# Patient Record
Sex: MALE | Race: WHITE | NOT HISPANIC OR LATINO | URBAN - METROPOLITAN AREA
[De-identification: names, ages, dates, MRNs, and addresses within clinical notes are randomized per-mention and may not be internally consistent; named-entity substitution may affect disease eponyms.]

---

## 2021-01-17 ENCOUNTER — EMERGENCY (EMERGENCY)
Facility: HOSPITAL | Age: 26
LOS: 1 days | Discharge: HOME | End: 2021-01-17
Attending: EMERGENCY MEDICINE | Admitting: EMERGENCY MEDICINE
Payer: COMMERCIAL

## 2021-01-17 VITALS
TEMPERATURE: 98 F | WEIGHT: 169.98 LBS | HEIGHT: 71 IN | OXYGEN SATURATION: 98 % | DIASTOLIC BLOOD PRESSURE: 82 MMHG | HEART RATE: 97 BPM | SYSTOLIC BLOOD PRESSURE: 127 MMHG | RESPIRATION RATE: 16 BRPM

## 2021-01-17 DIAGNOSIS — F11.99 OPIOID USE, UNSPECIFIED WITH UNSPECIFIED OPIOID-INDUCED DISORDER: ICD-10-CM

## 2021-01-17 LAB
ALBUMIN SERPL ELPH-MCNC: 3.5 G/DL — SIGNIFICANT CHANGE UP (ref 3.3–5)
ALP SERPL-CCNC: 86 U/L — SIGNIFICANT CHANGE UP (ref 30–120)
ALT FLD-CCNC: 22 U/L DA — SIGNIFICANT CHANGE UP (ref 10–60)
ANION GAP SERPL CALC-SCNC: 7 MMOL/L — SIGNIFICANT CHANGE UP (ref 5–17)
APAP SERPL-MCNC: <1 UG/ML — LOW (ref 10–30)
AST SERPL-CCNC: 21 U/L — SIGNIFICANT CHANGE UP (ref 10–40)
BASOPHILS # BLD AUTO: 0.04 K/UL — SIGNIFICANT CHANGE UP (ref 0–0.2)
BASOPHILS NFR BLD AUTO: 0.3 % — SIGNIFICANT CHANGE UP (ref 0–2)
BILIRUB SERPL-MCNC: 0.8 MG/DL — SIGNIFICANT CHANGE UP (ref 0.2–1.2)
BUN SERPL-MCNC: 20 MG/DL — SIGNIFICANT CHANGE UP (ref 7–23)
CALCIUM SERPL-MCNC: 9 MG/DL — SIGNIFICANT CHANGE UP (ref 8.4–10.5)
CHLORIDE SERPL-SCNC: 95 MMOL/L — LOW (ref 96–108)
CO2 SERPL-SCNC: 30 MMOL/L — SIGNIFICANT CHANGE UP (ref 22–31)
CREAT SERPL-MCNC: 0.97 MG/DL — SIGNIFICANT CHANGE UP (ref 0.5–1.3)
EOSINOPHIL # BLD AUTO: 0.31 K/UL — SIGNIFICANT CHANGE UP (ref 0–0.5)
EOSINOPHIL NFR BLD AUTO: 2 % — SIGNIFICANT CHANGE UP (ref 0–6)
ETHANOL SERPL-MCNC: <3 MG/DL — SIGNIFICANT CHANGE UP (ref 0–3)
GLUCOSE SERPL-MCNC: 109 MG/DL — HIGH (ref 70–99)
HCT VFR BLD CALC: 36.4 % — LOW (ref 39–50)
HGB BLD-MCNC: 11.7 G/DL — LOW (ref 13–17)
IMM GRANULOCYTES NFR BLD AUTO: 0.5 % — SIGNIFICANT CHANGE UP (ref 0–1.5)
LYMPHOCYTES # BLD AUTO: 13.8 % — SIGNIFICANT CHANGE UP (ref 13–44)
LYMPHOCYTES # BLD AUTO: 2.11 K/UL — SIGNIFICANT CHANGE UP (ref 1–3.3)
MCHC RBC-ENTMCNC: 26.8 PG — LOW (ref 27–34)
MCHC RBC-ENTMCNC: 32.1 GM/DL — SIGNIFICANT CHANGE UP (ref 32–36)
MCV RBC AUTO: 83.5 FL — SIGNIFICANT CHANGE UP (ref 80–100)
MONOCYTES # BLD AUTO: 0.9 K/UL — SIGNIFICANT CHANGE UP (ref 0–0.9)
MONOCYTES NFR BLD AUTO: 5.9 % — SIGNIFICANT CHANGE UP (ref 2–14)
NEUTROPHILS # BLD AUTO: 11.89 K/UL — HIGH (ref 1.8–7.4)
NEUTROPHILS NFR BLD AUTO: 77.5 % — HIGH (ref 43–77)
NRBC # BLD: 0 /100 WBCS — SIGNIFICANT CHANGE UP (ref 0–0)
PCP SPEC-MCNC: SIGNIFICANT CHANGE UP
PLATELET # BLD AUTO: 266 K/UL — SIGNIFICANT CHANGE UP (ref 150–400)
POTASSIUM SERPL-MCNC: 3.8 MMOL/L — SIGNIFICANT CHANGE UP (ref 3.5–5.3)
POTASSIUM SERPL-SCNC: 3.8 MMOL/L — SIGNIFICANT CHANGE UP (ref 3.5–5.3)
PROT SERPL-MCNC: 6.9 G/DL — SIGNIFICANT CHANGE UP (ref 6–8.3)
RBC # BLD: 4.36 M/UL — SIGNIFICANT CHANGE UP (ref 4.2–5.8)
RBC # FLD: 12.8 % — SIGNIFICANT CHANGE UP (ref 10.3–14.5)
SALICYLATES SERPL-MCNC: <0.2 MG/DL — LOW (ref 2.8–20)
SARS-COV-2 IGG SERPL QL IA: NEGATIVE — SIGNIFICANT CHANGE UP
SARS-COV-2 IGM SERPL IA-ACNC: 0.07 INDEX — SIGNIFICANT CHANGE UP
SARS-COV-2 RNA SPEC QL NAA+PROBE: SIGNIFICANT CHANGE UP
SODIUM SERPL-SCNC: 132 MMOL/L — LOW (ref 135–145)
WBC # BLD: 15.32 K/UL — HIGH (ref 3.8–10.5)
WBC # FLD AUTO: 15.32 K/UL — HIGH (ref 3.8–10.5)

## 2021-01-17 PROCEDURE — 93010 ELECTROCARDIOGRAM REPORT: CPT

## 2021-01-17 PROCEDURE — 90792 PSYCH DIAG EVAL W/MED SRVCS: CPT | Mod: 95

## 2021-01-17 PROCEDURE — 85025 COMPLETE CBC W/AUTO DIFF WBC: CPT

## 2021-01-17 PROCEDURE — 93005 ELECTROCARDIOGRAM TRACING: CPT

## 2021-01-17 PROCEDURE — 86769 SARS-COV-2 COVID-19 ANTIBODY: CPT

## 2021-01-17 PROCEDURE — U0003: CPT

## 2021-01-17 PROCEDURE — 99285 EMERGENCY DEPT VISIT HI MDM: CPT

## 2021-01-17 PROCEDURE — 36415 COLL VENOUS BLD VENIPUNCTURE: CPT

## 2021-01-17 PROCEDURE — 80053 COMPREHEN METABOLIC PANEL: CPT

## 2021-01-17 PROCEDURE — 99285 EMERGENCY DEPT VISIT HI MDM: CPT | Mod: 25

## 2021-01-17 PROCEDURE — U0005: CPT

## 2021-01-17 PROCEDURE — 84443 ASSAY THYROID STIM HORMONE: CPT

## 2021-01-17 PROCEDURE — 80307 DRUG TEST PRSMV CHEM ANLYZR: CPT

## 2021-01-17 NOTE — ED PROVIDER NOTE - PATIENT PORTAL LINK FT
You can access the FollowMyHealth Patient Portal offered by [facility] by registering at the following website: [email or web address]. By joining [facility]’s FollowMyHealth portal, you will also be able to view your health information using other applications (apps) compatible with our system.

## 2021-01-17 NOTE — ED PROVIDER NOTE - PROGRESS NOTE DETAILS
Jeannette ADAMS for ED attending, Dr. Sears: Spoke with pt's mother, who states pt with no real psych hx, but has extensive drug use hx. Reports pt has been depressed since brother  1 month ago. She noted him to be banging his head against wall yesterday and hallucinating, talking to someone who isn't there. Mother feels he is a danger to himself. Labs done. Pt ate tray of food. Still wants to leave. Awaiting Telepsych eval. Seen by Telepsych. Dr Hugo. Cleared for Discharge home. he will speak to pts mom about safety plan.

## 2021-01-17 NOTE — ED ADULT NURSE NOTE - OBJECTIVE STATEMENT
24 yo BIBA [facility] for agitation, as per [facility] pt mother called as pt was acting bizarre and agitated and aggressive . pt denies being agitated and or aggressive and states he was having a adverse reaction to new medication for withdrawals, pt reports hx of heroin abuse , states the last heroin use was around 01:00am. Pt denies being suicidal and or homicidal at this time, will place pt on 1:1 for risk of elopement , risk for hurting others and or himself.

## 2021-01-17 NOTE — ED ADULT TRIAGE NOTE - CHIEF COMPLAINT QUOTE
" I was nauseated earlier " as per pt  As per EMS, mother called EMS, pt has been agitated, acting bizarre, last use of Heroin at 1 am "

## 2021-01-17 NOTE — ED BEHAVIORAL HEALTH ASSESSMENT NOTE - SUMMARY
25 year-old man with opioid use disorder, history of detox/rehab/MAT, denying psychiatric diagnoses or hospitalizations, denying suicide attempts, who was BIB police for an evaluation. Psychiatry was consulted because of a report that the patient was banging his head and was possibly suicidal in the early morning hours while he was intoxicated on Kratom and possibly other substances. Patient meets criteria for opioid use disorder. He's been struggling with abstinence and has come across challenges in his recovery with his brother passing away last month and insurance problems preventing continuation of Vivitrol. It is reassuring that the patient is in the planning stage of his recovery at this point, stating his plan is to present to [facility] tomorrow for rehab. The patient is tyree for safety and is denying SI or HI. I do not have safety concerns for this patient from a psychiatric perspective that would warrant inpatient psychiatric treatment against his will; he is not interested in voluntary inpatient psychiatric treatment. He is at risk for overdosing from heroin and/or other substances. I attempted to mitigate this risk by informing the patient and his mother on harm reduction techniques, informing them of MAT options, and we are also faxing referrals to the ED for substance treatment in case the patient decides against representing to [facility] (or in the event [facility] is not accepting new patients tomorrow). The mother confirms that she has Narcan at home.

## 2021-01-17 NOTE — ED BEHAVIORAL HEALTH NOTE - BEHAVIORAL HEALTH NOTE
PRE-HOSPITAL COURSE  ===================  SOURCE:  Second-hand information via EMR documentation and primary RNAme.   DETAILS: Patient was BIB EMS    ===================  ED COURSE:   SOURCE:  Second-hand information via EMR documentation and primary RNAme.   ARRIVAL:  Patient was BIB EMS   BELONGINGS:  Clothing  BEHAVIOR: Complied with triage protocols –provided blood/urine, changed into a hospital gown, and allowed staff to wand/collect belongings without incident. Patient denies SI plan or ideation, denies HI, denies AH, and denies VH. Patient noted to be agitated stating that he wants to be discharged. Patient appears well-kempt, maintains eye contact, has a linear thought process, and is A&Ox3. No aggression or behavioral issues reported.   TREATMENT: No prn medications, restraints, security interventions or manual holds required.   VISITORS: Patient is unaccompanied by family or social supports.   ========================  FOR EACH COLLATERAL  ========================  NAME: Luli Bledsoe  NUMBER: [phone]  RELATIONSHIP: Mother   RELIABILITY: Reliable   COMMENTS:     =======================  PATIENT DEMOGRAPHICS: 25 year-old, male, single, domiciled with mother, unemployed, and a non-caregiver.   ========================  HPI  BASELINE FUNCTIONING: Collateral stated that patient has an extensive substance abuse history. She reported that patient has multiple detox/rehab admissions and usually discharges AMA and starts using immediately. Patient is not in treatment with a therapist or psychiatrist. Patient is not currently prescribed psychotropic medications.     DATE HPI STARTED: Yesterday   DECOMPENSATION: Collateral stated that patient was intoxicated yesterday and began acting bizarrely. She reported that patient had hallucinations and was observed having conversations with people who were not present. Patient began hitting/punching himself and attempting to scratch his skin off. Collateral brought patient to [facility] seeking a detox admission but they released him. Today, pt was observed to be intoxicated, talking to him, and had twitching body convulsions. No other mood/anxiety/psychotic symptoms reported.     SUICIDALITY: None   VIOLENCE: Pt was agitated at home   SUBSTANCE:  Heroin, opioids ?    ========================  PAST PSYCHIATRIC HISTORY  ========================  DATE PAST PSYCHIATRIC HISTORY STARTED: 7-8 years   MAIN PSYCHIATRIC DIAGNOSIS: Substance-abuse disorder   PSYCHIATRIC HOSPITALIZATIONS:  No hx of IPP. Multiple past rehab and detox admissions  SUICIDALITY:  Collateral reported that in December after patient brother  pt made a SI statement but recanted after he was taken to the ER  VIOLENCE:  Hx of agitation   SUBSTANCE USE:  hx of opioids, heroin, k2, marijuana, and other unknown substances ?    ==============  OTHER HISTORY  ==============  CURRENT MEDICATION:  None   MEDICAL HISTORY:  None   ALLERGIES: None   LEGAL ISSUES: Pt was in [employer] and on [employer] for larceny   FIREARM ACCESS: No reported access to firearms or weapons   FAMILY HISTORY: Pt brother  of an accidental overdose in 2020   DEVELOPMENTAL HISTORY: Normal developmental pattern     COVID Exposure Screen- collateral (i.e. third-party, chart review, belongings, etc; include EMS and ED staff)     1.        *Has the patient had a COVID-19 test in the last 21 days?  (  ) Yes   ( X ) No   (  ) Unknown- Reason: ______  IF YES PROCEED TO QUESTION #2. IF NO OR UNKNOWN THEN PLEASE SKIP TO QUESTION #3.  2.        Date of test: ________  3.        Do you know the result? (  ) Negative   (  ) Positive   (  ) No result available  4.        *In the past 14 days, has the patient been around anyone with a positive COVID-19 test?*  (  ) Yes   ( X ) No   (  ) Unknown- Reason (e.g. collateral uncertain, refusing to answer, etc.):  ______  IF YES PROCEED TO QUESTION #5. IF NO or UNKNOWN, PLEASE SKIP TO QUESTION #10  5.        Was the patient within 6 feet of them for at least 15 minutes? (  ) Yes   (  ) No   (  ) Unknown- Reason: ______   6.        Did the patient provide care for them? (  ) Yes   (  ) No   (  ) Unknown- Reason: ______   7.        Did the patient have direct physical contact with them (touched, hugged, or kissed them)? (  ) Yes   (  ) No    (  ) Unknown- Reason: ______   8.        Did the patient share eating or drinking utensils with them? (  ) Yes   (  ) No    (  ) Unknown- Reason: ______   9.        Have they sneezed, coughed, or somehow got respiratory droplets on the patient? (  ) Yes   (  ) No    (  ) Unknown- Reason: ______   10.     *Have you been out of [place] within the past 14 days?*  (  ) Yes   (X ) No   (  ) Unknown- Reason (e.g. patient uncertain, sedated, refusing to answer, etc.): _______  IF YES PLEASE ANSWER THE FOLLOWING QUESTIONS:  11.     Which state/country have you been

## 2021-01-17 NOTE — ED BEHAVIORAL HEALTH ASSESSMENT NOTE - DESCRIPTION
ICU Vital Signs Last 24 Hrs  T(C): 36.4 (17 Jan 2021 16:15), Max: 36.4 (17 Jan 2021 16:15)  T(F): 97.6 (17 Jan 2021 16:15), Max: 97.6 (17 Jan 2021 16:15)  HR: 97 (17 Jan 2021 16:15) (97 - 97)  BP: 127/82 (17 Jan 2021 16:15) (127/82 - 127/82)  BP(mean): --  ABP: --  ABP(mean): --  RR: 16 (17 Jan 2021 16:15) (16 - 16)  SpO2: 98% (17 Jan 2021 16:15) (98% - 98%) denies Living with mother at this time

## 2021-01-17 NOTE — ED PROVIDER NOTE - CLINICAL SUMMARY MEDICAL DECISION MAKING FREE TEXT BOX
Drug abuser, recently out of rehab, used Kratom today. Mother called EMS 2/2 agitation, reporting pt had been abusing drugs, agitated, and assaultive. Pt denies this, feels he had a bad reaction to Kratom. Denies SI or HI.   Plan: medical clearance and may need tele psych evaluation.

## 2021-01-17 NOTE — ED ADULT NURSE REASSESSMENT NOTE - NS ED NURSE REASSESS COMMENT FT1
Pt agreed to get blood work and ecg done after some encouraging and negotiating. Pt tolerated Iv process well.

## 2021-01-17 NOTE — ED PROVIDER NOTE - NSFOLLOWUPINSTRUCTIONS_ED_ALL_ED_FT
Polysubstance Abuse    WHAT YOU NEED TO KNOW:    Polysubstance abuse is the abuse of 2 or more drugs that cause impairment or distress. Examples include alcohol, nicotine, marijuana, cocaine, heroin, methamphetamine, hallucinogens such as mushrooms, or inhalants such as paint thinner. Prescribed medicines, such as opioids for pain or benzodiazepines for anxiety, are also commonly abused.    DISCHARGE INSTRUCTIONS:    Call your local emergency number (911 in the [place]) if:   •You feel you might harm yourself or others.          Return to the emergency department if:   •You have a seizure.      •You have chest pain and your heart is beating faster than usual.      •You have new shortness of breath.      •You are dizzy and lightheaded.      Call your doctor or therapist if:   •You are using drugs and think you are pregnant.      •You have withdrawal symptoms and want to start using drugs again.      •You have questions or concerns about your condition or care.      Risks of polysubstance abuse:   •Drug dependence is when you continue to use drugs, even when you know the risks. Polysubstance abuse can damage your heart, brain, lungs, liver, and gastrointestinal tract. You continue even when it causes problems with work, school, or relationships. You may have difficulty finding or keeping a job because of your drug dependence.      •Drug tolerance is when you need to use more drugs, or use them more often, to get the effects you want. You may not be able to stop using the drugs. When you try to stop, you may have withdrawal symptoms and strong cravings for the drugs.      •Drug overdose can occur when you take more drugs than your body can handle. This may be a small amount or a large amount. You can lose consciousness or have a seizure or stroke. Your heart can stop beating, or you can stop breathing. You may die from a drug overdose.      Medicines:   •Withdrawal medicines may be given according to the types of drugs you are abusing. Withdrawal from drugs can cause serious, life-threatening side effects. Certain medicines can help decrease your withdrawal symptoms and your desire for the drug. Ask for more information about the withdrawal medicines you may need.      •Mood stabilizers may be given to help prevent or treat depression or anxiety caused by drug abuse and withdrawal.      •Take your medicine as directed. Contact your healthcare provider if you think your medicine is not helping or if you have side effects. Tell him or her if you are allergic to any medicine. Keep a list of the medicines, vitamins, and herbs you take. Include the amounts, and when and why you take them. Bring the list or the pill bottles to follow-up visits. Carry your medicine list with you in case of an emergency.      Follow up with your doctor or therapist as directed: You may be referred to a specialist to treat health conditions caused by your drug use. This includes mental health, heart, or lung specialists. Write down your questions so you remember to ask them during your visits.    Therapy: You may need therapy and support to stop using drugs:   •Cognitive and behavioral therapy helps you change your thinking and behavior. It can help you develop plans to avoid the situations that make you want to use drugs. It also helps you cope with the feelings of wanting to use drugs. You may have individual or group therapy.      •Contingency management helps you set drug-free goals with a therapist. You will decide ways to celebrate your success when you reach a goal.      •Family therapy and support groups allow you and your family members to talk to and be encouraged by other people affected by drug abuse. You and your family members may attend together or separately. Ask your healthcare provider for information about programs in your area.      How polysubstance abuse affects unborn or  babies:   •If you are pregnant or get pregnant while using drugs, you may have a miscarriage or give birth early. Your baby may be born addicted to the drugs.      •Do not breastfeed your baby if you use drugs. Drugs pass from your bloodstream into your breast milk and affect your baby's health. Talk with your healthcare provider if you are using drugs and breastfeeding.      For support and more information:   •Alcoholics Anonymous  Web Address: http://www.aa.org      •Substance Abuse and Mental Health Services Administration  [address]  [place] [address]  Web Address: http://www.samhsa.gov

## 2021-01-17 NOTE — ED BEHAVIORAL HEALTH ASSESSMENT NOTE - DETAILS
I reviewed safety planning with the patient but did not fax a safety plan to the patient because his current acute and chronic risks are both low I spoke with the mother Denying SI

## 2021-01-17 NOTE — ED ADULT NURSE REASSESSMENT NOTE - NS ED NURSE REASSESS COMMENT FT1
Pt cleared by telepscyh, and is being discharge, offered pt to call his family and or girlfriend to come pick him up , pt refused, Pt also refused discharge VS.

## 2021-01-17 NOTE — ED BEHAVIORAL HEALTH ASSESSMENT NOTE - HPI (INCLUDE ILLNESS QUALITY, SEVERITY, DURATION, TIMING, CONTEXT, MODIFYING FACTORS, ASSOCIATED SIGNS AND SYMPTOMS)
25 year-old man with opioid use disorder, history of detox/rehab/MAT, denying psychiatric diagnoses or hospitalizations, denying suicide attempts, who was BIB police for an evaluation. Psychiatry was consulted because of a report that the patient was banging his head and was possibly suicidal in the early morning hours while he was intoxicated on Kratom and possibly other substances. Patient reports taking Kratom for the first time and having an adverse reaction to it. Says his mother became concerned about him, and had police take him to the ED. States he feels "anxious and claustrophobic" being held against his will in the ED. He states he wants to go home. He denies SI or HI, depressed mood or anhedonia. Denies symptoms of psychosis or zehra presently. Patient says he wants to re-present to rehab tomorrow morning and is planning to go to [facility]. Patient says his goals for the future include finishing out his parole and moving to [place] to live and work. I educated the patient about Suboxone, and he states he's familiar with medication options and used the abbreviation "MAT" himself. Patient reporting familiarity with harm reduction techniques and Narcan. Denies recent travel out of [place] or close contact with COVID+ people.     See SW note for collateral from mother.      I spoke to the mother myself to reinforce harm reduction and MAT options. She says she's familiar with how Narcan works. She says she's working on getting the patient on different insurance to make Vivitrol possible again. States he was previously on it but discontinued after insurance problems. Mother reports that the patient was psychotic in the early morning hours while intoxicated on Kratom. I explained that it's possible that the Kratom was cut with methamphetamine, a common practice called speedballing when uppers and combined with downers. Kratom itself has some stimulant properties. Results of the patient's UTOX are not presently available. In any event, she is denying safety concerns at this time and is agreeable to help facilitate his presentation to rehab tomorrow.      Records Checked: [facility], [facility] Inpatient, [facility], [facility] ED, [facility] Inpatient, [facility] CL, [facility] Outpatient Medical, HIE Outpatient , HIE ED, CVM Inpatient, CVM Outpatient, Tier Inpatient, Tier E&A, Meditech Inpatient, Meditech ED, Quick Docs, Healthix, Psyckes, One Content Inpatient, One Content CL, [facility], Social Media (For example - Facebook, Karma Snap, Huaat), Web search, Forensic Databases (For example - https://SmarTots.First Opinion.[place].gov or [email or web address].courts.[email or web address]/webcrim_attorney/DefendantSearch)      Records Checked-No Data: [facility], [facility] In[facility], [facility], [facility] ED, [facility] Inpatient, Alpha CL, [facility] Outpatient Medical, HIE Outpatient BH, HIE ED, CVM Inpatient, CVM Outpatient, Tier Inpatient, Tier E&A, Meditech Inpatient, Meditech ED, Quick Docs, One Content Inpatient, One Content CL, [facility], Social Media (For example - Facebook, Karma Snap, Huaat), Web search, Forensic Databases (For example - https://SmarTots.First Opinion.[place].gov or [email or web address])

## 2021-01-17 NOTE — ED BEHAVIORAL HEALTH ASSESSMENT NOTE - RISK ASSESSMENT
Acute and chronic risks for suicide/homicide are both low. He's denying SI/HI and is future-oriented and reports motivation for abstinence. Risk factors for violence include a history of being charged for larceny. The patient is at risk for an accidental overdose, a risk I attempted to mitigated as described above. Low Acute Suicide Risk

## 2021-01-17 NOTE — ED PROVIDER NOTE - OBJECTIVE STATEMENT
26 y/o M with PMHx of heroin abuse with prior detox, last used 1 week ago presents to the ED BIB [facility] for eval. Pt reports his mother called 911 because she was concerned about him because he was "having a reaction" to taking Kratom for the first time. Says he took Kratom because he is in "post acute withdrawal." Began having +diaphoresis, +N/V, and +abd cramping. States he feels fine in the ED and wants to go home. As per [facility], mother called 911 because pt was agitated, aggressive, and yelling at home. Pt denies this and no SI or HI. Current cigarette smoker. Occ marijuana use. 24 y/o M with PMHx of heroin abuse recently d/c from [facility] in [place] last week presents to the ED BIB [facility] for eval. Pt reports his mother called 911 because she was concerned about him because he was "having a reaction" to taking Kratom for the first time. Says he took Kratom because he is in "post acute withdrawal" as he has not used heroin in 1 week. After taking Kratom he began having +diaphoresis, +N/V, and +abd cramping. States he feels fine in the ED and wants to go home. As per [facility], mother called 911 because pt was agitated, aggressive, and yelling at home. Pt denies this and denies SI or HI. Current cigarette smoker. Occ marijuana use.

## 2021-04-30 ENCOUNTER — EMERGENCY (EMERGENCY)
Facility: HOSPITAL | Age: 26
LOS: 1 days | End: 2021-04-30
Admitting: EMERGENCY MEDICINE
Payer: OTHER GOVERNMENT

## 2021-04-30 PROCEDURE — 99285 EMERGENCY DEPT VISIT HI MDM: CPT

## 2021-04-30 PROCEDURE — 93010 ELECTROCARDIOGRAM REPORT: CPT

## 2021-04-30 PROCEDURE — 71046 X-RAY EXAM CHEST 2 VIEWS: CPT | Mod: 26

## 2021-05-12 ENCOUNTER — INPATIENT (INPATIENT)
Facility: HOSPITAL | Age: 26
LOS: 0 days | Discharge: ANOTHER HOSPITAL | End: 2021-05-13
Attending: INTERNAL MEDICINE
Payer: MEDICAID

## 2021-05-12 ENCOUNTER — OUTPATIENT (OUTPATIENT)
Dept: OUTPATIENT SERVICES | Facility: HOSPITAL | Age: 26
LOS: 1 days | End: 2021-05-12
Payer: MEDICAID

## 2021-05-12 PROCEDURE — 99285 EMERGENCY DEPT VISIT HI MDM: CPT

## 2021-05-12 PROCEDURE — 74230 X-RAY XM SWLNG FUNCJ C+: CPT | Mod: 26

## 2021-05-13 ENCOUNTER — INPATIENT (INPATIENT)
Facility: HOSPITAL | Age: 26
LOS: 2 days | Discharge: LEFT BEFORE DISCHARGE | DRG: 392 | End: 2021-05-16
Attending: STUDENT IN AN ORGANIZED HEALTH CARE EDUCATION/TRAINING PROGRAM | Admitting: HOSPITALIST
Payer: MEDICAID

## 2021-05-13 ENCOUNTER — TRANSCRIPTION ENCOUNTER (OUTPATIENT)
Age: 26
End: 2021-05-13

## 2021-05-13 VITALS
OXYGEN SATURATION: 99 % | RESPIRATION RATE: 16 BRPM | HEART RATE: 64 BPM | DIASTOLIC BLOOD PRESSURE: 65 MMHG | SYSTOLIC BLOOD PRESSURE: 102 MMHG | TEMPERATURE: 99 F

## 2021-05-13 DIAGNOSIS — K22.2 ESOPHAGEAL OBSTRUCTION: ICD-10-CM

## 2021-05-13 PROBLEM — F11.10 OPIOID ABUSE, UNCOMPLICATED: Chronic | Status: ACTIVE | Noted: 2021-01-17

## 2021-05-13 LAB
ALBUMIN SERPL ELPH-MCNC: 4.1 G/DL — SIGNIFICANT CHANGE UP (ref 3.3–5.2)
ALP SERPL-CCNC: 73 U/L — SIGNIFICANT CHANGE UP (ref 40–120)
ALT FLD-CCNC: 33 U/L — SIGNIFICANT CHANGE UP
ANION GAP SERPL CALC-SCNC: 9 MMOL/L — SIGNIFICANT CHANGE UP (ref 5–17)
AST SERPL-CCNC: 21 U/L — SIGNIFICANT CHANGE UP
BILIRUB SERPL-MCNC: 1.6 MG/DL — SIGNIFICANT CHANGE UP (ref 0.4–2)
BUN SERPL-MCNC: 17 MG/DL — SIGNIFICANT CHANGE UP (ref 8–20)
CALCIUM SERPL-MCNC: 9.2 MG/DL — SIGNIFICANT CHANGE UP (ref 8.6–10.2)
CHLORIDE SERPL-SCNC: 104 MMOL/L — SIGNIFICANT CHANGE UP (ref 98–107)
CO2 SERPL-SCNC: 28 MMOL/L — SIGNIFICANT CHANGE UP (ref 22–29)
CREAT SERPL-MCNC: 0.79 MG/DL — SIGNIFICANT CHANGE UP (ref 0.5–1.3)
GLUCOSE SERPL-MCNC: 84 MG/DL — SIGNIFICANT CHANGE UP (ref 70–99)
HCT VFR BLD CALC: 36.3 % — LOW (ref 39–50)
HGB BLD-MCNC: 10.8 G/DL — LOW (ref 13–17)
INR BLD: 1.17 RATIO — HIGH (ref 0.88–1.16)
MCHC RBC-ENTMCNC: 23.2 PG — LOW (ref 27–34)
MCHC RBC-ENTMCNC: 29.8 GM/DL — LOW (ref 32–36)
MCV RBC AUTO: 77.9 FL — LOW (ref 80–100)
PLATELET # BLD AUTO: 146 K/UL — LOW (ref 150–400)
POTASSIUM SERPL-MCNC: 4.5 MMOL/L — SIGNIFICANT CHANGE UP (ref 3.5–5.3)
POTASSIUM SERPL-SCNC: 4.5 MMOL/L — SIGNIFICANT CHANGE UP (ref 3.5–5.3)
PROT SERPL-MCNC: 6.7 G/DL — SIGNIFICANT CHANGE UP (ref 6.6–8.7)
PROTHROM AB SERPL-ACNC: 13.5 SEC — SIGNIFICANT CHANGE UP (ref 10.6–13.6)
RBC # BLD: 4.66 M/UL — SIGNIFICANT CHANGE UP (ref 4.2–5.8)
RBC # FLD: 15.2 % — HIGH (ref 10.3–14.5)
SODIUM SERPL-SCNC: 141 MMOL/L — SIGNIFICANT CHANGE UP (ref 135–145)
WBC # BLD: 4.6 K/UL — SIGNIFICANT CHANGE UP (ref 3.8–10.5)
WBC # FLD AUTO: 4.6 K/UL — SIGNIFICANT CHANGE UP (ref 3.8–10.5)

## 2021-05-13 PROCEDURE — 99223 1ST HOSP IP/OBS HIGH 75: CPT

## 2021-05-13 RX ORDER — SODIUM CHLORIDE 9 MG/ML
1000 INJECTION, SOLUTION INTRAVENOUS
Refills: 0 | Status: DISCONTINUED | OUTPATIENT
Start: 2021-05-13 | End: 2021-05-15

## 2021-05-13 RX ORDER — PANTOPRAZOLE SODIUM 20 MG/1
40 TABLET, DELAYED RELEASE ORAL DAILY
Refills: 0 | Status: DISCONTINUED | OUTPATIENT
Start: 2021-05-13 | End: 2021-05-13

## 2021-05-13 RX ORDER — ACETAMINOPHEN 500 MG
1000 TABLET ORAL ONCE
Refills: 0 | Status: COMPLETED | OUTPATIENT
Start: 2021-05-13 | End: 2021-05-13

## 2021-05-13 RX ORDER — PANTOPRAZOLE SODIUM 20 MG/1
40 TABLET, DELAYED RELEASE ORAL
Refills: 0 | Status: DISCONTINUED | OUTPATIENT
Start: 2021-05-13 | End: 2021-05-15

## 2021-05-13 RX ORDER — ENOXAPARIN SODIUM 100 MG/ML
40 INJECTION SUBCUTANEOUS DAILY
Refills: 0 | Status: DISCONTINUED | OUTPATIENT
Start: 2021-05-13 | End: 2021-05-13

## 2021-05-13 RX ADMIN — SODIUM CHLORIDE 100 MILLILITER(S): 9 INJECTION, SOLUTION INTRAVENOUS at 17:07

## 2021-05-13 RX ADMIN — PANTOPRAZOLE SODIUM 40 MILLIGRAM(S): 20 TABLET, DELAYED RELEASE ORAL at 17:07

## 2021-05-13 RX ADMIN — Medication 400 MILLIGRAM(S): at 21:25

## 2021-05-13 NOTE — CONSULT NOTE ADULT - ATTENDING COMMENTS
The patient probable has a very tight distal esophageal peptic stricture with possible superimposed barretts. Will proceed with EGD and possible savory dilation under flour tomorrow. The patient probable has a very tight distal esophageal peptic stricture with possible superimposed barretts. Will proceed with EGD and possible savory dilation under flour tomorrow. The treatment of his hep C is elective and should be addressed when his current problem has been rectified.

## 2021-05-13 NOTE — H&P ADULT - ASSESSMENT
24 y/o M with PMH of hiatal hernia, GERD, esophageal spasm (2012), illicit drug use transferred from [facility] s/p EGD with esophageal stricture.     Dysphagia secondary to esophageal stricture  - s/p MBS, esophagram, EGD at [place]  - GI consulted  - Protonix 40mg IV     GERD  Esophageal spasm   Hiatal Hernia  - Protonix 40mg IV daily    Daily tobacco use  - smoking cessation counseling given  - declines Nicotine patch    H/o drug use  - cocaine, heroin and K2  - counseling given  - last use March 2021    DVT ppx  - Lovenox SQ 26 y/o M with PMH of hiatal hernia, GERD, esophageal spasm (2012), illicit drug use transferred from [facility] s/p EGD with esophageal stricture.     Dysphagia secondary to esophageal stricture  - s/p MBS, esophagram, EGD at [place]  - GI consulted  - Protonix 40mg IV   - Speech and swallow evaluation    GERD  Esophageal spasm   Hiatal Hernia  - Protonix 40mg IV daily    Daily tobacco use  - smoking cessation counseling given  - declines Nicotine patch    H/o drug use  - cocaine, heroin and K2  - counseling given  - last use March 2021    DVT ppx  - Lovenox SQ 24 y/o M with PMH of hiatal hernia, GERD, esophageal spasm (2012), illicit drug use transferred from [facility] s/p EGD with esophageal stricture.     Dysphagia secondary to esophageal stricture  - s/p MBS, esophagram, EGD at [place]  - GI consulted  - Protonix 40mg IV   - Speech and swallow evaluation    GERD  Esophageal spasm   Hiatal Hernia  - Protonix 40mg IV daily    Daily tobacco use  - smoking cessation counseling given  - declines Nicotine patch    H/o drug use  - cocaine, heroin and K2  - counseling given  - last use March 2021    DVT ppx  - Lovenox SQ

## 2021-05-13 NOTE — H&P ADULT - NSHPPHYSICALEXAM_GEN_ALL_CORE
Vital Signs Last 24 Hrs  T(F): --  HR: --  BP: --  RR: --  SpO2: --    Physical Exam:  Constitutional: alert and oriented, in no acute distress   Neck: Soft and supple, No LAD, No JVD  Respiratory: Clear to auscultation bilaterally, no wheezes or crackles  Cardiovascular: Regular rate and rhythm, no murmurs, gallops, rubs  Gastrointestinal: Soft, non-tender to palpation, +bs  Vascular: 2+ peripheral pulses  Neurological: A/O x 3, no focal neurological deficits  Musculoskeletal: 5/5 strength b/l upper and lower extremities, no lower extremity edema bilaterally  Skin: no rashes  Psych: answering questions appropriately

## 2021-05-13 NOTE — CHART NOTE - NSCHARTNOTEFT_GEN_A_CORE
Called by RN for patient with complain of abdominal pain   Patient seen and assessed, and chart reviewed. patient is a 25y old man with a past medical history significant for Hiatal hernia, GERD, , Hep C, IV Drug user (last use 03/2021), who presents as a transfer from [facility] [place] complaining of dysphagia., with esophageal stricture and scheduled for EGD in am. Pt NPO on IVFs for EGD in am.    Vital Signs Last 24 Hrs  T(C): --  T(F): --  HR: --  BP: --  BP(mean): --  RR: --  SpO2: -- Called by RN for patient with complain of abdominal pain   Patient seen and assessed, and chart reviewed. Patient is a 25y old man with a past medical history significant for Hiatal hernia, GERD, , Hep C, IV Drug user (last use 03/2021), who presents as a transfer from [facility] [place] complaining of dysphagia., with esophageal stricture and scheduled for EGD in am. Pt NPO on IVFs for EGD in am. Patient denied nausea, vomiting, constipation, diarrhea, sob, chest pain, palpitations. Reports most recent BM was yesterday. Voiding without urinary retention/burning.    Vital Signs Last 24 Hrs    T(F): 98.8  HR: 75  BP: 112/67  RR: 15  SpO2: 99%    Physical Exam  General: Alert and oriented x4, well developed, comfortable with unlabored breathing  HEENT: NC/AT, EOMI, moist mucosa  Neck: Supple. No JVD  Cardiovasc: S1S2, RRR. No murmur  Gastroint: Soft, non-distended, mildly tender RLQ abdomen. No rebound tenderness, no guarding. Active bowel sounds throughout all quadrants.  Extrem: No pedal edema    A/P 24 Yo with abdominal pain, esophogeal stricture, PMHx Hiatal hernia, GERD, scheduled for EGD. Unclear if abd pain associated with esophageal stricture, or GERD. Pt also NPO since yesterday. No constipation, diarrhea, vomiting, nausea. EGD may shed light on pt's source of abd pain.  -Ofirmev 1000mg IVPB  Cont to monitor and manage as indicated.  RN instructed to call provider if patients condition worsens.

## 2021-05-13 NOTE — H&P ADULT - HISTORY OF PRESENT ILLNESS
26 y/o M with PMH of hiatal hernia, GERD, esophageal spasm (2012), illicit drug use presents as transfer from [facility] after EGD. The patient states he has been having difficulty swallowing for the last 2 months to the point where he was only able to drink liquids (Boost and Ensure). Patient underwent MBS and esophagram yesterday showing esophageal stricture. He was then sent for EGD showing severe stricture and as per patient, stricture was severe enough that biopsy was unable to be taken. Patient states his dysphagia worsened one month ago where he was barely able to swallow liquids. He was told this might have been caused by caustic ingestion per GI. Reports feeling tightness in his chest when he attempts to eat or drink and has occasional nausea.  24 y/o M with PMH of hiatal hernia, GERD, esophageal spasm (2012), illicit drug use presents as [place] [facility] [place]. The patient states he has been having difficulty swallowing for the last 2 months to the point where he was only able to drink liquids (Boost and Ensure). Patient underwent MBS and esophagram yesterday showing esophageal stricture. He was then sent for EGD showing severe stricture and as per patient, stricture was severe enough that biopsy was unable to be taken. Patient states his dysphagia worsened one month ago where he was barely able to swallow liquids. He was told this might have been caused by caustic ingestion per GI. Reports feeling tightness in his chest when he attempts to eat or drink and has occasional nausea.

## 2021-05-13 NOTE — CONSULT NOTE ADULT - SUBJECTIVE AND OBJECTIVE BOX
HISTORY OF PRESENT ILLNESS: This is a 25y old man with a past medical history significant for Hiatal hernia, GERD, esophageal spasms, Hep C, IV Drug user (last use 03/2021), who presents as a transfer from [facility] [place] complaining of dysphagia. As per patient yesterday at GI and had a modify Barium Swallow eval, and after abnormal results was referred for an Esophagram. After the Esophagram patient was sent to [facility] ED for an EGD where a benign stricture was seen in the middle third of the esophagus. Then patient transfer to [facility] for repeat EGD with fluoroscopic dilation  Patient stated difficulty swallowing started as mild discomfort approximately 2 months ago, where at the same time try a new drug called "K2" patient's diet has progressively changing from boost and Ensure to minimally water. Symptoms worsen 2 weeks ago where it was taking patient a long time to swallow fluids. Patient had an EGD in 2012 because of similar symptoms and was diagnosed with esophageal spasms. Denies nausea, vomiting, abdominal pain, chest pain, shortness of breath, hematemesis, hematochezia, melena.  ROS: A 14-point review of systems was completed and was otherwise negative save what was reported in the HPI.    PAST MEDICAL/SURGICAL HISTORY:  Heroin abuse    Cocaine abuse  GERD, Esophageal stricture   No significant past surgical history      SOCIAL HISTORY:  - TOBACCO: Denies  - ALCOHOL: Denies  - ILLICIT DRUG USE: Denies    FAMILY HISTORY:  No known history of gastrointestinal or liver disease;  FH: thyroid disease (Grandparent)        HOME MEDICATIONS:  Protonix 40 mg oral delayed release tablet: 1 tab(s) orally once a day (13 May 2021 12:04)    INPATIENT MEDICATIONS:  MEDICATIONS  (STANDING):  enoxaparin Injectable 40 milliGRAM(s) SubCutaneous daily  pantoprazole  Injectable 40 milliGRAM(s) IV Push daily    MEDICATIONS  (PRN):    ALLERGIES:  No Known Allergies      PHYSICAL EXAM:  Constitutional: Well-developed, well-nourished, in no apparent distress  Eyes: Sclerae anicteric, conjunctivae normal  ENMT: Mucus membranes moist, no oropharyngeal thrush noted  Respiratory: Breathing nonlabored; clear to auscultation  Cardiovascular: Regular rate and rhythm  Gastrointestinal: Soft, nontender, nondistended, normoactive bowel sounds.  Extremities: No clubbing, cyanosis or edema  Neurological: Alert and oriented to person, place and time; no asterixis  Skin: No jaundice  Musculoskeletal: No significant peripheral atrophy  Psychiatric: Affect and mood appropriate      LABS:                     HISTORY OF PRESENT ILLNESS: This is a 25y old man with a past medical history significant for Hiatal hernia, GERD, esophageal spasms, Hep C, IV Drug user (last use 03/2021), who [place] [facility]. As per patient yesterday at GI and had a modify Barium Swallow eval, and after abnormal results was referred for an Esophagram. After the Esophagram patient was sent [place] [facility] [place]GD where a benign stricture was seen in the middle third of the esophagus. Then patient transfer to [facility] for repeat EGD with fluoroscopic dilation.  Patient stated difficulty swallowing started as mild discomfort approximately 2years ago and in the last 2 months,(  the same time he try a new drug called "K2") patient's diet has progressively changing from boost and Ensure to minimally water. Symptoms worsen 2 weeks ago where it was taking patient a long time to swallow fluids. Patient had an EGD in 2012 because of similar symptoms and was diagnosed with GERD/esophageal spasms. Denies nausea, vomiting, abdominal pain, chest pain, shortness of breath, hematemesis, hematochezia, melena.  ROS: A 14-point review of systems was completed and was otherwise negative save what was reported in the HPI.    PAST MEDICAL/SURGICAL HISTORY:  Heroin abuse    Cocaine abuse  GERD, Esophageal stricture   No significant past surgical history      SOCIAL HISTORY:  - TOBACCO: Denies  - ALCOHOL: Denies  - ILLICIT DRUG USE: Denies    FAMILY HISTORY:  No known history of gastrointestinal or liver disease;  FH: thyroid disease (Grandparent)        HOME MEDICATIONS:  Protonix 40 mg oral delayed release tablet: 1 tab(s) orally once a day (13 May 2021 12:04)    INPATIENT MEDICATIONS:  MEDICATIONS  (STANDING):  enoxaparin Injectable 40 milliGRAM(s) SubCutaneous daily  pantoprazole  Injectable 40 milliGRAM(s) IV Push daily    MEDICATIONS  (PRN):    ALLERGIES:  No Known Allergies      PHYSICAL EXAM:  Constitutional: Well-developed, well-nourished, in no apparent distress  Eyes: Sclerae anicteric, conjunctivae normal  ENMT: Mucus membranes moist, no oropharyngeal thrush noted  Respiratory: Breathing nonlabored; clear to auscultation  Cardiovascular: Regular rate and rhythm  Gastrointestinal: Soft, nontender, nondistended, normoactive bowel sounds.  Extremities: No clubbing, cyanosis or edema  Neurological: Alert and oriented to person, place and time; no asterixis  Skin: No jaundice  Musculoskeletal: No significant peripheral atrophy  Psychiatric: Affect and mood appropriate      LABS:                     HISTORY OF PRESENT ILLNESS: This is a 25y old man with a past medical history significant for Hiatal hernia, GERD, , Hep C, IV Drug user (last use 2021), who presents as a transfer from [facility] [place] complaining of dysphagia. For past year he has had intermittant dysphagia to solids but now much worse and progressive for past two months to the point where he cant even tolerate clear liquids.. Earlier this year his brother  of an overdise and from  to march he was using IV heroin, cocaine and K2. More recently he has been residing in [facility] for rehab in [place]. Then patient transfer to [facility] for repeat EGD with fluoroscopic dilation.  Patient stated difficulty swallowing started as mild discomfort approximately 2years ago and in the last 2 months,(  the same time he try a new drug called "K2") patient's diet has progressively changing from boost and Ensure to minimally water. Symptoms worsen 2 weeks ago where it was taking patient a long time to swallow fluids. Patient had an EGD in  because of similar symptoms and was diagnosed with GERD/esophageal spasms. Denies nausea, vomiting, abdominal pain, chest pain, shortness of breath, hematemesis, hematochezia, melena.  ROS: A 14-point review of systems was completed and was otherwise negative save what was reported in the HPI.    PAST MEDICAL/SURGICAL HISTORY:  Heroin abuse    Cocaine abuse  GERD, Esophageal stricture   No significant past surgical history      SOCIAL HISTORY:  - TOBACCO: Denies  - ALCOHOL: Denies  - ILLICIT DRUG USE: Denies    FAMILY HISTORY:  No known history of gastrointestinal or liver disease;  FH: thyroid disease (Grandparent)        HOME MEDICATIONS:  Protonix 40 mg oral delayed release tablet: 1 tab(s) orally once a day (13 May 2021 12:04)    INPATIENT MEDICATIONS:  MEDICATIONS  (STANDING):  enoxaparin Injectable 40 milliGRAM(s) SubCutaneous daily  pantoprazole  Injectable 40 milliGRAM(s) IV Push daily    MEDICATIONS  (PRN):    ALLERGIES:  No Known Allergies      PHYSICAL EXAM:  Constitutional: Well-developed, well-nourished, in no apparent distress  Eyes: Sclerae anicteric, conjunctivae normal  ENMT: Mucus membranes moist, no oropharyngeal thrush noted  Respiratory: Breathing nonlabored; clear to auscultation  Cardiovascular: Regular rate and rhythm  Gastrointestinal: Soft, nontender, nondistended, normoactive bowel sounds.  Extremities: No clubbing, cyanosis or edema  Neurological: Alert and oriented to person, place and time; no asterixis  Skin: No jaundice  Musculoskeletal: No significant peripheral atrophy  Psychiatric: Affect and mood appropriate      LABS:                     HISTORY OF PRESENT ILLNESS: This is a 25y old man with a past medical history significant for Hiatal hernia, GERD, , Hep C, IV Drug user (last use 2021), who presents as a [place] [facility] [place] of dysphagia. For past year he has had intermittant dysphagia to solids but now much worse and progressive for past two months to the point where he cant even tolerate clear liquids.. Earlier this year his brother  of an overdise and from  to March he was using IV heroin, cocaine and K2. More recently he has been residing in [facility] for rehab in [place]. Yesterday patient's  mother picked him up in [place] and brought him to [facility] for an [place]ophagram which showed a smooth but tight distal esophageal stricture. He was admitted and underwent EGD by Dr. Suh this AM that showed a tight stricture thru which he could not pass. He stated the stricture was in the mid esophagus. He was then transferred here for further treatment. He underwent an EGD  for reflux and was told he had a hiatal hernia and reflux with esophageal spasm. he was treated with pantoprazole for one year after which the med was stopped. he was having daily heartburn ever since for which he takes tums. The heartburn could awaken him from sleep. The heartburn stopped when the dysphagia worsened two months ago. ROS: A 14-point review of systems was completed and was otherwise negative save what was reported in the HPI.    PAST MEDICAL/SURGICAL HISTORY:  Heroin abuse  chronic hep C -never treated and just recently diagnosed  Cocaine abuse  GERD, Esophageal stricture         SOCIAL HISTORY:  - TOBACCO: Denies  - ALCOHOL: Denies  - ILLICIT DRUG USE: Denies    FAMILY HISTORY:  No known history of gastrointestinal or liver disease;  FH: thyroid disease (Grandparent)        HOME MEDICATIONS:  Protonix 40 mg oral delayed release tablet: 1 tab(s) orally once a day (13 May 2021 12:04)    INPATIENT MEDICATIONS:  MEDICATIONS  (STANDING):  enoxaparin Injectable 40 milliGRAM(s) SubCutaneous daily  pantoprazole  Injectable 40 milliGRAM(s) IV Push daily    MEDICATIONS  (PRN):    ALLERGIES:  No Known Allergies      PHYSICAL EXAM:  Constitutional: Well-developed, well-nourished, in no apparent distress  Eyes: Sclerae anicteric, conjunctivae normal  ENMT: Mucus membranes moist, no oropharyngeal thrush noted  Respiratory: Breathing nonlabored; clear to auscultation  Cardiovascular: Regular rate and rhythm  Gastrointestinal: Soft, nontender, nondistended, normoactive bowel sounds.  Extremities: No clubbing, cyanosis or edema  Neurological: Alert and oriented to person, place and time; no asterixis  Skin: No jaundice  Musculoskeletal: No significant peripheral atrophy  Psychiatric: Affect and mood appropriate      LABS:

## 2021-05-13 NOTE — H&P ADULT - NSICDXFAMILYHX_GEN_ALL_CORE_FT
FAMILY HISTORY:  Grandparent  Still living? Unknown  FH: thyroid disease, Age at diagnosis: Age Unknown

## 2021-05-13 NOTE — H&P ADULT - NSHPSOCIALHISTORY_GEN_ALL_CORE
Lives with family  Recently incarcerated from March until 2 weeks ago  Denies alcohol use  Smokes 1/2 pack of cigarettes per day  Former Heroin, Cocaine and K2 use (last used March 2021)

## 2021-05-13 NOTE — PATIENT PROFILE ADULT - PATIENT REPRESENTATIVE: ( YOU CAN CHOOSE ANY PERSON THAT CAN ASSIST YOU WITH YOUR HEALTH CARE PREFERENCES, DOES NOT HAVE TO BE A SPOUSE, IMMEDIATE FAMILY OR SIGNIFICANT OTHER/PARTNER)
PHYSICIAN NEXT STEPS:  Review Only    CHIEF COMPLAINT:  Chief Complaint/Protocol Used: Back Pain  Onset: Got worse today        ASSESSMENT:  ? Onset: Got worse today    ? Normal True  ? Normal, no trouble breathing True  ? Onset: Got worse today , took pain medication , not helping , had physucal therapy earlier  today    ? Location: Midback and  bilateral  hip areas   ? Severity: Severe , Pain = 8 in a scale of 1-10   ? Pattern: Constant   ? Cause: Chronic problems  to  back areas   ? Neurologic Symptoms: None    ? Other Symptoms: Afebrile ; no  abddominal  pain , no urinary symptoms   -------------------------------------------------------    DISPOSITION:  Disposition Recommendation: See Physician within 4 Hours (or PCP triage)  Questions that led to disposition:  ? [1] SEVERE back pain (e.g., excruciating, unable to do any normal activities) AND [2] not improved 2 hours after pain medicine  Patient Directed To: Unspecified  Patient Intended Action: Other      CALL NOTES:  11/11/2020 at 4:44 PM by Ra Hoyt  ? Patient states she has no  SOB ,no chest pain  , not feeling faint  ; Hx COPD , has oxygen at home  ; symptoms that  got worse today   :  severe middle   back pain  and   ran out of  her  Norco   pain med since   yesterday . 11/11/2020 at 4:43 PM by   Ra Hoyt  ? Upgraded to   ED Disposition due to symptoms    ; patient declined   and insist to   have PCP   to  send refill of  her  [place] to her  pharmacy   because she ran out of it already  since yesterday . Patient   states the last   time she   refilled   her   Norco was  9/22/2020  and has been calling  for  refill request since  last  week ;  Magy Matias MD on call  Dr. Beard   ; patient informed  ;Advised patient   to  go to the hospital ER or call  911  if her   symptoms get worse and patient   agreed with the  advice.    DISPOSITION OVERRIDE/PROVIDER CONSULT:  Disposition Override: Go to ED Now  Override Source: Nurse clinical  judgment  Consulted with PCP: No  Consulted with On-Call Physician: No    CALLER CONTACT INFO:  Name: Soheila Boykin (Self)  Phone 1: [phone] (Home Phone) - Preferred      ENCOUNTER STARTED:  11/11/20 04:31:08 PM  ENCOUNTER ASSIGNED TO/CLOSED BY:  Ra Hoyt @ 11/11/20 04:45:05 PM      -------------------------------------------------------    UNDERSTANDS CARE ADVICE: Yes    AGREES WITH CARE ADVICE: No    WILL FOLLOW CARE ADVICE: No    -------------------------------------------------------   same name as above

## 2021-05-13 NOTE — CONSULT NOTE ADULT - PROBLEM SELECTOR RECOMMENDATION 9
Please keep Patient NPO  IV-Fluids  Will schedule EGD with dilation tomorrow in AM Please keep Patient NPO  IV-Fluids  Will schedule EGD with dilation tomorrow in AM  Please discontinue Lovenox for EGD with dilation tomorrow in AM. Please keep Patient NPO  IV-Fluids  Will schedule EGD with dilation tomorrow in AM  Please discontinue Lovenox for EGD with dilation tomorrow in AM.  IV pantoprazole BID

## 2021-05-14 DIAGNOSIS — F11.10 OPIOID ABUSE, UNCOMPLICATED: ICD-10-CM

## 2021-05-14 LAB
ALBUMIN SERPL ELPH-MCNC: 3.5 G/DL — SIGNIFICANT CHANGE UP (ref 3.3–5.2)
ALP SERPL-CCNC: 66 U/L — SIGNIFICANT CHANGE UP (ref 40–120)
ALT FLD-CCNC: 26 U/L — SIGNIFICANT CHANGE UP
ANION GAP SERPL CALC-SCNC: 8 MMOL/L — SIGNIFICANT CHANGE UP (ref 5–17)
ANISOCYTOSIS BLD QL: SLIGHT — SIGNIFICANT CHANGE UP
AST SERPL-CCNC: 17 U/L — SIGNIFICANT CHANGE UP
BASOPHILS # BLD AUTO: 0.03 K/UL — SIGNIFICANT CHANGE UP (ref 0–0.2)
BASOPHILS NFR BLD AUTO: 0.9 % — SIGNIFICANT CHANGE UP (ref 0–2)
BILIRUB SERPL-MCNC: 1.5 MG/DL — SIGNIFICANT CHANGE UP (ref 0.4–2)
BUN SERPL-MCNC: 14 MG/DL — SIGNIFICANT CHANGE UP (ref 8–20)
BURR CELLS BLD QL SMEAR: PRESENT — SIGNIFICANT CHANGE UP
CALCIUM SERPL-MCNC: 8.8 MG/DL — SIGNIFICANT CHANGE UP (ref 8.6–10.2)
CHLORIDE SERPL-SCNC: 105 MMOL/L — SIGNIFICANT CHANGE UP (ref 98–107)
CO2 SERPL-SCNC: 25 MMOL/L — SIGNIFICANT CHANGE UP (ref 22–29)
COVID-19 SPIKE DOMAIN AB INTERP: NEGATIVE — SIGNIFICANT CHANGE UP
COVID-19 SPIKE DOMAIN ANTIBODY RESULT: 0.4 U/ML — SIGNIFICANT CHANGE UP
CREAT SERPL-MCNC: 0.74 MG/DL — SIGNIFICANT CHANGE UP (ref 0.5–1.3)
ELLIPTOCYTES BLD QL SMEAR: SIGNIFICANT CHANGE UP
EOSINOPHIL # BLD AUTO: 0.06 K/UL — SIGNIFICANT CHANGE UP (ref 0–0.5)
EOSINOPHIL NFR BLD AUTO: 1.7 % — SIGNIFICANT CHANGE UP (ref 0–6)
GIANT PLATELETS BLD QL SMEAR: PRESENT — SIGNIFICANT CHANGE UP
GLUCOSE SERPL-MCNC: 72 MG/DL — SIGNIFICANT CHANGE UP (ref 70–99)
HCT VFR BLD CALC: 33.8 % — LOW (ref 39–50)
HGB BLD-MCNC: 10.2 G/DL — LOW (ref 13–17)
LYMPHOCYTES # BLD AUTO: 1.73 K/UL — SIGNIFICANT CHANGE UP (ref 1–3.3)
LYMPHOCYTES # BLD AUTO: 49.6 % — HIGH (ref 13–44)
MANUAL SMEAR VERIFICATION: SIGNIFICANT CHANGE UP
MCHC RBC-ENTMCNC: 23 PG — LOW (ref 27–34)
MCHC RBC-ENTMCNC: 30.2 GM/DL — LOW (ref 32–36)
MCV RBC AUTO: 76.3 FL — LOW (ref 80–100)
MICROCYTES BLD QL: SLIGHT — SIGNIFICANT CHANGE UP
MONOCYTES # BLD AUTO: 0.39 K/UL — SIGNIFICANT CHANGE UP (ref 0–0.9)
MONOCYTES NFR BLD AUTO: 11.3 % — SIGNIFICANT CHANGE UP (ref 2–14)
NEUTROPHILS # BLD AUTO: 1.27 K/UL — LOW (ref 1.8–7.4)
NEUTROPHILS NFR BLD AUTO: 36.5 % — LOW (ref 43–77)
OVALOCYTES BLD QL SMEAR: SLIGHT — SIGNIFICANT CHANGE UP
PLAT MORPH BLD: NORMAL — SIGNIFICANT CHANGE UP
PLATELET # BLD AUTO: 140 K/UL — LOW (ref 150–400)
POIKILOCYTOSIS BLD QL AUTO: SIGNIFICANT CHANGE UP
POLYCHROMASIA BLD QL SMEAR: SLIGHT — SIGNIFICANT CHANGE UP
POTASSIUM SERPL-MCNC: 3.9 MMOL/L — SIGNIFICANT CHANGE UP (ref 3.5–5.3)
POTASSIUM SERPL-SCNC: 3.9 MMOL/L — SIGNIFICANT CHANGE UP (ref 3.5–5.3)
PROT SERPL-MCNC: 6 G/DL — LOW (ref 6.6–8.7)
RBC # BLD: 4.43 M/UL — SIGNIFICANT CHANGE UP (ref 4.2–5.8)
RBC # FLD: 14.9 % — HIGH (ref 10.3–14.5)
RBC BLD AUTO: ABNORMAL
SARS-COV-2 IGG+IGM SERPL QL IA: 0.4 U/ML — SIGNIFICANT CHANGE UP
SARS-COV-2 IGG+IGM SERPL QL IA: NEGATIVE — SIGNIFICANT CHANGE UP
SARS-COV-2 RNA SPEC QL NAA+PROBE: SIGNIFICANT CHANGE UP
SODIUM SERPL-SCNC: 138 MMOL/L — SIGNIFICANT CHANGE UP (ref 135–145)
WBC # BLD: 3.49 K/UL — LOW (ref 3.8–10.5)
WBC # FLD AUTO: 3.49 K/UL — LOW (ref 3.8–10.5)

## 2021-05-14 PROCEDURE — 43200 ESOPHAGOSCOPY FLEXIBLE BRUSH: CPT

## 2021-05-14 PROCEDURE — 99221 1ST HOSP IP/OBS SF/LOW 40: CPT

## 2021-05-14 PROCEDURE — 71250 CT THORAX DX C-: CPT | Mod: 26

## 2021-05-14 PROCEDURE — 99233 SBSQ HOSP IP/OBS HIGH 50: CPT

## 2021-05-14 PROCEDURE — 71045 X-RAY EXAM CHEST 1 VIEW: CPT | Mod: 26

## 2021-05-14 RX ADMIN — PANTOPRAZOLE SODIUM 40 MILLIGRAM(S): 20 TABLET, DELAYED RELEASE ORAL at 05:09

## 2021-05-14 RX ADMIN — PANTOPRAZOLE SODIUM 40 MILLIGRAM(S): 20 TABLET, DELAYED RELEASE ORAL at 18:07

## 2021-05-14 NOTE — BRIEF OPERATIVE NOTE - NSICDXBRIEFPROCEDURE_GEN_ALL_CORE_FT
PROCEDURES:  EGD with biopsy 13-May-2021 14:53:25  Deniz Craft  
PROCEDURES:  EGD 14-May-2021 13:55:59  Amador Cox

## 2021-05-14 NOTE — BRIEF OPERATIVE NOTE - NSICDXBRIEFPOSTOP_GEN_ALL_CORE_FT
POST-OP DIAGNOSIS:  Esophageal stricture 14-May-2021 13:56:41  Amador Cox  
POST-OP DIAGNOSIS:  Nausea and vomiting 13-May-2021 14:54:29  Deniz Craft

## 2021-05-14 NOTE — CONSULT NOTE ADULT - SUBJECTIVE AND OBJECTIVE BOX
HISTORY OF PRESENT ILLNESS: This is a 25y old man with a past medical history significant for Hiatal hernia, GERD,  Hep C, IV Drug user (last use 2021), who [place] [facility] [place]. For past year he has had intermittant dysphagia to solids but now much worse and progressive for past two months to the point where he cant even tolerate clear liquids. Earlier this year his brother  of an overdose and from  to March he was using IV heroin, cocaine and K2. More recently he has been residing in [facility] for rehab in [place]. Yesterday patient's  mother picked him up in [place] and [place] [facility] [place] which showed a smooth but tight distal esophageal stricture. He was admitted and underwent EGD by Dr. Suh this AM that showed a tight stricture thru which he could not pass. He stated the stricture was in the mid esophagus. He was then transferred here for further treatment. He underwent an EGD  for reflux and was told he had a hiatal hernia and reflux with esophageal spasm. he was treated with pantoprazole for one year after which the med was stopped. He was having daily heartburn ever since for which he takes tums. The heartburn could awaken him from sleep. The heartburn stopped when the dysphagia worsened two months ago. ROS: A 14-point review of systems was completed and was otherwise negative save what was reported in the HPI.    PAST MEDICAL/SURGICAL HISTORY:  Heroin abuse  chronic hep C -never treated and just recently diagnosed  Cocaine abuse  GERD, Esophageal stricture         SOCIAL HISTORY:  - TOBACCO: Denies  - ALCOHOL: Denies  - ILLICIT DRUG USE: Denies    FAMILY HISTORY:  No known history of gastrointestinal or liver disease;  FH: thyroid disease (Grandparent)        HOME MEDICATIONS:  Protonix 40 mg oral delayed release tablet: 1 tab(s) orally once a day (13 May 2021 12:04)    INPATIENT MEDICATIONS:  MEDICATIONS  (STANDING):  enoxaparin Injectable 40 milliGRAM(s) SubCutaneous daily  pantoprazole  Injectable 40 milliGRAM(s) IV Push daily        ALLERGIES:  No Known Allergies      PHYSICAL EXAM:  Constitutional: Well-developed, well-nourished, in no apparent distress  Eyes: Sclerae anicteric, conjunctivae normal  ENMT: Mucus membranes moist, no oropharyngeal thrush noted  Respiratory: Breathing nonlabored; clear to auscultation  Cardiovascular: Regular rate and rhythm  Gastrointestinal: Soft, nontender, nondistended, normoactive bowel sounds.  Extremities: No clubbing, cyanosis or edema  Neurological: Alert and oriented to person, place and time; no asterixis  Skin: No jaundice  Musculoskeletal: No significant peripheral atrophy  Psychiatric: Affect and mood appropriate      LABS:                        10.2   3.49  )-----------( 140      ( 14 May 2021 06:00 )             33.8       138  |  105  |  14.0  ----------------------------<  72  3.9   |  25.0  |  0.74    Ca    8.8      14 May 2021 06:00    TPro  6.0<L>  /  Alb  3.5  /  TBili  1.5  /  DBili  x   /  AST  17  /  ALT  26  /  AlkPhos  66      < from: CT Chest No Cont (21 @ 15:49) >  IMPRESSION:  Diffuse esophageal wall thickening with a linear tract of contrast to the left of the mid to distal esophageal lumen, possibly a small amount of contrast tracking in the esophageal wall from a tear in the mid to distal esophagus.    No pneumomediastinum or contrast outside the esophagus.    The findings were discussed with Dr. Cox on 2021 4:20 PM      < end of copied text >                     HISTORY OF PRESENT ILLNESS: This is a 25y old man with a past medical history significant for Hiatal hernia, GERD,  Hep C, IV Drug user (last use 2021), who [place] [facility] [place]. For past year he has had intermittant dysphagia to solids but now much worse and progressive for past two months to the point where he cant even tolerate clear liquids. Earlier this year his brother  of an overdose and from  to March he was using IV heroin, cocaine and K2. More recently he has been residing in [facility] for rehab in [place]. Yesterday patient's  mother picked him up in [place] and brought him [place] [facility] [place] which showed a smooth but tight distal esophageal stricture. He was admitted and underwent EGD by Dr. Suh this AM that showed a tight stricture thru which he could not pass. He stated the stricture was in the mid esophagus. He was then transferred here for further treatment. He underwent an EGD  for reflux and was told he had a hiatal hernia and reflux with esophageal spasm. he was treated with pantoprazole for one year after which the med was stopped. He was having daily heartburn ever since for which he takes tums. The heartburn could awaken him from sleep. The heartburn stopped when the dysphagia worsened two months ago.   Review of Systems  CONSTITUTIONAL:  Fevers[ ] chills[ ] sweats[ ] fatigue[ ] weight loss[ ] weight gain [ ]                                     NEGATIVE [x ]   NEURO:  parathesias[ ] seizures [ ]  syncope [ ]  confusion [ ]                                                                                NEGATIVE[x ]   EYES: glasses[ ]  blurry vision[ ]  discharge[ ] pain[ ] glaucoma [ ]                                                                          NEGATIVE[x ]   ENMT:  difficulty hearing [ ]  vertigo[ ]  dysphagia[ ] epistaxis[ ] recent dental work [ ]                                    NEGATIVE[x ]   CV:  chest pain[ ] palpitations[ ] ALVARADO [ ] diaphoresis [ ]                                                                                           NEGATIVE[x ]   RESPIRATORY:  wheezing[ ] SOB[ ] cough [ ] sputum[ ] hemoptysis[ ]                                                                  NEGATIVE[x ]   GI:  nausea[x ]  vommiting [ x]  diarrhea[ ] constipation [ ] melena [ ]    unable to keep any food or liquids down, today after EGD able to drink a small amount                   : hematuria[ ]  dysuria[ ] urgency[ ] incontinence[ ]                                                                                            NEGATIVE[x ]   MUSKULOSKELETAL:  arthritis[ ]  joint swelling [ ] muscle weakness [ ]                                                                NEGATIVE[x ]   SKIN/BREAST:  rash[ ] itching [x ]  hair loss[ ] masses[ ]                 at times states itch from abdomen to legs.  currently no itchiness                                                    NEGATIVE[ ]   PSYCH:  dementia [ ] depresion [ ] anxiety[ ]        Brother recentlly dies from OD.  PT had history of IV drug use  HEME/LYMPH:  bruises easily[ ] enlarged lymph nodes[ ] tender lymph nodes[ ]                                               NEGATIVE[x ]   ENDOCRINE:  cold intolerance[ ] heat intolerance[ ] polydipsia[ ]                                                                          NEGATIVE[x ]     PAST MEDICAL/SURGICAL HISTORY:  Heroin abuse  chronic hep C -never treated and just recently diagnosed  Cocaine abuse  GERD, Esophageal stricture         Social History (marital status, living situation, occupation, tobacco use, alcohol and drug use, and sexual history): Lives with family  Recently incarcerated from March until 2 weeks ago  Denies alcohol use  Smokes 1/2 pack of cigarettes per day  Former Heroin, Cocaine and K2 use (last used 2021)      FAMILY HISTORY:  No known history of gastrointestinal or liver disease;  FH: thyroid disease (Grandparent)        HOME MEDICATIONS:  Protonix 40 mg oral delayed release tablet: 1 tab(s) orally once a day (13 May 2021 12:04)    INPATIENT MEDICATIONS:  MEDICATIONS  (STANDING):  enoxaparin Injectable 40 milliGRAM(s) SubCutaneous daily  pantoprazole  Injectable 40 milliGRAM(s) IV Push daily        ALLERGIES:  No Known Allergies      PHYSICAL EXAM:  Constitutional: Well-developed, well-nourished, in no apparent distress  Eyes: Sclerae anicteric, conjunctivae normal  ENMT: Mucus membranes moist, no oropharyngeal thrush noted  Respiratory: Breathing nonlabored; clear to auscultation  Cardiovascular: Regular rate and rhythm  Gastrointestinal: Soft, nontender, nondistended, normoactive bowel sounds.  Extremities: No clubbing, cyanosis or edema  Neurological: Alert and oriented to person, place and time; no asterixis  Skin: No jaundice  Musculoskeletal: No significant peripheral atrophy  Psychiatric: Affect and mood appropriate      LABS:                        10.2   3.49  )-----------( 140      ( 14 May 2021 06:00 )             33.8   05-14    138  |  105  |  14.0  ----------------------------<  72  3.9   |  25.0  |  0.74    Ca    8.8      14 May 2021 06:00    TPro  6.0<L>  /  Alb  3.5  /  TBili  1.5  /  DBili  x   /  AST  17  /  ALT  26  /  AlkPhos  66      < from: CT Chest No Cont (21 @ 15:49) >  IMPRESSION:  Diffuse esophageal wall thickening with a linear tract of contrast to the left of the mid to distal esophageal lumen, possibly a small amount of contrast tracking in the esophageal wall from a tear in the mid to distal esophagus.    No pneumomediastinum or contrast outside the esophagus.    The findings were discussed with Dr. Cox on 2021 4:20 PM      < end of copied text >

## 2021-05-14 NOTE — CONSULT NOTE ADULT - PROBLEM SELECTOR RECOMMENDATION 9
Care as per GI   Possible esophageal stent   Thoracic surgery will continue to follow patient  Please call Thoracic service for any issues.  Plan discusses with Dr. Vizcarra. Care as per GI   Possible esophageal stent   Thoracic surgery will continue to follow patient no surgical intervention at this time  Please call Thoracic service for any issues.  Plan discusses with Dr. Vizcarra.

## 2021-05-14 NOTE — BRIEF OPERATIVE NOTE - NSICDXBRIEFPREOP_GEN_ALL_CORE_FT
PRE-OP DIAGNOSIS:  Nausea and vomiting 13-May-2021 14:54:15  Deniz Craft  
PRE-OP DIAGNOSIS:  Esophageal stricture 14-May-2021 13:56:14  Amador Cox

## 2021-05-14 NOTE — BRIEF OPERATIVE NOTE - COMMENTS
Get CT chest. If no evidence of extravasation, schedule EGD with stent on tuesday. CT surgery consult.
symptomatic treatment for recurrent chronic nausea and vomiting. F/U with Dr. Batista as outpatient.

## 2021-05-14 NOTE — BRIEF OPERATIVE NOTE - OPERATION/FINDINGS
EGD: Pin hole esophageal stricture noted. at 40 cm. Even pediatric scope could not be advanced. Then guidwire placed. Gastrograffin injected. ? extravasation noted. Eventually procedure terminated. FU chest xray revealed contrast in the stomach.
normal EGD. Antral and body biopsy obtained for H Pylori

## 2021-05-14 NOTE — PROGRESS NOTE ADULT - ASSESSMENT
24 y/o M with PMH of hiatal hernia, GERD, esophageal spasm (2012), illicit drug use [place] [facility] [place] with [place] strict[place].     Dysphagia secondary to esophageal stricture  - s/p MBS, esophagram, EGD at [facility]  - GI consulted, EGD with dilation today  - Protonix 40mg IV     GERD  Esophageal spasm   Hiatal Hernia  - Protonix 40mg IV daily    Leukopenia/anemia/thrombocytopenia  - will monitor  - will check HIV     Daily tobacco use  - smoking cessation counseling given  - declines Nicotine patch    H/o drug use  - cocaine, heroin and K2  - counseling given  - last use March 2021    DVT ppx  - Lovenox SQ held for procedure    Dispo: 1-2 days pending clinical course 24 y/o M with PMH of hiatal hernia, GERD, esophageal spasm (2012), illicit drug use transferred from [facility] [place]/p EGD with esophageal stricture.     Dysphagia secondary to esophageal stricture  - s/p MBS, esophagram, EGD at [facility]  - GI consulted, EGD with dilation today  - Protonix 40mg IV     GERD  Esophageal spasm   Hiatal Hernia  - Protonix 40mg IV daily    Leukopenia/anemia/thrombocytopenia  - will monitor  - will check HIV     Daily tobacco use  - smoking cessation counseling given  - declines Nicotine patch    H/o drug use  - cocaine, heroin and K2  - counseling given  - last use March 2021    DVT ppx  - Lovenox SQ held for procedure    Dispo: pending clinical course    Updated patient's mother regarding plan of care.

## 2021-05-14 NOTE — CONSULT NOTE ADULT - ASSESSMENT
25 year old male with history of GERD, esophageal spasm and illicit drug use now with Esophageal stricture.   25 year old male with history of GERD, esophageal spasm, Hep C and illicit drug use now with Esophageal stricture.

## 2021-05-15 VITALS
RESPIRATION RATE: 18 BRPM | TEMPERATURE: 99 F | OXYGEN SATURATION: 93 % | SYSTOLIC BLOOD PRESSURE: 125 MMHG | DIASTOLIC BLOOD PRESSURE: 61 MMHG | HEART RATE: 118 BPM

## 2021-05-15 LAB
ALBUMIN SERPL ELPH-MCNC: 3.8 G/DL — SIGNIFICANT CHANGE UP (ref 3.3–5.2)
ALP SERPL-CCNC: 63 U/L — SIGNIFICANT CHANGE UP (ref 40–120)
ALT FLD-CCNC: 21 U/L — SIGNIFICANT CHANGE UP
ANION GAP SERPL CALC-SCNC: 9 MMOL/L — SIGNIFICANT CHANGE UP (ref 5–17)
AST SERPL-CCNC: 13 U/L — SIGNIFICANT CHANGE UP
BASOPHILS # BLD AUTO: 0.01 K/UL — SIGNIFICANT CHANGE UP (ref 0–0.2)
BASOPHILS NFR BLD AUTO: 0.2 % — SIGNIFICANT CHANGE UP (ref 0–2)
BILIRUB SERPL-MCNC: 1 MG/DL — SIGNIFICANT CHANGE UP (ref 0.4–2)
BUN SERPL-MCNC: 11 MG/DL — SIGNIFICANT CHANGE UP (ref 8–20)
CALCIUM SERPL-MCNC: 8.7 MG/DL — SIGNIFICANT CHANGE UP (ref 8.6–10.2)
CHLORIDE SERPL-SCNC: 106 MMOL/L — SIGNIFICANT CHANGE UP (ref 98–107)
CO2 SERPL-SCNC: 26 MMOL/L — SIGNIFICANT CHANGE UP (ref 22–29)
CREAT SERPL-MCNC: 0.72 MG/DL — SIGNIFICANT CHANGE UP (ref 0.5–1.3)
EOSINOPHIL # BLD AUTO: 0.01 K/UL — SIGNIFICANT CHANGE UP (ref 0–0.5)
EOSINOPHIL NFR BLD AUTO: 0.2 % — SIGNIFICANT CHANGE UP (ref 0–6)
GLUCOSE BLDC GLUCOMTR-MCNC: 177 MG/DL — HIGH (ref 70–99)
GLUCOSE BLDC GLUCOMTR-MCNC: 87 MG/DL — SIGNIFICANT CHANGE UP (ref 70–99)
GLUCOSE SERPL-MCNC: 80 MG/DL — SIGNIFICANT CHANGE UP (ref 70–99)
HCT VFR BLD CALC: 33.4 % — LOW (ref 39–50)
HGB BLD-MCNC: 10.2 G/DL — LOW (ref 13–17)
HIV 1+2 AB+HIV1 P24 AG SERPL QL IA: SIGNIFICANT CHANGE UP
IMM GRANULOCYTES NFR BLD AUTO: 0.2 % — SIGNIFICANT CHANGE UP (ref 0–1.5)
LYMPHOCYTES # BLD AUTO: 1.6 K/UL — SIGNIFICANT CHANGE UP (ref 1–3.3)
LYMPHOCYTES # BLD AUTO: 25.2 % — SIGNIFICANT CHANGE UP (ref 13–44)
MCHC RBC-ENTMCNC: 23.1 PG — LOW (ref 27–34)
MCHC RBC-ENTMCNC: 30.5 GM/DL — LOW (ref 32–36)
MCV RBC AUTO: 75.6 FL — LOW (ref 80–100)
MONOCYTES # BLD AUTO: 0.55 K/UL — SIGNIFICANT CHANGE UP (ref 0–0.9)
MONOCYTES NFR BLD AUTO: 8.7 % — SIGNIFICANT CHANGE UP (ref 2–14)
NEUTROPHILS # BLD AUTO: 4.16 K/UL — SIGNIFICANT CHANGE UP (ref 1.8–7.4)
NEUTROPHILS NFR BLD AUTO: 65.5 % — SIGNIFICANT CHANGE UP (ref 43–77)
PLATELET # BLD AUTO: 145 K/UL — LOW (ref 150–400)
POTASSIUM SERPL-MCNC: 4 MMOL/L — SIGNIFICANT CHANGE UP (ref 3.5–5.3)
POTASSIUM SERPL-SCNC: 4 MMOL/L — SIGNIFICANT CHANGE UP (ref 3.5–5.3)
PROT SERPL-MCNC: 6.1 G/DL — LOW (ref 6.6–8.7)
RBC # BLD: 4.42 M/UL — SIGNIFICANT CHANGE UP (ref 4.2–5.8)
RBC # FLD: 14.6 % — HIGH (ref 10.3–14.5)
SODIUM SERPL-SCNC: 140 MMOL/L — SIGNIFICANT CHANGE UP (ref 135–145)
WBC # BLD: 6.34 K/UL — SIGNIFICANT CHANGE UP (ref 3.8–10.5)
WBC # FLD AUTO: 6.34 K/UL — SIGNIFICANT CHANGE UP (ref 3.8–10.5)

## 2021-05-15 PROCEDURE — 99233 SBSQ HOSP IP/OBS HIGH 50: CPT

## 2021-05-15 RX ORDER — ENOXAPARIN SODIUM 100 MG/ML
40 INJECTION SUBCUTANEOUS DAILY
Refills: 0 | Status: DISCONTINUED | OUTPATIENT
Start: 2021-05-15 | End: 2021-05-15

## 2021-05-15 RX ORDER — DEXTROSE 50 % IN WATER 50 %
15 SYRINGE (ML) INTRAVENOUS ONCE
Refills: 0 | Status: DISCONTINUED | OUTPATIENT
Start: 2021-05-15 | End: 2021-05-15

## 2021-05-15 RX ORDER — BENZOCAINE AND MENTHOL 5; 1 G/100ML; G/100ML
1 LIQUID ORAL EVERY 6 HOURS
Refills: 0 | Status: DISCONTINUED | OUTPATIENT
Start: 2021-05-15 | End: 2021-05-15

## 2021-05-15 RX ORDER — DEXTROSE 50 % IN WATER 50 %
25 SYRINGE (ML) INTRAVENOUS ONCE
Refills: 0 | Status: DISCONTINUED | OUTPATIENT
Start: 2021-05-15 | End: 2021-05-15

## 2021-05-15 RX ORDER — GLUCAGON INJECTION, SOLUTION 0.5 MG/.1ML
1 INJECTION, SOLUTION SUBCUTANEOUS ONCE
Refills: 0 | Status: DISCONTINUED | OUTPATIENT
Start: 2021-05-15 | End: 2021-05-15

## 2021-05-15 RX ORDER — HYDROCORTISONE 1 %
1 OINTMENT (GRAM) TOPICAL ONCE
Refills: 0 | Status: COMPLETED | OUTPATIENT
Start: 2021-05-15 | End: 2021-05-15

## 2021-05-15 RX ORDER — BENZOCAINE AND MENTHOL 5; 1 G/100ML; G/100ML
1 LIQUID ORAL ONCE
Refills: 0 | Status: COMPLETED | OUTPATIENT
Start: 2021-05-15 | End: 2021-05-15

## 2021-05-15 RX ORDER — DEXTROSE 50 % IN WATER 50 %
12.5 SYRINGE (ML) INTRAVENOUS ONCE
Refills: 0 | Status: DISCONTINUED | OUTPATIENT
Start: 2021-05-15 | End: 2021-05-15

## 2021-05-15 RX ORDER — SODIUM CHLORIDE 9 MG/ML
1000 INJECTION, SOLUTION INTRAVENOUS
Refills: 0 | Status: DISCONTINUED | OUTPATIENT
Start: 2021-05-15 | End: 2021-05-15

## 2021-05-15 RX ADMIN — BENZOCAINE AND MENTHOL 1 LOZENGE: 5; 1 LIQUID ORAL at 12:48

## 2021-05-15 RX ADMIN — PANTOPRAZOLE SODIUM 40 MILLIGRAM(S): 20 TABLET, DELAYED RELEASE ORAL at 05:43

## 2021-05-15 RX ADMIN — BENZOCAINE AND MENTHOL 1 LOZENGE: 5; 1 LIQUID ORAL at 17:05

## 2021-05-15 RX ADMIN — SODIUM CHLORIDE 75 MILLILITER(S): 9 INJECTION, SOLUTION INTRAVENOUS at 11:41

## 2021-05-15 RX ADMIN — BENZOCAINE AND MENTHOL 1 LOZENGE: 5; 1 LIQUID ORAL at 01:13

## 2021-05-15 RX ADMIN — Medication 1 APPLICATION(S): at 23:24

## 2021-05-15 RX ADMIN — BENZOCAINE AND MENTHOL 1 LOZENGE: 5; 1 LIQUID ORAL at 23:23

## 2021-05-15 RX ADMIN — BENZOCAINE AND MENTHOL 1 LOZENGE: 5; 1 LIQUID ORAL at 05:57

## 2021-05-15 RX ADMIN — PANTOPRAZOLE SODIUM 40 MILLIGRAM(S): 20 TABLET, DELAYED RELEASE ORAL at 17:05

## 2021-05-15 RX ADMIN — ENOXAPARIN SODIUM 40 MILLIGRAM(S): 100 INJECTION SUBCUTANEOUS at 11:42

## 2021-05-15 NOTE — PROGRESS NOTE ADULT - ASSESSMENT
Esophageal stricture/ Etiology prolonged reflux/benign stricture/? Caustic ingestion: Will reattempt EGD on tuesday with possible stent placement. However, extreme likelihood for complications. Thoracic surgery back up has been requested. Appreciate thoracic surgery evaluation. PPI bid. NPO except some ice chips.

## 2021-05-15 NOTE — CHART NOTE - NSCHARTNOTEFT_GEN_A_CORE
26 y/o M admitted with dysphagia secondary to esophageal stricture  Patient with complaint of itching of abdomen and bilateral LE  Patient seen at bedside  +MP rash on abdomen with scratch marks, no rash of LE seen   Denies SOB, throat swelling  VSS    Patient states he gets this rash at home and usually uses thick lotion with good effect   Patient states he gets allergic skin reactions to detergent frequently   No new medications seen on MR   Likely allergic dermatitis from hospital linens   Patient also requesting a shower, patient ambulates without assist and is independent  Shower order placed   Hydrocortisone 1% ointment to abdomen and LE   RN to notify with any acute changes

## 2021-05-15 NOTE — CHART NOTE - NSCHARTNOTEFT_GEN_A_CORE
Called by RN for patient complaining of sore throat. Patient assessed and his chart reviewed. No oropharyngeal erythema, or exudates noted on exam. Cepacol 1 lozenge PO x1 dose ordered.

## 2021-05-15 NOTE — PROVIDER CONTACT NOTE (OTHER) - SITUATION
Pt requesting a shower, no shower order. Pt. also c/o itchiness/rash on ABD/lower extremities. Redness noted to ABD and legs from scratching, slight rash noted to ABD.

## 2021-05-15 NOTE — PROGRESS NOTE ADULT - ASSESSMENT
24 y/o M with PMH of hiatal hernia, GERD, esophageal spasm (2012), illicit drug use transferred from [facility] s/p EGD with esophageal stricture.     Dysphagia secondary to esophageal stricture  - s/p MBS, esophagram, EGD at [facility]  - GI consult appreciated  - Protonix 40mg IV   - s/p EGD on 5/14: Pin hole esophageal stricture noted. at 40 cm. Even pediatric scope could not be advanced. Then guidwire placed. Gastrograffin injected. ? extravasation noted. Eventually procedure terminated.  - CT chest: Diffuse esophageal wall thickening with a linear tract of contrast to the left of the mid to distal esophageal lumen, possibly a small amount of contrast tracking in the esophageal wall from a tear in the mid to distal esophagus. No pneumomediastinum or contrast outside the esophagus.  - CT surgery consulted, no surgical intervention at this time  - Possible esophageal stent placement on Tuesday    GERD  Esophageal spasm   Hiatal Hernia  - Protonix 40mg IV daily    Leukopenia/anemia/thrombocytopenia  - will monitor  - HIV in process    Daily tobacco use  - smoking cessation counseling given  - declines Nicotine patch    H/o drug use  - cocaine, heroin and K2  - counseling given  - last use March 2021    DVT ppx  - Lovenox SQ     Dispo: pending clinical course, esophageal stent placement on Tuesday

## 2021-05-15 NOTE — PROGRESS NOTE ADULT - ASSESSMENT
HISTORY OF PRESENT ILLNESS: This is a 25y old man with a past medical history significant for Hiatal hernia, GERD,  Hep C, IV Drug user (last use 2021), who [place] [facility] [place] of [place]ph[place]ia. For past year he has had intermittant dysphagia to solids but now much worse and progressive for past two months to the point where he cant even tolerate clear liquids. Earlier this year his brother  of an overdose and from  to March he was using IV heroin, cocaine and K2. More recently he has been residing in [facility] for rehab in [place]. Yesterday patient's  mother picked him up in [place] and brought him [place] [facility] [place] which showed a smooth but tight distal esophageal stricture. He was admitted and underwent EGD by Dr. Suh this AM that showed a tight stricture thru which he could not pass. He stated the stricture was in the mid esophagus. He was then transferred here for further treatment. He underwent an EGD  for reflux and was told he had a hiatal hernia and reflux with esophageal spasm. he was treated with pantoprazole for one year after which the med was stopped. He was having daily heartburn ever since for which he takes tums. The heartburn could awaken him from sleep. The heartburn stopped when the dysphagia worsened two months ago.      25 year old male with history of GERD, esophageal spasm, Hep C and illicit drug use now with Esophageal stricture.      Plan for GI to place esophageal stent Monday     Cont medical mgmt as per primary team    [place] Thoracic team in am rounds

## 2021-05-15 NOTE — PROGRESS NOTE ADULT - SUBJECTIVE AND OBJECTIVE BOX
Chief complaint: Esophageal Stricture    Patient seen and examined at bedside. No acute overnight events reported. Patient underwent EGD yesterday however unsuccessful. Patient states he has no complaints, denies fever, chills, cough, chest pain, shortness of breath, nausea or vomiting.     Vital Signs Last 24 Hrs  T(F): 98.3 (15 May 2021 04:51), Max: 98.7 (14 May 2021 21:39)  HR: 56 (15 May 2021 04:51) (56 - 95)  BP: 102/55 (15 May 2021 04:51) (102/55 - 128/72)  RR: 18 (15 May 2021 04:51) (18 - 19)  SpO2: 95% (15 May 2021 04:51) (95% - 98%)    Physical Exam:  Constitutional: alert and oriented, in no acute distress   Neck: Soft and supple  Respiratory: Clear to auscultation bilaterally, no wheezes or crackles  Cardiovascular: Regular rate and rhythm, no murmurs, gallops, rubs  Gastrointestinal: Soft, non-tender to palpation, +bs  Vascular: 2+ peripheral pulses  Neurological: A/O x 3, no focal neurological deficits  Musculoskeletal: 5/5 strength b/l upper and lower extremities, no lower extremity edema bilaterally    Labs:                        10.2   6.34  )-----------( 145      ( 15 May 2021 06:12 )             33.4   05-15    140  |  106  |  11.0  ----------------------------<  80  4.0   |  26.0  |  0.72    Ca    8.7      15 May 2021 06:12    TPro  6.1<L>  /  Alb  3.8  /  TBili  1.0  /  DBili  x   /  AST  13  /  ALT  21  /  AlkPhos  63  05-15  
  Subjective:  Patient in bed , Resting     T(C): 36.9 (05-15-21 @ 10:57), Max: 37.1 (05-14-21 @ 21:39)  HR: 82 (05-15-21 @ 10:57) (56 - 95)  BP: 114/68 (05-15-21 @ 10:57) (102/55 - 128/72)  ABP: --  ABP(mean): --  RR: 18 (05-15-21 @ 10:57) (18 - 19)  SpO2: 98% (05-15-21 @ 10:57) (95% - 98%) RA   Wt(kg): --  CVP(mm Hg): --  CO: --  CI: --  PA: --                                              Tele: SR            05-15    140  |  106  |  11.0  ----------------------------<  80  4.0   |  26.0  |  0.72    Ca    8.7      15 May 2021 06:12    TPro  6.1<L>  /  Alb  3.8  /  TBili  1.0  /  DBili  x   /  AST  13  /  ALT  21  /  AlkPhos  63  05-15                               10.2   6.34  )-----------( 145      ( 15 May 2021 06:12 )             33.4        PT/INR - ( 13 May 2021 15:56 )   PT: 13.5 sec;   INR: 1.17 ratio                  CAPILLARY BLOOD GLUCOSE               CXR:  < from: Xray Chest 1 View-PORTABLE IMMEDIATE (Xray Chest 1 View-PORTABLE IMMEDIATE .) (05.14.21 @ 13:59) >  Two expiratory/kyphotic views are compared to 5/12/2021 and demonstrate an ET tube with the tip 6 cm above the rachel..    Contrast is evident in the esophagus and stomach with no extravasation.    Mild cardiomegaly with a left ventricular configuration.    Normal pulmonary vasculature with no consolidation, effusion, gross adenopathy, pneumothorax or acute osseous finding.    IMPRESSION:  ET tube in satisfactory position.    Contrast in the esophagus and stomach with no extravasation. Patient underwent EGD on 5/14/2021 which demonstrated a pinhole esophageal stricture at 40 cm.    Mild cardiomegaly with a left ventricular configuration, recommend correlation for possible hypertension    < end of copied text >          Assessment  Neuro: alert awake NAD  Pulm: decreased at bases b/l   CV: RRR S1 S2   Abd: soft nt nd + BS          Assessment:  25yMale    with PAST MEDICAL & SURGICAL HISTORY:  Heroin abuse    Cocaine abuse    No significant past surgical history          Plan:  
Chief complaint: Esophageal stricture    Patient seen and examined at bedside. Overnight patient complained of abdominal pain. Denies headache, fever, chills, cough, chest pain, shortness of breath, nausea or vomiting. EGD with dilation today.     Vital Signs Last 24 Hrs  T(F): 97.5 (14 May 2021 04:30), Max: 98.8 (13 May 2021 21:40)  HR: 90 (14 May 2021 04:30) (64 - 90)  BP: 101/55 (14 May 2021 04:30) (101/55 - 112/67)  RR: 20 (14 May 2021 04:30) (16 - 20)  SpO2: 98% (14 May 2021 04:30) (98% - 99%)    Physical Exam:  Constitutional: alert and oriented, in no acute distress   Neck: Soft and supple  Respiratory: Clear to auscultation bilaterally, no wheezes or crackles  Cardiovascular: Regular rate and rhyhtm, no murmurs, gallops, rubs  Gastrointestinal: Soft, mild tenderness to palpation, +bs  Vascular: 2+ peripheral pulses  Neurological: A/O x 3, no focal neurological deficits  Musculoskeletal: 5/5 strength b/l upper and lower extremities, no lower extremity edema bilaterally    Labs:                        10.2   3.49  )-----------( 140      ( 14 May 2021 06:00 )             33.8   05-14    138  |  105  |  14.0  ----------------------------<  72  3.9   |  25.0  |  0.74    Ca    8.8      14 May 2021 06:00    TPro  6.0<L>  /  Alb  3.5  /  TBili  1.5  /  DBili  x   /  AST  17  /  ALT  26  /  AlkPhos  66  05-14  
INTERVAL HPI/OVERNIGHT EVENTS: FU for esophageal stricture. He complains of sore throat. No other complaints. Understands the procedure findings.    MEDICATIONS  (STANDING):  benzocaine 15 mG/menthol 3.6 mG (Sugar-Free) Lozenge 1 Lozenge Oral every 6 hours  dextrose 40% Gel 15 Gram(s) Oral once  dextrose 5%. 1000 milliLiter(s) (75 mL/Hr) IV Continuous <Continuous>  dextrose 50% Injectable 25 Gram(s) IV Push once  dextrose 50% Injectable 12.5 Gram(s) IV Push once  dextrose 50% Injectable 25 Gram(s) IV Push once  enoxaparin Injectable 40 milliGRAM(s) SubCutaneous daily  glucagon  Injectable 1 milliGRAM(s) IntraMuscular once  pantoprazole  Injectable 40 milliGRAM(s) IV Push two times a day    MEDICATIONS  (PRN):      Allergies    No Known Allergies    Intolerances        Vital Signs Last 24 Hrs  T(C): 36.9 (15 May 2021 10:57), Max: 37.1 (14 May 2021 21:39)  T(F): 98.5 (15 May 2021 10:57), Max: 98.7 (14 May 2021 21:39)  HR: 82 (15 May 2021 10:57) (56 - 95)  BP: 114/68 (15 May 2021 10:57) (102/55 - 128/72)  BP(mean): --  RR: 18 (15 May 2021 10:57) (18 - 19)  SpO2: 98% (15 May 2021 10:57) (95% - 98%)    LABS:                        10.2   6.34  )-----------( 145      ( 15 May 2021 06:12 )             33.4     05-15    140  |  106  |  11.0  ----------------------------<  80  4.0   |  26.0  |  0.72    Ca    8.7      15 May 2021 06:12    TPro  6.1<L>  /  Alb  3.8  /  TBili  1.0  /  DBili  x   /  AST  13  /  ALT  21  /  AlkPhos  63  05-15    PT/INR - ( 13 May 2021 15:56 )   PT: 13.5 sec;   INR: 1.17 ratio               RADIOLOGY & ADDITIONAL TESTS:  < from: CT Chest No Cont (05.14.21 @ 15:49) >     EXAM:  CT CHEST                          PROCEDURE DATE:  05/14/2021          INTERPRETATION:  CLINICAL INFORMATION: Esophageal stricture. Concern for esophageal perforation.    COMPARISON: Esophagram 5/12/2021, chest x-ray 5/14/2021, and images from endoscopy 5/14/2021    CONTRAST/COMPLICATIONS:  IV Contrast: None.  Oral Contrast: None.    PROCEDURE:  CT of the Chest was performed.  Sagittal and coronal reformats were performed.    FINDINGS:    LUNGS AND AIRWAYS: Secretions in the trachea.  Linear atelectasis or scarring in the lower lobes, greater on the left; fissural thickening along the left major fissure. Punctate right middle lobe nodule (series 2 image 25), likely a granuloma.  PLEURA: No pleural effusion.  MEDIASTINUM AND JOHN: Nolymphadenopathy. Diffuse esophageal wall thickening. Small amount of contrast in esophagus with contrast in the stomach and imaged small bowel and colon; contrast in the esophagus and stomach is likely from the patient's recent endoscopy and contrastin the colon is either from the endoscopy or recent esophagram. There is a thin linear tract of contrast to the left of the esophageal lumen (series 3 images 124-116) in the mid to distal esophagus which may be a small amount of contrast tracking in the esophageal wall, possibly secondary to a mucosal tear in the mid to distal esophagus (series 3 image 124). Evaluation is limited secondary to the esophageal wall thickening and an underdistended esophagus. There is no pneumomediastinum or contrastoutside the esophagus.  VESSELS: Thoracic aorta normal in caliber.  HEART: Heart size is normal. No pericardial effusion.  CHEST WALL AND LOWER NECK: No enlarged axillary lymph nodes.  VISUALIZED UPPER ABDOMEN: See above, otherwise unremarkable.  BONES: No aggressive osseous lesion.    IMPRESSION:  Diffuse esophageal wall thickening with a linear tract of contrast to the left of the mid to distal esophageal lumen, possibly a small amount of contrast tracking in the esophageal wall from a tear in the mid to distal esophagus.    No pneumomediastinum or contrast outside the esophagus.    The findings were discussed with Dr. Cox on 5/14/2021 4:20 PM            RUIZ ISLAS MD; Attending Radiologist  This document has been electronically signed. May 14 2021  4:27PM    < end of copied text >

## 2021-05-16 ENCOUNTER — INPATIENT (INPATIENT)
Facility: HOSPITAL | Age: 26
LOS: 11 days | Discharge: HOME | DRG: 871 | End: 2021-05-28
Attending: HOSPITALIST | Admitting: HOSPITALIST
Payer: MEDICAID

## 2021-05-16 VITALS
OXYGEN SATURATION: 87 % | SYSTOLIC BLOOD PRESSURE: 111 MMHG | DIASTOLIC BLOOD PRESSURE: 66 MMHG | HEART RATE: 144 BPM | HEIGHT: 71 IN | RESPIRATION RATE: 18 BRPM | WEIGHT: 160.06 LBS | TEMPERATURE: 103 F

## 2021-05-16 DIAGNOSIS — J69.0 PNEUMONITIS DUE TO INHALATION OF FOOD AND VOMIT: ICD-10-CM

## 2021-05-16 PROBLEM — F14.10 COCAINE ABUSE, UNCOMPLICATED: Chronic | Status: ACTIVE | Noted: 2021-05-13

## 2021-05-16 LAB
ALBUMIN SERPL ELPH-MCNC: 4.5 G/DL — SIGNIFICANT CHANGE UP (ref 3.3–5.2)
ALP SERPL-CCNC: 79 U/L — SIGNIFICANT CHANGE UP (ref 40–120)
ALT FLD-CCNC: 66 U/L — HIGH
AMPHET UR-MCNC: NEGATIVE — SIGNIFICANT CHANGE UP
ANION GAP SERPL CALC-SCNC: 20 MMOL/L — HIGH (ref 5–17)
APPEARANCE UR: ABNORMAL
APTT BLD: 27.3 SEC — LOW (ref 27.5–35.5)
AST SERPL-CCNC: 216 U/L — HIGH
BARBITURATES UR SCN-MCNC: NEGATIVE — SIGNIFICANT CHANGE UP
BASE EXCESS BLDA CALC-SCNC: -0.5 MMOL/L — SIGNIFICANT CHANGE UP (ref -3–3)
BASOPHILS # BLD AUTO: 0.01 K/UL — SIGNIFICANT CHANGE UP (ref 0–0.2)
BASOPHILS NFR BLD AUTO: 0.1 % — SIGNIFICANT CHANGE UP (ref 0–2)
BENZODIAZ UR-MCNC: NEGATIVE — SIGNIFICANT CHANGE UP
BILIRUB SERPL-MCNC: 0.8 MG/DL — SIGNIFICANT CHANGE UP (ref 0.4–2)
BILIRUB UR-MCNC: NEGATIVE — SIGNIFICANT CHANGE UP
BLOOD GAS COMMENTS ARTERIAL: SIGNIFICANT CHANGE UP
BUN SERPL-MCNC: 15 MG/DL — SIGNIFICANT CHANGE UP (ref 8–20)
CALCIUM SERPL-MCNC: 9.2 MG/DL — SIGNIFICANT CHANGE UP (ref 8.6–10.2)
CHLORIDE SERPL-SCNC: 102 MMOL/L — SIGNIFICANT CHANGE UP (ref 98–107)
CO2 SERPL-SCNC: 22 MMOL/L — SIGNIFICANT CHANGE UP (ref 22–29)
COCAINE METAB.OTHER UR-MCNC: POSITIVE
COLOR SPEC: YELLOW — SIGNIFICANT CHANGE UP
CREAT SERPL-MCNC: 1.89 MG/DL — HIGH (ref 0.5–1.3)
DIFF PNL FLD: ABNORMAL
EOSINOPHIL # BLD AUTO: 0 K/UL — SIGNIFICANT CHANGE UP (ref 0–0.5)
EOSINOPHIL NFR BLD AUTO: 0 % — SIGNIFICANT CHANGE UP (ref 0–6)
GAS PNL BLDA: SIGNIFICANT CHANGE UP
GLUCOSE SERPL-MCNC: 87 MG/DL — SIGNIFICANT CHANGE UP (ref 70–99)
GLUCOSE UR QL: NEGATIVE MG/DL — SIGNIFICANT CHANGE UP
HCO3 BLDA-SCNC: 24 MMOL/L — SIGNIFICANT CHANGE UP (ref 20–26)
HCT VFR BLD CALC: 34.4 % — LOW (ref 39–50)
HGB BLD-MCNC: 10.4 G/DL — LOW (ref 13–17)
HOROWITZ INDEX BLDA+IHG-RTO: 40 — SIGNIFICANT CHANGE UP
IMM GRANULOCYTES NFR BLD AUTO: 0.4 % — SIGNIFICANT CHANGE UP (ref 0–1.5)
INR BLD: 1.19 RATIO — HIGH (ref 0.88–1.16)
KETONES UR-MCNC: ABNORMAL
LACTATE BLDV-MCNC: 1.6 MMOL/L — SIGNIFICANT CHANGE UP (ref 0.5–2)
LACTATE BLDV-MCNC: 7.7 MMOL/L — CRITICAL HIGH (ref 0.5–2)
LEUKOCYTE ESTERASE UR-ACNC: ABNORMAL
LYMPHOCYTES # BLD AUTO: 0.64 K/UL — LOW (ref 1–3.3)
LYMPHOCYTES # BLD AUTO: 5.1 % — LOW (ref 13–44)
MCHC RBC-ENTMCNC: 23.6 PG — LOW (ref 27–34)
MCHC RBC-ENTMCNC: 30.2 GM/DL — LOW (ref 32–36)
MCV RBC AUTO: 78 FL — LOW (ref 80–100)
METHADONE UR-MCNC: NEGATIVE — SIGNIFICANT CHANGE UP
MONOCYTES # BLD AUTO: 1.16 K/UL — HIGH (ref 0–0.9)
MONOCYTES NFR BLD AUTO: 9.2 % — SIGNIFICANT CHANGE UP (ref 2–14)
NEUTROPHILS # BLD AUTO: 10.81 K/UL — HIGH (ref 1.8–7.4)
NEUTROPHILS NFR BLD AUTO: 85.2 % — HIGH (ref 43–77)
NITRITE UR-MCNC: NEGATIVE — SIGNIFICANT CHANGE UP
OPIATES UR-MCNC: POSITIVE
PCO2 BLDA: 48 MMHG — HIGH (ref 35–45)
PCP SPEC-MCNC: SIGNIFICANT CHANGE UP
PCP UR-MCNC: NEGATIVE — SIGNIFICANT CHANGE UP
PH BLDA: 7.34 — LOW (ref 7.35–7.45)
PH UR: 5 — SIGNIFICANT CHANGE UP (ref 5–8)
PLATELET # BLD AUTO: 172 K/UL — SIGNIFICANT CHANGE UP (ref 150–400)
PO2 BLDA: 57 MMHG — LOW (ref 83–108)
POTASSIUM SERPL-MCNC: 4.5 MMOL/L — SIGNIFICANT CHANGE UP (ref 3.5–5.3)
POTASSIUM SERPL-SCNC: 4.5 MMOL/L — SIGNIFICANT CHANGE UP (ref 3.5–5.3)
PROT SERPL-MCNC: 7.3 G/DL — SIGNIFICANT CHANGE UP (ref 6.6–8.7)
PROT UR-MCNC: 100 MG/DL
PROTHROM AB SERPL-ACNC: 13.7 SEC — HIGH (ref 10.6–13.6)
RBC # BLD: 4.41 M/UL — SIGNIFICANT CHANGE UP (ref 4.2–5.8)
RBC # FLD: 15.1 % — HIGH (ref 10.3–14.5)
SAO2 % BLDA: 87 % — LOW (ref 95–99)
SARS-COV-2 RNA SPEC QL NAA+PROBE: SIGNIFICANT CHANGE UP
SODIUM SERPL-SCNC: 144 MMOL/L — SIGNIFICANT CHANGE UP (ref 135–145)
SP GR SPEC: 1.02 — SIGNIFICANT CHANGE UP (ref 1.01–1.02)
THC UR QL: NEGATIVE — SIGNIFICANT CHANGE UP
UROBILINOGEN FLD QL: NEGATIVE MG/DL — SIGNIFICANT CHANGE UP
WBC # BLD: 12.67 K/UL — HIGH (ref 3.8–10.5)
WBC # FLD AUTO: 12.67 K/UL — HIGH (ref 3.8–10.5)

## 2021-05-16 PROCEDURE — 70450 CT HEAD/BRAIN W/O DYE: CPT | Mod: 26,ME

## 2021-05-16 PROCEDURE — 74177 CT ABD & PELVIS W/CONTRAST: CPT | Mod: 26,MG

## 2021-05-16 PROCEDURE — 99291 CRITICAL CARE FIRST HOUR: CPT | Mod: 25

## 2021-05-16 PROCEDURE — 71260 CT THORAX DX C+: CPT | Mod: 26,MG

## 2021-05-16 PROCEDURE — G1004: CPT

## 2021-05-16 PROCEDURE — 99222 1ST HOSP IP/OBS MODERATE 55: CPT

## 2021-05-16 PROCEDURE — 93010 ELECTROCARDIOGRAM REPORT: CPT

## 2021-05-16 PROCEDURE — 31500 INSERT EMERGENCY AIRWAY: CPT

## 2021-05-16 PROCEDURE — 71045 X-RAY EXAM CHEST 1 VIEW: CPT | Mod: 26

## 2021-05-16 RX ORDER — SODIUM CHLORIDE 9 MG/ML
1000 INJECTION, SOLUTION INTRAVENOUS ONCE
Refills: 0 | Status: COMPLETED | OUTPATIENT
Start: 2021-05-16 | End: 2021-05-16

## 2021-05-16 RX ORDER — CHLORHEXIDINE GLUCONATE 213 G/1000ML
1 SOLUTION TOPICAL
Refills: 0 | Status: DISCONTINUED | OUTPATIENT
Start: 2021-05-16 | End: 2021-05-28

## 2021-05-16 RX ORDER — VANCOMYCIN HCL 1 G
1000 VIAL (EA) INTRAVENOUS ONCE
Refills: 0 | Status: COMPLETED | OUTPATIENT
Start: 2021-05-16 | End: 2021-05-16

## 2021-05-16 RX ORDER — PROPOFOL 10 MG/ML
20 INJECTION, EMULSION INTRAVENOUS
Qty: 1000 | Refills: 0 | Status: DISCONTINUED | OUTPATIENT
Start: 2021-05-16 | End: 2021-05-17

## 2021-05-16 RX ORDER — SODIUM CHLORIDE 9 MG/ML
2300 INJECTION INTRAMUSCULAR; INTRAVENOUS; SUBCUTANEOUS ONCE
Refills: 0 | Status: COMPLETED | OUTPATIENT
Start: 2021-05-16 | End: 2021-05-16

## 2021-05-16 RX ORDER — ROCURONIUM BROMIDE 10 MG/ML
100 VIAL (ML) INTRAVENOUS ONCE
Refills: 0 | Status: COMPLETED | OUTPATIENT
Start: 2021-05-16 | End: 2021-05-16

## 2021-05-16 RX ORDER — PANTOPRAZOLE SODIUM 20 MG/1
40 TABLET, DELAYED RELEASE ORAL DAILY
Refills: 0 | Status: DISCONTINUED | OUTPATIENT
Start: 2021-05-16 | End: 2021-05-17

## 2021-05-16 RX ORDER — CHLORHEXIDINE GLUCONATE 213 G/1000ML
15 SOLUTION TOPICAL EVERY 12 HOURS
Refills: 0 | Status: DISCONTINUED | OUTPATIENT
Start: 2021-05-16 | End: 2021-05-17

## 2021-05-16 RX ORDER — NOREPINEPHRINE BITARTRATE/D5W 8 MG/250ML
0.05 PLASTIC BAG, INJECTION (ML) INTRAVENOUS
Qty: 8 | Refills: 0 | Status: DISCONTINUED | OUTPATIENT
Start: 2021-05-16 | End: 2021-05-17

## 2021-05-16 RX ORDER — ETOMIDATE 2 MG/ML
20 INJECTION INTRAVENOUS ONCE
Refills: 0 | Status: COMPLETED | OUTPATIENT
Start: 2021-05-16 | End: 2021-05-16

## 2021-05-16 RX ORDER — PIPERACILLIN AND TAZOBACTAM 4; .5 G/20ML; G/20ML
3.38 INJECTION, POWDER, LYOPHILIZED, FOR SOLUTION INTRAVENOUS EVERY 8 HOURS
Refills: 0 | Status: COMPLETED | OUTPATIENT
Start: 2021-05-16 | End: 2021-05-23

## 2021-05-16 RX ORDER — ACETAMINOPHEN 500 MG
1000 TABLET ORAL ONCE
Refills: 0 | Status: COMPLETED | OUTPATIENT
Start: 2021-05-16 | End: 2021-05-16

## 2021-05-16 RX ORDER — CALCIUM GLUCONATE 100 MG/ML
2 VIAL (ML) INTRAVENOUS ONCE
Refills: 0 | Status: COMPLETED | OUTPATIENT
Start: 2021-05-16 | End: 2021-05-16

## 2021-05-16 RX ORDER — PIPERACILLIN AND TAZOBACTAM 4; .5 G/20ML; G/20ML
3.38 INJECTION, POWDER, LYOPHILIZED, FOR SOLUTION INTRAVENOUS ONCE
Refills: 0 | Status: COMPLETED | OUTPATIENT
Start: 2021-05-16 | End: 2021-05-16

## 2021-05-16 RX ORDER — HEPARIN SODIUM 5000 [USP'U]/ML
5000 INJECTION INTRAVENOUS; SUBCUTANEOUS EVERY 8 HOURS
Refills: 0 | Status: DISCONTINUED | OUTPATIENT
Start: 2021-05-16 | End: 2021-05-20

## 2021-05-16 RX ADMIN — SODIUM CHLORIDE 2300 MILLILITER(S): 9 INJECTION INTRAMUSCULAR; INTRAVENOUS; SUBCUTANEOUS at 14:00

## 2021-05-16 RX ADMIN — Medication 200 GRAM(S): at 22:41

## 2021-05-16 RX ADMIN — CHLORHEXIDINE GLUCONATE 15 MILLILITER(S): 213 SOLUTION TOPICAL at 18:45

## 2021-05-16 RX ADMIN — Medication 250 MILLIGRAM(S): at 15:11

## 2021-05-16 RX ADMIN — Medication 1000 MILLIGRAM(S): at 15:00

## 2021-05-16 RX ADMIN — SODIUM CHLORIDE 1000 MILLILITER(S): 9 INJECTION, SOLUTION INTRAVENOUS at 16:40

## 2021-05-16 RX ADMIN — PIPERACILLIN AND TAZOBACTAM 25 GRAM(S): 4; .5 INJECTION, POWDER, LYOPHILIZED, FOR SOLUTION INTRAVENOUS at 22:41

## 2021-05-16 RX ADMIN — PIPERACILLIN AND TAZOBACTAM 200 GRAM(S): 4; .5 INJECTION, POWDER, LYOPHILIZED, FOR SOLUTION INTRAVENOUS at 15:10

## 2021-05-16 RX ADMIN — Medication 6.81 MICROGRAM(S)/KG/MIN: at 16:40

## 2021-05-16 RX ADMIN — Medication 400 MILLIGRAM(S): at 14:30

## 2021-05-16 RX ADMIN — HEPARIN SODIUM 5000 UNIT(S): 5000 INJECTION INTRAVENOUS; SUBCUTANEOUS at 22:41

## 2021-05-16 RX ADMIN — PROPOFOL 8.71 MICROGRAM(S)/KG/MIN: 10 INJECTION, EMULSION INTRAVENOUS at 23:04

## 2021-05-16 RX ADMIN — Medication 100 MILLIGRAM(S): at 14:56

## 2021-05-16 RX ADMIN — ETOMIDATE 20 MILLIGRAM(S): 2 INJECTION INTRAVENOUS at 14:56

## 2021-05-16 RX ADMIN — SODIUM CHLORIDE 2300 MILLILITER(S): 9 INJECTION INTRAMUSCULAR; INTRAVENOUS; SUBCUTANEOUS at 16:38

## 2021-05-16 RX ADMIN — Medication 2 MILLIGRAM(S): at 14:30

## 2021-05-16 RX ADMIN — SODIUM CHLORIDE 1000 MILLILITER(S): 9 INJECTION, SOLUTION INTRAVENOUS at 16:00

## 2021-05-16 RX ADMIN — PROPOFOL 8.71 MICROGRAM(S)/KG/MIN: 10 INJECTION, EMULSION INTRAVENOUS at 15:00

## 2021-05-16 NOTE — ED PROCEDURE NOTE - ADDITIONAL POST INTUBATION REVIEWED:
Initially tube on CXR was at level of larynx.  ET tube was advanced twice and to 24 cm and now at the level of the clavicles

## 2021-05-16 NOTE — ED PROVIDER NOTE - PHYSICAL EXAMINATION
Constitutional - well-developed; well nourished. Head - NCAT. Airway patent. Eyes - PERRL. CV - tachy regular. no murmur. no edema. Pulm - tachypneic Bibasilar crackles. Abd - soft, nt. no rebound. no guarding. Neuro - A&Ox3. strength 5/5 x4. sensation intact x4. normal gait. Skin - No rash. MSK - normal ROM.

## 2021-05-16 NOTE — CHART NOTE - NSCHARTNOTEFT_GEN_A_CORE
Patient found asleep bedside, patient able be roused by RN and patient quickly hid an item into his underwear. When questioned by RN, patient states it was a pen and refused to show the RN item. Questionable drug use as old needle found in room. Security called to search patient's room, patient refused to have his belongings searched. Patient requesting to leave AMA, pt is AxOx3. Pt has demonstrated concrete thinking/reasoning, has maintained an orderly/reasonable conversation, appears to have intact insight/judgment/reason and therefore in our opinion has capacity to make decisions. The pt verbalized an understanding of our worries. We’ve told the patient that the hospital evaluation is incomplete & many troublesome conditions haven’t  been r/o. We have discussed the need for further inpatient w/u so we can get more information. We have discussed the range of possible dx, potential testing & tx options. We’ve made numerous efforts to prevent the pt from leaving AMA.  Our discussions included the potential outcomes of leaving AMA, including worsening of their condition, becoming permanently disabled/in pain/critically ill, or death.  Despite these efforts, we were unable to convince the pt to stay. The pt is refusing any further care and is leaving against medical advice. We have attempted to offer tx/rx/guidance for any dangerous conditions which are most likely and/or dangerous.  We have answered all questions and have implored the pt to return ASAP to complete the w/u. Patient verbalized understanding.  Situation discussed with Dr. Canales. Patient to receive ride via [employer]. Patient signed AMA form.

## 2021-05-16 NOTE — ED PROCEDURE NOTE - NS ED PROCEUDURE1 POST INTUBATION REVIEW
Appropriate capnography/Breath sounds bilateral/Positive end tidal Co2 noted/Chest X-Ray Breath sounds bilateral/Positive end tidal Co2 noted/Chest X-Ray

## 2021-05-16 NOTE — ED PROVIDER NOTE - OBJECTIVE STATEMENT
Pt is a 26 yo co weakness, vomiting.  Pt was admitted here for esophageal stricture and left AMA early this morning.  Pt states that he left here and used heroin and was drinking some water which he started vomiting. Pt unable to give much further history as he is intermittently confused.

## 2021-05-16 NOTE — H&P ADULT - ASSESSMENT
26 y/o M with a h/o hiatal hernia, GERD, esophageal spasm (2012), IVDU, with acute hypoxemic respiratory failure, aspiration pneumonitis/pneumonia, possible crack cocaine inhalation-related lung injury, septic shock, ELLA, severe esophageal stricture.    Admit to MICU for further management.    - emergently intubated, actively titrating ventilator settings to maintain SpO2 > 92%, will obtain baseline ABG now  - keep HOB elevated > 30 degrees, strictly NPO, no NGT/OGT  - sedate with propofol to promote vent synchrony  - hemodynamics not responsive to 4L IVF bolus, although lactate level cleared (7.7-->1.6), started on norepinephrine infusion to maintain a MAP > 65  - start empiric course of Zosyn, received vancomycin x 1 dose in setting of renal failure, blood cultures pending, add sputum culture  - ELLA likely ischemic ATN, optimize end-organ perfusion with IV vasopressor, trend BUN/Cr, monitor lytes and UOP, renally dose meds where necessary  - prior to patient leaving AMA this morning there was a plan for high risk repeat EGD with attempt at esophageal stent, unclear if this will still be on the table, thoracic service consulted for esophagectomy eval      Case discussed in detail with MICU physician, Dr. Lora.      CRITICAL CARE TIME SPENT: 69 mins  Time spent evaluating/treating patient with medical issues that pose a high risk for life threatening deterioration and/or end-organ damage, reviewing data/labs/imaging, discussing case with multidisciplinary team, discussing plan/goals of care with patient/family. Non-inclusive of procedure time.

## 2021-05-16 NOTE — ED ADULT NURSE NOTE - NURSING GU BLADDER
Patient has resistant organisms, persistent bacteremia. Ostomy in place with dark liquid stool, open abdominal wound with wound vac in place (will changed twice weekly)  - antibiotics as per ID, de-escalated today  -_______________________________________________  - no plans for further surgical intervention Patient has resistant organisms, persistent bacteremia. Ostomy in place with dark liquid stool, open abdominal wound with wound vac in place (will changed twice weekly)  - antibiotics as per ID, de-escalated today (On Erythromycin, Cefazolin, and minocycline)   - no plans for further surgical intervention non-distended

## 2021-05-16 NOTE — H&P ADULT - NSICDXPASTMEDICALHX_GEN_ALL_CORE_FT
PAST MEDICAL HISTORY:  Cocaine abuse     GERD (gastroesophageal reflux disease)     Heroin abuse     Hiatal hernia

## 2021-05-16 NOTE — ED ADULT NURSE REASSESSMENT NOTE - NS ED NURSE REASSESS COMMENT FT1
patient restless, unable to keep nasal cannula on, hypoxic down to 70% at times, MD to intubate, sat improving with non-rebreather however continues to remove mask and attempting to pull lines

## 2021-05-16 NOTE — ED PROCEDURE NOTE - PROCEDURE ADDITIONAL DETAILS
Glidescope used to place 7.5 et tube. Placement confirmed w capnography, XRay, bilateral breath sounds. Patient tolerated procedure well.

## 2021-05-16 NOTE — ED ADULT TRIAGE NOTE - CHIEF COMPLAINT QUOTE
Patient arrived to ED today with c/o trouble swallowing, patient with jerking movements, confused and unintelligible mixture of words.  Patient was here yesterday.  Patient unable to comply for full assessment and vitals signs.

## 2021-05-16 NOTE — H&P ADULT - HISTORY OF PRESENT ILLNESS
26 y/o M with a h/o hiatal hernia, GERD, esophageal spasm (2012), IVDU, presents to the ED with complaints of generalized weakness and n/v. He had just left [facility] against medical advice early this morning after spending a few days on the medicine service being worked up for esophageal stricture (confirmed via EGD). He told the ED staff that after he left the hospital he drank water and used heroin, mentioned something about a crack pipe, but was noted to be intermittently encephalopathic with fever of 103'F, sinus tachycardia (HR 140s), hypotension (SBP 80s), and hypoxemia (SpO2 80s). Lactate 7.7. CT chest revealed bilateral multifocal pneumonia. Urine toxicology positive for cocaine and opiates. His hypoxemia and AMS worsened and he was subsequently endotracheally intubated and placed on mechanical ventilation. His hemodynamics did not respond to sepsis protocol volume resuscitation and he was started on IV vasopressor therapy.

## 2021-05-16 NOTE — ED ADULT NURSE NOTE - INTERVENTIONS DEFINITIONS
Physically safe environment: no spills, clutter or unnecessary equipment/Monitor gait and stability/Monitor for mental status changes and reorient to person, place, and time/Reinforce activity limits and safety measures with patient and family

## 2021-05-16 NOTE — ED PROVIDER NOTE - CARE PLAN
Principal Discharge DX:	Aspiration pneumonia of both lower lobes, unspecified aspiration pneumonia type  Secondary Diagnosis:	Acute respiratory failure with hypoxia  Secondary Diagnosis:	Septic shock

## 2021-05-16 NOTE — ED ADULT NURSE NOTE - OBJECTIVE STATEMENT
AMA from hospital, returned today altered and restless with episodes of hypoxia, pale and diaphoretic, unable to sit still, pulling at lines and oxygen assistive devices

## 2021-05-17 LAB
AMMONIA BLD-MCNC: 40 UMOL/L — SIGNIFICANT CHANGE UP (ref 11–55)
ANION GAP SERPL CALC-SCNC: 4 MMOL/L — LOW (ref 5–17)
BUN SERPL-MCNC: 11 MG/DL — SIGNIFICANT CHANGE UP (ref 8–20)
CALCIUM SERPL-MCNC: 8.5 MG/DL — LOW (ref 8.6–10.2)
CHLORIDE SERPL-SCNC: 104 MMOL/L — SIGNIFICANT CHANGE UP (ref 98–107)
CO2 SERPL-SCNC: 27 MMOL/L — SIGNIFICANT CHANGE UP (ref 22–29)
COVID-19 SPIKE DOMAIN AB INTERP: NEGATIVE — SIGNIFICANT CHANGE UP
COVID-19 SPIKE DOMAIN ANTIBODY RESULT: 0.4 U/ML — SIGNIFICANT CHANGE UP
CREAT SERPL-MCNC: 0.79 MG/DL — SIGNIFICANT CHANGE UP (ref 0.5–1.3)
CULTURE RESULTS: NO GROWTH — SIGNIFICANT CHANGE UP
GLUCOSE SERPL-MCNC: 94 MG/DL — SIGNIFICANT CHANGE UP (ref 70–99)
HCT VFR BLD CALC: 30.6 % — LOW (ref 39–50)
HGB BLD-MCNC: 9.2 G/DL — LOW (ref 13–17)
MAGNESIUM SERPL-MCNC: 2.1 MG/DL — SIGNIFICANT CHANGE UP (ref 1.6–2.6)
MCHC RBC-ENTMCNC: 23.3 PG — LOW (ref 27–34)
MCHC RBC-ENTMCNC: 30.1 GM/DL — LOW (ref 32–36)
MCV RBC AUTO: 77.5 FL — LOW (ref 80–100)
PHOSPHATE SERPL-MCNC: 3.1 MG/DL — SIGNIFICANT CHANGE UP (ref 2.4–4.7)
PLATELET # BLD AUTO: 127 K/UL — LOW (ref 150–400)
POTASSIUM SERPL-MCNC: 4.3 MMOL/L — SIGNIFICANT CHANGE UP (ref 3.5–5.3)
POTASSIUM SERPL-SCNC: 4.3 MMOL/L — SIGNIFICANT CHANGE UP (ref 3.5–5.3)
RBC # BLD: 3.95 M/UL — LOW (ref 4.2–5.8)
RBC # FLD: 15.7 % — HIGH (ref 10.3–14.5)
SARS-COV-2 IGG+IGM SERPL QL IA: 0.4 U/ML — SIGNIFICANT CHANGE UP
SARS-COV-2 IGG+IGM SERPL QL IA: NEGATIVE — SIGNIFICANT CHANGE UP
SODIUM SERPL-SCNC: 135 MMOL/L — SIGNIFICANT CHANGE UP (ref 135–145)
SPECIMEN SOURCE: SIGNIFICANT CHANGE UP
WBC # BLD: 12.34 K/UL — HIGH (ref 3.8–10.5)
WBC # FLD AUTO: 12.34 K/UL — HIGH (ref 3.8–10.5)

## 2021-05-17 PROCEDURE — 99233 SBSQ HOSP IP/OBS HIGH 50: CPT

## 2021-05-17 RX ORDER — SODIUM CHLORIDE 9 MG/ML
1000 INJECTION, SOLUTION INTRAVENOUS
Refills: 0 | Status: DISCONTINUED | OUTPATIENT
Start: 2021-05-17 | End: 2021-05-18

## 2021-05-17 RX ORDER — PANTOPRAZOLE SODIUM 20 MG/1
40 TABLET, DELAYED RELEASE ORAL
Refills: 0 | Status: DISCONTINUED | OUTPATIENT
Start: 2021-05-17 | End: 2021-05-28

## 2021-05-17 RX ORDER — SODIUM CHLORIDE 9 MG/ML
1000 INJECTION, SOLUTION INTRAVENOUS
Refills: 0 | Status: DISCONTINUED | OUTPATIENT
Start: 2021-05-17 | End: 2021-05-17

## 2021-05-17 RX ORDER — DEXMEDETOMIDINE HYDROCHLORIDE IN 0.9% SODIUM CHLORIDE 4 UG/ML
0.2 INJECTION INTRAVENOUS
Qty: 200 | Refills: 0 | Status: DISCONTINUED | OUTPATIENT
Start: 2021-05-17 | End: 2021-05-17

## 2021-05-17 RX ORDER — FAMOTIDINE 10 MG/ML
20 INJECTION INTRAVENOUS EVERY 12 HOURS
Refills: 0 | Status: DISCONTINUED | OUTPATIENT
Start: 2021-05-17 | End: 2021-05-17

## 2021-05-17 RX ADMIN — Medication 1 MILLIGRAM(S): at 15:48

## 2021-05-17 RX ADMIN — SODIUM CHLORIDE 50 MILLILITER(S): 9 INJECTION, SOLUTION INTRAVENOUS at 10:26

## 2021-05-17 RX ADMIN — HEPARIN SODIUM 5000 UNIT(S): 5000 INJECTION INTRAVENOUS; SUBCUTANEOUS at 05:14

## 2021-05-17 RX ADMIN — CHLORHEXIDINE GLUCONATE 15 MILLILITER(S): 213 SOLUTION TOPICAL at 05:16

## 2021-05-17 RX ADMIN — Medication 6.81 MICROGRAM(S)/KG/MIN: at 07:56

## 2021-05-17 RX ADMIN — PIPERACILLIN AND TAZOBACTAM 25 GRAM(S): 4; .5 INJECTION, POWDER, LYOPHILIZED, FOR SOLUTION INTRAVENOUS at 21:14

## 2021-05-17 RX ADMIN — Medication 6.81 MICROGRAM(S)/KG/MIN: at 05:16

## 2021-05-17 RX ADMIN — SODIUM CHLORIDE 125 MILLILITER(S): 9 INJECTION, SOLUTION INTRAVENOUS at 21:15

## 2021-05-17 RX ADMIN — CHLORHEXIDINE GLUCONATE 1 APPLICATION(S): 213 SOLUTION TOPICAL at 05:15

## 2021-05-17 RX ADMIN — HEPARIN SODIUM 5000 UNIT(S): 5000 INJECTION INTRAVENOUS; SUBCUTANEOUS at 21:14

## 2021-05-17 RX ADMIN — PIPERACILLIN AND TAZOBACTAM 25 GRAM(S): 4; .5 INJECTION, POWDER, LYOPHILIZED, FOR SOLUTION INTRAVENOUS at 05:14

## 2021-05-17 RX ADMIN — PIPERACILLIN AND TAZOBACTAM 25 GRAM(S): 4; .5 INJECTION, POWDER, LYOPHILIZED, FOR SOLUTION INTRAVENOUS at 13:02

## 2021-05-17 RX ADMIN — HEPARIN SODIUM 5000 UNIT(S): 5000 INJECTION INTRAVENOUS; SUBCUTANEOUS at 14:06

## 2021-05-17 RX ADMIN — PANTOPRAZOLE SODIUM 40 MILLIGRAM(S): 20 TABLET, DELAYED RELEASE ORAL at 17:44

## 2021-05-17 NOTE — PROGRESS NOTE ADULT - ASSESSMENT
26 y/o male pmhx hiatal hernia, GERD, esophageal spasm, IVDU recently admitted to [facility] w/ esophageal stricture, ultimately signed out AMA. Presented to ED last night w/ AMS, fever, tachycardia and hypotension.     Impression:  1. Acute hypoxic respiratory failure  2. Multifocal pneumonia  3. Septic Shock, resolved   4.Esophageal stricture  5. Drug abuse.       # Acute respiratory failure, resolved. Tolerating room air.    # Multifocal pneumonia, c/w zosyn q8hr. Cultures are pending. Chest PT/ pulmonary toilet.    # Shock, resolved. HD stable. Giving 1L fluid bolus @ 50cc/hr.     #Esophageal stricture, keep NPO for now. Plan for speech and swallow eval once cleared by thoracic. Plan for possible esophageal stent, and eventual esophagectomy down the road.  No NGT. Thoracic surgery and GI following.     # Drug abuse, Utox positive for THC/cocaine.  Watch for signs of withdrawal.  Social work.     Discussed w/ Dr. Lora

## 2021-05-17 NOTE — PROGRESS NOTE ADULT - ASSESSMENT
Esophageal stricture most likely due to caustic ingestion, further complicated by PSA and aspiration pneumonia.  Strongly advise against placement of NG tube for feeding.  Once clinically better, will discuss with the patient a thoracic surgery intervention of possible esophagectomy and J-tube placement.  We will follow along.  No new recommendations at this time.

## 2021-05-17 NOTE — PROGRESS NOTE ADULT - ASSESSMENT
Patient is a 24 y/o male with pmh of esophageal spasm, IVDA recently admitted to [facility] w/ esophageal stricture and signed out aMA. Presented to ED after using heroin and presented with septic shock and found to have multi focal pna and subsquently intubated and brought to icu. Patient extubated today and is being downgraded to medicine    #septic shock secondary to complex multi focal pneumonia  - c.w iv zosyn for now  - npo     #severe esophageal stricture  - s/p MBS, esophagram, EGD at [facility]  - GI following   - Protonix 40mg IV bid  - s/p EGD on 5/14: Pin hole esophageal stricture noted. at 40 cm. Even pediatric scope could not be advanced. Then guidwire placed. Gastrograffin injected. ? extravasation noted. Eventually procedure terminated.  - CT chest: Diffuse esophageal wall thickening with a linear tract of contrast to the left of the mid to distal esophageal lumen, possibly a small amount of contrast tracking in the esophageal wall from a tear in the mid to distal esophagus.     #acute hypoxic respiratory failure - secondary to complex pneumonia  - improving  - npo    - iv abx    Leukopenia/anemia/thrombocytopenia  - will monitor  - HIV negative    Daily tobacco use  advised to quit    H/o drug use  - cocaine, heroin and K2  - iv ativan prn

## 2021-05-17 NOTE — CDI QUERY NOTE - NSCDIOTHERTXTBX_GEN_ALL_CORE_HH
5/14/21 Progress Note Adult-Hospitalist Attending:  Leukopenia/anemia/thrombocytopenia  - will monitor  - will check HIV       Can you please clarify if combination of leukopenia, anemia and thrombocytopenia support a clinically significant diagnosis?  A.	Pancytopenia  due to (please specify etiology if known)  B.	Other, please specify  C.	Not clinically significant

## 2021-05-17 NOTE — PROGRESS NOTE ADULT - SUBJECTIVE AND OBJECTIVE BOX
ABNER MORA    [id number]    25y      Male    INTERVAL HPI/OVERNIGHT EVENTS: patient being seen for acute hypoxic resp failure, aspiration pna and esophageal stricture. Patient seen at bedside and states feeling ok.     tmax 101.7    REVIEW OF SYSTEMS:    CONSTITUTIONAL: No fever, weight loss, or fatigue  RESPIRATORY: No cough, wheezing, hemoptysis; No shortness of breath  CARDIOVASCULAR: No chest pain, palpitations  GASTROINTESTINAL: No abdominal or epigastric pain. No nausea, vomiting  NEUROLOGICAL: No headaches, memory loss, loss of strength.  MISCELLANEOUS:      Vital Signs Last 24 Hrs  T(C): 36.8 (17 May 2021 11:36), Max: 38.7 (16 May 2021 15:45)  T(F): 98.2 (17 May 2021 11:36), Max: 101.7 (16 May 2021 15:45)  HR: 61 (17 May 2021 15:00) (42 - 102)  BP: 99/62 (17 May 2021 15:00) (76/38 - 183/111)  BP(mean): 72 (17 May 2021 15:00) (50 - 130)  RR: 14 (17 May 2021 15:00) (6 - 21)  SpO2: 100% (17 May 2021 15:00) (97% - 100%)    PHYSICAL EXAM:    GENERAL: NAD,   HEENT: PERRL, +EOMI  NECK: soft, Supple, No JVD,   CHEST/LUNG: scattered rhonchi   HEART: S1S2+, Regular rate and rhythm; No murmurs, rubs, or gallops  ABDOMEN: Soft, Nontender, Nondistended; Bowel sounds present  EXTREMITIES:  2+ Peripheral Pulses, No clubbing, cyanosis, or edema  SKIN: No rashes or lesions  NEURO: AAOX3, no focal deficits,     LABS:                        9.2    12.34 )-----------( 127      ( 17 May 2021 05:41 )             30.6     -    135  |  104  |  11.0  ----------------------------<  94  4.3   |  27.0  |  0.79    Ca    8.5<L>      17 May 2021 05:41  Phos  3.1     -  Mg     2.1     -    TPro  7.3  /  Alb  4.5  /  TBili  0.8  /  DBili  x   /  AST  216<H>  /  ALT  66<H>  /  AlkPhos  79  05-16    PT/INR - ( 16 May 2021 14:22 )   PT: 13.7 sec;   INR: 1.19 ratio         PTT - ( 16 May 2021 14:22 )  PTT:27.3 sec  Urinalysis Basic - ( 16 May 2021 17:34 )    Color: Yellow / Appearance: Slightly Turbid / S.020 / pH: x  Gluc: x / Ketone: Trace  / Bili: Negative / Urobili: Negative mg/dL   Blood: x / Protein: 100 mg/dL / Nitrite: Negative   Leuk Esterase: Trace / RBC: 11-25 /HPF / WBC 3-5   Sq Epi: x / Non Sq Epi: x / Bacteria: x          MEDICATIONS  (STANDING):  chlorhexidine 2% Cloths 1 Application(s) Topical <User Schedule>  famotidine Injectable 20 milliGRAM(s) IV Push every 12 hours  heparin   Injectable 5000 Unit(s) SubCutaneous every 8 hours  lactated ringers. 1000 milliLiter(s) (125 mL/Hr) IV Continuous <Continuous>  piperacillin/tazobactam IVPB.. 3.375 Gram(s) IV Intermittent every 8 hours    MEDICATIONS  (PRN):  LORazepam   Injectable 1 milliGRAM(s) IV Push every 6 hours PRN Agitation      RADIOLOGY & ADDITIONAL TESTS:

## 2021-05-17 NOTE — PROGRESS NOTE ADULT - SUBJECTIVE AND OBJECTIVE BOX
Patient is a 25y old  Male who presents with a chief complaint of Acute hypoxemic respiratory failure, septic shock (17 May 2021 09:09)      BRIEF HOSPITAL COURSE: ***    Events last 24 hours: ***    PAST MEDICAL & SURGICAL HISTORY:  Heroin abuse    Cocaine abuse    GERD (gastroesophageal reflux disease)    Hiatal hernia    No significant past surgical history        Review of Systems:  CONSTITUTIONAL: No fever, chills, or fatigue  EYES: No eye pain, visual disturbances, or discharge  ENMT:  No difficulty hearing, tinnitus, vertigo; No sinus or throat pain  NECK: No pain or stiffness  RESPIRATORY: No cough, wheezing, chills or hemoptysis; No shortness of breath  CARDIOVASCULAR: No chest pain, palpitations, dizziness, or leg swelling  GASTROINTESTINAL: No abdominal or epigastric pain. No nausea, vomiting, or hematemesis; No diarrhea or constipation. No melena or hematochezia.  GENITOURINARY: No dysuria, frequency, hematuria, or incontinence  NEUROLOGICAL: No headaches, memory loss, loss of strength, numbness, or tremors  SKIN: No itching, burning, rashes, or lesions   MUSCULOSKELETAL: No joint pain or swelling; No muscle, back, or extremity pain  PSYCHIATRIC: No depression, anxiety, mood swings, or difficulty sleeping      Medications:  piperacillin/tazobactam IVPB.. 3.375 Gram(s) IV Intermittent every 8 hours            heparin   Injectable 5000 Unit(s) SubCutaneous every 8 hours    famotidine Injectable 20 milliGRAM(s) IV Push every 12 hours        lactated ringers. 1000 milliLiter(s) IV Continuous <Continuous>      chlorhexidine 2% Cloths 1 Application(s) Topical <User Schedule>        Mode: CPAP with PS  FiO2: 50  PEEP: 5  PS: 5  MAP: 8      ICU Vital Signs Last 24 Hrs  T(C): 36.8 (17 May 2021 11:36), Max: 39.5 (16 May 2021 13:31)  T(F): 98.2 (17 May 2021 11:36), Max: 103.1 (16 May 2021 13:31)  HR: 61 (17 May 2021 12:00) (42 - 144)  BP: 95/53 (17 May 2021 12:00) (76/38 - 183/111)  BP(mean): 66 (17 May 2021 12:00) (50 - 130)  ABP: --  ABP(mean): --  RR: 18 (17 May 2021 12:00) (6 - 32)  SpO2: 100% (17 May 2021 12:00) (78% - 100%)      ABG - ( 16 May 2021 20:16 )  pH, Arterial: 7.34  pH, Blood: x     /  pCO2: 48    /  pO2: 57    / HCO3: 24    / Base Excess: -0.5  /  SaO2: 87                  I&O's Detail    16 May 2021 07:01  -  17 May 2021 07:00  --------------------------------------------------------  IN:    Dexmedetomidine: 5.4 mL    IV PiggyBack: 150 mL    IV PiggyBack: 100 mL    Norepinephrine: 134 mL    Propofol: 87 mL  Total IN: 476.4 mL    OUT:    Indwelling Catheter - Urethral (mL): 875 mL    Voided (mL): 400 mL  Total OUT: 1275 mL    Total NET: -798.6 mL      17 May 2021 07:01  -  17 May 2021 12:56  --------------------------------------------------------  IN:  Total IN: 0 mL    OUT:    Voided (mL): 700 mL  Total OUT: 700 mL    Total NET: -700 mL            LABS:                        9.2    12.34 )-----------( 127      ( 17 May 2021 05:41 )             30.6         135  |  104  |  11.0  ----------------------------<  94  4.3   |  27.0  |  0.79    Ca    8.5<L>      17 May 2021 05:41  Phos  3.1       Mg     2.1         TPro  7.3  /  Alb  4.5  /  TBili  0.8  /  DBili  x   /  AST  216<H>  /  ALT  66<H>  /  AlkPhos  79            CAPILLARY BLOOD GLUCOSE      POCT Blood Glucose.: 177 mg/dL (15 May 2021 23:26)    PT/INR - ( 16 May 2021 14:22 )   PT: 13.7 sec;   INR: 1.19 ratio         PTT - ( 16 May 2021 14:22 )  PTT:27.3 sec  Urinalysis Basic - ( 16 May 2021 17:34 )    Color: Yellow / Appearance: Slightly Turbid / S.020 / pH: x  Gluc: x / Ketone: Trace  / Bili: Negative / Urobili: Negative mg/dL   Blood: x / Protein: 100 mg/dL / Nitrite: Negative   Leuk Esterase: Trace / RBC: 11-25 /HPF / WBC 3-5   Sq Epi: x / Non Sq Epi: x / Bacteria: x      CULTURES:      Physical Examination:    General: No acute distress.  Alert, oriented, interactive, nonfocal    HEENT: Pupils equal, reactive to light.  Symmetric.    PULM: Clear to auscultation bilaterally, no significant sputum production    CVS: Regular rate and rhythm, no murmurs, rubs, or gallops    ABD: Soft, nondistended, nontender, normoactive bowel sounds, no masses    EXT: No edema, nontender    SKIN: Warm and well perfused, no rashes noted.    NEURO: A&Ox3, strength 5/5 all extremities, cranial nerves grossly intact, no focal deficits    RADIOLOGY: ***    CRITICAL CARE TIME SPENT: ***  Evaluating/treating patient, reviewing data/labs/imaging, discussing case with multidisciplinary team, discussing plan/goals of care with patient/family. Non-inclusive of procedure time.   Patient is a 25y old  Male who presents with a chief complaint of Acute hypoxemic respiratory failure, septic shock (17 May 2021 09:09)      BRIEF HOSPITAL COURSE:  24 y/o male pmhx hiatal hernia, GERD, esophageal spasm, IVDU recently admitted to [facility] w/ esophageal stricture, ultimately signed out AMA. Presented to ED last night w/ AMS, fever, tachycardia and hypotension. Received sepsis fluid resuitation, remained hypotensive and started on pressors. Patient required intubation in ED for worsening hypoxia. CT chest showed multifocal pneumonia.     Events last 24 hours: Extubated this morning, now tolerating room air. Able to come off pressors. HD stable. Remains NPO     PAST MEDICAL & SURGICAL HISTORY:  Heroin abuse    Cocaine abuse    GERD (gastroesophageal reflux disease)    Hiatal hernia    No significant past surgical history        Review of Systems:  CONSTITUTIONAL: No fever, chills, or fatigue  EYES: No eye pain, visual disturbances, or discharge  ENMT:  No difficulty hearing, tinnitus, vertigo; No sinus or throat pain  NECK: No pain or stiffness  RESPIRATORY: No cough, wheezing, chills or hemoptysis; No shortness of breath  CARDIOVASCULAR: No chest pain, palpitations, dizziness, or leg swelling  GASTROINTESTINAL: No abdominal or epigastric pain. No nausea, vomiting, or hematemesis; No diarrhea or constipation. No melena or hematochezia.  GENITOURINARY: No dysuria, frequency, hematuria, or incontinence  NEUROLOGICAL: No headaches, memory loss, loss of strength, numbness, or tremors  SKIN: No itching, burning, rashes, or lesions   MUSCULOSKELETAL: No joint pain or swelling; No muscle, back, or extremity pain  PSYCHIATRIC: No depression, anxiety, mood swings, or difficulty sleeping      Medications:  piperacillin/tazobactam IVPB.. 3.375 Gram(s) IV Intermittent every 8 hours            heparin   Injectable 5000 Unit(s) SubCutaneous every 8 hours    famotidine Injectable 20 milliGRAM(s) IV Push every 12 hours        lactated ringers. 1000 milliLiter(s) IV Continuous <Continuous>      chlorhexidine 2% Cloths 1 Application(s) Topical <User Schedule>        Mode: CPAP with PS  FiO2: 50  PEEP: 5  PS: 5  MAP: 8      ICU Vital Signs Last 24 Hrs  T(C): 36.8 (17 May 2021 11:36), Max: 39.5 (16 May 2021 13:31)  T(F): 98.2 (17 May 2021 11:36), Max: 103.1 (16 May 2021 13:31)  HR: 61 (17 May 2021 12:00) (42 - 144)  BP: 95/53 (17 May 2021 12:00) (76/38 - 183/111)  BP(mean): 66 (17 May 2021 12:00) (50 - 130)  ABP: --  ABP(mean): --  RR: 18 (17 May 2021 12:00) (6 - 32)  SpO2: 100% (17 May 2021 12:00) (78% - 100%)      ABG - ( 16 May 2021 20:16 )  pH, Arterial: 7.34  pH, Blood: x     /  pCO2: 48    /  pO2: 57    / HCO3: 24    / Base Excess: -0.5  /  SaO2: 87                  I&O's Detail    16 May 2021 07:01  -  17 May 2021 07:00  --------------------------------------------------------  IN:    Dexmedetomidine: 5.4 mL    IV PiggyBack: 150 mL    IV PiggyBack: 100 mL    Norepinephrine: 134 mL    Propofol: 87 mL  Total IN: 476.4 mL    OUT:    Indwelling Catheter - Urethral (mL): 875 mL    Voided (mL): 400 mL  Total OUT: 1275 mL    Total NET: -798.6 mL      17 May 2021 07:01  -  17 May 2021 12:56  --------------------------------------------------------  IN:  Total IN: 0 mL    OUT:    Voided (mL): 700 mL  Total OUT: 700 mL    Total NET: -700 mL            LABS:                        9.2    12.34 )-----------( 127      ( 17 May 2021 05:41 )             30.6     05-17    135  |  104  |  11.0  ----------------------------<  94  4.3   |  27.0  |  0.79    Ca    8.5<L>      17 May 2021 05:41  Phos  3.1       Mg     2.1         TPro  7.3  /  Alb  4.5  /  TBili  0.8  /  DBili  x   /  AST  216<H>  /  ALT  66<H>  /  AlkPhos  79            CAPILLARY BLOOD GLUCOSE      POCT Blood Glucose.: 177 mg/dL (15 May 2021 23:26)    PT/INR - ( 16 May 2021 14:22 )   PT: 13.7 sec;   INR: 1.19 ratio         PTT - ( 16 May 2021 14:22 )  PTT:27.3 sec  Urinalysis Basic - ( 16 May 2021 17:34 )    Color: Yellow / Appearance: Slightly Turbid / S.020 / pH: x  Gluc: x / Ketone: Trace  / Bili: Negative / Urobili: Negative mg/dL   Blood: x / Protein: 100 mg/dL / Nitrite: Negative   Leuk Esterase: Trace / RBC: 11-25 /HPF / WBC 3-5   Sq Epi: x / Non Sq Epi: x / Bacteria: x      CULTURES:      Physical Examination:    General: No acute distress.  Alert, oriented, interactive, nonfocal    HEENT: Pupils equal, reactive to light.  Symmetric.    PULM: b/l ronchi. No wheeze/rales. Non labored breathing     CVS: Regular rate and rhythm, no murmurs, rubs, or gallops    ABD: Soft, nondistended, nontender, normoactive bowel sounds, no masses    EXT: No edema, nontender    SKIN: Warm and well perfused, no rashes noted.    NEURO: A&Ox3, strength 5/5 all extremities, no focal deficits    RADIOLOGY: < from: CT Abdomen and Pelvis w/ IV Cont (21 @ 16:49) >   EXAM:  CT ABDOMEN AND PELVIS IC                         EXAM:  CT CHEST IC                          PROCEDURE DATE:  2021          INTERPRETATION:  CLINICAL INFORMATION: 25-year-old man with fever and history esophageal stricture and recent EGD. Hypoxia. Returned 6 to ED after leaving hospital AMA with altered mental status. Intubated in the ED. COMPARISON: CT scan 2021    CONTRAST/COMPLICATIONS:  IV Contrast: Omnipaque 300  87 cc administered   13 cc discarded  Oral Contrast: NONE  Complications: None reported at time of study completion    PROCEDURE:  CT of the Chest, abdomen, and pelvis was performed.  Sagittal and coronal reformats were performed.    FINDINGS:    LUNGS AND AIRWAYS: Patent central airways. Endotracheal tube above rachel. Bilateral patchy opacities new since 2021 most marked in the lower lobes consistent with multifocal pneumonia, possibly secondary to aspiration.  PLEURA: No pleural effusion.  MEDIASTINUM AND JOHN: No lymphadenopathy.  VESSELS: Within normal limits.  HEART: Heart size is normal. No pericardial effusion.  CHEST WALL AND LOWER NECK: Within normal limits.        LIVER: Within normal limits.  BILE DUCTS: Normal caliber.  GALLBLADDER: Within normal limits.  SPLEEN: Within normal limits.  PANCREAS: Within normal limits.  ADRENALS: Within normal limits.  KIDNEYS/URETERS: 2 mm nonobstructing calculus.    BLADDER: Within normal limits.  REPRODUCTIVE ORGANS: Normal size prostate    BOWEL: No bowel obstruction. Appendix normal.  PERITONEUM: No ascites.  VESSELS: Within normal limits.  RETROPERITONEUM/LYMPH NODES: No lymphadenopathy.  ABDOMINAL WALL: Within normal limits.  BONES: Within normal limits.    IMPRESSION:  Multifocal pneumonia, mainly lower lobes, possibly secondary to aspiration new since 2021    < end of copied text >       TIME SPENT: 30 min   Evaluating/treating patient, reviewing data/labs/imaging, discussing case with multidisciplinary team, discussing plan/goals of care with patient/family. Non-inclusive of procedure time.

## 2021-05-17 NOTE — PROGRESS NOTE ADULT - SUBJECTIVE AND OBJECTIVE BOX
Chief Complaint: This is a 25y old man patient being seen in follow-up consultation for esophageal stricture.    HPI / 24H events:  Patient planned for extubation this morning.  No acute events overnight.    ROS: Unable to obtain as patient was intubated at the time of my evaluation.    PAST MEDICAL/SURGICAL HISTORY:  Heroin abuse    Cocaine abuse    GERD (gastroesophageal reflux disease)    Hiatal hernia    No significant past surgical history      MEDICATIONS  (STANDING):  chlorhexidine 2% Cloths 1 Application(s) Topical <User Schedule>  heparin   Injectable 5000 Unit(s) SubCutaneous every 8 hours  norepinephrine Infusion 0.05 MICROgram(s)/kG/Min (6.81 mL/Hr) IV Continuous <Continuous>  piperacillin/tazobactam IVPB.. 3.375 Gram(s) IV Intermittent every 8 hours    MEDICATIONS  (PRN):    No Known Allergies    T(C): 37 (05-17-21 @ 07:31), Max: 39.5 (05-16-21 @ 13:31)  HR: 73 (05-17-21 @ 08:26) (42 - 144)  BP: 98/59 (05-17-21 @ 08:00) (76/38 - 183/111)  RR: 16 (05-17-21 @ 08:00) (6 - 32)  SpO2: 100% (05-17-21 @ 08:26) (78% - 100%)  Mode: CPAP with PS  FiO2: 50  PEEP: 5  PS: 5  MAP: 8    I&O's Summary    16 May 2021 07:01  -  17 May 2021 07:00  --------------------------------------------------------  IN: 476.4 mL / OUT: 1275 mL / NET: -798.6 mL      PHYSICAL EXAM:  Constitutional: Intubated awake, alert  Eyes: Sclerae anicteric, conjunctivae normal  ENMT: ETT in place  Respiratory: On vent  Cardiovascular: Regular rate and rhythm  Gastrointestinal: Soft, nontender, nondistended, normoactive bowel sounds; no hepatosplenomegaly appreciated; no rebound tenderness or involuntary guarding  Extremities: No clubbing, cyanosis or edema  Skin: No jaundice  Musculoskeletal: No significant peripheral atrophy      LABS:  ABG - ( 16 May 2021 20:16 )  pH, Arterial: 7.34  pH, Blood: x     /  pCO2: 48    /  pO2: 57    / HCO3: 24    / Base Excess: -0.5  /  SaO2: 87                           9.2    12.34 )-----------( 127      ( 05-17 @ 05:41 )             30.6                10.4   12.67 )-----------( 172      ( 05-16 @ 14:22 )             34.4                10.2   6.34  )-----------( 145      ( 05-15 @ 06:12 )             33.4       05-17    135  |  104  |  11.0  ----------------------------<  94  4.3   |  27.0  |  0.79    Ca    8.5<L>      17 May 2021 05:41  Phos  3.1     05-17  Mg     2.1     05-17    TPro  7.3  /  Alb  4.5  /  TBili  0.8  /  DBili  x   /  AST  216<H>  /  ALT  66<H>  /  AlkPhos  79  05-16    LIVER FUNCTIONS - ( 16 May 2021 14:22 )  Alb: 4.5 g/dL / Pro: 7.3 g/dL / ALK PHOS: 79 U/L / ALT: 66 U/L / AST: 216 U/L / GGT: x           PT/INR - ( 16 May 2021 14:22 )   PT: 13.7 sec;   INR: 1.19 ratio    PTT - ( 16 May 2021 14:22 )  PTT:27.3 sec

## 2021-05-18 LAB
ALBUMIN SERPL ELPH-MCNC: 3.1 G/DL — LOW (ref 3.3–5.2)
ALP SERPL-CCNC: 56 U/L — SIGNIFICANT CHANGE UP (ref 40–120)
ALT FLD-CCNC: 90 U/L — HIGH
ANION GAP SERPL CALC-SCNC: 11 MMOL/L — SIGNIFICANT CHANGE UP (ref 5–17)
AST SERPL-CCNC: 258 U/L — HIGH
BILIRUB DIRECT SERPL-MCNC: 0.4 MG/DL — HIGH (ref 0–0.3)
BILIRUB INDIRECT FLD-MCNC: 0.9 MG/DL — SIGNIFICANT CHANGE UP (ref 0.2–1)
BILIRUB SERPL-MCNC: 1.3 MG/DL — SIGNIFICANT CHANGE UP (ref 0.4–2)
BUN SERPL-MCNC: 12 MG/DL — SIGNIFICANT CHANGE UP (ref 8–20)
CALCIUM SERPL-MCNC: 8.3 MG/DL — LOW (ref 8.6–10.2)
CHLORIDE SERPL-SCNC: 104 MMOL/L — SIGNIFICANT CHANGE UP (ref 98–107)
CO2 SERPL-SCNC: 25 MMOL/L — SIGNIFICANT CHANGE UP (ref 22–29)
CREAT SERPL-MCNC: 0.87 MG/DL — SIGNIFICANT CHANGE UP (ref 0.5–1.3)
GLUCOSE BLDC GLUCOMTR-MCNC: 74 MG/DL — SIGNIFICANT CHANGE UP (ref 70–99)
GLUCOSE SERPL-MCNC: 65 MG/DL — LOW (ref 70–99)
HCT VFR BLD CALC: 30.5 % — LOW (ref 39–50)
HGB BLD-MCNC: 9 G/DL — LOW (ref 13–17)
MAGNESIUM SERPL-MCNC: 1.5 MG/DL — LOW (ref 1.8–2.6)
MCHC RBC-ENTMCNC: 22.8 PG — LOW (ref 27–34)
MCHC RBC-ENTMCNC: 29.5 GM/DL — LOW (ref 32–36)
MCV RBC AUTO: 77.4 FL — LOW (ref 80–100)
PHOSPHATE SERPL-MCNC: 3.1 MG/DL — SIGNIFICANT CHANGE UP (ref 2.4–4.7)
PLATELET # BLD AUTO: 107 K/UL — LOW (ref 150–400)
POTASSIUM SERPL-MCNC: 4 MMOL/L — SIGNIFICANT CHANGE UP (ref 3.5–5.3)
POTASSIUM SERPL-SCNC: 4 MMOL/L — SIGNIFICANT CHANGE UP (ref 3.5–5.3)
PROT SERPL-MCNC: 5.3 G/DL — LOW (ref 6.6–8.7)
RBC # BLD: 3.94 M/UL — LOW (ref 4.2–5.8)
RBC # FLD: 15.6 % — HIGH (ref 10.3–14.5)
SODIUM SERPL-SCNC: 140 MMOL/L — SIGNIFICANT CHANGE UP (ref 135–145)
WBC # BLD: 4.73 K/UL — SIGNIFICANT CHANGE UP (ref 3.8–10.5)
WBC # FLD AUTO: 4.73 K/UL — SIGNIFICANT CHANGE UP (ref 3.8–10.5)

## 2021-05-18 PROCEDURE — 99231 SBSQ HOSP IP/OBS SF/LOW 25: CPT

## 2021-05-18 PROCEDURE — 71250 CT THORAX DX C-: CPT | Mod: 26

## 2021-05-18 PROCEDURE — 99232 SBSQ HOSP IP/OBS MODERATE 35: CPT

## 2021-05-18 PROCEDURE — 93010 ELECTROCARDIOGRAM REPORT: CPT

## 2021-05-18 PROCEDURE — 99233 SBSQ HOSP IP/OBS HIGH 50: CPT

## 2021-05-18 PROCEDURE — 99222 1ST HOSP IP/OBS MODERATE 55: CPT

## 2021-05-18 RX ORDER — MAGNESIUM SULFATE 500 MG/ML
2 VIAL (ML) INJECTION ONCE
Refills: 0 | Status: COMPLETED | OUTPATIENT
Start: 2021-05-18 | End: 2021-05-18

## 2021-05-18 RX ORDER — SODIUM CHLORIDE 9 MG/ML
1000 INJECTION, SOLUTION INTRAVENOUS
Refills: 0 | Status: DISCONTINUED | OUTPATIENT
Start: 2021-05-18 | End: 2021-05-20

## 2021-05-18 RX ORDER — ACETAMINOPHEN 500 MG
1000 TABLET ORAL ONCE
Refills: 0 | Status: COMPLETED | OUTPATIENT
Start: 2021-05-18 | End: 2021-05-18

## 2021-05-18 RX ADMIN — PIPERACILLIN AND TAZOBACTAM 25 GRAM(S): 4; .5 INJECTION, POWDER, LYOPHILIZED, FOR SOLUTION INTRAVENOUS at 21:55

## 2021-05-18 RX ADMIN — Medication 1000 MILLIGRAM(S): at 23:45

## 2021-05-18 RX ADMIN — SODIUM CHLORIDE 125 MILLILITER(S): 9 INJECTION, SOLUTION INTRAVENOUS at 17:25

## 2021-05-18 RX ADMIN — Medication 1 MILLIGRAM(S): at 17:25

## 2021-05-18 RX ADMIN — PANTOPRAZOLE SODIUM 40 MILLIGRAM(S): 20 TABLET, DELAYED RELEASE ORAL at 06:08

## 2021-05-18 RX ADMIN — PIPERACILLIN AND TAZOBACTAM 25 GRAM(S): 4; .5 INJECTION, POWDER, LYOPHILIZED, FOR SOLUTION INTRAVENOUS at 06:08

## 2021-05-18 RX ADMIN — SODIUM CHLORIDE 125 MILLILITER(S): 9 INJECTION, SOLUTION INTRAVENOUS at 06:09

## 2021-05-18 RX ADMIN — Medication 50 GRAM(S): at 09:50

## 2021-05-18 RX ADMIN — HEPARIN SODIUM 5000 UNIT(S): 5000 INJECTION INTRAVENOUS; SUBCUTANEOUS at 14:00

## 2021-05-18 RX ADMIN — SODIUM CHLORIDE 125 MILLILITER(S): 9 INJECTION, SOLUTION INTRAVENOUS at 09:50

## 2021-05-18 RX ADMIN — Medication 1 MILLIGRAM(S): at 11:07

## 2021-05-18 RX ADMIN — HEPARIN SODIUM 5000 UNIT(S): 5000 INJECTION INTRAVENOUS; SUBCUTANEOUS at 06:08

## 2021-05-18 RX ADMIN — PANTOPRAZOLE SODIUM 40 MILLIGRAM(S): 20 TABLET, DELAYED RELEASE ORAL at 17:21

## 2021-05-18 RX ADMIN — HEPARIN SODIUM 5000 UNIT(S): 5000 INJECTION INTRAVENOUS; SUBCUTANEOUS at 21:55

## 2021-05-18 RX ADMIN — PIPERACILLIN AND TAZOBACTAM 25 GRAM(S): 4; .5 INJECTION, POWDER, LYOPHILIZED, FOR SOLUTION INTRAVENOUS at 14:00

## 2021-05-18 RX ADMIN — Medication 400 MILLIGRAM(S): at 22:48

## 2021-05-18 NOTE — BH CONSULTATION LIAISON ASSESSMENT NOTE - NSBHCHARTREVIEWVS_PSY_A_CORE FT
Vital Signs Last 24 Hrs  T(C): 36.7 (18 May 2021 12:00), Max: 37.3 (17 May 2021 17:42)  T(F): 98 (18 May 2021 12:00), Max: 99.1 (17 May 2021 17:42)  HR: 54 (18 May 2021 12:00) (54 - 71)  BP: 105/63 (18 May 2021 12:00) (99/62 - 132/78)  BP(mean): 72 (17 May 2021 15:00) (72 - 72)  RR: 18 (18 May 2021 12:00) (14 - 18)  SpO2: 97% (18 May 2021 12:00) (95% - 100%)

## 2021-05-18 NOTE — PROGRESS NOTE ADULT - ASSESSMENT
24 y/o male with pmh of esophageal spasm, IVDA recently admitted to [facility] w/ esophageal stricture and signed out aMA. Presented to ED after using heroin and presented with septic shock and found to have multi focal pna and subsquently intubated and brought to icu. 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 at 40 cm. Even pediatric scope could not be advanced. Then guidwire placed. Gastrograffin injected. ? extravasation noted. Eventually procedure terminated.  - CT chest: Diffuse esophageal wall thickening with a linear tract of contrast to the left of the mid to distal esophageal lumen, possibly a small amount of contrast tracking in the esophageal wall from a tear in the mid to distal esophagus.   - CT surgery consult    Acute hypoxic respiratory failure - secondary to complex pneumonia  - improving  - npo   - c/w Zosyn    Leukopenia/anemia/thrombocytopenia  - will monitor  - HIV negative    Daily tobacco use  - advised to quit    H/o drug use  - cocaine, heroin and K2  - iv ativan prn     DVT ppx  - Heparin

## 2021-05-18 NOTE — BH CONSULTATION LIAISON ASSESSMENT NOTE - SUMMARY
24 y/o M with no pphx and pmh of hiatal hernia, GERD, esophageal spasm (2012), IVDU, presents with esophageal stricture. Patient left [facility] against medical advice on 5/16/21 after being worked up for esophageal stricture (confirmed with EGD). He states that he injected heroin and smoked crack after he left on on 5/16/21. He then return back to the hospital with septic shock and bilateral multifocal pneumonia. Urine toxicology positive for cocaine and opiates. Upon arrival, patient is laying on his side with his eyes open. He is able to make eye contact and respond to questions. He looked very sad and had tears in his eyes. He states that he injected heroin and smoked crack on 5/16/21. Patient states he has been abusing heroin and crack since 2013. Patient states he is currently going to [place] [facility] to help with withdrawal.  Patient states he understands his situation with what happened to his esophagus. Patient stated he wasn't able to eat for the past 2 months and states he does not know how that happened. Patient states he is in pain and he is hungry. Patient states his mother will be coming soon. Patient denies SI/HI/AH/VH. Patient denies having prior psych history or prior psych in hospitalization.     Discussed patient's situation with patient. Provided patient with information to contact psych if patient experiences any SI/HI/AH/VH. Patient understands and agrees with plan. Discussed patient's situation with NP. Continue with current medical management. Continue to monitor for patient's withdrawal symptoms. Awaiting GI evaluation prior to making any psych changes.  26 y/o M with no pphx and pmh of hiatal hernia, GERD, esophageal spasm (2012), IVDU, presents with esophageal stricture. Patient left [facility] against medical advice on 5/16/21 after being worked up for esophageal stricture (confirmed with EGD). He states that he injected heroin and smoked crack after he left on on 5/16/21. He then return back to the hospital with septic shock and bilateral multifocal pneumonia. Urine toxicology positive for cocaine and opiates. Upon arrival, patient is laying on his side with his eyes open. He is able to make eye contact and respond to questions. He looked very sad and had tears in his eyes. He states that he injected heroin and smoked crack on 5/16/21. Patient states he has been abusing heroin and crack since 2013. Patient states he is currently going to [place] [facility] to help with withdrawal.  Patient states he understands his situation with what happened to his esophagus. Patient stated he wasn't able to eat for the past 2 months and states he does not know how that happened. Patient states he is in pain and he is hungry. Patient states his mother will be coming soon. Patient denies SI/HI/AH/VH. Patient denies having prior psych history or prior psych in hospitalization.     Discussed patient's situation with patient. Provided patient with information to contact psych if patient experiences any SI/HI/AH/VH. Patient understands and agrees with plan. Discussed patient's situation with NP. Continue with current medical management. Continue to monitor for patient's withdrawal symptoms. Awaiting Thoracic surgery evaluation prior to making any psych changes.

## 2021-05-18 NOTE — PROGRESS NOTE ADULT - SUBJECTIVE AND OBJECTIVE BOX
Subjective: "I don't know, I'm ok I think" Pt curled up on bed in NAD.     Vital Signs:  Vital Signs Last 24 Hrs  T(C): 36.7 (05-18-21 @ 12:00), Max: 37.3 (05-17-21 @ 17:42)  T(F): 98 (05-18-21 @ 12:00), Max: 99.1 (05-17-21 @ 17:42)  HR: 54 (05-18-21 @ 12:00) (54 - 71)  BP: 105/63 (05-18-21 @ 12:00) (105/63 - 132/78)  RR: 18 (05-18-21 @ 12:00) (18 - 18)  SpO2: 97% (05-18-21 @ 12:00) (95% - 97%) on RA    Relevant labs, radiology and Medications reviewed    Pertinent Physical Exam  Gen: WN/WD NAD  Neuro: A+Ox3, nonfocal  Pulm: cta  CV: RRR  Abd: soft + tenderness epigastric  LE: no edema    CXR: < from: CT Chest w/ IV Cont (05.16.21 @ 15:35) >  Multifocal pneumonia, mainly lower lobes, possibly secondary to aspiration new since 05/14/2021    < end of copied text >

## 2021-05-18 NOTE — PROGRESS NOTE ADULT - SUBJECTIVE AND OBJECTIVE BOX
Chief complaint: Septic shock    Patient seen and examined at bedside. No acute overnight events reported. Patient states he feels fine and only wants to eat. Denies headache, fever, chills, cough, chest pain, shortness of breath, nausea or vomiting.     Vital Signs Last 24 Hrs  T(F): 98.1 (18 May 2021 06:10), Max: 99.1 (17 May 2021 17:42)  HR: 60 (18 May 2021 06:10) (60 - 73)  BP: 118/65 (18 May 2021 06:10) (85/50 - 132/78)  RR: 18 (18 May 2021 06:10) (14 - 18)  SpO2: 96% (18 May 2021 06:10) (95% - 100%)    Physical Exam:  Constitutional: alert and oriented, in no acute distress   Neck: Soft and supple  Respiratory: Coarse breath sounds b/l  Cardiovascular: Regular rate and rhyhtm, no murmurs, gallops, rubs  Gastrointestinal: Soft, non-tender to palpation, +bs  Vascular: 2+ peripheral pulses  Neurological: A/O x 3, no focal neurological deficits  Musculoskeletal: 5/5 strength b/l upper and lower extremities, no lower extremity edema bilaterally    Labs:                        9.0    4.73  )-----------( 107      ( 18 May 2021 08:07 )             30.5   05-18    140  |  104  |  12.0  ----------------------------<  65<L>  4.0   |  25.0  |  0.87    Ca    8.3<L>      18 May 2021 08:07  Phos  3.1     05-18  Mg     1.5     05-18    TPro  5.3<L>  /  Alb  3.1<L>  /  TBili  1.3  /  DBili  0.4<H>  /  AST  258<H>  /  ALT  90<H>  /  AlkPhos  56  05-18

## 2021-05-18 NOTE — CHART NOTE - NSCHARTNOTEFT_GEN_A_CORE
Called by RN after pt c/o abdominal pain and requesting pain meds.   Pt with pmh of esophageal spasm, IVDA recently admitted to [facility] w/ esophageal stricture and signed out AMA. Presented to ED after using heroin and presented with septic shock and found to have multi focal pna and subsquently intubated and brought to icu.   Extubated and downgraded to medicine service yesterday.  Pt on zosyn for pneumonia; once cleared surgery to f/u with esophageal stent and J tube.    Upon my arrival, pt was seen using cellphone in no distress with his head resting on his left hand.  Pt states pain is similar to pain he has been having.  He is hungry, wants to eat.  +flatulence.  Been NPO for last 2 days, hasn't had BM.  On IVF, urinating well without difficulty, with clear urine in urinal.  Denies n, v, fever, chills.    ICU Vital Signs Last 24 Hrs  T(C): 36.3 (18 May 2021 20:47), Max: 37.2 (18 May 2021 16:39)  T(F): 97.4 (18 May 2021 20:47), Max: 99 (18 May 2021 16:39)  HR: 50 (18 May 2021 20:47) (50 - 60)  BP: 127/84 (18 May 2021 20:47) (105/63 - 131/80)  BP(mean): --  ABP: --  ABP(mean): --  RR: 18 (18 May 2021 20:47) (18 - 18)  SpO2: 98% (18 May 2021 20:47) (96% - 98%)    General   Pt is A&Ox3 in NAD  Lungs      fair air entry b/l with decreased BS in lower fields  Heart      s1/s2 bob  Abdomen +BS soft +guarding; no rebound tenderness  Ext           no edema    CT abd/pelvis with contrast (5/16) wnl except for nonobstructing 2mm nephrolithiasis    Abdominal pain   Esophageal stricture  Bradycardia   Hypomagnesemia     Ofirmev 1gm IVPB x1 due to NPO status  EKG stat   Mg supplemented in am, repeat Mg, phos, BMP  Continue to monitor and reassess prn. Called by RN after pt c/o abdominal pain and requesting pain meds.   Pt with pmh of esophageal spasm, IVDA recently admitted to [facility] w/ esophageal stricture and signed out AMA. Presented to ED after using heroin and presented with septic shock and found to have multi focal pna and subsquently intubated and brought to icu.   Extubated and downgraded to medicine service yesterday.  Pt on zosyn for pneumonia; once cleared surgery to f/u with esophageal stent and J tube.    Upon my arrival, pt was seen using cellphone in no distress with his head resting on his left hand.  Pt states pain is similar to pain he has been having.  He is hungry, wants to eat.  +flatulence.  Been NPO for last 2 days, hasn't had BM.  On IVF, urinating well without difficulty, with clear urine in urinal.  Denies n, v, diarrhea, fever, chills.    ICU Vital Signs Last 24 Hrs  T(C): 36.3 (18 May 2021 20:47), Max: 37.2 (18 May 2021 16:39)  T(F): 97.4 (18 May 2021 20:47), Max: 99 (18 May 2021 16:39)  HR: 50 (18 May 2021 20:47) (50 - 60)  BP: 127/84 (18 May 2021 20:47) (105/63 - 131/80)  BP(mean): --  ABP: --  ABP(mean): --  RR: 18 (18 May 2021 20:47) (18 - 18)  SpO2: 98% (18 May 2021 20:47) (96% - 98%)    General   Pt is A&Ox3 in NAD  Lungs      fair air entry b/l with decreased BS in lower fields  Heart      s1/s2 bob  Abdomen +BS soft +guarding; no rebound tenderness  Ext           no edema    CT abd/pelvis with contrast (5/16) wnl except for nonobstructing 2mm nephrolithiasis    Abdominal pain   Esophageal stricture  Bradycardia   Hypomagnesemia     Ofirmev 1gm IVPB x1 due to NPO status  EKG stat   Mg supplemented in am, repeat Mg, phos, BMP  Continue to monitor and reassess prn.    5/19/21  00:30     Pain is controlled with tylenol; resting comfortably  EKG reviewed - Sinus Bradycardia with VR 48  Cardiac monitor  Hypokalemic, 3.2 - KCL 10mEq IVPB x3 doses  Hypomagnesemic 1.5 - MgSO4 2gn IVPB x1  Repeat labs ordered for am  RN to monitor and escalate prn. Called by RN after pt c/o abdominal pain and requesting pain meds.   Pt with pmh of esophageal spasm, IVDA recently admitted to [facility] w/ esophageal stricture and signed out AMA. Presented to ED after using heroin and presented with septic shock and found to have multi focal pna and subsquently intubated and brought to icu.   Extubated and downgraded to medicine service yesterday.  Pt on zosyn for pneumonia; once cleared surgery to f/u with esophageal stent and J tube.    Upon my arrival, pt was seen using cellphone in no distress with his head resting on his left hand.  Pt states pain is similar to pain he has been having.  He is hungry, wants to eat.  +flatulence.  Been NPO for last 2 days, hasn't had BM.  On IVF, urinating well without difficulty, with clear urine in urinal.  Denies n, v, diarrhea, fever, chills.    ICU Vital Signs Last 24 Hrs  T(C): 36.3 (18 May 2021 20:47), Max: 37.2 (18 May 2021 16:39)  T(F): 97.4 (18 May 2021 20:47), Max: 99 (18 May 2021 16:39)  HR: 50 (18 May 2021 20:47) (50 - 60)  BP: 127/84 (18 May 2021 20:47) (105/63 - 131/80)  BP(mean): --  ABP: --  ABP(mean): --  RR: 18 (18 May 2021 20:47) (18 - 18)  SpO2: 98% (18 May 2021 20:47) (96% - 98%)    General   Pt is A&Ox3 in NAD  Lungs      fair air entry b/l with decreased BS in lower fields  Heart      s1/s2 bob  Abdomen +BS soft +guarding; no rebound tenderness  Ext           no edema    CT abd/pelvis with contrast (5/16) wnl except for nonobstructing 2mm nephrolithiasis    Abdominal pain   Esophageal stricture  Bradycardia   Hypomagnesemia     Ofirmev 1gm IVPB x1 due to NPO status  EKG stat   Mg supplemented in am, repeat Mg, phos, BMP  Continue to monitor and reassess prn.    5/19/21  00:30     Pain is controlled with tylenol; resting comfortably  EKG reviewed - Sinus Bradycardia with VR 48  Cardiac monitor  Hypokalemic, 3.2 - KCL 10mEq IVPB x3 doses  Hypomagnesemic 1.5 - MgSO4 2gm IVPB x1  Repeat labs ordered for am  RN to monitor and escalate prn.

## 2021-05-18 NOTE — BH CONSULTATION LIAISON ASSESSMENT NOTE - CURRENT MEDICATION
MEDICATIONS  (STANDING):  chlorhexidine 2% Cloths 1 Application(s) Topical <User Schedule>  dextrose 5% + sodium chloride 0.45%. 1000 milliLiter(s) (125 mL/Hr) IV Continuous <Continuous>  heparin   Injectable 5000 Unit(s) SubCutaneous every 8 hours  pantoprazole  Injectable 40 milliGRAM(s) IV Push two times a day  piperacillin/tazobactam IVPB.. 3.375 Gram(s) IV Intermittent every 8 hours    MEDICATIONS  (PRN):  LORazepam   Injectable 1 milliGRAM(s) IV Push every 6 hours PRN Agitation

## 2021-05-18 NOTE — BH CONSULTATION LIAISON ASSESSMENT NOTE - CASE SUMMARY
24 y/o M with no pphx and pmh of hiatal hernia, GERD, esophageal spasm (2012), IVDU, presents with esophageal stricture. Patient left [facility] against medical advice on 5/16/21 after being worked up for esophageal stricture (confirmed with EGD). He states that he injected heroin and smoked crack after he left on 5/16/21. He then return back to the hospital with septic shock and bilateral multifocal pneumonia. Urine toxicology positive for cocaine and opiates. Upon arrival, patient is laying on his side with his eyes open. He is able to make eye contact and respond to questions. He looked very sad and had tears in his eyes. He states that he injected heroin and smoked crack on 5/16/21. Patient states he has been abusing heroin and crack since 2013. Patient states he is currently going to [place] [facility] to help with withdrawal.  Patient states he understands his situation with what happened to his esophagus. Patient stated he wasn't able to eat for the past 2 months and states he does not know how that happened. Patient states he is in pain and he is hungry. Patient states his mother will be coming soon. Patient denies SI/HI/AH/VH. Patient denies having prior psych history or prior psych in hospitalization.     Discussed patient's situation with patient. Provided patient with information to contact psych if patient experiences any SI/HI/AH/VH. Patient understands and agrees with plan. Discussed patient's situation with NP. Continue with current medical management. Continue to monitor for patient's withdrawal symptoms. Awaiting GI evaluation prior to making any psych changes.  26 y/o M with no pphx and pmh of hiatal hernia, GERD, esophageal spasm (2012), IVDU, presents with esophageal stricture. Patient left [facility] against medical advice on 5/16/21 after being worked up for esophageal stricture (confirmed with EGD). He states that he injected heroin and smoked crack after he left on 5/16/21. He then return back to the hospital with septic shock and bilateral multifocal pneumonia. Urine toxicology positive for cocaine and opiates. Upon arrival, patient is laying on his side with his eyes open. He is able to make eye contact and respond to questions. He looked very sad and had tears in his eyes. He states that he injected heroin and smoked crack on 5/16/21. Patient states he has been abusing heroin and crack since 2013. Patient states he is currently going to [place] [facility] to help with withdrawal.  Patient states he understands his situation with what happened to his esophagus. Patient stated he wasn't able to eat for the past 2 months and states he does not know how that happened. Patient states he is in pain and he is hungry. Patient states his mother will be coming soon. Patient denies SI/HI/AH/VH. Patient denies having prior psych history or prior psych in hospitalization.     Discussed patient's situation with patient. Provided patient with information to contact psych if patient experiences any SI/HI/AH/VH. Patient understands and agrees with plan. Discussed patient's situation with NP. Continue with current medical management. Continue to monitor for patient's withdrawal symptoms. Awaiting Thoracic surgery evaluation prior to making any psych changes.

## 2021-05-18 NOTE — BH CONSULTATION LIAISON ASSESSMENT NOTE - RISK ASSESSMENT
Low risk for violence and suicide: Patient does not have any psych history. Patient denies SI/HI/AH/VH. Patient's current affect and mood is appropriate for patient's current situation. Patient has support from mother.

## 2021-05-18 NOTE — PROGRESS NOTE ADULT - ASSESSMENT
25M h/o esophageal stricture, GERD, hiatal hernia, hepatitis C    Dysphagia and abdominal pain > esoph stricture    Unsuccessful dilitation by EGD x 2    S/P thoracentesis    Plan:  Esophageal stent vs esophagectomy  NPO  Surgeons to decide recommendations  D/W Patient and Dr Vizcarra   25M h/o esophageal stricture, GERD, hiatal hernia, hepatitis C    Dysphagia and abdominal pain > esoph stricture    Unsuccessful dilitation by EGD x 2    Plan:  Needs time to clear PNA with ABX  Esophageal stent vs esophagectomy  NPO  Surgeons to decide recommendations  D/W Patient and Dr Vizcarra

## 2021-05-18 NOTE — BH CONSULTATION LIAISON ASSESSMENT NOTE - HPI (INCLUDE ILLNESS QUALITY, SEVERITY, DURATION, TIMING, CONTEXT, MODIFYING FACTORS, ASSOCIATED SIGNS AND SYMPTOMS)
24 y/o M with no pphx and pmh of hiatal hernia, GERD, esophageal spasm (2012), IVDU, presents with esophageal stricture. Patient left [facility] against medical advice on 5/16/21 after being worked up for esophageal stricture (confirmed with EGD). He states that he injected heroin and smoked crack after he left on on 5/16/21. He then return back to the hospital with intermittently encephalopathic with fever of 103'F, sinus tachycardia (HR 140s), hypotension (SBP 80s), and hypoxemia (SpO2 80s). Lactate 7.7. CT chest revealed bilateral multifocal pneumonia. Urine toxicology positive for cocaine and opiates. Upon arrival, patient is laying on his side. He looked very sad and had tears in his eyes. He states that he injected heroin and smoked crack on 5/16/21. Patient states he has been abusing heroin and crack since 2013. Patient states he is currently going to [place] [facility] to help with withdrawal. Patient states he understands his situation with what happened to his esophagus. Patient stated he wasn't able to eat for the past 2 months and states he does not know how that happened. Patient states he is in pain and he is hungry. Patient states his mother will be coming soon. Patient denies SI/HI/AH/VH. Patient denies having prior psych history or prior psych in hospitalization.     Collateral from NP stated patient has a high-grade distal esophageal stricture which is most likely due to a caustic ingestion. Patient may need J-tube placement and also possible esophageal stent. Patient may need an esophagectomy.

## 2021-05-18 NOTE — PROGRESS NOTE ADULT - SUBJECTIVE AND OBJECTIVE BOX
INTERVAL HPI/OVERNIGHT EVENTS:Follow-up is being performed for esophageal stricture.  The patient is going to have a CT chest with contrast.  At this time the patient wants to eat.  I had extensive discussion with him.    MEDICATIONS  (STANDING):  chlorhexidine 2% Cloths 1 Application(s) Topical <User Schedule>  dextrose 5% + sodium chloride 0.45%. 1000 milliLiter(s) (125 mL/Hr) IV Continuous <Continuous>  heparin   Injectable 5000 Unit(s) SubCutaneous every 8 hours  pantoprazole  Injectable 40 milliGRAM(s) IV Push two times a day  piperacillin/tazobactam IVPB.. 3.375 Gram(s) IV Intermittent every 8 hours    MEDICATIONS  (PRN):  LORazepam   Injectable 1 milliGRAM(s) IV Push every 6 hours PRN Agitation      Allergies    No Known Allergies    Intolerances        Vital Signs Last 24 Hrs  T(C): 36.7 (18 May 2021 06:10), Max: 37.3 (17 May 2021 17:42)  T(F): 98.1 (18 May 2021 06:10), Max: 99.1 (17 May 2021 17:42)  HR: 60 (18 May 2021 06:10) (60 - 71)  BP: 118/65 (18 May 2021 06:10) (85/50 - 132/78)  BP(mean): 72 (17 May 2021 15:00) (63 - 72)  RR: 18 (18 May 2021 06:10) (14 - 18)  SpO2: 96% (18 May 2021 06:10) (95% - 100%)    LABS:                        9.0    4.73  )-----------( 107      ( 18 May 2021 08:07 )             30.5     05-18    140  |  104  |  12.0  ----------------------------<  65<L>  4.0   |  25.0  |  0.87    Ca    8.3<L>      18 May 2021 08:07  Phos  3.1     05-18  Mg     1.5     0518    TPro  5.3<L>  /  Alb  3.1<L>  /  TBili  1.3  /  DBili  0.4<H>  /  AST  258<H>  /  ALT  90<H>  /  AlkPhos  56  05-18    PT/INR - ( 16 May 2021 14:22 )   PT: 13.7 sec;   INR: 1.19 ratio         PTT - ( 16 May 2021 14:22 )  PTT:27.3 sec  Urinalysis Basic - ( 16 May 2021 17:34 )    Color: Yellow / Appearance: Slightly Turbid / S.020 / pH: x  Gluc: x / Ketone: Trace  / Bili: Negative / Urobili: Negative mg/dL   Blood: x / Protein: 100 mg/dL / Nitrite: Negative   Leuk Esterase: Trace / RBC: 11-25 /HPF / WBC 3-5   Sq Epi: x / Non Sq Epi: x / Bacteria: x        RADIOLOGY & ADDITIONAL TESTS:

## 2021-05-18 NOTE — PROGRESS NOTE ADULT - ASSESSMENT
Dysphagia related to the high-grade distal esophageal stricture.  Most likely some caustic ingestion.  Will await the CT chest with oral and IV contrast.  May need J-tube placement and also possible esophageal stent.  But patient may need esophagectomy.  However significant psychiatric evaluation has to be performed as he has drug- abuse history.

## 2021-05-19 DIAGNOSIS — J69.0 PNEUMONITIS DUE TO INHALATION OF FOOD AND VOMIT: ICD-10-CM

## 2021-05-19 DIAGNOSIS — F32.9 MAJOR DEPRESSIVE DISORDER, SINGLE EPISODE, UNSPECIFIED: ICD-10-CM

## 2021-05-19 DIAGNOSIS — K22.2 ESOPHAGEAL OBSTRUCTION: ICD-10-CM

## 2021-05-19 LAB
ALBUMIN SERPL ELPH-MCNC: 3.3 G/DL — SIGNIFICANT CHANGE UP (ref 3.3–5.2)
ALP SERPL-CCNC: 60 U/L — SIGNIFICANT CHANGE UP (ref 40–120)
ALT FLD-CCNC: 90 U/L — HIGH
ANION GAP SERPL CALC-SCNC: 10 MMOL/L — SIGNIFICANT CHANGE UP (ref 5–17)
ANION GAP SERPL CALC-SCNC: 8 MMOL/L — SIGNIFICANT CHANGE UP (ref 5–17)
AST SERPL-CCNC: 193 U/L — HIGH
BASOPHILS # BLD AUTO: 0 K/UL — SIGNIFICANT CHANGE UP (ref 0–0.2)
BASOPHILS NFR BLD AUTO: 0 % — SIGNIFICANT CHANGE UP (ref 0–2)
BILIRUB SERPL-MCNC: 1.1 MG/DL — SIGNIFICANT CHANGE UP (ref 0.4–2)
BUN SERPL-MCNC: 6 MG/DL — LOW (ref 8–20)
BUN SERPL-MCNC: 6 MG/DL — LOW (ref 8–20)
CALCIUM SERPL-MCNC: 8.2 MG/DL — LOW (ref 8.6–10.2)
CALCIUM SERPL-MCNC: 8.4 MG/DL — LOW (ref 8.6–10.2)
CHLORIDE SERPL-SCNC: 102 MMOL/L — SIGNIFICANT CHANGE UP (ref 98–107)
CHLORIDE SERPL-SCNC: 102 MMOL/L — SIGNIFICANT CHANGE UP (ref 98–107)
CO2 SERPL-SCNC: 27 MMOL/L — SIGNIFICANT CHANGE UP (ref 22–29)
CO2 SERPL-SCNC: 29 MMOL/L — SIGNIFICANT CHANGE UP (ref 22–29)
CREAT SERPL-MCNC: 0.74 MG/DL — SIGNIFICANT CHANGE UP (ref 0.5–1.3)
CREAT SERPL-MCNC: 0.85 MG/DL — SIGNIFICANT CHANGE UP (ref 0.5–1.3)
EOSINOPHIL # BLD AUTO: 0.14 K/UL — SIGNIFICANT CHANGE UP (ref 0–0.5)
EOSINOPHIL NFR BLD AUTO: 3.6 % — SIGNIFICANT CHANGE UP (ref 0–6)
GLUCOSE SERPL-MCNC: 92 MG/DL — SIGNIFICANT CHANGE UP (ref 70–99)
GLUCOSE SERPL-MCNC: 98 MG/DL — SIGNIFICANT CHANGE UP (ref 70–99)
HCT VFR BLD CALC: 33.3 % — LOW (ref 39–50)
HGB BLD-MCNC: 10 G/DL — LOW (ref 13–17)
IMM GRANULOCYTES NFR BLD AUTO: 0.3 % — SIGNIFICANT CHANGE UP (ref 0–1.5)
LYMPHOCYTES # BLD AUTO: 1.26 K/UL — SIGNIFICANT CHANGE UP (ref 1–3.3)
LYMPHOCYTES # BLD AUTO: 32.6 % — SIGNIFICANT CHANGE UP (ref 13–44)
MAGNESIUM SERPL-MCNC: 1.5 MG/DL — LOW (ref 1.6–2.6)
MCHC RBC-ENTMCNC: 23.1 PG — LOW (ref 27–34)
MCHC RBC-ENTMCNC: 30 GM/DL — LOW (ref 32–36)
MCV RBC AUTO: 76.9 FL — LOW (ref 80–100)
MONOCYTES # BLD AUTO: 0.39 K/UL — SIGNIFICANT CHANGE UP (ref 0–0.9)
MONOCYTES NFR BLD AUTO: 10.1 % — SIGNIFICANT CHANGE UP (ref 2–14)
NEUTROPHILS # BLD AUTO: 2.06 K/UL — SIGNIFICANT CHANGE UP (ref 1.8–7.4)
NEUTROPHILS NFR BLD AUTO: 53.4 % — SIGNIFICANT CHANGE UP (ref 43–77)
PHOSPHATE SERPL-MCNC: 2.9 MG/DL — SIGNIFICANT CHANGE UP (ref 2.4–4.7)
PLATELET # BLD AUTO: 136 K/UL — LOW (ref 150–400)
POTASSIUM SERPL-MCNC: 3.2 MMOL/L — LOW (ref 3.5–5.3)
POTASSIUM SERPL-MCNC: 3.9 MMOL/L — SIGNIFICANT CHANGE UP (ref 3.5–5.3)
POTASSIUM SERPL-SCNC: 3.2 MMOL/L — LOW (ref 3.5–5.3)
POTASSIUM SERPL-SCNC: 3.9 MMOL/L — SIGNIFICANT CHANGE UP (ref 3.5–5.3)
PROT SERPL-MCNC: 6 G/DL — LOW (ref 6.6–8.7)
RBC # BLD: 4.33 M/UL — SIGNIFICANT CHANGE UP (ref 4.2–5.8)
RBC # FLD: 15.4 % — HIGH (ref 10.3–14.5)
SODIUM SERPL-SCNC: 139 MMOL/L — SIGNIFICANT CHANGE UP (ref 135–145)
SODIUM SERPL-SCNC: 139 MMOL/L — SIGNIFICANT CHANGE UP (ref 135–145)
TSH SERPL-MCNC: 0.66 UIU/ML — SIGNIFICANT CHANGE UP (ref 0.27–4.2)
WBC # BLD: 3.86 K/UL — SIGNIFICANT CHANGE UP (ref 3.8–10.5)
WBC # FLD AUTO: 3.86 K/UL — SIGNIFICANT CHANGE UP (ref 3.8–10.5)

## 2021-05-19 PROCEDURE — 99221 1ST HOSP IP/OBS SF/LOW 40: CPT

## 2021-05-19 PROCEDURE — 93306 TTE W/DOPPLER COMPLETE: CPT | Mod: 26

## 2021-05-19 PROCEDURE — 99232 SBSQ HOSP IP/OBS MODERATE 35: CPT | Mod: 24

## 2021-05-19 PROCEDURE — 93010 ELECTROCARDIOGRAM REPORT: CPT

## 2021-05-19 PROCEDURE — 99233 SBSQ HOSP IP/OBS HIGH 50: CPT

## 2021-05-19 PROCEDURE — 99232 SBSQ HOSP IP/OBS MODERATE 35: CPT

## 2021-05-19 RX ORDER — MAGNESIUM SULFATE 500 MG/ML
2 VIAL (ML) INJECTION ONCE
Refills: 0 | Status: COMPLETED | OUTPATIENT
Start: 2021-05-19 | End: 2021-05-19

## 2021-05-19 RX ORDER — MIRTAZAPINE 45 MG/1
15 TABLET, ORALLY DISINTEGRATING ORAL AT BEDTIME
Refills: 0 | Status: DISCONTINUED | OUTPATIENT
Start: 2021-05-19 | End: 2021-05-19

## 2021-05-19 RX ORDER — SACCHAROMYCES BOULARDII 250 MG
250 POWDER IN PACKET (EA) ORAL
Refills: 0 | Status: DISCONTINUED | OUTPATIENT
Start: 2021-05-19 | End: 2021-05-25

## 2021-05-19 RX ORDER — MIRTAZAPINE 45 MG/1
15 TABLET, ORALLY DISINTEGRATING ORAL AT BEDTIME
Refills: 0 | Status: DISCONTINUED | OUTPATIENT
Start: 2021-05-19 | End: 2021-05-28

## 2021-05-19 RX ORDER — POTASSIUM CHLORIDE 20 MEQ
10 PACKET (EA) ORAL
Refills: 0 | Status: COMPLETED | OUTPATIENT
Start: 2021-05-19 | End: 2021-05-19

## 2021-05-19 RX ADMIN — Medication 50 GRAM(S): at 04:35

## 2021-05-19 RX ADMIN — HEPARIN SODIUM 5000 UNIT(S): 5000 INJECTION INTRAVENOUS; SUBCUTANEOUS at 21:09

## 2021-05-19 RX ADMIN — Medication 100 MILLIEQUIVALENT(S): at 00:34

## 2021-05-19 RX ADMIN — Medication 100 MILLIEQUIVALENT(S): at 01:54

## 2021-05-19 RX ADMIN — PANTOPRAZOLE SODIUM 40 MILLIGRAM(S): 20 TABLET, DELAYED RELEASE ORAL at 05:36

## 2021-05-19 RX ADMIN — Medication 250 MILLIGRAM(S): at 17:34

## 2021-05-19 RX ADMIN — CHLORHEXIDINE GLUCONATE 1 APPLICATION(S): 213 SOLUTION TOPICAL at 05:37

## 2021-05-19 RX ADMIN — PIPERACILLIN AND TAZOBACTAM 25 GRAM(S): 4; .5 INJECTION, POWDER, LYOPHILIZED, FOR SOLUTION INTRAVENOUS at 05:37

## 2021-05-19 RX ADMIN — PANTOPRAZOLE SODIUM 40 MILLIGRAM(S): 20 TABLET, DELAYED RELEASE ORAL at 17:34

## 2021-05-19 RX ADMIN — SODIUM CHLORIDE 125 MILLILITER(S): 9 INJECTION, SOLUTION INTRAVENOUS at 14:33

## 2021-05-19 RX ADMIN — MIRTAZAPINE 15 MILLIGRAM(S): 45 TABLET, ORALLY DISINTEGRATING ORAL at 21:09

## 2021-05-19 RX ADMIN — HEPARIN SODIUM 5000 UNIT(S): 5000 INJECTION INTRAVENOUS; SUBCUTANEOUS at 14:33

## 2021-05-19 RX ADMIN — HEPARIN SODIUM 5000 UNIT(S): 5000 INJECTION INTRAVENOUS; SUBCUTANEOUS at 05:36

## 2021-05-19 RX ADMIN — PIPERACILLIN AND TAZOBACTAM 25 GRAM(S): 4; .5 INJECTION, POWDER, LYOPHILIZED, FOR SOLUTION INTRAVENOUS at 14:32

## 2021-05-19 RX ADMIN — SODIUM CHLORIDE 125 MILLILITER(S): 9 INJECTION, SOLUTION INTRAVENOUS at 05:36

## 2021-05-19 RX ADMIN — Medication 100 MILLIEQUIVALENT(S): at 03:25

## 2021-05-19 RX ADMIN — PIPERACILLIN AND TAZOBACTAM 25 GRAM(S): 4; .5 INJECTION, POWDER, LYOPHILIZED, FOR SOLUTION INTRAVENOUS at 21:09

## 2021-05-19 NOTE — CONSULT NOTE ADULT - SUBJECTIVE AND OBJECTIVE BOX
HPI:25-year-old man with history of drug abuse, question of caustic ingestion, distal esophageal stricture, who underwent EGD on Friday. He was planned to have another upper endoscopy on Tuesday with cardiothoracic surgery backup. However he signed out AMA yesterday. He used heroin and other drugs and now presented with altered mental status. He was noted to be febrile and very confused. He is electively intubated for his safety. He is noted to have aspiration pneumonia. He is admitted to medical ICU.      PAST MEDICAL & SURGICAL HISTORY:  Esophogeal stricture    Heroin abuse    Cocaine abuse    No significant past surgical history        ROS:  Could not be obtained    MEDICATIONS  (STANDING):  chlorhexidine 0.12% Liquid 15 milliLiter(s) Oral Mucosa every 12 hours  chlorhexidine 2% Cloths 1 Application(s) Topical <User Schedule>  heparin   Injectable 5000 Unit(s) SubCutaneous every 8 hours  norepinephrine Infusion 0.05 MICROgram(s)/kG/Min (6.81 mL/Hr) IV Continuous <Continuous>  pantoprazole  Injectable 40 milliGRAM(s) IV Push daily  piperacillin/tazobactam IVPB.. 3.375 Gram(s) IV Intermittent every 8 hours  propofol Infusion 20 MICROgram(s)/kG/Min (8.71 mL/Hr) IV Continuous <Continuous>    MEDICATIONS  (PRN):      Allergies    No Known Allergies    Intolerances        SOCIAL HISTORY:Drug abuse    ENDOSCOPIC/GI HISTORY:Recent EGD    FAMILY HISTORY:  FH: thyroid disease (Grandparent)        Vital Signs Last 24 Hrs  T(C): 37.7 (16 May 2021 17:20), Max: 39.5 (16 May 2021 13:31)  T(F): 99.9 (16 May 2021 17:20), Max: 103.1 (16 May 2021 13:31)  HR: 90 (16 May 2021 18:18) (86 - 144)  BP: 97/54 (16 May 2021 17:45) (81/40 - 125/61)  BP(mean): 73 (16 May 2021 17:45) (58 - 77)  RR: 16 (16 May 2021 17:45) (16 - 32)  SpO2: 100% (16 May 2021 18:18) (78% - 100%)    PHYSICAL EXAM:    GENERAL: Agitated  HEAD:  Atraumatic, Normocephalic  EYES: conjunctiva and sclera clear  ENMT:No lesions  NECK: Supple, No JVD, Normal thyroid  CHEST/LUNG:Coarse breath sounds  HEART: Tachycardiac No murmurs, rubs, or gallops  ABDOMEN: Soft, Nontender, Nondistended; Bowel sounds present  EXTREMITIES:  2+ Peripheral Pulses, No clubbing, cyanosis, or edema  LYMPH: No lymphadenopathy noted  SKIN: No rashes or lesions      LABS:                        10.4   12.67 )-----------( 172      ( 16 May 2021 14:22 )             34.4         144  |  102  |  15.0  ----------------------------<  87  4.5   |  22.0  |  1.89<H>    Ca    9.2      16 May 2021 14:22    TPro  7.3  /  Alb  4.5  /  TBili  0.8  /  DBili  x   /  AST  216<H>  /  ALT  66<H>  /  AlkPhos  79  16    PT/INR - ( 16 May 2021 14:22 )   PT: 13.7 sec;   INR: 1.19 ratio         PTT - ( 16 May 2021 14:22 )  PTT:27.3 sec   Urinalysis Basic - ( 16 May 2021 17:34 )    Color: Yellow / Appearance: Slightly Turbid / S.020 / pH: x  Gluc: x / Ketone: Trace  / Bili: Negative / Urobili: Negative mg/dL   Blood: x / Protein: 100 mg/dL / Nitrite: Negative   Leuk Esterase: Trace / RBC: 11-25 /HPF / WBC 3-5   Sq Epi: x / Non Sq Epi: x / Bacteria: x        LIVER FUNCTIONS - ( 16 May 2021 14:22 )  Alb: 4.5 g/dL / Pro: 7.3 g/dL / ALK PHOS: 79 U/L / ALT: 66 U/L / AST: 216 U/L / GGT: x           COVID-19 PCR (21 @ 14:22)    COVID-19 PCR: NotDetec: You can help in the fight against COVID-19. [employer] may contact  you to see if you are interested in voluntarily participating in one of  our clinical trials.  This test has been validated by "[employer] to be accurate;  though it has not been FDA cleared/approved by the usual pathway  As with all laboratory test, results should be correlated with clinical  findings.  https://www.fda.gov/media/195973/download  https://www.fda.gov/media/407595/download          RADIOLOGY & ADDITIONAL STUDIES:  < from: CT Abdomen and Pelvis w/ IV Cont (21 @ 16:49) >     EXAM:  CT ABDOMEN AND PELVIS IC                         EXAM:  CT CHEST IC                          PROCEDURE DATE:  2021          INTERPRETATION:  CLINICAL INFORMATION: 25-year-old man with fever and history esophageal stricture and recent EGD. Hypoxia. Returned 6 to ED after leaving hospital AMA with altered mental status. Intubated in the ED. COMPARISON: CT scan 2021    CONTRAST/COMPLICATIONS:  IV Contrast: Omnipaque 300  87 cc administered   13 cc discarded  Oral Contrast: NONE  Complications: None reported at time of study completion    PROCEDURE:  CT of the Chest, abdomen, and pelvis was performed.  Sagittal and coronal reformats were performed.    FINDINGS:    LUNGS AND AIRWAYS: Patent central airways. Endotracheal tube above rachel. Bilateral patchy opacities new since 2021 most marked in the lower lobes consistent with multifocal pneumonia, possibly secondary to aspiration.  PLEURA: No pleural effusion.  MEDIASTINUM AND JOHN: No lymphadenopathy.  VESSELS: Within normal limits.  HEART: Heart size is normal. No pericardial effusion.  CHEST WALL AND LOWER NECK: Within normal limits.        LIVER: Within normal limits.  BILE DUCTS: Normal caliber.  GALLBLADDER: Within normal limits.  SPLEEN: Within normal limits.  PANCREAS: Within normal limits.  ADRENALS: Within normal limits.  KIDNEYS/URETERS: 2 mm nonobstructing calculus.    BLADDER: Within normal limits.  REPRODUCTIVE ORGANS: Normal size prostate    BOWEL: No bowel obstruction. Appendix normal.  PERITONEUM: No ascites.  VESSELS: Within normal limits.  RETROPERITONEUM/LYMPH NODES: No lymphadenopathy.  ABDOMINAL WALL: Within normal limits.  BONES: Within normal limits.    IMPRESSION:  Multifocal pneumonia, mainly lower lobes, possibly secondary to aspiration new since 2021              ANALIA DYER MD; Attending Radiologist  This document has been electronically signed. May 16 2021  4:26PM    < end of copied text >  
                                                                    [facility]                                                               Office: [address] [address]                                                              Telephone: [phone]. Fax:[phone]                                                                        CARDIOLOGY CONSULTATION NOTE                                                                                            Chief complaint:  Abdominal pain    HPI:  25M h/o substance abuse (cocaine, heroin), severe esophageal stricture [place] [facility] [place], then left AMA and returned on 5/16/21 found with septic shock due to aspiration/multifocal pneumonia required intubation, extubated on 5/17/21, plan for eventual esophagectomy and J-tube placement, noted overnight with sinus bradycardia 40s and on telemetry this morning, cardiology consulted for evaluation.     Patient denies any dizziness or chest discomfort, been bothered by abdominal pain for few days, denies nausea, tolerating clear diet, denies prior cardiac issues, had prior loss of consciousness with drug use, last cocaine use few days ago. Denies family history of sudden cardiac death except his brother who passed away last year due to drug overdose.         REVIEW OF SYMPTOMS:     CONSTITUTIONAL: No fever, weight loss, or fatigue  ENMT:  No difficulty hearing, tinnitus, vertigo; No sinus or throat pain  NECK: No pain or stiffness  CARDIOVASCULAR: as per HPI  RESPIRATORY: No Dyspnea on exertion, Shortness of breath, cough, wheezing  : No dysuria, no hematuria   GI: No dark color stool, no melena, no diarrhea, no constipation, no abdominal pain   NEURO: No headache, no dizziness, no slurred speech   MUSCULOSKELETAL: No joint pain or swelling; No muscle, back, or extremity pain  PSYCH: No agitation, no anxiety.    ALL OTHER REVIEW OF SYSTEMS ARE NEGATIVE.      PREVIOUS DIAGNOSTIC TESTING    ECHO FINDINGS:  None        STRESS FINDINGS:  None    CATHETERIZATION FINDINGS:   None      ALLERGIES: Allergies    No Known Allergies        PAST MEDICAL HISTORY  see HPI      PAST SURGICAL HISTORY  None      FAMILY HISTORY:  Grandfather MI      SOCIAL HISTORY:    CIGARETTES:   +weed  ALCOHOL: +  DRUGS: cocaine, heroin      CURRENT MEDICATIONS:  MEDICATIONS  (STANDING):  chlorhexidine 2% Cloths 1 Application(s) Topical <User Schedule>  dextrose 5% + sodium chloride 0.45%. 1000 milliLiter(s) (125 mL/Hr) IV Continuous <Continuous>  heparin   Injectable 5000 Unit(s) SubCutaneous every 8 hours  mirtazapine Soltab 15 milliGRAM(s) Oral at bedtime  pantoprazole  Injectable 40 milliGRAM(s) IV Push two times a day  piperacillin/tazobactam IVPB.. 3.375 Gram(s) IV Intermittent every 8 hours    MEDICATIONS  (PRN):  LORazepam   Injectable 1 milliGRAM(s) IV Push every 6 hours PRN Agitation         HOME MEDICATIONS:  none      Vital Signs Last 24 Hrs  T(C): 37.1 (05-19-21 @ 09:00), Max: 37.2 (05-18-21 @ 16:39)  T(F): 98.8 (05-19-21 @ 09:00), Max: 99 (05-18-21 @ 16:39)  HR: 48 (05-19-21 @ 09:00) (39 - 54)  BP: 138/85 (05-19-21 @ 09:00) (105/63 - 145/90)  BP(mean): --  RR: 18 (05-19-21 @ 09:00) (18 - 18)  SpO2: 98% (05-19-21 @ 09:00) (97% - 99%)  I&O's Summary    18 May 2021 07:01  -  19 May 2021 07:00  --------------------------------------------------------  IN: 0 mL / OUT: 1200 mL / NET: -1200 mL        Appearance: Comfortable. No acute distress  HEENT:  Head and neck: Atraumatic. Normocephalic.  Normal oral mucosa, PERRL, Neck is supple. No JVD, No carotid bruit.   Neurologic: A&Ox 3, no focal deficits. EOMI, Cranial nerves are intact.  Lymphatic: No cervical lymphadenopathy  Cardiovascular: Normal S1 S2, RRR, No murmur, rubs/gallops. No JVD, No edema  Respiratory: Lungs clear to auscultation  Gastrointestinal:  Soft, Non-tender, + BS  Lower Extremities: No edema  Psychiatry: Patient is calm. No agitation. Mood & affect appropriate  Skin: No rashes/ecchymoses/cyanosis/ulcers visualized on the face, hands or feet.      Labs:                         10.0   3.86  )-----------( 136      ( 19 May 2021 07:03 )             33.3     05-19    139  |  102  |  6.0<L>  ----------------------------<  98  3.9   |  29.0  |  0.85    Ca    8.4<L>      19 May 2021 07:03  Phos  2.9     05-18  Mg     1.5     05-18    TPro  6.0<L>  /  Alb  3.3  /  TBili  1.1  /  DBili  x   /  AST  193<H>  /  ALT  90<H>  /  AlkPhos  60  05-19                TELEMETRY: sinus 40-50s    ECG:  Sinus 39, normal axis, QTc 421

## 2021-05-19 NOTE — BH CONSULTATION LIAISON PROGRESS NOTE - CASE SUMMARY
26 y/o M with no pphx and pmh of hiatal hernia, GERD, esophageal spasm (2012), IVDU, presents with esophageal stricture. Patient left [facility] against medical advice on 5/16/21 after being worked up for esophageal stricture (confirmed with EGD). He then return back to the hospital on the same day with septic shock and bilateral multifocal pneumonia. Urine toxicology positive for cocaine and opiates. Upon arrival, patient is laying on his side with his eyes open. He is able to make eye contact and respond to questions. He states he has been feeling very sad because of the whole situation. He also states his brother passed away last year and since then he realized he needed to seek help for his mental state. He states he was given zoloft but that gave him anxiety. Patient states he also was given  Remeron for sleep. He also states his mother came yesterday to visit. Patient shows he has capacity to understand his situation with what is happening to his esophagus. Patient denies SI/HI/AH/VH. Patient denies having prior psych history or prior psych in hospitalization. Patient does not show any signs of withdrawal and patient states he does not feel any withdrawal symptoms.     Discussed patient's situation with patient. Patient is told he will be given psych medication for depression. Psych medication will be determined based on patient's situation with his esophagitis stricture. Patient understands and agrees with plan. Continue to monitor for patient's withdrawal symptoms. Continue with current medical management.      24 y/o M with no pphx and pmh of hiatal hernia, GERD, esophageal spasm (2012), IVDU, presents with esophageal stricture. Patient left [facility] against medical advice on 5/16/21 after being worked up for esophageal stricture (confirmed with EGD). He then return back to the hospital on the same day with septic shock and bilateral multifocal pneumonia. Urine toxicology positive for cocaine and opiates. Upon arrival, patient is laying on his side with his eyes open. He is able to make eye contact and respond to questions. He states he has been feeling very sad because of the whole situation. He also states his brother passed away last year and since then he realized he needed to seek help for his mental state. He states he was given zoloft but that gave him anxiety. Patient states he also was given  Remeron for sleep. He also states his mother came yesterday to visit. Patient shows he has capacity to understand his situation with what is happening to his esophagus. Patient denies SI/HI/AH/VH. Patient denies having prior psych history or prior psych in hospitalization. Patient does not show any signs of withdrawal and patient states he does not feel any withdrawal symptoms.     Discussed patient's situation with patient. Patient will be started on Remeron 15 mg for depression and to help with sleep. Further psych medication will later be determined based on patient's situation regarding his esophagitis stricture. Patient understands and agrees with plan. Continue to monitor for patient's withdrawal symptoms. Continue with current medical management.    26 y/o M with no pphx and pmh of hiatal hernia, GERD, esophageal spasm (2012), IVDU, presents with esophageal stricture. Patient left [facility] against medical advice on 5/16/21 after being worked up for esophageal stricture (confirmed with EGD). He then return back to the hospital on the same day with septic shock and bilateral multifocal pneumonia. Urine toxicology positive for cocaine and opiates. Upon arrival, patient is laying on his side with his eyes open. He is able to make eye contact and respond to questions. He states he has been feeling very sad because of the whole situation. He also states his brother passed away last year and since then he realized he needed to seek help for his mental state. He states he was given zoloft but that gave him anxiety. Patient states he also was given  Remeron for sleep. He also states his mother came yesterday to visit. Patient shows he has capacity to understand his situation with what is happening to his esophagus. Patient denies SI/HI/AH/VH. Patient denies having prior psych history or prior psych in hospitalization. Patient does not show any signs of withdrawal and patient states he does not feel any withdrawal symptoms.     Discussed patient's situation with patient. Patient will be started on Remeron 15 mg oral bedtime for depression and to help with sleep. Further psych medication will later be determined based on patient's situation regarding his esophagitis stricture. Patient understands and agrees with plan. Continue to monitor for patient's withdrawal symptoms. Continue with current medical management.

## 2021-05-19 NOTE — PROGRESS NOTE ADULT - SUBJECTIVE AND OBJECTIVE BOX
Patient seen and examined; chart reviewed.  No change in clinical status.  Tolerated oral contrast with CTscan done yesterday.  No new medical issues.  Afebrile.  Passed normal BM.  Denies SOB or chest pain or Cough.  CT shows improvement in bilateral lung infiltrates.      PAST MEDICAL & SURGICAL HISTORY:  Heroin abuse    Cocaine abuse    GERD (gastroesophageal reflux disease)    Hiatal hernia    No significant past surgical history        ROS:  No Heartburn, regurgitation, dysphagia, odynophagia.  No dyspepsia  No abdominal pain.    No Nausea, vomiting.  No Bleeding.  No hematemesis.   No diarrhea.    No hematochesia.  No weight loss, anorexia.  No edema.      MEDICATIONS  (STANDING):  chlorhexidine 2% Cloths 1 Application(s) Topical <User Schedule>  dextrose 5% + sodium chloride 0.45%. 1000 milliLiter(s) (125 mL/Hr) IV Continuous <Continuous>  heparin   Injectable 5000 Unit(s) SubCutaneous every 8 hours  mirtazapine Soltab 15 milliGRAM(s) Oral at bedtime  pantoprazole  Injectable 40 milliGRAM(s) IV Push two times a day  piperacillin/tazobactam IVPB.. 3.375 Gram(s) IV Intermittent every 8 hours    MEDICATIONS  (PRN):  LORazepam   Injectable 1 milliGRAM(s) IV Push every 6 hours PRN Agitation      Allergies    No Known Allergies    Intolerances        Vital Signs Last 24 Hrs  T(C): 37.1 (19 May 2021 09:00), Max: 37.2 (18 May 2021 16:39)  T(F): 98.8 (19 May 2021 09:00), Max: 99 (18 May 2021 16:39)  HR: 48 (19 May 2021 09:00) (39 - 54)  BP: 138/85 (19 May 2021 09:00) (105/63 - 145/90)  BP(mean): --  RR: 18 (19 May 2021 09:00) (18 - 18)  SpO2: 98% (19 May 2021 09:00) (97% - 99%)    PHYSICAL EXAM:    GENERAL: NAD, well-groomed, well-developed; Robust in appearance.  NAD.  Cooperative.    HEAD:  Atraumatic, Normocephalic  EYES: EOMI, PERRLA, conjunctiva and sclera clear  ENMT: No tonsillar erythema, exudates, or enlargement; Moist mucous membranes, Good dentition, No lesions  NECK: Supple, No JVD, Normal thyroid  CHEST/LUNG: Clear to percussion bilaterally; No rales, rhonchi, wheezing, or rubs  HEART: Regular rate and rhythm; No murmurs, rubs, or gallops  ABDOMEN: Soft, Nontender, Nondistended; Bowel sounds present  EXTREMITIES:  2+ Peripheral Pulses, No clubbing, cyanosis, or edema  LYMPH: No lymphadenopathy noted  SKIN: No rashes or lesions      LABS:                        10.0   3.86  )-----------( 136      ( 19 May 2021 07:03 )             33.3     05-19    139  |  102  |  6.0<L>  ----------------------------<  98  3.9   |  29.0  |  0.85    Ca    8.4<L>      19 May 2021 07:03  Phos  2.9     05-18  Mg     1.5     05-18    TPro  6.0<L>  /  Alb  3.3  /  TBili  1.1  /  DBili  x   /  AST  193<H>  /  ALT  90<H>  /  AlkPhos  60  05-19             RADIOLOGY & ADDITIONAL STUDIES:  CT chest with [place] contrast:  Esophageal wall thickening and distal esophageal stricture.  Bilateral infiltrates.

## 2021-05-19 NOTE — BH CONSULTATION LIAISON PROGRESS NOTE - NSBHFUPINTERVALHXFT_PSY_A_CORE
24 y/o M with no pphx and pmh of hiatal hernia, GERD, esophageal spasm (2012), IVDU, presents with esophageal stricture. Patient left [facility] against medical advice on 5/16/21 after being worked up for esophageal stricture (confirmed with EGD). He states that he injected heroin and smoked crack after he left on 5/16/21. He then return back to the hospital with septic shock and bilateral multifocal pneumonia. Urine toxicology positive for cocaine and opiates. Upon arrival, patient is laying on his side with his eyes open. He is able to make eye contact and respond to questions. He continue to look very sad and states he had been feeling very sad because of the whole situation. He also states his brother passed away last year and since then he realized he needed to seek help for his mental state. He states he was given zoloft but that gave him anxiety. Patient states he also was given  Remeron for sleep. He also states his mother came yesterday to visit. Patient shows he has capacity to understand his situation with what is happening to his esophagus. Patient denies SI/HI/AH/VH. Patient denies having prior psych history or prior psych in hospitalization. Patient does not show any signs of withdrawal and patient states he does not feel any withdrawal symptoms.     Collateral from nurse: Stated that patient is still very depressed and patient is very hungry.

## 2021-05-19 NOTE — BH CONSULTATION LIAISON PROGRESS NOTE - NSBHASSESSMENTFT_PSY_ALL_CORE
24 y/o M with no pphx and pmh of hiatal hernia, GERD, esophageal spasm (2012), IVDU, presents with esophageal stricture. Patient left [facility] against medical advice on 5/16/21 after being worked up for esophageal stricture (confirmed with EGD). He then return back to the hospital on the same day with septic shock and bilateral multifocal pneumonia. Urine toxicology positive for cocaine and opiates. Upon arrival, patient is laying on his side with his eyes open. He is able to make eye contact and respond to questions. He continue to look very sad and states he had been feeling very sad because of the whole situation. He also states his brother passed away last year and since then he realized he needed to seek help for his mental state. He states he was given zoloft but that gave him anxiety. Patient states he also was given  Remeron for sleep. He also states his mother came yesterday to visit. Patient shows he has capacity to understand his situation with what is happening to his esophagus. Patient denies SI/HI/AH/VH. Patient denies having prior psych history or prior psych in hospitalization. Patient does not show any signs of withdrawal and patient states he does not feel any withdrawal symptoms.     Discussed patient's situation with patient. Patient is told he will be given psych medication for depression. Psych medication will be determined based on patient's situation with his esophagitis stricture. Patient understands and agrees with plan. Continue to monitor for patient's withdrawal symptoms. Continue with current medical management.    24 y/o M with no pphx and pmh of hiatal hernia, GERD, esophageal spasm (2012), IVDU, presents with esophageal stricture. Patient left [facility] against medical advice on 5/16/21 after being worked up for esophageal stricture (confirmed with EGD). He then return back to the hospital on the same day with septic shock and bilateral multifocal pneumonia. Urine toxicology positive for cocaine and opiates. Upon arrival, patient is laying on his side with his eyes open. He is able to make eye contact and respond to questions. He continue to look very sad and states he had been feeling very sad because of the whole situation. He also states his brother passed away last year and since then he realized he needed to seek help for his mental state. He states he was given zoloft but that gave him anxiety. Patient states he also was given  Remeron for sleep. He also states his mother came yesterday to visit. Patient shows he has capacity to understand his situation with what is happening to his esophagus. Patient denies SI/HI/AH/VH. Patient denies having prior psych history or prior psych in hospitalization. Patient does not show any signs of withdrawal and patient states he does not feel any withdrawal symptoms.     Discussed patient's situation with patient. Patient will be started on Remeron 15 mg for depression and to help with sleep. Further psych medication will later be determined based on patient's situation regarding his esophagitis stricture. Patient understands and agrees with plan. Continue to monitor for patient's withdrawal symptoms. Continue with current medical management.    24 y/o M with no pphx and pmh of hiatal hernia, GERD, esophageal spasm (2012), IVDU, presents with esophageal stricture. Patient left [facility] against medical advice on 5/16/21 after being worked up for esophageal stricture (confirmed with EGD). He then return back to the hospital on the same day with septic shock and bilateral multifocal pneumonia. Urine toxicology positive for cocaine and opiates. Upon arrival, patient is laying on his side with his eyes open. He is able to make eye contact and respond to questions. He continue to look very sad and states he had been feeling very sad because of the whole situation. He also states his brother passed away last year and since then he realized he needed to seek help for his mental state. He states he was given zoloft but that gave him anxiety. Patient states he also was given  Remeron for sleep. He also states his mother came yesterday to visit. Patient shows he has capacity to understand his situation with what is happening to his esophagus. Patient denies SI/HI/AH/VH. Patient denies having prior psych history or prior psych in hospitalization. Patient does not show any signs of withdrawal and patient states he does not feel any withdrawal symptoms.     Discussed patient's situation with patient. Patient will be started on Remeron 15 mg oral bedtime for depression and to help with sleep. Further psych medication will later be determined based on patient's situation regarding his esophagitis stricture. Patient understands and agrees with plan. Continue to monitor for patient's withdrawal symptoms. Continue with current medical management.

## 2021-05-19 NOTE — CONSULT NOTE ADULT - ASSESSMENT
25M h/o substance abuse (cocaine, heroin), severe esophageal stricture previously transferred from [facility] [place]GD on 5/13/21, then left AMA and returned on 5/16/21 found with septic shock due to aspiration/multifocal pneumonia required intubation, extubated on 5/17/21, plan for eventual esophagectomy and J-tube placement, noted overnight with sinus bradycardia 40s and on telemetry this morning, cardiology consulted for evaluation.     Asymptomatic sinus bradycardia, suspect due to inactivity/deconditioning vs. vagal mediated with abdominal discomfort, he was sinus tachycardic 3 days ago while in septic shock.     1. Sinus bradycardia  -continue telemetry monitoring, atropine only if symptomatic bradycardia  -check TSH and baseline TTE  -avoid AV reilly blocker  -OOB/ambulate and move around to increase sinus activity    2. Preoperative cardiac risk evaluation for eventual esophagectomy/J-tube placement  -if TTE without significant abnormality then no cardiac contraindication to proceed with planned surgery    3. h/o substance abuse  -psychiatry following, started on mirtazapine          Jean Paul Rod DO, [facility]  Faculty Non-Invasive Cardiologist  [phone] 
Esophageal stricture most likely due to caustic ingestion. Also now complicated by drug abuse and aspiration pneumonia. At this time please do not place any NG tube for feeding. Once he is clinically better, we will discuss with the patient regarding thoracic surgery intervention of possible esophagectomy and J-tube placement. We will follow along.

## 2021-05-19 NOTE — PROGRESS NOTE ADULT - ASSESSMENT
24 y/o male with pmh of esophageal spasm, IVDA recently admitted to [facility] w/ esophageal stricture and signed out aMA. Presented to ED after using heroin and presented with septic shock and found to have multi focal pna and subsquently intubated and brought to icu. Patient extubated today and is being downgraded to medicine. Patient being worked up for severe esophageal stricture.     Severe esophageal stricture possibly due to caustic ingestion  - s/p MBS, esophagram, EGD at [facility] [place]  - GI following   - Protonix 40mg IV bid  - s/p EGD on 5/14: Pin hole esophageal stricture noted. at 40 cm. Even pediatric scope could not be advanced. Then guidwire placed. Gastrograffin injected. ? extravasation noted. Eventually procedure terminated.  - CT chest: Diffuse esophageal wall thickening with a linear tract of contrast to the left of the mid to distal esophageal lumen, possibly a small amount of contrast tracking in the esophageal wall from a tear in the mid to distal esophagus.   - CT surgery consult appreciated  - CT chest with oral contrast: Significant improvement in bibasilar infiltrates from the prior study suggesting resolving aspiration pneumonia.  The esophagus is mildly distended with contrast remaining in the esophagus likely due to known stricture. There is distal esophageal wall thickening and narrowing of the distal esophageal lumen consistent with distal esophageal stricture of unknown origin. Some contrast does pass through to the stomach.  - patient started on clear liquid diet    Bradycardia  - sinus bradycardia  - TSH 0.66  - Cardiology consulted  - TTE      Depression  - Psych consulted  - Remeron 15mg nightly     Septic shock secondary to complex multi focal pneumonia  - c/w Zosyn    Acute hypoxic respiratory failure - secondary to complex pneumonia  - improving  - c/w Zosyn    Leukopenia/anemia/thrombocytopenia  - will monitor  - HIV negative    Daily tobacco use  - advised to quit    H/o drug use  - cocaine, heroin and K2  - iv ativan prn     DVT ppx  - Heparin

## 2021-05-19 NOTE — PROGRESS NOTE ADULT - ASSESSMENT
Tight distal esophageal stricture, eccentric. Recovering from bilateral infiltrates.  Antibiotics  persist.    Awaiting CT surgery opinion re intervention.    OK for start of CLD with ensure clear supplements.  Should still be tolerated.

## 2021-05-19 NOTE — PROGRESS NOTE ADULT - SUBJECTIVE AND OBJECTIVE BOX
Subjective: Pt. states he is tolerating clear liquid diet, denies any N/V and is passing gas.  He also states he is willing to proceed with J Tube & go home & wait for surgery.    VITAL SIGNS  Vital Signs Last 24 Hrs  T(C): 37.1 (05-19-21 @ 09:00), Max: 37.2 (05-18-21 @ 16:39)  T(F): 98.8 (05-19-21 @ 09:00), Max: 99 (05-18-21 @ 16:39)  HR: 48 (05-19-21 @ 09:00) (39 - 53)  BP: 138/85 (05-19-21 @ 09:00) (115/79 - 145/90)  RR: 18 (05-19-21 @ 09:00) (18 - 18)  SpO2: 98% (05-19-21 @ 09:00) (97% - 99%)  on (O2)              Telemetry:  Sinus rhythm      MEDICATIONS  chlorhexidine 2% Cloths 1 Application(s) Topical <User Schedule>  dextrose 5% + sodium chloride 0.45%. 1000 milliLiter(s) IV Continuous <Continuous>  heparin   Injectable 5000 Unit(s) SubCutaneous every 8 hours  LORazepam   Injectable 1 milliGRAM(s) IV Push every 6 hours PRN  mirtazapine Soltab 15 milliGRAM(s) Oral at bedtime  pantoprazole  Injectable 40 milliGRAM(s) IV Push two times a day  piperacillin/tazobactam IVPB.. 3.375 Gram(s) IV Intermittent every 8 hours  saccharomyces boulardii 250 milliGRAM(s) Oral two times a day      PHYSICAL EXAM  General: no acute distress  Neurology: alert and oriented x 3, nonfocal, no gross deficits  Respiratory: clear to auscultation bilaterally  CV: regular rate and rhythm, normal S1, S2  Abdomen: soft, +tenderness, nondistended, positive bowel sounds, last bowel movement 5/19/21 per pt  Extremities: warm, well perfused. no edema. + DP pulses      I&O's Detail    18 May 2021 07:01  -  19 May 2021 07:00  --------------------------------------------------------  IN:  Total IN: 0 mL    OUT:    Voided (mL): 1200 mL  Total OUT: 1200 mL    Total NET: -1200 mL          Weights:  Daily     Daily   Admit Wt: Drug Dosing Weight  Height (cm): 180.3 (16 May 2021 13:31)  Weight (kg): 72.6 (16 May 2021 13:31)  BMI (kg/m2): 22.3 (16 May 2021 13:31)  BSA (m2): 1.92 (16 May 2021 13:31)    LABS  05-19    139  |  102  |  6.0<L>  ----------------------------<  98  3.9   |  29.0  |  0.85    Ca    8.4<L>      19 May 2021 07:03  Phos  2.9     05-18  Mg     1.5     05-18    TPro  6.0<L>  /  Alb  3.3  /  TBili  1.1  /  DBili  x   /  AST  193<H>  /  ALT  90<H>  /  AlkPhos  60  05-19                                 10.0   3.86  )-----------( 136      ( 19 May 2021 07:03 )             33.3                Bilirubin Total, Serum: 1.1 mg/dL (05-19 @ 07:03)    CAPILLARY BLOOD GLUCOSE               Today's CXR:< from: CT Chest w/ Oral Cont (05.18.21 @ 19:01) >  EXAM:  CT CHEST OC                          PROCEDURE DATE:  05/18/2021          INTERPRETATION:  CLINICAL INFORMATION: Esophageal structure. Aspiration    COMPARISON: 9/16/2021    CONTRAST/COMPLICATIONS:  IV Contrast: NONE  Oral Contrast: Gastroview  Complications: None reported at time of study completion    PROCEDURE:  CT of the Chest was performed.  Sagittal and coronal reformats were performed.    FINDINGS:    LUNGS AND AIRWAYS: Patent central airways.  Lungs are clear remarkable for patchy densities at the posterior lung bases with some areas of groundglass opacity as well suspicious for infiltrates likely secondary to aspiration. These are significantly improved from the prior study. ET tube is no longer present  PLEURA: No pleural effusion.  MEDIASTINUM AND JOHN: No lymphadenopathy. The esophagus is mildly dilated and demonstrates presence of air with contrast layering posteriorly. There is distal esophageal wall thickening. There is narrowing of the distal esophageal lumen consistent with stricture  VESSELS: Within normal limits.  HEART: Heart size is normal. No pericardial effusion.  CHEST WALL AND LOWER NECK: Within normal limits.  VISUALIZED UPPER ABDOMEN: Within normal limits. Contrast is seen in the stomach  BONES: Within normal limits.    IMPRESSION: Significant improvement in bibasilar infiltrates from the prior study suggesting resolving aspiration pneumonia.  The esophagus is mildly distended with contrast remaining in the esophagus likely due to known stricture. There is distal esophageal wall thickening and narrowing of the distal esophageal lumen consistent with distal esophageal stricture of unknown origin. Some contrast does pass through to the stomach.            JU TOWNSEND MD; Attending Radiologist  This document has been electronically signed. May 18 2021  8:05PM    < end of copied text >          PAST MEDICAL & SURGICAL HISTORY:  Heroin abuse    Cocaine abuse    GERD (gastroesophageal reflux disease)    Hiatal hernia    No significant past surgical history

## 2021-05-19 NOTE — PROGRESS NOTE ADULT - SUBJECTIVE AND OBJECTIVE BOX
Chief complaint: Esophageal Stricture    Patient seen and examined at bedside. No acute overnight events reported. Patient is very sad about his situation. Denies headache, fever, chills, cough, chest pain, shortness of breath, nausea or vomiting.     Vital Signs Last 24 Hrs  T(F): 98.8 (19 May 2021 09:00), Max: 99 (18 May 2021 16:39)  HR: 48 (19 May 2021 09:00) (39 - 54)  BP: 138/85 (19 May 2021 09:00) (105/63 - 145/90)  RR: 18 (19 May 2021 09:00) (18 - 18)  SpO2: 98% (19 May 2021 09:00) (97% - 99%)    Physical Exam:  Constitutional: alert and oriented, in no acute distress   Neck: Soft and supple, No LAD, No JVD  Respiratory: Clear to auscultation bilaterally, no wheezes or crackles  Cardiovascular: Regular rate and rhyhtm, no murmurs, gallops, rubs  Gastrointestinal: Soft, non-tender to palpation, +bs  Vascular: 2+ peripheral pulses  Neurological: A/O x 3, no focal neurological deficits  Musculoskeletal: 5/5 strength b/l upper and lower extremities, no lower extremity edema bilaterally    Labs:                        10.0   3.86  )-----------( 136      ( 19 May 2021 07:03 )             33.3   05-19    139  |  102  |  6.0<L>  ----------------------------<  98  3.9   |  29.0  |  0.85    Ca    8.4<L>      19 May 2021 07:03  Phos  2.9     05-18  Mg     1.5     05-18    TPro  6.0<L>  /  Alb  3.3  /  TBili  1.1  /  DBili  x   /  AST  193<H>  /  ALT  90<H>  /  AlkPhos  60  05-19

## 2021-05-19 NOTE — PROGRESS NOTE ADULT - ASSESSMENT
25M h/o esophageal stricture, GERD, hiatal hernia, hepatitis C    Dysphagia and abdominal pain > esoph stricture    Unsuccessful dilitation by EGD x 2

## 2021-05-20 LAB
ALBUMIN SERPL ELPH-MCNC: 3.5 G/DL — SIGNIFICANT CHANGE UP (ref 3.3–5.2)
ALP SERPL-CCNC: 52 U/L — SIGNIFICANT CHANGE UP (ref 40–120)
ALT FLD-CCNC: 79 U/L — HIGH
ANION GAP SERPL CALC-SCNC: 8 MMOL/L — SIGNIFICANT CHANGE UP (ref 5–17)
AST SERPL-CCNC: 134 U/L — HIGH
BASOPHILS # BLD AUTO: 0.02 K/UL — SIGNIFICANT CHANGE UP (ref 0–0.2)
BASOPHILS NFR BLD AUTO: 0.5 % — SIGNIFICANT CHANGE UP (ref 0–2)
BILIRUB SERPL-MCNC: 0.6 MG/DL — SIGNIFICANT CHANGE UP (ref 0.4–2)
BUN SERPL-MCNC: 3 MG/DL — LOW (ref 8–20)
CALCIUM SERPL-MCNC: 8.9 MG/DL — SIGNIFICANT CHANGE UP (ref 8.6–10.2)
CHLORIDE SERPL-SCNC: 107 MMOL/L — SIGNIFICANT CHANGE UP (ref 98–107)
CO2 SERPL-SCNC: 27 MMOL/L — SIGNIFICANT CHANGE UP (ref 22–29)
CREAT SERPL-MCNC: 0.76 MG/DL — SIGNIFICANT CHANGE UP (ref 0.5–1.3)
EOSINOPHIL # BLD AUTO: 0.15 K/UL — SIGNIFICANT CHANGE UP (ref 0–0.5)
EOSINOPHIL NFR BLD AUTO: 3.6 % — SIGNIFICANT CHANGE UP (ref 0–6)
GLUCOSE SERPL-MCNC: 87 MG/DL — SIGNIFICANT CHANGE UP (ref 70–99)
HCT VFR BLD CALC: 34.6 % — LOW (ref 39–50)
HGB BLD-MCNC: 10.3 G/DL — LOW (ref 13–17)
IMM GRANULOCYTES NFR BLD AUTO: 0.2 % — SIGNIFICANT CHANGE UP (ref 0–1.5)
LYMPHOCYTES # BLD AUTO: 1.97 K/UL — SIGNIFICANT CHANGE UP (ref 1–3.3)
LYMPHOCYTES # BLD AUTO: 47.2 % — HIGH (ref 13–44)
MAGNESIUM SERPL-MCNC: 1.7 MG/DL — SIGNIFICANT CHANGE UP (ref 1.6–2.6)
MCHC RBC-ENTMCNC: 23.3 PG — LOW (ref 27–34)
MCHC RBC-ENTMCNC: 29.8 GM/DL — LOW (ref 32–36)
MCV RBC AUTO: 78.1 FL — LOW (ref 80–100)
MONOCYTES # BLD AUTO: 0.43 K/UL — SIGNIFICANT CHANGE UP (ref 0–0.9)
MONOCYTES NFR BLD AUTO: 10.3 % — SIGNIFICANT CHANGE UP (ref 2–14)
NEUTROPHILS # BLD AUTO: 1.59 K/UL — LOW (ref 1.8–7.4)
NEUTROPHILS NFR BLD AUTO: 38.2 % — LOW (ref 43–77)
PHOSPHATE SERPL-MCNC: 4 MG/DL — SIGNIFICANT CHANGE UP (ref 2.4–4.7)
PLATELET # BLD AUTO: 140 K/UL — LOW (ref 150–400)
POTASSIUM SERPL-MCNC: 3.5 MMOL/L — SIGNIFICANT CHANGE UP (ref 3.5–5.3)
POTASSIUM SERPL-SCNC: 3.5 MMOL/L — SIGNIFICANT CHANGE UP (ref 3.5–5.3)
PROT SERPL-MCNC: 6 G/DL — LOW (ref 6.6–8.7)
RBC # BLD: 4.43 M/UL — SIGNIFICANT CHANGE UP (ref 4.2–5.8)
RBC # FLD: 15.8 % — HIGH (ref 10.3–14.5)
SODIUM SERPL-SCNC: 142 MMOL/L — SIGNIFICANT CHANGE UP (ref 135–145)
WBC # BLD: 4.17 K/UL — SIGNIFICANT CHANGE UP (ref 3.8–10.5)
WBC # FLD AUTO: 4.17 K/UL — SIGNIFICANT CHANGE UP (ref 3.8–10.5)

## 2021-05-20 PROCEDURE — 99233 SBSQ HOSP IP/OBS HIGH 50: CPT

## 2021-05-20 RX ORDER — ENOXAPARIN SODIUM 100 MG/ML
40 INJECTION SUBCUTANEOUS DAILY
Refills: 0 | Status: DISCONTINUED | OUTPATIENT
Start: 2021-05-20 | End: 2021-05-24

## 2021-05-20 RX ORDER — MAGNESIUM SULFATE 500 MG/ML
1 VIAL (ML) INJECTION ONCE
Refills: 0 | Status: COMPLETED | OUTPATIENT
Start: 2021-05-20 | End: 2021-05-20

## 2021-05-20 RX ADMIN — MIRTAZAPINE 15 MILLIGRAM(S): 45 TABLET, ORALLY DISINTEGRATING ORAL at 21:39

## 2021-05-20 RX ADMIN — SODIUM CHLORIDE 125 MILLILITER(S): 9 INJECTION, SOLUTION INTRAVENOUS at 05:58

## 2021-05-20 RX ADMIN — PANTOPRAZOLE SODIUM 40 MILLIGRAM(S): 20 TABLET, DELAYED RELEASE ORAL at 05:52

## 2021-05-20 RX ADMIN — PANTOPRAZOLE SODIUM 40 MILLIGRAM(S): 20 TABLET, DELAYED RELEASE ORAL at 18:38

## 2021-05-20 RX ADMIN — Medication 250 MILLIGRAM(S): at 18:38

## 2021-05-20 RX ADMIN — Medication 100 GRAM(S): at 10:22

## 2021-05-20 RX ADMIN — PIPERACILLIN AND TAZOBACTAM 25 GRAM(S): 4; .5 INJECTION, POWDER, LYOPHILIZED, FOR SOLUTION INTRAVENOUS at 15:42

## 2021-05-20 RX ADMIN — PIPERACILLIN AND TAZOBACTAM 25 GRAM(S): 4; .5 INJECTION, POWDER, LYOPHILIZED, FOR SOLUTION INTRAVENOUS at 21:39

## 2021-05-20 RX ADMIN — HEPARIN SODIUM 5000 UNIT(S): 5000 INJECTION INTRAVENOUS; SUBCUTANEOUS at 05:54

## 2021-05-20 RX ADMIN — CHLORHEXIDINE GLUCONATE 1 APPLICATION(S): 213 SOLUTION TOPICAL at 05:52

## 2021-05-20 RX ADMIN — PIPERACILLIN AND TAZOBACTAM 25 GRAM(S): 4; .5 INJECTION, POWDER, LYOPHILIZED, FOR SOLUTION INTRAVENOUS at 05:53

## 2021-05-20 RX ADMIN — ENOXAPARIN SODIUM 40 MILLIGRAM(S): 100 INJECTION SUBCUTANEOUS at 15:42

## 2021-05-20 NOTE — PROGRESS NOTE ADULT - ASSESSMENT
24 y/o male with pmh of esophageal spasm, IVDA recently admitted to [facility] w/ esophageal stricture and signed out aMA. Presented to ED after using heroin and presented with septic shock and found to have multi focal pna and subsquently intubated and brought to icu. Patient extubated ,  downgraded to medicine. Patient being worked up for severe esophageal stricture.     Severe esophageal stricture possibly due to caustic ingestion  - s/p MBS, esophagram, EGD at [facility] [place]  - GI following   - Protonix 40mg IV bid  - s/p EGD on 5/14: Pin hole esophageal stricture noted. at 40 cm. Even pediatric scope could not be advanced. Then guidwire placed. Gastrograffin injected. ? extravasation noted. Eventually procedure terminated.  - CT chest: Diffuse esophageal wall thickening with a linear tract of contrast to the left of the mid to distal esophageal lumen, possibly a small amount of contrast tracking in the esophageal wall from a tear in the mid to distal esophagus.   - CT surgery consult appreciated  - CT chest with oral contrast: Significant improvement in bibasilar infiltrates from the prior study suggesting resolving aspiration pneumonia.  The esophagus is mildly distended with contrast remaining in the esophagus likely due to known stricture. There is distal esophageal wall thickening and narrowing of the distal esophageal lumen consistent with distal esophageal stricture of unknown origin. Some contrast does pass through to the stomach.  - Continue  clear liquid diet  - As per discussion with Dr. Vizcarra, best option would be placement of J tube then eventual esophageal stent. To be discussed with Dr. Cox. Options still Esophageal stent vs esophagectomy    Bradycardia  - sinus bradycardia  - TSH 0.66  - Cardiology input appreciated  - TTE  reviewed - normal EF  -suspect large hiatal hernia causing extrinsic compression of the sinus node-vagal mediated intermittent sinus bradycardia      Depression  - Psych input appreciated   - Remeron 15mg nightly     Septic shock secondary to complex multi focal pneumonia  - c/w Zosyn- Day 5    Acute hypoxic respiratory failure - secondary to complex pneumonia  - Does not require supplemental oxygen  - c/w Zosyn- Day 5    Leukopenia/anemia/thrombocytopenia  - will monitor  - HIV negative    Daily tobacco use  - advised to quit    H/o drug use  - cocaine, heroin and K2  - iv ativan prn  -no signs of withdrawal   -as per Psych defer starting Suboxone as patient will probably require opiates post operatively.     DVT ppx  - Lovenox    Dsipo: Pending decision of Esophageal stent vs esophagectomy.

## 2021-05-20 NOTE — PROGRESS NOTE ADULT - SUBJECTIVE AND OBJECTIVE BOX
CC: Acute hypoxemic respiratory failure/septic shock/Esophageal stricture    INTERVAL HPI/OVERNIGHT EVENTS: Patient seen and examined. Tolerating CLD with Ensure Enlive. Denies chest pain, SOB, dizziness, nausea, vomiting, fever, chills.     Vital Signs Last 24 Hrs  T(C): 36.8 (20 May 2021 09:32), Max: 37.1 (19 May 2021 16:21)  T(F): 98.2 (20 May 2021 09:32), Max: 98.8 (19 May 2021 16:21)  HR: 64 (20 May 2021 09:32) (64 - 76)  BP: 112/68 (20 May 2021 09:32) (112/68 - 137/81)  BP(mean): --  RR: 18 (20 May 2021 09:32) (18 - 18)  SpO2: 98% (20 May 2021 09:32) (98% - 99%)    Physical Exam:  Constitutional: alert and oriented, in no acute distress   Neck: Soft and supple, No LAD, No JVD  Respiratory: Clear to auscultation bilaterally, no wheezes or crackles  Cardiovascular: Regular rate and rhythm,  no murmurs, gallops, rubs  Gastrointestinal: Soft, non-tender to palpation, +bs  Vascular: 2+ peripheral pulses  Neurological: A/O x 3, no focal neurological deficits  Musculoskeletal: 5/5 strength b/l upper and lower extremities, no lower extremity edema bilaterally      I&O's Detail    20 May 2021 07:01  -  20 May 2021 13:07  --------------------------------------------------------  IN:  Total IN: 0 mL    OUT:    Voided (mL): 1000 mL  Total OUT: 1000 mL    Total NET: -1000 mL                                    10.3   4.17  )-----------( 140      ( 20 May 2021 07:03 )             34.6     20 May 2021 07:03    142    |  107    |  3.0    ----------------------------<  87     3.5     |  27.0   |  0.76     Ca    8.9        20 May 2021 07:03  Phos  4.0       20 May 2021 07:03  Mg     1.7       20 May 2021 07:03    TPro  6.0    /  Alb  3.5    /  TBili  0.6    /  DBili  x      /  AST  134    /  ALT  79     /  AlkPhos  52     20 May 2021 07:03      CAPILLARY BLOOD GLUCOSE        LIVER FUNCTIONS - ( 20 May 2021 07:03 )  Alb: 3.5 g/dL / Pro: 6.0 g/dL / ALK PHOS: 52 U/L / ALT: 79 U/L / AST: 134 U/L / GGT: x               MEDICATIONS  (STANDING):  chlorhexidine 2% Cloths 1 Application(s) Topical <User Schedule>  heparin   Injectable 5000 Unit(s) SubCutaneous every 8 hours  mirtazapine Soltab 15 milliGRAM(s) Oral at bedtime  pantoprazole  Injectable 40 milliGRAM(s) IV Push two times a day  piperacillin/tazobactam IVPB.. 3.375 Gram(s) IV Intermittent every 8 hours  saccharomyces boulardii 250 milliGRAM(s) Oral two times a day    MEDICATIONS  (PRN):  LORazepam   Injectable 1 milliGRAM(s) IV Push every 6 hours PRN Agitation      RADIOLOGY & ADDITIONAL TESTS:

## 2021-05-20 NOTE — PROGRESS NOTE ADULT - SUBJECTIVE AND OBJECTIVE BOX
Pt seen and examined. Barely tolerating CLD.     REVIEW OF SYSTEMS:  Constitutional: No fever, weight loss or fatigue  Cardiovascular: No chest pain, palpitations, dizziness or leg swelling  Gastrointestinal: As noted above. No abdominal or epigastric pain. No nausea, vomiting or hematemesis; No diarrhea or constipation. No melena or hematochezia.  Skin: No itching, burning, rashes or lesions       MEDICATIONS:  MEDICATIONS  (STANDING):  chlorhexidine 2% Cloths 1 Application(s) Topical <User Schedule>  dextrose 5% + sodium chloride 0.45%. 1000 milliLiter(s) (125 mL/Hr) IV Continuous <Continuous>  heparin   Injectable 5000 Unit(s) SubCutaneous every 8 hours  mirtazapine Soltab 15 milliGRAM(s) Oral at bedtime  pantoprazole  Injectable 40 milliGRAM(s) IV Push two times a day  piperacillin/tazobactam IVPB.. 3.375 Gram(s) IV Intermittent every 8 hours  saccharomyces boulardii 250 milliGRAM(s) Oral two times a day    MEDICATIONS  (PRN):  LORazepam   Injectable 1 milliGRAM(s) IV Push every 6 hours PRN Agitation      Allergies    No Known Allergies    Intolerances        Vital Signs Last 24 Hrs  T(C): 36.8 (20 May 2021 09:32), Max: 37.1 (19 May 2021 16:21)  T(F): 98.2 (20 May 2021 09:32), Max: 98.8 (19 May 2021 16:21)  HR: 64 (20 May 2021 09:32) (64 - 76)  BP: 112/68 (20 May 2021 09:32) (112/68 - 137/81)  BP(mean): --  RR: 18 (20 May 2021 09:32) (18 - 18)  SpO2: 98% (20 May 2021 09:32) (98% - 99%)    05-20 @ 07:01  -  05-20 @ 12:21  --------------------------------------------------------  IN: 0 mL / OUT: 1000 mL / NET: -1000 mL        PHYSICAL EXAM:    General: Well developed; well nourished; in no acute distress  HEENT: MMM, conjunctiva pink and sclera anicteric.  Lungs: clear to auscultation bilaterally.  Cor: RRR S1, S2 only.  Gastrointestinal: Abdomen: Soft non-tender non-distended; Normal bowel sounds; No hepatosplenomegaly.  Extremities: no cyanosis, clubbing or edema.  Skin: Warm and dry. No obvious rash  Neuro: Pt. a + o x 3    LABS:      CBC Full  -  ( 20 May 2021 07:03 )  WBC Count : 4.17 K/uL  RBC Count : 4.43 M/uL  Hemoglobin : 10.3 g/dL  Hematocrit : 34.6 %  Platelet Count - Automated : 140 K/uL  Mean Cell Volume : 78.1 fl  Mean Cell Hemoglobin : 23.3 pg  Mean Cell Hemoglobin Concentration : 29.8 gm/dL  Auto Neutrophil # : 1.59 K/uL  Auto Lymphocyte # : 1.97 K/uL  Auto Monocyte # : 0.43 K/uL  Auto Eosinophil # : 0.15 K/uL  Auto Basophil # : 0.02 K/uL  Auto Neutrophil % : 38.2 %  Auto Lymphocyte % : 47.2 %  Auto Monocyte % : 10.3 %  Auto Eosinophil % : 3.6 %  Auto Basophil % : 0.5 %    05-20    142  |  107  |  3.0<L>  ----------------------------<  87  3.5   |  27.0  |  0.76    Ca    8.9      20 May 2021 07:03  Phos  4.0     05-20  Mg     1.7     05-20    TPro  6.0<L>  /  Alb  3.5  /  TBili  0.6  /  DBili  x   /  AST  134<H>  /  ALT  79<H>  /  AlkPhos  52  05-20                      RADIOLOGY & ADDITIONAL STUDIES (The following images were personally reviewed):

## 2021-05-20 NOTE — PROGRESS NOTE ADULT - SUBJECTIVE AND OBJECTIVE BOX
[place] CARDIOLOGY-[facility]                                                               Office: [address] [address]                                                              Telephone: [phone]. Fax:[phone]                                                                             PROGRESS NOTE  Reason for follow up: Sinus bradycardia  Overnight: No new events.   Update: intermittent sinus bob 30-40s but HR would increase to 70s, denies dizziness/chest discomfort; TTE done yesterday with normal biventricular systolic function, suspect large hiatal hernia causing extrinsic compression of the sinus node-vagal mediated intermittent sinus bradycardia      Review of symptoms:   Cardiac:  No chest pain. No dyspnea. No palpitations.  Respiratory: No cough. No dyspnea  Gastrointestinal: No diarrhea. No abdominal pain. No bleeding.     Past medical history: No updates.   	  Vital Signs Last 24 Hrs  T(C): 36.8 (05-20-21 @ 09:32), Max: 37.1 (05-19-21 @ 16:21)  T(F): 98.2 (05-20-21 @ 09:32), Max: 98.8 (05-19-21 @ 16:21)  HR: 64 (05-20-21 @ 09:32) (64 - 76)  BP: 112/68 (05-20-21 @ 09:32) (112/68 - 137/81)  BP(mean): --  RR: 18 (05-20-21 @ 09:32) (18 - 18)  SpO2: 98% (05-20-21 @ 09:32) (98% - 99%)  I&O's Summary    20 May 2021 07:01  -  20 May 2021 11:07  --------------------------------------------------------  IN: 0 mL / OUT: 1000 mL / NET: -1000 mL          PHYSICAL EXAM:  Appearance: Comfortable. No acute distress  HEENT:  Head and neck: Atraumatic. Normocephalic.  Normal oral mucosa, PERRL, Neck is supple. No JVD, No carotid bruit.   Neurologic: A&Ox 3, no focal deficits. EOMI, Cranial nerves are intact.  Lymphatic: No cervical lymphadenopathy  Cardiovascular: Normal S1 S2, No murmur, rubs/gallops. No JVD, No edema  Respiratory: Lungs clear to auscultation  Gastrointestinal:  Soft, Non-tender, + BS  Lower Extremities: No edema  Psychiatry: Patient is calm. No agitation. Mood & affect appropriate  Skin: No rashes/ecchymoses/cyanosis/ulcers visualized on the face, hands or feet.      CURRENT MEDICATIONS:  MEDICATIONS  (STANDING):  chlorhexidine 2% Cloths 1 Application(s) Topical <User Schedule>  dextrose 5% + sodium chloride 0.45%. 1000 milliLiter(s) (125 mL/Hr) IV Continuous <Continuous>  heparin   Injectable 5000 Unit(s) SubCutaneous every 8 hours  mirtazapine Soltab 15 milliGRAM(s) Oral at bedtime  pantoprazole  Injectable 40 milliGRAM(s) IV Push two times a day  piperacillin/tazobactam IVPB.. 3.375 Gram(s) IV Intermittent every 8 hours  saccharomyces boulardii 250 milliGRAM(s) Oral two times a day    MEDICATIONS  (PRN):  LORazepam   Injectable 1 milliGRAM(s) IV Push every 6 hours PRN Agitation      DIAGNOSTIC TESTING:  [ ] Echocardiogram:   < from: TTE Echo Complete w/o Contrast w/ Doppler (05.19.21 @ 10:43) >    Summary:   1. Technically good study.   2. Normal global left ventricular systolic function.   3. Left ventricular ejection fraction, by visual estimation, is 60 to 65%.  4. Trace mitral valve regurgitation.   5. Mild pulmonic valve regurgitation.   6. There is no evidence of pericardial effusion.   7. There is a fluid filled structure displacing the descending aorta. It became smaller after patient vomited. This maybe due to a larger hiatal hernia. Recommend further imaging such as CT scan for further evaluation.    < end of copied text >    [ ]  Catheterization:  [ ] Stress Test:      Labs:                        10.3   4.17  )-----------( 140      ( 20 May 2021 07:03 )             34.6     05-20    142  |  107  |  3.0<L>  ----------------------------<  87  3.5   |  27.0  |  0.76    Ca    8.9      20 May 2021 07:03  Phos  4.0     05-20  Mg     1.7     05-20    TPro  6.0<L>  /  Alb  3.5  /  TBili  0.6  /  DBili  x   /  AST  134<H>  /  ALT  79<H>  /  AlkPhos  52  05-20                TELEMETRY: Sinus 30-70s

## 2021-05-20 NOTE — PROGRESS NOTE ADULT - ASSESSMENT
Distal very tight esophageal stricture possibly caustic in etiology. Pt. would likely benefit from jejunostomy placement and then eventual OPT esophageal stent or distal esophagectomy. Would continue CLD for now.

## 2021-05-20 NOTE — PROGRESS NOTE ADULT - ASSESSMENT
25M h/o substance abuse (cocaine, heroin), severe esophageal stricture previously transferred from [facility] after EGD on 5/13/21, then left AMA and returned on 5/16/21 found with septic shock due to aspiration/multifocal pneumonia required intubation, extubated on 5/17/21, plan for eventual esophagectomy and J-tube placement, noted overnight with sinus bradycardia 40s and on telemetry, TSH normal, cardiology consulted for evaluation.     Asymptomatic intermittent sinus bradycardia, suspect due to suspect large hiatal hernia causing extrinsic compression of the sinus node-vagal mediated intermittent sinus bradycardia      1. Intermittent sinus bradycardia  -asymptomatic, normal biventricular heart function, no evidence of heart block  -etiology likely vagally mediated from extrinsic compression due to large hiatal hernia, management per thoracic/GI team    2. Preoperative cardiac risk evaluation for eventual esophagectomy/J-tube placement  - no cardiac contraindication to proceed with planned surgery, atropine or dopamine if symptomatic persistent sinus bradycardia    3. h/o substance abuse  -psychiatry following, on mirtazapine      Cardiology will sign off, reconsult if new cardiac issues arise.     Jean Paul Rod DO, [facility]  Faculty Non-Invasive Cardiologist  [phone]

## 2021-05-21 LAB
ALBUMIN SERPL ELPH-MCNC: 3.6 G/DL — SIGNIFICANT CHANGE UP (ref 3.3–5.2)
ALP SERPL-CCNC: 62 U/L — SIGNIFICANT CHANGE UP (ref 40–120)
ALT FLD-CCNC: 71 U/L — HIGH
ANION GAP SERPL CALC-SCNC: 10 MMOL/L — SIGNIFICANT CHANGE UP (ref 5–17)
AST SERPL-CCNC: 91 U/L — HIGH
BASOPHILS # BLD AUTO: 0.02 K/UL — SIGNIFICANT CHANGE UP (ref 0–0.2)
BASOPHILS NFR BLD AUTO: 0.4 % — SIGNIFICANT CHANGE UP (ref 0–2)
BILIRUB SERPL-MCNC: 0.4 MG/DL — SIGNIFICANT CHANGE UP (ref 0.4–2)
BUN SERPL-MCNC: 5 MG/DL — LOW (ref 8–20)
CALCIUM SERPL-MCNC: 9.1 MG/DL — SIGNIFICANT CHANGE UP (ref 8.6–10.2)
CHLORIDE SERPL-SCNC: 106 MMOL/L — SIGNIFICANT CHANGE UP (ref 98–107)
CO2 SERPL-SCNC: 27 MMOL/L — SIGNIFICANT CHANGE UP (ref 22–29)
CREAT SERPL-MCNC: 0.71 MG/DL — SIGNIFICANT CHANGE UP (ref 0.5–1.3)
CULTURE RESULTS: SIGNIFICANT CHANGE UP
CULTURE RESULTS: SIGNIFICANT CHANGE UP
EOSINOPHIL # BLD AUTO: 0.16 K/UL — SIGNIFICANT CHANGE UP (ref 0–0.5)
EOSINOPHIL NFR BLD AUTO: 3.6 % — SIGNIFICANT CHANGE UP (ref 0–6)
GLUCOSE SERPL-MCNC: 84 MG/DL — SIGNIFICANT CHANGE UP (ref 70–99)
HCT VFR BLD CALC: 38.7 % — LOW (ref 39–50)
HGB BLD-MCNC: 11.5 G/DL — LOW (ref 13–17)
IMM GRANULOCYTES NFR BLD AUTO: 0.2 % — SIGNIFICANT CHANGE UP (ref 0–1.5)
LYMPHOCYTES # BLD AUTO: 2.06 K/UL — SIGNIFICANT CHANGE UP (ref 1–3.3)
LYMPHOCYTES # BLD AUTO: 46.2 % — HIGH (ref 13–44)
MCHC RBC-ENTMCNC: 23.2 PG — LOW (ref 27–34)
MCHC RBC-ENTMCNC: 29.7 GM/DL — LOW (ref 32–36)
MCV RBC AUTO: 78.2 FL — LOW (ref 80–100)
MONOCYTES # BLD AUTO: 0.38 K/UL — SIGNIFICANT CHANGE UP (ref 0–0.9)
MONOCYTES NFR BLD AUTO: 8.5 % — SIGNIFICANT CHANGE UP (ref 2–14)
NEUTROPHILS # BLD AUTO: 1.83 K/UL — SIGNIFICANT CHANGE UP (ref 1.8–7.4)
NEUTROPHILS NFR BLD AUTO: 41.1 % — LOW (ref 43–77)
PLATELET # BLD AUTO: 155 K/UL — SIGNIFICANT CHANGE UP (ref 150–400)
POTASSIUM SERPL-MCNC: 3.7 MMOL/L — SIGNIFICANT CHANGE UP (ref 3.5–5.3)
POTASSIUM SERPL-SCNC: 3.7 MMOL/L — SIGNIFICANT CHANGE UP (ref 3.5–5.3)
PROT SERPL-MCNC: 6.2 G/DL — LOW (ref 6.6–8.7)
RBC # BLD: 4.95 M/UL — SIGNIFICANT CHANGE UP (ref 4.2–5.8)
RBC # FLD: 15.6 % — HIGH (ref 10.3–14.5)
SODIUM SERPL-SCNC: 143 MMOL/L — SIGNIFICANT CHANGE UP (ref 135–145)
SPECIMEN SOURCE: SIGNIFICANT CHANGE UP
SPECIMEN SOURCE: SIGNIFICANT CHANGE UP
WBC # BLD: 4.46 K/UL — SIGNIFICANT CHANGE UP (ref 3.8–10.5)
WBC # FLD AUTO: 4.46 K/UL — SIGNIFICANT CHANGE UP (ref 3.8–10.5)

## 2021-05-21 PROCEDURE — 99233 SBSQ HOSP IP/OBS HIGH 50: CPT

## 2021-05-21 PROCEDURE — 99231 SBSQ HOSP IP/OBS SF/LOW 25: CPT

## 2021-05-21 RX ADMIN — Medication 250 MILLIGRAM(S): at 17:11

## 2021-05-21 RX ADMIN — PANTOPRAZOLE SODIUM 40 MILLIGRAM(S): 20 TABLET, DELAYED RELEASE ORAL at 17:11

## 2021-05-21 RX ADMIN — PIPERACILLIN AND TAZOBACTAM 25 GRAM(S): 4; .5 INJECTION, POWDER, LYOPHILIZED, FOR SOLUTION INTRAVENOUS at 13:05

## 2021-05-21 RX ADMIN — PIPERACILLIN AND TAZOBACTAM 25 GRAM(S): 4; .5 INJECTION, POWDER, LYOPHILIZED, FOR SOLUTION INTRAVENOUS at 05:07

## 2021-05-21 RX ADMIN — PANTOPRAZOLE SODIUM 40 MILLIGRAM(S): 20 TABLET, DELAYED RELEASE ORAL at 05:07

## 2021-05-21 RX ADMIN — Medication 250 MILLIGRAM(S): at 05:08

## 2021-05-21 RX ADMIN — PIPERACILLIN AND TAZOBACTAM 25 GRAM(S): 4; .5 INJECTION, POWDER, LYOPHILIZED, FOR SOLUTION INTRAVENOUS at 21:55

## 2021-05-21 RX ADMIN — MIRTAZAPINE 15 MILLIGRAM(S): 45 TABLET, ORALLY DISINTEGRATING ORAL at 21:54

## 2021-05-21 RX ADMIN — CHLORHEXIDINE GLUCONATE 1 APPLICATION(S): 213 SOLUTION TOPICAL at 05:09

## 2021-05-21 NOTE — DIETITIAN INITIAL EVALUATION ADULT. - NS FNS WEIGHT CHANGE REASON
Chief Complaints and History of Present Illnesses   Patient presents with     Glaucoma Suspect Follow Up     And type I diabetic eye exam     Chief Complaint(s) and History of Present Illness(es)     Glaucoma Suspect Follow Up     Laterality: both eyes    Associated symptoms: Negative for eye pain, flashes and floaters    Pain scale: 0/10    Comments: And type I diabetic eye exam              Comments     Pt feels vision at distance stable overall - has noted an increasing need for glasses at near (uses OTC readers)  No drops    Most recent A1c was 7.2 within the past two weeks    SANJU Champion 10:47 AM October 12, 2020                  unintentional

## 2021-05-21 NOTE — PROGRESS NOTE ADULT - ASSESSMENT
24 y/o male with pmh of esophageal spasm, IVDA recently admitted to [facility] w/ esophageal stricture and signed out aMA. Presented to ED after using heroin and presented with septic shock and found to have multi focal pna and subsquently intubated and brought to icu. Patient extubated ,  downgraded to medicine. Patient being worked up for severe esophageal stricture.     Severe esophageal stricture possibly due to caustic ingestion  - s/p MBS, esophagram, EGD at [facility]  - GI following   - Protonix 40mg IV bid  - s/p EGD on 5/14: Pin hole esophageal stricture noted. at 40 cm. Even pediatric scope could not be advanced. Then guidwire placed. Gastrograffin injected. ? extravasation noted. Eventually procedure terminated.  - CT chest: Diffuse esophageal wall thickening with a linear tract of contrast to the left of the mid to distal esophageal lumen, possibly a small amount of contrast tracking in the esophageal wall from a tear in the mid to distal esophagus.   - CT surgery consult appreciated  - CT chest with oral contrast: Significant improvement in bibasilar infiltrates from the prior study suggesting resolving aspiration pneumonia.  The esophagus is mildly distended with contrast remaining in the esophagus likely due to known stricture. There is distal esophageal wall thickening and narrowing of the distal esophageal lumen consistent with distal esophageal stricture of unknown origin. Some contrast does pass through to the stomach.  - Continue  clear liquid diet, instructed patient to consume slowly, with smaller  more frequent feedings rather than consuming large quantity at one time.  - As per discussion with Dr. Vizcarra, best option would be placement of J tube then eventual esophageal stent. GI following, awaiting final decision.  Options still Esophageal stent vs esophagectomy    Bradycardia  - sinus bradycardia  - TSH 0.66  - Cardiology input appreciated  - TTE  reviewed - normal EF  -suspect large hiatal hernia causing extrinsic compression of the sinus node-vagal mediated intermittent sinus bradycardia  -Heart rate increases appropriately with activity, no events overnight, will dc CM      Depression  - Psych input appreciated   - Remeron 15mg nightly     Septic shock secondary to complex multi focal pneumonia  - c/w Zosyn- Day 6    Acute hypoxic respiratory failure - secondary to complex pneumonia  - Does not require supplemental oxygen  - c/w Zosyn- Day 6    Leukopenia/anemia/thrombocytopenia  - will monitor  - HIV negative    Daily tobacco use  - advised to quit    H/o drug use  - cocaine, heroin and K2  - iv ativan prn  -no signs of withdrawal   -as per Psych defer starting Suboxone as patient will probably require opiates post operatively.     DVT ppx  - Lovenox    Dsipo: Pending decision of Esophageal stent vs esophagectomy.

## 2021-05-21 NOTE — DIETITIAN INITIAL EVALUATION ADULT. - OTHER INFO
26 y/o male with pmh of esophageal spasm, IVDA recently admitted to [facility] w/ esophageal stricture and signed out aMA. Presented to ED after using heroin and presented with septic shock and found to have multi focal pna and subsequently intubated and brought to icu. Patient extubated, downgraded to medicine. Patient being worked up for severe esophageal stricture. Pt needs surgery, possible J tube next week.

## 2021-05-21 NOTE — DIETITIAN NUTRITION RISK NOTIFICATION - TREATMENT: THE FOLLOWING DIET HAS BEEN RECOMMENDED
Diet, Clear Liquid:   Supplement Feeding Modality:  Oral  Ensure Enlive Cans or Servings Per Day:  1       Frequency:  Three Times a day (05-19-21 @ 08:49) [Active]      
normal...

## 2021-05-21 NOTE — PROGRESS NOTE ADULT - SUBJECTIVE AND OBJECTIVE BOX
Chief Complaint: This is a 25y old man patient being seen in follow-up consultation for acute hypoxemic respiratory failure septic shock/ esophageal stricture.    HPI / 24H events:  Patient seeing and evaluated at bedside, reporting no complains, tolerating liquid diet stating "I have to take small zips of fluids, and I am fine, is when I take big zips or too fast that I get uncomfortable". Denies nausea, vomiting, abdominal pain, chest pain, shortness of breath, hematemesis, hematochezia, melena.      ROS: A 14-point review of systems was reviewed and was otherwise negative save what was reported in the HPI.    PAST MEDICAL/SURGICAL HISTORY:  Heroin abuse    Cocaine abuse    GERD (gastroesophageal reflux disease)    Hiatal hernia    No significant past surgical history      MEDICATIONS  (STANDING):  chlorhexidine 2% Cloths 1 Application(s) Topical <User Schedule>  enoxaparin Injectable 40 milliGRAM(s) SubCutaneous daily  mirtazapine Soltab 15 milliGRAM(s) Oral at bedtime  pantoprazole  Injectable 40 milliGRAM(s) IV Push two times a day  piperacillin/tazobactam IVPB.. 3.375 Gram(s) IV Intermittent every 8 hours  saccharomyces boulardii 250 milliGRAM(s) Oral two times a day    MEDICATIONS  (PRN):  LORazepam   Injectable 1 milliGRAM(s) IV Push every 6 hours PRN Agitation    No Known Allergies    T(C): 36.9 (05-21-21 @ 10:00), Max: 37.4 (05-20-21 @ 17:00)  HR: 85 (05-21-21 @ 10:00) (68 - 85)  BP: 120/70 (05-21-21 @ 10:00) (120/70 - 123/56)  RR: 20 (05-21-21 @ 10:00) (18 - 20)  SpO2: 99% (05-21-21 @ 10:00) (99% - 100%)    I&O's Summary    20 May 2021 07:01  -  21 May 2021 07:00  --------------------------------------------------------  IN: 250 mL / OUT: 1000 mL / NET: -750 mL      PHYSICAL EXAM:  Constitutional: Well-developed, well-nourished, in no apparent distress  Eyes: Sclerae anicteric, conjunctivae normal  ENMT: Mucus membranes moist, no oropharyngeal thrush noted  Respiratory: Breathing nonlabored; clear to auscultation  Cardiovascular: Regular rate and rhythm  Gastrointestinal: Soft, nontender, nondistended, normoactive bowel sounds  Extremities: No clubbing, cyanosis or edema  Neurological: Alert and oriented to person, place and time; no asterixis  Skin: No jaundice  Musculoskeletal: No significant peripheral atrophy  Psychiatric: Affect and mood appropriate                   11.5   4.46  )-----------( 155      ( 05-21 @ 07:12 )             38.7                10.3   4.17  )-----------( 140      ( 05-20 @ 07:03 )             34.6                10.0   3.86  )-----------( 136      ( 05-19 @ 07:03 )             33.3       05-21    143  |  106  |  5.0<L>  ----------------------------<  84  3.7   |  27.0  |  0.71    Ca    9.1      21 May 2021 07:12  Phos  4.0     05-20  Mg     1.7     05-20    TPro  6.2<L>  /  Alb  3.6  /  TBili  0.4  /  DBili  x   /  AST  91<H>  /  ALT  71<H>  /  AlkPhos  62  05-21    LIVER FUNCTIONS - ( 21 May 2021 07:12 )  Alb: 3.6 g/dL / Pro: 6.2 g/dL / ALK PHOS: 62 U/L / ALT: 71 U/L / AST: 91 U/L / GGT: x

## 2021-05-21 NOTE — DIETITIAN INITIAL EVALUATION ADULT. - ORAL INTAKE PTA/DIET HISTORY
Pt reports taking clears fairly well. Takes Ensure clears well.  Has esophageal stricture and has been having difficulty eating. Pt does report some weight loss, ~10  pounds. Pt getting surgery next week.

## 2021-05-21 NOTE — PROGRESS NOTE ADULT - SUBJECTIVE AND OBJECTIVE BOX
CC: Acute hypoxemic respiratory failure/septic shock/Esophageal stricture    INTERVAL HPI/OVERNIGHT EVENTS: Patient seen and examined. Had one episode of vomiting this am. States he drank liquids "too much too fast". Able to tolerate diet if consumed slowly. Denies chest pain, SOB, dizziness, fever, chills. Ambulating in room without difficulty. Heart rate elevates appropriately with activity.     Vital Signs Last 24 Hrs  T(C): 36.9 (21 May 2021 10:00), Max: 37.4 (20 May 2021 17:00)  T(F): 98.4 (21 May 2021 10:00), Max: 99.3 (20 May 2021 17:00)  HR: 85 (21 May 2021 10:00) (68 - 85)  BP: 120/70 (21 May 2021 10:00) (120/70 - 123/56)  BP(mean): --  RR: 20 (21 May 2021 10:00) (18 - 20)  SpO2: 99% (21 May 2021 10:00) (99% - 100%)    Physical Exam:  Constitutional: alert and oriented, in no acute distress   Neck: Soft and supple, No LAD, No JVD  Respiratory: Clear to auscultation bilaterally, no wheezes or crackles  Cardiovascular: Regular rate and rhythm,  no murmurs, gallops, rubs  Gastrointestinal: Soft, non-tender to palpation, +bs  Vascular: 2+ peripheral pulses  Neurological: A/O x 3, no focal neurological deficits  Musculoskeletal: 5/5 strength b/l upper and lower extremities, no lower extremity edema bilaterally    I&O's Detail    20 May 2021 07:01  -  21 May 2021 07:00  --------------------------------------------------------  IN:    IV PiggyBack: 200 mL    Oral Fluid: 50 mL  Total IN: 250 mL    OUT:    Voided (mL): 1000 mL  Total OUT: 1000 mL    Total NET: -750 mL                                    11.5   4.46  )-----------( 155      ( 21 May 2021 07:12 )             38.7     21 May 2021 07:12    143    |  106    |  5.0    ----------------------------<  84     3.7     |  27.0   |  0.71     Ca    9.1        21 May 2021 07:12  Phos  4.0       20 May 2021 07:03  Mg     1.7       20 May 2021 07:03    TPro  6.2    /  Alb  3.6    /  TBili  0.4    /  DBili  x      /  AST  91     /  ALT  71     /  AlkPhos  62     21 May 2021 07:12      CAPILLARY BLOOD GLUCOSE        LIVER FUNCTIONS - ( 21 May 2021 07:12 )  Alb: 3.6 g/dL / Pro: 6.2 g/dL / ALK PHOS: 62 U/L / ALT: 71 U/L / AST: 91 U/L / GGT: x               MEDICATIONS  (STANDING):  chlorhexidine 2% Cloths 1 Application(s) Topical <User Schedule>  enoxaparin Injectable 40 milliGRAM(s) SubCutaneous daily  mirtazapine Soltab 15 milliGRAM(s) Oral at bedtime  pantoprazole  Injectable 40 milliGRAM(s) IV Push two times a day  piperacillin/tazobactam IVPB.. 3.375 Gram(s) IV Intermittent every 8 hours  saccharomyces boulardii 250 milliGRAM(s) Oral two times a day    MEDICATIONS  (PRN):  LORazepam   Injectable 1 milliGRAM(s) IV Push every 6 hours PRN Agitation      RADIOLOGY & ADDITIONAL TESTS:

## 2021-05-21 NOTE — PROGRESS NOTE ADULT - SUBJECTIVE AND OBJECTIVE BOX
Subjective - patient seen and evaluated bedside. Sitting comfortably in bed. Complains of dysphagia. Denies CP, SOB, HA, dizziness, n/v/d    Review of Systems: negative x 10 systems except as noted above      PAST MEDICAL & SURGICAL HISTORY:  Heroin abuse    Cocaine abuse    GERD (gastroesophageal reflux disease)    Hiatal hernia    No significant past surgical history          chlorhexidine 2% Cloths 1 Application(s) Topical <User Schedule>  enoxaparin Injectable 40 milliGRAM(s) SubCutaneous daily  LORazepam   Injectable 1 milliGRAM(s) IV Push every 6 hours PRN  mirtazapine Soltab 15 milliGRAM(s) Oral at bedtime  pantoprazole  Injectable 40 milliGRAM(s) IV Push two times a day  piperacillin/tazobactam IVPB.. 3.375 Gram(s) IV Intermittent every 8 hours  saccharomyces boulardii 250 milliGRAM(s) Oral two times a day  MEDICATIONS  (PRN):  LORazepam   Injectable 1 milliGRAM(s) IV Push every 6 hours PRN Agitation      Daily     Daily                               11.5   4.46  )-----------( 155      ( 21 May 2021 07:12 )             38.7   05-21    143  |  106  |  5.0<L>  ----------------------------<  84  3.7   |  27.0  |  0.71    Ca    9.1      21 May 2021 07:12  Phos  4.0     05-20  Mg     1.7     05-20    TPro  6.2<L>  /  Alb  3.6  /  TBili  0.4  /  DBili  x   /  AST  91<H>  /  ALT  71<H>  /  AlkPhos  62  05-21            Objective:  T(C): 36.9 (05-21-21 @ 10:00), Max: 37.4 (05-20-21 @ 17:00)  HR: 85 (05-21-21 @ 10:00) (68 - 85)  BP: 120/70 (05-21-21 @ 10:00) (120/70 - 123/56)  RR: 20 (05-21-21 @ 10:00) (18 - 20)  SpO2: 99% (05-21-21 @ 10:00) (99% - 100%)  Wt(kg): --CAPILLARY BLOOD GLUCOSE      I&O's Summary    20 May 2021 07:01  -  21 May 2021 07:00  --------------------------------------------------------  IN: 250 mL / OUT: 1000 mL / NET: -750 mL        Physical Exam  Neuro: A+O x 3, non-focal, speech clear and intact  Pulm: CTA, equal bilaterally  CV: RRR, , +S1S2  Abd: soft, NT, ND, +BS  Ext: +DP Pulses b/l, no edema      Imaging:  CXR: < from: CT Chest w/ Oral Cont (05.18.21 @ 19:01) >  IMPRESSION: Significant improvement in bibasilar infiltrates from the prior study suggesting resolving aspiration pneumonia.  The esophagus is mildly distended with contrast remaining in the esophagus likely due to known stricture. There is distal esophageal wall thickening and narrowing of the distal esophageal lumen consistent with distal esophageal stricture of unknown origin. Some contrast does pass through to the stomach.          < end of copied text >

## 2021-05-21 NOTE — DIETITIAN INITIAL EVALUATION ADULT. - PERTINENT LABORATORY DATA
05-21 Na143 mmol/L Glu 84 mg/dL K+ 3.7 mmol/L Cr  0.71 mg/dL BUN 5.0 mg/dL<L> Phos n/a   Alb 3.6 g/dL PAB n/a

## 2021-05-22 LAB
ALBUMIN SERPL ELPH-MCNC: 3.7 G/DL — SIGNIFICANT CHANGE UP (ref 3.3–5.2)
ALP SERPL-CCNC: 62 U/L — SIGNIFICANT CHANGE UP (ref 40–120)
ALT FLD-CCNC: 58 U/L — HIGH
ANION GAP SERPL CALC-SCNC: 8 MMOL/L — SIGNIFICANT CHANGE UP (ref 5–17)
AST SERPL-CCNC: 53 U/L — HIGH
BILIRUB SERPL-MCNC: 0.4 MG/DL — SIGNIFICANT CHANGE UP (ref 0.4–2)
BUN SERPL-MCNC: 6 MG/DL — LOW (ref 8–20)
CALCIUM SERPL-MCNC: 9.2 MG/DL — SIGNIFICANT CHANGE UP (ref 8.6–10.2)
CHLORIDE SERPL-SCNC: 105 MMOL/L — SIGNIFICANT CHANGE UP (ref 98–107)
CO2 SERPL-SCNC: 26 MMOL/L — SIGNIFICANT CHANGE UP (ref 22–29)
CREAT SERPL-MCNC: 0.7 MG/DL — SIGNIFICANT CHANGE UP (ref 0.5–1.3)
GLUCOSE SERPL-MCNC: 88 MG/DL — SIGNIFICANT CHANGE UP (ref 70–99)
HCT VFR BLD CALC: 38 % — LOW (ref 39–50)
HGB BLD-MCNC: 11.4 G/DL — LOW (ref 13–17)
MCHC RBC-ENTMCNC: 22.8 PG — LOW (ref 27–34)
MCHC RBC-ENTMCNC: 30 GM/DL — LOW (ref 32–36)
MCV RBC AUTO: 76.2 FL — LOW (ref 80–100)
PLATELET # BLD AUTO: 168 K/UL — SIGNIFICANT CHANGE UP (ref 150–400)
POTASSIUM SERPL-MCNC: 4.2 MMOL/L — SIGNIFICANT CHANGE UP (ref 3.5–5.3)
POTASSIUM SERPL-SCNC: 4.2 MMOL/L — SIGNIFICANT CHANGE UP (ref 3.5–5.3)
PROT SERPL-MCNC: 6.3 G/DL — LOW (ref 6.6–8.7)
RBC # BLD: 4.99 M/UL — SIGNIFICANT CHANGE UP (ref 4.2–5.8)
RBC # FLD: 15.7 % — HIGH (ref 10.3–14.5)
SODIUM SERPL-SCNC: 139 MMOL/L — SIGNIFICANT CHANGE UP (ref 135–145)
WBC # BLD: 4.42 K/UL — SIGNIFICANT CHANGE UP (ref 3.8–10.5)
WBC # FLD AUTO: 4.42 K/UL — SIGNIFICANT CHANGE UP (ref 3.8–10.5)

## 2021-05-22 PROCEDURE — 99232 SBSQ HOSP IP/OBS MODERATE 35: CPT

## 2021-05-22 RX ADMIN — Medication 250 MILLIGRAM(S): at 17:37

## 2021-05-22 RX ADMIN — MIRTAZAPINE 15 MILLIGRAM(S): 45 TABLET, ORALLY DISINTEGRATING ORAL at 21:40

## 2021-05-22 RX ADMIN — Medication 250 MILLIGRAM(S): at 06:02

## 2021-05-22 RX ADMIN — PANTOPRAZOLE SODIUM 40 MILLIGRAM(S): 20 TABLET, DELAYED RELEASE ORAL at 17:37

## 2021-05-22 RX ADMIN — PIPERACILLIN AND TAZOBACTAM 25 GRAM(S): 4; .5 INJECTION, POWDER, LYOPHILIZED, FOR SOLUTION INTRAVENOUS at 21:40

## 2021-05-22 RX ADMIN — CHLORHEXIDINE GLUCONATE 1 APPLICATION(S): 213 SOLUTION TOPICAL at 06:03

## 2021-05-22 RX ADMIN — PIPERACILLIN AND TAZOBACTAM 25 GRAM(S): 4; .5 INJECTION, POWDER, LYOPHILIZED, FOR SOLUTION INTRAVENOUS at 13:31

## 2021-05-22 RX ADMIN — PANTOPRAZOLE SODIUM 40 MILLIGRAM(S): 20 TABLET, DELAYED RELEASE ORAL at 06:02

## 2021-05-22 RX ADMIN — PIPERACILLIN AND TAZOBACTAM 25 GRAM(S): 4; .5 INJECTION, POWDER, LYOPHILIZED, FOR SOLUTION INTRAVENOUS at 06:04

## 2021-05-22 NOTE — PROGRESS NOTE ADULT - ASSESSMENT
26 y/o male with pmh of esophageal spasm, IVDA recently admitted to [facility] w/ esophageal stricture and signed out aMA. Presented to ED after using heroin and presented with septic shock and found to have multi focal pna and subsquently intubated and brought to icu. Patient extubated ,  downgraded to medicine. Patient being worked up for severe esophageal stricture.     Severe esophageal stricture possibly due to caustic ingestion  - s/p MBS, esophagram, EGD at [facility]  - GI following   - Protonix 40mg IV bid  - s/p EGD on 5/14: Pin hole esophageal stricture noted. at 40 cm. Even pediatric scope could not be advanced. Then guidwire placed. Gastrograffin injected. ? extravasation noted. Eventually procedure terminated.  - CT chest: Diffuse esophageal wall thickening with a linear tract of contrast to the left of the mid to distal esophageal lumen, possibly a small amount of contrast tracking in the esophageal wall from a tear in the mid to distal esophagus.   - CT surgery consult appreciated  - CT chest with oral contrast: Significant improvement in bibasilar infiltrates from the prior study suggesting resolving aspiration pneumonia.  The esophagus is mildly distended with contrast remaining in the esophagus likely due to known stricture. There is distal esophageal wall thickening and narrowing of the distal esophageal lumen consistent with distal esophageal stricture of unknown origin. Some contrast does pass through to the stomach.  - Continue  clear liquid diet, instructed patient to consume slowly, with smaller  more frequent feedings rather than consuming large quantity at one time.  - GI following Esophageal stent 5/25     Bradycardia  - sinus bradycardia  - TSH 0.66  - Cardiology input appreciated  - TTE  reviewed - normal EF  -suspect large hiatal hernia causing extrinsic compression of the sinus node-vagal mediated intermittent sinus bradycardia  -Heart rate increases appropriately with activity, no events overnight, will dc CM      Depression  - Psych input appreciated   - Remeron 15mg nightly     Septic shock secondary to complex multi focal pneumonia  - c/w Zosyn- Day 6    Acute hypoxic respiratory failure - secondary to complex pneumonia  - Does not require supplemental oxygen  - c/w Zosyn- Day 6    Leukopenia/anemia/thrombocytopenia  - will monitor  - HIV negative    Daily tobacco use  - advised to quit    H/o drug use  - cocaine, heroin and K2  - iv ativan prn  -no signs of withdrawal   -as per Psych defer starting Suboxone as patient will probably require opiates post operatively.     DVT ppx  - Lovenox    Dsipo: Pending decision of Esophageal stent 5/25

## 2021-05-22 NOTE — PROGRESS NOTE ADULT - SUBJECTIVE AND OBJECTIVE BOX
HPI   Pt is a 24yo M admitted to [facility] for esphageal stricture  Pt was seen and examined at bedside. Pt states he vomited once today after drinking liquid too fast. No other complaints     Vital Signs Last 24 Hrs  T(C): 37 (22 May 2021 12:16), Max: 37 (22 May 2021 12:16)  T(F): 98.6 (22 May 2021 12:16), Max: 98.6 (22 May 2021 12:16)  HR: 75 (22 May 2021 12:16) (60 - 87)  BP: 144/74 (22 May 2021 12:16) (102/65 - 144/74)  BP(mean): --  RR: 16 (22 May 2021 12:16) (16 - 20)  SpO2: 94% (22 May 2021 04:55) (94% - 100%)    I&O's Summary    21 May 2021 07:01  -  22 May 2021 07:00  --------------------------------------------------------  IN: 720 mL / OUT: 0 mL / NET: 720 mL        CAPILLARY BLOOD GLUCOSE          PHYSICAL EXAM:    Constitutional: NAD, awake and alert, well-developed  HEENT: PERR, EOMI, Normal Hearing, MMM  Neck: Soft and supple, No LAD, No JVD  Respiratory: Breath sounds are clear bilaterally, No wheezing, rales or rhonchi  Cardiovascular: S1 and S2, regular rate and rhythm, no Murmurs, gallops or rubs  Gastrointestinal: Bowel Sounds present, soft, nontender, nondistended, no guarding, no rebound  Extremities: No peripheral edema  Vascular: 2+ peripheral pulses  Neurological: A/O x 3, no focal deficits  Musculoskeletal: 5/5 strength b/l upper and lower extremities  Skin: No rashes    MEDICATIONS:  MEDICATIONS  (STANDING):  chlorhexidine 2% Cloths 1 Application(s) Topical <User Schedule>  enoxaparin Injectable 40 milliGRAM(s) SubCutaneous daily  mirtazapine Soltab 15 milliGRAM(s) Oral at bedtime  pantoprazole  Injectable 40 milliGRAM(s) IV Push two times a day  piperacillin/tazobactam IVPB.. 3.375 Gram(s) IV Intermittent every 8 hours  saccharomyces boulardii 250 milliGRAM(s) Oral two times a day      LABS: All Labs Reviewed:                        11.4   4.42  )-----------( 168      ( 22 May 2021 08:55 )             38.0     05-22    139  |  105  |  6.0<L>  ----------------------------<  88  4.2   |  26.0  |  0.70    Ca    9.2      22 May 2021 08:55    TPro  6.3<L>  /  Alb  3.7  /  TBili  0.4  /  DBili  x   /  AST  53<H>  /  ALT  58<H>  /  AlkPhos  62  05-22          Blood Culture:     RADIOLOGY/EKG:    DVT PPX:    ADVANCED DIRECTIVE:    DISPOSITION:

## 2021-05-23 LAB
ANION GAP SERPL CALC-SCNC: 10 MMOL/L — SIGNIFICANT CHANGE UP (ref 5–17)
BUN SERPL-MCNC: 8 MG/DL — SIGNIFICANT CHANGE UP (ref 8–20)
CALCIUM SERPL-MCNC: 9.1 MG/DL — SIGNIFICANT CHANGE UP (ref 8.6–10.2)
CHLORIDE SERPL-SCNC: 105 MMOL/L — SIGNIFICANT CHANGE UP (ref 98–107)
CO2 SERPL-SCNC: 26 MMOL/L — SIGNIFICANT CHANGE UP (ref 22–29)
CREAT SERPL-MCNC: 0.82 MG/DL — SIGNIFICANT CHANGE UP (ref 0.5–1.3)
GLUCOSE SERPL-MCNC: 86 MG/DL — SIGNIFICANT CHANGE UP (ref 70–99)
HCT VFR BLD CALC: 39.9 % — SIGNIFICANT CHANGE UP (ref 39–50)
HGB BLD-MCNC: 11.8 G/DL — LOW (ref 13–17)
MCHC RBC-ENTMCNC: 22.9 PG — LOW (ref 27–34)
MCHC RBC-ENTMCNC: 29.6 GM/DL — LOW (ref 32–36)
MCV RBC AUTO: 77.5 FL — LOW (ref 80–100)
PLATELET # BLD AUTO: 162 K/UL — SIGNIFICANT CHANGE UP (ref 150–400)
POTASSIUM SERPL-MCNC: 4.3 MMOL/L — SIGNIFICANT CHANGE UP (ref 3.5–5.3)
POTASSIUM SERPL-SCNC: 4.3 MMOL/L — SIGNIFICANT CHANGE UP (ref 3.5–5.3)
RBC # BLD: 5.15 M/UL — SIGNIFICANT CHANGE UP (ref 4.2–5.8)
RBC # FLD: 15.7 % — HIGH (ref 10.3–14.5)
SARS-COV-2 RNA SPEC QL NAA+PROBE: SIGNIFICANT CHANGE UP
SODIUM SERPL-SCNC: 141 MMOL/L — SIGNIFICANT CHANGE UP (ref 135–145)
WBC # BLD: 4.74 K/UL — SIGNIFICANT CHANGE UP (ref 3.8–10.5)
WBC # FLD AUTO: 4.74 K/UL — SIGNIFICANT CHANGE UP (ref 3.8–10.5)

## 2021-05-23 PROCEDURE — 99232 SBSQ HOSP IP/OBS MODERATE 35: CPT

## 2021-05-23 RX ADMIN — PANTOPRAZOLE SODIUM 40 MILLIGRAM(S): 20 TABLET, DELAYED RELEASE ORAL at 17:03

## 2021-05-23 RX ADMIN — Medication 250 MILLIGRAM(S): at 05:30

## 2021-05-23 RX ADMIN — MIRTAZAPINE 15 MILLIGRAM(S): 45 TABLET, ORALLY DISINTEGRATING ORAL at 21:41

## 2021-05-23 RX ADMIN — PIPERACILLIN AND TAZOBACTAM 25 GRAM(S): 4; .5 INJECTION, POWDER, LYOPHILIZED, FOR SOLUTION INTRAVENOUS at 13:47

## 2021-05-23 RX ADMIN — CHLORHEXIDINE GLUCONATE 1 APPLICATION(S): 213 SOLUTION TOPICAL at 05:30

## 2021-05-23 RX ADMIN — PANTOPRAZOLE SODIUM 40 MILLIGRAM(S): 20 TABLET, DELAYED RELEASE ORAL at 05:30

## 2021-05-23 RX ADMIN — Medication 250 MILLIGRAM(S): at 17:03

## 2021-05-23 RX ADMIN — PIPERACILLIN AND TAZOBACTAM 25 GRAM(S): 4; .5 INJECTION, POWDER, LYOPHILIZED, FOR SOLUTION INTRAVENOUS at 05:30

## 2021-05-23 NOTE — PROGRESS NOTE ADULT - SUBJECTIVE AND OBJECTIVE BOX
Follow up for esphageal stricture  Pt was seen and examined at bedside. He is still will difficulty swallowing tolerating liquid   no nausea , cont to drink small frequent  amounts       Vital Signs Last 24 Hrs  T(C): 36.8 (23 May 2021 11:35), Max: 37.1 (22 May 2021 21:00)  T(F): 98.3 (23 May 2021 11:35), Max: 98.8 (22 May 2021 21:00)  HR: 80 (23 May 2021 11:35) (70 - 89)  BP: 99/64 (23 May 2021 11:35) (99/64 - 121/76)  BP(mean): --  RR: 19 (23 May 2021 11:35) (19 - 20)    SpO2: 100% (23 May 2021 11:35) (95% - 100%)I&O's Summary    21 May 2021 07:01  -  22 May 2021 07:00  --------------------------------------------------------  IN: 720 mL / OUT: 0 mL / NET: 720 mL        Constitutional: NAD, awake   HEENT: PERRLA , EOMI  Neck: supple, No LAD, No JVD  Respiratory: Breath sounds are clear bilaterally, No wheezing, rales or rhonchi  Cardiovascular: S1 and S2, regular rate and rhythm   Gastrointestinal: Bowel Sounds present, soft, nontender, nondistended, no guarding, no rebound  Extremities: No peripheral edema                              11.8   4.74  )-----------( 162      ( 23 May 2021 08:26 )             39.9   05-23    141  |  105  |  8.0  ----------------------------<  86  4.3   |  26.0  |  0.82    Ca    9.1      23 May 2021 08:26    TPro  6.3<L>  /  Alb  3.7  /  TBili  0.4  /  DBili  x   /  AST  53<H>  /  ALT  58<H>  /  AlkPhos  62  05-22

## 2021-05-23 NOTE — PROGRESS NOTE ADULT - SUBJECTIVE AND OBJECTIVE BOX
Subjective:" Im fine, what do you want"  Sleeping in bed      V/S  T(C): 36.8 (05-23-21 @ 11:35), Max: 37.1 (05-22-21 @ 21:00)  HR: 80 (05-23-21 @ 11:35) (70 - 89)  BP: 99/64 (05-23-21 @ 11:35) (99/64 - 121/76)  RR: 19 (05-23-21 @ 11:35) (19 - 20)  SpO2: 100% (05-23-21 @ 11:35) (95% - 100%)      MEDICATIONS  (STANDING):  chlorhexidine 2% Cloths 1 Application(s) Topical <User Schedule>  enoxaparin Injectable 40 milliGRAM(s) SubCutaneous daily  mirtazapine Soltab 15 milliGRAM(s) Oral at bedtime  pantoprazole  Injectable 40 milliGRAM(s) IV Push two times a day  piperacillin/tazobactam IVPB.. 3.375 Gram(s) IV Intermittent every 8 hours  saccharomyces boulardii 250 milliGRAM(s) Oral two times a day      05-23    141  |  105  |  8.0  ----------------------------<  86  4.3   |  26.0  |  0.82    Ca    9.1      23 May 2021 08:26    TPro  6.3<L>  /  Alb  3.7  /  TBili  0.4  /  DBili  x   /  AST  53<H>  /  ALT  58<H>  /  AlkPhos  62  05-22                               11.8   4.74  )-----------( 162      ( 23 May 2021 08:26 )             39.9                Physical Exam:    Neuro: A+O x 3, no deficits    Pulm: CTA, equal bilaterally    CV: RRR, , +S1S2    Abd: soft, NT, ND, +BS    Ext: +DP Pulses b/l, no edema      PAST MEDICAL & SURGICAL HISTORY:  Heroin abuse    Cocaine abuse    GERD (gastroesophageal reflux disease)    Hiatal hernia    No significant past surgical history

## 2021-05-23 NOTE — PROGRESS NOTE ADULT - ASSESSMENT
26 y/o male with pmh of esophageal spasm, IVDA recently admitted to [facility] w/ esophageal stricture and signed out aMA. Presented to ED after using heroin and presented with septic shock and found to have multi focal pna and subsquently intubated and brought to icu. Patient extubated ,  downgraded to medicine. Patient being worked up for severe esophageal stricture.     Severe esophageal stricture possibly due to caustic ingestion  - s/p MBS, esophagram, EGD at [facility]  - GI following   - Protonix 40mg IV bid  - s/p EGD on 5/14: Pin hole esophageal stricture noted. at 40 cm. Even pediatric scope could not be advanced. Then guidwire placed. Gastrograffin injected. ? extravasation noted. Eventually procedure terminated.  - CT chest: Diffuse esophageal wall thickening with a linear tract of contrast to the left of the mid to distal esophageal lumen, possibly a small amount of contrast tracking in the esophageal wall from a tear in the mid to distal esophagus.   - CT surgery consult appreciated  - CT chest with oral contrast: Significant improvement in bibasilar infiltrates from the prior study suggesting resolving aspiration pneumonia.  The esophagus is mildly distended with contrast remaining in the esophagus likely due to known stricture. There is distal esophageal wall thickening and narrowing of the distal esophageal lumen consistent with distal esophageal stricture of unknown origin. Some contrast does pass through to the stomach.  - Continue  clear liquid diet, instructed patient to consume slowly, with smaller  more frequent feedings rather than consuming large quantity at one time.  - GI following Esophageal stent 5/25     1- Sinus Bradycardia, improved   - Cardiology input appreciated  - TTE  reviewed - normal EF  -suspect large hiatal hernia causing extrinsic compression of the sinus node-vagal mediated intermittent sinus bradycardia  -Heart rate increases appropriately with activity, no events overnight    2-Depression  - Psych input appreciated   on  Remeron 15mg nightly     3-Septic shock secondary to complex multi focal pneumonia  - c/w Zosyn- Day 6    4-Acute hypoxic respiratory failure - secondary to complex pneumonia  - Does not require supplemental oxygen  - c/w Zosyn    5-Leukopenia/anemia/thrombocytopenia  - HIV negative  improved     6-Smoking / tobacco use  - advised to quit    7-H/o drug use  - cocaine, heroin and K2  - iv ativan prn  -no signs of withdrawal   -as per Psych defer starting Suboxone as patient will probably require opiates post operatively.     DVT ppx  - Lovenox    Dsipo: Pending decision of Esophageal stent 5/25

## 2021-05-24 ENCOUNTER — TRANSCRIPTION ENCOUNTER (OUTPATIENT)
Age: 26
End: 2021-05-24

## 2021-05-24 LAB
APTT BLD: 32.7 SEC — SIGNIFICANT CHANGE UP (ref 27.5–35.5)
BLD GP AB SCN SERPL QL: SIGNIFICANT CHANGE UP
INR BLD: 1.11 RATIO — SIGNIFICANT CHANGE UP (ref 0.88–1.16)
PROTHROM AB SERPL-ACNC: 12.8 SEC — SIGNIFICANT CHANGE UP (ref 10.6–13.6)

## 2021-05-24 PROCEDURE — 99232 SBSQ HOSP IP/OBS MODERATE 35: CPT

## 2021-05-24 RX ORDER — SODIUM CHLORIDE 9 MG/ML
1000 INJECTION, SOLUTION INTRAVENOUS
Refills: 0 | Status: DISCONTINUED | OUTPATIENT
Start: 2021-05-24 | End: 2021-05-25

## 2021-05-24 RX ORDER — SUCRALFATE 1 G
1 TABLET ORAL
Refills: 0 | Status: DISCONTINUED | OUTPATIENT
Start: 2021-05-24 | End: 2021-05-28

## 2021-05-24 RX ADMIN — PANTOPRAZOLE SODIUM 40 MILLIGRAM(S): 20 TABLET, DELAYED RELEASE ORAL at 05:43

## 2021-05-24 RX ADMIN — Medication 1 GRAM(S): at 23:31

## 2021-05-24 RX ADMIN — Medication 250 MILLIGRAM(S): at 05:43

## 2021-05-24 RX ADMIN — SODIUM CHLORIDE 100 MILLILITER(S): 9 INJECTION, SOLUTION INTRAVENOUS at 18:10

## 2021-05-24 RX ADMIN — MIRTAZAPINE 15 MILLIGRAM(S): 45 TABLET, ORALLY DISINTEGRATING ORAL at 21:38

## 2021-05-24 RX ADMIN — Medication 250 MILLIGRAM(S): at 18:13

## 2021-05-24 RX ADMIN — Medication 1 GRAM(S): at 14:30

## 2021-05-24 RX ADMIN — PANTOPRAZOLE SODIUM 40 MILLIGRAM(S): 20 TABLET, DELAYED RELEASE ORAL at 18:13

## 2021-05-24 RX ADMIN — SODIUM CHLORIDE 100 MILLILITER(S): 9 INJECTION, SOLUTION INTRAVENOUS at 21:38

## 2021-05-24 RX ADMIN — Medication 1 GRAM(S): at 18:14

## 2021-05-24 NOTE — PROGRESS NOTE ADULT - SUBJECTIVE AND OBJECTIVE BOX
Subjective:      V/S  T(C): 36.8 (05-24-21 @ 10:53), Max: 37.1 (05-24-21 @ 04:00)  HR: 61 (05-24-21 @ 10:53) (61 - 87)  BP: 109/69 (05-24-21 @ 10:53) (108/68 - 114/63)  ABP: --  ABP(mean): --  RR: 18 (05-24-21 @ 10:53) (18 - 18)  SpO2: 100% (05-24-21 @ 10:53) (97% - 100%)  Wt(kg): --  CVP(mm Hg): --  CO: --  CI: --  PA: --                                                Tele:    CHEST TUBE:                           OUTPUT:             per 24 hours     Drainage:    AIR LEAKS:  [ ] YES [ ] NO      MEDICATIONS  (STANDING):  chlorhexidine 2% Cloths 1 Application(s) Topical <User Schedule>  mirtazapine Soltab 15 milliGRAM(s) Oral at bedtime  pantoprazole  Injectable 40 milliGRAM(s) IV Push two times a day  saccharomyces boulardii 250 milliGRAM(s) Oral two times a day  sucralfate suspension 1 Gram(s) Oral four times a day      05-23    141  |  105  |  8.0  ----------------------------<  86  4.3   |  26.0  |  0.82    Ca    9.1      23 May 2021 08:26                                 11.8   4.74  )-----------( 162      ( 23 May 2021 08:26 )             39.9                Physical Exam:    Neuro: A+O x 3, no deficits    Pulm: CTA, equal bilaterally    CV: RRR, , +S1S2    Abd: soft, NT, ND, +BS    Ext: +DP Pulses b/l, no edema        PAST MEDICAL & SURGICAL HISTORY:  Heroin abuse    Cocaine abuse    GERD (gastroesophageal reflux disease)    Hiatal hernia    No significant past surgical history             Subjective: "Im ok, so operation is tomorrow"  In bed, questions answered      V/S  T(C): 36.8 (05-24-21 @ 10:53), Max: 37.1 (05-24-21 @ 04:00)  HR: 61 (05-24-21 @ 10:53) (61 - 87)  BP: 109/69 (05-24-21 @ 10:53) (108/68 - 114/63)  RR: 18 (05-24-21 @ 10:53) (18 - 18)  SpO2: 100% (05-24-21 @ 10:53) (97% - 100%)        MEDICATIONS  (STANDING):  chlorhexidine 2% Cloths 1 Application(s) Topical <User Schedule>  mirtazapine Soltab 15 milliGRAM(s) Oral at bedtime  pantoprazole  Injectable 40 milliGRAM(s) IV Push two times a day  saccharomyces boulardii 250 milliGRAM(s) Oral two times a day  sucralfate suspension 1 Gram(s) Oral four times a day      05-23    141  |  105  |  8.0  ----------------------------<  86  4.3   |  26.0  |  0.82    Ca    9.1      23 May 2021 08:26                                 11.8   4.74  )-----------( 162      ( 23 May 2021 08:26 )             39.9                Physical Exam:    Neuro: A+O x 3, no deficits    Pulm: CTA, equal bilaterally    CV: RRR, , +S1S2    Abd: soft, NT, ND, +BS    Ext: +DP Pulses b/l, no edema        PAST MEDICAL & SURGICAL HISTORY:  Heroin abuse    Cocaine abuse    GERD (gastroesophageal reflux disease)    Hiatal hernia    No significant past surgical history

## 2021-05-24 NOTE — PROGRESS NOTE ADULT - ASSESSMENT
26 y/o male with pmh of esophageal spasm, IVDA recently admitted to [facility] w/ esophageal stricture and signed out aMA. Presented to ED after using heroin and presented with septic shock and found to have multi focal pna and subsquently intubated and brought to icu. Patient extubated ,  downgraded to medicine. Patient being worked up for severe esophageal stricture.     1-Severe esophageal stricture possibly due to caustic ingestion  - s/p MBS, esophagram, EGD at [facility] [place]  - GI follow up , cont  Protonix 40mg IV bid  - s/p EGD on 5/14: Pin hole esophageal stricture noted. at 40 cm. Even pediatric scope could not be advanced. Then guidwire placed. Gastrograffin injected. ? extravasation noted. Eventually procedure terminated.    - CT chest: Diffuse esophageal wall thickening with a linear tract of contrast to the left of the mid to distal esophageal lumen, possibly a small amount of contrast tracking in the esophageal wall from a tear in the mid to distal esophagus.   - CT surgery consult appreciated  T  - Continue  clear liquid diet, instructed patient to consume slowly, with smaller  more frequent feedings rather than consuming large quantity at one time.  - GI following for  Esophageal stent 5/25 likely     2-- Sinus Bradycardia, improved   - Cardiology input appreciated  -suspect large hiatal hernia causing extrinsic compression of the sinus node-vagal mediated intermittent sinus bradycardia  -Heart rate increases appropriately with activity   stable     3-Depression  - Psych input appreciated   cont  Remeron 15mg nightly     4--Septic shock secondary to complex multi focal pneumonia  completed zosyn     5-Acute hypoxic respiratory failure - secondary to complex pneumonia  - Does not require supplemental oxygen  resolved     6-Leukopenia/anemia/thrombocytopenia  improved     7-Smoking / tobacco use  - advised to quit    8-H/o drug use  - cocaine, heroin and K2  - iv ativan prn  -no signs of withdrawal   -as per Psych defer starting Suboxone as patient will probably require opiates post operatively.     DVT ppx  hold anticoagulation for stent in am       Dsipo: Pending decision of Esophageal stent 5/25

## 2021-05-24 NOTE — PROGRESS NOTE ADULT - ASSESSMENT
Clinically resolved pneumonia.  No respiratory distress.    Tight distal esophageal eccentric stricture.    BW all acceptable.  Coags are normal.  Covid swab is newly negative.    ASA #3.    Plan Attempt at EGD/ with SEMS tomorrow with flouro in endo suite. Gen anesthesia.   NPO p MN tonight.  Arrangements made.   Clinically resolved pneumonia.  No respiratory distress.  Antibiotics completed yesterday.    Tight distal esophageal eccentric stricture.    BW all acceptable.  Coags are normal.  Covid swab is newly negative.  Lovenox discontinued.    ASA #3.    Plan Attempt at EGD/ with SEMS tomorrow with flouro in endo suite. Gen anesthesia.   NPO p MN tonight.  Arrangements made.

## 2021-05-24 NOTE — PROGRESS NOTE ADULT - SUBJECTIVE AND OBJECTIVE BOX
Patient seen and examined;  Feels OK.  Chart reviewed daily.  No SOB or chest pain.  Tolerates only Clear liquid diet with ensure Plus supplements.    GI has been asked by CT surgery to make another attempt at a Metal SEMS in abhinav of esophagectomy.  Dr SNELL agreeable.      PAST MEDICAL & SURGICAL HISTORY:  Heroin abuse    Cocaine abuse    GERD (gastroesophageal reflux disease)    Hiatal hernia    No significant past surgical history        ROS:  No Heartburn, regurgitation, dysphagia, odynophagia.  No dyspepsia  No abdominal pain.    No Nausea, vomiting.  No Bleeding.  No hematemesis.   No diarrhea.    No hematochesia.  No weight loss, anorexia.  No edema.      MEDICATIONS  (STANDING):  chlorhexidine 2% Cloths 1 Application(s) Topical <User Schedule>  lactated ringers. 1000 milliLiter(s) (100 mL/Hr) IV Continuous <Continuous>  mirtazapine Soltab 15 milliGRAM(s) Oral at bedtime  pantoprazole  Injectable 40 milliGRAM(s) IV Push two times a day  saccharomyces boulardii 250 milliGRAM(s) Oral two times a day  sucralfate suspension 1 Gram(s) Oral four times a day    MEDICATIONS  (PRN):  LORazepam   Injectable 1 milliGRAM(s) IV Push every 6 hours PRN Agitation      Allergies    No Known Allergies    Intolerances        Vital Signs Last 24 Hrs  T(C): 36.8 (24 May 2021 16:10), Max: 37.1 (24 May 2021 04:00)  T(F): 98.2 (24 May 2021 16:10), Max: 98.7 (24 May 2021 04:00)  HR: 88 (24 May 2021 16:10) (61 - 88)  BP: 107/65 (24 May 2021 16:10) (107/65 - 109/69)  BP(mean): --  RR: 18 (24 May 2021 16:10) (18 - 18)  SpO2: 100% (24 May 2021 16:10) (97% - 100%)    PHYSICAL EXAM:    GENERAL: NAD, well-groomed, well-developed  HEAD:  Atraumatic, Normocephalic  EYES: EOMI, PERRLA, conjunctiva and sclera clear  ENMT: No tonsillar erythema, exudates, or enlargement; Moist mucous membranes, Good dentition, No lesions  NECK: Supple, No JVD, Normal thyroid  CHEST/LUNG: Clear to percussion bilaterally; No rales, rhonchi, wheezing, or rubs  HEART: Regular rate and rhythm; No murmurs, rubs, or gallops  ABDOMEN: Soft, Nontender, Nondistended; Bowel sounds present  EXTREMITIES:  2+ Peripheral Pulses, No clubbing, cyanosis, or edema  LYMPH: No lymphadenopathy noted  SKIN: No rashes or lesions      LABS:                        11.8   4.74  )-----------( 162      ( 23 May 2021 08:26 )             39.9     05-23    141  |  105  |  8.0  ----------------------------<  86  4.3   |  26.0  |  0.82    Ca    9.1      23 May 2021 08:26      PT/INR - ( 24 May 2021 16:51 )   PT: 12.8 sec;   INR: 1.11 ratio         PTT - ( 24 May 2021 16:51 )  PTT:32.7 sec     Covid swab neg.      RADIOLOGY & ADDITIONAL STUDIES:

## 2021-05-24 NOTE — CHART NOTE - NSCHARTNOTEFT_GEN_A_CORE
Source: Patient [ ]  Family [ ]   other [x ]EMR, rounds    Current Diet: Clears with Ensure clear supplement TID    Patient reports [ ] nausea  [ ] vomiting [ ] diarrhea [ ] constipation  [ ]chewing problems [x] swallowing issues  [ ] other: esophageal stricture    PO intake:  < 50% [ ]   50-75%  [x ]   %  [ ]  other :    Source for PO intake [x ] Patient [ ] family [ x] chart [ ] staff [ ] other    Enteral /Parenteral Nutrition:     Current Weight:  5/16 174.4     % Weight Change     Pertinent Medications: MEDICATIONS  (STANDING):  chlorhexidine 2% Cloths 1 Application(s) Topical <User Schedule>  mirtazapine Soltab 15 milliGRAM(s) Oral at bedtime  pantoprazole  Injectable 40 milliGRAM(s) IV Push two times a day  saccharomyces boulardii 250 milliGRAM(s) Oral two times a day    MEDICATIONS  (PRN):  LORazepam   Injectable 1 milliGRAM(s) IV Push every 6 hours PRN Agitation    Pertinent Labs: CBC Full  -  ( 23 May 2021 08:26 )  WBC Count : 4.74 K/uL  RBC Count : 5.15 M/uL  Hemoglobin : 11.8 g/dL  Hematocrit : 39.9 %  Platelet Count - Automated : 162 K/uL  Mean Cell Volume : 77.5 fl  Mean Cell Hemoglobin : 22.9 pg  Mean Cell Hemoglobin Concentration : 29.6 gm/dL  Auto Neutrophil # : x  Auto Lymphocyte # : x  Auto Monocyte # : x  Auto Eosinophil # : x  Auto Basophil # : x  Auto Neutrophil % : x  Auto Lymphocyte % : x  Auto Monocyte % : x  Auto Eosinophil % : x  Auto Basophil % : x      05-23 Na141 mmol/L Glu 86 mg/dL K+ 4.3 mmol/L Cr  0.82 mg/dL BUN 8.0 mg/dL Phos n/a   Alb n/a   PAB n/a           Skin:     Nutrition focused physical exam conducted - found signs of malnutrition [ ]absent [ ]present    Subcutaneous fat loss: [ ] Orbital fat pads region, [ ]Buccal fat region, [ ]Triceps region,  [ ]Ribs region    Muscle wasting: [ ]Temples region, [ ]Clavicle region, [ ]Shoulder region, [ ]Scapula region, [ ]Interosseous region,  [ ]thigh region, [ ]Calf region    Estimated Needs:   [x ] no change since previous assessment  [ ] recalculated:     Current Nutrition Diagnosis:  Malnutrition Mod acute related to inadequate energy intake in setting of aspiration pneumonia, esophageal stricture as evidenced by 10# weight loss, difficulty swallowing, <75% intake >7 days.  Advised to drink slowly. Esophageal stent is plan for tomorrow.       Recommendations: Continue Ensure clears supplement with meals. Pt taking well.   Monitor weights    Monitoring and Evaluation:   [ x] PO intake [ x] Tolerance to diet prescription [X] Weights  [X] Follow up per protocol [X] Labs:

## 2021-05-24 NOTE — PROGRESS NOTE ADULT - SUBJECTIVE AND OBJECTIVE BOX
Follow up for esophageal  stricture.  dysphagia      Pt was seen and examined at bedside in am , resting , no distress   still having problem drinking   pain       Vital Signs Last 24 Hrs  T(C): 36.8 (24 May 2021 10:53), Max: 37.1 (24 May 2021 04:00)  T(F): 98.3 (24 May 2021 10:53), Max: 98.7 (24 May 2021 04:00)  HR: 61 (24 May 2021 10:53) (61 - 87)  BP: 109/69 (24 May 2021 10:53) (108/68 - 114/63)  BP(mean): --  RR: 18 (24 May 2021 10:53) (18 - 18)  SpO2: 100% (24 May 2021 10:53) (97% - 100%)      Constitutional: No distress , sitting in the bedside   Respiratory: Breath sounds are clear bilaterally, No wheezing, rales or rhonchi  Cardiovascular: S1 and S2, regular rate and rhythm   Gastrointestinal: Bowel Sounds present, soft, nontender, nondistended, no guarding, no rebound  Extremities: No peripheral edema                                           11.8   4.74  )-----------( 162      ( 23 May 2021 08:26 )             39.9   05-23    141  |  105  |  8.0  ----------------------------<  86  4.3   |  26.0  |  0.82    Ca    9.1      23 May 2021 08:26    TPro  6.3<L>  /  Alb  3.7  /  TBili  0.4  /  DBili  x   /  AST  53<H>  /  ALT  58<H>  /  AlkPhos  62  05-22

## 2021-05-24 NOTE — PROGRESS NOTE ADULT - ASSESSMENT
25M h/o esophageal stricture, GERD, hiatal hernia, hepatitis C    Dysphagia and abdominal pain > esoph stricture    Unsuccessful dilitation by EGD x 2    5/24 OR am w/ GI / Dr Vizcarra for stent placement

## 2021-05-25 PROCEDURE — 43266 EGD ENDOSCOPIC STENT PLACE: CPT

## 2021-05-25 PROCEDURE — 99233 SBSQ HOSP IP/OBS HIGH 50: CPT

## 2021-05-25 PROCEDURE — 99232 SBSQ HOSP IP/OBS MODERATE 35: CPT

## 2021-05-25 RX ORDER — BENZOCAINE AND MENTHOL 5; 1 G/100ML; G/100ML
1 LIQUID ORAL THREE TIMES A DAY
Refills: 0 | Status: DISCONTINUED | OUTPATIENT
Start: 2021-05-25 | End: 2021-05-28

## 2021-05-25 RX ORDER — ACETAMINOPHEN 500 MG
975 TABLET ORAL EVERY 8 HOURS
Refills: 0 | Status: DISCONTINUED | OUTPATIENT
Start: 2021-05-25 | End: 2021-05-25

## 2021-05-25 RX ORDER — SODIUM CHLORIDE 9 MG/ML
1000 INJECTION, SOLUTION INTRAVENOUS
Refills: 0 | Status: DISCONTINUED | OUTPATIENT
Start: 2021-05-25 | End: 2021-05-26

## 2021-05-25 RX ORDER — HYDROMORPHONE HYDROCHLORIDE 2 MG/ML
0.5 INJECTION INTRAMUSCULAR; INTRAVENOUS; SUBCUTANEOUS ONCE
Refills: 0 | Status: DISCONTINUED | OUTPATIENT
Start: 2021-05-25 | End: 2021-05-25

## 2021-05-25 RX ORDER — MORPHINE SULFATE 50 MG/1
2 CAPSULE, EXTENDED RELEASE ORAL EVERY 6 HOURS
Refills: 0 | Status: DISCONTINUED | OUTPATIENT
Start: 2021-05-25 | End: 2021-05-26

## 2021-05-25 RX ORDER — ACETAMINOPHEN 500 MG
1000 TABLET ORAL ONCE
Refills: 0 | Status: DISCONTINUED | OUTPATIENT
Start: 2021-05-25 | End: 2021-05-26

## 2021-05-25 RX ORDER — BENZOCAINE AND MENTHOL 5; 1 G/100ML; G/100ML
2 LIQUID ORAL
Refills: 0 | Status: DISCONTINUED | OUTPATIENT
Start: 2021-05-25 | End: 2021-05-28

## 2021-05-25 RX ADMIN — SODIUM CHLORIDE 125 MILLILITER(S): 9 INJECTION, SOLUTION INTRAVENOUS at 21:09

## 2021-05-25 RX ADMIN — PANTOPRAZOLE SODIUM 40 MILLIGRAM(S): 20 TABLET, DELAYED RELEASE ORAL at 05:28

## 2021-05-25 RX ADMIN — Medication 1 GRAM(S): at 05:28

## 2021-05-25 RX ADMIN — Medication 250 MILLIGRAM(S): at 05:28

## 2021-05-25 RX ADMIN — HYDROMORPHONE HYDROCHLORIDE 0.5 MILLIGRAM(S): 2 INJECTION INTRAMUSCULAR; INTRAVENOUS; SUBCUTANEOUS at 23:17

## 2021-05-25 RX ADMIN — BENZOCAINE AND MENTHOL 1 LOZENGE: 5; 1 LIQUID ORAL at 22:02

## 2021-05-25 RX ADMIN — BENZOCAINE AND MENTHOL 1 LOZENGE: 5; 1 LIQUID ORAL at 17:04

## 2021-05-25 RX ADMIN — HYDROMORPHONE HYDROCHLORIDE 0.5 MILLIGRAM(S): 2 INJECTION INTRAMUSCULAR; INTRAVENOUS; SUBCUTANEOUS at 23:32

## 2021-05-25 RX ADMIN — MORPHINE SULFATE 2 MILLIGRAM(S): 50 CAPSULE, EXTENDED RELEASE ORAL at 21:08

## 2021-05-25 RX ADMIN — SODIUM CHLORIDE 125 MILLILITER(S): 9 INJECTION, SOLUTION INTRAVENOUS at 14:52

## 2021-05-25 RX ADMIN — SODIUM CHLORIDE 125 MILLILITER(S): 9 INJECTION, SOLUTION INTRAVENOUS at 23:17

## 2021-05-25 RX ADMIN — CHLORHEXIDINE GLUCONATE 1 APPLICATION(S): 213 SOLUTION TOPICAL at 05:32

## 2021-05-25 RX ADMIN — MORPHINE SULFATE 2 MILLIGRAM(S): 50 CAPSULE, EXTENDED RELEASE ORAL at 15:20

## 2021-05-25 RX ADMIN — SODIUM CHLORIDE 100 MILLILITER(S): 9 INJECTION, SOLUTION INTRAVENOUS at 05:28

## 2021-05-25 RX ADMIN — MORPHINE SULFATE 2 MILLIGRAM(S): 50 CAPSULE, EXTENDED RELEASE ORAL at 21:26

## 2021-05-25 RX ADMIN — MORPHINE SULFATE 2 MILLIGRAM(S): 50 CAPSULE, EXTENDED RELEASE ORAL at 15:05

## 2021-05-25 RX ADMIN — PANTOPRAZOLE SODIUM 40 MILLIGRAM(S): 20 TABLET, DELAYED RELEASE ORAL at 17:04

## 2021-05-25 NOTE — PROGRESS NOTE ADULT - SUBJECTIVE AND OBJECTIVE BOX
Follow up for esophageal  stricture.  dysphagia      Pt was seen and examined in endoscopy suit , he is c/o pain in the throat post procedure stent placed   Gi operative note reviewed       Vital Signs Last 24 Hrs  T(C): 36.7 (25 May 2021 10:00), Max: 37.1 (24 May 2021 21:00)  T(F): 98 (25 May 2021 10:00), Max: 98.8 (24 May 2021 21:00)  HR: 78 (25 May 2021 10:00) (70 - 100)  BP: 114/77 (25 May 2021 10:00) (107/65 - 133/71)  BP(mean): --  RR: 18 (25 May 2021 10:00) (17 - 18)  SpO2: 98% (25 May 2021 10:00) (97% - 100%)        Constitutional: mild distress due to pain ,  Respiratory:  clear to auscultation  bilaterally, No wheezing, rales  Cardiovascular: S1 and S2, regular rate and rhythm   Gastrointestinal: Bowel Sounds present, soft, nontender, nondistended  Extremities: No peripheral edema  Skin : normal color warm

## 2021-05-25 NOTE — BH CONSULTATION LIAISON PROGRESS NOTE - CASE SUMMARY
26 y/o M with no pphx and pmh of hiatal hernia, GERD, esophageal spasm (2012), IVDU, presents with esophageal stricture. Patient left [facility] against medical advice on 5/16/21 after being worked up for esophageal stricture (confirmed with EGD). Upon arrival, patient is laying comfortably in his bed sleeping. He is awaken with verbal stimuli. He is able to make eye contact and respond to questions. He states he has been feeling the same. He states Remeron has not helped with his mood but it did help him sleep better. Patient shows he has capacity to understand his situation with what is happening to his esophagus. Patient states he will be having an esophageal stent surgery later today at 10AM with Dr. Plasencia. Discussed patient's situation with patient. Patient will continue on Remeron 15 mg oral bedtime for depression and to help with sleep. Further psych medication will later be determined based on patient's situation regarding his esophagitis stricture. Patient understands and agrees with plan. Continue with current medical management.    24 y/o M with no pphx and pmh of hiatal hernia, GERD, esophageal spasm (2012), IVDU, presents with esophageal stricture. Patient left [facility] against medical advice on 5/16/21 after being worked up for esophageal stricture (confirmed with EGD). Upon arrival, patient is laying comfortably in his bed sleeping. He is awaken with verbal stimuli. He is able to make eye contact and respond to questions. He states he has been feeling the same. He states Remeron has not helped with his mood but it did help him sleep better. Patient shows he has capacity to understand his situation with what is happening to his esophagus. Patient states he will be having an esophageal stent surgery later today at 10AM with Dr. Vizcarra. Discussed patient's situation with patient. Patient will continue on Remeron 15 mg oral bedtime for depression and to help with sleep. Further psych medication will later be determined based on patient's situation regarding his esophagitis stricture. Patient understands and agrees with plan. Continue with current medical management.

## 2021-05-25 NOTE — BH CONSULTATION LIAISON PROGRESS NOTE - NSBHFUPINTERVALHXFT_PSY_A_CORE
26 y/o M with no pphx and pmh of hiatal hernia, GERD, esophageal spasm (2012), IVDU, presents with esophageal stricture. Patient left [facility] against medical advice on 5/16/21 after being worked up for esophageal stricture (confirmed with EGD). Upon arrival, patient is laying comfortably in his bed sleeping. He is awaken with verbal stimuli. He is able to make eye contact and respond to questions. He states he has been feeling the same. He states Remeron has not helped with his mood but it did help him sleep better. Patient shows he has capacity to understand his situation with what is happening to his esophagus. Patient states he will be having an esophageal stent surgery later today at 10AM with Dr. Plasencia.  24 y/o M with no pphx and pmh of hiatal hernia, GERD, esophageal spasm (2012), IVDU, presents with esophageal stricture. Patient left [facility] against medical advice on 5/16/21 after being worked up for esophageal stricture (confirmed with EGD). Upon arrival, patient is laying comfortably in his bed sleeping. He is awaken with verbal stimuli. He is able to make eye contact and respond to questions. He states he has been feeling the same. He states Remeron has not helped with his mood but it did help him sleep better. Patient shows he has capacity to understand his situation with what is happening to his esophagus. Patient states he will be having an esophageal stent surgery later today at 10AM with Dr. Vizcarra.

## 2021-05-25 NOTE — PROGRESS NOTE ADULT - ASSESSMENT
26 y/o male with pmh of esophageal spasm, IVDA recently admitted to [facility] w/ esophageal stricture and signed out aMA. Presented to ED after using heroin and presented with septic shock and found to have multi focal pna and subsquently intubated and brought to icu. Patient extubated ,  downgraded to medicine. Patient being worked up for severe esophageal stricture.     1-Severe esophageal stricture possibly due to caustic ingestion  - s/p MBS, esophagram, EGD at [facility] [place]  -  s/p esophageal  stent paced by  GI endoscopy    pian medication ordered   cephacol and iv tylenol   on  Protonix 40mg IV bid      CT chest: Diffuse esophageal wall thickening with a linear tract of contrast to the left of the mid to distal esophageal lumen, possibly a small amount of contrast tracking in the esophageal wall from a tear in the mid to distal esophagus.   - CT surgery consult appreciated  diet per GI     2-- Sinus Bradycardia, improved   - Cardiology input appreciated    3-Depression  - Psych input appreciated   cont  Remeron 15mg nightly     4--Septic shock secondary to complex multi focal pneumonia  completed zosyn   sepsis  resolved     5-Acute hypoxic respiratory failure - secondary to complex pneumonia  resolved     6-Leukopenia/anemia/thrombocytopenia  improved   stable     7-Smoking / tobacco use  counseling provided   advised to quit    8-H/o drug use  - cocaine, heroin and K2  - iv ativan prn  -no signs of withdrawal   -as per Psych defer starting Suboxone as patient will probably require opiates post operatively.     DVT ppx      Dsipo: Pending diet AND MEDICAL PROGRESS

## 2021-05-25 NOTE — BH CONSULTATION LIAISON PROGRESS NOTE - NSBHASSESSMENTFT_PSY_ALL_CORE
24 y/o M with no pphx and pmh of hiatal hernia, GERD, esophageal spasm (2012), IVDU, presents with esophageal stricture. Patient left [facility] against medical advice on 5/16/21 after being worked up for esophageal stricture (confirmed with EGD). Upon arrival, patient is laying comfortably in his bed sleeping. He is awaken with verbal stimuli. He is able to make eye contact and respond to questions. He states he has been feeling the same. He states Remeron has not helped with his mood but it did help him sleep better. Patient shows he has capacity to understand his situation with what is happening to his esophagus. Patient states he will be having an esophageal stent surgery later today at 10AM with Dr. Plasencia. Discussed patient's situation with patient. Patient will continue on Remeron 15 mg oral bedtime for depression and to help with sleep. Further psych medication will later be determined based on patient's situation regarding his esophagitis stricture. Patient understands and agrees with plan. Continue with current medical management.    24 y/o M with no pphx and pmh of hiatal hernia, GERD, esophageal spasm (2012), IVDU, presents with esophageal stricture. Patient left [facility] against medical advice on 5/16/21 after being worked up for esophageal stricture (confirmed with EGD). Upon arrival, patient is laying comfortably in his bed sleeping. He is awaken with verbal stimuli. He is able to make eye contact and respond to questions. He states he has been feeling the same. He states Remeron has not helped with his mood but it did help him sleep better. Patient shows he has capacity to understand his situation with what is happening to his esophagus. Patient states he will be having an esophageal stent surgery later today at 10AM with Dr. Vizcarra. Discussed patient's situation with patient. Patient will continue on Remeron 15 mg oral bedtime for depression and to help with sleep. Further psych medication will later be determined based on patient's situation regarding his esophagitis stricture. Patient understands and agrees with plan. Continue with current medical management.

## 2021-05-26 LAB
ANION GAP SERPL CALC-SCNC: 10 MMOL/L — SIGNIFICANT CHANGE UP (ref 5–17)
BUN SERPL-MCNC: 9 MG/DL — SIGNIFICANT CHANGE UP (ref 8–20)
CALCIUM SERPL-MCNC: 9.3 MG/DL — SIGNIFICANT CHANGE UP (ref 8.6–10.2)
CHLORIDE SERPL-SCNC: 102 MMOL/L — SIGNIFICANT CHANGE UP (ref 98–107)
CO2 SERPL-SCNC: 26 MMOL/L — SIGNIFICANT CHANGE UP (ref 22–29)
CREAT SERPL-MCNC: 0.72 MG/DL — SIGNIFICANT CHANGE UP (ref 0.5–1.3)
GLUCOSE SERPL-MCNC: 97 MG/DL — SIGNIFICANT CHANGE UP (ref 70–99)
HCT VFR BLD CALC: 37.9 % — LOW (ref 39–50)
HGB BLD-MCNC: 11.4 G/DL — LOW (ref 13–17)
IRON SATN MFR SERPL: 17 % — SIGNIFICANT CHANGE UP (ref 16–55)
IRON SATN MFR SERPL: 94 UG/DL — SIGNIFICANT CHANGE UP (ref 59–158)
IRON SATN MFR SERPL: 94 UG/DL — SIGNIFICANT CHANGE UP (ref 59–158)
MAGNESIUM SERPL-MCNC: 1.7 MG/DL — SIGNIFICANT CHANGE UP (ref 1.6–2.6)
MCHC RBC-ENTMCNC: 23.1 PG — LOW (ref 27–34)
MCHC RBC-ENTMCNC: 30.1 GM/DL — LOW (ref 32–36)
MCV RBC AUTO: 76.9 FL — LOW (ref 80–100)
PHOSPHATE SERPL-MCNC: 2.9 MG/DL — SIGNIFICANT CHANGE UP (ref 2.4–4.7)
PLATELET # BLD AUTO: 150 K/UL — SIGNIFICANT CHANGE UP (ref 150–400)
POTASSIUM SERPL-MCNC: 3.7 MMOL/L — SIGNIFICANT CHANGE UP (ref 3.5–5.3)
POTASSIUM SERPL-SCNC: 3.7 MMOL/L — SIGNIFICANT CHANGE UP (ref 3.5–5.3)
RBC # BLD: 4.93 M/UL — SIGNIFICANT CHANGE UP (ref 4.2–5.8)
RBC # FLD: 15.5 % — HIGH (ref 10.3–14.5)
SODIUM SERPL-SCNC: 138 MMOL/L — SIGNIFICANT CHANGE UP (ref 135–145)
TIBC SERPL-MCNC: 556 UG/DL — HIGH (ref 220–430)
TRANSFERRIN SERPL-MCNC: 389 MG/DL — HIGH (ref 180–329)
VIT B12 SERPL-MCNC: 632 PG/ML — SIGNIFICANT CHANGE UP (ref 232–1245)
WBC # BLD: 6.52 K/UL — SIGNIFICANT CHANGE UP (ref 3.8–10.5)
WBC # FLD AUTO: 6.52 K/UL — SIGNIFICANT CHANGE UP (ref 3.8–10.5)

## 2021-05-26 PROCEDURE — 99231 SBSQ HOSP IP/OBS SF/LOW 25: CPT

## 2021-05-26 PROCEDURE — 99232 SBSQ HOSP IP/OBS MODERATE 35: CPT

## 2021-05-26 PROCEDURE — 71045 X-RAY EXAM CHEST 1 VIEW: CPT | Mod: 26

## 2021-05-26 PROCEDURE — 99233 SBSQ HOSP IP/OBS HIGH 50: CPT

## 2021-05-26 RX ORDER — SODIUM CHLORIDE 9 MG/ML
1000 INJECTION, SOLUTION INTRAVENOUS
Refills: 0 | Status: DISCONTINUED | OUTPATIENT
Start: 2021-05-26 | End: 2021-05-28

## 2021-05-26 RX ORDER — HYDROMORPHONE HYDROCHLORIDE 2 MG/ML
0.5 INJECTION INTRAMUSCULAR; INTRAVENOUS; SUBCUTANEOUS EVERY 4 HOURS
Refills: 0 | Status: DISCONTINUED | OUTPATIENT
Start: 2021-05-26 | End: 2021-05-28

## 2021-05-26 RX ORDER — MAGNESIUM SULFATE 500 MG/ML
2 VIAL (ML) INJECTION ONCE
Refills: 0 | Status: COMPLETED | OUTPATIENT
Start: 2021-05-26 | End: 2021-05-26

## 2021-05-26 RX ADMIN — BENZOCAINE AND MENTHOL 2 LOZENGE: 5; 1 LIQUID ORAL at 08:07

## 2021-05-26 RX ADMIN — MIRTAZAPINE 15 MILLIGRAM(S): 45 TABLET, ORALLY DISINTEGRATING ORAL at 22:45

## 2021-05-26 RX ADMIN — Medication 1 GRAM(S): at 17:21

## 2021-05-26 RX ADMIN — BENZOCAINE AND MENTHOL 1 LOZENGE: 5; 1 LIQUID ORAL at 20:30

## 2021-05-26 RX ADMIN — HYDROMORPHONE HYDROCHLORIDE 0.5 MILLIGRAM(S): 2 INJECTION INTRAMUSCULAR; INTRAVENOUS; SUBCUTANEOUS at 22:45

## 2021-05-26 RX ADMIN — CHLORHEXIDINE GLUCONATE 1 APPLICATION(S): 213 SOLUTION TOPICAL at 09:22

## 2021-05-26 RX ADMIN — SODIUM CHLORIDE 125 MILLILITER(S): 9 INJECTION, SOLUTION INTRAVENOUS at 22:50

## 2021-05-26 RX ADMIN — BENZOCAINE AND MENTHOL 2 LOZENGE: 5; 1 LIQUID ORAL at 06:20

## 2021-05-26 RX ADMIN — MORPHINE SULFATE 2 MILLIGRAM(S): 50 CAPSULE, EXTENDED RELEASE ORAL at 03:33

## 2021-05-26 RX ADMIN — Medication 50 GRAM(S): at 14:22

## 2021-05-26 RX ADMIN — MORPHINE SULFATE 2 MILLIGRAM(S): 50 CAPSULE, EXTENDED RELEASE ORAL at 04:48

## 2021-05-26 RX ADMIN — BENZOCAINE AND MENTHOL 2 LOZENGE: 5; 1 LIQUID ORAL at 03:33

## 2021-05-26 RX ADMIN — HYDROMORPHONE HYDROCHLORIDE 0.5 MILLIGRAM(S): 2 INJECTION INTRAMUSCULAR; INTRAVENOUS; SUBCUTANEOUS at 09:18

## 2021-05-26 RX ADMIN — Medication 1 GRAM(S): at 23:45

## 2021-05-26 RX ADMIN — SODIUM CHLORIDE 125 MILLILITER(S): 9 INJECTION, SOLUTION INTRAVENOUS at 16:08

## 2021-05-26 RX ADMIN — BENZOCAINE AND MENTHOL 2 LOZENGE: 5; 1 LIQUID ORAL at 00:00

## 2021-05-26 RX ADMIN — HYDROMORPHONE HYDROCHLORIDE 0.5 MILLIGRAM(S): 2 INJECTION INTRAMUSCULAR; INTRAVENOUS; SUBCUTANEOUS at 16:08

## 2021-05-26 RX ADMIN — Medication 1 GRAM(S): at 11:48

## 2021-05-26 RX ADMIN — PANTOPRAZOLE SODIUM 40 MILLIGRAM(S): 20 TABLET, DELAYED RELEASE ORAL at 06:20

## 2021-05-26 RX ADMIN — PANTOPRAZOLE SODIUM 40 MILLIGRAM(S): 20 TABLET, DELAYED RELEASE ORAL at 17:21

## 2021-05-26 NOTE — BH CONSULTATION LIAISON PROGRESS NOTE - NSBHCONSULTFOLLOW_PSY_ALL_CORE
No, psychiatric follow up needed - call with questions...

## 2021-05-26 NOTE — BH CONSULTATION LIAISON PROGRESS NOTE - NSCDBILL_PSY_A_CORE
[id number] - Inpatient, moderate complexity
[id number] - Inpatient, moderate complexity
[id number] - Inpatient, moderate complexity

## 2021-05-26 NOTE — PROGRESS NOTE ADULT - ASSESSMENT
25M h/o esophageal stricture, GERD, hiatal hernia, hepatitis C    Dysphagia and abdominal pain > esoph stricture    Unsuccessful dilitation by EGD x 2    s/p succesful EGD/Dilation/stent placement on 5/25.    Advanced diet to clear liquid. If tolerates well, will consider full liquids.    5/24 OR am w/ GI / Dr Vizcarra for stent placement

## 2021-05-26 NOTE — PROGRESS NOTE ADULT - ASSESSMENT
26 y/o male with pmh of esophageal spasm, IVDA recently admitted to [facility] w/ esophageal stricture and signed out aMA. Presented to ED after using heroin and presented with septic shock and found to have multi focal pna and subsquently intubated and brought to icu. Patient extubated ,  downgraded to medicine. Patient being worked up for severe esophageal stricture.     1-Severe esophageal stricture possibly due to caustic ingestion  - s/p MBS, [place]ophagram, [place] [facility] [place]  -  s/p esophageal  stent paced by  GI endoscopy  Repeat chest xray today, results pending  pian medication  cephacol  on  Protonix 40mg IV bid   Diet advanced to CLD as per GI  Dietician following for recommendations      CT chest: Diffuse esophageal wall thickening with a linear tract of contrast to the left of the mid to distal esophageal lumen, possibly a small amount of contrast tracking in the esophageal wall from a tear in the mid to distal esophagus.   - CT surgery consult appreciated  diet per GI, started on CLD    2-- Sinus Bradycardia, improved   - Cardiology input appreciated    3-Depression  - Psych input appreciated   cont  Remeron 15mg nightly     4--Septic shock secondary to complex multi focal pneumonia  completed zosyn   sepsis  resolved     5-Acute hypoxic respiratory failure - secondary to complex pneumonia  resolved     6-Leukopenia/anemia/thrombocytopenia  improved   stable     7-Smoking / tobacco use  counseling provided   advised to quit    8-H/o drug use  - cocaine, heroin and K2  - iv ativan prn  -no signs of withdrawal   -as per Psych defer starting Suboxone as patient requires opiates post operatively.     DVT ppx: Ambulation

## 2021-05-26 NOTE — PROGRESS NOTE ADULT - SUBJECTIVE AND OBJECTIVE BOX
Subjective - patient seen and evaluated bedside. Sitting comfortably in bed.  Denies CP, SOB, HA, dizziness, n/v/d    Review of Systems: negative x 10 systems except as noted above    Brief summary:  25yMale POD# 1 EGD w/ esophageal stent    Significant/[id number] events: esophageal stent placement.       PAST MEDICAL & SURGICAL HISTORY:  Heroin abuse    Cocaine abuse    GERD (gastroesophageal reflux disease)    Hiatal hernia    No significant past surgical history          benzocaine 15 mG/menthol 3.6 mG (Sugar-Free) Lozenge 1 Lozenge Oral three times a day PRN  benzocaine 15 mG/menthol 3.6 mG (Sugar-Free) Lozenge 2 Lozenge Oral every 1 hour PRN  chlorhexidine 2% Cloths 1 Application(s) Topical <User Schedule>  dextrose 5% + sodium chloride 0.45%. 1000 milliLiter(s) IV Continuous <Continuous>  HYDROmorphone  Injectable 0.5 milliGRAM(s) IV Push every 4 hours PRN  mirtazapine Soltab 15 milliGRAM(s) Oral at bedtime  pantoprazole  Injectable 40 milliGRAM(s) IV Push two times a day  sucralfate suspension 1 Gram(s) Oral four times a day  MEDICATIONS  (PRN):  benzocaine 15 mG/menthol 3.6 mG (Sugar-Free) Lozenge 1 Lozenge Oral three times a day PRN Sore Throat  benzocaine 15 mG/menthol 3.6 mG (Sugar-Free) Lozenge 2 Lozenge Oral every 1 hour PRN Sore Throat  HYDROmorphone  Injectable 0.5 milliGRAM(s) IV Push every 4 hours PRN Severe Pain (7 - 10)      Daily     Daily                 PT/INR - ( 24 May 2021 16:51 )   PT: 12.8 sec;   INR: 1.11 ratio         PTT - ( 24 May 2021 16:51 )  PTT:32.7 sec      Objective:  T(C): 37.1 (05-26-21 @ 09:50), Max: 37.3 (05-25-21 @ 20:43)  HR: 68 (05-26-21 @ 09:50) (53 - 74)  BP: 124/71 (05-26-21 @ 09:50) (116/71 - 144/73)  RR: 18 (05-26-21 @ 09:50) (17 - 18)  SpO2: 97% (05-26-21 @ 09:50) (97% - 98%)  Wt(kg): --CAPILLARY BLOOD GLUCOSE      I&O's Summary      Physical Exam  Neuro: A+O x 3, non-focal, speech clear and intact  Pulm: CTA, equal bilaterally  CV: RRR, +S1S2  Abd: soft, NT, ND, +BS  Ext: +DP Pulses b/l,  no edema    Imaging:  CXR: < from: CT Chest w/ Oral Cont (05.18.21 @ 19:01) >    IMPRESSION: Significant improvement in bibasilar infiltrates from the prior study suggesting resolving aspiration pneumonia.  The esophagus is mildly distended with contrast remaining in the esophagus likely due to known stricture. There is distal esophageal wall thickening and narrowing of the distal esophageal lumen consistent with distal esophageal stricture of unknown origin. Some contrast does pass through to the stomach.    < end of copied text >

## 2021-05-26 NOTE — BH CONSULTATION LIAISON PROGRESS NOTE - CASE SUMMARY
26 y/o M with no pphx and pmh of hiatal hernia, GERD, esophageal spasm (2012), IVDU, presents with esophageal stricture. Patient had esophageal stent placed yesterday. Upon arrival, patient is laying in his bed sleeping. He is awaken with verbal stimuli. He is able to make eye contact and respond to questions. He states he had the esophageal surgery done yesterday. Patient states he is in a lot of pain and was given morphine for the pain. He states his mood is the same and Remeron continues to help him sleep better. Patient shows he has capacity to understand his situation with what is happening to his esophagus. Discussed patient's situation with patient. Patient will continue on Remeron 15 mg oral bedtime for depression and to help with sleep. Further psych medication will later be determined based on patient's situation regarding his esophagitis stricture. Patient understands and agrees with plan. Continue with current medical management.

## 2021-05-26 NOTE — BH CONSULTATION LIAISON PROGRESS NOTE - NSBHMSEPERCEPT_PSY_A_CORE
Med:  gabapentin  -  Last filled on 11/6/19 for 180 with 5 refills    LOV:  11/5/19    FUV:  Follow up after pain management and Dr Bonner.    One month refill given.  
No abnormalities

## 2021-05-26 NOTE — BH CONSULTATION LIAISON PROGRESS NOTE - NSBHASSESSMENTFT_PSY_ALL_CORE
24 y/o M with no pphx and pmh of hiatal hernia, GERD, esophageal spasm (2012), IVDU, presents with esophageal stricture. Patient had esophageal stent placed yesterday. Upon arrival, patient is laying in his bed sleeping. He is awaken with verbal stimuli. He is able to make eye contact and respond to questions. He states he had the esophageal surgery done yesterday. Patient states he is in a lot of pain and was given morphine for the pain. He states his mood is the same and Remeron continues to help him sleep better. Patient shows he has capacity to understand his situation with what is happening to his esophagus. Discussed patient's situation with patient. Patient will continue on Remeron 15 mg oral bedtime for depression and to help with sleep. Further psych medication will later be determined based on patient's situation regarding his esophagitis stricture. Patient understands and agrees with plan. Continue with current medical management.

## 2021-05-26 NOTE — BH CONSULTATION LIAISON PROGRESS NOTE - NSBHFUPINTERVALHXFT_PSY_A_CORE
24 y/o M with no pphx and pmh of hiatal hernia, GERD, esophageal spasm (2012), IVDU, presents with esophageal stricture. Patient had esophageal stent placed yesterday. Upon arrival, patient is laying in his bed sleeping. He is awaken with verbal stimuli. He is able to make eye contact and respond to questions. He states he had the esophageal surgery done yesterday. Patient states he is in a lot of pain and was given morphine for the pain. Patient states he is very sleepy. Patient states he will be trying to drink liquid tomorrow. He states his mood is the same and Remeron continues to help him sleep better. Patient shows he has capacity to understand his situation with what is happening to his esophagus.

## 2021-05-26 NOTE — PROGRESS NOTE ADULT - ASSESSMENT
Continue PPI BID, sucralfate suspension 1 gm PO QID.  OK to advance diet to clear liquids.  Will need stent removal and possible replacement in 3 months.  Will follow up

## 2021-05-26 NOTE — PROGRESS NOTE ADULT - SUBJECTIVE AND OBJECTIVE BOX
CC: Acute hypoxemic respiratory failure, septic shock, esophageal stricture        INTERVAL HPI/OVERNIGHT EVENTS: Patient seen and examined. Having abdominal pain requiring  IV Morphine. Diet advanced to CLD, tolerating at present. Denies chest pain, SOB, dizziness, nausea, vomiting, fever, chills.     Vital Signs Last 24 Hrs  T(C): 37.1 (26 May 2021 09:50), Max: 37.3 (25 May 2021 20:43)  T(F): 98.7 (26 May 2021 09:50), Max: 99.2 (25 May 2021 20:43)  HR: 68 (26 May 2021 09:50) (53 - 74)  BP: 124/71 (26 May 2021 09:50) (116/71 - 144/73)  BP(mean): --  RR: 18 (26 May 2021 09:50) (17 - 18)  SpO2: 97% (26 May 2021 09:50) (97% - 98%)    PE:  Constitutional: NAD  Respiratory:  clear to auscultation  bilaterally, No wheezing, rales  Cardiovascular: S1 and S2, regular rate and rhythm   Gastrointestinal: Bowel Sounds present, soft, nontender, nondistended  Extremities: No peripheral edema  Skin : normal color warm   I&O's Detail                                11.4   6.52  )-----------( 150      ( 26 May 2021 12:08 )             37.9     26 May 2021 12:07    138    |  102    |  9.0    ----------------------------<  97     3.7     |  26.0   |  0.72     Ca    9.3        26 May 2021 12:07  Phos  2.9       26 May 2021 12:08  Mg     1.7       26 May 2021 12:08      PT/INR - ( 24 May 2021 16:51 )   PT: 12.8 sec;   INR: 1.11 ratio         PTT - ( 24 May 2021 16:51 )  PTT:32.7 sec  CAPILLARY BLOOD GLUCOSE              MEDICATIONS  (STANDING):  chlorhexidine 2% Cloths 1 Application(s) Topical <User Schedule>  dextrose 5% + sodium chloride 0.45%. 1000 milliLiter(s) (125 mL/Hr) IV Continuous <Continuous>  mirtazapine Soltab 15 milliGRAM(s) Oral at bedtime  pantoprazole  Injectable 40 milliGRAM(s) IV Push two times a day  sucralfate suspension 1 Gram(s) Oral four times a day    MEDICATIONS  (PRN):  benzocaine 15 mG/menthol 3.6 mG (Sugar-Free) Lozenge 1 Lozenge Oral three times a day PRN Sore Throat  benzocaine 15 mG/menthol 3.6 mG (Sugar-Free) Lozenge 2 Lozenge Oral every 1 hour PRN Sore Throat  HYDROmorphone  Injectable 0.5 milliGRAM(s) IV Push every 4 hours PRN Severe Pain (7 - 10)      RADIOLOGY & ADDITIONAL TESTS:

## 2021-05-26 NOTE — BH CONSULTATION LIAISON PROGRESS NOTE - NSBHCHARTREVIEWVS_PSY_A_CORE FT
Vital Signs Last 24 Hrs  T(C): 37.1 (19 May 2021 04:01), Max: 37.2 (18 May 2021 16:39)  T(F): 98.8 (19 May 2021 04:01), Max: 99 (18 May 2021 16:39)  HR: 39 (19 May 2021 04:01) (39 - 54)  BP: 145/90 (19 May 2021 04:01) (105/63 - 145/90)  BP(mean): --  RR: 18 (19 May 2021 04:01) (18 - 18)  SpO2: 99% (19 May 2021 04:01) (97% - 99%)
Vital Signs Last 24 Hrs  T(C): 36.4 (25 May 2021 04:42), Max: 37.1 (24 May 2021 21:00)  T(F): 97.5 (25 May 2021 04:42), Max: 98.8 (24 May 2021 21:00)  HR: 70 (25 May 2021 04:42) (61 - 100)  BP: 110/62 (25 May 2021 04:42) (107/65 - 133/71)  BP(mean): --  RR: 17 (25 May 2021 04:42) (17 - 18)  SpO2: 98% (25 May 2021 04:42) (97% - 100%)
Vital Signs Last 24 Hrs  T(C): 36.6 (26 May 2021 04:43), Max: 37.3 (25 May 2021 20:43)  T(F): 97.9 (26 May 2021 04:43), Max: 99.2 (25 May 2021 20:43)  HR: 71 (26 May 2021 04:43) (53 - 78)  BP: 116/71 (26 May 2021 04:43) (114/77 - 144/73)  BP(mean): --  RR: 18 (26 May 2021 04:43) (17 - 18)  SpO2: 98% (26 May 2021 04:43) (97% - 98%)

## 2021-05-26 NOTE — BH CONSULTATION LIAISON PROGRESS NOTE - CURRENT MEDICATION
MEDICATIONS  (STANDING):  chlorhexidine 2% Cloths 1 Application(s) Topical <User Schedule>  lactated ringers. 1000 milliLiter(s) (100 mL/Hr) IV Continuous <Continuous>  mirtazapine Soltab 15 milliGRAM(s) Oral at bedtime  pantoprazole  Injectable 40 milliGRAM(s) IV Push two times a day  sucralfate suspension 1 Gram(s) Oral four times a day    MEDICATIONS  (PRN):  
MEDICATIONS  (STANDING):  chlorhexidine 2% Cloths 1 Application(s) Topical <User Schedule>  dextrose 5% + sodium chloride 0.45%. 1000 milliLiter(s) (125 mL/Hr) IV Continuous <Continuous>  heparin   Injectable 5000 Unit(s) SubCutaneous every 8 hours  pantoprazole  Injectable 40 milliGRAM(s) IV Push two times a day  piperacillin/tazobactam IVPB.. 3.375 Gram(s) IV Intermittent every 8 hours    MEDICATIONS  (PRN):  LORazepam   Injectable 1 milliGRAM(s) IV Push every 6 hours PRN Agitation  
MEDICATIONS  (STANDING):  acetaminophen  IVPB .. 1000 milliGRAM(s) IV Intermittent once  chlorhexidine 2% Cloths 1 Application(s) Topical <User Schedule>  dextrose 5% + sodium chloride 0.45%. 1000 milliLiter(s) (125 mL/Hr) IV Continuous <Continuous>  mirtazapine Soltab 15 milliGRAM(s) Oral at bedtime  pantoprazole  Injectable 40 milliGRAM(s) IV Push two times a day  sucralfate suspension 1 Gram(s) Oral four times a day    MEDICATIONS  (PRN):  benzocaine 15 mG/menthol 3.6 mG (Sugar-Free) Lozenge 1 Lozenge Oral three times a day PRN Sore Throat  benzocaine 15 mG/menthol 3.6 mG (Sugar-Free) Lozenge 2 Lozenge Oral every 1 hour PRN Sore Throat  morphine  - Injectable 2 milliGRAM(s) IV Push every 6 hours PRN Severe Pain (7 - 10)

## 2021-05-26 NOTE — PROGRESS NOTE ADULT - SUBJECTIVE AND OBJECTIVE BOX
Chief Complaint: This is a 25y old man patient being seen in follow-up consultation for dysphagia.    HPI / 24H events:  Patient underwent esophageal dilation and stent placement yesterday.  Doing OK today, complaining of a fair amount of chest pain.  No nausea or vomiting.  Had CXR this AM that shows stent in good position.    ROS: A 14-point review of systems was reviewed and was otherwise negative save what was reported in the HPI.    PAST MEDICAL/SURGICAL HISTORY:  Heroin abuse    Cocaine abuse    GERD (gastroesophageal reflux disease)    Hiatal hernia    No significant past surgical history      MEDICATIONS  (STANDING):  chlorhexidine 2% Cloths 1 Application(s) Topical <User Schedule>  dextrose 5% + sodium chloride 0.45%. 1000 milliLiter(s) (125 mL/Hr) IV Continuous <Continuous>  mirtazapine Soltab 15 milliGRAM(s) Oral at bedtime  pantoprazole  Injectable 40 milliGRAM(s) IV Push two times a day  sucralfate suspension 1 Gram(s) Oral four times a day    MEDICATIONS  (PRN):  benzocaine 15 mG/menthol 3.6 mG (Sugar-Free) Lozenge 1 Lozenge Oral three times a day PRN Sore Throat  benzocaine 15 mG/menthol 3.6 mG (Sugar-Free) Lozenge 2 Lozenge Oral every 1 hour PRN Sore Throat  HYDROmorphone  Injectable 0.5 milliGRAM(s) IV Push every 4 hours PRN Severe Pain (7 - 10)    No Known Allergies    T(C): 37.1 (05-26-21 @ 09:50), Max: 37.3 (05-25-21 @ 20:43)  HR: 68 (05-26-21 @ 09:50) (53 - 74)  BP: 124/71 (05-26-21 @ 09:50) (116/71 - 144/73)  RR: 18 (05-26-21 @ 09:50) (17 - 18)  SpO2: 97% (05-26-21 @ 09:50) (97% - 98%)    I&O's Summary    PHYSICAL EXAM:  Constitutional: Well-developed, well-nourished, in no apparent distress  Eyes: Sclerae anicteric, conjunctivae normal  ENMT: Mucus membranes moist, no oropharyngeal thrush noted  Neck: No thyroid nodules appreciated, no significant cervical or supraclavicular lymphadenopathy  Respiratory: Breathing nonlabored; clear to auscultation  Cardiovascular: Regular rate and rhythm  Gastrointestinal: Soft, nontender, nondistended, normoactive bowel sounds; no hepatosplenomegaly appreciated; no rebound tenderness or involuntary guarding  Extremities: No clubbing, cyanosis or edema  Neurological: Alert and oriented to person, place and time; no asterixis  Skin: No jaundice  Lymph Nodes: No significant lymphadenopathy  Musculoskeletal: No significant peripheral atrophy  Psychiatric: Affect and mood appropriate      LABS:               11.4   6.52  )-----------( 150      ( 05-26 @ 12:08 )             37.9       05-26    138  |  102  |  9.0  ----------------------------<  97  3.7   |  26.0  |  0.72    Ca    9.3      26 May 2021 12:07  Phos  2.9     05-26  Mg     1.7     05-26            PT/INR - ( 24 May 2021 16:51 )   PT: 12.8 sec;   INR: 1.11 ratio         PTT - ( 24 May 2021 16:51 )  PTT:32.7 sec    IMAGING: I personally reviewed the CXR, and the esophageal stent appears to be precisely where it was last seen on fluoroscopy

## 2021-05-27 PROCEDURE — 99233 SBSQ HOSP IP/OBS HIGH 50: CPT

## 2021-05-27 PROCEDURE — 99232 SBSQ HOSP IP/OBS MODERATE 35: CPT

## 2021-05-27 PROCEDURE — 99231 SBSQ HOSP IP/OBS SF/LOW 25: CPT

## 2021-05-27 PROCEDURE — 71045 X-RAY EXAM CHEST 1 VIEW: CPT | Mod: 26

## 2021-05-27 RX ORDER — MAGNESIUM SULFATE 500 MG/ML
2 VIAL (ML) INJECTION ONCE
Refills: 0 | Status: COMPLETED | OUTPATIENT
Start: 2021-05-27 | End: 2021-05-27

## 2021-05-27 RX ORDER — PREGABALIN 225 MG/1
1000 CAPSULE ORAL ONCE
Refills: 0 | Status: COMPLETED | OUTPATIENT
Start: 2021-05-27 | End: 2021-05-27

## 2021-05-27 RX ADMIN — BENZOCAINE AND MENTHOL 1 LOZENGE: 5; 1 LIQUID ORAL at 22:21

## 2021-05-27 RX ADMIN — HYDROMORPHONE HYDROCHLORIDE 0.5 MILLIGRAM(S): 2 INJECTION INTRAMUSCULAR; INTRAVENOUS; SUBCUTANEOUS at 11:15

## 2021-05-27 RX ADMIN — CHLORHEXIDINE GLUCONATE 1 APPLICATION(S): 213 SOLUTION TOPICAL at 05:18

## 2021-05-27 RX ADMIN — Medication 1 GRAM(S): at 05:18

## 2021-05-27 RX ADMIN — SODIUM CHLORIDE 125 MILLILITER(S): 9 INJECTION, SOLUTION INTRAVENOUS at 05:19

## 2021-05-27 RX ADMIN — Medication 1 GRAM(S): at 11:46

## 2021-05-27 RX ADMIN — HYDROMORPHONE HYDROCHLORIDE 0.5 MILLIGRAM(S): 2 INJECTION INTRAMUSCULAR; INTRAVENOUS; SUBCUTANEOUS at 15:35

## 2021-05-27 RX ADMIN — HYDROMORPHONE HYDROCHLORIDE 0.5 MILLIGRAM(S): 2 INJECTION INTRAMUSCULAR; INTRAVENOUS; SUBCUTANEOUS at 20:52

## 2021-05-27 RX ADMIN — PANTOPRAZOLE SODIUM 40 MILLIGRAM(S): 20 TABLET, DELAYED RELEASE ORAL at 17:05

## 2021-05-27 RX ADMIN — HYDROMORPHONE HYDROCHLORIDE 0.5 MILLIGRAM(S): 2 INJECTION INTRAMUSCULAR; INTRAVENOUS; SUBCUTANEOUS at 10:15

## 2021-05-27 RX ADMIN — HYDROMORPHONE HYDROCHLORIDE 0.5 MILLIGRAM(S): 2 INJECTION INTRAMUSCULAR; INTRAVENOUS; SUBCUTANEOUS at 21:07

## 2021-05-27 RX ADMIN — PREGABALIN 1000 MICROGRAM(S): 225 CAPSULE ORAL at 11:46

## 2021-05-27 RX ADMIN — Medication 50 GRAM(S): at 08:39

## 2021-05-27 RX ADMIN — PANTOPRAZOLE SODIUM 40 MILLIGRAM(S): 20 TABLET, DELAYED RELEASE ORAL at 05:18

## 2021-05-27 RX ADMIN — Medication 1 GRAM(S): at 23:18

## 2021-05-27 RX ADMIN — Medication 1 GRAM(S): at 17:05

## 2021-05-27 RX ADMIN — HYDROMORPHONE HYDROCHLORIDE 0.5 MILLIGRAM(S): 2 INJECTION INTRAMUSCULAR; INTRAVENOUS; SUBCUTANEOUS at 15:50

## 2021-05-27 RX ADMIN — HYDROMORPHONE HYDROCHLORIDE 0.5 MILLIGRAM(S): 2 INJECTION INTRAMUSCULAR; INTRAVENOUS; SUBCUTANEOUS at 05:18

## 2021-05-27 RX ADMIN — MIRTAZAPINE 15 MILLIGRAM(S): 45 TABLET, ORALLY DISINTEGRATING ORAL at 22:07

## 2021-05-27 NOTE — PROGRESS NOTE ADULT - SUBJECTIVE AND OBJECTIVE BOX
CC: Acute hypoxemic respiratory failure, septic shock/Esophageal stricture/Substance abuse    INTERVAL HPI/OVERNIGHT EVENTS: Patient seen and examined. Diet advanced to full liquids for lunch, tolerated thus far. Seen by dietician and happy with advise. Pain is controlled on current RX but understands that he will not go home on narcotics. states he will be staying with mother and then return to court ordered substance abuse program. Denies chest pain, SOB, dizziness, nausea, vomiting, fever, chills.     Vital Signs Last 24 Hrs  T(C): 36.8 (27 May 2021 09:59), Max: 37.5 (26 May 2021 20:32)  T(F): 98.3 (27 May 2021 09:59), Max: 99.5 (26 May 2021 20:32)  HR: 74 (27 May 2021 09:59) (62 - 94)  BP: 107/65 (27 May 2021 09:59) (107/65 - 133/72)  BP(mean): --  RR: 18 (27 May 2021 09:59) (18 - 18)  SpO2: 97% (27 May 2021 09:59) (97% - 100%)      PE:  Constitutional: NAD  Respiratory:  clear to auscultation  bilaterally, No wheezing, rales  Cardiovascular: S1 and S2, regular rate and rhythm   Gastrointestinal: Bowel Sounds present, soft, nontender, nondistended  Extremities: No peripheral edema  Skin : normal color warm     I&O's Detail    26 May 2021 07:01  -  27 May 2021 07:00  --------------------------------------------------------  IN:  Total IN: 0 mL    OUT:    Voided (mL): 800 mL  Total OUT: 800 mL    Total NET: -800 mL                                    11.4   6.52  )-----------( 150      ( 26 May 2021 12:08 )             37.9     26 May 2021 12:07    138    |  102    |  9.0    ----------------------------<  97     3.7     |  26.0   |  0.72     Ca    9.3        26 May 2021 12:07  Phos  2.9       26 May 2021 12:08  Mg     1.7       26 May 2021 12:08        CAPILLARY BLOOD GLUCOSE              MEDICATIONS  (STANDING):  chlorhexidine 2% Cloths 1 Application(s) Topical <User Schedule>  dextrose 5% + sodium chloride 0.45%. 1000 milliLiter(s) (125 mL/Hr) IV Continuous <Continuous>  mirtazapine Soltab 15 milliGRAM(s) Oral at bedtime  pantoprazole  Injectable 40 milliGRAM(s) IV Push two times a day  sucralfate suspension 1 Gram(s) Oral four times a day    MEDICATIONS  (PRN):  benzocaine 15 mG/menthol 3.6 mG (Sugar-Free) Lozenge 1 Lozenge Oral three times a day PRN Sore Throat  benzocaine 15 mG/menthol 3.6 mG (Sugar-Free) Lozenge 2 Lozenge Oral every 1 hour PRN Sore Throat  HYDROmorphone  Injectable 0.5 milliGRAM(s) IV Push every 4 hours PRN Severe Pain (7 - 10)      RADIOLOGY & ADDITIONAL TESTS:

## 2021-05-27 NOTE — CHART NOTE - NSCHARTNOTEFT_GEN_A_CORE
Nutrition Note:   Full liquids and [facility] soft diet education provided with good understanding. Nutrition Note:    s/p esophageal  stent paced by GI endoscopy 5/25.  Diet changed to full liquids today. Rec mech soft diet if tolerates fulls.   Diet education provided with good understanding.

## 2021-05-27 NOTE — PROGRESS NOTE ADULT - SUBJECTIVE AND OBJECTIVE BOX
Subjective - patient seen and evaluated bedside. Sitting comfortably in bed. Denies CP, SOB, HA, dizziness, n/v/d. States " the clear liquid diet went down well."    Review of Systems: negative x 10 systems except as noted above    Brief summary:  25yMale s/p esophageal dilation and stent.  Significant/Saak26ic events: advanced to full liquid diet.      PAST MEDICAL & SURGICAL HISTORY:  Heroin abuse    Cocaine abuse    GERD (gastroesophageal reflux disease)    Hiatal hernia    No significant past surgical history          benzocaine 15 mG/menthol 3.6 mG (Sugar-Free) Lozenge 1 Lozenge Oral three times a day PRN  benzocaine 15 mG/menthol 3.6 mG (Sugar-Free) Lozenge 2 Lozenge Oral every 1 hour PRN  chlorhexidine 2% Cloths 1 Application(s) Topical <User Schedule>  dextrose 5% + sodium chloride 0.45%. 1000 milliLiter(s) IV Continuous <Continuous>  HYDROmorphone  Injectable 0.5 milliGRAM(s) IV Push every 4 hours PRN  mirtazapine Soltab 15 milliGRAM(s) Oral at bedtime  pantoprazole  Injectable 40 milliGRAM(s) IV Push two times a day  sucralfate suspension 1 Gram(s) Oral four times a day  MEDICATIONS  (PRN):  benzocaine 15 mG/menthol 3.6 mG (Sugar-Free) Lozenge 1 Lozenge Oral three times a day PRN Sore Throat  benzocaine 15 mG/menthol 3.6 mG (Sugar-Free) Lozenge 2 Lozenge Oral every 1 hour PRN Sore Throat  HYDROmorphone  Injectable 0.5 milliGRAM(s) IV Push every 4 hours PRN Severe Pain (7 - 10)      Daily     Daily                               11.4   6.52  )-----------( 150      ( 26 May 2021 12:08 )             37.9   05-26    138  |  102  |  9.0  ----------------------------<  97  3.7   |  26.0  |  0.72    Ca    9.3      26 May 2021 12:07  Phos  2.9     05-26  Mg     1.7     05-26              Objective:  T(C): 36.8 (05-27-21 @ 09:59), Max: 37.5 (05-26-21 @ 20:32)  HR: 74 (05-27-21 @ 09:59) (62 - 94)  BP: 107/65 (05-27-21 @ 09:59) (107/65 - 133/72)  RR: 18 (05-27-21 @ 09:59) (18 - 18)  SpO2: 97% (05-27-21 @ 09:59) (97% - 100%)  Wt(kg): --CAPILLARY BLOOD GLUCOSE      I&O's Summary    26 May 2021 07:01  -  27 May 2021 07:00  --------------------------------------------------------  IN: 0 mL / OUT: 800 mL / NET: -800 mL        Physical Exam  Neuro: A+O x 3, non-focal, speech clear and intact  Pulm: CTA, equal bilaterally  CV: RRR,  +S1S2  Abd: soft, NT, ND, +BS  Ext: +DP Pulses b/l,no edema      Imaging:  CXR: < from: Xray Chest 1 View- PORTABLE-Routine (Xray Chest 1 View- PORTABLE-Routine in AM.) (05.26.21 @ 05:53) >    INTERPRETATION:  Portable chest radiograph    CLINICAL INFORMATION: Status post esophageal perforation. Follow-up    TECHNIQUE:  Portable  AP view ofthe chest was obtained.    COMPARISON: 5/16/2021 chest available for review.    FINDINGS:  Distal esophageal stent in place.  The lungs are clear of airspace consolidations or effusions. No pneumothorax.    The heart and mediastinum are within normallimits.    Visualized osseous structures are intact.    IMPRESSION:   No evidence of active chest disease.  Distal esophageal stent in place.    < end of copied text >

## 2021-05-27 NOTE — PROGRESS NOTE ADULT - ASSESSMENT
24 y/o male with pmh of esophageal spasm, IVDA recently admitted to [facility] w/ esophageal stricture and signed out aMA. Presented to ED after using heroin and presented with septic shock and found to have multi focal pna and subsquently intubated and brought to icu. Patient extubated ,  downgraded to medicine. Patient worked up for severe esophageal stricture.     1-Severe esophageal stricture possibly due to caustic ingestion  - s/p MBS, esophagram, EGD at [facility] [place]  -  s/p esophageal  stent paced by  GI endoscopy  Repeat chest 5/26 shows stent in good position.  pian medication  cephacol  on  Protonix 40mg IV bid   Diet advanced to Full liquid diet as per GI  Dietician following for recommendations      CT chest: Diffuse esophageal wall thickening with a linear tract of contrast to the left of the mid to distal esophageal lumen, possibly a small amount of contrast tracking in the esophageal wall from a tear in the mid to distal esophagus.   - CT surgery consult appreciated  diet per GI, advanced to Full liquid diet, possible mechanical soft in am based on tolerance    2-- Sinus Bradycardia, improved   - Cardiology input appreciated    3-Depression  - Psych input appreciated   cont  Remeron 15mg nightly     4--Septic shock secondary to complex multi focal pneumonia  completed zosyn   sepsis  resolved     5-Acute hypoxic respiratory failure - secondary to complex pneumonia  resolved     6-Leukopenia/anemia/thrombocytopenia  improved   stable     7-Smoking / tobacco use  counseling provided   advised to quit    8-H/o drug use  - cocaine, heroin and K2  - iv ativan prn  -no signs of withdrawal   -as per Psych defer starting Suboxone as patient requires opiates post operatively. Patient feels he will not need Suboxone on discharge.    DVT ppx: Ambulation    Dispo: Possible dc in 24 hrs if tolerates advance diet  26 y/o male with pmh of esophageal spasm, IVDA recently admitted to [facility] w/ esophageal stricture and signed out aMA. Presented to ED after using heroin and presented with septic shock and found to have multi focal pna and subsquently intubated and brought to icu. Patient extubated ,  downgraded to medicine. Patient worked up for severe esophageal stricture.     1-Severe esophageal stricture possibly due to caustic ingestion  -  s/p esophageal  stent paced by  GI endoscopy  Repeat chest 5/26 shows stent in good position.  pian medication as needed   cephacol  on  Protonix 40mg IV bid   Diet advanced to Full liquid diet as per GI  Dietician following for recommendations   - CT surgery consult appreciated  diet per GI, advanced to Full liquid diet, possible mechanical soft in am based on tolerance    2-- Sinus Bradycardia, improved   - Cardiology input appreciated    3-Depression  - Psych input appreciated   cont  Remeron 15mg nightly     4--Septic shock secondary to complex multi focal pneumonia  completed zosyn treatment   sepsis  resolved     5-Acute hypoxic respiratory failure - secondary to complex pneumonia  resolved     6-Leukopenia/anemia/thrombocytopenia  improved   stable     7-Smoking / tobacco use  counseling provided   advised to quit    8-H/o drug use  - cocaine, heroin and K2  - iv ativan prn  -no signs of withdrawal   -as per Psych defer starting Suboxone as patient requires opiates post operatively. Patient feels he will not need Suboxone on discharge.    DVT ppx: Ambulation    Dispo: Possible dc in 24 hrs if tolerates advance diet

## 2021-05-27 NOTE — PROGRESS NOTE ADULT - SUBJECTIVE AND OBJECTIVE BOX
Chief Complaint: This is a 25y old man patient being seen in follow-up consultation for acute hypoxemic respiratory failure, septic shock, esophageal stricture .    HPI / 24H events:  Patient seeing and evaluated at bedside, reporting no complains, no overnight events, tolerating clear liquid diet.  Chest X-Ray showing no evidence of active chest disease, and noted distal esophageal stent in place. Denies nausea, vomiting, abdominal pain, chest pain, shortness of breath, hematemesis, hematochezia, melena.  ROS: A 14-point review of systems was reviewed and was otherwise negative save what was reported in the HPI.    PAST MEDICAL/SURGICAL HISTORY:  Heroin abuse    Cocaine abuse    GERD (gastroesophageal reflux disease)    Hiatal hernia    No significant past surgical history      MEDICATIONS  (STANDING):  chlorhexidine 2% Cloths 1 Application(s) Topical <User Schedule>  cyanocobalamin 1000 MICROGram(s) Oral once  dextrose 5% + sodium chloride 0.45%. 1000 milliLiter(s) (125 mL/Hr) IV Continuous <Continuous>  mirtazapine Soltab 15 milliGRAM(s) Oral at bedtime  pantoprazole  Injectable 40 milliGRAM(s) IV Push two times a day  sucralfate suspension 1 Gram(s) Oral four times a day    MEDICATIONS  (PRN):  benzocaine 15 mG/menthol 3.6 mG (Sugar-Free) Lozenge 1 Lozenge Oral three times a day PRN Sore Throat  benzocaine 15 mG/menthol 3.6 mG (Sugar-Free) Lozenge 2 Lozenge Oral every 1 hour PRN Sore Throat  HYDROmorphone  Injectable 0.5 milliGRAM(s) IV Push every 4 hours PRN Severe Pain (7 - 10)    No Known Allergies    T(C): 36.8 (05-27-21 @ 09:59), Max: 37.5 (05-26-21 @ 20:32)  HR: 74 (05-27-21 @ 09:59) (62 - 94)  BP: 107/65 (05-27-21 @ 09:59) (107/65 - 133/72)  RR: 18 (05-27-21 @ 09:59) (18 - 18)  SpO2: 97% (05-27-21 @ 09:59) (97% - 100%)    I&O's Summary    26 May 2021 07:01  -  27 May 2021 07:00  --------------------------------------------------------  IN: 0 mL / OUT: 800 mL / NET: -800 mL      PHYSICAL EXAM:  Constitutional: Well-developed, well-nourished, in no apparent distress  Respiratory:  clear to auscultation  bilaterally, No wheezing, rales  Cardiovascular: S1 and S2, regular rate and rhythm   Gastrointestinal: Bowel Sounds present, soft, nontender, nondistended  Extremities: No peripheral edema  Skin : normal color warm                    11.4   6.52  )-----------( 150      ( 05-26 @ 12:08 )             37.9       05-26    138  |  102  |  9.0  ----------------------------<  97  3.7   |  26.0  |  0.72    Ca    9.3      26 May 2021 12:07  Phos  2.9     05-26  Mg     1.7     05-26      IMAGING: I personally reviewed the Chest X-Ray, and I agree with the radiologist's interpretation as described below:    FINDINGS:  Distal esophageal stent in place.  The lungs are clear of airspace consolidations or effusions. No pneumothorax.    The heart and mediastinum are within normallimits.    Visualized osseous structures are intact.    IMPRESSION:   No evidence of active chest disease.  Distal esophageal stent in place.

## 2021-05-27 NOTE — PROGRESS NOTE ADULT - ASSESSMENT
I evaluated this pt. with NP Soledad. Plan is to advance diet to full liquids for lunch and mechanical soft for dinner. Pt should be on a low residue mechanical soft diet with feeding in the upright position w/o lying down for 1 hour after meals with BID Pantoprazole therapy and repeat EGD with stent removal in 3 months with Dr. Amador Cox.

## 2021-05-28 ENCOUNTER — TRANSCRIPTION ENCOUNTER (OUTPATIENT)
Age: 26
End: 2021-05-28

## 2021-05-28 VITALS
DIASTOLIC BLOOD PRESSURE: 78 MMHG | OXYGEN SATURATION: 98 % | HEART RATE: 88 BPM | SYSTOLIC BLOOD PRESSURE: 122 MMHG | TEMPERATURE: 98 F | RESPIRATION RATE: 18 BRPM

## 2021-05-28 PROCEDURE — 99239 HOSP IP/OBS DSCHRG MGMT >30: CPT

## 2021-05-28 PROCEDURE — 99231 SBSQ HOSP IP/OBS SF/LOW 25: CPT

## 2021-05-28 PROCEDURE — 99232 SBSQ HOSP IP/OBS MODERATE 35: CPT

## 2021-05-28 RX ORDER — PANTOPRAZOLE SODIUM 20 MG/1
1 TABLET, DELAYED RELEASE ORAL
Qty: 0 | Refills: 0 | DISCHARGE

## 2021-05-28 RX ORDER — SIMETHICONE 80 MG/1
160 TABLET, CHEWABLE ORAL ONCE
Refills: 0 | Status: COMPLETED | OUTPATIENT
Start: 2021-05-28 | End: 2021-05-28

## 2021-05-28 RX ORDER — SUCRALFATE 1 G
10 TABLET ORAL
Qty: 1200 | Refills: 0
Start: 2021-05-28 | End: 2021-06-26

## 2021-05-28 RX ORDER — MIRTAZAPINE 45 MG/1
1 TABLET, ORALLY DISINTEGRATING ORAL
Qty: 30 | Refills: 0
Start: 2021-05-28 | End: 2021-06-26

## 2021-05-28 RX ORDER — PANTOPRAZOLE SODIUM 20 MG/1
1 TABLET, DELAYED RELEASE ORAL
Qty: 60 | Refills: 0
Start: 2021-05-28 | End: 2021-06-26

## 2021-05-28 RX ADMIN — BENZOCAINE AND MENTHOL 1 LOZENGE: 5; 1 LIQUID ORAL at 11:12

## 2021-05-28 RX ADMIN — HYDROMORPHONE HYDROCHLORIDE 0.5 MILLIGRAM(S): 2 INJECTION INTRAMUSCULAR; INTRAVENOUS; SUBCUTANEOUS at 14:27

## 2021-05-28 RX ADMIN — PANTOPRAZOLE SODIUM 40 MILLIGRAM(S): 20 TABLET, DELAYED RELEASE ORAL at 17:22

## 2021-05-28 RX ADMIN — HYDROMORPHONE HYDROCHLORIDE 0.5 MILLIGRAM(S): 2 INJECTION INTRAMUSCULAR; INTRAVENOUS; SUBCUTANEOUS at 01:13

## 2021-05-28 RX ADMIN — HYDROMORPHONE HYDROCHLORIDE 0.5 MILLIGRAM(S): 2 INJECTION INTRAMUSCULAR; INTRAVENOUS; SUBCUTANEOUS at 09:53

## 2021-05-28 RX ADMIN — PANTOPRAZOLE SODIUM 40 MILLIGRAM(S): 20 TABLET, DELAYED RELEASE ORAL at 05:10

## 2021-05-28 RX ADMIN — HYDROMORPHONE HYDROCHLORIDE 0.5 MILLIGRAM(S): 2 INJECTION INTRAMUSCULAR; INTRAVENOUS; SUBCUTANEOUS at 05:09

## 2021-05-28 RX ADMIN — HYDROMORPHONE HYDROCHLORIDE 0.5 MILLIGRAM(S): 2 INJECTION INTRAMUSCULAR; INTRAVENOUS; SUBCUTANEOUS at 13:57

## 2021-05-28 RX ADMIN — Medication 1 GRAM(S): at 11:09

## 2021-05-28 RX ADMIN — HYDROMORPHONE HYDROCHLORIDE 0.5 MILLIGRAM(S): 2 INJECTION INTRAMUSCULAR; INTRAVENOUS; SUBCUTANEOUS at 00:55

## 2021-05-28 RX ADMIN — HYDROMORPHONE HYDROCHLORIDE 0.5 MILLIGRAM(S): 2 INJECTION INTRAMUSCULAR; INTRAVENOUS; SUBCUTANEOUS at 05:28

## 2021-05-28 RX ADMIN — Medication 1 GRAM(S): at 05:09

## 2021-05-28 RX ADMIN — HYDROMORPHONE HYDROCHLORIDE 0.5 MILLIGRAM(S): 2 INJECTION INTRAMUSCULAR; INTRAVENOUS; SUBCUTANEOUS at 09:23

## 2021-05-28 RX ADMIN — Medication 1 GRAM(S): at 17:22

## 2021-05-28 RX ADMIN — SIMETHICONE 160 MILLIGRAM(S): 80 TABLET, CHEWABLE ORAL at 06:00

## 2021-05-28 NOTE — DISCHARGE NOTE PROVIDER - HOSPITAL COURSE
26 y/o male with pmhx of esophageal spasm, IVDA recently admitted to [facility] w/ esophageal stricture and signed out aMA. Presented to ED after using heroin and presented with septic shock and found to have multi focal pna and subsequently intubated and brought to icu. Patient extubated ,  downgraded to medicine. Patient worked up for severe esophageal stricture.      24 y/o male with pmhx of esophageal spasm, IVDA recently admitted to [facility] w/ esophageal stricture and signed out aMA. Presented to ED after using heroin and presented with septic shock and found to have multi focal pna and subsequently intubated and brought to icu. Patient extubated ,  downgraded to medicine. Patient worked up for severe esophageal stricture. The patient was seen in consultation by Thoracic      24 y/o male with pmhx of esophageal spasm, IVDA recently admitted to [facility] w/ esophageal stricture and signed out aMA. Presented to ED after using heroin and presented with septic shock and found to have multi focal pna and subsequently intubated and brought to icu. Patient extubated ,  downgraded to medicine. Patient worked up for severe esophageal stricture. The patient was seen in consultation by Thoracic surgery and GI.      26 y/o male with pmhx of esophageal spasm, IVDA recently admitted to [facility] w/ esophageal stricture and signed out aMA. Presented to ED after using heroin and presented with septic shock and found to have multi focal pna and subsequently intubated and brought to icu. Patient extubated ,  downgraded to medicine. completed course of IV Zosyn. Patient worked up for severe esophageal stricture. The patient was seen in consultation by Thoracic surgery and GI. S/P esophageal  stent paced by  GI endoscopy on 5/25.  Repeat chest 5/26 shows stent in good position. Diet advanced to CLD then   full liquids and then mechanical soft . Tolerated well.  Pt should be on a low residue mechanical soft diet with feeding in the upright position w/o lying down for 1 hour after meals with BID Pantoprazole therapy and repeat EGD with stent removal in 3 months with Dr. Amador Cox. Patient was seen by Psychiatry, started on Remeron. Offered Suboxone but declined. The patient is medically stable for discharge.   Vital Signs Last 24 Hrs  T(C): 36.8 (28 May 2021 11:27), Max: 37.6 (27 May 2021 20:47)  T(F): 98.3 (28 May 2021 11:27), Max: 99.6 (27 May 2021 20:47)  HR: 90 (28 May 2021 11:27) (68 - 95)  BP: 122/71 (28 May 2021 11:27) (112/63 - 122/71)  BP(mean): --  RR: 18 (28 May 2021 11:27) (18 - 18)  SpO2: 98% (28 May 2021 11:27) (97% - 99%)                CAPILLARY BLOOD GLUCOSE    PE:  Constitutional: NAD  Respiratory:  clear to auscultation  bilaterally, No wheezing, rales  Cardiovascular: S1 and S2, regular rate and rhythm   Gastrointestinal: Bowel Sounds present, soft, nontender, nondistended  Extremities: No peripheral edema  Skin : normal color warm               24 y/o male with pmhx of esophageal spasm, IVDA recently admitted to [facility] w/ esophageal stricture and signed out aMA. Presented to ED after using heroin and presented with septic shock and found to have multi focal pna and subsequently intubated and brought to icu. Patient extubated ,  downgraded to medicine. completed course of IV Zosyn. Patient worked up for severe esophageal stricture. The patient was seen in consultation by Thoracic surgery and GI. S/P esophageal  stent paced by  GI endoscopy on 5/25.  Repeat chest 5/26 shows stent in good position. Diet advanced to CLD then   full liquids and then mechanical soft . Tolerated well.  Pt should be on a low residue mechanical soft diet with feeding in the upright position w/o lying down for 1 hour after meals with BID Pantoprazole therapy and repeat EGD with stent removal in 3 months with Dr. Amador Cox. Patient was seen by Psychiatry, started on Remeron. Offered Suboxone but declined. The patient is medically stable for discharge.     total time spend coordinating care 35 min

## 2021-05-28 NOTE — PROGRESS NOTE ADULT - SUBJECTIVE AND OBJECTIVE BOX
Chief Complaint: This is a 25y old man patient being seen in follow-up consultation for acute hypoxemic respiratory failure, esophageal stricture.     HPI / 24H events:  Patient seeing and evaluated at bedside, reporting no complains, no overnight events. Tolerating diet. Denies nausea, vomiting, abdominal pain, chest pain, shortness of breath, hematemesis, hematochezia, melena.  ROS: A 14-point review of systems was reviewed and was otherwise negative save what was reported in the HPI.    PAST MEDICAL/SURGICAL HISTORY:  Heroin abuse    Cocaine abuse    GERD (gastroesophageal reflux disease)    Hiatal hernia    No significant past surgical history      MEDICATIONS  (STANDING):  chlorhexidine 2% Cloths 1 Application(s) Topical <User Schedule>  mirtazapine Soltab 15 milliGRAM(s) Oral at bedtime  pantoprazole  Injectable 40 milliGRAM(s) IV Push two times a day  sucralfate suspension 1 Gram(s) Oral four times a day    MEDICATIONS  (PRN):  benzocaine 15 mG/menthol 3.6 mG (Sugar-Free) Lozenge 1 Lozenge Oral three times a day PRN Sore Throat  benzocaine 15 mG/menthol 3.6 mG (Sugar-Free) Lozenge 2 Lozenge Oral every 1 hour PRN Sore Throat  HYDROmorphone  Injectable 0.5 milliGRAM(s) IV Push every 4 hours PRN Severe Pain (7 - 10)    No Known Allergies    T(C): 36.8 (05-28-21 @ 11:27), Max: 37.6 (05-27-21 @ 20:47)  HR: 90 (05-28-21 @ 11:27) (68 - 95)  BP: 122/71 (05-28-21 @ 11:27) (112/63 - 122/71)  RR: 18 (05-28-21 @ 11:27) (18 - 18)  SpO2: 98% (05-28-21 @ 11:27) (97% - 99%)    I&O's Summary    PHYSICAL EXAM:  Constitutional: Well-developed, well-nourished, in no apparent distress  Eyes: Sclerae anicteric, conjunctivae normal  ENMT: Mucus membranes moist, no oropharyngeal thrush noted  Respiratory: Breathing nonlabored; clear to auscultation  Cardiovascular: Regular rate and rhythm  Gastrointestinal: Soft, nontender, nondistended, normoactive bowel sounds.  Extremities: No clubbing, cyanosis or edema  Neurological: Alert and oriented to person, place and time; no asterixis  Skin: No jaundice  Musculoskeletal: No significant peripheral atrophy  Psychiatric: Affect and mood appropriate                   11.4   6.52  )-----------( 150      ( 05-26 @ 12:08 )             37.9

## 2021-05-28 NOTE — PROGRESS NOTE ADULT - PROVIDER SPECIALTY LIST ADULT
Gastroenterology
Gastroenterology
Hospitalist
Thoracic Surgery
Critical Care
Gastroenterology
Gastroenterology
Hospitalist
Hospitalist
Gastroenterology
Gastroenterology
Hospitalist
Thoracic Surgery
Hospitalist
Hospitalist
Cardiology
Hospitalist
Hospitalist
Internal Medicine
Thoracic Surgery
Thoracic Surgery
Hospitalist
Hospitalist
Gastroenterology
Thoracic Surgery
Gastroenterology
Thoracic Surgery
Gastroenterology

## 2021-05-28 NOTE — DISCHARGE NOTE PROVIDER - CARE PROVIDER_API CALL
Amador Cox)  Gastroenterology; Internal Medicine  [address] [place] [address]  Phone: [phone]  Fax: [phone]  Follow Up Time:     Catracho Vizcarra)  Surgery; Thoracic Surgery  [address] [place] [address]  Phone: [phone]  Fax: [phone]  Follow Up Time:

## 2021-05-28 NOTE — PROGRESS NOTE ADULT - PROBLEM SELECTOR PROBLEM 2
Esophageal stricture
Aspiration pneumonia of both lower lobes, unspecified aspiration pneumonia type
Esophageal stricture
Esophageal stricture

## 2021-05-28 NOTE — PROGRESS NOTE ADULT - PROBLEM SELECTOR PLAN 2
GI following  S/p EGD, dilation, esophageal stent on 5/25. No complications.  Advanced to clear liquid diet which he tolerated well. Advanced again to full liquid diet. If he tolerates, may advance to mechanical soft. If tolerates mechanical soft, then stable for discharge from thoracic surgery perspective.  Will continue to follow.  Discussed plan with Dr. Vizcarra
GI following  Tolerating clear liquid diet  Esophageal stent vs esophagectomy  Surgeons to decide recommendations  Discussed plan with Dr. Vizcarra & Dr. Cox
GI following  S/p EGD, dilation, esophageal stent on 5/25. No complications.  Advanced to clear liquid diet.  Will continue to follow.  Discussed plan with Dr. Vizcarra
Continue Antibiotic therapy  Continue management as per ID
GI following  Tolerating clear liquid diet  Plan for esophageal stent with Dr. Vizcarra and Dr. Cox on tuesday 5/25  Discussed plan with Dr. Vizcarra & Dr. Cox
S/p EGD, dilation, esophageal stent on 5/25. No complications.  Advanced to clear liquid diet which he tolerated well. Advanced again to full liquid diet which he tolerated well. Advance to mechanical soft diet.    If he tolerates, mechanical soft diet, then patient is safe for discharge from thoracic surgery perspective.  Will continue to follow.  Discussed plan with Dr. Cox

## 2021-05-28 NOTE — DISCHARGE NOTE PROVIDER - NSDCMRMEDTOKEN_GEN_ALL_CORE_FT
Protonix 40 mg oral delayed release tablet: 1 tab(s) orally once a day   mirtazapine 15 mg oral tablet, disintegratin tab(s) orally once a day (at bedtime)  Protonix 40 mg oral delayed release tablet: 1 tab(s) orally 2 times a day   sucralfate 1 g/10 mL oral suspension: 10 milliliter(s) orally 4 times a day

## 2021-05-28 NOTE — PROGRESS NOTE ADULT - NUTRITIONAL ASSESSMENT
This patient has been assessed with a concern for Malnutrition and has been determined to have a diagnosis/diagnoses of Moderate protein-calorie malnutrition.    This patient is being managed with:   Diet Clear Liquid-  Supplement Feeding Modality:  Oral  Ensure Enlive Cans or Servings Per Day:  1       Frequency:  Three Times a day  Entered: May 19 2021  8:49AM    
This patient has been assessed with a concern for Malnutrition and has been determined to have a diagnosis/diagnoses of Moderate protein-calorie malnutrition.    This patient is being managed with:   Diet Mechanical Soft-  Entered: May 28 2021 10:34AM    
This patient has been assessed with a concern for Malnutrition and has been determined to have a diagnosis/diagnoses of Moderate protein-calorie malnutrition.    This patient is being managed with:   Diet NPO after Midnight-     NPO Start Date: 24-May-2021   NPO Start Time: 23:59  Except Medications  Entered: May 24 2021  7:56AM    Diet Clear Liquid-  Supplement Feeding Modality:  Oral  Ensure Enlive Cans or Servings Per Day:  1       Frequency:  Three Times a day  Entered: May 19 2021  8:49AM    
This patient has been assessed with a concern for Malnutrition and has been determined to have a diagnosis/diagnoses of Moderate protein-calorie malnutrition.    This patient is being managed with:   Diet Full Liquid-  Entered: May 27 2021 10:02AM    
This patient has been assessed with a concern for Malnutrition and has been determined to have a diagnosis/diagnoses of Moderate protein-calorie malnutrition.    This patient is being managed with:   Diet NPO after Midnight-     NPO Start Date: 24-May-2021   NPO Start Time: 23:59  Except Medications  Entered: May 24 2021  7:56AM    Diet Clear Liquid-  Supplement Feeding Modality:  Oral  Ensure Enlive Cans or Servings Per Day:  1       Frequency:  Three Times a day  Entered: May 19 2021  8:49AM    
This patient has been assessed with a concern for Malnutrition and has been determined to have a diagnosis/diagnoses of Moderate protein-calorie malnutrition.    This patient is being managed with:   Diet Clear Liquid-  Supplement Feeding Modality:  Oral  Ensure Enlive Cans or Servings Per Day:  1       Frequency:  Three Times a day  Entered: May 19 2021  8:49AM    
This patient has been assessed with a concern for Malnutrition and has been determined to have a diagnosis/diagnoses of Moderate protein-calorie malnutrition.    This patient is being managed with:   Diet Clear Liquid-  Supplement Feeding Modality:  Oral  Ensure Enlive Cans or Servings Per Day:  1       Frequency:  Three Times a day  Entered: May 19 2021  8:49AM    
This patient has been assessed with a concern for Malnutrition and has been determined to have a diagnosis/diagnoses of Moderate protein-calorie malnutrition.    This patient is being managed with:   Diet Full Liquid-  Entered: May 27 2021 10:02AM    
This patient has been assessed with a concern for Malnutrition and has been determined to have a diagnosis/diagnoses of Moderate protein-calorie malnutrition.    This patient is being managed with:   Diet Clear Liquid-  Entered: May 26 2021  8:13AM    
This patient has been assessed with a concern for Malnutrition and has been determined to have a diagnosis/diagnoses of Moderate protein-calorie malnutrition.    This patient is being managed with:   Diet Clear Liquid-  Supplement Feeding Modality:  Oral  Ensure Enlive Cans or Servings Per Day:  1       Frequency:  Three Times a day  Entered: May 19 2021  8:49AM    
This patient has been assessed with a concern for Malnutrition and has been determined to have a diagnosis/diagnoses of Moderate protein-calorie malnutrition.    This patient is being managed with:   Diet Full Liquid-  Entered: May 27 2021 10:02AM    
This patient has been assessed with a concern for Malnutrition and has been determined to have a diagnosis/diagnoses of Moderate protein-calorie malnutrition.    This patient is being managed with:   Diet NPO after Midnight-     NPO Start Date: 24-May-2021   NPO Start Time: 23:59  Except Medications  Entered: May 24 2021  7:56AM    Diet Clear Liquid-  Supplement Feeding Modality:  Oral  Ensure Enlive Cans or Servings Per Day:  1       Frequency:  Three Times a day  Entered: May 19 2021  8:49AM    
This patient has been assessed with a concern for Malnutrition and has been determined to have a diagnosis/diagnoses of Moderate protein-calorie malnutrition.    This patient is being managed with:   Diet Full Liquid-  Entered: May 27 2021 10:02AM    
This patient has been assessed with a concern for Malnutrition and has been determined to have a diagnosis/diagnoses of Moderate protein-calorie malnutrition.    This patient is being managed with:   Diet Clear Liquid-  Entered: May 26 2021  8:13AM    
This patient has been assessed with a concern for Malnutrition and has been determined to have a diagnosis/diagnoses of Moderate protein-calorie malnutrition.    This patient is being managed with:   Diet Clear Liquid-  Supplement Feeding Modality:  Oral  Ensure Enlive Cans or Servings Per Day:  1       Frequency:  Three Times a day  Entered: May 19 2021  8:49AM

## 2021-05-28 NOTE — PROGRESS NOTE ADULT - PROBLEM SELECTOR PROBLEM 1
Aspiration pneumonia of both lower lobes, unspecified aspiration pneumonia type
Esophageal stricture
Aspiration pneumonia of both lower lobes, unspecified aspiration pneumonia type
Aspiration pneumonia of both lower lobes, unspecified aspiration pneumonia type
Esophageal stricture
Esophageal stricture
Aspiration pneumonia of both lower lobes, unspecified aspiration pneumonia type

## 2021-05-28 NOTE — PROGRESS NOTE ADULT - PROBLEM SELECTOR PLAN 1
ABX completed  Trend WCT & Fevers  Management per ID.
Continue Zosyn  Trend WCT & Fevers  Management per ID.
Continue Zosyn  Trend WCT & Fevers  Management per ID.  Poss OR stent Tues  Dr Vizcarra
Tolerating Clear liquid diet, please advance to Full liquid diet.  Continue PPI for cytoprotection   Continue Sucralfate suspension 1gm QID  Please follow up with Dr Cox as patient will need stent removal and possible replacement in 3 months
ABX completed  Trend WCT & Fevers  Management per ID.
Tolerating mechanical soft diet   Continue PPI BID for cytoprotection  As per GI patient could be discharge home with outpatient follow up with Dr Cox as patient will need stent removal and possible replacement in 3 months.
ABX completed  Trend WCT & Fevers  Management per ID.
Continue Zosyn  Trend WCT & Fevers
brown
Distal tight esophageal stricture possibly caustic in nature  Tolerating Clear liquid diet.  Plan for esophageal stent with Dr Vizcarra and Dr Cox on 05/25
Continue Zosyn  Trend WCT & Fevers  Management per ID.  Poss OR stent Tues  Dr Vizcarra

## 2021-05-28 NOTE — PROGRESS NOTE ADULT - REASON FOR ADMISSION
Acute hypoxemic respiratory failure, septic shock

## 2021-05-28 NOTE — DISCHARGE NOTE PROVIDER - CARE PROVIDERS DIRECT ADDRESSES
[email or web address]

## 2021-05-28 NOTE — DISCHARGE NOTE PROVIDER - NSDCCPCAREPLAN_GEN_ALL_CORE_FT
PRINCIPAL DISCHARGE DIAGNOSIS  Diagnosis: Aspiration pneumonia of both lower lobes, unspecified aspiration pneumonia type  Assessment and Plan of Treatment: completed course of IV ABX      SECONDARY DISCHARGE DIAGNOSES  Diagnosis: Acute respiratory failure with hypoxia  Assessment and Plan of Treatment: resolved    Diagnosis: Septic shock  Assessment and Plan of Treatment: resolved    Diagnosis: Esophageal stricture  Assessment and Plan of Treatment: S/P esophageal  stent paced by  GI endoscopy on 5/25.  Repeat chest 5/26 shows stent in good position  Please follow a  low residue mechanical soft diet with feeding in the upright position w/o lying down for 1 hour after meals with BID Pantoprazole therapy and repeat EGD with stent removal in 3 months with Dr. Cox.        PRINCIPAL DISCHARGE DIAGNOSIS  Diagnosis: Aspiration pneumonia of both lower lobes, unspecified aspiration pneumonia type  Assessment and Plan of Treatment: completed course of IV ABX      SECONDARY DISCHARGE DIAGNOSES  Diagnosis: Esophageal stricture  Assessment and Plan of Treatment: S/P esophageal  stent paced by  GI endoscopy on 5/25.  Repeat chest 5/26 shows stent in good position  Please follow a  low residue mechanical soft diet with feeding in the upright position w/o lying down for 1 hour after meals with BID Pantoprazole therapy and repeat EGD with stent removal in 3 months with Dr. Cox.       Diagnosis: Acute respiratory failure with hypoxia  Assessment and Plan of Treatment: resolved    Diagnosis: Depression  Assessment and Plan of Treatment: continue Remeron  Follow up with your PMD for further management  Outpatient support services    Diagnosis: Septic shock  Assessment and Plan of Treatment: resolved

## 2021-05-28 NOTE — PROGRESS NOTE ADULT - NSICDXPILOT_GEN_ALL_CORE
[place]

## 2021-05-28 NOTE — PROGRESS NOTE ADULT - ATTENDING COMMENTS
Vital Signs Last 24 Hrs  T(F): 98.2 (20 May 2021 09:32), Max: 98.8 (19 May 2021 16:21)  HR: 64 (20 May 2021 09:32) (64 - 76)  BP: 112/68 (20 May 2021 09:32) (112/68 - 137/81)  RR: 18 (20 May 2021 09:32) (18 - 18)  SpO2: 98% (20 May 2021 09:32) (98% - 99%)    Physical Exam:  Constitutional: alert and oriented, in no acute distress   Neck: Soft and supple, No LAD, No JVD  Respiratory: Clear to auscultation bilaterally, no wheezes or crackles  Cardiovascular: Regular rate and rhythm, no murmurs, gallops, rubs  Gastrointestinal: Soft, non-tender to palpation, +bs  Vascular: 2+ peripheral pulses  Neurological: A/O x 3, no focal neurological deficits  Musculoskeletal: 5/5 strength b/l upper and lower extremities, no lower extremity edema bilaterally
Pt is with esophageal stricture s/p stent EGD   \tolerating diet clear , GI follow up appreciated   cont diet advance as tolerate slowly     hypomagnesemia replace iv mg ordered   repeat lytes in am   home soon likely
Pt seen and examined at bedside. No acute events reported overnight. No new complaints. Possibly esophageal stenting early next week.
seen in m , d/w NP CHELA   pt is with apin in the lower sternal area , dull ache constant     no overnight events   d/w GI   CXR in am performed   start clear liquid diet   cont pain medication for CP post procedure
Patient doing well. OK for discharge on PPI BID.
Patient with esophageal stricture, planned for stent placement next week.  Doing OK on CLD.  Would not advance beyond this, as he is barely tolerating it.

## 2021-05-28 NOTE — PROGRESS NOTE ADULT - SUBJECTIVE AND OBJECTIVE BOX
Subjective - patient seen and evaluated bedside. Sitting comfortably in bed. Complains of some mild bloating sensation, improving . Denies CP, SOB, HA, dizziness, n/v/d    Review of Systems: negative x 10 systems except as noted above    Brief summary:  25yMale s/p EGD, esophageal dilation, esophageal stent.     Significant/[id number] events: tolerating full liquid diet      PAST MEDICAL & SURGICAL HISTORY:  Heroin abuse    Cocaine abuse    GERD (gastroesophageal reflux disease)    Hiatal hernia    No significant past surgical history          benzocaine 15 mG/menthol 3.6 mG (Sugar-Free) Lozenge 1 Lozenge Oral three times a day PRN  benzocaine 15 mG/menthol 3.6 mG (Sugar-Free) Lozenge 2 Lozenge Oral every 1 hour PRN  chlorhexidine 2% Cloths 1 Application(s) Topical <User Schedule>  dextrose 5% + sodium chloride 0.45%. 1000 milliLiter(s) IV Continuous <Continuous>  HYDROmorphone  Injectable 0.5 milliGRAM(s) IV Push every 4 hours PRN  mirtazapine Soltab 15 milliGRAM(s) Oral at bedtime  pantoprazole  Injectable 40 milliGRAM(s) IV Push two times a day  sucralfate suspension 1 Gram(s) Oral four times a day  MEDICATIONS  (PRN):  benzocaine 15 mG/menthol 3.6 mG (Sugar-Free) Lozenge 1 Lozenge Oral three times a day PRN Sore Throat  benzocaine 15 mG/menthol 3.6 mG (Sugar-Free) Lozenge 2 Lozenge Oral every 1 hour PRN Sore Throat  HYDROmorphone  Injectable 0.5 milliGRAM(s) IV Push every 4 hours PRN Severe Pain (7 - 10)      Daily     Daily                               11.4   6.52  )-----------( 150      ( 26 May 2021 12:08 )             37.9   05-26    138  |  102  |  9.0  ----------------------------<  97  3.7   |  26.0  |  0.72    Ca    9.3      26 May 2021 12:07  Phos  2.9     05-26  Mg     1.7     05-26              Objective:  T(C): 36.8 (05-28-21 @ 04:36), Max: 37.6 (05-27-21 @ 20:47)  HR: 68 (05-28-21 @ 04:36) (68 - 95)  BP: 119/67 (05-28-21 @ 04:36) (112/63 - 119/67)  RR: 18 (05-28-21 @ 04:36) (18 - 18)  SpO2: 98% (05-28-21 @ 04:36) (97% - 99%)  Wt(kg): --CAPILLARY BLOOD GLUCOSE      I&O's Summary      Physical Exam  Neuro: A+O x 3, non-focal, speech clear and intact  Pulm: CTA, equal bilaterally  CV: RRR,  +S1S2  Abd: soft, NT, ND, +BS  Ext: +DP Pulses b/l,  no edema      Imaging:  CXR: < from: Xray Chest 1 View- PORTABLE-Routine (Xray Chest 1 View- PORTABLE-Routine in AM.) (05.27.21 @ 06:37) >    INTERPRETATION:  Chest one view    HISTORY: Postop    COMPARISON STUDY: 5/26/2021    Frontal expiratory view of the chest shows the heart to be normal in size. Esophageal stent is again noted.    The lungs are clear and there is no evidence of pneumothorax nor pleural effusion.    IMPRESSION:  No active pulmonary disease.    Thank you for the courtesy of this referral.    < end of copied text >

## 2021-05-28 NOTE — DISCHARGE NOTE PROVIDER - INSTRUCTIONS
low residue mechanical soft diet with feeding in the upright position w/o lying down for 1 hour after meals with BID

## 2021-05-28 NOTE — DISCHARGE NOTE NURSING/CASE MANAGEMENT/SOCIAL WORK - PATIENT PORTAL LINK FT
You can access the FollowMyHealth Patient Portal offered by [facility] by registering at the following website: [email or web address]. By joining [employer]’s FollowMyHealth portal, you will also be able to view your health information using other applications (apps) compatible with our system.

## 2021-05-29 ENCOUNTER — EMERGENCY (EMERGENCY)
Facility: HOSPITAL | Age: 26
LOS: 1 days | Discharge: LEFT BEFORE DISCHARGE | End: 2021-05-29
Attending: STUDENT IN AN ORGANIZED HEALTH CARE EDUCATION/TRAINING PROGRAM
Payer: MEDICAID

## 2021-05-29 VITALS
HEART RATE: 104 BPM | SYSTOLIC BLOOD PRESSURE: 105 MMHG | HEIGHT: 71 IN | OXYGEN SATURATION: 96 % | TEMPERATURE: 98 F | WEIGHT: 160.06 LBS | RESPIRATION RATE: 18 BRPM | DIASTOLIC BLOOD PRESSURE: 63 MMHG

## 2021-05-29 PROBLEM — K21.9 GASTRO-ESOPHAGEAL REFLUX DISEASE WITHOUT ESOPHAGITIS: Chronic | Status: ACTIVE | Noted: 2021-05-16

## 2021-05-29 PROBLEM — K44.9 DIAPHRAGMATIC HERNIA WITHOUT OBSTRUCTION OR GANGRENE: Chronic | Status: ACTIVE | Noted: 2021-05-16

## 2021-05-29 LAB
ALBUMIN SERPL ELPH-MCNC: 4.3 G/DL — SIGNIFICANT CHANGE UP (ref 3.3–5.2)
ALP SERPL-CCNC: 75 U/L — SIGNIFICANT CHANGE UP (ref 40–120)
ALT FLD-CCNC: 126 U/L — HIGH
ANION GAP SERPL CALC-SCNC: 16 MMOL/L — SIGNIFICANT CHANGE UP (ref 5–17)
APTT BLD: 32.1 SEC — SIGNIFICANT CHANGE UP (ref 27.5–35.5)
AST SERPL-CCNC: 91 U/L — HIGH
BASOPHILS # BLD AUTO: 0.03 K/UL — SIGNIFICANT CHANGE UP (ref 0–0.2)
BASOPHILS NFR BLD AUTO: 0.3 % — SIGNIFICANT CHANGE UP (ref 0–2)
BILIRUB SERPL-MCNC: 0.6 MG/DL — SIGNIFICANT CHANGE UP (ref 0.4–2)
BUN SERPL-MCNC: 10.9 MG/DL — SIGNIFICANT CHANGE UP (ref 8–20)
CALCIUM SERPL-MCNC: 9.6 MG/DL — SIGNIFICANT CHANGE UP (ref 8.6–10.2)
CHLORIDE SERPL-SCNC: 100 MMOL/L — SIGNIFICANT CHANGE UP (ref 98–107)
CO2 SERPL-SCNC: 25 MMOL/L — SIGNIFICANT CHANGE UP (ref 22–29)
CREAT SERPL-MCNC: 0.9 MG/DL — SIGNIFICANT CHANGE UP (ref 0.5–1.3)
EOSINOPHIL # BLD AUTO: 0.12 K/UL — SIGNIFICANT CHANGE UP (ref 0–0.5)
EOSINOPHIL NFR BLD AUTO: 1.2 % — SIGNIFICANT CHANGE UP (ref 0–6)
GLUCOSE SERPL-MCNC: 109 MG/DL — HIGH (ref 70–99)
HCT VFR BLD CALC: 36.3 % — LOW (ref 39–50)
HGB BLD-MCNC: 10.7 G/DL — LOW (ref 13–17)
IMM GRANULOCYTES NFR BLD AUTO: 0.3 % — SIGNIFICANT CHANGE UP (ref 0–1.5)
INR BLD: 1.06 RATIO — SIGNIFICANT CHANGE UP (ref 0.88–1.16)
LACTATE BLDV-MCNC: 5 MMOL/L — CRITICAL HIGH (ref 0.5–2)
LYMPHOCYTES # BLD AUTO: 2.42 K/UL — SIGNIFICANT CHANGE UP (ref 1–3.3)
LYMPHOCYTES # BLD AUTO: 23.6 % — SIGNIFICANT CHANGE UP (ref 13–44)
MCHC RBC-ENTMCNC: 23.4 PG — LOW (ref 27–34)
MCHC RBC-ENTMCNC: 29.5 GM/DL — LOW (ref 32–36)
MCV RBC AUTO: 79.3 FL — LOW (ref 80–100)
MONOCYTES # BLD AUTO: 1.02 K/UL — HIGH (ref 0–0.9)
MONOCYTES NFR BLD AUTO: 9.9 % — SIGNIFICANT CHANGE UP (ref 2–14)
NEUTROPHILS # BLD AUTO: 6.64 K/UL — SIGNIFICANT CHANGE UP (ref 1.8–7.4)
NEUTROPHILS NFR BLD AUTO: 64.7 % — SIGNIFICANT CHANGE UP (ref 43–77)
PLATELET # BLD AUTO: 196 K/UL — SIGNIFICANT CHANGE UP (ref 150–400)
POTASSIUM SERPL-MCNC: 3.6 MMOL/L — SIGNIFICANT CHANGE UP (ref 3.5–5.3)
POTASSIUM SERPL-SCNC: 3.6 MMOL/L — SIGNIFICANT CHANGE UP (ref 3.5–5.3)
PROT SERPL-MCNC: 7.2 G/DL — SIGNIFICANT CHANGE UP (ref 6.6–8.7)
PROTHROM AB SERPL-ACNC: 12.3 SEC — SIGNIFICANT CHANGE UP (ref 10.6–13.6)
RBC # BLD: 4.58 M/UL — SIGNIFICANT CHANGE UP (ref 4.2–5.8)
RBC # FLD: 15.6 % — HIGH (ref 10.3–14.5)
SODIUM SERPL-SCNC: 141 MMOL/L — SIGNIFICANT CHANGE UP (ref 135–145)
WBC # BLD: 10.26 K/UL — SIGNIFICANT CHANGE UP (ref 3.8–10.5)
WBC # FLD AUTO: 10.26 K/UL — SIGNIFICANT CHANGE UP (ref 3.8–10.5)

## 2021-05-29 PROCEDURE — 99291 CRITICAL CARE FIRST HOUR: CPT

## 2021-05-29 RX ORDER — NALOXONE HYDROCHLORIDE 4 MG/.1ML
2 SPRAY NASAL ONCE
Refills: 0 | Status: COMPLETED | OUTPATIENT
Start: 2021-05-29 | End: 2021-05-29

## 2021-05-29 RX ADMIN — NALOXONE HYDROCHLORIDE 2 MILLIGRAM(S): 4 SPRAY NASAL at 07:09

## 2021-05-29 NOTE — ED ADULT NURSE NOTE - CHIEF COMPLAINT QUOTE
Patient states that he was discharged yesterday after having esophogeal surgery, was here for 2 weeks complaining of throat pain, on Protonix at home without pain meds, does not feel good

## 2021-05-29 NOTE — ED PROVIDER NOTE - PHYSICAL EXAMINATION
Constitutional - well-developed; well nourished. Head - NCAT. Airway patent. Eyes - pupils pinpoint. CV - tachy regular. no murmur. no edema. Pulm - CTAB. Abd - soft, nt. no rebound. no guarding. Neuro - drowsy. FERRARO. Skin - No rash. MSK - normal ROM.

## 2021-05-29 NOTE — ED PROVIDER NOTE - CLINICAL SUMMARY MEDICAL DECISION MAKING FREE TEXT BOX
Pt was just discharged from the hospital after being here for aspiration pna requiring intubation and pressors followed by esophageal stent placement for stricture. Pt went home last night and states that he left without any pain medication. Pt states that he snorted 4 percocet at home.  Pt's mother found him this morning vomiting and obtunded and was concerned and brought him to the ER. Pt was initially hypoxic and was given 2 mg of IN narcan and woke up. Pt is alert and oriented to person, place and time. Pt understands that he overdosed. Pt also understands that he almost  last time this happened.  Pt does not want further treatment and is adamant that he wants to go home. Pt understands the risks including repeated overdose, brain damage, death, migration of esophageal stent from vomiting causing the need for surgery. Pt was able to repeat all of these risks and still wants to go home. Pt also adamant that he is not suicidal and has no intention of hurting himself.  Pt instructed to return at any time. Pt was initially hypoxic and was given 2 mg of IN narcan and woke up. Pt ambulatory in department without assistance and wanting to leaving. Pt is alert and oriented to person, place and time. Pt understands that he overdosed. Pt also understands that he almost  last time this happened.  Pt does not want further treatment and is adamant that he wants to go home. Pt understands the risks including repeated overdose, brain damage, death, migration of esophageal stent from vomiting causing the need for surgery, or other unnamed risks. Pt was able to repeat all of these risks and still wants to go home. Pt also adamant that he is not suicidal and has no intention of hurting himself.  Pt instructed to return at any time.

## 2021-05-29 NOTE — ED PROVIDER NOTE - PATIENT PORTAL LINK FT
You can access the FollowMyHealth Patient Portal offered by [facility] by registering at the following website: [email or web address]. By joining [employer]’s FollowMyHealth portal, you will also be able to view your health information using other applications (apps) compatible with our system.

## 2021-05-29 NOTE — ED ADULT NURSE REASSESSMENT NOTE - NS ED NURSE REASSESS COMMENT FT1
Pt became aggressive with staff, ripping off cardiac monitor and pulse oximetry. Security and MD called to bedside. Pt states, "I want to get the fuck out of here". Pt will be discharged home with mother as per MD.

## 2021-05-29 NOTE — ED ADULT NURSE REASSESSMENT NOTE - NS ED NURSE REASSESS COMMENT FT1
Pt observed going to bathroom for prolonged period of time, RN knocked on door and patient left. No paraphernalia found in bathroom. Patient observed making snorting sounds and scratching face. Pale in color. Pt placed on cm and . Pt hypoxic oxygen saturations 67% RA. Non-rebreather placed on patient, MD called to bedside. 2mg Narcan IN administered. Oxygen saturations increased to 100% and patient more responsive. Labs and line completed. Mother at bedside. Remains on cm and .

## 2021-05-29 NOTE — ED PROVIDER NOTE - OBJECTIVE STATEMENT
Pt was just discharged from the hospital after being here for aspiration pna requiring intubation and pressors followed by esophageal stent placement for stricture. Pt went home last night and states that he left without any pain medication. Pt states that he snorted 4 percocet at home.  Pt's mother found him this morning vomiting and obtunded and was concerned and brought him to the ER.  Pt somnolent 2/2 opiate overdose unable to give any further hx.

## 2021-05-30 ENCOUNTER — INPATIENT (INPATIENT)
Facility: HOSPITAL | Age: 26
LOS: 12 days | Discharge: HOME | DRG: 867 | End: 2021-06-12
Attending: STUDENT IN AN ORGANIZED HEALTH CARE EDUCATION/TRAINING PROGRAM | Admitting: HOSPITALIST
Payer: MEDICAID

## 2021-05-30 VITALS
OXYGEN SATURATION: 99 % | HEIGHT: 71 IN | WEIGHT: 160.06 LBS | TEMPERATURE: 100 F | DIASTOLIC BLOOD PRESSURE: 67 MMHG | SYSTOLIC BLOOD PRESSURE: 103 MMHG | HEART RATE: 138 BPM | RESPIRATION RATE: 18 BRPM

## 2021-05-30 DIAGNOSIS — T85.528A DISPLACEMENT OF OTHER GASTROINTESTINAL PROSTHETIC DEVICES, IMPLANTS AND GRAFTS, INITIAL ENCOUNTER: Chronic | ICD-10-CM

## 2021-05-30 DIAGNOSIS — A41.9 SEPSIS, UNSPECIFIED ORGANISM: ICD-10-CM

## 2021-05-30 LAB
-  STREPTOCOCCUS SP. (NOT GRP A, B OR S PNEUMONIAE): SIGNIFICANT CHANGE UP
ALBUMIN SERPL ELPH-MCNC: 4.1 G/DL — SIGNIFICANT CHANGE UP (ref 3.3–5.2)
ALP SERPL-CCNC: 75 U/L — SIGNIFICANT CHANGE UP (ref 40–120)
ALT FLD-CCNC: 144 U/L — HIGH
ANION GAP SERPL CALC-SCNC: 10 MMOL/L — SIGNIFICANT CHANGE UP (ref 5–17)
APTT BLD: 29.5 SEC — SIGNIFICANT CHANGE UP (ref 27.5–35.5)
AST SERPL-CCNC: 173 U/L — HIGH
BASOPHILS # BLD AUTO: 0.09 K/UL — SIGNIFICANT CHANGE UP (ref 0–0.2)
BASOPHILS NFR BLD AUTO: 0.9 % — SIGNIFICANT CHANGE UP (ref 0–2)
BILIRUB SERPL-MCNC: 1.6 MG/DL — SIGNIFICANT CHANGE UP (ref 0.4–2)
BUN SERPL-MCNC: 17.6 MG/DL — SIGNIFICANT CHANGE UP (ref 8–20)
CALCIUM SERPL-MCNC: 9 MG/DL — SIGNIFICANT CHANGE UP (ref 8.6–10.2)
CHLORIDE SERPL-SCNC: 97 MMOL/L — LOW (ref 98–107)
CO2 SERPL-SCNC: 28 MMOL/L — SIGNIFICANT CHANGE UP (ref 22–29)
CREAT SERPL-MCNC: 0.68 MG/DL — SIGNIFICANT CHANGE UP (ref 0.5–1.3)
EOSINOPHIL # BLD AUTO: 0 K/UL — SIGNIFICANT CHANGE UP (ref 0–0.5)
EOSINOPHIL NFR BLD AUTO: 0 % — SIGNIFICANT CHANGE UP (ref 0–6)
GLUCOSE SERPL-MCNC: 127 MG/DL — HIGH (ref 70–99)
GRAM STN FLD: SIGNIFICANT CHANGE UP
HCT VFR BLD CALC: 35 % — LOW (ref 39–50)
HGB BLD-MCNC: 10.6 G/DL — LOW (ref 13–17)
INR BLD: 1.31 RATIO — HIGH (ref 0.88–1.16)
LACTATE BLDV-MCNC: 0.9 MMOL/L — SIGNIFICANT CHANGE UP (ref 0.5–2)
LACTATE BLDV-MCNC: 2.3 MMOL/L — HIGH (ref 0.5–2)
LYMPHOCYTES # BLD AUTO: 0.17 K/UL — LOW (ref 1–3.3)
LYMPHOCYTES # BLD AUTO: 1.7 % — LOW (ref 13–44)
MCHC RBC-ENTMCNC: 23.5 PG — LOW (ref 27–34)
MCHC RBC-ENTMCNC: 30.3 GM/DL — LOW (ref 32–36)
MCV RBC AUTO: 77.4 FL — LOW (ref 80–100)
METHOD TYPE: SIGNIFICANT CHANGE UP
MONOCYTES # BLD AUTO: 0.53 K/UL — SIGNIFICANT CHANGE UP (ref 0–0.9)
MONOCYTES NFR BLD AUTO: 5.3 % — SIGNIFICANT CHANGE UP (ref 2–14)
NEUTROPHILS # BLD AUTO: 9.28 K/UL — HIGH (ref 1.8–7.4)
NEUTROPHILS NFR BLD AUTO: 92.1 % — HIGH (ref 43–77)
ORGANISM # SPEC MICROSCOPIC CNT: SIGNIFICANT CHANGE UP
PLATELET # BLD AUTO: 176 K/UL — SIGNIFICANT CHANGE UP (ref 150–400)
POTASSIUM SERPL-MCNC: 3.9 MMOL/L — SIGNIFICANT CHANGE UP (ref 3.5–5.3)
POTASSIUM SERPL-SCNC: 3.9 MMOL/L — SIGNIFICANT CHANGE UP (ref 3.5–5.3)
PROT SERPL-MCNC: 6.8 G/DL — SIGNIFICANT CHANGE UP (ref 6.6–8.7)
PROTHROM AB SERPL-ACNC: 15 SEC — HIGH (ref 10.6–13.6)
RBC # BLD: 4.52 M/UL — SIGNIFICANT CHANGE UP (ref 4.2–5.8)
RBC # FLD: 16.5 % — HIGH (ref 10.3–14.5)
SARS-COV-2 RNA SPEC QL NAA+PROBE: SIGNIFICANT CHANGE UP
SODIUM SERPL-SCNC: 135 MMOL/L — SIGNIFICANT CHANGE UP (ref 135–145)
SPECIMEN SOURCE: SIGNIFICANT CHANGE UP
SPECIMEN SOURCE: SIGNIFICANT CHANGE UP
WBC # BLD: 10.08 K/UL — SIGNIFICANT CHANGE UP (ref 3.8–10.5)
WBC # FLD AUTO: 10.08 K/UL — SIGNIFICANT CHANGE UP (ref 3.8–10.5)

## 2021-05-30 PROCEDURE — 71260 CT THORAX DX C+: CPT | Mod: 26,MG

## 2021-05-30 PROCEDURE — 80053 COMPREHEN METABOLIC PANEL: CPT

## 2021-05-30 PROCEDURE — 85025 COMPLETE CBC W/AUTO DIFF WBC: CPT

## 2021-05-30 PROCEDURE — 83605 ASSAY OF LACTIC ACID: CPT

## 2021-05-30 PROCEDURE — 87077 CULTURE AEROBIC IDENTIFY: CPT

## 2021-05-30 PROCEDURE — 93010 ELECTROCARDIOGRAM REPORT: CPT

## 2021-05-30 PROCEDURE — 99291 CRITICAL CARE FIRST HOUR: CPT

## 2021-05-30 PROCEDURE — 87040 BLOOD CULTURE FOR BACTERIA: CPT

## 2021-05-30 PROCEDURE — 87184 SC STD DISK METHOD PER PLATE: CPT

## 2021-05-30 PROCEDURE — G1004: CPT

## 2021-05-30 PROCEDURE — 85610 PROTHROMBIN TIME: CPT

## 2021-05-30 PROCEDURE — 87181 SC STD AGAR DILUTION PER AGT: CPT

## 2021-05-30 PROCEDURE — 85730 THROMBOPLASTIN TIME PARTIAL: CPT

## 2021-05-30 PROCEDURE — 36415 COLL VENOUS BLD VENIPUNCTURE: CPT

## 2021-05-30 PROCEDURE — 74177 CT ABD & PELVIS W/CONTRAST: CPT | Mod: 26,MG

## 2021-05-30 PROCEDURE — 93005 ELECTROCARDIOGRAM TRACING: CPT

## 2021-05-30 PROCEDURE — 87150 DNA/RNA AMPLIFIED PROBE: CPT

## 2021-05-30 PROCEDURE — 99223 1ST HOSP IP/OBS HIGH 75: CPT

## 2021-05-30 PROCEDURE — 71045 X-RAY EXAM CHEST 1 VIEW: CPT | Mod: 26

## 2021-05-30 RX ORDER — VANCOMYCIN HCL 1 G
1000 VIAL (EA) INTRAVENOUS EVERY 12 HOURS
Refills: 0 | Status: DISCONTINUED | OUTPATIENT
Start: 2021-05-30 | End: 2021-05-30

## 2021-05-30 RX ORDER — VANCOMYCIN HCL 1 G
1000 VIAL (EA) INTRAVENOUS ONCE
Refills: 0 | Status: COMPLETED | OUTPATIENT
Start: 2021-05-30 | End: 2021-05-30

## 2021-05-30 RX ORDER — SODIUM CHLORIDE 9 MG/ML
2300 INJECTION INTRAMUSCULAR; INTRAVENOUS; SUBCUTANEOUS ONCE
Refills: 0 | Status: COMPLETED | OUTPATIENT
Start: 2021-05-30 | End: 2021-05-30

## 2021-05-30 RX ORDER — PANTOPRAZOLE SODIUM 20 MG/1
40 TABLET, DELAYED RELEASE ORAL
Refills: 0 | Status: DISCONTINUED | OUTPATIENT
Start: 2021-05-30 | End: 2021-06-12

## 2021-05-30 RX ORDER — ACETAMINOPHEN 500 MG
650 TABLET ORAL EVERY 6 HOURS
Refills: 0 | Status: DISCONTINUED | OUTPATIENT
Start: 2021-05-30 | End: 2021-06-12

## 2021-05-30 RX ORDER — ACETAMINOPHEN 500 MG
975 TABLET ORAL ONCE
Refills: 0 | Status: COMPLETED | OUTPATIENT
Start: 2021-05-30 | End: 2021-05-30

## 2021-05-30 RX ORDER — SUCRALFATE 1 G
1 TABLET ORAL
Refills: 0 | Status: DISCONTINUED | OUTPATIENT
Start: 2021-05-30 | End: 2021-06-12

## 2021-05-30 RX ORDER — MIRTAZAPINE 45 MG/1
15 TABLET, ORALLY DISINTEGRATING ORAL AT BEDTIME
Refills: 0 | Status: DISCONTINUED | OUTPATIENT
Start: 2021-05-30 | End: 2021-06-12

## 2021-05-30 RX ORDER — PIPERACILLIN AND TAZOBACTAM 4; .5 G/20ML; G/20ML
3.38 INJECTION, POWDER, LYOPHILIZED, FOR SOLUTION INTRAVENOUS ONCE
Refills: 0 | Status: COMPLETED | OUTPATIENT
Start: 2021-05-30 | End: 2021-05-30

## 2021-05-30 RX ORDER — VANCOMYCIN HCL 1 G
1250 VIAL (EA) INTRAVENOUS EVERY 8 HOURS
Refills: 0 | Status: DISCONTINUED | OUTPATIENT
Start: 2021-05-30 | End: 2021-05-31

## 2021-05-30 RX ORDER — PIPERACILLIN AND TAZOBACTAM 4; .5 G/20ML; G/20ML
3.38 INJECTION, POWDER, LYOPHILIZED, FOR SOLUTION INTRAVENOUS EVERY 8 HOURS
Refills: 0 | Status: DISCONTINUED | OUTPATIENT
Start: 2021-05-30 | End: 2021-06-02

## 2021-05-30 RX ADMIN — PIPERACILLIN AND TAZOBACTAM 25 GRAM(S): 4; .5 INJECTION, POWDER, LYOPHILIZED, FOR SOLUTION INTRAVENOUS at 23:33

## 2021-05-30 RX ADMIN — Medication 1 GRAM(S): at 23:33

## 2021-05-30 RX ADMIN — Medication 975 MILLIGRAM(S): at 16:00

## 2021-05-30 RX ADMIN — SODIUM CHLORIDE 2300 MILLILITER(S): 9 INJECTION INTRAMUSCULAR; INTRAVENOUS; SUBCUTANEOUS at 16:55

## 2021-05-30 RX ADMIN — MIRTAZAPINE 15 MILLIGRAM(S): 45 TABLET, ORALLY DISINTEGRATING ORAL at 21:59

## 2021-05-30 RX ADMIN — SODIUM CHLORIDE 2300 MILLILITER(S): 9 INJECTION INTRAMUSCULAR; INTRAVENOUS; SUBCUTANEOUS at 16:00

## 2021-05-30 RX ADMIN — PIPERACILLIN AND TAZOBACTAM 200 GRAM(S): 4; .5 INJECTION, POWDER, LYOPHILIZED, FOR SOLUTION INTRAVENOUS at 16:00

## 2021-05-30 RX ADMIN — Medication 166.67 MILLIGRAM(S): at 21:59

## 2021-05-30 RX ADMIN — PIPERACILLIN AND TAZOBACTAM 3.38 GRAM(S): 4; .5 INJECTION, POWDER, LYOPHILIZED, FOR SOLUTION INTRAVENOUS at 16:09

## 2021-05-30 RX ADMIN — Medication 1000 MILLIGRAM(S): at 17:06

## 2021-05-30 RX ADMIN — Medication 250 MILLIGRAM(S): at 16:09

## 2021-05-30 NOTE — ED PROVIDER NOTE - NS ED ROS FT
+ fever no chills, No photophobia/eye pain/changes in vision, No ear pain/sore throat/dysphagia, No chest pain/palpitations, no SOB/cough/wheeze/stridor, +abdominal bloating, No N/V/D, no dysuria/frequency/discharge, No neck/back pain, no rash, no changes in neurological status/function.

## 2021-05-30 NOTE — H&P ADULT - NSHPREVIEWOFSYSTEMS_GEN_ALL_CORE
REVIEW OF SYSTEMS:    CONSTITUTIONAL: No weakness, +fever  EYES/ENT: No visual changes;  No vertigo or throat pain   NECK: No pain or stiffness  RESPIRATORY: No cough, wheezing, hemoptysis; No shortness of breath  CARDIOVASCULAR: No chest pain or palpitations  GASTROINTESTINAL: No abdominal or epigastric pain. No vomiting, or hematemesis; No diarrhea or constipation. No melena or hematochezia. +nausea  GENITOURINARY: No dysuria, frequency or hematuria  NEUROLOGICAL: No numbness or weakness  SKIN: No itching, burning, rashes, or lesions   All other review of systems is negative unless indicated above.

## 2021-05-30 NOTE — H&P ADULT - HISTORY OF PRESENT ILLNESS
25M pmh IVDA, esophageal stricture s/p stent placement called in to ER for positive blood culture. Patient had recent admission after using heroin and presented with septic shock and found to have multi focal pna and subsequently intubated and brought to icu. Patient extubated ,  downgraded to medicine. completed course of IV Zosyn. Patient worked up for severe esophageal stricture. The patient was seen in consultation by Thoracic surgery and GI. S/P esophageal  stent paced by  GI endoscopy on 5/25. Patient eventually discharged home. He presented to ER yesterday after snorting multiple percocet and having multiple episodes of vomiting. He eventually left AMA. Today blood culture came back positive and he was told to come back in.     Upon presenting to ER, patient found to be septic with fever and tachcyardia. CT chest abdomen pelvis showed that esophageal stent has migrated to small bowel. GI called and stated will see patient in AM. Patient complaining of fever and nausea. He states he's been eating okay and having bowel movements. Denies abdominal pain. Patient admits to injecting heroin multiple times the last couple of days. He claims he uses clean needles.

## 2021-05-30 NOTE — ED PROVIDER NOTE - CLINICAL SUMMARY MEDICAL DECISION MAKING FREE TEXT BOX
labs and imaging reviewed. Pt with positive blood cultures.  Pt septic on arrival, fluid bolus and abx given.  CTs showed that esophageal stent has migrated to distal small bowel, no obstruction/perforation.  Case d/w GI Dr. Angelo who will see patient tomorrow. Case d/w Dr. Deluna who will admit for further management.

## 2021-05-30 NOTE — ED POST DISCHARGE NOTE - RESULT SUMMARY
Blood Cx 3 bottles GPR in pairs and chains, and gram variable rods.  I s/w patient and mother they will return to ED now.

## 2021-05-30 NOTE — ED PROVIDER NOTE - OBJECTIVE STATEMENT
Pt is a 26 yo M here for positive blood culture.  Pt was recently admitted here for aspiration pna/sepsis and subsequently had esophageal stent for stricture. Pt was discharged 2 d ago and came back yesterday morning as an opiate overdose and left AMA after. Pt had labs drawn at that time and today his blood culture came back positive so was called to come back to the ED. Pt admits to abdominal bloating. no cp. no n/v. +fever. no other complaints.

## 2021-05-30 NOTE — ED ADULT TRIAGE NOTE - CHIEF COMPLAINT QUOTE
had surgery last Tuesday, came back due to pain yesterday but left AMA, was call back due to + blood cultures   pt had esophagus surgery

## 2021-05-30 NOTE — ED ADULT NURSE NOTE - OBJECTIVE STATEMENT
26 y/o male advised by [facility] to return to ED for + blood cultures, upon arrival into ED patient febrile, tachycardiac, code sepsis initiated.

## 2021-05-30 NOTE — H&P ADULT - NSICDXPASTMEDICALHX_GEN_ALL_CORE_FT
PAST MEDICAL HISTORY:  Cocaine abuse     GERD (gastroesophageal reflux disease)     Heroin abuse     Hiatal hernia     History of esophageal stricture

## 2021-05-30 NOTE — H&P ADULT - NSHPPHYSICALEXAM_GEN_ALL_CORE
Vital Signs Last 24 Hrs  T(C): 37.6 (30 May 2021 17:32), Max: 38.2 (30 May 2021 16:32)  T(F): 99.6 (30 May 2021 17:32), Max: 100.8 (30 May 2021 16:32)  HR: 84 (30 May 2021 17:32) (84 - 138)  BP: 111/51 (30 May 2021 17:32) (102/50 - 126/58)  BP(mean): --  RR: 18 (30 May 2021 17:32) (16 - 18)  SpO2: 97% (30 May 2021 17:32) (97% - 99%)    GENERAL: NAD, ill appearing  HEENT:  Atraumatic, Normocephalic, MMM, no oropharyngeal lesions  EYES: EOMI, PERRLA, conjunctiva and sclera clear  NECK: Supple, No JVD, no throat tenderness.  CHEST/LUNG: Clear to auscultation bilaterally; No wheezes, rales, or rhonchi  HEART: Regular rate and rhythm; No murmurs, rubs, or gallops  ABDOMEN: Soft, Nontender, Nondistended; Bowel sounds present  EXTREMITIES:  2+ Peripheral Pulses, No clubbing, cyanosis, or edema  PSYCH: AAOx3, withdrawn  NEUROLOGY: moves all extremities spontaneously. no sensory deficits  SKIN: multiple injection sites in bilateral AC appear clean and dry

## 2021-05-30 NOTE — H&P ADULT - NSHPLABSRESULTS_GEN_ALL_CORE
10.6   10.08 )-----------( 176      ( 30 May 2021 15:56 )             35.0       05-30    135  |  97<L>  |  17.6  ----------------------------<  127<H>  3.9   |  28.0  |  0.68    Ca    9.0      30 May 2021 15:48    TPro  6.8  /  Alb  4.1  /  TBili  1.6  /  DBili  x   /  AST  173<H>  /  ALT  144<H>  /  AlkPhos  75  05-30        PT/INR - ( 30 May 2021 15:48 )   PT: 15.0 sec;   INR: 1.31 ratio       PTT - ( 30 May 2021 15:48 )  PTT:29.5 sec    Lactate Trend    CAPILLARY BLOOD GLUCOSE    Culture Results:   No growth (05-17 @ 02:13)  Culture Results:   No growth at 5 days. (05-16 @ 14:25)  Culture Results:   No growth at 5 days. (05-16 @ 14:25)      RADIOLOGY, EKG & ADDITIONAL TESTS: Reviewed.     < from: CT Chest w/ IV Cont (05.30.21 @ 17:43) >    FINDINGS:  CHEST:  LUNGS AND LARGE AIRWAYS: Groundglass opacities in the medial aspect of both upper lobes anteriorly. Improved consolidation at both bases with minimal residual. Findings represent sequela of prior aspiration pneumonia. Bilateral dependent atelectasis. No pulmonary nodules.  PLEURA: No pleural effusion.  VESSELS: Within normal limits.  HEART: Heart size is normal. No pericardial effusion.  MEDIASTINUM AND JOHN: No lymphadenopathy.  CHEST WALL AND LOWER NECK: Within normal limits.    ABDOMEN AND PELVIS:  LIVER: Within normal limits.  BILE DUCTS: Normal caliber.  GALLBLADDER: Within normal limits.  SPLEEN: Prominent but stable spleen.  PANCREAS: Within normal limits.  ADRENALS: Within normal limits.  KIDNEYS/URETERS: Punctate calculi in the lower pole of the RIGHT kidney.    BLADDER: Within normal limits.  REPRODUCTIVE ORGANS: Normal prostate    BOWEL: Markedly thickened esophagus. Esophageal stent is not visualized in situ. The esophageal stent is noted to be within a loop of small bowel within the RIGHT lower quadrant. No evidence of bowel obstruction. Appendix is not visualized. No evidence of inflammation in the pericecal region.  PERITONEUM: No ascites.  VESSELS: Within normal limits.  RETROPERITONEUM/LYMPH NODES: No lymphadenopathy.  ABDOMINAL WALL: Within normal limits.  BONES: Within normal limits.    IMPRESSION:  1.  Migrated esophageal stent now located within the distal small bowel in the RIGHT lower quadrant.  2.  Marked esophageal wall thickening especially at the distal esophagus.  3.  Improved bilateral lower lobe aspiration pneumonia with minimal residual.  4.  Punctate nonobstructing renal calculi within the lower pole of the RIGHT kidney.    < end of copied text >

## 2021-05-30 NOTE — ED ADULT NURSE NOTE - SUICIDE SCREENING QUESTION 2
Return to ER if fever over 102 5 or you feel that you're illness is worsening, otherwise follow-up with podiatrist  No

## 2021-05-30 NOTE — H&P ADULT - ASSESSMENT
25M pmh IVDA, esophageal stricture s/p stent placement called in to ER for positive blood culture found to be septic and have esophageal stent migration.         #Sepsis 2/2 Bacteremia  -bacteremia likely due to IVDA  -will c/w vanc and zosyn  -repeat blood cx in 2 days  -ID consult    #Esophageal stent migration  -GI consulted by ED, no urgent intervention  -will see patient in am  -NPO after midnight for possible procedure  -c/w protonix 40 and carafate    #Heroin abuse  -active heroin abuse  -monitor for sign of withdrawal  -refused suboxone last admission

## 2021-05-30 NOTE — ED PROVIDER NOTE - PHYSICAL EXAMINATION
Constitutional - well-developed; well nourished. Head - NCAT. Airway patent. Eyes - PERRL. CV - tachycardic regular. no murmur. no edema. Pulm - CTAB. Abd - soft, nt. no rebound. no guarding. Neuro - A&Ox3. strength 5/5 x4. sensation intact x4. normal gait. Skin - No rash. MSK - normal ROM.

## 2021-05-31 LAB
ANION GAP SERPL CALC-SCNC: 8 MMOL/L — SIGNIFICANT CHANGE UP (ref 5–17)
APPEARANCE UR: CLEAR — SIGNIFICANT CHANGE UP
BILIRUB UR-MCNC: NEGATIVE — SIGNIFICANT CHANGE UP
BUN SERPL-MCNC: 13.3 MG/DL — SIGNIFICANT CHANGE UP (ref 8–20)
CALCIUM SERPL-MCNC: 8.3 MG/DL — LOW (ref 8.6–10.2)
CHLORIDE SERPL-SCNC: 108 MMOL/L — HIGH (ref 98–107)
CO2 SERPL-SCNC: 28 MMOL/L — SIGNIFICANT CHANGE UP (ref 22–29)
COLOR SPEC: YELLOW — SIGNIFICANT CHANGE UP
COVID-19 SPIKE DOMAIN AB INTERP: NEGATIVE — SIGNIFICANT CHANGE UP
COVID-19 SPIKE DOMAIN ANTIBODY RESULT: 0.4 U/ML — SIGNIFICANT CHANGE UP
CREAT SERPL-MCNC: 0.58 MG/DL — SIGNIFICANT CHANGE UP (ref 0.5–1.3)
DIFF PNL FLD: NEGATIVE — SIGNIFICANT CHANGE UP
GLUCOSE SERPL-MCNC: 95 MG/DL — SIGNIFICANT CHANGE UP (ref 70–99)
GLUCOSE UR QL: NEGATIVE MG/DL — SIGNIFICANT CHANGE UP
GRAM STN FLD: SIGNIFICANT CHANGE UP
HAV IGM SER-ACNC: SIGNIFICANT CHANGE UP
HBV CORE IGM SER-ACNC: SIGNIFICANT CHANGE UP
HBV SURFACE AG SER-ACNC: SIGNIFICANT CHANGE UP
HCT VFR BLD CALC: 32.1 % — LOW (ref 39–50)
HCV AB S/CO SERPL IA: 11.52 S/CO — HIGH (ref 0–0.99)
HCV AB SERPL-IMP: REACTIVE
HGB BLD-MCNC: 9.6 G/DL — LOW (ref 13–17)
HIV 1+2 AB+HIV1 P24 AG SERPL QL IA: SIGNIFICANT CHANGE UP
KETONES UR-MCNC: NEGATIVE — SIGNIFICANT CHANGE UP
LEUKOCYTE ESTERASE UR-ACNC: NEGATIVE — SIGNIFICANT CHANGE UP
MAGNESIUM SERPL-MCNC: 1.9 MG/DL — SIGNIFICANT CHANGE UP (ref 1.6–2.6)
MCHC RBC-ENTMCNC: 23.5 PG — LOW (ref 27–34)
MCHC RBC-ENTMCNC: 29.9 GM/DL — LOW (ref 32–36)
MCV RBC AUTO: 78.7 FL — LOW (ref 80–100)
NITRITE UR-MCNC: NEGATIVE — SIGNIFICANT CHANGE UP
PH UR: 8 — SIGNIFICANT CHANGE UP (ref 5–8)
PHOSPHATE SERPL-MCNC: 2.5 MG/DL — SIGNIFICANT CHANGE UP (ref 2.4–4.7)
PLATELET # BLD AUTO: 136 K/UL — LOW (ref 150–400)
POTASSIUM SERPL-MCNC: 3.9 MMOL/L — SIGNIFICANT CHANGE UP (ref 3.5–5.3)
POTASSIUM SERPL-SCNC: 3.9 MMOL/L — SIGNIFICANT CHANGE UP (ref 3.5–5.3)
PROT UR-MCNC: NEGATIVE MG/DL — SIGNIFICANT CHANGE UP
RBC # BLD: 4.08 M/UL — LOW (ref 4.2–5.8)
RBC # FLD: 17.2 % — HIGH (ref 10.3–14.5)
SARS-COV-2 IGG+IGM SERPL QL IA: 0.4 U/ML — SIGNIFICANT CHANGE UP
SARS-COV-2 IGG+IGM SERPL QL IA: NEGATIVE — SIGNIFICANT CHANGE UP
SODIUM SERPL-SCNC: 144 MMOL/L — SIGNIFICANT CHANGE UP (ref 135–145)
SP GR SPEC: 1.01 — SIGNIFICANT CHANGE UP (ref 1.01–1.02)
SPECIMEN SOURCE: SIGNIFICANT CHANGE UP
UROBILINOGEN FLD QL: NEGATIVE MG/DL — SIGNIFICANT CHANGE UP
WBC # BLD: 5.26 K/UL — SIGNIFICANT CHANGE UP (ref 3.8–10.5)
WBC # FLD AUTO: 5.26 K/UL — SIGNIFICANT CHANGE UP (ref 3.8–10.5)

## 2021-05-31 PROCEDURE — 99223 1ST HOSP IP/OBS HIGH 75: CPT

## 2021-05-31 PROCEDURE — 99233 SBSQ HOSP IP/OBS HIGH 50: CPT

## 2021-05-31 PROCEDURE — 99222 1ST HOSP IP/OBS MODERATE 55: CPT

## 2021-05-31 RX ORDER — VANCOMYCIN HCL 1 G
1000 VIAL (EA) INTRAVENOUS EVERY 8 HOURS
Refills: 0 | Status: DISCONTINUED | OUTPATIENT
Start: 2021-05-31 | End: 2021-06-04

## 2021-05-31 RX ADMIN — PIPERACILLIN AND TAZOBACTAM 25 GRAM(S): 4; .5 INJECTION, POWDER, LYOPHILIZED, FOR SOLUTION INTRAVENOUS at 21:33

## 2021-05-31 RX ADMIN — Medication 1 GRAM(S): at 12:29

## 2021-05-31 RX ADMIN — Medication 250 MILLIGRAM(S): at 14:27

## 2021-05-31 RX ADMIN — PANTOPRAZOLE SODIUM 40 MILLIGRAM(S): 20 TABLET, DELAYED RELEASE ORAL at 05:55

## 2021-05-31 RX ADMIN — Medication 650 MILLIGRAM(S): at 21:39

## 2021-05-31 RX ADMIN — MIRTAZAPINE 15 MILLIGRAM(S): 45 TABLET, ORALLY DISINTEGRATING ORAL at 21:33

## 2021-05-31 RX ADMIN — PANTOPRAZOLE SODIUM 40 MILLIGRAM(S): 20 TABLET, DELAYED RELEASE ORAL at 17:09

## 2021-05-31 RX ADMIN — PIPERACILLIN AND TAZOBACTAM 25 GRAM(S): 4; .5 INJECTION, POWDER, LYOPHILIZED, FOR SOLUTION INTRAVENOUS at 08:58

## 2021-05-31 RX ADMIN — Medication 1 GRAM(S): at 05:56

## 2021-05-31 RX ADMIN — Medication 650 MILLIGRAM(S): at 22:22

## 2021-05-31 RX ADMIN — Medication 166.67 MILLIGRAM(S): at 05:55

## 2021-05-31 RX ADMIN — Medication 250 MILLIGRAM(S): at 21:33

## 2021-05-31 RX ADMIN — Medication 1 GRAM(S): at 23:44

## 2021-05-31 RX ADMIN — PIPERACILLIN AND TAZOBACTAM 25 GRAM(S): 4; .5 INJECTION, POWDER, LYOPHILIZED, FOR SOLUTION INTRAVENOUS at 14:27

## 2021-05-31 RX ADMIN — Medication 1 GRAM(S): at 17:10

## 2021-05-31 NOTE — PROGRESS NOTE ADULT - SUBJECTIVE AND OBJECTIVE BOX
Chief complaint: Bacteremia    Patient seen and examined at bedside. Patient is frustrated that he is back in the hospital. States he currently has no complaints. Reports having some chills. Denies fever, cough, chest pain, shortness of breath, nausea or vomiting.     Vital Signs Last 24 Hrs  T(F): 98.9 (31 May 2021 07:50), Max: 100.8 (30 May 2021 16:32)  HR: 71 (31 May 2021 07:50) (62 - 138)  BP: 119/67 (31 May 2021 07:50) (102/50 - 126/58)  RR: 20 (31 May 2021 07:50) (16 - 20)  SpO2: 97% (31 May 2021 07:50) (97% - 99%)    Physical Exam:  Constitutional: alert and oriented, in no acute distress   Neck: Soft and supple  Respiratory: Clear to auscultation bilaterally, no wheezes or crackles  Cardiovascular: Regular rate and rhyhtm, no murmurs, gallops, rubs  Gastrointestinal: Soft, non-tender to palpation, +bs  Vascular: 2+ peripheral pulses  Neurological: A/O x 3, no focal neurological deficits  Musculoskeletal: 5/5 strength b/l upper and lower extremities, no lower extremity edema bilaterally    Labs:                        9.6    5.26  )-----------( 136      ( 31 May 2021 07:24 )             32.1   05-31    144  |  108<H>  |  13.3  ----------------------------<  95  3.9   |  28.0  |  0.58    Ca    8.3<L>      31 May 2021 07:24  Phos  2.5     05-31  Mg     1.9     05-31    TPro  6.8  /  Alb  4.1  /  TBili  1.6  /  DBili  x   /  AST  173<H>  /  ALT  144<H>  /  AlkPhos  75  05-30

## 2021-05-31 NOTE — CONSULT NOTE ADULT - ASSESSMENT
Fever:  + BC with hx if IVDU, still actively abusing drugs.  BC + for Strept species.  Fever eval per med.    Migrated stent in distal small bowel:  Asymptomatic.  Will likely spontaneously pass.  KUB in AM.  OK to feed.  Had tolerated Mechanical soft / thin liquids.  Ordered.  Eventual repeat EGD with further dilitation when medically cleared.    HCV:  Outpt issue.  
25y  Male with h/o IVDA, esophageal stricture s/p stent placement.  Patient had recent admission after using heroin and presented with septic shock and found to have multi focal pna and subsequently intubated and was in MICU, completed course of IV Zosyn. Patient worked up for severe esophageal stricture and is now s/p esophageal  stent paced by  GI endoscopy on 5/25. Patient eventually discharged home. He presented to ER 5/29 after snorting multiple percocet and having multiple episodes of vomiting. He eventually left AMA. He was called back to the ER due to positive blood cultures. In the ER patient was noted to be febrile, tachycardic, CT chest abdomen pelvis reporting esophageal stent has migrated to small bowel. GI called. Patient complaining of fever and nausea. He states he's been eating okay and having bowel movements. Denies abdominal pain. Patient admits to injecting heroin multiple times the last couple of days. He claims he uses clean needles. Started on IV Vancomycin and Zosyn.      Streptococcus Septicemia  Fever  Leukocytosis   Migrated esophageal stent  Transaminitis   IVDA    - Blood cultures Reporting Strep   - Repeat blood cultures pending  - COVID 19 OPCR negative   - CT A/P reporting migrated esophageal stent   - Continue Zosyn  - Continue Vancomycin  - Monitor trough  - Monitor for Vancomycin toxicity   - Vancomycin required at this time pending culture results  - Check Viral hepatitis panel   - Check TTE  - Follow up cultures   - Trend Fever  - Trend Leukocytosis      Will Follow      d/w Dr Crooks

## 2021-05-31 NOTE — PROVIDER CONTACT NOTE (CRITICAL VALUE NOTIFICATION) - SITUATION
Positive growth in anerobic bottle Streptococcous   parasanguinis, streptococcous salivarius/vestibularis.  group alpha hemalytic strep (not strep pneumonia or enterococcous.  growth arobic bottle rothis mucilaginosa susceptibility is not performed.  Single isolate possible contaminate contact microbiology dept if susceptibility is clinically needed as indicated.

## 2021-05-31 NOTE — PROGRESS NOTE ADULT - ASSESSMENT
25M pmh IVDA, esophageal stricture s/p stent placement called in to ER for positive blood culture found to be septic and have esophageal stent migration.     Sepsis 2/2 Bacteremia  -bacteremia likely due to IVDA  - c/w Zosyn 3.375g q8h  - c/w Vancomycin 1250mg q8h  - repeat blood cultures in process  - ID consulted    Esophageal stent migration  - GI consulted by ED, no urgent intervention  - NPO for possible procedure  - c/w protonix 40 and carafate    Heroin abuse  - active heroin abuse  - monitor for sign of withdrawal  - refused suboxone last admission    DVT ppx  - SCD    Dispo: pending clinical course

## 2021-06-01 LAB
-  CEFTRIAXONE: SIGNIFICANT CHANGE UP
-  CLINDAMYCIN: SIGNIFICANT CHANGE UP
-  ERYTHROMYCIN: SIGNIFICANT CHANGE UP
-  LEVOFLOXACIN: SIGNIFICANT CHANGE UP
-  PENICILLIN: SIGNIFICANT CHANGE UP
-  VANCOMYCIN: SIGNIFICANT CHANGE UP
ALBUMIN SERPL ELPH-MCNC: 3.3 G/DL — SIGNIFICANT CHANGE UP (ref 3.3–5.2)
ALP SERPL-CCNC: 73 U/L — SIGNIFICANT CHANGE UP (ref 40–120)
ALT FLD-CCNC: 98 U/L — HIGH
ANION GAP SERPL CALC-SCNC: 8 MMOL/L — SIGNIFICANT CHANGE UP (ref 5–17)
AST SERPL-CCNC: 71 U/L — HIGH
BASOPHILS # BLD AUTO: 0.01 K/UL — SIGNIFICANT CHANGE UP (ref 0–0.2)
BASOPHILS NFR BLD AUTO: 0.2 % — SIGNIFICANT CHANGE UP (ref 0–2)
BILIRUB SERPL-MCNC: 0.4 MG/DL — SIGNIFICANT CHANGE UP (ref 0.4–2)
BUN SERPL-MCNC: 9.5 MG/DL — SIGNIFICANT CHANGE UP (ref 8–20)
CALCIUM SERPL-MCNC: 8.9 MG/DL — SIGNIFICANT CHANGE UP (ref 8.6–10.2)
CHLORIDE SERPL-SCNC: 104 MMOL/L — SIGNIFICANT CHANGE UP (ref 98–107)
CO2 SERPL-SCNC: 29 MMOL/L — SIGNIFICANT CHANGE UP (ref 22–29)
CREAT SERPL-MCNC: 0.75 MG/DL — SIGNIFICANT CHANGE UP (ref 0.5–1.3)
CULTURE RESULTS: NO GROWTH — SIGNIFICANT CHANGE UP
CULTURE RESULTS: SIGNIFICANT CHANGE UP
CULTURE RESULTS: SIGNIFICANT CHANGE UP
EOSINOPHIL # BLD AUTO: 0.18 K/UL — SIGNIFICANT CHANGE UP (ref 0–0.5)
EOSINOPHIL NFR BLD AUTO: 4.1 % — SIGNIFICANT CHANGE UP (ref 0–6)
GLUCOSE SERPL-MCNC: 109 MG/DL — HIGH (ref 70–99)
HAV IGM SER-ACNC: SIGNIFICANT CHANGE UP
HBV CORE IGM SER-ACNC: SIGNIFICANT CHANGE UP
HBV SURFACE AG SER-ACNC: SIGNIFICANT CHANGE UP
HCT VFR BLD CALC: 32.5 % — LOW (ref 39–50)
HCV AB S/CO SERPL IA: 10.96 S/CO — HIGH (ref 0–0.99)
HCV AB SERPL-IMP: REACTIVE
HGB BLD-MCNC: 10 G/DL — LOW (ref 13–17)
IMM GRANULOCYTES NFR BLD AUTO: 0.2 % — SIGNIFICANT CHANGE UP (ref 0–1.5)
LYMPHOCYTES # BLD AUTO: 1.49 K/UL — SIGNIFICANT CHANGE UP (ref 1–3.3)
LYMPHOCYTES # BLD AUTO: 33.6 % — SIGNIFICANT CHANGE UP (ref 13–44)
MCHC RBC-ENTMCNC: 24.1 PG — LOW (ref 27–34)
MCHC RBC-ENTMCNC: 30.8 GM/DL — LOW (ref 32–36)
MCV RBC AUTO: 78.3 FL — LOW (ref 80–100)
METHOD TYPE: SIGNIFICANT CHANGE UP
METHOD TYPE: SIGNIFICANT CHANGE UP
MONOCYTES # BLD AUTO: 0.51 K/UL — SIGNIFICANT CHANGE UP (ref 0–0.9)
MONOCYTES NFR BLD AUTO: 11.5 % — SIGNIFICANT CHANGE UP (ref 2–14)
NEUTROPHILS # BLD AUTO: 2.24 K/UL — SIGNIFICANT CHANGE UP (ref 1.8–7.4)
NEUTROPHILS NFR BLD AUTO: 50.4 % — SIGNIFICANT CHANGE UP (ref 43–77)
ORGANISM # SPEC MICROSCOPIC CNT: SIGNIFICANT CHANGE UP
PLATELET # BLD AUTO: 152 K/UL — SIGNIFICANT CHANGE UP (ref 150–400)
POTASSIUM SERPL-MCNC: 3.9 MMOL/L — SIGNIFICANT CHANGE UP (ref 3.5–5.3)
POTASSIUM SERPL-SCNC: 3.9 MMOL/L — SIGNIFICANT CHANGE UP (ref 3.5–5.3)
PROT SERPL-MCNC: 5.8 G/DL — LOW (ref 6.6–8.7)
RBC # BLD: 4.15 M/UL — LOW (ref 4.2–5.8)
RBC # FLD: 17.2 % — HIGH (ref 10.3–14.5)
SODIUM SERPL-SCNC: 141 MMOL/L — SIGNIFICANT CHANGE UP (ref 135–145)
SPECIMEN SOURCE: SIGNIFICANT CHANGE UP
VANCOMYCIN TROUGH SERPL-MCNC: 13.1 UG/ML — SIGNIFICANT CHANGE UP (ref 10–20)
WBC # BLD: 4.44 K/UL — SIGNIFICANT CHANGE UP (ref 3.8–10.5)
WBC # FLD AUTO: 4.44 K/UL — SIGNIFICANT CHANGE UP (ref 3.8–10.5)

## 2021-06-01 PROCEDURE — 99233 SBSQ HOSP IP/OBS HIGH 50: CPT

## 2021-06-01 PROCEDURE — 74177 CT ABD & PELVIS W/CONTRAST: CPT | Mod: 26

## 2021-06-01 PROCEDURE — 74018 RADEX ABDOMEN 1 VIEW: CPT | Mod: 26

## 2021-06-01 PROCEDURE — 99232 SBSQ HOSP IP/OBS MODERATE 35: CPT

## 2021-06-01 RX ADMIN — PIPERACILLIN AND TAZOBACTAM 25 GRAM(S): 4; .5 INJECTION, POWDER, LYOPHILIZED, FOR SOLUTION INTRAVENOUS at 22:06

## 2021-06-01 RX ADMIN — Medication 1 GRAM(S): at 23:30

## 2021-06-01 RX ADMIN — PANTOPRAZOLE SODIUM 40 MILLIGRAM(S): 20 TABLET, DELAYED RELEASE ORAL at 16:19

## 2021-06-01 RX ADMIN — Medication 1 GRAM(S): at 05:42

## 2021-06-01 RX ADMIN — Medication 250 MILLIGRAM(S): at 19:55

## 2021-06-01 RX ADMIN — Medication 1 GRAM(S): at 22:06

## 2021-06-01 RX ADMIN — Medication 250 MILLIGRAM(S): at 22:06

## 2021-06-01 RX ADMIN — Medication 250 MILLIGRAM(S): at 09:51

## 2021-06-01 RX ADMIN — MIRTAZAPINE 15 MILLIGRAM(S): 45 TABLET, ORALLY DISINTEGRATING ORAL at 23:06

## 2021-06-01 RX ADMIN — PIPERACILLIN AND TAZOBACTAM 25 GRAM(S): 4; .5 INJECTION, POWDER, LYOPHILIZED, FOR SOLUTION INTRAVENOUS at 05:42

## 2021-06-01 RX ADMIN — Medication 1 GRAM(S): at 15:30

## 2021-06-01 RX ADMIN — PANTOPRAZOLE SODIUM 40 MILLIGRAM(S): 20 TABLET, DELAYED RELEASE ORAL at 05:42

## 2021-06-01 RX ADMIN — PIPERACILLIN AND TAZOBACTAM 25 GRAM(S): 4; .5 INJECTION, POWDER, LYOPHILIZED, FOR SOLUTION INTRAVENOUS at 15:21

## 2021-06-01 NOTE — PROGRESS NOTE ADULT - ASSESSMENT
25y  Male with h/o IVDA, esophageal stricture s/p stent placement.  Patient had recent admission after using heroin and presented with septic shock and found to have multi focal pna and subsequently intubated and was in MICU, completed course of IV Zosyn. Patient worked up for severe esophageal stricture and is now s/p esophageal  stent paced by  GI endoscopy on 5/25. Patient eventually discharged home. He presented to ER 5/29 after snorting multiple percocet and having multiple episodes of vomiting. He eventually left AMA. He was called back to the ER due to positive blood cultures. In the ER patient was noted to be febrile, tachycardic, CT chest abdomen pelvis reporting esophageal stent has migrated to small bowel. GI called. Patient complaining of fever and nausea. He states he's been eating okay and having bowel movements. Denies abdominal pain. Patient admits to injecting heroin multiple times the last couple of days. He claims he uses clean needles. Started on IV Vancomycin and Zosyn.      Streptococcus Septicemia likely 2/2 IVDU  Fever  Leukocytosis   Migrated esophageal stent  Transaminitis   IVDA    - Blood cultures Reporting Strep /bacillus  - Repeat blood cultures pending  - COVID 19 PCR negative   - CT A/P reporting migrated esophageal stent   - Continue Zosyn  - Continue Vancomycin  - Monitor trough  - Monitor for Vancomycin toxicity   - Vancomycin required at this time pending culture results  - HCV +, f/u PCR  - TTE no vegetations  - Follow up cultures   - Trend Fever  - Trend Leukocytosis      Will Follow

## 2021-06-01 NOTE — PROGRESS NOTE ADULT - SUBJECTIVE AND OBJECTIVE BOX
Chief complaint: Bacteremia    Patient seen and examined at bedside  Laying in bed, no complaints this morning  Denies headache, dizziness, n/v, fever, chills, chest pain, SOB, abdominal pain, dysphagia  Remainder ROS neg  VSS on RA    Vital Signs Last 24 Hrs  T(C): 36.7 (01 Jun 2021 07:18), Max: 37.2 (31 May 2021 15:27)  T(F): 98 (01 Jun 2021 07:18), Max: 99 (31 May 2021 15:27)  HR: 62 (01 Jun 2021 07:18) (56 - 94)  BP: 104/61 (01 Jun 2021 07:18) (101/58 - 123/70)  BP(mean): --  RR: 18 (01 Jun 2021 07:18) (18 - 20)  SpO2: 97% (01 Jun 2021 07:18) (94% - 99%) on RA    Physical Exam:  Constitutional: alert and oriented, in no acute distress   Neck: Soft and supple  Respiratory: Clear to auscultation bilaterally, no wheezes or crackles  Cardiovascular: Regular rate and rhythm, no murmurs, gallops, rubs  Gastrointestinal: Soft, non-tender to palpation, +bs  Vascular: 2+ peripheral pulses  Neurological: A/O x 3, no focal neurological deficits  Musculoskeletal: 5/5 strength b/l upper and lower extremities, no lower extremity edema bilaterally    Labs:                        10.0   4.44  )-----------( 152      ( 01 Jun 2021 05:34 )             32.5   06-01    141  |  104  |  9.5  ----------------------------<  109<H>  3.9   |  29.0  |  0.75    Ca    8.9      01 Jun 2021 05:34  Phos  2.5     05-31  Mg     1.9     05-31    TPro  5.8<L>  /  Alb  3.3  /  TBili  0.4  /  DBili  x   /  AST  71<H>  /  ALT  98<H>  /  AlkPhos  73  06-01

## 2021-06-01 NOTE — PROGRESS NOTE ADULT - SUBJECTIVE AND OBJECTIVE BOX
[facility]  INFECTIOUS DISEASES AND INTERNAL MEDICINE at [place]  =======================================================  Rafa Garay MD  Diplomates American Board of Internal Medicine and Infectious Diseases  Tel: [phone]      Fax: [phone]  =======================================================    [place]NER [place]ORA [id number]    Follow up: bacteremia, IVDU  feels well  abdominal pain resolved  admits to IVDU reports using clean needles but licked the needle to clean them and licked the wound on skin after using      Allergies:  No Known Allergies           REVIEW OF SYSTEMS:  CONSTITUTIONAL:  No Fever or chills  HEENT:   No diplopia or blurred vision.  No earache, sore throat or runny nose.  CARDIOVASCULAR:  No pressure, squeezing, strangling, tightness, heaviness or aching about the chest, neck, axilla or epigastrium.  RESPIRATORY:  No cough, shortness of breath  GASTROINTESTINAL:  No nausea, vomiting or diarrhea.  GENITOURINARY:  No dysuria, frequency or urgency. No Blood in urine  MUSCULOSKELETAL:  no joint aches, no muscle pain  SKIN:  No change in skin, hair or nails.  NEUROLOGIC:  No Headaches, seizures or weakness.  PSYCHIATRIC:  No disorder of thought or mood.  ENDOCRINE:  No heat or cold intolerance  HEMATOLOGICAL:  No easy bruising or bleeding.       Physical Exam:  GEN: NAD, pleasant  HEENT: normocephalic and atraumatic. EOMI. PERRL.  Anicteric   NECK: Supple.   LUNGS: Clear to auscultation.  HEART: Regular rate and rhythm without murmur.  ABDOMEN: Soft, nontender, and nondistended.  Positive bowel sounds.    : No CVA tenderness  EXTREMITIES: Without any edema.  MSK: no joint swelling  NEUROLOGIC: Cranial nerves II through XII are grossly intact. No focal deficits  PSYCHIATRIC: Appropriate affect .  SKIN: No Rash      Vitals:    T(F): 98.3 (01 Jun 2021 11:26), Max: 99 (31 May 2021 15:27)  HR: 90 (01 Jun 2021 11:26)  BP: 118/76 (01 Jun 2021 11:26)  RR: 18 (01 Jun 2021 11:26)  SpO2: 100% (01 Jun 2021 11:26) (94% - 100%)  temp max in last 48H T(F): , Max: 100.8 (05-30-21 @ 16:32)    Current Antibiotics:  piperacillin/tazobactam IVPB.. 3.375 Gram(s) IV Intermittent every 8 hours  vancomycin  IVPB 1000 milliGRAM(s) IV Intermittent every 8 hours    Other medications:  mirtazapine Soltab 15 milliGRAM(s) Oral at bedtime  pantoprazole  Injectable 40 milliGRAM(s) IV Push two times a day  sucralfate suspension 1 Gram(s) Oral four times a day                            10.0   4.44  )-----------( 152      ( 01 Jun 2021 05:34 )             32.5     06-01    141  |  104  |  9.5  ----------------------------<  109<H>  3.9   |  29.0  |  0.75    Ca    8.9      01 Jun 2021 05:34  Phos  2.5     05-31  Mg     1.9     05-31    TPro  5.8<L>  /  Alb  3.3  /  TBili  0.4  /  DBili  x   /  AST  71<H>  /  ALT  98<H>  /  AlkPhos  73  06-01    RECENT CULTURES:  05-29 @ 07:04 .Blood Blood-Peripheral aerobic Blood Culture PCR    Growth in aerobic bottle: Streptococcus parasanguinis Susceptibility to  follow.  Growth in aerobic bottle: Bacillus species not anthracis  "Susceptibilities not performed"    Growth in aerobic bottle: Gram Positive Cocci in Pairs and Chains and  Gram Variable Rods  Gram Stain and BCID performed by:  [facility]  [address] [place] [address]  .  ***Blood Panel PCR results on this specimen are available  approximately 3 hours after the Gram stain result.***  Gram stain, PCR, and/or culture results may not always  correspond due to difference in methodologies.  ************************************************************  ThisPCR assay was performed using [employer].  The following targets are tested for: Enterococcus,  vancomycin resistant enterococci, Listeria monocytogenes,  coagulase negative staphylococci, S. aureus,  methicillin resistant S. aureus, Streptococcus agalactiae  (Group B), S. pneumoniae, S. pyogenes (Group A),  Acinetobacter baumannii, Enterobacter cloacae, E. coli,  Klebsiella oxytoca, K. pneumoniae, Proteus sp.,  Serratia marcescens, Haemophilus influenzae,  Neisseria meningitidis, Pseudomonas aeruginosa, Candida  albicans, C. glabrata, C krusei, C parapsilosis,  C. tropicalis and the KPC resistance gene.  "Due to technical problems, Pseudomonas aeruginosa    until further notice".  TYPE: (C=Critical, N=Notification, A=Abnormal) C  TESTS:  _ GS  DATE/TIME CALLED: _ 05/30/2021 14:17:57  CALLED TO: ALIYAH LANTIGUA  READ BACK (2 Patient Identifiers)(Y/N): _ Y  READ BACK VALUES (Y/N): _ Y  CALLED BY: _ SG          WBC Count: 4.44 K/uL (06-01-21 @ 05:34)  WBC Count: 5.26 K/uL (05-31-21 @ 07:24)  WBC Count: 10.08 K/uL (05-30-21 @ 15:56)  WBC Count: 10.26 K/uL (05-29-21 @ 07:05)    Creatinine, Serum: 0.75 mg/dL (06-01-21 @ 05:34)  Creatinine, Serum: 0.58 mg/dL (05-31-21 @ 07:24)  Creatinine, Serum: 0.68 mg/dL (05-30-21 @ 15:48)  Creatinine, Serum: 0.90 mg/dL (05-29-21 @ 07:05)               COVID-19 PCR: NotDetec (05-30-21 @ 17:19)  COVID-19 PCR: NotDetec (05-23-21 @ 05:56)  COVID-19 PCR: NotDetec (05-16-21 @ 14:22)  COVID-19 PCR: NotDetec (05-13-21 @ 22:42)

## 2021-06-01 NOTE — PROGRESS NOTE ADULT - ASSESSMENT
The patient with dysphagia status post esophageal stenting and now with stent migration.  At this time, I will recommend to perform serial KUB and serial abdominal exam.  It is expected that the stent may pass spontaneously.  We will also recommend to continue the antibiotics for bacteremia.  Perform esophagram to assess for esophageal stricture as most likely the stricture may have resolved after the placement of the stent and that may have led to the migration of the stent.

## 2021-06-01 NOTE — PROGRESS NOTE ADULT - SUBJECTIVE AND OBJECTIVE BOX
INTERVAL HPI/OVERNIGHT EVENTS:The patient was seen and evaluated at the bedside.  Complains of being tired.  He is on antibiotics for bacteremia.    MEDICATIONS  (STANDING):  mirtazapine Soltab 15 milliGRAM(s) Oral at bedtime  pantoprazole  Injectable 40 milliGRAM(s) IV Push two times a day  piperacillin/tazobactam IVPB.. 3.375 Gram(s) IV Intermittent every 8 hours  sucralfate suspension 1 Gram(s) Oral four times a day  vancomycin  IVPB 1000 milliGRAM(s) IV Intermittent every 8 hours    MEDICATIONS  (PRN):  acetaminophen   Tablet .. 650 milliGRAM(s) Oral every 6 hours PRN Temp greater or equal to 38C (100.4F), Mild Pain (1 - 3), Moderate Pain (4 - 6)      Allergies    No Known Allergies    Intolerances        Vital Signs Last 24 Hrs  T(C): 36.7 (2021 07:18), Max: 37.2 (31 May 2021 15:27)  T(F): 98 (2021 07:18), Max: 99 (31 May 2021 15:27)  HR: 62 (2021 07:18) (56 - 94)  BP: 104/61 (2021 07:18) (101/58 - 123/70)  BP(mean): --  RR: 18 (2021 07:18) (18 - 20)  SpO2: 97% (2021 07:18) (94% - 99%)    LABS:                        10.0   4.44  )-----------( 152      ( 2021 05:34 )             32.5     06-01    141  |  104  |  9.5  ----------------------------<  109<H>  3.9   |  29.0  |  0.75    Ca    8.9      2021 05:34  Phos  2.5     05-31  Mg     1.9     05-31    TPro  5.8<L>  /  Alb  3.3  /  TBili  0.4  /  DBili  x   /  AST  71<H>  /  ALT  98<H>  /  AlkPhos  73  06-01    PT/INR - ( 30 May 2021 15:48 )   PT: 15.0 sec;   INR: 1.31 ratio         PTT - ( 30 May 2021 15:48 )  PTT:29.5 sec  Urinalysis Basic - ( 31 May 2021 16:59 )    Color: Yellow / Appearance: Clear / S.010 / pH: x  Gluc: x / Ketone: Negative  / Bili: Negative / Urobili: Negative mg/dL   Blood: x / Protein: Negative mg/dL / Nitrite: Negative   Leuk Esterase: Negative / RBC: x / WBC x   Sq Epi: x / Non Sq Epi: x / Bacteria: x        RADIOLOGY & ADDITIONAL TESTS:

## 2021-06-01 NOTE — PROGRESS NOTE ADULT - ASSESSMENT
25M pmh IVDA, esophageal stricture s/p stent placement called in to ER for positive blood culture found to be septic and have esophageal stent migration.     #Sepsis 2/2 Bacteremia  - BCX reporting strep  - Bacteremia likely due to IVDA  - C/w Zozyn and vanco  - Repeat BCX ordered for today 6/1, follow  - ID following  - TTE w/o signs of vegetation     #Esophageal stent migration  - Stent initially placed for esophageal stricture  - Seen in consult by GI. No urgent intervention, serial KUB/abdo exams, esophogram for further stricture evaluation   - C/w protonix and carafate  [facility]    #Heroin abuse  - Active heroin abuse  - Monitor for sign of withdrawal  - Refused suboxone last admission    DVT ppx  - SCD    Dispo: pending clinical course 25M pmh IVDA, esophageal stricture s/p stent placement called in to ER for positive blood culture found to be septic and have esophageal stent migration.     #Sepsis 2/2 Bacteremia  - BCX reporting strep  - Bacteremia likely due to IVDA  - C/w Zozyn and vanco  - Repeat BCX ordered for today 6/1, follow  - ID following  - TTE w/o signs of vegetation     #Esophageal stent migration  - Stent initially placed for esophageal stricture  - Seen in consult by GI. No urgent intervention, serial KUB/abdo exams, esophogram for further stricture evaluation   - C/w protonix and carafate  - Mech soft diet    #Heroin abuse  - Active heroin abuse  - Monitor for sign of withdrawal  - Refused suboxone last admission    #Hep C reactive  - Hep C panel reactive  - RNA ab pending, follow     DVT ppx  - SCD    Dispo: pending clinical course 25M pmh IVDA, esophageal stricture s/p stent placement called in to ER for positive blood culture found to be septic and have esophageal stent migration.     #Sepsis 2/2 Bacteremia  - BCX reporting strep  - Bacteremia likely due to IVDA  - CT c/a/p reviewed   - C/w Zozyn and vanco  - Repeat BCX ordered for today 6/1, follow  - ID following  - TTE w/o signs of vegetation     #Esophageal stent migration  - Stent initially placed for esophageal stricture  - Seen in consult by GI. No urgent intervention, serial KUB/abdo exams, esophogram for further stricture evaluation   - C/w protonix and carafate  - Mech soft diet    #Heroin abuse  - Active heroin abuse  - Monitor for sign of withdrawal  - Refused suboxone last admission    #Hep C reactive  - Hep C panel reactive  - RNA ab pending, follow     DVT ppx  - SCD    Dispo: pending clinical course

## 2021-06-02 VITALS
TEMPERATURE: 98 F | HEART RATE: 50 BPM | OXYGEN SATURATION: 98 % | SYSTOLIC BLOOD PRESSURE: 117 MMHG | DIASTOLIC BLOOD PRESSURE: 65 MMHG

## 2021-06-02 DIAGNOSIS — Z87.19 PERSONAL HISTORY OF OTHER DISEASES OF THE DIGESTIVE SYSTEM: ICD-10-CM

## 2021-06-02 LAB
ALBUMIN SERPL ELPH-MCNC: 3.6 G/DL — SIGNIFICANT CHANGE UP (ref 3.3–5.2)
ALP SERPL-CCNC: 88 U/L — SIGNIFICANT CHANGE UP (ref 40–120)
ALT FLD-CCNC: 79 U/L — HIGH
ANION GAP SERPL CALC-SCNC: 8 MMOL/L — SIGNIFICANT CHANGE UP (ref 5–17)
AST SERPL-CCNC: 42 U/L — HIGH
BASOPHILS # BLD AUTO: 0.02 K/UL — SIGNIFICANT CHANGE UP (ref 0–0.2)
BASOPHILS NFR BLD AUTO: 0.4 % — SIGNIFICANT CHANGE UP (ref 0–2)
BILIRUB SERPL-MCNC: 0.3 MG/DL — LOW (ref 0.4–2)
BUN SERPL-MCNC: 11.2 MG/DL — SIGNIFICANT CHANGE UP (ref 8–20)
CALCIUM SERPL-MCNC: 9.3 MG/DL — SIGNIFICANT CHANGE UP (ref 8.6–10.2)
CHLORIDE SERPL-SCNC: 102 MMOL/L — SIGNIFICANT CHANGE UP (ref 98–107)
CO2 SERPL-SCNC: 31 MMOL/L — HIGH (ref 22–29)
CREAT SERPL-MCNC: 0.67 MG/DL — SIGNIFICANT CHANGE UP (ref 0.5–1.3)
EOSINOPHIL # BLD AUTO: 0.18 K/UL — SIGNIFICANT CHANGE UP (ref 0–0.5)
EOSINOPHIL NFR BLD AUTO: 3.6 % — SIGNIFICANT CHANGE UP (ref 0–6)
GLUCOSE SERPL-MCNC: 91 MG/DL — SIGNIFICANT CHANGE UP (ref 70–99)
HCT VFR BLD CALC: 33.3 % — LOW (ref 39–50)
HGB BLD-MCNC: 10 G/DL — LOW (ref 13–17)
IMM GRANULOCYTES NFR BLD AUTO: 0.2 % — SIGNIFICANT CHANGE UP (ref 0–1.5)
LYMPHOCYTES # BLD AUTO: 1.65 K/UL — SIGNIFICANT CHANGE UP (ref 1–3.3)
LYMPHOCYTES # BLD AUTO: 33.1 % — SIGNIFICANT CHANGE UP (ref 13–44)
MCHC RBC-ENTMCNC: 23.8 PG — LOW (ref 27–34)
MCHC RBC-ENTMCNC: 30 GM/DL — LOW (ref 32–36)
MCV RBC AUTO: 79.1 FL — LOW (ref 80–100)
MONOCYTES # BLD AUTO: 0.59 K/UL — SIGNIFICANT CHANGE UP (ref 0–0.9)
MONOCYTES NFR BLD AUTO: 11.8 % — SIGNIFICANT CHANGE UP (ref 2–14)
NEUTROPHILS # BLD AUTO: 2.54 K/UL — SIGNIFICANT CHANGE UP (ref 1.8–7.4)
NEUTROPHILS NFR BLD AUTO: 50.9 % — SIGNIFICANT CHANGE UP (ref 43–77)
PLATELET # BLD AUTO: 168 K/UL — SIGNIFICANT CHANGE UP (ref 150–400)
POTASSIUM SERPL-MCNC: 4.1 MMOL/L — SIGNIFICANT CHANGE UP (ref 3.5–5.3)
POTASSIUM SERPL-SCNC: 4.1 MMOL/L — SIGNIFICANT CHANGE UP (ref 3.5–5.3)
PROT SERPL-MCNC: 6.1 G/DL — LOW (ref 6.6–8.7)
RBC # BLD: 4.21 M/UL — SIGNIFICANT CHANGE UP (ref 4.2–5.8)
RBC # FLD: 16.9 % — HIGH (ref 10.3–14.5)
SODIUM SERPL-SCNC: 141 MMOL/L — SIGNIFICANT CHANGE UP (ref 135–145)
VANCOMYCIN TROUGH SERPL-MCNC: 11.2 UG/ML — SIGNIFICANT CHANGE UP (ref 10–20)
WBC # BLD: 4.99 K/UL — SIGNIFICANT CHANGE UP (ref 3.8–10.5)
WBC # FLD AUTO: 4.99 K/UL — SIGNIFICANT CHANGE UP (ref 3.8–10.5)

## 2021-06-02 PROCEDURE — 99233 SBSQ HOSP IP/OBS HIGH 50: CPT

## 2021-06-02 PROCEDURE — 99232 SBSQ HOSP IP/OBS MODERATE 35: CPT

## 2021-06-02 RX ORDER — AMPICILLIN SODIUM AND SULBACTAM SODIUM 250; 125 MG/ML; MG/ML
3 INJECTION, POWDER, FOR SUSPENSION INTRAMUSCULAR; INTRAVENOUS EVERY 6 HOURS
Refills: 0 | Status: COMPLETED | OUTPATIENT
Start: 2021-06-02 | End: 2021-06-12

## 2021-06-02 RX ORDER — SACCHAROMYCES BOULARDII 250 MG
250 POWDER IN PACKET (EA) ORAL
Refills: 0 | Status: DISCONTINUED | OUTPATIENT
Start: 2021-06-02 | End: 2021-06-12

## 2021-06-02 RX ADMIN — MIRTAZAPINE 15 MILLIGRAM(S): 45 TABLET, ORALLY DISINTEGRATING ORAL at 21:56

## 2021-06-02 RX ADMIN — PIPERACILLIN AND TAZOBACTAM 25 GRAM(S): 4; .5 INJECTION, POWDER, LYOPHILIZED, FOR SOLUTION INTRAVENOUS at 06:05

## 2021-06-02 RX ADMIN — Medication 250 MILLIGRAM(S): at 21:56

## 2021-06-02 RX ADMIN — AMPICILLIN SODIUM AND SULBACTAM SODIUM 200 GRAM(S): 250; 125 INJECTION, POWDER, FOR SUSPENSION INTRAMUSCULAR; INTRAVENOUS at 17:04

## 2021-06-02 RX ADMIN — PANTOPRAZOLE SODIUM 40 MILLIGRAM(S): 20 TABLET, DELAYED RELEASE ORAL at 17:09

## 2021-06-02 RX ADMIN — Medication 1 GRAM(S): at 13:43

## 2021-06-02 RX ADMIN — PANTOPRAZOLE SODIUM 40 MILLIGRAM(S): 20 TABLET, DELAYED RELEASE ORAL at 06:05

## 2021-06-02 RX ADMIN — Medication 250 MILLIGRAM(S): at 14:01

## 2021-06-02 RX ADMIN — Medication 250 MILLIGRAM(S): at 17:07

## 2021-06-02 RX ADMIN — Medication 250 MILLIGRAM(S): at 08:12

## 2021-06-02 RX ADMIN — PIPERACILLIN AND TAZOBACTAM 25 GRAM(S): 4; .5 INJECTION, POWDER, LYOPHILIZED, FOR SOLUTION INTRAVENOUS at 13:43

## 2021-06-02 RX ADMIN — Medication 1 GRAM(S): at 06:04

## 2021-06-02 RX ADMIN — Medication 1 GRAM(S): at 17:07

## 2021-06-02 NOTE — PROGRESS NOTE ADULT - ASSESSMENT
25M pmh IVDA, esophageal stricture s/p stent placement called in to ER for positive blood culture found to be septic and have esophageal stent migration. Blood cultures positive for strep, repeat blood cultures in process.     Sepsis 2/2 Bacteremia  - BCX reporting strep  - Bacteremia likely due to IVDA  - CT c/a/p reviewed   - C/w Zozyn and vanco  - ID following  - TTE w/o signs of vegetation   - Blood cultures (5/29): Streptococcus parasanguinis  - Blood cultures (5/30): No growth  - Repeat blood cultures in process form 6/1    Esophageal stent migration  - tolerating mechanical soft diet well  - Stent initially placed for esophageal stricture  - Seen in consult by GI. No urgent intervention, serial KUB/abdo exams, esophogram for further stricture evaluation   - C/w protonix and carafate  [facility]    Heroin abuse  - Active heroin abuse  - Monitor for sign of withdrawal  - Refused suboxone last admission    Hep C reactive  - Hep C panel reactive  - RNA ab pending, follow     DVT ppx  - SCD    Dispo: pending clinical course

## 2021-06-02 NOTE — SBIRT NOTE ADULT - NSSBIRTDRGPASSREFTXDET_GEN_A_CORE
Patient reported that he was mandated to treatment at [facility] ([place]) for inpatient rehab by his . Patient declined SW involvement or intervention with arranging his referral back to [facility]. He stated that he has a bed hold and he can complete referral to treatment on his own.   SW available as needed.

## 2021-06-02 NOTE — PROGRESS NOTE ADULT - SUBJECTIVE AND OBJECTIVE BOX
Patient is a 25y old  Male who presents with a chief complaint of bacteremia, stent migration (02 Jun 2021 14:28)      HPI:  25M pmh IVDA, esophageal stricture s/p stent placement - stent noted to be in desceding colon on CT. Patient had BM today and passed the stent. NO abdominal pain. Tolerating  soft diet .     REVIEW OF SYSTEMS:  Constitutional: No fever, weight loss or fatigue  ENMT:  No difficulty hearing, tinnitus, vertigo; No sinus or throat pain  Respiratory: No cough, wheezing, chills or hemoptysis  Cardiovascular: No chest pain, palpitations, dizziness or leg swelling  Gastrointestinal: No abdominal or epigastric pain. No nausea, vomiting or hematemesis; No diarrhea or constipation. No melena or hematochezia.  Skin: No itching, burning, rashes or lesions   Musculoskeletal: No joint pain or swelling; No muscle, back or extremity pain    PAST MEDICAL & SURGICAL HISTORY:  Heroin abuse    Cocaine abuse    GERD (gastroesophageal reflux disease)    Hiatal hernia    History of esophageal stricture    Intravenous drug abuse    Migration of pancreatic stent    Migrated esophageal stent        FAMILY HISTORY:  FH: thyroid disease (Grandparent)        SOCIAL HISTORY:  Smoking Status: [ ] Current, [ ] Former, [ ] Never  Pack Years:  [  ] EtOH  [  ] IVDA    MEDICATIONS:  MEDICATIONS  (STANDING):  ampicillin/sulbactam  IVPB 3 Gram(s) IV Intermittent every 6 hours  mirtazapine Soltab 15 milliGRAM(s) Oral at bedtime  pantoprazole  Injectable 40 milliGRAM(s) IV Push two times a day  saccharomyces boulardii 250 milliGRAM(s) Oral two times a day  sucralfate suspension 1 Gram(s) Oral four times a day  vancomycin  IVPB 1000 milliGRAM(s) IV Intermittent every 8 hours    MEDICATIONS  (PRN):  acetaminophen   Tablet .. 650 milliGRAM(s) Oral every 6 hours PRN Temp greater or equal to 38C (100.4F), Mild Pain (1 - 3), Moderate Pain (4 - 6)      Allergies    No Known Allergies    Intolerances        Vital Signs Last 24 Hrs  T(C): 37.1 (02 Jun 2021 10:29), Max: 37.1 (02 Jun 2021 10:29)  T(F): 98.8 (02 Jun 2021 10:29), Max: 98.8 (02 Jun 2021 10:29)  HR: 74 (02 Jun 2021 10:29) (50 - 103)  BP: 118/72 (02 Jun 2021 10:29) (102/57 - 119/71)  BP(mean): --  RR: 19 (02 Jun 2021 10:29) (18 - 19)  SpO2: 97% (02 Jun 2021 06:12) (97% - 99%)        PHYSICAL EXAM:    General: Well developed; well nourished; in no acute distress  HEENT: MMM, conjunctiva and sclera clear  H- RRR  L- CTA   Gastrointestinal: Soft, non-tender non-distended; Normal bowel sounds; No rebound or guarding  Extremities: Normal range of motion, No clubbing, cyanosis or edema  Neurological: Alert and oriented x3  Skin: Warm and dry. No obvious rash      LABS:                        10.0   4.99  )-----------( 168      ( 02 Jun 2021 07:15 )             33.3     02 Jun 2021 07:15    141    |  102    |  11.2   ----------------------------<  91     4.1     |  31.0   |  0.67     Ca    9.3        02 Jun 2021 07:15    TPro  6.1    /  Alb  3.6    /  TBili  0.3    /  DBili  x      /  AST  42     /  ALT  79     /  AlkPhos  88     / Amylase x      /Lipase x      02 Jun 2021 07:15              RADIOLOGY & ADDITIONAL STUDIES:     < from: CT Abdomen and Pelvis w/ IV Cont (06.01.21 @ 19:51) >  IMPRESSION:  1. Mild mucosalenhancement and intraluminal fluid in the small bowel and distention of the stomach is nonspecific but may indicate a viral gastroenteritis. No obstruction.  2. Intraluminal stent is now in the distal descending colon. Mild constipation.  3. Distal esophageal wall thickening suggesting esophagitis.    < end of copied text >

## 2021-06-02 NOTE — PROGRESS NOTE ADULT - ASSESSMENT
25y  Male with h/o IVDA, esophageal stricture s/p stent placement.  Patient had recent admission after using heroin and presented with septic shock and found to have multi focal pna and subsequently intubated and was in MICU, completed course of IV Zosyn. Patient worked up for severe esophageal stricture and is now s/p esophageal  stent paced by  GI endoscopy on 5/25. Patient eventually discharged home. He presented to ER 5/29 after snorting multiple percocet and having multiple episodes of vomiting. He eventually left AMA. He was called back to the ER due to positive blood cultures. In the ER patient was noted to be febrile, tachycardic, CT chest abdomen pelvis reporting esophageal stent has migrated to small bowel. GI called. Patient complaining of fever and nausea. He states he's been eating okay and having bowel movements. Denies abdominal pain. Patient admits to injecting heroin multiple times the last couple of days. He claims he uses clean needles but licked the needles and his needle insertion site. Started on IV Vancomycin and Zosyn.      Streptococcus Septicemia likely 2/2 IVDU  Fever  Leukocytosis   Migrated esophageal stent  Transaminitis   IVDA    - Blood cultures Reporting Strep /bacillus  - Repeat blood cultures 5/30 ngtd  - COVID 19 PCR negative   - CT A/P reporting migrated esophageal stent , per patient he spontaneously passed the stent this morning  - dc zosyn-->unasyn 3 g iv q6h  - Continue Vancomycin 1 g IV q8h  - end date of abx is 6/12/21  - Monitor trough- trough 11.2 this AM drawn late-dose adequate as per AUC/YOBANY  - Monitor for Vancomycin toxicity   - Vancomycin required at this time to cover strep parasanguinus (I) sensitivity to ceftriaxone/penicillin  - HCV +, f/u PCR  - TTE no vegetations  - Trend Fever  - Trend Leukocytosis  - not a candidate for OPAT  - advised to avoid drug IV drugs      d/w pharmacy, Dr Crooks  Will Follow

## 2021-06-02 NOTE — PROGRESS NOTE ADULT - SUBJECTIVE AND OBJECTIVE BOX
Chief Complaint: Bacteremia    Patient seen and examined at bedside. No acute overnight events reported. Patient denies any fever, chills, cough, chest pain, shortness of breath, nausea or vomiting.     Vital Signs Last 24 Hrs  T(F): 98.8 (02 Jun 2021 10:29), Max: 98.8 (02 Jun 2021 10:29)  HR: 74 (02 Jun 2021 10:29) (50 - 103)  BP: 118/72 (02 Jun 2021 10:29) (102/57 - 119/71)  RR: 19 (02 Jun 2021 10:29) (18 - 19)  SpO2: 97% (02 Jun 2021 06:12) (97% - 99%)    Physical Exam:  Constitutional: alert and oriented, in no acute distress   Neck: Soft and supple, No LAD, No JVD  Respiratory: Clear to auscultation bilaterally, no wheezes or crackles  Cardiovascular: Regular rate and rhyhtm, no murmurs, gallops, rubs  Gastrointestinal: Soft, non-tender to palpation, +bs  Vascular: 2+ peripheral pulses  Neurological: A/O x 3, no focal neurological deficits  Musculoskeletal: 5/5 strength b/l upper and lower extremities, no lower extremity edema bilaterally    Labs:                        10.0   4.99  )-----------( 168      ( 02 Jun 2021 07:15 )             33.3   06-02    141  |  102  |  11.2  ----------------------------<  91  4.1   |  31.0<H>  |  0.67    Ca    9.3      02 Jun 2021 07:15    TPro  6.1<L>  /  Alb  3.6  /  TBili  0.3<L>  /  DBili  x   /  AST  42<H>  /  ALT  79<H>  /  AlkPhos  88  06-02

## 2021-06-02 NOTE — PROGRESS NOTE ADULT - SUBJECTIVE AND OBJECTIVE BOX
[facility]  INFECTIOUS DISEASES AND INTERNAL MEDICINE at [place]  =======================================================  Rafa Garay MD  Diplomates American Board of Internal Medicine and Infectious Diseases  Tel: [phone]      Fax: [phone]  =======================================================    ABNER MORA [id number]    Follow up: bacteremia  feels well  states he passed the esophageal stent in his stool this morning      Allergies:  No Known Allergies           REVIEW OF SYSTEMS:  CONSTITUTIONAL:  No Fever or chills  HEENT:   No diplopia or blurred vision.  No earache, sore throat or runny nose.  CARDIOVASCULAR:  No pressure, squeezing, strangling, tightness, heaviness or aching about the chest, neck, axilla or epigastrium.  RESPIRATORY:  No cough, shortness of breath  GASTROINTESTINAL:  No nausea, vomiting or diarrhea.  GENITOURINARY:  No dysuria, frequency or urgency. No Blood in urine  MUSCULOSKELETAL:  no joint aches, no muscle pain  SKIN:  No change in skin, hair or nails.  NEUROLOGIC:  No Headaches, seizures or weakness.  PSYCHIATRIC:  No disorder of thought or mood.  ENDOCRINE:  No heat or cold intolerance  HEMATOLOGICAL:  No easy bruising or bleeding.       Physical Exam:  GEN: NAD, pleasant  HEENT: normocephalic and atraumatic. EOMI. PERRL.  Anicteric   NECK: Supple.   LUNGS: Clear to auscultation.  HEART: Regular rate and rhythm without murmur.  ABDOMEN: Soft, nontender, and nondistended.  Positive bowel sounds.    : No CVA tenderness  EXTREMITIES: Without any edema.  MSK: no joint swelling  NEUROLOGIC: Cranial nerves II through XII are grossly intact. No focal deficits  PSYCHIATRIC: Appropriate affect .  SKIN: No Rash      Vitals:    T(F): 98.8 (02 Jun 2021 10:29), Max: 98.8 (02 Jun 2021 10:29)  HR: 74 (02 Jun 2021 10:29)  BP: 118/72 (02 Jun 2021 10:29)  RR: 19 (02 Jun 2021 10:29)  SpO2: 97% (02 Jun 2021 06:12) (97% - 99%)  temp max in last 48H T(F): , Max: 99 (05-31-21 @ 15:27)    Current Antibiotics:  ampicillin/sulbactam  IVPB 3 Gram(s) IV Intermittent every 6 hours  vancomycin  IVPB 1000 milliGRAM(s) IV Intermittent every 8 hours    Other medications:  mirtazapine Soltab 15 milliGRAM(s) Oral at bedtime  pantoprazole  Injectable 40 milliGRAM(s) IV Push two times a day  saccharomyces boulardii 250 milliGRAM(s) Oral two times a day  sucralfate suspension 1 Gram(s) Oral four times a day                            10.0   4.99  )-----------( 168      ( 02 Jun 2021 07:15 )             33.3     06-02    141  |  102  |  11.2  ----------------------------<  91  4.1   |  31.0<H>  |  0.67    Ca    9.3      02 Jun 2021 07:15    TPro  6.1<L>  /  Alb  3.6  /  TBili  0.3<L>  /  DBili  x   /  AST  42<H>  /  ALT  79<H>  /  AlkPhos  88  06-02    RECENT CULTURES:  06-01 @ 00:22 .Urine Clean Catch (Midstream)     No growth        05-30 @ 16:00 .Blood Blood-Peripheral     No growth at 48 hours        05-29 @ 07:04 .Blood Blood-Peripheral aerobic Blood Culture PCR  Streptococcus parasanguinis    Growth in aerobic bottle: Streptococcus parasanguinis  Growth in aerobic bottle: Bacillus species not anthracis  "Susceptibilities not performed"    Growth in aerobic bottle: Gram Positive Cocci in Pairs and Chains and  Gram Variable Rods  Gram Stain and BCID performed by:  [facility]  [address] [place] [id number]  .  ***Blood Panel PCR results on this specimen are available  approximately 3 hours after the Gram stain result.***  Gram stain, PCR, and/or culture results may not always  correspond due to difference in methodologies.  ************************************************************  ThisPCR assay was performed using [employer].  The following targets are tested for: Enterococcus,  vancomycin resistant enterococci, Listeria monocytogenes,  coagulase negative staphylococci, S. aureus,  methicillin resistant S. aureus, Streptococcus agalactiae  (Group B), S. pneumoniae, S. pyogenes (Group A),  Acinetobacter baumannii, Enterobacter cloacae, E. coli,  Klebsiella oxytoca, K. pneumoniae, Proteus sp.,  Serratia marcescens, Haemophilus influenzae,  Neisseria meningitidis, Pseudomonas aeruginosa, Candida  albicans, C. glabrata, C krusei, C parapsilosis,  C. tropicalis and the KPC resistance gene.  "Due to technical problems, Pseudomonas aeruginosa    until further notice".  TYPE: (C=Critical, N=Notification, A=Abnormal) C  TESTS:  _ GS  DATE/TIME CALLED: _ 05/30/2021 14:17:57  CALLED TO: _ ALIYAH JENNINGS  READ BACK (2 Patient Identifiers)(Y/N): _ Y  READ BACK VALUES (Y/N): _ Y  CALLED BY: _ SG          WBC Count: 4.99 K/uL (06-02-21 @ 07:15)  WBC Count: 4.44 K/uL (06-01-21 @ 05:34)  WBC Count: 5.26 K/uL (05-31-21 @ 07:24)  WBC Count: 10.08 K/uL (05-30-21 @ 15:56)  WBC Count: 10.26 K/uL (05-29-21 @ 07:05)    Creatinine, Serum: 0.67 mg/dL (06-02-21 @ 07:15)  Creatinine, Serum: 0.75 mg/dL (06-01-21 @ 05:34)  Creatinine, Serum: 0.58 mg/dL (05-31-21 @ 07:24)  Creatinine, Serum: 0.68 mg/dL (05-30-21 @ 15:48)  Creatinine, Serum: 0.90 mg/dL (05-29-21 @ 07:05)               COVID-19 PCR: NotDetec (05-30-21 @ 17:19)  COVID-19 PCR: NotDetec (05-23-21 @ 05:56)  COVID-19 PCR: NotDetec (05-16-21 @ 14:22)  COVID-19 PCR: NotDetec (05-13-21 @ 22:42)

## 2021-06-03 LAB
ALBUMIN SERPL ELPH-MCNC: 3.5 G/DL — SIGNIFICANT CHANGE UP (ref 3.3–5.2)
ALP SERPL-CCNC: 94 U/L — SIGNIFICANT CHANGE UP (ref 40–120)
ALT FLD-CCNC: 59 U/L — HIGH
ANION GAP SERPL CALC-SCNC: 11 MMOL/L — SIGNIFICANT CHANGE UP (ref 5–17)
AST SERPL-CCNC: 28 U/L — SIGNIFICANT CHANGE UP
BASOPHILS # BLD AUTO: 0.02 K/UL — SIGNIFICANT CHANGE UP (ref 0–0.2)
BASOPHILS NFR BLD AUTO: 0.5 % — SIGNIFICANT CHANGE UP (ref 0–2)
BILIRUB SERPL-MCNC: 0.3 MG/DL — LOW (ref 0.4–2)
BUN SERPL-MCNC: 12.3 MG/DL — SIGNIFICANT CHANGE UP (ref 8–20)
CALCIUM SERPL-MCNC: 9.3 MG/DL — SIGNIFICANT CHANGE UP (ref 8.6–10.2)
CHLORIDE SERPL-SCNC: 104 MMOL/L — SIGNIFICANT CHANGE UP (ref 98–107)
CO2 SERPL-SCNC: 27 MMOL/L — SIGNIFICANT CHANGE UP (ref 22–29)
CREAT SERPL-MCNC: 0.57 MG/DL — SIGNIFICANT CHANGE UP (ref 0.5–1.3)
EOSINOPHIL # BLD AUTO: 0.2 K/UL — SIGNIFICANT CHANGE UP (ref 0–0.5)
EOSINOPHIL NFR BLD AUTO: 4.5 % — SIGNIFICANT CHANGE UP (ref 0–6)
GLUCOSE SERPL-MCNC: 93 MG/DL — SIGNIFICANT CHANGE UP (ref 70–99)
HCT VFR BLD CALC: 34 % — LOW (ref 39–50)
HCV RNA FLD QL NAA+PROBE: SIGNIFICANT CHANGE UP
HGB BLD-MCNC: 10.2 G/DL — LOW (ref 13–17)
IMM GRANULOCYTES NFR BLD AUTO: 0.2 % — SIGNIFICANT CHANGE UP (ref 0–1.5)
LYMPHOCYTES # BLD AUTO: 1.66 K/UL — SIGNIFICANT CHANGE UP (ref 1–3.3)
LYMPHOCYTES # BLD AUTO: 37.6 % — SIGNIFICANT CHANGE UP (ref 13–44)
MCHC RBC-ENTMCNC: 23.5 PG — LOW (ref 27–34)
MCHC RBC-ENTMCNC: 30 GM/DL — LOW (ref 32–36)
MCV RBC AUTO: 78.3 FL — LOW (ref 80–100)
MONOCYTES # BLD AUTO: 0.49 K/UL — SIGNIFICANT CHANGE UP (ref 0–0.9)
MONOCYTES NFR BLD AUTO: 11.1 % — SIGNIFICANT CHANGE UP (ref 2–14)
MRSA PCR RESULT.: SIGNIFICANT CHANGE UP
NEUTROPHILS # BLD AUTO: 2.03 K/UL — SIGNIFICANT CHANGE UP (ref 1.8–7.4)
NEUTROPHILS NFR BLD AUTO: 46.1 % — SIGNIFICANT CHANGE UP (ref 43–77)
PLATELET # BLD AUTO: 171 K/UL — SIGNIFICANT CHANGE UP (ref 150–400)
POTASSIUM SERPL-MCNC: 3.9 MMOL/L — SIGNIFICANT CHANGE UP (ref 3.5–5.3)
POTASSIUM SERPL-SCNC: 3.9 MMOL/L — SIGNIFICANT CHANGE UP (ref 3.5–5.3)
PROT SERPL-MCNC: 6.1 G/DL — LOW (ref 6.6–8.7)
RBC # BLD: 4.34 M/UL — SIGNIFICANT CHANGE UP (ref 4.2–5.8)
RBC # FLD: 16.7 % — HIGH (ref 10.3–14.5)
S AUREUS DNA NOSE QL NAA+PROBE: SIGNIFICANT CHANGE UP
SODIUM SERPL-SCNC: 142 MMOL/L — SIGNIFICANT CHANGE UP (ref 135–145)
WBC # BLD: 4.41 K/UL — SIGNIFICANT CHANGE UP (ref 3.8–10.5)
WBC # FLD AUTO: 4.41 K/UL — SIGNIFICANT CHANGE UP (ref 3.8–10.5)

## 2021-06-03 PROCEDURE — 99233 SBSQ HOSP IP/OBS HIGH 50: CPT

## 2021-06-03 RX ADMIN — Medication 1 GRAM(S): at 06:16

## 2021-06-03 RX ADMIN — AMPICILLIN SODIUM AND SULBACTAM SODIUM 200 GRAM(S): 250; 125 INJECTION, POWDER, FOR SUSPENSION INTRAMUSCULAR; INTRAVENOUS at 00:21

## 2021-06-03 RX ADMIN — MIRTAZAPINE 15 MILLIGRAM(S): 45 TABLET, ORALLY DISINTEGRATING ORAL at 21:07

## 2021-06-03 RX ADMIN — AMPICILLIN SODIUM AND SULBACTAM SODIUM 200 GRAM(S): 250; 125 INJECTION, POWDER, FOR SUSPENSION INTRAMUSCULAR; INTRAVENOUS at 17:17

## 2021-06-03 RX ADMIN — Medication 250 MILLIGRAM(S): at 14:09

## 2021-06-03 RX ADMIN — Medication 250 MILLIGRAM(S): at 07:03

## 2021-06-03 RX ADMIN — Medication 250 MILLIGRAM(S): at 21:07

## 2021-06-03 RX ADMIN — Medication 1 GRAM(S): at 00:21

## 2021-06-03 RX ADMIN — Medication 1 GRAM(S): at 11:35

## 2021-06-03 RX ADMIN — Medication 250 MILLIGRAM(S): at 06:16

## 2021-06-03 RX ADMIN — PANTOPRAZOLE SODIUM 40 MILLIGRAM(S): 20 TABLET, DELAYED RELEASE ORAL at 06:16

## 2021-06-03 RX ADMIN — Medication 250 MILLIGRAM(S): at 17:17

## 2021-06-03 RX ADMIN — AMPICILLIN SODIUM AND SULBACTAM SODIUM 200 GRAM(S): 250; 125 INJECTION, POWDER, FOR SUSPENSION INTRAMUSCULAR; INTRAVENOUS at 23:00

## 2021-06-03 RX ADMIN — AMPICILLIN SODIUM AND SULBACTAM SODIUM 200 GRAM(S): 250; 125 INJECTION, POWDER, FOR SUSPENSION INTRAMUSCULAR; INTRAVENOUS at 06:16

## 2021-06-03 RX ADMIN — Medication 1 GRAM(S): at 21:07

## 2021-06-03 RX ADMIN — AMPICILLIN SODIUM AND SULBACTAM SODIUM 200 GRAM(S): 250; 125 INJECTION, POWDER, FOR SUSPENSION INTRAMUSCULAR; INTRAVENOUS at 11:35

## 2021-06-03 RX ADMIN — PANTOPRAZOLE SODIUM 40 MILLIGRAM(S): 20 TABLET, DELAYED RELEASE ORAL at 17:17

## 2021-06-03 RX ADMIN — Medication 1 GRAM(S): at 17:18

## 2021-06-03 NOTE — PROGRESS NOTE ADULT - ASSESSMENT
25M pmh IVDA, esophageal stricture s/p stent placement called in to ER for positive blood culture found to be septic and have esophageal stent migration. Blood cultures positive for strep, repeat blood cultures in process.     Sepsis 2/2 Bacteremia  - BCX reporting strep  - Bacteremia likely due to IVDA  - CT c/a/p reviewed   - ID following  - TTE w/o signs of vegetation   - Blood cultures (5/29): Streptococcus parasanguinis  - Blood cultures (5/30): No growth  - Repeat blood cultures in process   - Zosyn d/c  - c/w Unasyn 3g q6h  - c/w Vancomycin 1g q8h  - End date of antibiotics 6/12/21    Esophageal stent migration  - tolerating mechanical soft diet well  - Stent initially placed for esophageal stricture  - Seen in consult by GI. No urgent intervention, serial KUB/abdo exams, esophogram for further stricture evaluation   - C/w protonix and carafate  - Mech soft diet  - Esophagram today    Heroin abuse  - Active heroin abuse  - Monitor for sign of withdrawal  - Refused suboxone last admission    Hep C reactive  - Hep C panel reactive  - RNA ab pending, follow     DVT ppx  - SCD    Dispo: patient will remain in the hospital through 6/12/21 as he is an active IDU and cannot be d/c home with picc line

## 2021-06-03 NOTE — PROGRESS NOTE ADULT - ASSESSMENT
Dysphagia secondary to esophageal stricture s/p esophageal dilatation with esophageal stent placement last week with dislodgement of esophageal stent and passage of said stent in his stool yesterday AM. Awaiting esophagram to re-evaluate the presence and severity of the above esophageal to determine the need for repeat endoscopic intervention. Pt. made NPO after MN tonight so that Esophagram can be done tomorrow AM. Continue current diet and IV Pantoprazole for now.

## 2021-06-03 NOTE — PROGRESS NOTE ADULT - SUBJECTIVE AND OBJECTIVE BOX
Pt seen and examined. Tolerating current diet with some dysphagia for more solid food. Awaiting Esophagram but pt. had breakfast this AM so it is unlikely that he will get Esophagram this AM.     REVIEW OF SYSTEMS:  Constitutional: No fever, weight loss or fatigue  Cardiovascular: No chest pain, palpitations, dizziness or leg swelling  Gastrointestinal: As noted above. No abdominal or epigastric pain. No nausea, vomiting or hematemesis; No diarrhea or constipation. No melena or hematochezia.  Skin: No itching, burning, rashes or lesions       MEDICATIONS:  MEDICATIONS  (STANDING):  ampicillin/sulbactam  IVPB 3 Gram(s) IV Intermittent every 6 hours  mirtazapine Soltab 15 milliGRAM(s) Oral at bedtime  pantoprazole  Injectable 40 milliGRAM(s) IV Push two times a day  saccharomyces boulardii 250 milliGRAM(s) Oral two times a day  sucralfate suspension 1 Gram(s) Oral four times a day  vancomycin  IVPB 1000 milliGRAM(s) IV Intermittent every 8 hours    MEDICATIONS  (PRN):  acetaminophen   Tablet .. 650 milliGRAM(s) Oral every 6 hours PRN Temp greater or equal to 38C (100.4F), Mild Pain (1 - 3), Moderate Pain (4 - 6)      Allergies    No Known Allergies    Intolerances        Vital Signs Last 24 Hrs  T(C): 36.7 (03 Jun 2021 10:06), Max: 36.8 (02 Jun 2021 18:02)  T(F): 98.1 (03 Jun 2021 10:06), Max: 98.3 (02 Jun 2021 18:02)  HR: 98 (03 Jun 2021 10:06) (71 - 98)  BP: 115/74 (03 Jun 2021 10:06) (108/64 - 120/74)  BP(mean): --  RR: 19 (03 Jun 2021 10:06) (17 - 19)  SpO2: 99% (03 Jun 2021 10:06) (98% - 100%)      PHYSICAL EXAM:    General: Well developed; well nourished; in no acute distress  HEENT: MMM, conjunctiva pink and sclera anicteric.  Lungs: clear to auscultation bilaterally.  Cor: RRR S1, S2 only.  Gastrointestinal: Abdomen: Soft non-tender non-distended; Normal bowel sounds; No hepatosplenomegaly.  Extremities: no cyanosis, clubbing or edema.  Skin: Warm and dry. No obvious rash  Neuro: Pt. a + o x 3.    LABS:  CBC Full  -  ( 03 Jun 2021 08:03 )  WBC Count : 4.41 K/uL  RBC Count : 4.34 M/uL  Hemoglobin : 10.2 g/dL  Hematocrit : 34.0 %  Platelet Count - Automated : 171 K/uL  Mean Cell Volume : 78.3 fl  Mean Cell Hemoglobin : 23.5 pg  Mean Cell Hemoglobin Concentration : 30.0 gm/dL  Auto Neutrophil # : 2.03 K/uL  Auto Lymphocyte # : 1.66 K/uL  Auto Monocyte # : 0.49 K/uL  Auto Eosinophil # : 0.20 K/uL  Auto Basophil # : 0.02 K/uL  Auto Neutrophil % : 46.1 %  Auto Lymphocyte % : 37.6 %  Auto Monocyte % : 11.1 %  Auto Eosinophil % : 4.5 %  Auto Basophil % : 0.5 %    06-03    142  |  104  |  12.3  ----------------------------<  93  3.9   |  27.0  |  0.57    Ca    9.3      03 Jun 2021 08:03    TPro  6.1<L>  /  Alb  3.5  /  TBili  0.3<L>  /  DBili  x   /  AST  28  /  ALT  59<H>  /  AlkPhos  94  06-03                      RADIOLOGY & ADDITIONAL STUDIES (The following images were personally reviewed):

## 2021-06-03 NOTE — PROGRESS NOTE ADULT - SUBJECTIVE AND OBJECTIVE BOX
Chief complaint: Bacteremia    Patient seen and examined at bedside. No acute overnight events reported. Patient states he is doing well, currently has no complaints. Denies fever, chills, cough, chest pain, shortness of breath, nausea or vomiting.     Vital Signs Last 24 Hrs  T(F): 98.1 (03 Jun 2021 10:06), Max: 98.8 (02 Jun 2021 10:29)  HR: 98 (03 Jun 2021 10:06) (71 - 98)  BP: 115/74 (03 Jun 2021 10:06) (108/64 - 120/74)  RR: 19 (03 Jun 2021 10:06) (17 - 19)  SpO2: 99% (03 Jun 2021 10:06) (98% - 100%)    Physical Exam:  Constitutional: alert and oriented, in no acute distress   Neck: Soft and supple  Respiratory: Clear to auscultation bilaterally, no wheezes or crackles  Cardiovascular: Regular rate and rhyhtm, no murmurs, gallops, rubs  Gastrointestinal: Soft, non-tender to palpation, +bs  Vascular: 2+ peripheral pulses  Neurological: A/O x 3, no focal neurological deficits  Musculoskeletal: 5/5 strength b/l upper and lower extremities, no lower extremity edema bilaterally    Labs:                        10.2   4.41  )-----------( 171      ( 03 Jun 2021 08:03 )             34.0   06-03    142  |  104  |  12.3  ----------------------------<  93  3.9   |  27.0  |  0.57    Ca    9.3      03 Jun 2021 08:03    TPro  6.1<L>  /  Alb  3.5  /  TBili  0.3<L>  /  DBili  x   /  AST  28  /  ALT  59<H>  /  AlkPhos  94  06-03

## 2021-06-04 LAB
ALBUMIN SERPL ELPH-MCNC: 3.8 G/DL — SIGNIFICANT CHANGE UP (ref 3.3–5.2)
ALP SERPL-CCNC: 86 U/L — SIGNIFICANT CHANGE UP (ref 40–120)
ALT FLD-CCNC: 61 U/L — HIGH
ANION GAP SERPL CALC-SCNC: 10 MMOL/L — SIGNIFICANT CHANGE UP (ref 5–17)
AST SERPL-CCNC: 37 U/L — SIGNIFICANT CHANGE UP
BASOPHILS # BLD AUTO: 0.02 K/UL — SIGNIFICANT CHANGE UP (ref 0–0.2)
BASOPHILS NFR BLD AUTO: 0.4 % — SIGNIFICANT CHANGE UP (ref 0–2)
BILIRUB SERPL-MCNC: 0.4 MG/DL — SIGNIFICANT CHANGE UP (ref 0.4–2)
BUN SERPL-MCNC: 14.1 MG/DL — SIGNIFICANT CHANGE UP (ref 8–20)
CALCIUM SERPL-MCNC: 9.7 MG/DL — SIGNIFICANT CHANGE UP (ref 8.6–10.2)
CHLORIDE SERPL-SCNC: 102 MMOL/L — SIGNIFICANT CHANGE UP (ref 98–107)
CO2 SERPL-SCNC: 27 MMOL/L — SIGNIFICANT CHANGE UP (ref 22–29)
CREAT SERPL-MCNC: 0.77 MG/DL — SIGNIFICANT CHANGE UP (ref 0.5–1.3)
CULTURE RESULTS: SIGNIFICANT CHANGE UP
CULTURE RESULTS: SIGNIFICANT CHANGE UP
EOSINOPHIL # BLD AUTO: 0.22 K/UL — SIGNIFICANT CHANGE UP (ref 0–0.5)
EOSINOPHIL NFR BLD AUTO: 4.1 % — SIGNIFICANT CHANGE UP (ref 0–6)
GLUCOSE SERPL-MCNC: 91 MG/DL — SIGNIFICANT CHANGE UP (ref 70–99)
HCT VFR BLD CALC: 36.1 % — LOW (ref 39–50)
HCV RNA FLD QL NAA+PROBE: SIGNIFICANT CHANGE UP
HCV RNA SERPL NAA DL=5-ACNC: 4470 IU/ML — HIGH
HCV RNA SPEC NAA+PROBE-LOG IU: 3.65 LOG10IU/ML — HIGH
HGB BLD-MCNC: 11.1 G/DL — LOW (ref 13–17)
IMM GRANULOCYTES NFR BLD AUTO: 0.6 % — SIGNIFICANT CHANGE UP (ref 0–1.5)
LYMPHOCYTES # BLD AUTO: 2.33 K/UL — SIGNIFICANT CHANGE UP (ref 1–3.3)
LYMPHOCYTES # BLD AUTO: 43.1 % — SIGNIFICANT CHANGE UP (ref 13–44)
MCHC RBC-ENTMCNC: 23.7 PG — LOW (ref 27–34)
MCHC RBC-ENTMCNC: 30.7 GM/DL — LOW (ref 32–36)
MCV RBC AUTO: 77 FL — LOW (ref 80–100)
MONOCYTES # BLD AUTO: 0.53 K/UL — SIGNIFICANT CHANGE UP (ref 0–0.9)
MONOCYTES NFR BLD AUTO: 9.8 % — SIGNIFICANT CHANGE UP (ref 2–14)
NEUTROPHILS # BLD AUTO: 2.27 K/UL — SIGNIFICANT CHANGE UP (ref 1.8–7.4)
NEUTROPHILS NFR BLD AUTO: 42 % — LOW (ref 43–77)
PLATELET # BLD AUTO: 176 K/UL — SIGNIFICANT CHANGE UP (ref 150–400)
POTASSIUM SERPL-MCNC: 4.3 MMOL/L — SIGNIFICANT CHANGE UP (ref 3.5–5.3)
POTASSIUM SERPL-SCNC: 4.3 MMOL/L — SIGNIFICANT CHANGE UP (ref 3.5–5.3)
PROT SERPL-MCNC: 6.7 G/DL — SIGNIFICANT CHANGE UP (ref 6.6–8.7)
RBC # BLD: 4.69 M/UL — SIGNIFICANT CHANGE UP (ref 4.2–5.8)
RBC # FLD: 16.8 % — HIGH (ref 10.3–14.5)
SODIUM SERPL-SCNC: 139 MMOL/L — SIGNIFICANT CHANGE UP (ref 135–145)
SPECIMEN SOURCE: SIGNIFICANT CHANGE UP
SPECIMEN SOURCE: SIGNIFICANT CHANGE UP
VANCOMYCIN TROUGH SERPL-MCNC: 8.1 UG/ML — LOW (ref 10–20)
WBC # BLD: 5.4 K/UL — SIGNIFICANT CHANGE UP (ref 3.8–10.5)
WBC # FLD AUTO: 5.4 K/UL — SIGNIFICANT CHANGE UP (ref 3.8–10.5)

## 2021-06-04 PROCEDURE — 99233 SBSQ HOSP IP/OBS HIGH 50: CPT

## 2021-06-04 PROCEDURE — 74220 X-RAY XM ESOPHAGUS 1CNTRST: CPT | Mod: 26

## 2021-06-04 RX ORDER — VANCOMYCIN HCL 1 G
1250 VIAL (EA) INTRAVENOUS EVERY 8 HOURS
Refills: 0 | Status: COMPLETED | OUTPATIENT
Start: 2021-06-04 | End: 2021-06-12

## 2021-06-04 RX ADMIN — MIRTAZAPINE 15 MILLIGRAM(S): 45 TABLET, ORALLY DISINTEGRATING ORAL at 22:05

## 2021-06-04 RX ADMIN — Medication 166.67 MILLIGRAM(S): at 22:05

## 2021-06-04 RX ADMIN — AMPICILLIN SODIUM AND SULBACTAM SODIUM 200 GRAM(S): 250; 125 INJECTION, POWDER, FOR SUSPENSION INTRAMUSCULAR; INTRAVENOUS at 23:33

## 2021-06-04 RX ADMIN — AMPICILLIN SODIUM AND SULBACTAM SODIUM 200 GRAM(S): 250; 125 INJECTION, POWDER, FOR SUSPENSION INTRAMUSCULAR; INTRAVENOUS at 18:21

## 2021-06-04 RX ADMIN — Medication 166.67 MILLIGRAM(S): at 15:57

## 2021-06-04 RX ADMIN — Medication 1 GRAM(S): at 18:21

## 2021-06-04 RX ADMIN — Medication 250 MILLIGRAM(S): at 18:21

## 2021-06-04 RX ADMIN — PANTOPRAZOLE SODIUM 40 MILLIGRAM(S): 20 TABLET, DELAYED RELEASE ORAL at 05:31

## 2021-06-04 RX ADMIN — PANTOPRAZOLE SODIUM 40 MILLIGRAM(S): 20 TABLET, DELAYED RELEASE ORAL at 18:21

## 2021-06-04 RX ADMIN — Medication 250 MILLIGRAM(S): at 06:28

## 2021-06-04 RX ADMIN — AMPICILLIN SODIUM AND SULBACTAM SODIUM 200 GRAM(S): 250; 125 INJECTION, POWDER, FOR SUSPENSION INTRAMUSCULAR; INTRAVENOUS at 05:30

## 2021-06-04 NOTE — PROGRESS NOTE ADULT - ASSESSMENT
25M pmh IVDA, esophageal stricture s/p stent placement called in to ER for positive blood culture found to be septic and have esophageal stent migration. Blood cultures positive for strep, repeat blood cultures NGTD. Esophagram today.    Sepsis 2/2 Bacteremia  - BCX reporting strep  - Bacteremia likely due to IVDA  - CT c/a/p reviewed   - ID following  - TTE w/o signs of vegetation   - Blood cultures (5/29): Streptococcus parasanguinis  - Blood cultures (5/30): No growth  - Repeat blood cultures NGTD  - Zosyn d/c  - c/w Unasyn 3g q6h  - c/w Vancomycin 1g q8h  - End date of antibiotics 6/12/21    Esophageal stent migration  - tolerating mechanical soft diet well  - Stent initially placed for esophageal stricture  - Seen in consult by GI. No urgent intervention, serial KUB/abdo exams, esophogram for further stricture evaluation   - C/w protonix and carafate  - Mech soft diet  - pending Esophagram today    Heroin abuse  - Active heroin abuse  - Monitor for sign of withdrawal  - Refused suboxone last admission    Hep C reactive  - Hep C panel reactive  - Hep C RNA positive    DVT ppx  - SCD    Dispo: patient will remain in the hospital through 6/12/21 as he is an active IDU and cannot be d/c home with picc line

## 2021-06-04 NOTE — DIETITIAN INITIAL EVALUATION ADULT. - ORAL INTAKE PTA/DIET HISTORY
Per previous RD assessment Pt had reported ~10lb weight loss x a few weeks, UBW 170lbs. Current weight stable with previous admission weight with Pt reporting good PO intake PTA. Pt tolerating mech soft well in house. Pt unable to interview, RD to remain available.

## 2021-06-04 NOTE — PROGRESS NOTE ADULT - PROBLEM SELECTOR PLAN 1
- stent passed   - esophagram with mild tappering of distal esohagus  - may need eventual dilation as outpatient  - tolerating solid diet

## 2021-06-04 NOTE — PROGRESS NOTE ADULT - SUBJECTIVE AND OBJECTIVE BOX
Patient is a 25y old  Male who presents with a chief complaint of bacteremia, stent migration (04 Jun 2021 13:44)      HPI:  25M- patient has mild dysphagia with solids.  Swallows'ok' with copious liquids after solid.  NO abdominal pain     REVIEW OF SYSTEMS:  Constitutional: No fever, weight loss or fatigue  ENMT:  No difficulty hearing, tinnitus, vertigo; No sinus or throat pain  Respiratory: No cough, wheezing, chills or hemoptysis  Cardiovascular: No chest pain, palpitations, dizziness or leg swelling  Gastrointestinal: No abdominal or epigastric pain. No nausea, vomiting or hematemesis; No diarrhea or constipation. No melena or hematochezia.- dysphagia   Skin: No itching, burning, rashes or lesions   Musculoskeletal: No joint pain or swelling; No muscle, back or extremity pain    PAST MEDICAL & SURGICAL HISTORY:  Heroin abuse    Cocaine abuse    GERD (gastroesophageal reflux disease)    Hiatal hernia    History of esophageal stricture    Intravenous drug abuse    Migration of pancreatic stent    Migrated esophageal stent        FAMILY HISTORY:  FH: thyroid disease (Grandparent)        SOCIAL HISTORY:  Smoking Status: [ ] Current, [ ] Former, [ ] Never  Pack Years:  [  ] EtOH-no  [  ] IVDA    MEDICATIONS:  MEDICATIONS  (STANDING):  ampicillin/sulbactam  IVPB 3 Gram(s) IV Intermittent every 6 hours  mirtazapine Soltab 15 milliGRAM(s) Oral at bedtime  pantoprazole  Injectable 40 milliGRAM(s) IV Push two times a day  saccharomyces boulardii 250 milliGRAM(s) Oral two times a day  sucralfate suspension 1 Gram(s) Oral four times a day  vancomycin  IVPB 1250 milliGRAM(s) IV Intermittent every 8 hours    MEDICATIONS  (PRN):  acetaminophen   Tablet .. 650 milliGRAM(s) Oral every 6 hours PRN Temp greater or equal to 38C (100.4F), Mild Pain (1 - 3), Moderate Pain (4 - 6)      Allergies    No Known Allergies    Intolerances        Vital Signs Last 24 Hrs  T(C): 37 (04 Jun 2021 15:54), Max: 37 (04 Jun 2021 15:54)  T(F): 98.6 (04 Jun 2021 15:54), Max: 98.6 (04 Jun 2021 15:54)  HR: 85 (04 Jun 2021 15:54) (60 - 92)  BP: 141/73 (04 Jun 2021 15:54) (120/73 - 141/73)  BP(mean): --  RR: 18 (04 Jun 2021 15:54) (18 - 19)  SpO2: 100% (04 Jun 2021 15:54) (99% - 100%)        PHYSICAL EXAM:    General: Well developed; well nourished; in no acute distress  HEENT: MMM, conjunctiva and sclera clear  H- RRR  L- CTA   Gastrointestinal: Soft, non-tender non-distended; Normal bowel sounds; No rebound or guarding  Extremities: Normal range of motion, No clubbing, cyanosis or edema  Neurological: Alert and oriented x3  Skin: Warm and dry. No obvious rash      LABS:                        11.1   5.40  )-----------( 176      ( 04 Jun 2021 05:44 )             36.1     04 Jun 2021 05:44    139    |  102    |  14.1   ----------------------------<  91     4.3     |  27.0   |  0.77     Ca    9.7        04 Jun 2021 05:44    TPro  6.7    /  Alb  3.8    /  TBili  0.4    /  DBili  x      /  AST  37     /  ALT  61     /  AlkPhos  86     / Amylase x      /Lipase x      04 Jun 2021 05:44        Hepatitis C RNA, Log Value: 3.65 Prl68CK/mL (06-04-21 @ 09:56)        RADIOLOGY & ADDITIONAL STUDIES:     < from: Xray Esophagram Single Contrast (06.04.21 @ 12:48) >  MPRESSION:  Smooth tapering of a 3 cm segment of the distal esophagus to 0.5 cm with no leak.      < end of copied text >  < from: Xray Esophagram Single Contrast (06.04.21 @ 12:48) >  MPRESSION:  Smooth tapering of a 3 cm segment of the distal esophagus to 0.5 cm with no leak.      < end of copied text >  < from: Xray Esophagram Single Contrast (06.04.21 @ 12:48) >  MPRESSION:  Smooth tapering of a 3 cm segment of the distal esophagus to 0.5 cm with no leak.      < end of copied text >

## 2021-06-04 NOTE — DIETITIAN INITIAL EVALUATION ADULT. - PERTINENT LABORATORY DATA
06-04 Na139 mmol/L Glu 91 mg/dL K+ 4.3 mmol/L Cr  0.77 mg/dL BUN 14.1 mg/dL Phos n/a   Alb 3.8 g/dL PAB n/a

## 2021-06-04 NOTE — PROGRESS NOTE ADULT - SUBJECTIVE AND OBJECTIVE BOX
Chief complaint: Bacteremia    Patient seen and examined at bedside. No acute overnight events reported. Patient states he is doing well, npo for esophagram today. Denies fever, chills, cough, chest pain, abdominal pain, nausea or vomiting. Tolerating diet well.     Vital Signs Last 24 Hrs  T(F): 98.2 (04 Jun 2021 04:40), Max: 98.4 (03 Jun 2021 16:22)  HR: 60 (04 Jun 2021 04:40) (60 - 98)  BP: 120/73 (04 Jun 2021 04:40) (115/74 - 125/77)  RR: 18 (04 Jun 2021 04:40) (18 - 19)  SpO2: 99% (04 Jun 2021 04:40) (98% - 99%)    Physical Exam:  Constitutional: alert and oriented, in no acute distress   Neck: Soft and supple  Respiratory: Clear to auscultation bilaterally, no wheezes or crackles  Cardiovascular: Regular rate and rhyhtm, no murmurs, gallops, rubs  Gastrointestinal: Soft, non-tender to palpation, +bs  Vascular: 2+ peripheral pulses  Neurological: A/O x 3, no focal neurological deficits  Musculoskeletal: 5/5 strength b/l upper and lower extremities, no lower extremity edema bilaterally    Labs:                        11.1   5.40  )-----------( 176      ( 04 Jun 2021 05:44 )             36.1   06-04    139  |  102  |  14.1  ----------------------------<  91  4.3   |  27.0  |  0.77    Ca    9.7      04 Jun 2021 05:44    TPro  6.7  /  Alb  3.8  /  TBili  0.4  /  DBili  x   /  AST  37  /  ALT  61<H>  /  AlkPhos  86  06-04

## 2021-06-04 NOTE — DIETITIAN INITIAL EVALUATION ADULT. - OTHER INFO
25M pmh IVDA, esophageal stricture s/p stent placement called in to ER for positive blood culture. Patient had recent admission (May 2021) after using heroin and presented with septic shock and found to have multi focal PNA and subsequently intubated/extubated. Patient worked up for severe esophageal stricture s/p stent placement (5/25). He presented to ER 5/29 after snorting multiple percocet and having multiple episodes of vomiting. He eventually left AMA.     Upon presenting to ER, patient found to be septic with fever and tachcyardia. CT chest abdomen pelvis showed that esophageal stent has migrated to small bowel. Patient complaining of fever and nausea. He states he's been eating okay and having bowel movements. Denies abdominal pain. Patient admits to injecting heroin multiple times the last couple of days. Pt currently NPO esophagram today.

## 2021-06-05 LAB
ALBUMIN SERPL ELPH-MCNC: 3.8 G/DL — SIGNIFICANT CHANGE UP (ref 3.3–5.2)
ALP SERPL-CCNC: 96 U/L — SIGNIFICANT CHANGE UP (ref 40–120)
ALT FLD-CCNC: 56 U/L — HIGH
ANION GAP SERPL CALC-SCNC: 11 MMOL/L — SIGNIFICANT CHANGE UP (ref 5–17)
AST SERPL-CCNC: 29 U/L — SIGNIFICANT CHANGE UP
BASOPHILS # BLD AUTO: 0.03 K/UL — SIGNIFICANT CHANGE UP (ref 0–0.2)
BASOPHILS NFR BLD AUTO: 0.6 % — SIGNIFICANT CHANGE UP (ref 0–2)
BILIRUB SERPL-MCNC: 0.4 MG/DL — SIGNIFICANT CHANGE UP (ref 0.4–2)
BUN SERPL-MCNC: 13.1 MG/DL — SIGNIFICANT CHANGE UP (ref 8–20)
CALCIUM SERPL-MCNC: 9.4 MG/DL — SIGNIFICANT CHANGE UP (ref 8.6–10.2)
CHLORIDE SERPL-SCNC: 99 MMOL/L — SIGNIFICANT CHANGE UP (ref 98–107)
CO2 SERPL-SCNC: 29 MMOL/L — SIGNIFICANT CHANGE UP (ref 22–29)
CREAT SERPL-MCNC: 0.78 MG/DL — SIGNIFICANT CHANGE UP (ref 0.5–1.3)
EOSINOPHIL # BLD AUTO: 0.17 K/UL — SIGNIFICANT CHANGE UP (ref 0–0.5)
EOSINOPHIL NFR BLD AUTO: 3.4 % — SIGNIFICANT CHANGE UP (ref 0–6)
GLUCOSE SERPL-MCNC: 95 MG/DL — SIGNIFICANT CHANGE UP (ref 70–99)
HCT VFR BLD CALC: 35.7 % — LOW (ref 39–50)
HGB BLD-MCNC: 11 G/DL — LOW (ref 13–17)
IMM GRANULOCYTES NFR BLD AUTO: 0.4 % — SIGNIFICANT CHANGE UP (ref 0–1.5)
LYMPHOCYTES # BLD AUTO: 2.15 K/UL — SIGNIFICANT CHANGE UP (ref 1–3.3)
LYMPHOCYTES # BLD AUTO: 43.3 % — SIGNIFICANT CHANGE UP (ref 13–44)
MCHC RBC-ENTMCNC: 23.5 PG — LOW (ref 27–34)
MCHC RBC-ENTMCNC: 30.8 GM/DL — LOW (ref 32–36)
MCV RBC AUTO: 76.1 FL — LOW (ref 80–100)
MONOCYTES # BLD AUTO: 0.5 K/UL — SIGNIFICANT CHANGE UP (ref 0–0.9)
MONOCYTES NFR BLD AUTO: 10.1 % — SIGNIFICANT CHANGE UP (ref 2–14)
NEUTROPHILS # BLD AUTO: 2.09 K/UL — SIGNIFICANT CHANGE UP (ref 1.8–7.4)
NEUTROPHILS NFR BLD AUTO: 42.2 % — LOW (ref 43–77)
PLATELET # BLD AUTO: 196 K/UL — SIGNIFICANT CHANGE UP (ref 150–400)
POTASSIUM SERPL-MCNC: 4.4 MMOL/L — SIGNIFICANT CHANGE UP (ref 3.5–5.3)
POTASSIUM SERPL-SCNC: 4.4 MMOL/L — SIGNIFICANT CHANGE UP (ref 3.5–5.3)
PROT SERPL-MCNC: 6.5 G/DL — LOW (ref 6.6–8.7)
RBC # BLD: 4.69 M/UL — SIGNIFICANT CHANGE UP (ref 4.2–5.8)
RBC # FLD: 16.7 % — HIGH (ref 10.3–14.5)
SODIUM SERPL-SCNC: 139 MMOL/L — SIGNIFICANT CHANGE UP (ref 135–145)
VANCOMYCIN TROUGH SERPL-MCNC: 9.9 UG/ML — LOW (ref 10–20)
WBC # BLD: 4.96 K/UL — SIGNIFICANT CHANGE UP (ref 3.8–10.5)
WBC # FLD AUTO: 4.96 K/UL — SIGNIFICANT CHANGE UP (ref 3.8–10.5)

## 2021-06-05 PROCEDURE — 99232 SBSQ HOSP IP/OBS MODERATE 35: CPT

## 2021-06-05 PROCEDURE — 74018 RADEX ABDOMEN 1 VIEW: CPT | Mod: 26

## 2021-06-05 RX ADMIN — Medication 1 GRAM(S): at 16:03

## 2021-06-05 RX ADMIN — PANTOPRAZOLE SODIUM 40 MILLIGRAM(S): 20 TABLET, DELAYED RELEASE ORAL at 05:44

## 2021-06-05 RX ADMIN — AMPICILLIN SODIUM AND SULBACTAM SODIUM 200 GRAM(S): 250; 125 INJECTION, POWDER, FOR SUSPENSION INTRAMUSCULAR; INTRAVENOUS at 11:04

## 2021-06-05 RX ADMIN — Medication 1 GRAM(S): at 11:04

## 2021-06-05 RX ADMIN — AMPICILLIN SODIUM AND SULBACTAM SODIUM 200 GRAM(S): 250; 125 INJECTION, POWDER, FOR SUSPENSION INTRAMUSCULAR; INTRAVENOUS at 17:36

## 2021-06-05 RX ADMIN — AMPICILLIN SODIUM AND SULBACTAM SODIUM 200 GRAM(S): 250; 125 INJECTION, POWDER, FOR SUSPENSION INTRAMUSCULAR; INTRAVENOUS at 23:10

## 2021-06-05 RX ADMIN — Medication 166.67 MILLIGRAM(S): at 08:21

## 2021-06-05 RX ADMIN — Medication 1 GRAM(S): at 23:10

## 2021-06-05 RX ADMIN — Medication 1 GRAM(S): at 05:44

## 2021-06-05 RX ADMIN — Medication 166.67 MILLIGRAM(S): at 23:10

## 2021-06-05 RX ADMIN — MIRTAZAPINE 15 MILLIGRAM(S): 45 TABLET, ORALLY DISINTEGRATING ORAL at 23:10

## 2021-06-05 RX ADMIN — Medication 250 MILLIGRAM(S): at 05:44

## 2021-06-05 RX ADMIN — Medication 166.67 MILLIGRAM(S): at 16:03

## 2021-06-05 RX ADMIN — AMPICILLIN SODIUM AND SULBACTAM SODIUM 200 GRAM(S): 250; 125 INJECTION, POWDER, FOR SUSPENSION INTRAMUSCULAR; INTRAVENOUS at 05:44

## 2021-06-05 RX ADMIN — Medication 250 MILLIGRAM(S): at 16:03

## 2021-06-05 RX ADMIN — PANTOPRAZOLE SODIUM 40 MILLIGRAM(S): 20 TABLET, DELAYED RELEASE ORAL at 16:03

## 2021-06-05 NOTE — PROGRESS NOTE ADULT - SUBJECTIVE AND OBJECTIVE BOX
[facility] of [facility] Medicine  Torres Burns [phone]    Chief Complaint:  Patient is a 25y old  Male who presents with a chief complaint of bacteremia, stent migration (04 Jun 2021 21:38)      SUBJECTIVE / OVERNIGHT EVENTS:  Pt seen and examined at bedside. No acute events reported overnight. No complaints.    Patient denies chest pain, SOB, abd pain, N/V, fever, chills, dysuria or any other complaints. All remainder ROS negative.     MEDICATIONS  (STANDING):  ampicillin/sulbactam  IVPB 3 Gram(s) IV Intermittent every 6 hours  mirtazapine Soltab 15 milliGRAM(s) Oral at bedtime  pantoprazole  Injectable 40 milliGRAM(s) IV Push two times a day  saccharomyces boulardii 250 milliGRAM(s) Oral two times a day  sucralfate suspension 1 Gram(s) Oral four times a day  vancomycin  IVPB 1250 milliGRAM(s) IV Intermittent every 8 hours    MEDICATIONS  (PRN):  acetaminophen   Tablet .. 650 milliGRAM(s) Oral every 6 hours PRN Temp greater or equal to 38C (100.4F), Mild Pain (1 - 3), Moderate Pain (4 - 6)        I&O's Summary      PHYSICAL EXAM:  Vital Signs Last 24 Hrs  T(C): 36.8 (05 Jun 2021 11:18), Max: 37 (04 Jun 2021 15:54)  T(F): 98.2 (05 Jun 2021 11:18), Max: 98.6 (04 Jun 2021 15:54)  HR: 105 (05 Jun 2021 11:18) (85 - 105)  BP: 123/68 (05 Jun 2021 11:18) (102/71 - 141/73)  BP(mean): --  RR: 20 (05 Jun 2021 11:18) (18 - 20)  SpO2: 100% (05 Jun 2021 11:18) (98% - 100%)        CONSTITUTIONAL: NAD, well-developed, well-groomed  HEENT: NC/AT, PERRL, no JVD  RESPIRATORY: CTA bilaterally, normal effort  CARDIOVASCULAR: RRR, S1/S2+, no m/g/r  ABDOMEN: Nontender to palpation, normoactive bowel sounds, no rebound/guarding; No hepatosplenomegaly  MUSCLOSKELETAL: No edema, cyanosis or deformities.  PSYCH: A+O to person, place, and time; affect appropriate  NEUROLOGY: CN 2-12 are intact and symmetric; no gross neurological deficits.  SKIN: No rashes; no palpable lesions  VASC: Distal pulses palpable    LABS:                        11.0   4.96  )-----------( 196      ( 05 Jun 2021 07:43 )             35.7     06-05    139  |  99  |  13.1  ----------------------------<  95  4.4   |  29.0  |  0.78    Ca    9.4      05 Jun 2021 07:43    TPro  6.5<L>  /  Alb  3.8  /  TBili  0.4  /  DBili  x   /  AST  29  /  ALT  56<H>  /  AlkPhos  96  06-05              CAPILLARY BLOOD GLUCOSE            RADIOLOGY & ADDITIONAL TESTS:  Results Reviewed:   Imaging Personally Reviewed:  Electrocardiogram Personally Reviewed:

## 2021-06-05 NOTE — PROGRESS NOTE ADULT - ASSESSMENT
25M pmh IVDA, esophageal stricture s/p stent placement called in to ER for positive blood culture found to be septic and have esophageal stent migration. Blood cultures positive for strep, repeat blood cultures NGTD. Esophagram today.    #Sepsis 2/2 Bacteremia  - BCX reporting strep  - Bacteremia likely due to IVDA  - CT c/a/p reviewed   - ID following  - TTE w/o signs of vegetation   - Blood cultures (5/29): Streptococcus parasanguinis  - Blood cultures (5/30): No growth  - Repeat blood cultures NGTD  - Zosyn d/kristine  - c/w Unasyn 3g q6h  - c/w Vancomycin 1g q8h  - End date of antibiotics 6/12/21    #Esophageal stent migration  - tolerating mechanical soft diet well  - Stent initially placed for esophageal stricture  - now passed.  - GI following, may need eventual dilation outpatient.   - C/w protonix and carafate  - Mech soft diet    #Heroin abuse  - Active heroin abuse  - Monitor for sign of withdrawal  - Refused suboxone last admission    #Hep C reactive  - Hep C panel reactive  - Hep C RNA positive    DVT ppx  - SCD    Dispo: patient will remain in the hospital through 6/12/21 as he is an active IDU and cannot be d/c home with picc line

## 2021-06-05 NOTE — PROGRESS NOTE ADULT - PROBLEM SELECTOR PLAN 1
mild dysphagia to solids  may need eventual dilation as outpatient   will sign off   F/U with Dr Cox in 4 weeks  will be here until 6/12 for IV antibiotics ( bacteremia )

## 2021-06-05 NOTE — PROGRESS NOTE ADULT - TIME BILLING
reviewed esopahgram films, labs, notes
reviewed esophagram images, labs , notes
reviewed CT images, labs, notes

## 2021-06-05 NOTE — PROGRESS NOTE ADULT - SUBJECTIVE AND OBJECTIVE BOX
Patient is a 25y old  Male who presents with a chief complaint of bacteremia, stent migration (05 Jun 2021 11:24)      HPI:  25M pmh - still with mild dysphagia with dry foods. Better if he takes liquids with solids. NO chest pain or SOB    REVIEW OF SYSTEMS:  Constitutional: No fever, weight loss or fatigue  ENMT:  No difficulty hearing, tinnitus, vertigo; No sinus or throat pain  Respiratory: No cough, wheezing, chills or hemoptysis  Cardiovascular: No chest pain, palpitations, dizziness or leg swelling  Gastrointestinal: No abdominal or epigastric pain. No nausea, vomiting or hematemesis; No diarrhea or constipation. No melena or hematochezia.  Skin: No itching, burning, rashes or lesions   Musculoskeletal: No joint pain or swelling; No muscle, back or extremity pain    PAST MEDICAL & SURGICAL HISTORY:  Heroin abuse    Cocaine abuse    GERD (gastroesophageal reflux disease)    Hiatal hernia    History of esophageal stricture    Intravenous drug abuse    Migration of pancreatic stent    Migrated esophageal stent        FAMILY HISTORY:  FH: thyroid disease (Grandparent)        SOCIAL HISTORY:  Smoking Status: [ ] Current, [ ] Former, [ ] Never  Pack Years:  [  ] EtOH  [X  ] IVDA    MEDICATIONS:  MEDICATIONS  (STANDING):  ampicillin/sulbactam  IVPB 3 Gram(s) IV Intermittent every 6 hours  mirtazapine Soltab 15 milliGRAM(s) Oral at bedtime  pantoprazole  Injectable 40 milliGRAM(s) IV Push two times a day  saccharomyces boulardii 250 milliGRAM(s) Oral two times a day  sucralfate suspension 1 Gram(s) Oral four times a day  vancomycin  IVPB 1250 milliGRAM(s) IV Intermittent every 8 hours    MEDICATIONS  (PRN):  acetaminophen   Tablet .. 650 milliGRAM(s) Oral every 6 hours PRN Temp greater or equal to 38C (100.4F), Mild Pain (1 - 3), Moderate Pain (4 - 6)      Allergies    No Known Allergies    Intolerances        Vital Signs Last 24 Hrs  T(C): 37.5 (05 Jun 2021 16:57), Max: 37.5 (05 Jun 2021 16:57)  T(F): 99.5 (05 Jun 2021 16:57), Max: 99.5 (05 Jun 2021 16:57)  HR: 93 (05 Jun 2021 16:57) (93 - 105)  BP: 126/74 (05 Jun 2021 16:57) (102/71 - 126/74)  BP(mean): --  RR: 19 (05 Jun 2021 16:57) (18 - 20)  SpO2: 98% (05 Jun 2021 16:57) (98% - 100%)        PHYSICAL EXAM:    General: Well developed; well nourished; in no acute distress  HEENT: MMM, conjunctiva and sclera clear  Gastrointestinal: Soft, non-tender non-distended; Normal bowel sounds; No rebound or guarding  Extremities: Normal range of motion, No clubbing, cyanosis or edema  Neurological: Alert and oriented x3  Skin: Warm and dry. No obvious rash      LABS:                        11.0   4.96  )-----------( 196      ( 05 Jun 2021 07:43 )             35.7     05 Jun 2021 07:43    139    |  99     |  13.1   ----------------------------<  95     4.4     |  29.0   |  0.78     Ca    9.4        05 Jun 2021 07:43    TPro  6.5    /  Alb  3.8    /  TBili  0.4    /  DBili  x      /  AST  29     /  ALT  56     /  AlkPhos  96     / Amylase x      /Lipase x      05 Jun 2021 07:43              RADIOLOGY & ADDITIONAL STUDIES:     < from: Xray Kidney Ureter Bladder (06.05.21 @ 08:46) >  MPRESSION:  Ingested contrast within nonobstructed large bowel.  No acute radiographic intra-abdominal findings.      < end of copied text >

## 2021-06-06 LAB
CULTURE RESULTS: SIGNIFICANT CHANGE UP
CULTURE RESULTS: SIGNIFICANT CHANGE UP
SPECIMEN SOURCE: SIGNIFICANT CHANGE UP
SPECIMEN SOURCE: SIGNIFICANT CHANGE UP
VANCOMYCIN TROUGH SERPL-MCNC: 13 UG/ML — SIGNIFICANT CHANGE UP (ref 10–20)

## 2021-06-06 PROCEDURE — 99232 SBSQ HOSP IP/OBS MODERATE 35: CPT

## 2021-06-06 RX ADMIN — PANTOPRAZOLE SODIUM 40 MILLIGRAM(S): 20 TABLET, DELAYED RELEASE ORAL at 17:43

## 2021-06-06 RX ADMIN — MIRTAZAPINE 15 MILLIGRAM(S): 45 TABLET, ORALLY DISINTEGRATING ORAL at 21:13

## 2021-06-06 RX ADMIN — AMPICILLIN SODIUM AND SULBACTAM SODIUM 200 GRAM(S): 250; 125 INJECTION, POWDER, FOR SUSPENSION INTRAMUSCULAR; INTRAVENOUS at 06:26

## 2021-06-06 RX ADMIN — PANTOPRAZOLE SODIUM 40 MILLIGRAM(S): 20 TABLET, DELAYED RELEASE ORAL at 06:27

## 2021-06-06 RX ADMIN — Medication 166.67 MILLIGRAM(S): at 08:53

## 2021-06-06 RX ADMIN — Medication 1 GRAM(S): at 17:42

## 2021-06-06 RX ADMIN — Medication 166.67 MILLIGRAM(S): at 16:47

## 2021-06-06 RX ADMIN — AMPICILLIN SODIUM AND SULBACTAM SODIUM 200 GRAM(S): 250; 125 INJECTION, POWDER, FOR SUSPENSION INTRAMUSCULAR; INTRAVENOUS at 12:56

## 2021-06-06 RX ADMIN — Medication 1 GRAM(S): at 06:26

## 2021-06-06 RX ADMIN — Medication 250 MILLIGRAM(S): at 17:42

## 2021-06-06 RX ADMIN — Medication 1 GRAM(S): at 12:57

## 2021-06-06 RX ADMIN — Medication 166.67 MILLIGRAM(S): at 22:33

## 2021-06-06 RX ADMIN — AMPICILLIN SODIUM AND SULBACTAM SODIUM 200 GRAM(S): 250; 125 INJECTION, POWDER, FOR SUSPENSION INTRAMUSCULAR; INTRAVENOUS at 17:41

## 2021-06-06 RX ADMIN — Medication 250 MILLIGRAM(S): at 06:27

## 2021-06-06 RX ADMIN — AMPICILLIN SODIUM AND SULBACTAM SODIUM 200 GRAM(S): 250; 125 INJECTION, POWDER, FOR SUSPENSION INTRAMUSCULAR; INTRAVENOUS at 22:33

## 2021-06-06 RX ADMIN — Medication 1 GRAM(S): at 22:33

## 2021-06-06 NOTE — PROGRESS NOTE ADULT - SUBJECTIVE AND OBJECTIVE BOX
[facility] Division of Hospital Medicine  Torres Burns [phone]    Chief Complaint:  Patient is a 25y old  Male who presents with a chief complaint of bacteremia, stent migration (05 Jun 2021 21:33)      SUBJECTIVE / OVERNIGHT EVENTS:  Pt seen and examined at bedside. No acute events reported overnight. No new complaints.    Patient denies chest pain, SOB, abd pain, N/V, fever, chills, dysuria or any other complaints. All remainder ROS negative.     MEDICATIONS  (STANDING):  ampicillin/sulbactam  IVPB 3 Gram(s) IV Intermittent every 6 hours  mirtazapine Soltab 15 milliGRAM(s) Oral at bedtime  pantoprazole  Injectable 40 milliGRAM(s) IV Push two times a day  saccharomyces boulardii 250 milliGRAM(s) Oral two times a day  sucralfate suspension 1 Gram(s) Oral four times a day  vancomycin  IVPB 1250 milliGRAM(s) IV Intermittent every 8 hours    MEDICATIONS  (PRN):  acetaminophen   Tablet .. 650 milliGRAM(s) Oral every 6 hours PRN Temp greater or equal to 38C (100.4F), Mild Pain (1 - 3), Moderate Pain (4 - 6)        I&O's Summary      PHYSICAL EXAM:  Vital Signs Last 24 Hrs  T(C): 36.6 (06 Jun 2021 06:26), Max: 37.5 (05 Jun 2021 16:57)  T(F): 97.8 (06 Jun 2021 06:26), Max: 99.5 (05 Jun 2021 16:57)  HR: 85 (06 Jun 2021 06:26) (85 - 105)  BP: 115/71 (06 Jun 2021 06:26) (115/71 - 126/74)  BP(mean): --  RR: 18 (06 Jun 2021 06:26) (18 - 20)  SpO2: 97% (06 Jun 2021 06:26) (97% - 100%)        CONSTITUTIONAL: NAD, well-developed, well-groomed  HEENT: NC/AT, PERRL, no JVD  RESPIRATORY: CTA bilaterally, normal effort  CARDIOVASCULAR: RRR, S1/S2+, no m/g/r  ABDOMEN: Nontender to palpation, normoactive bowel sounds, no rebound/guarding; No hepatosplenomegaly  MUSCLOSKELETAL: No edema, cyanosis or deformities.  PSYCH: A+O to person, place, and time; affect appropriate  NEUROLOGY: CN 2-12 are intact and symmetric; no gross neurological deficits.  SKIN: No rashes; no palpable lesions  VASC: Distal pulses palpable    LABS:                        11.0   4.96  )-----------( 196      ( 05 Jun 2021 07:43 )             35.7     06-05    139  |  99  |  13.1  ----------------------------<  95  4.4   |  29.0  |  0.78    Ca    9.4      05 Jun 2021 07:43    TPro  6.5<L>  /  Alb  3.8  /  TBili  0.4  /  DBili  x   /  AST  29  /  ALT  56<H>  /  AlkPhos  96  06-05              CAPILLARY BLOOD GLUCOSE            RADIOLOGY & ADDITIONAL TESTS:  Results Reviewed:   Imaging Personally Reviewed:  Electrocardiogram Personally Reviewed:

## 2021-06-06 NOTE — PROGRESS NOTE ADULT - ASSESSMENT
25M pmh IVDA, esophageal stricture s/p stent placement called in to ER for positive blood culture found to be septic and have esophageal stent migration. Blood cultures positive for strep, repeat blood cultures NGTD. Esophagram today.    #Sepsis 2/2 Bacteremia  - BCX reporting strep  - Bacteremia likely due to IVDA  - CT c/a/p reviewed   - ID following  - TTE w/o signs of vegetation   - Blood cultures (5/29): Streptococcus parasanguinis  - Blood cultures (5/30): No growth  - Repeat blood cultures NGTD  - Zosyn d/kristine  - c/w Unasyn 3g q6h  - c/w Vancomycin 1g q8h  - End date of antibiotics 6/12/21    #Esophageal stent migration  - tolerating mechanical soft diet well  - Stent initially placed for esophageal stricture  - now passed.  - GI following, may need eventual dilation outpatient.   - C/w protonix and carafate  - Mech soft diet    #Heroin abuse  - Active heroin abuse  - Monitor for sign of withdrawal  - Refused suboxone last admission    #Hep C reactive  - Hep C panel reactive  - Hep C RNA positive    DVT ppx  - SCD    Dispo: patient will remain in the hospital through 6/12/21 as he is an active IVDU and cannot be d/c home with picc line

## 2021-06-07 DIAGNOSIS — R13.10 DYSPHAGIA, UNSPECIFIED: ICD-10-CM

## 2021-06-07 LAB — VANCOMYCIN TROUGH SERPL-MCNC: 11.4 UG/ML — SIGNIFICANT CHANGE UP (ref 10–20)

## 2021-06-07 PROCEDURE — 99233 SBSQ HOSP IP/OBS HIGH 50: CPT

## 2021-06-07 PROCEDURE — 99232 SBSQ HOSP IP/OBS MODERATE 35: CPT

## 2021-06-07 RX ADMIN — Medication 250 MILLIGRAM(S): at 05:58

## 2021-06-07 RX ADMIN — Medication 1 GRAM(S): at 21:58

## 2021-06-07 RX ADMIN — Medication 166.67 MILLIGRAM(S): at 17:00

## 2021-06-07 RX ADMIN — PANTOPRAZOLE SODIUM 40 MILLIGRAM(S): 20 TABLET, DELAYED RELEASE ORAL at 17:00

## 2021-06-07 RX ADMIN — AMPICILLIN SODIUM AND SULBACTAM SODIUM 200 GRAM(S): 250; 125 INJECTION, POWDER, FOR SUSPENSION INTRAMUSCULAR; INTRAVENOUS at 17:01

## 2021-06-07 RX ADMIN — MIRTAZAPINE 15 MILLIGRAM(S): 45 TABLET, ORALLY DISINTEGRATING ORAL at 21:57

## 2021-06-07 RX ADMIN — AMPICILLIN SODIUM AND SULBACTAM SODIUM 200 GRAM(S): 250; 125 INJECTION, POWDER, FOR SUSPENSION INTRAMUSCULAR; INTRAVENOUS at 05:58

## 2021-06-07 RX ADMIN — Medication 250 MILLIGRAM(S): at 17:00

## 2021-06-07 RX ADMIN — Medication 1 GRAM(S): at 12:44

## 2021-06-07 RX ADMIN — AMPICILLIN SODIUM AND SULBACTAM SODIUM 200 GRAM(S): 250; 125 INJECTION, POWDER, FOR SUSPENSION INTRAMUSCULAR; INTRAVENOUS at 12:43

## 2021-06-07 RX ADMIN — Medication 166.67 MILLIGRAM(S): at 08:39

## 2021-06-07 RX ADMIN — Medication 1 GRAM(S): at 17:00

## 2021-06-07 RX ADMIN — Medication 166.67 MILLIGRAM(S): at 21:58

## 2021-06-07 RX ADMIN — Medication 1 GRAM(S): at 05:57

## 2021-06-07 RX ADMIN — AMPICILLIN SODIUM AND SULBACTAM SODIUM 200 GRAM(S): 250; 125 INJECTION, POWDER, FOR SUSPENSION INTRAMUSCULAR; INTRAVENOUS at 21:58

## 2021-06-07 RX ADMIN — PANTOPRAZOLE SODIUM 40 MILLIGRAM(S): 20 TABLET, DELAYED RELEASE ORAL at 05:58

## 2021-06-07 NOTE — PROGRESS NOTE ADULT - ASSESSMENT
Symptomatic from recurrent tight distal esophageal stricture.    Will plan for EGD and Repeat dilitation of the eso stricture with Dr Cox on Wed.

## 2021-06-07 NOTE — PROGRESS NOTE ADULT - SUBJECTIVE AND OBJECTIVE BOX
[facility] Division of Hospital Medicine  Torres Burns [phone]    Chief Complaint:  Patient is a 25y old  Male who presents with a chief complaint of bacteremia, stent migration (06 Jun 2021 09:30)      SUBJECTIVE / OVERNIGHT EVENTS:  Pt seen and examined at bedside. No acute events reported overnight. Complains of some esophageal irritation. Otherwise, no new complaints.    Patient denies chest pain, SOB, abd pain, N/V, fever, chills, dysuria or any other complaints. All remainder ROS negative.     MEDICATIONS  (STANDING):  ampicillin/sulbactam  IVPB 3 Gram(s) IV Intermittent every 6 hours  mirtazapine Soltab 15 milliGRAM(s) Oral at bedtime  pantoprazole  Injectable 40 milliGRAM(s) IV Push two times a day  saccharomyces boulardii 250 milliGRAM(s) Oral two times a day  sucralfate suspension 1 Gram(s) Oral four times a day  vancomycin  IVPB 1250 milliGRAM(s) IV Intermittent every 8 hours    MEDICATIONS  (PRN):  acetaminophen   Tablet .. 650 milliGRAM(s) Oral every 6 hours PRN Temp greater or equal to 38C (100.4F), Mild Pain (1 - 3), Moderate Pain (4 - 6)        I&O's Summary      PHYSICAL EXAM:  Vital Signs Last 24 Hrs  T(C): 36.5 (07 Jun 2021 04:44), Max: 36.8 (06 Jun 2021 17:50)  T(F): 97.7 (07 Jun 2021 04:44), Max: 98.2 (06 Jun 2021 17:50)  HR: 84 (07 Jun 2021 04:44) (84 - 106)  BP: 113/67 (07 Jun 2021 04:44) (113/67 - 125/72)  BP(mean): --  RR: 18 (07 Jun 2021 04:44) (18 - 18)  SpO2: 98% (07 Jun 2021 04:44) (98% - 99%)        CONSTITUTIONAL: NAD  HEENT: NC/AT, PERRL, no JVD  RESPIRATORY: CTA bilaterally, normal effort  CARDIOVASCULAR: RRR, S1/S2+, no m/g/r  ABDOMEN: Nontender to palpation, normoactive bowel sounds, no rebound/guarding; No hepatosplenomegaly  MUSCLOSKELETAL: No edema, cyanosis or deformities.  PSYCH: A+O to person, place, and time; affect appropriate  NEUROLOGY: CN 2-12 are intact and symmetric; no gross neurological deficits.  SKIN: No rashes; no palpable lesions  VASC: Distal pulses palpable    LABS:                    CAPILLARY BLOOD GLUCOSE            RADIOLOGY & ADDITIONAL TESTS:  Results Reviewed:   Imaging Personally Reviewed:  Electrocardiogram Personally Reviewed:

## 2021-06-07 NOTE — PROGRESS NOTE ADULT - SUBJECTIVE AND OBJECTIVE BOX
[facility]  INFECTIOUS DISEASES AND INTERNAL MEDICINE at [place]  =======================================================  Rafa Garay MD  Diplomates American Board of Internal Medicine and Infectious Diseases  Tel: [phone]      Fax: [phone]  =======================================================    ABNER MORA [id number]    Follow up: bacteremia  feels well    difficulty swallowing persists       Allergies:  No Known Allergies       REVIEW OF SYSTEMS:  CONSTITUTIONAL:  No Fever or chills  HEENT:   No diplopia or blurred vision.  No earache, sore throat or runny nose.  CARDIOVASCULAR:  No pressure, squeezing, strangling, tightness, heaviness or aching about the chest, neck, axilla or epigastrium.  RESPIRATORY:  No cough, shortness of breath  GASTROINTESTINAL:  No nausea, vomiting or diarrhea.  GENITOURINARY:  No dysuria, frequency or urgency. No Blood in urine  MUSCULOSKELETAL:  no joint aches, no muscle pain  SKIN:  No change in skin, hair or nails.  NEUROLOGIC:  No Headaches, seizures or weakness.  PSYCHIATRIC:  No disorder of thought or mood.  ENDOCRINE:  No heat or cold intolerance  HEMATOLOGICAL:  No easy bruising or bleeding.       Physical Exam:  GEN: NAD, pleasant  HEENT: normocephalic and atraumatic. EOMI. PERRL.  Anicteric   NECK: Supple.   LUNGS: Clear to auscultation.  HEART: Regular rate and rhythm .  ABDOMEN: Soft, nontender, and nondistended.  Positive bowel sounds.    : No CVA tenderness  EXTREMITIES: Without any edema.  MSK: no joint swelling  NEUROLOGIC:  No focal deficits  PSYCHIATRIC: Appropriate affect .  SKIN: No Rash        Vitals:    T(F): 97.7 (07 Jun 2021 04:44), Max: 98.2 (06 Jun 2021 17:50)  HR: 84 (07 Jun 2021 04:44)  BP: 113/67 (07 Jun 2021 04:44)  RR: 18 (07 Jun 2021 04:44)  SpO2: 98% (07 Jun 2021 04:44) (98% - 99%)  temp max in last 48H T(F): , Max: 99.5 (06-05-21 @ 16:57)    Current Antibiotics:  ampicillin/sulbactam  IVPB 3 Gram(s) IV Intermittent every 6 hours  vancomycin  IVPB 1250 milliGRAM(s) IV Intermittent every 8 hours    Other medications:  mirtazapine Soltab 15 milliGRAM(s) Oral at bedtime  pantoprazole  Injectable 40 milliGRAM(s) IV Push two times a day  saccharomyces boulardii 250 milliGRAM(s) Oral two times a day  sucralfate suspension 1 Gram(s) Oral four times a day      RECENT CULTURES:  06-01 @ 09:07 .Blood Blood     No growth at 5 days.    06-01 @ 00:22 .Urine Clean Catch (Midstream)     No growth    05-30 @ 16:00 .Blood Blood-Peripheral     No growth at 5 days.    05-29 @ 07:04 .Blood Blood-Peripheral aerobic Blood Culture PCR  Streptococcus parasanguinis    Growth in aerobic bottle: Streptococcus parasanguinis  Growth in aerobic bottle: Bacillus species not anthracis  "Susceptibilities not performed"  Growth in aerobic bottle: Gram Positive Cocci in Pairs and Chains and  Gram Variable Rods  Gram Stain and BCID performed by:  [facility]  [address] [place] [id number]  ***Blood Panel PCR results on this specimen are available  approximately 3 hours after the Gram stain result.***  Gram stain, PCR, and/or culture results may not always  correspond due to difference in methodologies.  ************************************************************  ThisPCR assay was performed using [employer].  The following targets are tested for: Enterococcus,  vancomycin resistant enterococci, Listeria monocytogenes,  coagulase negative staphylococci, S. aureus,  methicillin resistant S. aureus, Streptococcus agalactiae  (Group B), S. pneumoniae, S. pyogenes (Group A),  Acinetobacter baumannii, Enterobacter cloacae, E. coli,  Klebsiella oxytoca, K. pneumoniae, Proteus sp.,  Serratia marcescens, Haemophilus influenzae,  Neisseria meningitidis, Pseudomonas aeruginosa, Candida  albicans, C. glabrata, C krusei, C parapsilosis,  C. tropicalis and the KPC resistance gene.  "Due to technical problems, Pseudomonas aeruginosa  until further notice".      WBC Count: 4.96 K/uL (06-05-21 @ 07:43)  WBC Count: 5.40 K/uL (06-04-21 @ 05:44)  WBC Count: 4.41 K/uL (06-03-21 @ 08:03)    Creatinine, Serum: 0.78 mg/dL (06-05-21 @ 07:43)  Creatinine, Serum: 0.77 mg/dL (06-04-21 @ 05:44)  Creatinine, Serum: 0.57 mg/dL (06-03-21 @ 08:03)    COVID-19 PCR: NotDetec (05-30-21 @ 17:19)  COVID-19 PCR: NotDetec (05-23-21 @ 05:56)  COVID-19 PCR: NotDetec (05-16-21 @ 14:22)  COVID-19 PCR: NotDetec (05-13-21 @ 22:42)

## 2021-06-07 NOTE — PROGRESS NOTE ADULT - SUBJECTIVE AND OBJECTIVE BOX
Patient is a 25y old  Male who presents with a chief complaint of bacteremia, stent migration (07 Jun 2021 09:10)      INTERVAL HPI/OVERNIGHT EVENTS: Patient seeing and evaluated at bedside, reporting no complaints, no overnight events, tolerating mechanical soft thin liquid diet. Denies nausea, vomiting, abdominal pain, chest pain, shortness of breath, hematemesis, hematochezia, melena.  MEDICATIONS  (STANDING):  ampicillin/sulbactam  IVPB 3 Gram(s) IV Intermittent every 6 hours  mirtazapine Soltab 15 milliGRAM(s) Oral at bedtime  pantoprazole  Injectable 40 milliGRAM(s) IV Push two times a day  saccharomyces boulardii 250 milliGRAM(s) Oral two times a day  sucralfate suspension 1 Gram(s) Oral four times a day  vancomycin  IVPB 1250 milliGRAM(s) IV Intermittent every 8 hours    MEDICATIONS  (PRN):  acetaminophen   Tablet .. 650 milliGRAM(s) Oral every 6 hours PRN Temp greater or equal to 38C (100.4F), Mild Pain (1 - 3), Moderate Pain (4 - 6)      Allergies    No Known Allergies    Intolerances        Review of Systems:  CONSTITUTIONAL:  No Fever or chills  HEENT:   No diplopia or blurred vision.  No earache, sore throat or runny nose.  CARDIOVASCULAR:  No pressure, squeezing, strangling, tightness, heaviness or aching about the chest, neck, axilla or epigastrium.  RESPIRATORY:  No cough, shortness of breath  GASTROINTESTINAL:  No nausea, vomiting or diarrhea.  GENITOURINARY:  No dysuria, frequency or urgency. No Blood in urine  MUSCULOSKELETAL:  no joint aches, no muscle pain  SKIN:  No change in skin, hair or nails.  NEUROLOGIC:  No Headaches, seizures or weakness.  PSYCHIATRIC:  No disorder of thought or mood.  ENDOCRINE:  No heat or cold intolerance  HEMATOLOGICAL:  No easy bruising or bleeding.     Vital Signs Last 24 Hrs  T(C): 36.6 (07 Jun 2021 10:21), Max: 36.8 (06 Jun 2021 17:50)  T(F): 97.9 (07 Jun 2021 10:21), Max: 98.2 (06 Jun 2021 17:50)  HR: 80 (07 Jun 2021 10:21) (80 - 106)  BP: 120/71 (07 Jun 2021 10:21) (113/67 - 125/72)  BP(mean): --  RR: 18 (07 Jun 2021 10:21) (18 - 18)  SpO2: 99% (07 Jun 2021 10:21) (98% - 99%)    PHYSICAL EXAM:    CONSTITUTIONAL: NAD  HEENT: NC/AT, PERRL, no JVD  RESPIRATORY: CTA bilaterally, normal effort  CARDIOVASCULAR: RRR, S1/S2+, no m/g/r  ABDOMEN: Nontender to palpation, normoactive bowel sounds, no rebound/guarding; No hepatosplenomegaly  MUSCLOSKELETAL: No edema, cyanosis or deformities.  PSYCH: A+O to person, place, and time; affect appropriate  NEUROLOGY: CN 2-12 are intact and symmetric; no gross neurological deficits.  SKIN: No rashes; no palpable lesions  VASC: Distal pulses palpable      LIVER FUNCTIONS - ( 05 Jun 2021 07:43 )  Alb: 3.8 g/dL / Pro: 6.5 g/dL / ALK PHOS: 96 U/L / ALT: 56 U/L / AST: 29 U/L / GGT: x                Patient is a 25y old  Male who presents with a chief complaint of bacteremia, stent migration (07 Jun 2021 09:10)    Had been tolerating Mechanical soft diet but now notes that only pureed diet is tolerated.    Esophagram results noted.        INTERVAL HPI/OVERNIGHT EVENTS: Patient seeing and evaluated at bedside, reporting no complaints, no overnight events, tolerating mechanical soft thin liquid diet. Denies nausea, vomiting, abdominal pain, chest pain, shortness of breath, hematemesis, hematochezia, melena.  MEDICATIONS  (STANDING):  ampicillin/sulbactam  IVPB 3 Gram(s) IV Intermittent every 6 hours  mirtazapine Soltab 15 milliGRAM(s) Oral at bedtime  pantoprazole  Injectable 40 milliGRAM(s) IV Push two times a day  saccharomyces boulardii 250 milliGRAM(s) Oral two times a day  sucralfate suspension 1 Gram(s) Oral four times a day  vancomycin  IVPB 1250 milliGRAM(s) IV Intermittent every 8 hours    MEDICATIONS  (PRN):  acetaminophen   Tablet .. 650 milliGRAM(s) Oral every 6 hours PRN Temp greater or equal to 38C (100.4F), Mild Pain (1 - 3), Moderate Pain (4 - 6)      Allergies    No Known Allergies    Intolerances        Review of Systems:  CONSTITUTIONAL:  No Fever or chills  HEENT:   No diplopia or blurred vision.  No earache, sore throat or runny nose.  CARDIOVASCULAR:  No pressure, squeezing, strangling, tightness, heaviness or aching about the chest, neck, axilla or epigastrium.  RESPIRATORY:  No cough, shortness of breath  GASTROINTESTINAL:  No nausea, vomiting or diarrhea.  GENITOURINARY:  No dysuria, frequency or urgency. No Blood in urine  MUSCULOSKELETAL:  no joint aches, no muscle pain  SKIN:  No change in skin, hair or nails.  NEUROLOGIC:  No Headaches, seizures or weakness.  PSYCHIATRIC:  No disorder of thought or mood.  ENDOCRINE:  No heat or cold intolerance  HEMATOLOGICAL:  No easy bruising or bleeding.     Vital Signs Last 24 Hrs  T(C): 36.6 (07 Jun 2021 10:21), Max: 36.8 (06 Jun 2021 17:50)  T(F): 97.9 (07 Jun 2021 10:21), Max: 98.2 (06 Jun 2021 17:50)  HR: 80 (07 Jun 2021 10:21) (80 - 106)  BP: 120/71 (07 Jun 2021 10:21) (113/67 - 125/72)  BP(mean): --  RR: 18 (07 Jun 2021 10:21) (18 - 18)  SpO2: 99% (07 Jun 2021 10:21) (98% - 99%)    PHYSICAL EXAM:    CONSTITUTIONAL: NAD  HEENT: NC/AT, PERRL, no JVD  RESPIRATORY: CTA bilaterally, normal effort  CARDIOVASCULAR: RRR, S1/S2+, no m/g/r  ABDOMEN: Nontender to palpation, normoactive bowel sounds, no rebound/guarding; No hepatosplenomegaly  MUSCLOSKELETAL: No edema, cyanosis or deformities.  PSYCH: A+O to person, place, and time; affect appropriate  NEUROLOGY: CN 2-12 are intact and symmetric; no gross neurological deficits.  SKIN: No rashes; no palpable lesions  VASC: Distal pulses palpable      LIVER FUNCTIONS - ( 05 Jun 2021 07:43 )  Alb: 3.8 g/dL / Pro: 6.5 g/dL / ALK PHOS: 96 U/L / ALT: 56 U/L / AST: 29 U/L / GGT: x             Radiology:  Esophagram 6/3:  Distal esophageal smooth tapering stricture x 3 cm long with minimal diameter of 5 mm.

## 2021-06-07 NOTE — PROGRESS NOTE ADULT - PROBLEM SELECTOR PLAN 1
History of esophageal stricture, now able to tolerate thin liquids but mild dysphagia to solids.  Continue IV Vancomycin   Patient will need to follow up as outpatient with Dr Cox in the next 4 weeks for possible esophageal stent placement. History of esophageal stricture, now able to tolerate thin liquids but mild dysphagia to solids.  Continue IV Vancomycin   Will plan for another dilitation this week.    Patient will need to follow up as outpatient with Dr Cox in the next 4 weeks for possible esophageal intervention. .

## 2021-06-07 NOTE — PROGRESS NOTE ADULT - ASSESSMENT
25y  Male with h/o IVDA, esophageal stricture s/p stent placement.  Patient had recent admission after using heroin and presented with septic shock and found to have multi focal pna and subsequently intubated and was in MICU, completed course of IV Zosyn. Patient worked up for severe esophageal stricture and is now s/p esophageal  stent paced by  GI endoscopy on 5/25. Patient eventually discharged home. He presented to ER 5/29 after snorting multiple percocet and having multiple episodes of vomiting. He eventually left AMA. He was called back to the ER due to positive blood cultures. In the ER patient was noted to be febrile, tachycardic, CT chest abdomen pelvis reporting esophageal stent has migrated to small bowel. GI called. Patient complaining of fever and nausea. He states he's been eating okay and having bowel movements. Denies abdominal pain. Patient admits to injecting heroin multiple times the last couple of days. He claims he uses clean needles but licked the needles and his needle insertion site. Started on IV Vancomycin and Zosyn.      Streptococcus Septicemia likely 2/2 IVDU  Fever  Leukocytosis   Migrated esophageal stent  Transaminitis   IVDA    - Blood cultures Reporting Strep /bacillus  - Repeat blood cultures 5/30 ngtd  - COVID 19 PCR negative   - CT A/P reporting migrated esophageal stent  - Continue unasyn 3 g iv q6h  - Continue Vancomycin   - end date of abx is 6/12/21  - Monitor trough  - Monitor for Vancomycin toxicity   - Vancomycin required at this time to cover strep parasanguinus (I) sensitivity to ceftriaxone/penicillin  - HCV +  - TTE no vegetations  - Trend Fever  - Trend Leukocytosis  - not a candidate for OPAT  - advised to avoid drug IV drugs      Will Follow      d/w Dr Burns

## 2021-06-08 ENCOUNTER — TRANSCRIPTION ENCOUNTER (OUTPATIENT)
Age: 26
End: 2021-06-08

## 2021-06-08 LAB
ALBUMIN SERPL ELPH-MCNC: 4 G/DL — SIGNIFICANT CHANGE UP (ref 3.3–5.2)
ALP SERPL-CCNC: 86 U/L — SIGNIFICANT CHANGE UP (ref 40–120)
ALT FLD-CCNC: 48 U/L — HIGH
ANION GAP SERPL CALC-SCNC: 12 MMOL/L — SIGNIFICANT CHANGE UP (ref 5–17)
AST SERPL-CCNC: 29 U/L — SIGNIFICANT CHANGE UP
BILIRUB SERPL-MCNC: 0.5 MG/DL — SIGNIFICANT CHANGE UP (ref 0.4–2)
BUN SERPL-MCNC: 10.8 MG/DL — SIGNIFICANT CHANGE UP (ref 8–20)
CALCIUM SERPL-MCNC: 9.3 MG/DL — SIGNIFICANT CHANGE UP (ref 8.6–10.2)
CHLORIDE SERPL-SCNC: 99 MMOL/L — SIGNIFICANT CHANGE UP (ref 98–107)
CO2 SERPL-SCNC: 27 MMOL/L — SIGNIFICANT CHANGE UP (ref 22–29)
CREAT SERPL-MCNC: 0.81 MG/DL — SIGNIFICANT CHANGE UP (ref 0.5–1.3)
GLUCOSE SERPL-MCNC: 165 MG/DL — HIGH (ref 70–99)
HCT VFR BLD CALC: 34.8 % — LOW (ref 39–50)
HGB BLD-MCNC: 10.7 G/DL — LOW (ref 13–17)
MCHC RBC-ENTMCNC: 23.4 PG — LOW (ref 27–34)
MCHC RBC-ENTMCNC: 30.7 GM/DL — LOW (ref 32–36)
MCV RBC AUTO: 76 FL — LOW (ref 80–100)
PLATELET # BLD AUTO: 221 K/UL — SIGNIFICANT CHANGE UP (ref 150–400)
POTASSIUM SERPL-MCNC: 4 MMOL/L — SIGNIFICANT CHANGE UP (ref 3.5–5.3)
POTASSIUM SERPL-SCNC: 4 MMOL/L — SIGNIFICANT CHANGE UP (ref 3.5–5.3)
PROT SERPL-MCNC: 6.7 G/DL — SIGNIFICANT CHANGE UP (ref 6.6–8.7)
RBC # BLD: 4.58 M/UL — SIGNIFICANT CHANGE UP (ref 4.2–5.8)
RBC # FLD: 16.6 % — HIGH (ref 10.3–14.5)
SARS-COV-2 RNA SPEC QL NAA+PROBE: SIGNIFICANT CHANGE UP
SODIUM SERPL-SCNC: 138 MMOL/L — SIGNIFICANT CHANGE UP (ref 135–145)
WBC # BLD: 5.35 K/UL — SIGNIFICANT CHANGE UP (ref 3.8–10.5)
WBC # FLD AUTO: 5.35 K/UL — SIGNIFICANT CHANGE UP (ref 3.8–10.5)

## 2021-06-08 PROCEDURE — 99232 SBSQ HOSP IP/OBS MODERATE 35: CPT

## 2021-06-08 PROCEDURE — 99233 SBSQ HOSP IP/OBS HIGH 50: CPT

## 2021-06-08 RX ADMIN — Medication 166.67 MILLIGRAM(S): at 07:18

## 2021-06-08 RX ADMIN — Medication 1 GRAM(S): at 11:18

## 2021-06-08 RX ADMIN — Medication 1 GRAM(S): at 17:45

## 2021-06-08 RX ADMIN — PANTOPRAZOLE SODIUM 40 MILLIGRAM(S): 20 TABLET, DELAYED RELEASE ORAL at 18:44

## 2021-06-08 RX ADMIN — Medication 1 GRAM(S): at 23:48

## 2021-06-08 RX ADMIN — Medication 250 MILLIGRAM(S): at 05:15

## 2021-06-08 RX ADMIN — AMPICILLIN SODIUM AND SULBACTAM SODIUM 200 GRAM(S): 250; 125 INJECTION, POWDER, FOR SUSPENSION INTRAMUSCULAR; INTRAVENOUS at 23:48

## 2021-06-08 RX ADMIN — AMPICILLIN SODIUM AND SULBACTAM SODIUM 200 GRAM(S): 250; 125 INJECTION, POWDER, FOR SUSPENSION INTRAMUSCULAR; INTRAVENOUS at 18:45

## 2021-06-08 RX ADMIN — Medication 1 GRAM(S): at 05:15

## 2021-06-08 RX ADMIN — Medication 166.67 MILLIGRAM(S): at 16:59

## 2021-06-08 RX ADMIN — AMPICILLIN SODIUM AND SULBACTAM SODIUM 200 GRAM(S): 250; 125 INJECTION, POWDER, FOR SUSPENSION INTRAMUSCULAR; INTRAVENOUS at 11:18

## 2021-06-08 RX ADMIN — AMPICILLIN SODIUM AND SULBACTAM SODIUM 200 GRAM(S): 250; 125 INJECTION, POWDER, FOR SUSPENSION INTRAMUSCULAR; INTRAVENOUS at 05:14

## 2021-06-08 RX ADMIN — PANTOPRAZOLE SODIUM 40 MILLIGRAM(S): 20 TABLET, DELAYED RELEASE ORAL at 05:14

## 2021-06-08 RX ADMIN — MIRTAZAPINE 15 MILLIGRAM(S): 45 TABLET, ORALLY DISINTEGRATING ORAL at 21:47

## 2021-06-08 RX ADMIN — Medication 250 MILLIGRAM(S): at 17:45

## 2021-06-08 NOTE — PROGRESS NOTE ADULT - SUBJECTIVE AND OBJECTIVE BOX
[facility] Division of Hospital Medicine  Torres Burns [phone]    Chief Complaint:  Patient is a 25y old  Male who presents with a chief complaint of bacteremia, stent migration (07 Jun 2021 13:14)      SUBJECTIVE / OVERNIGHT EVENTS:  Pt seen and examined at bedside. No acute events reported overnight. No new complaints.    Patient denies chest pain, SOB, abd pain, N/V, fever, chills, dysuria or any other complaints. All remainder ROS negative.     MEDICATIONS  (STANDING):  ampicillin/sulbactam  IVPB 3 Gram(s) IV Intermittent every 6 hours  mirtazapine Soltab 15 milliGRAM(s) Oral at bedtime  pantoprazole  Injectable 40 milliGRAM(s) IV Push two times a day  saccharomyces boulardii 250 milliGRAM(s) Oral two times a day  sucralfate suspension 1 Gram(s) Oral four times a day  vancomycin  IVPB 1250 milliGRAM(s) IV Intermittent every 8 hours    MEDICATIONS  (PRN):  acetaminophen   Tablet .. 650 milliGRAM(s) Oral every 6 hours PRN Temp greater or equal to 38C (100.4F), Mild Pain (1 - 3), Moderate Pain (4 - 6)        I&O's Summary      PHYSICAL EXAM:  Vital Signs Last 24 Hrs  T(C): 36.3 (08 Jun 2021 05:09), Max: 36.7 (07 Jun 2021 17:53)  T(F): 97.4 (08 Jun 2021 05:09), Max: 98 (07 Jun 2021 17:53)  HR: 73 (08 Jun 2021 05:09) (68 - 80)  BP: 115/73 (08 Jun 2021 05:09) (115/73 - 130/72)  BP(mean): --  RR: 16 (08 Jun 2021 05:09) (16 - 18)  SpO2: 96% (08 Jun 2021 05:09) (96% - 99%)        CONSTITUTIONAL: NAD  HEENT: NC/AT, PERRL, no JVD  RESPIRATORY: CTA bilaterally, normal effort  CARDIOVASCULAR: RRR, S1/S2+, no m/g/r  ABDOMEN: Nontender to palpation, normoactive bowel sounds, no rebound/guarding; No hepatosplenomegaly  MUSCLOSKELETAL: No edema, cyanosis or deformities.  PSYCH: A+O to person, place, and time; affect appropriate  NEUROLOGY: CN 2-12 are intact and symmetric; no gross neurological deficits.  SKIN: No rashes; no palpable lesions  VASC: Distal pulses palpable    LABS:                        10.7   5.35  )-----------( 221      ( 08 Jun 2021 08:44 )             34.8                     CAPILLARY BLOOD GLUCOSE            RADIOLOGY & ADDITIONAL TESTS:  Results Reviewed:   Imaging Personally Reviewed:  Electrocardiogram Personally Reviewed:

## 2021-06-08 NOTE — PROGRESS NOTE ADULT - PROBLEM SELECTOR PLAN 1
symptomatic from recurrent tight distal esophageal stricture.  Will schedule EGD with dilation in AM  Continue Vancomycin  Will need to follow up as outpatient with Dr Cox in the next 4 weeks to evaluated  Esophageal stricture.

## 2021-06-08 NOTE — PROGRESS NOTE ADULT - SUBJECTIVE AND OBJECTIVE BOX
[facility]  INFECTIOUS DISEASES AND INTERNAL MEDICINE at [place]  =======================================================  Rafa Garay MD  Diplomates American Board of Internal Medicine and Infectious Diseases  Tel: [phone]      Fax: [phone]  =======================================================    [place] [id number]    Follow up: bacteremia  feels well    No complaints this morning      Allergies:  No Known Allergies       REVIEW OF SYSTEMS:  CONSTITUTIONAL:  No Fever or chills  HEENT:   No diplopia or blurred vision.  No earache, sore throat or runny nose.  CARDIOVASCULAR:  No pressure, squeezing, strangling, tightness, heaviness or aching about the chest, neck, axilla or epigastrium.  RESPIRATORY:  No cough, shortness of breath  GASTROINTESTINAL:  No nausea, vomiting or diarrhea.  GENITOURINARY:  No dysuria, frequency or urgency. No Blood in urine  MUSCULOSKELETAL:  no joint aches, no muscle pain  SKIN:  No change in skin, hair or nails.  NEUROLOGIC:  No Headaches, seizures or weakness.  PSYCHIATRIC:  No disorder of thought or mood.  ENDOCRINE:  No heat or cold intolerance  HEMATOLOGICAL:  No easy bruising or bleeding.       Physical Exam:  GEN: NAD, pleasant  HEENT: normocephalic and atraumatic. EOMI. PERRL.  Anicteric   NECK: Supple.   LUNGS: Clear to auscultation.  HEART: Regular rate and rhythm .  ABDOMEN: Soft, nontender, and nondistended.  Positive bowel sounds.    : No CVA tenderness  EXTREMITIES: Without any edema.  MSK: no joint swelling  NEUROLOGIC:  No focal deficits  PSYCHIATRIC: Appropriate affect .  SKIN: No Rash      Vitals:  T(F): 97.4 (08 Jun 2021 05:09), Max: 98 (07 Jun 2021 17:53)  HR: 73 (08 Jun 2021 05:09)  BP: 115/73 (08 Jun 2021 05:09)  RR: 16 (08 Jun 2021 05:09)  SpO2: 96% (08 Jun 2021 05:09) (96% - 99%)  temp max in last 48H T(F): , Max: 98.2 (06-06-21 @ 17:50)    Current Antibiotics:  ampicillin/sulbactam  IVPB 3 Gram(s) IV Intermittent every 6 hours  vancomycin  IVPB 1250 milliGRAM(s) IV Intermittent every 8 hours    Other medications:  mirtazapine Soltab 15 milliGRAM(s) Oral at bedtime  pantoprazole  Injectable 40 milliGRAM(s) IV Push two times a day  saccharomyces boulardii 250 milliGRAM(s) Oral two times a day  sucralfate suspension 1 Gram(s) Oral four times a day                 10.7   5.35  )-----------( 221      ( 08 Jun 2021 08:44 )             34.8         RECENT CULTURES:  06-01 @ 09:07 .Blood Blood     No growth at 5 days.    06-01 @ 00:22 .Urine Clean Catch (Midstream)     No growth    05-30 @ 16:00 .Blood Blood-Peripheral     No growth at 5 days.    05-29 @ 07:04 .Blood Blood-Peripheral aerobic Blood Culture PCR  Streptococcus parasanguinis    Growth in aerobic bottle: Streptococcus parasanguinis  Growth in aerobic bottle: Bacillus species not anthracis  "Susceptibilities not performed"  Growth in aerobic bottle: Gram Positive Cocci in Pairs and Chains and  Gram Variable Rods  Gram Stain and BCID performed by:  [facility]  [address] [place] [id number]  ***Blood Panel PCR results on this specimen are available  approximately 3 hours after the Gram stain result.***  Gram stain, PCR, and/or culture results may not always  correspond due to difference in methodologies.  ************************************************************  ThisPCR assay was performed using [employer].  The following targets are tested for: Enterococcus,  vancomycin resistant enterococci, Listeria monocytogenes,  coagulase negative staphylococci, S. aureus,  methicillin resistant S. aureus, Streptococcus agalactiae  (Group B), S. pneumoniae, S. pyogenes (Group A),  Acinetobacter baumannii, Enterobacter cloacae, E. coli,  Klebsiella oxytoca, K. pneumoniae, Proteus sp.,  Serratia marcescens, Haemophilus influenzae,  Neisseria meningitidis, Pseudomonas aeruginosa, Candida  albicans, C. glabrata, C krusei, C parapsilosis,  C. tropicalis and the KPC resistance gene.  "Due to technical problems, Pseudomonas aeruginosa      WBC Count: 5.35 K/uL (06-08-21 @ 08:44)  WBC Count: 4.96 K/uL (06-05-21 @ 07:43)  WBC Count: 5.40 K/uL (06-04-21 @ 05:44)    Creatinine, Serum: 0.78 mg/dL (06-05-21 @ 07:43)  Creatinine, Serum: 0.77 mg/dL (06-04-21 @ 05:44)    COVID-19 PCR: NotDetec (05-30-21 @ 17:19)  COVID-19 PCR: NotDetec (05-23-21 @ 05:56)  COVID-19 PCR: NotDetec (05-16-21 @ 14:22)  COVID-19 PCR: NotDetec (05-13-21 @ 22:42)

## 2021-06-08 NOTE — PROGRESS NOTE ADULT - SUBJECTIVE AND OBJECTIVE BOX
Patient is a 25y old  Male who presents with a chief complaint of bacteremia, stent migration (08 Jun 2021 08:55)      INTERVAL HPI/OVERNIGHT EVENTS: Patient seeing and evaluated at bedside, tolerating pureed diet, schedule for EGD with dilation in AM. Denies nausea, vomiting, abdominal pain, chest pain, shortness of breath, hematemesis, hematochezia, melena.  MEDICATIONS  (STANDING):  ampicillin/sulbactam  IVPB 3 Gram(s) IV Intermittent every 6 hours  mirtazapine Soltab 15 milliGRAM(s) Oral at bedtime  pantoprazole  Injectable 40 milliGRAM(s) IV Push two times a day  saccharomyces boulardii 250 milliGRAM(s) Oral two times a day  sucralfate suspension 1 Gram(s) Oral four times a day  vancomycin  IVPB 1250 milliGRAM(s) IV Intermittent every 8 hours    MEDICATIONS  (PRN):  acetaminophen   Tablet .. 650 milliGRAM(s) Oral every 6 hours PRN Temp greater or equal to 38C (100.4F), Mild Pain (1 - 3), Moderate Pain (4 - 6)      Allergies    No Known Allergies    Intolerances        Review of Systems:  CONSTITUTIONAL:  No Fever or chills  HEENT:   No diplopia or blurred vision.  No earache, sore throat or runny nose.  CARDIOVASCULAR:  No pressure, squeezing, strangling, tightness, heaviness or aching about the chest, neck, axilla or epigastrium.  RESPIRATORY:  No cough, shortness of breath  GASTROINTESTINAL:  No nausea, vomiting or diarrhea.  GENITOURINARY:  No dysuria, frequency or urgency. No Blood in urine  MUSCULOSKELETAL:  no joint aches, no muscle pain  SKIN:  No change in skin, hair or nails.  NEUROLOGIC:  No Headaches, seizures or weakness.  PSYCHIATRIC:  No disorder of thought or mood.  ENDOCRINE:  No heat or cold intolerance  HEMATOLOGICAL:  No easy bruising or bleeding.       Vital Signs Last 24 Hrs  T(C): 37 (08 Jun 2021 10:55), Max: 37 (08 Jun 2021 10:55)  T(F): 98.6 (08 Jun 2021 10:55), Max: 98.6 (08 Jun 2021 10:55)  HR: 99 (08 Jun 2021 10:55) (68 - 99)  BP: 124/68 (08 Jun 2021 10:55) (115/73 - 130/72)  BP(mean): --  RR: 18 (08 Jun 2021 10:55) (16 - 18)  SpO2: 98% (08 Jun 2021 10:55) (96% - 98%)    PHYSICAL EXAM:  CONSTITUTIONAL: NAD  HEENT: NC/AT, PERRL, no JVD  RESPIRATORY: CTA bilaterally, normal effort  CARDIOVASCULAR: RRR, S1/S2+, no m/g/r  ABDOMEN: Nontender to palpation, normoactive bowel sounds, no rebound/guarding; No hepatosplenomegaly  MUSCLOSKELETAL: No edema, cyanosis or deformities.  PSYCH: A+O to person, place, and time; affect appropriate  NEUROLOGY: CN 2-12 are intact and symmetric; no gross neurological deficits.  SKIN: No rashes; no palpable lesions  VASC: Distal pulses palpable      LABS:                        10.7   5.35  )-----------( 221      ( 08 Jun 2021 08:44 )             34.8     06-08    138  |  99  |  10.8  ----------------------------<  165<H>  4.0   |  27.0  |  0.81    Ca    9.3      08 Jun 2021 08:44    TPro  6.7  /  Alb  4.0  /  TBili  0.5  /  DBili  x   /  AST  29  /  ALT  48<H>  /  AlkPhos  86  06-08        LIVER FUNCTIONS - ( 08 Jun 2021 08:44 )  Alb: 4.0 g/dL / Pro: 6.7 g/dL / ALK PHOS: 86 U/L / ALT: 48 U/L / AST: 29 U/L / GGT: x

## 2021-06-08 NOTE — PROGRESS NOTE ADULT - ASSESSMENT
25M pmh IVDA, esophageal stricture s/p stent placement called in to ER for positive blood culture found to be septic and have esophageal stent migration. Blood cultures positive for strep, repeat blood cultures NGTD. Esophagram today.    #Sepsis 2/2 Bacteremia  - BCX reporting strep  - Bacteremia likely due to IVDA  - CT c/a/p reviewed   - ID following  - TTE w/o signs of vegetation   - Blood cultures (5/29): Streptococcus parasanguinis  - Blood cultures (5/30): No growth  - Repeat blood cultures NGTD  - Zosyn d/kristine  - c/w Unasyn 3g q6h  - c/w Vancomycin 1g q8h  - End date of antibiotics 6/12/21    #Esophageal stent migration  - tolerating mechanical soft diet well  - Stent initially placed for esophageal stricture  - now passed.  - GI following, may need eventual dilation outpatient.   - C/w protonix and carafate  - Mech soft diet  - Possible dilatation this week.     #Heroin abuse  - Active heroin abuse  - Monitor for sign of withdrawal  - Refused suboxone last admission    #Hep C reactive  - Hep C panel reactive  - Hep C RNA positive    DVT ppx  - SCD    Dispo: patient will remain in the hospital through 6/12/21 as he is an active IVDU and cannot be d/c home with picc line

## 2021-06-09 LAB — VANCOMYCIN TROUGH SERPL-MCNC: 15 UG/ML — SIGNIFICANT CHANGE UP (ref 10–20)

## 2021-06-09 PROCEDURE — 99232 SBSQ HOSP IP/OBS MODERATE 35: CPT

## 2021-06-09 PROCEDURE — 43249 ESOPH EGD DILATION <30 MM: CPT

## 2021-06-09 RX ADMIN — Medication 250 MILLIGRAM(S): at 16:12

## 2021-06-09 RX ADMIN — Medication 166.67 MILLIGRAM(S): at 00:34

## 2021-06-09 RX ADMIN — Medication 166.67 MILLIGRAM(S): at 09:29

## 2021-06-09 RX ADMIN — Medication 1 GRAM(S): at 16:11

## 2021-06-09 RX ADMIN — PANTOPRAZOLE SODIUM 40 MILLIGRAM(S): 20 TABLET, DELAYED RELEASE ORAL at 06:11

## 2021-06-09 RX ADMIN — MIRTAZAPINE 15 MILLIGRAM(S): 45 TABLET, ORALLY DISINTEGRATING ORAL at 23:04

## 2021-06-09 RX ADMIN — Medication 166.67 MILLIGRAM(S): at 16:13

## 2021-06-09 RX ADMIN — AMPICILLIN SODIUM AND SULBACTAM SODIUM 200 GRAM(S): 250; 125 INJECTION, POWDER, FOR SUSPENSION INTRAMUSCULAR; INTRAVENOUS at 12:57

## 2021-06-09 RX ADMIN — AMPICILLIN SODIUM AND SULBACTAM SODIUM 200 GRAM(S): 250; 125 INJECTION, POWDER, FOR SUSPENSION INTRAMUSCULAR; INTRAVENOUS at 16:12

## 2021-06-09 RX ADMIN — PANTOPRAZOLE SODIUM 40 MILLIGRAM(S): 20 TABLET, DELAYED RELEASE ORAL at 16:12

## 2021-06-09 RX ADMIN — AMPICILLIN SODIUM AND SULBACTAM SODIUM 200 GRAM(S): 250; 125 INJECTION, POWDER, FOR SUSPENSION INTRAMUSCULAR; INTRAVENOUS at 06:11

## 2021-06-09 RX ADMIN — Medication 1 GRAM(S): at 23:04

## 2021-06-09 RX ADMIN — AMPICILLIN SODIUM AND SULBACTAM SODIUM 200 GRAM(S): 250; 125 INJECTION, POWDER, FOR SUSPENSION INTRAMUSCULAR; INTRAVENOUS at 23:04

## 2021-06-09 RX ADMIN — Medication 1 GRAM(S): at 12:57

## 2021-06-09 NOTE — PROGRESS NOTE ADULT - SUBJECTIVE AND OBJECTIVE BOX
[facility]  INFECTIOUS DISEASES AND INTERNAL MEDICINE at [place]  =======================================================  Rafa Garay MD  Diplomates American Board of Internal Medicine and Infectious Diseases  Tel: [phone]      Fax: [phone]  =======================================================    [place] [id number]    Follow up: bacteremia  feels well    No complaints this morning      Allergies:  No Known Allergies       REVIEW OF SYSTEMS:  CONSTITUTIONAL:  No Fever or chills  HEENT:   No diplopia or blurred vision.  No earache, sore throat or runny nose.  CARDIOVASCULAR:  No pressure, squeezing, strangling, tightness, heaviness or aching about the chest, neck, axilla or epigastrium.  RESPIRATORY:  No cough, shortness of breath  GASTROINTESTINAL:  No nausea, vomiting or diarrhea.  GENITOURINARY:  No dysuria, frequency or urgency. No Blood in urine  MUSCULOSKELETAL:  no joint aches, no muscle pain  SKIN:  No change in skin, hair or nails.  NEUROLOGIC:  No Headaches, seizures or weakness.  PSYCHIATRIC:  No disorder of thought or mood.  ENDOCRINE:  No heat or cold intolerance  HEMATOLOGICAL:  No easy bruising or bleeding.       Physical Exam:  GEN: NAD, pleasant  HEENT: normocephalic and atraumatic. EOMI. PERRL.  Anicteric   NECK: Supple.   LUNGS: Clear to auscultation.  HEART: Regular rate and rhythm .  ABDOMEN: Soft, nontender, and nondistended.  Positive bowel sounds.    : No CVA tenderness  EXTREMITIES: Without any edema.  MSK: no joint swelling  NEUROLOGIC:  No focal deficits  PSYCHIATRIC: Appropriate affect .  SKIN: No Rash      Vitals:  T(F): 98.3 (09 Jun 2021 04:26), Max: 98.6 (08 Jun 2021 10:55)  HR: 71 (09 Jun 2021 04:26)  BP: 110/70 (09 Jun 2021 04:26)  RR: 18 (09 Jun 2021 04:26)  SpO2: 98% (09 Jun 2021 04:26) (98% - 98%)  temp max in last 48H T(F): , Max: 98.6 (06-08-21 @ 10:55)    Current Antibiotics:  ampicillin/sulbactam  IVPB 3 Gram(s) IV Intermittent every 6 hours  vancomycin  IVPB 1250 milliGRAM(s) IV Intermittent every 8 hours    Other medications:  mirtazapine Soltab 15 milliGRAM(s) Oral at bedtime  pantoprazole  Injectable 40 milliGRAM(s) IV Push two times a day  saccharomyces boulardii 250 milliGRAM(s) Oral two times a day  sucralfate suspension 1 Gram(s) Oral four times a day                            10.7   5.35  )-----------( 221      ( 08 Jun 2021 08:44 )             34.8     06-08    138  |  99  |  10.8  ----------------------------<  165<H>  4.0   |  27.0  |  0.81    Ca    9.3      08 Jun 2021 08:44    TPro  6.7  /  Alb  4.0  /  TBili  0.5  /  DBili  x   /  AST  29  /  ALT  48<H>  /  AlkPhos  86  06-08    RECENT CULTURES:  06-01 @ 09:07 .Blood Blood     No growth at 5 days.    06-01 @ 00:22 .Urine Clean Catch (Midstream)     No growth    05-30 @ 16:00 .Blood Blood-Peripheral     No growth at 5 days.    05-29 @ 07:04 .Blood Blood-Peripheral aerobic Blood Culture PCR  Streptococcus parasanguinis    Growth in aerobic bottle: Streptococcus parasanguinis  Growth in aerobic bottle: Bacillus species not anthracis  "Susceptibilities not performed"  Growth in aerobic bottle: Gram Positive Cocci in Pairs and Chains and  Gram Variable Rods  Gram Stain and BCID performed by:  [facility]  [address] [place] [id number]  ***Blood Panel PCR results on this specimen are available  approximately 3 hours after the Gram stain result.***  Gram stain, PCR, and/or culture results may not always  correspond due to difference in methodologies.  ************************************************************  ThisPCR assay was performed using [employer].  The following targets are tested for: Enterococcus,  vancomycin resistant enterococci, Listeria monocytogenes,  coagulase negative staphylococci, S. aureus,  methicillin resistant S. aureus, Streptococcus agalactiae  (Group B), S. pneumoniae, S. pyogenes (Group A),  Acinetobacter baumannii, Enterobacter cloacae, E. coli,  Klebsiella oxytoca, K. pneumoniae, Proteus sp.,  Serratia marcescens, Haemophilus influenzae,  Neisseria meningitidis, Pseudomonas aeruginosa, Candida  albicans, C. glabrata, C krusei, C parapsilosis,  C. tropicalis and the KPC resistance gene.  "Due to technical problems, Pseudomonas aeruginosa  until further notice".    WBC Count: 5.35 K/uL (06-08-21 @ 08:44)  WBC Count: 4.96 K/uL (06-05-21 @ 07:43)    Creatinine, Serum: 0.81 mg/dL (06-08-21 @ 08:44)  Creatinine, Serum: 0.78 mg/dL (06-05-21 @ 07:43)    COVID-19 PCR: NotDetec (06-08-21 @ 20:15)  COVID-19 PCR: NotDetec (05-30-21 @ 17:19)  COVID-19 PCR: NotDetec (05-23-21 @ 05:56)  COVID-19 PCR: NotDetec (05-16-21 @ 14:22)  COVID-19 PCR: NotDetec (05-13-21 @ 22:42)

## 2021-06-09 NOTE — PROGRESS NOTE ADULT - SUBJECTIVE AND OBJECTIVE BOX
[facility] Division of Hospital Medicine  Torres Burns [phone]    Chief Complaint:  Patient is a 25y old  Male who presents with a chief complaint of bacteremia, stent migration (08 Jun 2021 12:29)      SUBJECTIVE / OVERNIGHT EVENTS:  Pt seen and examined at bedside. No acute events reported overnight. No new complaints.    Patient denies chest pain, SOB, abd pain, N/V, fever, chills, dysuria or any other complaints. All remainder ROS negative.     MEDICATIONS  (STANDING):  ampicillin/sulbactam  IVPB 3 Gram(s) IV Intermittent every 6 hours  mirtazapine Soltab 15 milliGRAM(s) Oral at bedtime  pantoprazole  Injectable 40 milliGRAM(s) IV Push two times a day  saccharomyces boulardii 250 milliGRAM(s) Oral two times a day  sucralfate suspension 1 Gram(s) Oral four times a day  vancomycin  IVPB 1250 milliGRAM(s) IV Intermittent every 8 hours    MEDICATIONS  (PRN):  acetaminophen   Tablet .. 650 milliGRAM(s) Oral every 6 hours PRN Temp greater or equal to 38C (100.4F), Mild Pain (1 - 3), Moderate Pain (4 - 6)        I&O's Summary      PHYSICAL EXAM:  Vital Signs Last 24 Hrs  T(C): 36.8 (09 Jun 2021 04:26), Max: 37 (08 Jun 2021 10:55)  T(F): 98.3 (09 Jun 2021 04:26), Max: 98.6 (08 Jun 2021 10:55)  HR: 71 (09 Jun 2021 04:26) (71 - 99)  BP: 110/70 (09 Jun 2021 04:26) (110/70 - 124/68)  BP(mean): --  RR: 18 (09 Jun 2021 04:26) (18 - 18)  SpO2: 98% (09 Jun 2021 04:26) (98% - 98%)        CONSTITUTIONAL: NAD, well-developed, well-groomed  HEENT: NC/AT, PERRL, no JVD  RESPIRATORY: CTA bilaterally, normal effort  CARDIOVASCULAR: RRR, S1/S2+, no m/g/r  ABDOMEN: Nontender to palpation, normoactive bowel sounds, no rebound/guarding; No hepatosplenomegaly  MUSCLOSKELETAL: No edema, cyanosis or deformities.  PSYCH: A+O to person, place, and time; affect appropriate  NEUROLOGY: CN 2-12 are intact and symmetric; no gross neurological deficits.  SKIN: No rashes; no palpable lesions  VASC: Distal pulses palpable    LABS:                        10.7   5.35  )-----------( 221      ( 08 Jun 2021 08:44 )             34.8     06-08    138  |  99  |  10.8  ----------------------------<  165<H>  4.0   |  27.0  |  0.81    Ca    9.3      08 Jun 2021 08:44    TPro  6.7  /  Alb  4.0  /  TBili  0.5  /  DBili  x   /  AST  29  /  ALT  48<H>  /  AlkPhos  86  06-08              CAPILLARY BLOOD GLUCOSE            RADIOLOGY & ADDITIONAL TESTS:  Results Reviewed:   Imaging Personally Reviewed:  Electrocardiogram Personally Reviewed:

## 2021-06-09 NOTE — BRIEF OPERATIVE NOTE - OPERATION/FINDINGS
EGD report: Proximal esophagitis with distal esophageal stricture was noted.  CRE balloon dilation was performed using 6 -7- 8 mm balloon and then 8-9/-10 balloon.  Normal stomach and normal duodenum.

## 2021-06-09 NOTE — PROGRESS NOTE ADULT - ASSESSMENT
25M pmh IVDA, esophageal stricture s/p stent placement called in to ER for positive blood culture found to be septic and have esophageal stent migration. Blood cultures positive for strep, repeat blood cultures NGTD. Esophagram today.    #Sepsis 2/2 Bacteremia  - BCX reporting strep  - Bacteremia likely due to IVDA  - CT c/a/p reviewed   - ID following  - TTE w/o signs of vegetation   - Blood cultures (5/29): Streptococcus parasanguinis  - Blood cultures (5/30): No growth  - Repeat blood cultures NGTD  - Zosyn d/kristine  - c/w Unasyn 3g q6h  - c/w Vancomycin 1g q8h  - End date of antibiotics 6/12/21    #Esophageal stent migration  - tolerating mechanical soft diet well  - Stent initially placed for esophageal stricture  - now passed.  - GI following, may need eventual dilation outpatient.   - C/w protonix and carafate  - Mech soft diet  - Due for EGD dilatation today.    #Heroin abuse  - Active heroin abuse  - Monitor for sign of withdrawal  - Refused suboxone last admission    #Hep C reactive  - Hep C panel reactive  - Hep C RNA positive    DVT ppx  - SCD    Dispo: patient will remain in the hospital through 6/12/21 as he is an active IVDU and cannot be d/c home with picc line

## 2021-06-10 DIAGNOSIS — K22.2 ESOPHAGEAL OBSTRUCTION: ICD-10-CM

## 2021-06-10 PROCEDURE — 99232 SBSQ HOSP IP/OBS MODERATE 35: CPT

## 2021-06-10 PROCEDURE — 99233 SBSQ HOSP IP/OBS HIGH 50: CPT

## 2021-06-10 RX ADMIN — AMPICILLIN SODIUM AND SULBACTAM SODIUM 200 GRAM(S): 250; 125 INJECTION, POWDER, FOR SUSPENSION INTRAMUSCULAR; INTRAVENOUS at 06:05

## 2021-06-10 RX ADMIN — Medication 250 MILLIGRAM(S): at 18:05

## 2021-06-10 RX ADMIN — Medication 166.67 MILLIGRAM(S): at 18:04

## 2021-06-10 RX ADMIN — Medication 975 MILLIGRAM(S): at 08:55

## 2021-06-10 RX ADMIN — Medication 166.67 MILLIGRAM(S): at 00:24

## 2021-06-10 RX ADMIN — Medication 250 MILLIGRAM(S): at 06:05

## 2021-06-10 RX ADMIN — MIRTAZAPINE 15 MILLIGRAM(S): 45 TABLET, ORALLY DISINTEGRATING ORAL at 23:47

## 2021-06-10 RX ADMIN — Medication 1 GRAM(S): at 18:06

## 2021-06-10 RX ADMIN — PANTOPRAZOLE SODIUM 40 MILLIGRAM(S): 20 TABLET, DELAYED RELEASE ORAL at 18:05

## 2021-06-10 RX ADMIN — Medication 1 GRAM(S): at 11:15

## 2021-06-10 RX ADMIN — Medication 1 GRAM(S): at 23:47

## 2021-06-10 RX ADMIN — PANTOPRAZOLE SODIUM 40 MILLIGRAM(S): 20 TABLET, DELAYED RELEASE ORAL at 06:05

## 2021-06-10 RX ADMIN — AMPICILLIN SODIUM AND SULBACTAM SODIUM 200 GRAM(S): 250; 125 INJECTION, POWDER, FOR SUSPENSION INTRAMUSCULAR; INTRAVENOUS at 18:05

## 2021-06-10 RX ADMIN — Medication 1 GRAM(S): at 06:05

## 2021-06-10 RX ADMIN — AMPICILLIN SODIUM AND SULBACTAM SODIUM 200 GRAM(S): 250; 125 INJECTION, POWDER, FOR SUSPENSION INTRAMUSCULAR; INTRAVENOUS at 12:09

## 2021-06-10 RX ADMIN — Medication 166.67 MILLIGRAM(S): at 11:15

## 2021-06-10 RX ADMIN — AMPICILLIN SODIUM AND SULBACTAM SODIUM 200 GRAM(S): 250; 125 INJECTION, POWDER, FOR SUSPENSION INTRAMUSCULAR; INTRAVENOUS at 23:47

## 2021-06-10 NOTE — PROGRESS NOTE ADULT - SUBJECTIVE AND OBJECTIVE BOX
[facility] Division of Hospital Medicine  Torres Burns [phone]    Chief Complaint:  Patient is a 25y old  Male who presents with a chief complaint of bacteremia, stent migration (09 Jun 2021 09:08)      SUBJECTIVE / OVERNIGHT EVENTS:  Pt seen and examined at bedside. No new complaints.    Patient denies chest pain, SOB, abd pain, N/V, fever, chills, dysuria or any other complaints. All remainder ROS negative.     MEDICATIONS  (STANDING):  ampicillin/sulbactam  IVPB 3 Gram(s) IV Intermittent every 6 hours  mirtazapine Soltab 15 milliGRAM(s) Oral at bedtime  pantoprazole  Injectable 40 milliGRAM(s) IV Push two times a day  saccharomyces boulardii 250 milliGRAM(s) Oral two times a day  sucralfate suspension 1 Gram(s) Oral four times a day  vancomycin  IVPB 1250 milliGRAM(s) IV Intermittent every 8 hours    MEDICATIONS  (PRN):  acetaminophen   Tablet .. 650 milliGRAM(s) Oral every 6 hours PRN Temp greater or equal to 38C (100.4F), Mild Pain (1 - 3), Moderate Pain (4 - 6)        I&O's Summary      PHYSICAL EXAM:  Vital Signs Last 24 Hrs  T(C): 36.5 (10 Davian 2021 04:17), Max: 37.1 (09 Jun 2021 14:10)  T(F): 97.7 (10 Davian 2021 04:17), Max: 98.7 (09 Jun 2021 14:10)  HR: 79 (10 Davian 2021 04:17) (79 - 111)  BP: 116/65 (10 Davian 2021 04:17) (111/72 - 129/71)  BP(mean): --  RR: 18 (10 Davian 2021 04:17) (18 - 18)  SpO2: 98% (10 Davian 2021 04:17) (98% - 99%)        CONSTITUTIONAL: NAD, well-developed, well-groomed  HEENT: NC/AT, PERRL, no JVD  RESPIRATORY: CTA bilaterally, normal effort  CARDIOVASCULAR: RRR, S1/S2+, no m/g/r  ABDOMEN: Nontender to palpation, normoactive bowel sounds, no rebound/guarding; No hepatosplenomegaly  MUSCLOSKELETAL: No edema, cyanosis or deformities.  PSYCH: A+O to person, place, and time; affect appropriate  NEUROLOGY: CN 2-12 are intact and symmetric; no gross neurological deficits.  SKIN: No rashes; no palpable lesions  VASC: Distal pulses palpable    LABS:                    CAPILLARY BLOOD GLUCOSE            RADIOLOGY & ADDITIONAL TESTS:  Results Reviewed:   Imaging Personally Reviewed:  Electrocardiogram Personally Reviewed:

## 2021-06-10 NOTE — PROGRESS NOTE ADULT - ASSESSMENT
25y  Male with h/o IVDA, esophageal stricture s/p stent placement.  Patient had recent admission after using heroin and presented with septic shock and found to have multi focal pna and subsequently intubated and was in MICU, completed course of IV Zosyn. Patient worked up for severe esophageal stricture and is now s/p esophageal  stent paced by  GI endoscopy on 5/25. Patient eventually discharged home. He presented to ER 5/29 after snorting multiple percocet and having multiple episodes of vomiting. He eventually left AMA. He was called back to the ER due to positive blood cultures. In the ER patient was noted to be febrile, tachycardic, CT chest abdomen pelvis reporting esophageal stent has migrated to small bowel. GI called. Patient complaining of fever and nausea. He states he's been eating okay and having bowel movements. Denies abdominal pain. Patient admits to injecting heroin multiple times the last couple of days. He claims he uses clean needles but licked the needles and his needle insertion site. Started on IV Vancomycin and Zosyn.      Streptococcus Septicemia likely 2/2 IVDU  Fever  Leukocytosis   Migrated esophageal stent  Transaminitis   IVDA    - Blood cultures Reporting Strep /bacillus  - Repeat blood cultures 5/30 ngtd  - COVID 19 PCR negative   - CT A/P reporting migrated esophageal stent  - Continue unasyn 3 g iv q6h  - Continue Vancomycin   - end date of abx is 6/12/21  - Monitor trough  - Monitor for Vancomycin toxicity   - Vancomycin required at this time to cover strep parasanguinus (I) sensitivity to ceftriaxone/penicillin  - HCV +, will need outpatient follow up  - TTE no vegetations  - Trend Fever  - Trend Leukocytosis  - not a candidate for OPAT  - advised to avoid drug IV drugs      Will Follow      d/w Dr Burns     25y  Male with h/o IVDA, esophageal stricture s/p stent placement.  Patient had recent admission after using heroin and presented with septic shock and found to have multi focal pna and subsequently intubated and was in MICU, completed course of IV Zosyn. Patient worked up for severe esophageal stricture and is now s/p esophageal  stent paced by  GI endoscopy on 5/25. Patient eventually discharged home. He presented to ER 5/29 after snorting multiple percocet and having multiple episodes of vomiting. He eventually left AMA. He was called back to the ER due to positive blood cultures. In the ER patient was noted to be febrile, tachycardic, CT chest abdomen pelvis reporting esophageal stent has migrated to small bowel. GI called. Patient complaining of fever and nausea. He states he's been eating okay and having bowel movements. Denies abdominal pain. Patient admits to injecting heroin multiple times the last couple of days. He claims he uses clean needles but licked the needles and his needle insertion site. Started on IV Vancomycin and Zosyn.      Streptococcus Septicemia likely 2/2 IVDU  Fever  Leukocytosis   Migrated esophageal stent  Transaminitis   IVDA    - Blood cultures Reporting Strep /bacillus  - Repeat blood cultures 5/30 ngtd  - COVID 19 PCR negative   - CT A/P reporting migrated esophageal stent  - Continue unasyn 3 g iv q6h  - Continue Vancomycin   - end date of abx is 6/12/21  - Monitor trough  - Monitor for Vancomycin toxicity   - Vancomycin required at this time to cover strep parasanguinus (I) sensitivity to ceftriaxone/penicillin  - HCV +, will need outpatient follow up  - TTE no vegetations  - Trend Fever  - Trend Leukocytosis  - not a candidate for OPAT  - advised to avoid drug IV drugs      Will sign off. Please call PRN.       d/w Dr Burns

## 2021-06-10 NOTE — CHART NOTE - NSCHARTNOTEFT_GEN_A_CORE
Called by RN for patient with critical blood culture values.    Growth in aerobic and anaerobic bottle:  Aerobic bottle : Streptococcus parasanguinis, Bacillus not Anthracis  Anaerobic bottle Strep. parasanguinis, Strep salivarius/vestibularis Alpha hemolytic Strep    Patient seen and being followed by Infectious disease team, and currently on IV Zosyn and IV Vancomycin. Gastroenterology also following.
Source: Patient [x]  Family [ ]   other [x] EMR     Current Diet:   Diet, Pureed (06-09-21 @ 12:28)    Patient reports [ ] nausea  [ ] vomiting [ ] diarrhea [ ] constipation  [ ]chewing problems [x] swallowing issues  [ ] other:     PO intake:  < 50% [ ]   50-75%  [ ]   %  [x]  other :    Source for PO intake [ ] Patient [ ] family [x] chart [ ] staff [ ] other    Current Weight:   (5/30)   160 lbs    % Weight Change: No recent weight documented     Pertinent Medications: MEDICATIONS  (STANDING):  ampicillin/sulbactam  IVPB 3 Gram(s) IV Intermittent every 6 hours  mirtazapine Soltab 15 milliGRAM(s) Oral at bedtime  pantoprazole  Injectable 40 milliGRAM(s) IV Push two times a day  saccharomyces boulardii 250 milliGRAM(s) Oral two times a day  sucralfate suspension 1 Gram(s) Oral four times a day  vancomycin  IVPB 1250 milliGRAM(s) IV Intermittent every 8 hours    MEDICATIONS  (PRN):  acetaminophen   Tablet .. 650 milliGRAM(s) Oral every 6 hours PRN Temp greater or equal to 38C (100.4F), Mild Pain (1 - 3), Moderate Pain (4 - 6)    Pertinent Labs: No recent nutrition-related labs     Skin: No skin breakdown/edema noted     Nutrition focused physical exam conducted - found signs of malnutrition [x]absent [ ]present    Subcutaneous fat loss: [ ] Orbital fat pads region, [ ]Buccal fat region, [ ]Triceps region,  [ ]Ribs region    Muscle wasting: [ ]Temples region, [ ]Clavicle region, [ ]Shoulder region, [ ]Scapula region, [ ]Interosseous region,  [ ]thigh region, [ ]Calf region    Estimated Needs:   [x] no change since previous assessment  [ ] recalculated:     Current Nutrition Diagnosis: Pt remains at nutrition risk secondary to increased nutrient needs related to increased physiological demand to maintain nutrition status as evidenced by sepsis, IVDA. Pt with good PO intake completing 100% of meals, not preferable of pureed diet. Pt s/p EGD with balloon dilation (6/9). Pt to remain in house to completed IV Abx until 6/12.     Recommendations:   1) Add Ensure Enlive BID   2) Rx MVI, thiamine and folic acid daily   3) Obtain/provide food preferences daily to inc PO     Monitoring and Evaluation:   [x] PO intake [x] Tolerance to diet prescription [X] Weights  [X] Follow up per protocol [X] Labs:

## 2021-06-10 NOTE — PROGRESS NOTE ADULT - ASSESSMENT
25M pmh IVDA, esophageal stricture s/p stent placement called in to ER for positive blood culture found to be septic and have esophageal stent migration. Blood cultures positive for strep, repeat blood cultures NGTD. Esophagram today.    #Sepsis 2/2 Bacteremia  - BCX reporting strep  - Bacteremia likely due to IVDA  - CT c/a/p reviewed   - ID following  - TTE w/o signs of vegetation   - Blood cultures (5/29): Streptococcus parasanguinis  - Blood cultures (5/30): No growth  - Repeat blood cultures NGTD  - Zosyn d/kristine  - c/w Unasyn 3g q6h  - c/w Vancomycin 1g q8h  - End date of antibiotics 6/12/21    #Esophageal stent migration  - tolerating mechanical soft diet well  - Stent initially placed for esophageal stricture  - now passed.  - GI following, may need eventual dilation outpatient.   - C/w protonix and carafate  - S/p EGD dilatation 6/9  - Requesting pureed diet    #Heroin abuse  - Active heroin abuse  - Monitor for sign of withdrawal  - Refused suboxone last admission    #Hep C reactive  - Hep C panel reactive  - Hep C RNA positive    DVT ppx  - SCD    Dispo: patient will remain in the hospital through 6/12/21 as he is an active IVDU and cannot be d/c home with picc line

## 2021-06-10 NOTE — PROGRESS NOTE ADULT - SUBJECTIVE AND OBJECTIVE BOX
[facility]  INFECTIOUS DISEASES AND INTERNAL MEDICINE at [place]  =======================================================  Rafa Garay MD  Diplomates American Board of Internal Medicine and Infectious Diseases  Tel: [phone]      Fax: [phone]  =======================================================    [place] MORA [id number]    Follow up: bacteremia  feels well    No complaints this morning      Allergies:  No Known Allergies       REVIEW OF SYSTEMS:  CONSTITUTIONAL:  No Fever or chills  HEENT:   No diplopia or blurred vision.  No earache, sore throat or runny nose.  CARDIOVASCULAR:  No pressure, squeezing, strangling, tightness, heaviness or aching about the chest, neck, axilla or epigastrium.  RESPIRATORY:  No cough, shortness of breath  GASTROINTESTINAL:  No nausea, vomiting or diarrhea.  GENITOURINARY:  No dysuria, frequency or urgency. No Blood in urine  MUSCULOSKELETAL:  no joint aches, no muscle pain  SKIN:  No change in skin, hair or nails.  NEUROLOGIC:  No Headaches, seizures or weakness.  PSYCHIATRIC:  No disorder of thought or mood.  ENDOCRINE:  No heat or cold intolerance  HEMATOLOGICAL:  No easy bruising or bleeding.       Physical Exam:  GEN: NAD, pleasant  HEENT: normocephalic and atraumatic. EOMI. PERRL.  Anicteric   NECK: Supple.   LUNGS: Clear to auscultation.  HEART: Regular rate and rhythm .  ABDOMEN: Soft, nontender, and nondistended.  Positive bowel sounds.    : No CVA tenderness  EXTREMITIES: Without any edema.  MSK: no joint swelling  NEUROLOGIC:  No focal deficits  PSYCHIATRIC: Appropriate affect .  SKIN: No Rash      Vitals:  T(F): 97.7 (10 Davian 2021 04:17), Max: 98.7 (09 Jun 2021 14:10)  HR: 79 (10 Davian 2021 04:17)  BP: 116/65 (10 Davian 2021 04:17)  RR: 18 (10 Davian 2021 04:17)  SpO2: 98% (10 Davian 2021 04:17) (98% - 99%)  temp max in last 48H T(F): , Max: 98.7 (06-09-21 @ 14:10)      Current Antibiotics:  ampicillin/sulbactam  IVPB 3 Gram(s) IV Intermittent every 6 hours  vancomycin  IVPB 1250 milliGRAM(s) IV Intermittent every 8 hours    Other medications:  mirtazapine Soltab 15 milliGRAM(s) Oral at bedtime  pantoprazole  Injectable 40 milliGRAM(s) IV Push two times a day  saccharomyces boulardii 250 milliGRAM(s) Oral two times a day  sucralfate suspension 1 Gram(s) Oral four times a day      RECENT CULTURES:  06-01 @ 09:07 .Blood Blood     No growth at 5 days.    06-01 @ 00:22 .Urine Clean Catch (Midstream)     No growth    05-30 @ 16:00 .Blood Blood-Peripheral     No growth at 5 days.    05-29 @ 07:04 .Blood Blood-Peripheral aerobic Blood Culture PCR  Streptococcus parasanguinis    Growth in aerobic bottle: Streptococcus parasanguinis  Growth in aerobic bottle: Bacillus species not anthracis  "Susceptibilities not performed"  Growth in aerobic bottle: Gram Positive Cocci in Pairs and Chains and  Gram Variable Rods  Gram Stain and BCID performed by:  [facility]  [address] [place] [id number]  ***Blood Panel PCR results on this specimen are available  approximately 3 hours after the Gram stain result.***  Gram stain, PCR, and/or culture results may not always  correspond due to difference in methodologies.  ************************************************************  ThisPCR assay was performed using [employer].  The following targets are tested for: Enterococcus,  vancomycin resistant enterococci, Listeria monocytogenes,  coagulase negative staphylococci, S. aureus,  methicillin resistant S. aureus, Streptococcus agalactiae  (Group B), S. pneumoniae, S. pyogenes (Group A),  Acinetobacter baumannii, Enterobacter cloacae, E. coli,  Klebsiella oxytoca, K. pneumoniae, Proteus sp.,  Serratia marcescens, Haemophilus influenzae,  Neisseria meningitidis, Pseudomonas aeruginosa, Candida  albicans, C. glabrata, C krusei, C parapsilosis,  C. tropicalis and the KPC resistance gene.  "Due to technical problems, Pseudomonas aeruginosa  until further notice".      WBC Count: 5.35 K/uL (06-08-21 @ 08:44)    Creatinine, Serum: 0.81 mg/dL (06-08-21 @ 08:44)    COVID-19 PCR: NotDetec (06-08-21 @ 20:15)  COVID-19 PCR: NotDetec (05-30-21 @ 17:19)  COVID-19 PCR: NotDetec (05-23-21 @ 05:56)  COVID-19 PCR: NotDetec (05-16-21 @ 14:22)  COVID-19 PCR: NotDetec (05-13-21 @ 22:42)      Hepatitis C Virus RNA Detection by PCR (06.04.21 @ 09:56)    Hepatitis C Virus RNA Detection by PCR: 4470 IU/mL    Hepatitis C RNA, Log Value: 3.65: HCV RNA Quantification by RT-PCR using [employer] m2000:  Assay dynamic range:  12 IU/mL to 100,000,000 HCV RNA IU/mL  1.08 (log10) IU/mL to 8.00 (log10) IU/mL  Assay reference range: Not Detected  The results of this test should be interpreted with consideration of all  clinical and laboratory findings. A result of  "No HCV RNA Detected" does  not preclude the possibility of infection with hepatitis C virus.  In  particular, caution should be used when interpreting low level positive  results when the test is used for diagnostic purposes. [id number]/mL

## 2021-06-10 NOTE — PROGRESS NOTE ADULT - PROBLEM SELECTOR PLAN 1
Now s/p EGD with dilatation.  Continue PPI for cytoprotection  Carafate 1 gram oral BID  Continue mechanical soft diet.  Patient should follow up in next 4 for a repeat EGD to reevaluate stricture.  No further recommendations.

## 2021-06-10 NOTE — PROGRESS NOTE ADULT - PROBLEM SELECTOR PROBLEM 1
History of esophageal stricture
Dysphagia
History of esophageal stricture
Esophageal stricture
History of esophageal stricture
Dysphagia

## 2021-06-10 NOTE — PROGRESS NOTE ADULT - SUBJECTIVE AND OBJECTIVE BOX
Patient is a 25y old  Male who presents with a chief complaint of bacteremia, stent migration (10 Davian 2021 09:33)      INTERVAL HPI/OVERNIGHT EVENTS: Patient seeing and evaluated at bedside, reporting no complains no overnight events. Now S/p EGD revealing proximal esophagitis with distal esophageal stricture. CRE balloon dilation was performed. Denies nausea, vomiting, abdominal pain, chest pain, shortness of breath, hematemesis, hematochezia, melena.  MEDICATIONS  (STANDING):  ampicillin/sulbactam  IVPB 3 Gram(s) IV Intermittent every 6 hours  mirtazapine Soltab 15 milliGRAM(s) Oral at bedtime  pantoprazole  Injectable 40 milliGRAM(s) IV Push two times a day  saccharomyces boulardii 250 milliGRAM(s) Oral two times a day  sucralfate suspension 1 Gram(s) Oral four times a day  vancomycin  IVPB 1250 milliGRAM(s) IV Intermittent every 8 hours    MEDICATIONS  (PRN):  acetaminophen   Tablet .. 650 milliGRAM(s) Oral every 6 hours PRN Temp greater or equal to 38C (100.4F), Mild Pain (1 - 3), Moderate Pain (4 - 6)      Allergies    No Known Allergies    Intolerances        Review of Systems:      Vital Signs Last 24 Hrs  T(C): 36.8 (10 Davian 2021 10:53), Max: 36.8 (10 Davian 2021 10:53)  T(F): 98.3 (10 Davian 2021 10:53), Max: 98.3 (10 Davian 2021 10:53)  HR: 90 (10 Davian 2021 10:53) (79 - 99)  BP: 112/53 (10 Davian 2021 10:53) (111/72 - 116/65)  BP(mean): --  RR: 18 (10 Davian 2021 10:53) (18 - 18)  SpO2: 98% (10 Davian 2021 10:53) (98% - 99%)    PHYSICAL EXAM:      LABS:              LIVER FUNCTIONS - ( 08 Jun 2021 08:44 )  Alb: 4.0 g/dL / Pro: 6.7 g/dL / ALK PHOS: 86 U/L / ALT: 48 U/L / AST: 29 U/L / GGT: x

## 2021-06-10 NOTE — PROGRESS NOTE ADULT - ATTENDING COMMENTS
Vital Signs Last 24 Hrs  T(F): 98 (01 Jun 2021 07:18), Max: 99 (31 May 2021 15:27)  HR: 62 (01 Jun 2021 07:18) (56 - 94)  BP: 104/61 (01 Jun 2021 07:18) (101/58 - 123/70)  RR: 18 (01 Jun 2021 07:18) (18 - 20)  SpO2: 97% (01 Jun 2021 07:18) (94% - 99%)    Physical Exam:  Constitutional: alert and oriented, in no acute distress   Neck: Soft and supple, No LAD, No JVD  Respiratory: Clear to auscultation bilaterally, no wheezes or crackles  Cardiovascular: Regular rate and rhythm, no murmurs, gallops, rubs  Gastrointestinal: Soft, non-tender to palpation, +bs  Vascular: 2+ peripheral pulses  Neurological: A/O x 3, no focal neurological deficits  Musculoskeletal: 5/5 strength b/l upper and lower extremities, no lower extremity edema bilaterally
I evaluated this pt. with NP Dadd and agree with the above assessment and management plan. Repeat EGD with dilatation of tight distal esophageal stricture tomorrow with Dr. Amador Cox. NPO after MN tonight. Repeat COVID swab ordered for tonight for tomorrow's EGD. Pt. was informed and is agreeable to the proposed repeat EGD.
I evaluated this pt. with NP Dadd. Pt is stable for D/C home from GI POV on Pantoprazole 40 mg. BID and Sucralfate 1 gram PO QID with OPT f/u with Dr. Amador Cox in 4 weeks for repeat EGD and possible dilatation.
Case reviewed with Dr Cox.  Will plan for another dilitation on wed c Dr Cox.

## 2021-06-11 ENCOUNTER — TRANSCRIPTION ENCOUNTER (OUTPATIENT)
Age: 26
End: 2021-06-11

## 2021-06-11 LAB
ALBUMIN SERPL ELPH-MCNC: 4 G/DL — SIGNIFICANT CHANGE UP (ref 3.3–5.2)
ALP SERPL-CCNC: 81 U/L — SIGNIFICANT CHANGE UP (ref 40–120)
ALT FLD-CCNC: 84 U/L — HIGH
ANION GAP SERPL CALC-SCNC: 10 MMOL/L — SIGNIFICANT CHANGE UP (ref 5–17)
AST SERPL-CCNC: 54 U/L — HIGH
BILIRUB SERPL-MCNC: 0.6 MG/DL — SIGNIFICANT CHANGE UP (ref 0.4–2)
BUN SERPL-MCNC: 9 MG/DL — SIGNIFICANT CHANGE UP (ref 8–20)
CALCIUM SERPL-MCNC: 9.1 MG/DL — SIGNIFICANT CHANGE UP (ref 8.6–10.2)
CHLORIDE SERPL-SCNC: 102 MMOL/L — SIGNIFICANT CHANGE UP (ref 98–107)
CO2 SERPL-SCNC: 28 MMOL/L — SIGNIFICANT CHANGE UP (ref 22–29)
CREAT SERPL-MCNC: 0.76 MG/DL — SIGNIFICANT CHANGE UP (ref 0.5–1.3)
GLUCOSE SERPL-MCNC: 95 MG/DL — SIGNIFICANT CHANGE UP (ref 70–99)
HCT VFR BLD CALC: 34.3 % — LOW (ref 39–50)
HGB BLD-MCNC: 10.4 G/DL — LOW (ref 13–17)
MCHC RBC-ENTMCNC: 23.1 PG — LOW (ref 27–34)
MCHC RBC-ENTMCNC: 30.3 GM/DL — LOW (ref 32–36)
MCV RBC AUTO: 76.2 FL — LOW (ref 80–100)
PLATELET # BLD AUTO: 242 K/UL — SIGNIFICANT CHANGE UP (ref 150–400)
POTASSIUM SERPL-MCNC: 4 MMOL/L — SIGNIFICANT CHANGE UP (ref 3.5–5.3)
POTASSIUM SERPL-SCNC: 4 MMOL/L — SIGNIFICANT CHANGE UP (ref 3.5–5.3)
PROT SERPL-MCNC: 6.7 G/DL — SIGNIFICANT CHANGE UP (ref 6.6–8.7)
RBC # BLD: 4.5 M/UL — SIGNIFICANT CHANGE UP (ref 4.2–5.8)
RBC # FLD: 16.9 % — HIGH (ref 10.3–14.5)
SARS-COV-2 RNA SPEC QL NAA+PROBE: SIGNIFICANT CHANGE UP
SODIUM SERPL-SCNC: 140 MMOL/L — SIGNIFICANT CHANGE UP (ref 135–145)
WBC # BLD: 5.44 K/UL — SIGNIFICANT CHANGE UP (ref 3.8–10.5)
WBC # FLD AUTO: 5.44 K/UL — SIGNIFICANT CHANGE UP (ref 3.8–10.5)

## 2021-06-11 PROCEDURE — 99232 SBSQ HOSP IP/OBS MODERATE 35: CPT

## 2021-06-11 RX ADMIN — Medication 1 GRAM(S): at 17:05

## 2021-06-11 RX ADMIN — Medication 166.67 MILLIGRAM(S): at 16:19

## 2021-06-11 RX ADMIN — AMPICILLIN SODIUM AND SULBACTAM SODIUM 200 GRAM(S): 250; 125 INJECTION, POWDER, FOR SUSPENSION INTRAMUSCULAR; INTRAVENOUS at 18:04

## 2021-06-11 RX ADMIN — PANTOPRAZOLE SODIUM 40 MILLIGRAM(S): 20 TABLET, DELAYED RELEASE ORAL at 06:09

## 2021-06-11 RX ADMIN — Medication 250 MILLIGRAM(S): at 06:09

## 2021-06-11 RX ADMIN — AMPICILLIN SODIUM AND SULBACTAM SODIUM 200 GRAM(S): 250; 125 INJECTION, POWDER, FOR SUSPENSION INTRAMUSCULAR; INTRAVENOUS at 11:14

## 2021-06-11 RX ADMIN — AMPICILLIN SODIUM AND SULBACTAM SODIUM 200 GRAM(S): 250; 125 INJECTION, POWDER, FOR SUSPENSION INTRAMUSCULAR; INTRAVENOUS at 06:09

## 2021-06-11 RX ADMIN — Medication 166.67 MILLIGRAM(S): at 00:31

## 2021-06-11 RX ADMIN — Medication 166.67 MILLIGRAM(S): at 08:25

## 2021-06-11 RX ADMIN — PANTOPRAZOLE SODIUM 40 MILLIGRAM(S): 20 TABLET, DELAYED RELEASE ORAL at 17:05

## 2021-06-11 RX ADMIN — Medication 250 MILLIGRAM(S): at 17:05

## 2021-06-11 RX ADMIN — Medication 1 GRAM(S): at 11:14

## 2021-06-11 RX ADMIN — Medication 1 GRAM(S): at 06:09

## 2021-06-11 NOTE — DISCHARGE NOTE PROVIDER - NSDCMRMEDTOKEN_GEN_ALL_CORE_FT
mirtazapine 15 mg oral tablet, disintegratin tab(s) orally once a day (at bedtime)  Protonix 40 mg oral delayed release tablet: 1 tab(s) orally 2 times a day   sucralfate 1 g/10 mL oral suspension: 10 milliliter(s) orally 4 times a day

## 2021-06-11 NOTE — PROGRESS NOTE ADULT - SUBJECTIVE AND OBJECTIVE BOX
[facility] Division of Hospital Medicine  Torres Burns [phone]    Chief Complaint:  Patient is a 25y old  Male who presents with a chief complaint of bacteremia, stent migration (10 Davian 2021 14:09)      SUBJECTIVE / OVERNIGHT EVENTS:  Pt seen and examined at bedside. No complaints.    Patient denies chest pain, SOB, abd pain, N/V, fever, chills, dysuria or any other complaints. All remainder ROS negative.     MEDICATIONS  (STANDING):  ampicillin/sulbactam  IVPB 3 Gram(s) IV Intermittent every 6 hours  mirtazapine Soltab 15 milliGRAM(s) Oral at bedtime  pantoprazole  Injectable 40 milliGRAM(s) IV Push two times a day  saccharomyces boulardii 250 milliGRAM(s) Oral two times a day  sucralfate suspension 1 Gram(s) Oral four times a day  vancomycin  IVPB 1250 milliGRAM(s) IV Intermittent every 8 hours    MEDICATIONS  (PRN):  acetaminophen   Tablet .. 650 milliGRAM(s) Oral every 6 hours PRN Temp greater or equal to 38C (100.4F), Mild Pain (1 - 3), Moderate Pain (4 - 6)        I&O's Summary      PHYSICAL EXAM:  Vital Signs Last 24 Hrs  T(C): 36.6 (11 Jun 2021 04:09), Max: 36.8 (10 Davian 2021 10:53)  T(F): 97.8 (11 Jun 2021 04:09), Max: 98.3 (10 Davian 2021 10:53)  HR: 59 (11 Jun 2021 04:09) (59 - 90)  BP: 103/63 (11 Jun 2021 04:09) (103/63 - 117/68)  BP(mean): --  RR: 18 (11 Jun 2021 04:09) (18 - 18)  SpO2: 94% (11 Jun 2021 04:09) (94% - 98%)        CONSTITUTIONAL: NAD  HEENT: NC/AT, PERRL, no JVD  RESPIRATORY: CTA bilaterally, normal effort  CARDIOVASCULAR: RRR, S1/S2+, no m/g/r  ABDOMEN: Nontender to palpation, normoactive bowel sounds, no rebound/guarding; No hepatosplenomegaly  MUSCULOSKELETAL: No edema, cyanosis or deformities.  PSYCH: A+O to person, place, and time; affect appropriate  NEUROLOGY: CN 2-12 are intact and symmetric; no gross neurological deficits.  SKIN: No rashes; no palpable lesions  VASC: Distal pulses palpable    LABS:                        10.4   5.44  )-----------( 242      ( 11 Jun 2021 08:17 )             34.3     06-11    140  |  102  |  9.0  ----------------------------<  95  4.0   |  28.0  |  0.76    Ca    9.1      11 Jun 2021 08:17    TPro  6.7  /  Alb  4.0  /  TBili  0.6  /  DBili  x   /  AST  54<H>  /  ALT  84<H>  /  AlkPhos  81  06-11              CAPILLARY BLOOD GLUCOSE            RADIOLOGY & ADDITIONAL TESTS:  Results Reviewed:   Imaging Personally Reviewed:  Electrocardiogram Personally Reviewed:

## 2021-06-11 NOTE — DISCHARGE NOTE PROVIDER - HOSPITAL COURSE
25M pmh IVDA, esophageal stricture s/p stent placement called in to ER for positive blood culture found to be septic and have esophageal stent migration. Blood cultures positive for strep, repeat blood cultures NGTD. ID consulted and patient was started on Unasyn, Vancomycin per ID. TTE was negative for vegatations. GI was consulted. Patient's stent passed and patient had EGD dilatation for his stricture on . Per GI, patient to take Protonix 40mg PO BID, Carafate QUID and follow up w/ GI in 4 weeks. Patient was kept inpatient due to history of IVDA. Patient completed antibiotics on 2021. Patient no longer requires inpatient hoispitalizationa nd is stable for discharge w/ appropriate follow up.    D/c time spent coordinatin mins 25M pmh IVDA, esophageal stricture s/p stent placement called in to ER for positive blood culture found to be septic and have esophageal stent migration. Blood cultures positive for strep, repeat blood cultures NGTD. ID consulted and patient was started on Unasyn, Vancomycin per ID. TTE was negative for vegatations. GI was consulted. Patient's stent passed and patient had EGD dilatation for his stricture on . Per GI, patient to take Protonix 40mg PO BID, Carafate QUID and follow up w/ GI in 4 weeks. Patient was kept inpatient due to history of IVDA. Patient completed antibiotics on 2021. Patient no longer requires inpatient hospitalization and is stable for discharge w/ appropriate follow up.    Discharge Physical Examination:  Vital Signs Last 24 Hrs  T(F): 98 (2021 05:08), Max: 98.3 (2021 17:19)  HR: 69 (2021 05:08) (69 - 91)  BP: 110/69 (2021 05:08) (110/68 - 111/64)  RR: 16 (2021 05:08) (16 - 18)  SpO2: 99% (2021 05:08) (98% - 100%)    Physical Exam:  Constitutional: alert and oriented, in no acute distress   Neck: Soft and supple, No LAD, No JVD  Respiratory: Clear to auscultation bilaterally, no wheezes or crackles  Cardiovascular: Regular rate and rhythm, no murmurs, gallops, rubs  Gastrointestinal: Soft, non-tender to palpation, +bs  Vascular: 2+ peripheral pulses  Neurological: A/O x 3, no focal neurological deficits  Musculoskeletal: 5/5 strength b/l upper and lower extremities, no lower extremity edema bilaterally    D/c time spent coordinatin mins

## 2021-06-11 NOTE — PHARMACOTHERAPY INTERVENTION NOTE - NSPHARMCOMMASP
ASP - Dose optimization/Non-Renal dose adjustment
ASP - Lab/ test recommended
ASP - Lab/ test recommended
ASP - Dose optimization/Non-Renal dose adjustment
ASP - Duration of therapy

## 2021-06-11 NOTE — PROGRESS NOTE ADULT - NSICDXPILOT_GEN_ALL_CORE
[place]

## 2021-06-11 NOTE — PHARMACOTHERAPY INTERVENTION NOTE - COMMENTS
Discussed with danny ZABALA to complete 6/12, updated end date.
Vancomycin level 8.1, adjusted dose to 1250 mg IV q8h.
Vancomycin level ordered
Vancomycin level ordered

## 2021-06-11 NOTE — PROGRESS NOTE ADULT - REASON FOR ADMISSION
bacteremia, stent migration

## 2021-06-11 NOTE — PROGRESS NOTE ADULT - ASSESSMENT
25M pmh IVDA, esophageal stricture s/p stent placement called in to ER for positive blood culture found to be septic and have esophageal stent migration. Blood cultures positive for strep, repeat blood cultures NGTD. Esophagram today.    #Sepsis 2/2 Bacteremia  - BCX reporting strep  - Bacteremia likely due to IVDA  - CT c/a/p reviewed   - ID following  - TTE w/o signs of vegetation   - Blood cultures (5/29): Streptococcus parasanguinis  - Blood cultures (5/30): No growth  - Repeat blood cultures NGTD  - Zosyn d/kristine  - c/w Unasyn 3g q6h  - c/w Vancomycin 1g q8h  - End date of antibiotics 6/12/21    #Esophageal stent migration  - tolerating mechanical soft diet well  - Stent initially placed for esophageal stricture  - now passed.  - GI following, may need eventual dilation outpatient.   - C/w protonix and carafate  - S/p EGD dilatation 6/9  - Requesting pureed diet    #Heroin abuse  - Active heroin abuse  - Monitor for sign of withdrawal  - Refused suboxone last admission    #Hep C reactive  - Hep C panel reactive  - Hep C RNA positive    DVT ppx  - SCD    Dispo: patient will remain in the hospital through 6/12/21 as he is an active IVDU and cannot be d/c home with picc line.

## 2021-06-11 NOTE — DISCHARGE NOTE PROVIDER - CARE PROVIDER_API CALL
Amador Cxo)  Gastroenterology; Internal Medicine  [address] [place] [address]  Phone: [phone]  Fax: [phone]  Follow Up Time: 1 month

## 2021-06-11 NOTE — PROGRESS NOTE ADULT - PROVIDER SPECIALTY LIST ADULT
Gastroenterology
Hospitalist
Infectious Disease
Infectious Disease
Hospitalist
Hospitalist
Infectious Disease
Gastroenterology
Infectious Disease
Gastroenterology
Hospitalist
Infectious Disease
Hospitalist
Infectious Disease
Gastroenterology

## 2021-06-11 NOTE — DISCHARGE NOTE PROVIDER - NSDCCPCAREPLAN_GEN_ALL_CORE_FT
PRINCIPAL DISCHARGE DIAGNOSIS  Diagnosis: Sepsis, due to unspecified organism, unspecified whether acute organ dysfunction present  Assessment and Plan of Treatment: Completed IV abx.      SECONDARY DISCHARGE DIAGNOSES  Diagnosis: Dysphagia  Assessment and Plan of Treatment: Take Pantoprazole 40 mg BID and Sucralfate 1 gram PO QID. Follow up with Dr. Amador Cox in 4 weeks for repeat EGD and possible dilatation.    Diagnosis: History of esophageal stricture  Assessment and Plan of Treatment: History of esophageal stricture

## 2021-06-12 ENCOUNTER — TRANSCRIPTION ENCOUNTER (OUTPATIENT)
Age: 26
End: 2021-06-12

## 2021-06-12 VITALS
TEMPERATURE: 99 F | SYSTOLIC BLOOD PRESSURE: 128 MMHG | OXYGEN SATURATION: 100 % | HEART RATE: 100 BPM | RESPIRATION RATE: 19 BRPM | DIASTOLIC BLOOD PRESSURE: 64 MMHG

## 2021-06-12 PROCEDURE — 84100 ASSAY OF PHOSPHORUS: CPT

## 2021-06-12 PROCEDURE — U0005: CPT

## 2021-06-12 PROCEDURE — 71260 CT THORAX DX C+: CPT

## 2021-06-12 PROCEDURE — 87641 MR-STAPH DNA AMP PROBE: CPT

## 2021-06-12 PROCEDURE — 36415 COLL VENOUS BLD VENIPUNCTURE: CPT

## 2021-06-12 PROCEDURE — 74018 RADEX ABDOMEN 1 VIEW: CPT

## 2021-06-12 PROCEDURE — 86769 SARS-COV-2 COVID-19 ANTIBODY: CPT

## 2021-06-12 PROCEDURE — 74177 CT ABD & PELVIS W/CONTRAST: CPT

## 2021-06-12 PROCEDURE — 99285 EMERGENCY DEPT VISIT HI MDM: CPT | Mod: 25

## 2021-06-12 PROCEDURE — 74220 X-RAY XM ESOPHAGUS 1CNTRST: CPT

## 2021-06-12 PROCEDURE — 87040 BLOOD CULTURE FOR BACTERIA: CPT

## 2021-06-12 PROCEDURE — 87640 STAPH A DNA AMP PROBE: CPT

## 2021-06-12 PROCEDURE — 80202 ASSAY OF VANCOMYCIN: CPT

## 2021-06-12 PROCEDURE — C8929: CPT

## 2021-06-12 PROCEDURE — 87389 HIV-1 AG W/HIV-1&-2 AB AG IA: CPT

## 2021-06-12 PROCEDURE — 83605 ASSAY OF LACTIC ACID: CPT

## 2021-06-12 PROCEDURE — 85027 COMPLETE CBC AUTOMATED: CPT

## 2021-06-12 PROCEDURE — 85610 PROTHROMBIN TIME: CPT

## 2021-06-12 PROCEDURE — 87635 SARS-COV-2 COVID-19 AMP PRB: CPT

## 2021-06-12 PROCEDURE — 81003 URINALYSIS AUTO W/O SCOPE: CPT

## 2021-06-12 PROCEDURE — 85025 COMPLETE CBC W/AUTO DIFF WBC: CPT

## 2021-06-12 PROCEDURE — 99239 HOSP IP/OBS DSCHRG MGMT >30: CPT

## 2021-06-12 PROCEDURE — 80048 BASIC METABOLIC PNL TOTAL CA: CPT

## 2021-06-12 PROCEDURE — 87521 HEPATITIS C PROBE&RVRS TRNSC: CPT

## 2021-06-12 PROCEDURE — 87522 HEPATITIS C REVRS TRNSCRPJ: CPT

## 2021-06-12 PROCEDURE — 85730 THROMBOPLASTIN TIME PARTIAL: CPT

## 2021-06-12 PROCEDURE — U0003: CPT

## 2021-06-12 PROCEDURE — 87086 URINE CULTURE/COLONY COUNT: CPT

## 2021-06-12 PROCEDURE — 83735 ASSAY OF MAGNESIUM: CPT

## 2021-06-12 PROCEDURE — 80074 ACUTE HEPATITIS PANEL: CPT

## 2021-06-12 PROCEDURE — 71045 X-RAY EXAM CHEST 1 VIEW: CPT

## 2021-06-12 PROCEDURE — 80053 COMPREHEN METABOLIC PANEL: CPT

## 2021-06-12 RX ORDER — SUCRALFATE 1 G
10 TABLET ORAL
Qty: 1200 | Refills: 0
Start: 2021-06-12 | End: 2021-07-11

## 2021-06-12 RX ORDER — PANTOPRAZOLE SODIUM 20 MG/1
1 TABLET, DELAYED RELEASE ORAL
Qty: 60 | Refills: 0
Start: 2021-06-12 | End: 2021-07-11

## 2021-06-12 RX ADMIN — Medication 166.67 MILLIGRAM(S): at 16:01

## 2021-06-12 RX ADMIN — AMPICILLIN SODIUM AND SULBACTAM SODIUM 200 GRAM(S): 250; 125 INJECTION, POWDER, FOR SUSPENSION INTRAMUSCULAR; INTRAVENOUS at 00:27

## 2021-06-12 RX ADMIN — AMPICILLIN SODIUM AND SULBACTAM SODIUM 200 GRAM(S): 250; 125 INJECTION, POWDER, FOR SUSPENSION INTRAMUSCULAR; INTRAVENOUS at 11:13

## 2021-06-12 RX ADMIN — AMPICILLIN SODIUM AND SULBACTAM SODIUM 200 GRAM(S): 250; 125 INJECTION, POWDER, FOR SUSPENSION INTRAMUSCULAR; INTRAVENOUS at 17:05

## 2021-06-12 RX ADMIN — Medication 1 GRAM(S): at 06:07

## 2021-06-12 RX ADMIN — Medication 250 MILLIGRAM(S): at 06:07

## 2021-06-12 RX ADMIN — Medication 250 MILLIGRAM(S): at 17:05

## 2021-06-12 RX ADMIN — Medication 166.67 MILLIGRAM(S): at 08:50

## 2021-06-12 RX ADMIN — Medication 1 GRAM(S): at 00:27

## 2021-06-12 RX ADMIN — PANTOPRAZOLE SODIUM 40 MILLIGRAM(S): 20 TABLET, DELAYED RELEASE ORAL at 06:07

## 2021-06-12 RX ADMIN — AMPICILLIN SODIUM AND SULBACTAM SODIUM 200 GRAM(S): 250; 125 INJECTION, POWDER, FOR SUSPENSION INTRAMUSCULAR; INTRAVENOUS at 06:07

## 2021-06-12 RX ADMIN — MIRTAZAPINE 15 MILLIGRAM(S): 45 TABLET, ORALLY DISINTEGRATING ORAL at 00:27

## 2021-06-12 RX ADMIN — Medication 166.67 MILLIGRAM(S): at 01:09

## 2021-06-12 NOTE — DISCHARGE NOTE NURSING/CASE MANAGEMENT/SOCIAL WORK - NSTRANSFERBELONGINGSRESP_GEN_A_NUR
[place] [address]
Phone:  [phone] 2 D/M-MODE ECHOCARDIOGRAM     
_______________________________________________________________________________
 
Name:         MIKAELA CHAN             Room:          [facility] [place] 
[facility]#:    [id number]     Account #:     [id number]  
Admission:    01/17/18    Attend Phys:   Bentley Mims, 
Discharge:                Date of Birth: [birth year]  
Date of Service: 01/17/18 1614  Report #:      [id number]
        [id number]
_______________________________________________________________________________
THIS REPORT FOR:  //name//                      
 
 
--------------- APPROVED REPORT --------------
 
 
Study performed:  01/17/2018 15:51:39
 
EXAM: Limited 2D Echocardiogram 
Patient Location: In-Patient 
Room #:  224   Status:  routine
 
      BSA:         1.62
HR: 103 bpm BP:          187/100 mmHg 
Rhythm: NSR     
 
Other Information 
Study Quality: Good
 
Indications
Dyspnea 
Chest Pain
 
Volumes
Left Atrial Volume (Systole) 
    LA ESV Index:  66.30 mL/m2
 
Left Ventricle
The left ventricle is normal size. There is normal LV segmental wall 
motion. Mild concentric left ventricular hypertrophy. The left 
ventricular systolic function is normal. The left ventricular 
ejection fraction is within the normal range. LVEF is 55%. This study 
is not technically sufficient to allow evaluation of the LV diastolic 
function due to atrial fibrillation.
 
Right Ventricle
The right ventricle is normal size. The right ventricular systolic 
function is normal. Pacemaker lead is present in the right ventricle. 
 
Atria
Left atrium is moderately dilated. Right atrium is mildly 
dilated.
 
Aortic Valve
Mild aortic valve sclerosis.
 
 
 
[place] [address]
Phone:  [phone]                     2 D/M-MODE ECHOCARDIOGRAM     
_______________________________________________________________________________
 
Name:         MIKAELA CHAN             Room:          [address] [place] 
M.R.#:    [id number]     Account #:     [id number]  
Admission:    01/17/18    Attend Phys:   Bentley Mims, 
Discharge:                Date of Birth: [birth year]  
Date of Service: 01/17/18 1614  Report #:      [id number]
        [id number]
_______________________________________________________________________________
Mitral Valve
The mitral valve is normal in structure.
 
Tricuspid Valve
The tricuspid valve is normal in structure.
 
Pulmonic Valve
The pulmonary valve is normal in structure.
 
Great Vessels
The aortic root is normal in size. IVC is normal in size and 
collapses &gt;50% with inspiration.
 
Pericardium
There is no pericardial effusion.
 
&lt;Conclusion&gt;
The left ventricle is normal size.
Mild concentric left ventricular hypertrophy.
The left ventricular systolic function is normal.
The left ventricular ejection fraction is within the normal 
range.
LVEF is 55%.
This study is not technically sufficient to allow evaluation of the 
LV diastolic function due to atrial fibrillation.
The right ventricle is normal size.
Left atrium is moderately dilated.
Right atrium is mildly dilated.
Mild aortic valve sclerosis.
The mitral valve is normal in structure.
The tricuspid valve is normal in structure.
IVC is normal in size and collapses &gt;50% with inspiration.
There is normal LV segmental wall motion.
Pacemaker lead is present in the right ventricle.
 
 
 
 
 
 
 
 
 
 
  <ELECTRONICALLY SIGNED>
                                           By: Jhon Sorto MD, FACC     
  01/17/18     1614
D: 01/17/18 1614   _____________________________________
T: 01/17/18 1614   John Sorto MD, FAC       /INF
yes

## 2021-06-12 NOTE — DISCHARGE NOTE NURSING/CASE MANAGEMENT/SOCIAL WORK - PATIENT PORTAL LINK FT
You can access the FollowMyHealth Patient Portal offered by [facility] by registering at the following website: [email or web address]. By joining [facility]’s FollowMyHealth portal, you will also be able to view your health information using other applications (apps) compatible with our system.

## 2021-06-22 PROCEDURE — 82962 GLUCOSE BLOOD TEST: CPT

## 2021-06-22 PROCEDURE — 80048 BASIC METABOLIC PNL TOTAL CA: CPT

## 2021-06-22 PROCEDURE — C9399: CPT

## 2021-06-22 PROCEDURE — 86850 RBC ANTIBODY SCREEN: CPT

## 2021-06-22 PROCEDURE — 85027 COMPLETE CBC AUTOMATED: CPT

## 2021-06-22 PROCEDURE — 99285 EMERGENCY DEPT VISIT HI MDM: CPT | Mod: 25

## 2021-06-22 PROCEDURE — 84100 ASSAY OF PHOSPHORUS: CPT

## 2021-06-22 PROCEDURE — 86769 SARS-COV-2 COVID-19 ANTIBODY: CPT

## 2021-06-22 PROCEDURE — 80076 HEPATIC FUNCTION PANEL: CPT

## 2021-06-22 PROCEDURE — 85730 THROMBOPLASTIN TIME PARTIAL: CPT

## 2021-06-22 PROCEDURE — 93306 TTE W/DOPPLER COMPLETE: CPT

## 2021-06-22 PROCEDURE — 83605 ASSAY OF LACTIC ACID: CPT

## 2021-06-22 PROCEDURE — 74177 CT ABD & PELVIS W/CONTRAST: CPT

## 2021-06-22 PROCEDURE — 84443 ASSAY THYROID STIM HORMONE: CPT

## 2021-06-22 PROCEDURE — 84466 ASSAY OF TRANSFERRIN: CPT

## 2021-06-22 PROCEDURE — 85610 PROTHROMBIN TIME: CPT

## 2021-06-22 PROCEDURE — 87086 URINE CULTURE/COLONY COUNT: CPT

## 2021-06-22 PROCEDURE — 36415 COLL VENOUS BLD VENIPUNCTURE: CPT

## 2021-06-22 PROCEDURE — C1874: CPT

## 2021-06-22 PROCEDURE — 87389 HIV-1 AG W/HIV-1&-2 AB AG IA: CPT

## 2021-06-22 PROCEDURE — 94002 VENT MGMT INPAT INIT DAY: CPT

## 2021-06-22 PROCEDURE — 87040 BLOOD CULTURE FOR BACTERIA: CPT

## 2021-06-22 PROCEDURE — C1769: CPT

## 2021-06-22 PROCEDURE — 83540 ASSAY OF IRON: CPT

## 2021-06-22 PROCEDURE — 87635 SARS-COV-2 COVID-19 AMP PRB: CPT

## 2021-06-22 PROCEDURE — 71250 CT THORAX DX C-: CPT

## 2021-06-22 PROCEDURE — 71045 X-RAY EXAM CHEST 1 VIEW: CPT

## 2021-06-22 PROCEDURE — 70450 CT HEAD/BRAIN W/O DYE: CPT

## 2021-06-22 PROCEDURE — 80307 DRUG TEST PRSMV CHEM ANLYZR: CPT

## 2021-06-22 PROCEDURE — 80053 COMPREHEN METABOLIC PANEL: CPT

## 2021-06-22 PROCEDURE — 94760 N-INVAS EAR/PLS OXIMETRY 1: CPT

## 2021-06-22 PROCEDURE — 82140 ASSAY OF AMMONIA: CPT

## 2021-06-22 PROCEDURE — 83550 IRON BINDING TEST: CPT

## 2021-06-22 PROCEDURE — C1726: CPT

## 2021-06-22 PROCEDURE — 71260 CT THORAX DX C+: CPT

## 2021-06-22 PROCEDURE — 83735 ASSAY OF MAGNESIUM: CPT

## 2021-06-22 PROCEDURE — 93005 ELECTROCARDIOGRAM TRACING: CPT

## 2021-06-22 PROCEDURE — 82607 VITAMIN B-12: CPT

## 2021-06-22 PROCEDURE — 85025 COMPLETE CBC W/AUTO DIFF WBC: CPT

## 2021-06-22 PROCEDURE — 94003 VENT MGMT INPAT SUBQ DAY: CPT

## 2021-06-22 PROCEDURE — 81001 URINALYSIS AUTO W/SCOPE: CPT

## 2021-06-22 PROCEDURE — 86900 BLOOD TYPING SEROLOGIC ABO: CPT

## 2021-06-22 PROCEDURE — 82803 BLOOD GASES ANY COMBINATION: CPT

## 2021-06-22 PROCEDURE — 76000 FLUOROSCOPY <1 HR PHYS/QHP: CPT

## 2021-06-22 PROCEDURE — 86901 BLOOD TYPING SEROLOGIC RH(D): CPT

## 2021-06-22 PROCEDURE — 96375 TX/PRO/DX INJ NEW DRUG ADDON: CPT

## 2021-06-22 PROCEDURE — 96365 THER/PROPH/DIAG IV INF INIT: CPT

## 2021-06-28 PROBLEM — Z87.19 PERSONAL HISTORY OF OTHER DISEASES OF THE DIGESTIVE SYSTEM: Chronic | Status: ACTIVE | Noted: 2021-05-30

## 2021-06-28 PROBLEM — F19.10 OTHER PSYCHOACTIVE SUBSTANCE ABUSE, UNCOMPLICATED: Chronic | Status: ACTIVE | Noted: 2021-06-01

## 2021-07-16 DIAGNOSIS — Z01.818 ENCOUNTER FOR OTHER PREPROCEDURAL EXAMINATION: ICD-10-CM

## 2021-07-19 ENCOUNTER — APPOINTMENT (OUTPATIENT)
Dept: DISASTER EMERGENCY | Facility: CLINIC | Age: 26
End: 2021-07-19

## 2021-07-20 LAB — SARS-COV-2 N GENE NPH QL NAA+PROBE: NOT DETECTED

## 2021-07-21 ENCOUNTER — APPOINTMENT (OUTPATIENT)
Dept: GASTROENTEROLOGY | Facility: CLINIC | Age: 26
End: 2021-07-21
Payer: MEDICAID

## 2021-07-21 VITALS
OXYGEN SATURATION: 100 % | HEIGHT: 71 IN | HEART RATE: 87 BPM | SYSTOLIC BLOOD PRESSURE: 102 MMHG | WEIGHT: 185 LBS | DIASTOLIC BLOOD PRESSURE: 69 MMHG | BODY MASS INDEX: 25.9 KG/M2

## 2021-07-21 DIAGNOSIS — Z87.19 PERSONAL HISTORY OF OTHER DISEASES OF THE DIGESTIVE SYSTEM: ICD-10-CM

## 2021-07-21 DIAGNOSIS — F17.210 NICOTINE DEPENDENCE, CIGARETTES, UNCOMPLICATED: ICD-10-CM

## 2021-07-21 DIAGNOSIS — Z78.9 OTHER SPECIFIED HEALTH STATUS: ICD-10-CM

## 2021-07-21 PROCEDURE — 99072 ADDL SUPL MATRL&STAF TM PHE: CPT

## 2021-07-21 PROCEDURE — 99204 OFFICE O/P NEW MOD 45 MIN: CPT

## 2021-07-21 NOTE — CONSULT LETTER
[Dear  ___] : Dear  [unfilled], [Consult Letter:] : I had the pleasure of evaluating your patient, [unfilled]. [Please see my note below.] : Please see my note below. [Consult Closing:] : Thank you very much for allowing me to participate in the care of this patient.  If you have any questions, please do not hesitate to contact me. [Sincerely,] : Sincerely, [FreeTextEntry2] : \par Aaron Eldridge MD\par \par [address], [place] [address]\par \par [phone] [FreeTextEntry3] : George Burt MD\par

## 2021-07-21 NOTE — HISTORY OF PRESENT ILLNESS
[de-identified] : \par Aaron Eldridge MD\par \par [address], [place] [address]\par \par [phone]\par \par Very pleasant 25-year-old individual here with his mother\par \par Generally in good health\par \par He has had significant dysphagia to solids and thin liquids starting in March of this year\par \par He had a upper endoscopy with esophageal dilation x2\par \par He was actually hospitalized for about a 3-week period of time at [facility] with dysphagia and actually had a esophageal stent placed\par \par The stent migrated\par \par He continues to have significant dysphagia to solids and liquids\par \par He can only have a puréed diet at best\par \par Yes to open capsules or take liquid medications\par \par He has lost some weight\par \par He may have had some mild heartburn in the past\par \par Is otherwise been healthy

## 2021-07-21 NOTE — REASON FOR VISIT
[Initial Evaluation] : an initial evaluation [FreeTextEntry1] : Significant dysphagia second opinion

## 2021-07-21 NOTE — ASSESSMENT
[FreeTextEntry1] : Impression\par \par Significant dysphagia\par \par Esophageal dilation x2 without success\par \par Esophageal stent was placed and then migrated\par \par Here for second opinion\par \par Suggest\par \par Discussed with advanced endoscopy team\par \par Patient hopefully to be seen next week by one of the physician assistants and then scheduled for upper endoscopy with appropriate management hopefully in the near future\par \par Continue puréed diet\par \par Continue proton pump inhibitor and Carafate\par \par He has my cell phone number can call me at any point\par \par

## 2021-07-22 ENCOUNTER — OUTPATIENT (OUTPATIENT)
Dept: OUTPATIENT SERVICES | Facility: HOSPITAL | Age: 26
LOS: 1 days | End: 2021-07-22
Payer: MEDICAID

## 2021-07-22 ENCOUNTER — TRANSCRIPTION ENCOUNTER (OUTPATIENT)
Age: 26
End: 2021-07-22

## 2021-07-22 ENCOUNTER — APPOINTMENT (OUTPATIENT)
Dept: GASTROENTEROLOGY | Facility: HOSPITAL | Age: 26
End: 2021-07-22

## 2021-07-22 DIAGNOSIS — K22.2 ESOPHAGEAL OBSTRUCTION: ICD-10-CM

## 2021-07-22 DIAGNOSIS — T85.528A DISPLACEMENT OF OTHER GASTROINTESTINAL PROSTHETIC DEVICES, IMPLANTS AND GRAFTS, INITIAL ENCOUNTER: Chronic | ICD-10-CM

## 2021-07-22 PROCEDURE — 43220 ESOPHAGOSCOPY BALLOON <30MM: CPT

## 2021-07-22 PROCEDURE — 43249 ESOPH EGD DILATION <30 MM: CPT

## 2021-07-22 PROCEDURE — C1726: CPT

## 2021-07-27 ENCOUNTER — APPOINTMENT (OUTPATIENT)
Dept: DISASTER EMERGENCY | Facility: CLINIC | Age: 26
End: 2021-07-27

## 2021-07-27 ENCOUNTER — LABORATORY RESULT (OUTPATIENT)
Age: 26
End: 2021-07-27

## 2021-07-27 ENCOUNTER — APPOINTMENT (OUTPATIENT)
Dept: GASTROENTEROLOGY | Facility: CLINIC | Age: 26
End: 2021-07-27
Payer: MEDICAID

## 2021-07-27 VITALS
BODY MASS INDEX: 25.76 KG/M2 | TEMPERATURE: 97.5 F | WEIGHT: 184 LBS | DIASTOLIC BLOOD PRESSURE: 80 MMHG | HEIGHT: 71 IN | SYSTOLIC BLOOD PRESSURE: 119 MMHG

## 2021-07-27 PROCEDURE — 99072 ADDL SUPL MATRL&STAF TM PHE: CPT

## 2021-07-27 PROCEDURE — 99214 OFFICE O/P EST MOD 30 MIN: CPT

## 2021-07-27 NOTE — ASSESSMENT
[FreeTextEntry1] : 25 year old male with stricture due to GERD. \par \par Plan: Double dose prevacid with granules put into apple sauce. Continue the Carafate. Will schedule for EGD with dilation. Will need frequent dilations. Prior dilation was only to 7mm.

## 2021-07-27 NOTE — HISTORY OF PRESENT ILLNESS
[de-identified] : 25 year old male who has had progressive dysphagia for solids and then liquids. He was found to have an esophageal stenosis on the basis of GERD  and has been dilated twice and underwent a stent that migrated after 5 days. He lost 20 lbs because of difficulty swallowing but has put the weight back on after the dilations. He has a history of GERD and hiatal hernia starting in 2012. He notes that before he had the dysphagia the GERD was particularly bad for 2 months.However, he had stopped the PPI that was prescribed for him. He is currently treated with prevacid and carafate.

## 2021-07-29 NOTE — ASU PATIENT PROFILE, ADULT - PMH
Cocaine abuse    GERD (gastroesophageal reflux disease)    Heroin abuse    Hiatal hernia    History of esophageal stricture    Intravenous drug abuse

## 2021-07-30 ENCOUNTER — APPOINTMENT (OUTPATIENT)
Dept: GASTROENTEROLOGY | Facility: HOSPITAL | Age: 26
End: 2021-07-30

## 2021-07-30 ENCOUNTER — OUTPATIENT (OUTPATIENT)
Dept: OUTPATIENT SERVICES | Facility: HOSPITAL | Age: 26
LOS: 1 days | Discharge: HOME | End: 2021-07-30
Payer: MEDICAID

## 2021-07-30 VITALS
HEIGHT: 71 IN | SYSTOLIC BLOOD PRESSURE: 115 MMHG | DIASTOLIC BLOOD PRESSURE: 50 MMHG | TEMPERATURE: 98 F | HEART RATE: 79 BPM | OXYGEN SATURATION: 100 % | RESPIRATION RATE: 21 BRPM | WEIGHT: 184.97 LBS

## 2021-07-30 VITALS
HEART RATE: 75 BPM | SYSTOLIC BLOOD PRESSURE: 113 MMHG | OXYGEN SATURATION: 99 % | DIASTOLIC BLOOD PRESSURE: 52 MMHG | RESPIRATION RATE: 19 BRPM

## 2021-07-30 DIAGNOSIS — T85.528A DISPLACEMENT OF OTHER GASTROINTESTINAL PROSTHETIC DEVICES, IMPLANTS AND GRAFTS, INITIAL ENCOUNTER: Chronic | ICD-10-CM

## 2021-07-30 DIAGNOSIS — K21.9 GASTRO-ESOPHAGEAL REFLUX DISEASE WITHOUT ESOPHAGITIS: ICD-10-CM

## 2021-07-30 PROCEDURE — 43249 ESOPH EGD DILATION <30 MM: CPT | Mod: GC

## 2021-08-03 ENCOUNTER — NON-APPOINTMENT (OUTPATIENT)
Age: 26
End: 2021-08-03

## 2021-08-04 LAB — SARS-COV-2 N GENE NPH QL NAA+PROBE: NOT DETECTED

## 2021-08-06 ENCOUNTER — APPOINTMENT (OUTPATIENT)
Dept: GASTROENTEROLOGY | Facility: HOSPITAL | Age: 26
End: 2021-08-06

## 2021-08-06 ENCOUNTER — OUTPATIENT (OUTPATIENT)
Dept: OUTPATIENT SERVICES | Facility: HOSPITAL | Age: 26
LOS: 1 days | Discharge: HOME | End: 2021-08-06
Payer: MEDICAID

## 2021-08-06 VITALS
DIASTOLIC BLOOD PRESSURE: 56 MMHG | SYSTOLIC BLOOD PRESSURE: 118 MMHG | WEIGHT: 184.97 LBS | RESPIRATION RATE: 20 BRPM | HEART RATE: 65 BPM | HEIGHT: 71 IN | OXYGEN SATURATION: 100 % | TEMPERATURE: 98 F

## 2021-08-06 VITALS
OXYGEN SATURATION: 98 % | DIASTOLIC BLOOD PRESSURE: 60 MMHG | HEART RATE: 73 BPM | SYSTOLIC BLOOD PRESSURE: 100 MMHG | RESPIRATION RATE: 22 BRPM

## 2021-08-06 DIAGNOSIS — T85.528A DISPLACEMENT OF OTHER GASTROINTESTINAL PROSTHETIC DEVICES, IMPLANTS AND GRAFTS, INITIAL ENCOUNTER: Chronic | ICD-10-CM

## 2021-08-06 DIAGNOSIS — R13.10 DYSPHAGIA, UNSPECIFIED: ICD-10-CM

## 2021-08-06 PROCEDURE — 43249 ESOPH EGD DILATION <30 MM: CPT | Mod: GC

## 2021-08-06 RX ORDER — SODIUM CHLORIDE 9 MG/ML
500 INJECTION, SOLUTION INTRAVENOUS
Refills: 0 | Status: COMPLETED | OUTPATIENT
Start: 2021-08-06 | End: 2021-08-06

## 2021-08-06 RX ADMIN — SODIUM CHLORIDE 30 MILLILITER(S): 9 INJECTION, SOLUTION INTRAVENOUS at 11:39

## 2021-08-11 ENCOUNTER — NON-APPOINTMENT (OUTPATIENT)
Age: 26
End: 2021-08-11

## 2021-08-11 LAB — SARS-COV-2 N GENE NPH QL NAA+PROBE: NOT DETECTED

## 2021-08-12 NOTE — ASU PATIENT PROFILE, ADULT - NSICDXPASTMEDICALHX_GEN_ALL_CORE_FT
PAST MEDICAL HISTORY:  Cocaine abuse     GERD (gastroesophageal reflux disease)     Heroin abuse     Hiatal hernia     History of esophageal stricture     Intravenous drug abuse

## 2021-08-13 ENCOUNTER — OUTPATIENT (OUTPATIENT)
Dept: OUTPATIENT SERVICES | Facility: HOSPITAL | Age: 26
LOS: 1 days | Discharge: HOME | End: 2021-08-13
Payer: MEDICAID

## 2021-08-13 ENCOUNTER — RESULT REVIEW (OUTPATIENT)
Age: 26
End: 2021-08-13

## 2021-08-13 ENCOUNTER — APPOINTMENT (OUTPATIENT)
Dept: GASTROENTEROLOGY | Facility: HOSPITAL | Age: 26
End: 2021-08-13

## 2021-08-13 VITALS
OXYGEN SATURATION: 98 % | HEART RATE: 77 BPM | DIASTOLIC BLOOD PRESSURE: 63 MMHG | TEMPERATURE: 98 F | RESPIRATION RATE: 18 BRPM | HEIGHT: 71 IN | SYSTOLIC BLOOD PRESSURE: 118 MMHG | WEIGHT: 184.97 LBS

## 2021-08-13 VITALS
RESPIRATION RATE: 15 BRPM | HEART RATE: 62 BPM | DIASTOLIC BLOOD PRESSURE: 34 MMHG | SYSTOLIC BLOOD PRESSURE: 96 MMHG | OXYGEN SATURATION: 100 %

## 2021-08-13 DIAGNOSIS — T85.528A DISPLACEMENT OF OTHER GASTROINTESTINAL PROSTHETIC DEVICES, IMPLANTS AND GRAFTS, INITIAL ENCOUNTER: Chronic | ICD-10-CM

## 2021-08-13 DIAGNOSIS — R13.10 DYSPHAGIA, UNSPECIFIED: ICD-10-CM

## 2021-08-13 PROCEDURE — 43249 ESOPH EGD DILATION <30 MM: CPT | Mod: GC

## 2021-08-13 PROCEDURE — 88305 TISSUE EXAM BY PATHOLOGIST: CPT | Mod: 26

## 2021-08-13 RX ORDER — SODIUM CHLORIDE 9 MG/ML
500 INJECTION, SOLUTION INTRAVENOUS
Refills: 0 | Status: COMPLETED | OUTPATIENT
Start: 2021-08-13 | End: 2021-08-13

## 2021-08-13 RX ORDER — TRIAMCINOLONE 4 MG
40 TABLET ORAL ONCE
Refills: 0 | Status: DISCONTINUED | OUTPATIENT
Start: 2021-08-13 | End: 2021-08-27

## 2021-08-13 RX ORDER — SODIUM CHLORIDE 9 MG/ML
1000 INJECTION, SOLUTION INTRAVENOUS
Refills: 0 | Status: DISCONTINUED | OUTPATIENT
Start: 2021-08-13 | End: 2021-08-27

## 2021-08-13 RX ADMIN — SODIUM CHLORIDE 30 MILLILITER(S): 9 INJECTION, SOLUTION INTRAVENOUS at 12:29

## 2021-08-13 RX ADMIN — SODIUM CHLORIDE 999 MILLILITER(S): 9 INJECTION, SOLUTION INTRAVENOUS at 12:32

## 2021-08-16 NOTE — ASU PREOP CHECKLIST - RESPIRATORY RATE (BREATHS/MIN)
Referral from Dr. Robb for Vaginal lesion.  
Referral from Dr. Robb for vaginal lesion. Pt reports an area on the left side of her vagina that is itchy and irritated and is painful during intercourse. It comes and goes over the last several months. She has not tried anything for the discomfort.  Pt denies any odor, abnormal discharge, or other concerns. Dr. Robb was thinking possibly an area of lichen sclerosis but recommended pt to see OB/GYN for consideration for biopsy. Pt is agreeable to plan. Pt is postmenopausal, stating she has not had her period in years. Pt does see Dr. Maddie Beth who is an independent integrative medicine physician in [place] and is on a compound biest cream.     Pt requests to see Dr. Polanco. Appt made.  
21

## 2021-08-17 LAB — SURGICAL PATHOLOGY STUDY: SIGNIFICANT CHANGE UP

## 2021-08-18 ENCOUNTER — NON-APPOINTMENT (OUTPATIENT)
Age: 26
End: 2021-08-18

## 2021-08-19 RX ORDER — SODIUM CHLORIDE 9 MG/ML
500 INJECTION, SOLUTION INTRAVENOUS
Refills: 0 | Status: DISCONTINUED | OUTPATIENT
Start: 2021-08-20 | End: 2021-09-03

## 2021-08-20 ENCOUNTER — OUTPATIENT (OUTPATIENT)
Dept: OUTPATIENT SERVICES | Facility: HOSPITAL | Age: 26
LOS: 1 days | Discharge: HOME | End: 2021-08-20
Payer: MEDICAID

## 2021-08-20 ENCOUNTER — APPOINTMENT (OUTPATIENT)
Dept: GASTROENTEROLOGY | Facility: HOSPITAL | Age: 26
End: 2021-08-20

## 2021-08-20 VITALS
HEIGHT: 71 IN | OXYGEN SATURATION: 97 % | HEART RATE: 77 BPM | RESPIRATION RATE: 21 BRPM | WEIGHT: 190.04 LBS | TEMPERATURE: 97 F | DIASTOLIC BLOOD PRESSURE: 60 MMHG | SYSTOLIC BLOOD PRESSURE: 118 MMHG

## 2021-08-20 VITALS
DIASTOLIC BLOOD PRESSURE: 51 MMHG | OXYGEN SATURATION: 97 % | HEART RATE: 69 BPM | SYSTOLIC BLOOD PRESSURE: 111 MMHG | RESPIRATION RATE: 20 BRPM

## 2021-08-20 DIAGNOSIS — R13.10 DYSPHAGIA, UNSPECIFIED: ICD-10-CM

## 2021-08-20 DIAGNOSIS — T85.528A DISPLACEMENT OF OTHER GASTROINTESTINAL PROSTHETIC DEVICES, IMPLANTS AND GRAFTS, INITIAL ENCOUNTER: Chronic | ICD-10-CM

## 2021-08-20 PROCEDURE — 43249 ESOPH EGD DILATION <30 MM: CPT | Mod: GC

## 2021-08-20 NOTE — PACU DISCHARGE NOTE - THE ANESTHESIA ORDERS USED IN THE PACU ORDER SET WILL BE DISCONTINUED UPON TRANSFER OF THIS PATIENT
Problem: Falls - Risk of:  Goal: Will remain free from falls  Description: Orthostatic vital signs obtained at start of shift - see flowsheet for details. Pt does not meet criteria for orthostasis. Pt is a Med fall risk. See Ragena Papa Fall Score and ABCDS Injury Risk assessments. - Screening for Orthostasis AND not a [place] Risk per BERMUDEZ/ABCDS: Pt bed is in low position, side rails up, call light and belongings are in reach. Fall risk light is on outside pts room. Pt encouraged to call for assistance as needed. Will continue with hourly rounds for PO intake, pain needs, toileting and repositioning as needed. Outcome: Ongoing  Note: Orthostatic vital signs obtained at start of shift - see flowsheet for details. Pt meets criteria for orthostasis. Pt is a Med fall risk. See Ragena Papa Fall Score and ABCDS Injury Risk assessments. - Screening for Orthostasis AND not a [place] Risk per BERMUDEZ/ABCDS: Pt bed is in low position, side rails up, call light and belongings are in reach. Fall risk light is on outside pts room. Pt encouraged to call for assistance as needed. Will continue with hourly rounds for PO intake, pain needs, toileting and repositioning as needed. Problem: Infection - Central Venous Catheter-Associated Bloodstream Infection:  Goal: Will show no infection signs and symptoms  Description: Will show no infection signs and symptoms  Outcome: Ongoing  Note: CVC site remains free of signs/symptoms of infection. No drainage, edema, erythema, pain, or warmth noted at site. Dressing changes continue per protocol and on an as needed basis - see flowsheet. Compliant with [facility] Bath Protocol:  Performed CHG bath today per [facility] protocol utilizing CHG solution in the shower. CVC site cleansed with CHG wipe over dressing, skin surrounding dressing, and first 6\" of IV tubing. Pt tolerated well. Continued to encourage daily CHG bathing per [facility] protocol.        Problem: Venous Thromboembolism:  Goal: Will show no signs or symptoms of venous thromboembolism  Description: Will show no signs or symptoms of venous thromboembolism  Outcome: Ongoing     Problem: PROTECTIVE PRECAUTIONS  Goal: Patient will remain free of nosocomial Infections  Description: Pt educated on use of mask prior to leaving room. All staff and visitor following handwashing requirements this shift prior to entering room. Low microbial diet order in place and being followed. Linens changed today per protocol and pt reports using chlorhexidine wash per order. Outcome: Ongoing  Note: Pt remains in protective precautions. Pt educated on wearing mask when in hallways. Pt, staff, and visitors adhering to handwashing guidelines. Pt educated to shower or bathe daily with chlorhexidine and linens changed daily per protocol. Pt verbalizes understanding of low microbial diet. Will continue to monitor. Problem: Bleeding:  Goal: Will show no signs and symptoms of excessive bleeding  Description: Will show no signs and symptoms of excessive bleeding  Outcome: Ongoing  Note: Patient's hemoglobin this AM:   Recent Labs     07/05/21  0426   HGB 11.3*     Patient's platelet count this AM:   Recent Labs     07/05/21  0426       Thrombocytopenia not present at this time. Patient showing no signs or symptoms of active bleeding. Transfusion not indicated at this time. Patient verbalizes understanding of all instructions. Will continue to assess and implement POC. Call light within reach and hourly rounding in place.        Problem: Nausea/Vomiting:  Goal: Absence of nausea/vomiting  Description: Absence of nausea/vomiting  Outcome: Ongoing Statement Selected

## 2021-09-02 ENCOUNTER — NON-APPOINTMENT (OUTPATIENT)
Age: 26
End: 2021-09-02

## 2021-09-02 LAB — SARS-COV-2 N GENE NPH QL NAA+PROBE: NOT DETECTED

## 2021-09-03 ENCOUNTER — APPOINTMENT (OUTPATIENT)
Dept: GASTROENTEROLOGY | Facility: HOSPITAL | Age: 26
End: 2021-09-03
Payer: MEDICAID

## 2021-09-03 ENCOUNTER — OUTPATIENT (OUTPATIENT)
Dept: OUTPATIENT SERVICES | Facility: HOSPITAL | Age: 26
LOS: 1 days | Discharge: HOME | End: 2021-09-03

## 2021-09-03 VITALS
RESPIRATION RATE: 20 BRPM | OXYGEN SATURATION: 98 % | DIASTOLIC BLOOD PRESSURE: 78 MMHG | SYSTOLIC BLOOD PRESSURE: 117 MMHG | HEART RATE: 78 BPM

## 2021-09-03 VITALS
TEMPERATURE: 98 F | HEART RATE: 62 BPM | SYSTOLIC BLOOD PRESSURE: 131 MMHG | DIASTOLIC BLOOD PRESSURE: 74 MMHG | WEIGHT: 190.04 LBS | HEIGHT: 71 IN | OXYGEN SATURATION: 96 % | RESPIRATION RATE: 13 BRPM

## 2021-09-03 DIAGNOSIS — T85.528A DISPLACEMENT OF OTHER GASTROINTESTINAL PROSTHETIC DEVICES, IMPLANTS AND GRAFTS, INITIAL ENCOUNTER: Chronic | ICD-10-CM

## 2021-09-03 DIAGNOSIS — R13.10 DYSPHAGIA, UNSPECIFIED: ICD-10-CM

## 2021-09-03 PROCEDURE — XXXXX: CPT

## 2021-09-07 ENCOUNTER — NON-APPOINTMENT (OUTPATIENT)
Age: 26
End: 2021-09-07

## 2021-09-09 ENCOUNTER — NON-APPOINTMENT (OUTPATIENT)
Age: 26
End: 2021-09-09

## 2021-09-09 LAB — SARS-COV-2 N GENE NPH QL NAA+PROBE: NOT DETECTED

## 2021-09-10 ENCOUNTER — APPOINTMENT (OUTPATIENT)
Dept: GASTROENTEROLOGY | Facility: HOSPITAL | Age: 26
End: 2021-09-10

## 2021-09-10 ENCOUNTER — OUTPATIENT (OUTPATIENT)
Dept: OUTPATIENT SERVICES | Facility: HOSPITAL | Age: 26
LOS: 1 days | Discharge: HOME | End: 2021-09-10
Payer: MEDICAID

## 2021-09-10 VITALS
WEIGHT: 190.04 LBS | TEMPERATURE: 98 F | SYSTOLIC BLOOD PRESSURE: 130 MMHG | HEIGHT: 71 IN | OXYGEN SATURATION: 98 % | RESPIRATION RATE: 17 BRPM | HEART RATE: 74 BPM | DIASTOLIC BLOOD PRESSURE: 69 MMHG

## 2021-09-10 VITALS
SYSTOLIC BLOOD PRESSURE: 107 MMHG | OXYGEN SATURATION: 98 % | RESPIRATION RATE: 19 BRPM | DIASTOLIC BLOOD PRESSURE: 70 MMHG | HEART RATE: 67 BPM

## 2021-09-10 DIAGNOSIS — T85.528A DISPLACEMENT OF OTHER GASTROINTESTINAL PROSTHETIC DEVICES, IMPLANTS AND GRAFTS, INITIAL ENCOUNTER: Chronic | ICD-10-CM

## 2021-09-10 DIAGNOSIS — K22.2 ESOPHAGEAL OBSTRUCTION: ICD-10-CM

## 2021-09-10 PROCEDURE — 43249 ESOPH EGD DILATION <30 MM: CPT | Mod: GC

## 2021-09-10 RX ORDER — TRIAMCINOLONE 4 MG
1 TABLET ORAL ONCE
Refills: 0 | Status: DISCONTINUED | OUTPATIENT
Start: 2021-09-10 | End: 2021-09-15

## 2021-09-10 RX ORDER — SODIUM CHLORIDE 9 MG/ML
500 INJECTION, SOLUTION INTRAVENOUS
Refills: 0 | Status: DISCONTINUED | OUTPATIENT
Start: 2021-09-10 | End: 2021-09-24

## 2021-09-10 NOTE — ASU PATIENT PROFILE, ADULT - NSICDXPASTSURGICALHX_GEN_ALL_CORE_FT
How Severe Is It?: moderate Is This A New Presentation, Or A Follow-Up?: Rash PAST SURGICAL HISTORY:  Migrated esophageal stent     Migration of pancreatic stent

## 2021-09-23 ENCOUNTER — NON-APPOINTMENT (OUTPATIENT)
Age: 26
End: 2021-09-23

## 2021-09-23 LAB — SARS-COV-2 N GENE NPH QL NAA+PROBE: NOT DETECTED

## 2021-09-24 ENCOUNTER — APPOINTMENT (OUTPATIENT)
Dept: GASTROENTEROLOGY | Facility: HOSPITAL | Age: 26
End: 2021-09-24

## 2021-09-24 ENCOUNTER — OUTPATIENT (OUTPATIENT)
Dept: OUTPATIENT SERVICES | Facility: HOSPITAL | Age: 26
LOS: 1 days | Discharge: HOME | End: 2021-09-24
Payer: MEDICAID

## 2021-09-24 VITALS
TEMPERATURE: 97 F | DIASTOLIC BLOOD PRESSURE: 67 MMHG | RESPIRATION RATE: 20 BRPM | HEART RATE: 66 BPM | OXYGEN SATURATION: 96 % | WEIGHT: 190.04 LBS | SYSTOLIC BLOOD PRESSURE: 128 MMHG | HEIGHT: 71 IN

## 2021-09-24 VITALS
RESPIRATION RATE: 20 BRPM | HEART RATE: 76 BPM | SYSTOLIC BLOOD PRESSURE: 112 MMHG | OXYGEN SATURATION: 97 % | DIASTOLIC BLOOD PRESSURE: 52 MMHG

## 2021-09-24 DIAGNOSIS — T85.528A DISPLACEMENT OF OTHER GASTROINTESTINAL PROSTHETIC DEVICES, IMPLANTS AND GRAFTS, INITIAL ENCOUNTER: Chronic | ICD-10-CM

## 2021-09-24 DIAGNOSIS — R13.10 DYSPHAGIA, UNSPECIFIED: ICD-10-CM

## 2021-09-24 PROCEDURE — 43249 ESOPH EGD DILATION <30 MM: CPT | Mod: GC

## 2021-10-27 ENCOUNTER — APPOINTMENT (OUTPATIENT)
Dept: GASTROENTEROLOGY | Facility: HOSPITAL | Age: 26
End: 2021-10-27
Payer: MEDICAID

## 2021-10-27 ENCOUNTER — OUTPATIENT (OUTPATIENT)
Dept: OUTPATIENT SERVICES | Facility: HOSPITAL | Age: 26
LOS: 1 days | Discharge: HOME | End: 2021-10-27

## 2021-10-27 VITALS
OXYGEN SATURATION: 98 % | RESPIRATION RATE: 13 BRPM | HEART RATE: 53 BPM | DIASTOLIC BLOOD PRESSURE: 96 MMHG | SYSTOLIC BLOOD PRESSURE: 110 MMHG

## 2021-10-27 VITALS
TEMPERATURE: 98 F | HEIGHT: 60 IN | OXYGEN SATURATION: 96 % | WEIGHT: 190.04 LBS | DIASTOLIC BLOOD PRESSURE: 66 MMHG | RESPIRATION RATE: 12 BRPM | HEART RATE: 54 BPM | SYSTOLIC BLOOD PRESSURE: 127 MMHG

## 2021-10-27 DIAGNOSIS — T85.528A DISPLACEMENT OF OTHER GASTROINTESTINAL PROSTHETIC DEVICES, IMPLANTS AND GRAFTS, INITIAL ENCOUNTER: Chronic | ICD-10-CM

## 2021-10-27 DIAGNOSIS — K22.2 ESOPHAGEAL OBSTRUCTION: ICD-10-CM

## 2021-10-27 PROCEDURE — 43249 ESOPH EGD DILATION <30 MM: CPT

## 2021-11-17 ENCOUNTER — NON-APPOINTMENT (OUTPATIENT)
Age: 26
End: 2021-11-17

## 2021-11-17 ENCOUNTER — APPOINTMENT (OUTPATIENT)
Dept: GASTROENTEROLOGY | Facility: HOSPITAL | Age: 26
End: 2021-11-17

## 2021-11-17 LAB — SARS-COV-2 N GENE NPH QL NAA+PROBE: NOT DETECTED

## 2021-11-30 NOTE — BRIEF OPERATIVE NOTE - OPERATION/FINDINGS
EGD: High grade esophageal stricture noted in distal esophagus. Guidewire placed into the stomach. Contrast injected and opacified the stomach. Then 14 mm stent was attempted to be inserted. Then dilation using CRE balloon [id number] mm and then eventually to 9-10 mm. Then 14 mm X120 mm fully covered stent placed. EGD completed using the pediatric scope.
no

## 2021-12-16 NOTE — ASU PATIENT PROFILE, ADULT - FALL HARM RISK - UNIVERSAL INTERVENTIONS
Bed in lowest position, wheels locked, appropriate side rails in place/Call bell, personal items and telephone in reach/Instruct patient to call for assistance before getting out of bed or chair/Non-slip footwear when patient is out of bed/[place] to call system/Physically safe environment - no spills, clutter or unnecessary equipment/Purposeful Proactive Rounding/Room/bathroom lighting operational, light cord in reach

## 2021-12-17 ENCOUNTER — APPOINTMENT (OUTPATIENT)
Dept: GASTROENTEROLOGY | Facility: HOSPITAL | Age: 26
End: 2021-12-17

## 2021-12-17 ENCOUNTER — OUTPATIENT (OUTPATIENT)
Dept: OUTPATIENT SERVICES | Facility: HOSPITAL | Age: 26
LOS: 1 days | Discharge: HOME | End: 2021-12-17
Payer: MEDICAID

## 2021-12-17 VITALS
HEIGHT: 71 IN | DIASTOLIC BLOOD PRESSURE: 65 MMHG | TEMPERATURE: 98 F | HEART RATE: 83 BPM | RESPIRATION RATE: 20 BRPM | OXYGEN SATURATION: 98 % | SYSTOLIC BLOOD PRESSURE: 116 MMHG | WEIGHT: 179.9 LBS

## 2021-12-17 VITALS
DIASTOLIC BLOOD PRESSURE: 65 MMHG | OXYGEN SATURATION: 97 % | HEART RATE: 77 BPM | RESPIRATION RATE: 19 BRPM | SYSTOLIC BLOOD PRESSURE: 109 MMHG

## 2021-12-17 DIAGNOSIS — T85.528A DISPLACEMENT OF OTHER GASTROINTESTINAL PROSTHETIC DEVICES, IMPLANTS AND GRAFTS, INITIAL ENCOUNTER: Chronic | ICD-10-CM

## 2021-12-17 DIAGNOSIS — K22.2 ESOPHAGEAL OBSTRUCTION: ICD-10-CM

## 2021-12-17 LAB — SARS-COV-2 N GENE NPH QL NAA+PROBE: NOT DETECTED

## 2021-12-17 PROCEDURE — 43249 ESOPH EGD DILATION <30 MM: CPT | Mod: GC

## 2021-12-17 NOTE — ASU PREOP CHECKLIST - WEIGHT IN LBS
Plan of care reviewed with patient. Pt remains free from fall, injury, and skin breakdown. Positions self independently. Patient ambulated the room and hallway this shift. No complaints of N/V. Purposeful hourly rounding done. Safety maintained. Patient lying in bed in no distress. Will continue to monitor.         179.8

## 2022-03-14 ENCOUNTER — NON-APPOINTMENT (OUTPATIENT)
Age: 27
End: 2022-03-14

## 2022-04-04 ENCOUNTER — INPATIENT (INPATIENT)
Facility: HOSPITAL | Age: 27
LOS: 22 days | Discharge: HOME | End: 2022-04-27
Attending: STUDENT IN AN ORGANIZED HEALTH CARE EDUCATION/TRAINING PROGRAM | Admitting: HOSPITALIST
Payer: MEDICAID

## 2022-04-04 VITALS
RESPIRATION RATE: 18 BRPM | HEART RATE: 100 BPM | SYSTOLIC BLOOD PRESSURE: 145 MMHG | TEMPERATURE: 98 F | DIASTOLIC BLOOD PRESSURE: 83 MMHG | OXYGEN SATURATION: 99 % | HEIGHT: 71 IN

## 2022-04-04 DIAGNOSIS — Z87.19 PERSONAL HISTORY OF OTHER DISEASES OF THE DIGESTIVE SYSTEM: ICD-10-CM

## 2022-04-04 DIAGNOSIS — Z98.890 OTHER SPECIFIED POSTPROCEDURAL STATES: ICD-10-CM

## 2022-04-04 DIAGNOSIS — T85.528A DISPLACEMENT OF OTHER GASTROINTESTINAL PROSTHETIC DEVICES, IMPLANTS AND GRAFTS, INITIAL ENCOUNTER: Chronic | ICD-10-CM

## 2022-04-04 DIAGNOSIS — F19.10 OTHER PSYCHOACTIVE SUBSTANCE ABUSE, UNCOMPLICATED: ICD-10-CM

## 2022-04-04 DIAGNOSIS — F17.200 NICOTINE DEPENDENCE, UNSPECIFIED, UNCOMPLICATED: ICD-10-CM

## 2022-04-04 DIAGNOSIS — Z29.9 ENCOUNTER FOR PROPHYLACTIC MEASURES, UNSPECIFIED: ICD-10-CM

## 2022-04-04 LAB
ALBUMIN SERPL ELPH-MCNC: 4.6 G/DL — SIGNIFICANT CHANGE UP (ref 3.3–5)
ALP SERPL-CCNC: 85 U/L — SIGNIFICANT CHANGE UP (ref 40–120)
ALT FLD-CCNC: 19 U/L — SIGNIFICANT CHANGE UP (ref 4–41)
ANION GAP SERPL CALC-SCNC: 14 MMOL/L — SIGNIFICANT CHANGE UP (ref 7–14)
AST SERPL-CCNC: 26 U/L — SIGNIFICANT CHANGE UP (ref 4–40)
BASOPHILS # BLD AUTO: 0.02 K/UL — SIGNIFICANT CHANGE UP (ref 0–0.2)
BASOPHILS NFR BLD AUTO: 0.3 % — SIGNIFICANT CHANGE UP (ref 0–2)
BILIRUB SERPL-MCNC: 0.9 MG/DL — SIGNIFICANT CHANGE UP (ref 0.2–1.2)
BLD GP AB SCN SERPL QL: NEGATIVE — SIGNIFICANT CHANGE UP
BUN SERPL-MCNC: 12 MG/DL — SIGNIFICANT CHANGE UP (ref 7–23)
CALCIUM SERPL-MCNC: 9.9 MG/DL — SIGNIFICANT CHANGE UP (ref 8.4–10.5)
CHLORIDE SERPL-SCNC: 101 MMOL/L — SIGNIFICANT CHANGE UP (ref 98–107)
CO2 SERPL-SCNC: 21 MMOL/L — LOW (ref 22–31)
CREAT SERPL-MCNC: 0.93 MG/DL — SIGNIFICANT CHANGE UP (ref 0.5–1.3)
EGFR: 116 ML/MIN/1.73M2 — SIGNIFICANT CHANGE UP
EOSINOPHIL # BLD AUTO: 0.24 K/UL — SIGNIFICANT CHANGE UP (ref 0–0.5)
EOSINOPHIL NFR BLD AUTO: 3.5 % — SIGNIFICANT CHANGE UP (ref 0–6)
GLUCOSE SERPL-MCNC: 89 MG/DL — SIGNIFICANT CHANGE UP (ref 70–99)
HCT VFR BLD CALC: 43.4 % — SIGNIFICANT CHANGE UP (ref 39–50)
HGB BLD-MCNC: 14.7 G/DL — SIGNIFICANT CHANGE UP (ref 13–17)
IANC: 4.66 K/UL — SIGNIFICANT CHANGE UP (ref 1.8–7.4)
IMM GRANULOCYTES NFR BLD AUTO: 0.3 % — SIGNIFICANT CHANGE UP (ref 0–1.5)
LYMPHOCYTES # BLD AUTO: 1.57 K/UL — SIGNIFICANT CHANGE UP (ref 1–3.3)
LYMPHOCYTES # BLD AUTO: 22.7 % — SIGNIFICANT CHANGE UP (ref 13–44)
MAGNESIUM SERPL-MCNC: 1.8 MG/DL — SIGNIFICANT CHANGE UP (ref 1.6–2.6)
MCHC RBC-ENTMCNC: 28.6 PG — SIGNIFICANT CHANGE UP (ref 27–34)
MCHC RBC-ENTMCNC: 33.9 GM/DL — SIGNIFICANT CHANGE UP (ref 32–36)
MCV RBC AUTO: 84.4 FL — SIGNIFICANT CHANGE UP (ref 80–100)
MONOCYTES # BLD AUTO: 0.41 K/UL — SIGNIFICANT CHANGE UP (ref 0–0.9)
MONOCYTES NFR BLD AUTO: 5.9 % — SIGNIFICANT CHANGE UP (ref 2–14)
NEUTROPHILS # BLD AUTO: 4.66 K/UL — SIGNIFICANT CHANGE UP (ref 1.8–7.4)
NEUTROPHILS NFR BLD AUTO: 67.3 % — SIGNIFICANT CHANGE UP (ref 43–77)
NRBC # BLD: 0 /100 WBCS — SIGNIFICANT CHANGE UP
NRBC # FLD: 0 K/UL — SIGNIFICANT CHANGE UP
PHOSPHATE SERPL-MCNC: 4 MG/DL — SIGNIFICANT CHANGE UP (ref 2.5–4.5)
PLATELET # BLD AUTO: 134 K/UL — LOW (ref 150–400)
POTASSIUM SERPL-MCNC: 5.1 MMOL/L — SIGNIFICANT CHANGE UP (ref 3.5–5.3)
POTASSIUM SERPL-SCNC: 5.1 MMOL/L — SIGNIFICANT CHANGE UP (ref 3.5–5.3)
PROT SERPL-MCNC: 7.2 G/DL — SIGNIFICANT CHANGE UP (ref 6–8.3)
RBC # BLD: 5.14 M/UL — SIGNIFICANT CHANGE UP (ref 4.2–5.8)
RBC # FLD: 14.3 % — SIGNIFICANT CHANGE UP (ref 10.3–14.5)
RH IG SCN BLD-IMP: POSITIVE — SIGNIFICANT CHANGE UP
SARS-COV-2 RNA SPEC QL NAA+PROBE: SIGNIFICANT CHANGE UP
SODIUM SERPL-SCNC: 136 MMOL/L — SIGNIFICANT CHANGE UP (ref 135–145)
WBC # BLD: 6.92 K/UL — SIGNIFICANT CHANGE UP (ref 3.8–10.5)
WBC # FLD AUTO: 6.92 K/UL — SIGNIFICANT CHANGE UP (ref 3.8–10.5)

## 2022-04-04 PROCEDURE — 99285 EMERGENCY DEPT VISIT HI MDM: CPT

## 2022-04-04 PROCEDURE — 99223 1ST HOSP IP/OBS HIGH 75: CPT

## 2022-04-04 RX ORDER — PANTOPRAZOLE SODIUM 20 MG/1
40 TABLET, DELAYED RELEASE ORAL EVERY 12 HOURS
Refills: 0 | Status: DISCONTINUED | OUTPATIENT
Start: 2022-04-04 | End: 2022-04-06

## 2022-04-04 RX ORDER — PANTOPRAZOLE SODIUM 20 MG/1
40 TABLET, DELAYED RELEASE ORAL DAILY
Refills: 0 | Status: DISCONTINUED | OUTPATIENT
Start: 2022-04-04 | End: 2022-04-04

## 2022-04-04 RX ORDER — BUPRENORPHINE AND NALOXONE 2; .5 MG/1; MG/1
2 TABLET SUBLINGUAL DAILY
Refills: 0 | Status: DISCONTINUED | OUTPATIENT
Start: 2022-04-04 | End: 2022-04-11

## 2022-04-04 RX ORDER — ONDANSETRON 8 MG/1
4 TABLET, FILM COATED ORAL EVERY 8 HOURS
Refills: 0 | Status: DISCONTINUED | OUTPATIENT
Start: 2022-04-04 | End: 2022-04-15

## 2022-04-04 RX ORDER — SUCRALFATE 1 G
1 TABLET ORAL
Refills: 0 | Status: DISCONTINUED | OUTPATIENT
Start: 2022-04-04 | End: 2022-04-15

## 2022-04-04 RX ORDER — SODIUM CHLORIDE 9 MG/ML
1000 INJECTION, SOLUTION INTRAVENOUS
Refills: 0 | Status: DISCONTINUED | OUTPATIENT
Start: 2022-04-04 | End: 2022-04-06

## 2022-04-04 RX ORDER — LANOLIN ALCOHOL/MO/W.PET/CERES
3 CREAM (GRAM) TOPICAL AT BEDTIME
Refills: 0 | Status: DISCONTINUED | OUTPATIENT
Start: 2022-04-04 | End: 2022-04-15

## 2022-04-04 RX ORDER — ACETAMINOPHEN 500 MG
650 TABLET ORAL EVERY 6 HOURS
Refills: 0 | Status: DISCONTINUED | OUTPATIENT
Start: 2022-04-04 | End: 2022-04-15

## 2022-04-04 RX ORDER — SODIUM CHLORIDE 9 MG/ML
1000 INJECTION INTRAMUSCULAR; INTRAVENOUS; SUBCUTANEOUS ONCE
Refills: 0 | Status: COMPLETED | OUTPATIENT
Start: 2022-04-04 | End: 2022-04-04

## 2022-04-04 RX ADMIN — SODIUM CHLORIDE 100 MILLILITER(S): 9 INJECTION, SOLUTION INTRAVENOUS at 13:51

## 2022-04-04 RX ADMIN — Medication 1 GRAM(S): at 18:25

## 2022-04-04 RX ADMIN — SODIUM CHLORIDE 1000 MILLILITER(S): 9 INJECTION INTRAMUSCULAR; INTRAVENOUS; SUBCUTANEOUS at 13:39

## 2022-04-04 RX ADMIN — PANTOPRAZOLE SODIUM 40 MILLIGRAM(S): 20 TABLET, DELAYED RELEASE ORAL at 18:22

## 2022-04-04 RX ADMIN — SODIUM CHLORIDE 1000 MILLILITER(S): 9 INJECTION INTRAMUSCULAR; INTRAVENOUS; SUBCUTANEOUS at 12:18

## 2022-04-04 NOTE — H&P ADULT - PROBLEM SELECTOR PLAN 1
- GI consulted   - Plan for esophagram   - Will follow up with GI re; plan for EGD - GI consulted   - Plan for esophagram   - Will follow up with GI re; plan for EGD  - Continue PPI -> increase to BID per GI recommendation  - Continue sucralfate

## 2022-04-04 NOTE — ED PROVIDER NOTE - ATTENDING CONTRIBUTION TO CARE
26M w h/o esophageal stricture, h/o multiple dilations, h/o stent migration and bacteremia p/w worsening ability to tolerate solids. States he was last dilated a few months ago. Reports 30 lbs weight loss over the past few months. Denies chest pain, sob, n/v/d, abdominal pain, f/c, tingling, palpitations     Except as documented in the HPI,  all other systems are negative    CONSTITUTIONAL: NAD, awake, alert  HEAD: Normocephalic; atraumatic  ENMT: External appears normal, MMM  NECK: no tenderness, FROM  CARD: Normal Sl, S2; no audible murmurs,rubs  RESP: normal wob, lungs ctab  ABD: soft, non-distended; non-tender  MSK: no edema, normal ROM in all four extremities  SKIN: Warm, dry, no rashes  NEURO: aaox3, moving all extremities spontaneously    26M w h/o esophageal stricture, h/o multiple dilations, h/o stent migration and bacteremia p/w worsening ability to tolerate solids, + weight loss, plan to check lytes, hydrate, admit, abdomen soft nontender, no infectious symptoms, no n/v

## 2022-04-04 NOTE — H&P ADULT - HISTORY OF PRESENT ILLNESS
27yo M w/ PMHx of esophageal stricture s/p multiple dilations and IVDU on Suboxone presenting with inability to tolerate oral intake for the past 30 days.  Patient reports a few week history of progressively worsening inability to tolerate solids and now only tolerates oral liquid intake. He is only able to tolerate smoothies or liquids. He reports he has lost about 30 pounds in the last few months. He reports nausea,  chills, no fever, night sweats. He was recently started on Suboxone this past month, which was tapered down to 4mg 3 days ago. Patient follows with Dr. Gerry Serrato of GI. His last EGD was in December of 2021. Of note,  Patient was admitted in June of 2021 for esophageal stent migration with bacteremia (strep) and he required IV ABs. Otherwise, no fevers, chills, abdominal pain, no D/C/ or emesis.

## 2022-04-04 NOTE — H&P ADULT - NSHPPHYSICALEXAM_GEN_ALL_CORE
GENERAL: NAD  HEENT: EOMI, MMM, no oropharyngeal lesions or erythema appreciated  Pulm: normal work of breathing, CTABL  CV: RRR, S1&S2+, no m/r/g appreciated  ABDOMEN: soft, nt, nd, no hepatosplenomegaly  MSK: nl ROM  EXTREMITIES:  no appreciable edema in b/l LE  Neuro: A&Ox3, no focal deficits  SKIN: warm and dry, no visible rash GENERAL: Young man in NAD  HEENT: EOMI, MMM, no oropharyngeal lesions or erythema appreciated  Pulm: normal work of breathing, CTABL  CV: RRR, S1&S2+, no m/r/g appreciated  ABDOMEN: soft, nt, nd, no hepatosplenomegaly  MSK: nl ROM  EXTREMITIES:  no appreciable edema in b/l LE  Neuro: A&Ox3, no focal deficits  SKIN: Tattoos; warm and dry, no visible rash

## 2022-04-04 NOTE — H&P ADULT - NSHPLABSRESULTS_GEN_ALL_CORE
LABS:  CAPILLARY BLOOD GLUCOSE                                14.7   6.92  )-----------( 134      ( 04 Apr 2022 12:08 )             43.4     04-04    136  |  101  |  12  ----------------------------<  89  5.1   |  21<L>  |  0.93    Ca    9.9      04 Apr 2022 12:08  Phos  4.0     04-04  Mg     1.80     04-04    TPro  7.2  /  Alb  4.6  /  TBili  0.9  /  DBili  x   /  AST  26  /  ALT  19  /  AlkPhos  85  04-04                RADIOLOGY & ADDITIONAL TESTS:    Telemetry Personally Reviewed:    ECG Personally Reviewed:    Imaging Personally Reviewed:    Imaging Reviewed:     Consultant(s) Notes Reviewed:      Care Discussed with Consultants/Other Providers:

## 2022-04-04 NOTE — ED ADULT NURSE NOTE - OBJECTIVE STATEMENT
Received patient to room 29 for decreased PO intake. A&o4, ambulatory, hx esophageal stricture s/p multiple dilations reporting to the ED with lack of oral intake. Speaking in full clear sentences, no drooling noted. RR even and unlabored, labs sent.

## 2022-04-04 NOTE — ED PROVIDER NOTE - CLINICAL SUMMARY MEDICAL DECISION MAKING FREE TEXT BOX
27yo M w/ PMHx of esophageal stricture s/p multiple dilations reporting to the ED with lack of oral intake. This patient was staffed with supervising physician, Dr. Kim; medical record was reviewed. Upon arrival to ED, patient promptly evaluated. Overall, hs is tired appearing and reports significant weight loss. Was able to see he called GI physician office last month and was unable to get outpatient appt. Concerned for dehydration in this patient and worsening stricture. Plan for labs, IV fluids, and call to GI. CBC and CMP reassuring. Type and screen obtained. Spoke with GI fellow with plan for barium esophagram. IvF started. Spoke with medicine attending, Fr. Marlow, who accepted patient to his service for continued workup and possible repeat EcG. Patient and mother updated regarding disposition with all questions answered. Patient admitted in stable condition.

## 2022-04-04 NOTE — ED PROVIDER NOTE - NSFOLLOWUPINSTRUCTIONS_ED_ALL_ED_FT
25yo M w/ PMHx of esophageal stricture s/p multiple dilations reporting to the ED with lack of oral intake. This patient was staffed with supervising physician, Dr. Kim; medical record was reviewed. Upon arrival to ED, patient promptly evaluated. Overall, hs is tired appearing and reports significant weight loss. Was able to see he called GI physician office last month and was unable to get outpatient appt. Concerned for dehydration in this patient and worsening stricture. Plan for labs, IV fluids, and call to GI.

## 2022-04-04 NOTE — H&P ADULT - NSHPREVIEWOFSYSTEMS_GEN_ALL_CORE
REVIEW OF SYSTEMS:    CONSTITUTIONAL: No weakness, fevers or chills  EYES/ENT: No visual changes;  no throat pain   NECK: No pain or stiffness  RESPIRATORY: No cough, wheezing, hemoptysis; No shortness of breath  CARDIOVASCULAR: No chest pain or palpitations  GASTROINTESTINAL: No abdominal or epigastric pain. No nausea, vomiting, or hematemesis; No diarrhea or constipation. No melena or hematochezia.  GENITOURINARY: No dysuria, change in frequency or hematuria  NEUROLOGICAL: No numbness or weakness  SKIN: No itching, burning, rashes, or lesions   Psych: No depression   All other review of systems is negative unless indicated above. REVIEW OF SYSTEMS:    CONSTITUTIONAL: No weakness, fevers or + chills  EYES/ENT: No visual changes;  no throat pain   NECK: No pain or stiffness  RESPIRATORY: No cough, wheezing, hemoptysis; No shortness of breath  CARDIOVASCULAR: No chest pain or palpitations  GASTROINTESTINAL: No abdominal or epigastric pain. + nausea, No vomiting, or hematemesis; No diarrhea or constipation. No melena or hematochezia.  GENITOURINARY: No dysuria, change in frequency or hematuria  NEUROLOGICAL: No numbness or weakness  SKIN: No itching, burning, rashes, or lesions   Psych: No depression   All other review of systems is negative unless indicated above.

## 2022-04-04 NOTE — ED PROVIDER NOTE - CONSTITUTIONAL, MLM
normal... Tired and weak appearing, awake, alert, oriented to person, place, time/situation and in no apparent distress.

## 2022-04-04 NOTE — H&P ADULT - NSHPSOCIALHISTORY_GEN_ALL_CORE
Lives [facility] with his father. Down here visiting mom. Occassional tobacco use - 5 cigarettes a day. no etoh. Prior IVDU. Low vapes THC.

## 2022-04-04 NOTE — H&P ADULT - ASSESSMENT
25yo M w/ PMHx of esophageal stricture s/p multiple dilations and IVDU on Suboxone presenting with inability to tolerate oral intake for the past 30 days. Pt plan for esophagram.

## 2022-04-04 NOTE — CONSULT NOTE ADULT - ASSESSMENT
Impression:  # Esophageal stricture s/p dilation x9, last one 12/2021 now with worsening dysphagia     Recommendations:  - IVF  - IV PPI 40 mg bid        Shelbie May MD  Gastroenterology Fellow  Pager: [phone]  Please call answering service [phone] / on-call GI fellow after 5pm and before 8am, and on weekends.   Impression:  # Esophageal stricture - benign likely peptic etiology s/p dilation > 10 x, last one 12/2021 now with worsening dysphagia   # Hiatal hernia     Recommendations:  - IVF  - IV PPI 40 mg bid  - Carafate 1 g four times daily suspension   - No need for barium esophagram - will plan foe EGD with dilation tomorrow       Shelbie May MD  Gastroenterology Fellow  Pager: [phone]  Please call answering service [phone] / on-call GI fellow after 5pm and before 8am, and on weekends.

## 2022-04-04 NOTE — ED ADULT TRIAGE NOTE - CHIEF COMPLAINT QUOTE
Pt complaining of unable to swallow has a hx of esophageal stricture and over the past month can only tolerate liquids. Pt denies SOB or difficulty swallowing.

## 2022-04-04 NOTE — ED PROVIDER NOTE - OBJECTIVE STATEMENT
25yo M w/ PMHx of esophageal stricture s/p multiple dilations reporting to the ED with lack of oral intake. Patient reports a few week history of progressively worsening inability to tolerate solids and now only tolerates oral intake. Patient follows with Dr. Gerry Serrato of GI. His last ECG was in December of 2021. Patient was admitted in June of 2021 for esophageal stent migration with bacteremia (strep) and he required IV ABs. He reports he has lost about 30 pounds in the last few months.

## 2022-04-04 NOTE — PATIENT PROFILE ADULT - FALL HARM RISK - UNIVERSAL INTERVENTIONS
Bed in lowest position, wheels locked, appropriate side rails in place/Call bell, personal items and telephone in reach/Instruct patient to call for assistance before getting out of bed or chair/Non-slip footwear when patient is out of bed/[place] to call system/Physically safe environment - no spills, clutter or unnecessary equipment/Purposeful Proactive Rounding/Room/bathroom lighting operational, light cord in reach

## 2022-04-05 ENCOUNTER — RESULT REVIEW (OUTPATIENT)
Age: 27
End: 2022-04-05

## 2022-04-05 LAB
ALBUMIN SERPL ELPH-MCNC: 4 G/DL — SIGNIFICANT CHANGE UP (ref 3.3–5)
ALP SERPL-CCNC: 72 U/L — SIGNIFICANT CHANGE UP (ref 40–120)
ALT FLD-CCNC: 16 U/L — SIGNIFICANT CHANGE UP (ref 4–41)
ANION GAP SERPL CALC-SCNC: 8 MMOL/L — SIGNIFICANT CHANGE UP (ref 7–14)
AST SERPL-CCNC: 16 U/L — SIGNIFICANT CHANGE UP (ref 4–40)
BILIRUB SERPL-MCNC: 0.9 MG/DL — SIGNIFICANT CHANGE UP (ref 0.2–1.2)
BUN SERPL-MCNC: 12 MG/DL — SIGNIFICANT CHANGE UP (ref 7–23)
CALCIUM SERPL-MCNC: 8.8 MG/DL — SIGNIFICANT CHANGE UP (ref 8.4–10.5)
CHLORIDE SERPL-SCNC: 104 MMOL/L — SIGNIFICANT CHANGE UP (ref 98–107)
CO2 SERPL-SCNC: 27 MMOL/L — SIGNIFICANT CHANGE UP (ref 22–31)
CREAT SERPL-MCNC: 0.92 MG/DL — SIGNIFICANT CHANGE UP (ref 0.5–1.3)
EGFR: 118 ML/MIN/1.73M2 — SIGNIFICANT CHANGE UP
GLUCOSE SERPL-MCNC: 100 MG/DL — HIGH (ref 70–99)
HCT VFR BLD CALC: 37.8 % — LOW (ref 39–50)
HGB BLD-MCNC: 12.3 G/DL — LOW (ref 13–17)
MAGNESIUM SERPL-MCNC: 1.9 MG/DL — SIGNIFICANT CHANGE UP (ref 1.6–2.6)
MCHC RBC-ENTMCNC: 27.8 PG — SIGNIFICANT CHANGE UP (ref 27–34)
MCHC RBC-ENTMCNC: 32.5 GM/DL — SIGNIFICANT CHANGE UP (ref 32–36)
MCV RBC AUTO: 85.3 FL — SIGNIFICANT CHANGE UP (ref 80–100)
NRBC # BLD: 0 /100 WBCS — SIGNIFICANT CHANGE UP
NRBC # FLD: 0 K/UL — SIGNIFICANT CHANGE UP
PHOSPHATE SERPL-MCNC: 5 MG/DL — HIGH (ref 2.5–4.5)
PLATELET # BLD AUTO: 113 K/UL — LOW (ref 150–400)
POTASSIUM SERPL-MCNC: 4.9 MMOL/L — SIGNIFICANT CHANGE UP (ref 3.5–5.3)
POTASSIUM SERPL-SCNC: 4.9 MMOL/L — SIGNIFICANT CHANGE UP (ref 3.5–5.3)
PROT SERPL-MCNC: 6.2 G/DL — SIGNIFICANT CHANGE UP (ref 6–8.3)
RBC # BLD: 4.43 M/UL — SIGNIFICANT CHANGE UP (ref 4.2–5.8)
RBC # FLD: 14.3 % — SIGNIFICANT CHANGE UP (ref 10.3–14.5)
SODIUM SERPL-SCNC: 139 MMOL/L — SIGNIFICANT CHANGE UP (ref 135–145)
WBC # BLD: 4.06 K/UL — SIGNIFICANT CHANGE UP (ref 3.8–10.5)
WBC # FLD AUTO: 4.06 K/UL — SIGNIFICANT CHANGE UP (ref 3.8–10.5)

## 2022-04-05 PROCEDURE — 43239 EGD BIOPSY SINGLE/MULTIPLE: CPT

## 2022-04-05 PROCEDURE — 43245 EGD DILATE STRICTURE: CPT

## 2022-04-05 PROCEDURE — 43236 UPPR GI SCOPE W/SUBMUC INJ: CPT

## 2022-04-05 PROCEDURE — 88342 IMHCHEM/IMCYTCHM 1ST ANTB: CPT | Mod: 26

## 2022-04-05 PROCEDURE — 88305 TISSUE EXAM BY PATHOLOGIST: CPT | Mod: 26

## 2022-04-05 PROCEDURE — 99232 SBSQ HOSP IP/OBS MODERATE 35: CPT

## 2022-04-05 DEVICE — CATH BLLN CRE 10-12MMX5.5CM: Type: IMPLANTABLE DEVICE | Status: FUNCTIONAL

## 2022-04-05 RX ADMIN — Medication 1 GRAM(S): at 23:03

## 2022-04-05 RX ADMIN — PANTOPRAZOLE SODIUM 40 MILLIGRAM(S): 20 TABLET, DELAYED RELEASE ORAL at 06:49

## 2022-04-05 RX ADMIN — Medication 1 GRAM(S): at 16:39

## 2022-04-05 RX ADMIN — SODIUM CHLORIDE 100 MILLILITER(S): 9 INJECTION, SOLUTION INTRAVENOUS at 06:49

## 2022-04-05 RX ADMIN — BUPRENORPHINE AND NALOXONE 2 TABLET(S): 2; .5 TABLET SUBLINGUAL at 16:38

## 2022-04-05 RX ADMIN — PANTOPRAZOLE SODIUM 40 MILLIGRAM(S): 20 TABLET, DELAYED RELEASE ORAL at 16:39

## 2022-04-05 NOTE — PROGRESS NOTE ADULT - ASSESSMENT
25yo M w/ PMHx of esophageal stricture s/p multiple dilations and IVDU on Suboxone presenting with inability to tolerate oral intake for the past 30 days. GI planning for EGD today

## 2022-04-05 NOTE — PROGRESS NOTE ADULT - PROBLEM SELECTOR PLAN 1
- GI consulted    plan for EGD today  - Continue PPI BID per GI recommendation  - Continue sucralfate

## 2022-04-05 NOTE — PROGRESS NOTE ADULT - SUBJECTIVE AND OBJECTIVE BOX
[facility] Division of Hospital Medicine  Melissa Espinoza MD  Pager [phone]    Patient is a 26y old  Male who presents with a chief complaint of Inability to tolerate PO      SUBJECTIVE / OVERNIGHT EVENTS: pt currently without pain or nausea upon my eval earlier this AM; states last dilation was Dec 2021; has been having progressive trouble tolerating PO; pt reports diet mostly consisted of protein drinks but even that has been getting difficult      MEDICATIONS  (STANDING):  buprenorphine 2 mG/naloxone 0.5 mG SL  Tablet 2 Tablet(s) SubLingual daily  dextrose 5% + sodium chloride 0.9%. 1000 milliLiter(s) (100 mL/Hr) IV Continuous <Continuous>  pantoprazole  Injectable 40 milliGRAM(s) IV Push every 12 hours  sucralfate suspension 1 Gram(s) Oral four times a day    MEDICATIONS  (PRN):  acetaminophen     Tablet .. 650 milliGRAM(s) Oral every 6 hours PRN Temp greater or equal to 38C (100.4F), Mild Pain (1 - 3)  aluminum hydroxide/magnesium hydroxide/simethicone Suspension 30 milliLiter(s) Oral every 4 hours PRN Dyspepsia  melatonin 3 milliGRAM(s) Oral at bedtime PRN Insomnia  ondansetron Injectable 4 milliGRAM(s) IV Push every 8 hours PRN Nausea and/or Vomiting        PHYSICAL EXAM:  Vital Signs Last 24 Hrs  T(F): 97.1 (05 Apr 2022 11:53), Max: 98.9 (04 Apr 2022 15:08)  HR: 60 (05 Apr 2022 11:53) (55 - 85)  BP: 110/66 (05 Apr 2022 11:53) (105/50 - 116/60)  RR: 16 (05 Apr 2022 11:53) (16 - 18)  SpO2: 97% (05 Apr 2022 11:53) (97% - 100%)    CONSTITUTIONAL: NAD, thin  EYES: PERRLA; conjunctiva and sclera clear  ENMT: Moist oral mucosa; normal dentition  RESPIRATORY: Normal respiratory effort; lungs are clear to auscultation bilaterally  CARDIOVASCULAR: Regular rate and rhythm; No lower extremity edema;   ABDOMEN: Nontender to palpation, normoactive bowel sounds  MUSCULOSKELETAL: no clubbing or cyanosis of digits; no joint swelling or tenderness to palpation  PSYCH: A+O to person, place, and time; affect appropriate  NEUROLOGY: CN 2-12 are intact and symmetric; no gross sensory deficits   SKIN: No rashes; no palpable lesions; + tattoos    LABS:                        12.3   4.06  )-----------( 113      ( 05 Apr 2022 06:19 )             37.8     04-05    139  |  104  |  12  ----------------------------<  100<H>  4.9   |  27  |  0.92    Ca    8.8      05 Apr 2022 06:19  Phos  5.0     04-05  Mg     1.90     04-05    TPro  6.2  /  Alb  4.0  /  TBili  0.9  /  DBili  x   /  AST  16  /  ALT  16  /  AlkPhos  72  04-05

## 2022-04-06 LAB
ALBUMIN SERPL ELPH-MCNC: 4.2 G/DL — SIGNIFICANT CHANGE UP (ref 3.3–5)
ALP SERPL-CCNC: 83 U/L — SIGNIFICANT CHANGE UP (ref 40–120)
ALT FLD-CCNC: 16 U/L — SIGNIFICANT CHANGE UP (ref 4–41)
ANION GAP SERPL CALC-SCNC: 12 MMOL/L — SIGNIFICANT CHANGE UP (ref 7–14)
AST SERPL-CCNC: 15 U/L — SIGNIFICANT CHANGE UP (ref 4–40)
BILIRUB SERPL-MCNC: 0.6 MG/DL — SIGNIFICANT CHANGE UP (ref 0.2–1.2)
BILIRUB SERPL-MCNC: 0.6 MG/DL — SIGNIFICANT CHANGE UP (ref 0.2–1.2)
BUN SERPL-MCNC: 12 MG/DL — SIGNIFICANT CHANGE UP (ref 7–23)
CALCIUM SERPL-MCNC: 8.9 MG/DL — SIGNIFICANT CHANGE UP (ref 8.4–10.5)
CHLORIDE SERPL-SCNC: 101 MMOL/L — SIGNIFICANT CHANGE UP (ref 98–107)
CO2 SERPL-SCNC: 26 MMOL/L — SIGNIFICANT CHANGE UP (ref 22–31)
CREAT SERPL-MCNC: 0.82 MG/DL — SIGNIFICANT CHANGE UP (ref 0.5–1.3)
CREAT SERPL-MCNC: 0.83 MG/DL — SIGNIFICANT CHANGE UP (ref 0.5–1.3)
EGFR: 124 ML/MIN/1.73M2 — SIGNIFICANT CHANGE UP
EGFR: 124 ML/MIN/1.73M2 — SIGNIFICANT CHANGE UP
GLUCOSE SERPL-MCNC: 98 MG/DL — SIGNIFICANT CHANGE UP (ref 70–99)
HCT VFR BLD CALC: 38.4 % — LOW (ref 39–50)
HGB BLD-MCNC: 12.7 G/DL — LOW (ref 13–17)
INR BLD: 1.06 RATIO — SIGNIFICANT CHANGE UP (ref 0.88–1.16)
MAGNESIUM SERPL-MCNC: 2.2 MG/DL — SIGNIFICANT CHANGE UP (ref 1.6–2.6)
MCHC RBC-ENTMCNC: 27.9 PG — SIGNIFICANT CHANGE UP (ref 27–34)
MCHC RBC-ENTMCNC: 33.1 GM/DL — SIGNIFICANT CHANGE UP (ref 32–36)
MCV RBC AUTO: 84.4 FL — SIGNIFICANT CHANGE UP (ref 80–100)
MELD SCORE WITH DIALYSIS: 20 POINTS — SIGNIFICANT CHANGE UP
MELD SCORE WITHOUT DIALYSIS: 7 POINTS — SIGNIFICANT CHANGE UP
NRBC # BLD: 0 /100 WBCS — SIGNIFICANT CHANGE UP
NRBC # FLD: 0 K/UL — SIGNIFICANT CHANGE UP
PLATELET # BLD AUTO: 118 K/UL — LOW (ref 150–400)
POTASSIUM SERPL-MCNC: 4.1 MMOL/L — SIGNIFICANT CHANGE UP (ref 3.5–5.3)
POTASSIUM SERPL-SCNC: 4.1 MMOL/L — SIGNIFICANT CHANGE UP (ref 3.5–5.3)
PROT SERPL-MCNC: 6.5 G/DL — SIGNIFICANT CHANGE UP (ref 6–8.3)
PROTHROM AB SERPL-ACNC: 12.3 SEC — SIGNIFICANT CHANGE UP (ref 10.5–13.4)
RBC # BLD: 4.55 M/UL — SIGNIFICANT CHANGE UP (ref 4.2–5.8)
RBC # FLD: 14.3 % — SIGNIFICANT CHANGE UP (ref 10.3–14.5)
SODIUM SERPL-SCNC: 138 MMOL/L — SIGNIFICANT CHANGE UP (ref 135–145)
SODIUM SERPL-SCNC: 139 MMOL/L — SIGNIFICANT CHANGE UP (ref 135–145)
WBC # BLD: 5.91 K/UL — SIGNIFICANT CHANGE UP (ref 3.8–10.5)
WBC # FLD AUTO: 5.91 K/UL — SIGNIFICANT CHANGE UP (ref 3.8–10.5)

## 2022-04-06 PROCEDURE — 99232 SBSQ HOSP IP/OBS MODERATE 35: CPT

## 2022-04-06 PROCEDURE — 99232 SBSQ HOSP IP/OBS MODERATE 35: CPT | Mod: GC

## 2022-04-06 RX ORDER — DEXLANSOPRAZOLE 30 MG/1
60 CAPSULE, DELAYED RELEASE ORAL
Refills: 0 | Status: DISCONTINUED | OUTPATIENT
Start: 2022-04-07 | End: 2022-04-15

## 2022-04-06 RX ADMIN — Medication 1 GRAM(S): at 23:39

## 2022-04-06 RX ADMIN — PANTOPRAZOLE SODIUM 40 MILLIGRAM(S): 20 TABLET, DELAYED RELEASE ORAL at 05:14

## 2022-04-06 RX ADMIN — Medication 1 GRAM(S): at 17:30

## 2022-04-06 RX ADMIN — Medication 1 GRAM(S): at 11:38

## 2022-04-06 RX ADMIN — ONDANSETRON 4 MILLIGRAM(S): 8 TABLET, FILM COATED ORAL at 10:12

## 2022-04-06 RX ADMIN — PANTOPRAZOLE SODIUM 40 MILLIGRAM(S): 20 TABLET, DELAYED RELEASE ORAL at 17:29

## 2022-04-06 NOTE — PROGRESS NOTE ADULT - ASSESSMENT
25yo M w/ PMHx of esophageal stricture s/p multiple dilations and IVDU on Suboxone presenting with inability to tolerate oral intake for the past 30 days. GI planning for EGD 4/5 with dilation

## 2022-04-06 NOTE — PROGRESS NOTE ADULT - PROBLEM SELECTOR PLAN 4
Low risk  Clear diet   Dispo: home      CM to look into pt changing insurance in order to facilitate ability to f/u in [facility] as this is where pt chooses his care

## 2022-04-06 NOTE — PROGRESS NOTE ADULT - SUBJECTIVE AND OBJECTIVE BOX
Chief Complaint:  Patient is a 26y old  Male who presents with a chief complaint of Inability to tolerate PO (05 Apr 2022 13:10)    Reason for consult: esophageal stricture, dysphagia    Interval Events: S/p EGD dilation yesterday, feeling okay with clears.     Hospital Medications:  acetaminophen     Tablet .. 650 milliGRAM(s) Oral every 6 hours PRN  aluminum hydroxide/magnesium hydroxide/simethicone Suspension 30 milliLiter(s) Oral every 4 hours PRN  buprenorphine 2 mG/naloxone 0.5 mG SL  Tablet 2 Tablet(s) SubLingual daily  melatonin 3 milliGRAM(s) Oral at bedtime PRN  ondansetron Injectable 4 milliGRAM(s) IV Push every 8 hours PRN  pantoprazole  Injectable 40 milliGRAM(s) IV Push every 12 hours  sucralfate suspension 1 Gram(s) Oral four times a day      ROS:   [x] 14 point ROS negative other than as above  [ ] Patient unable to provide    PHYSICAL EXAM:   Vital Signs:  Vital Signs Last 24 Hrs  T(C): 36.8 (06 Apr 2022 05:34), Max: 37.2 (05 Apr 2022 21:50)  T(F): 98.2 (06 Apr 2022 05:34), Max: 99 (05 Apr 2022 21:50)  HR: 60 (06 Apr 2022 05:34) (56 - 76)  BP: 105/48 (06 Apr 2022 05:34) (105/48 - 120/60)  BP(mean): --  RR: 18 (06 Apr 2022 05:34) (12 - 20)  SpO2: 98% (06 Apr 2022 05:34) (98% - 100%)  Daily     Daily     GENERAL: no acute distress  NEURO: alert  HEENT: anicteric sclera  CHEST: no respiratory distress, no accessory muscle use  ABDOMEN: soft, non-tender, non-distended, no rebound or guarding  EXTREMITIES: warm, well perfused, no edema  SKIN: no jaundice    LABS: reviewed                        12.7   5.91  )-----------( 118      ( 06 Apr 2022 06:23 )             38.4     04-06    139  |  101  |  12  ----------------------------<  98  4.1   |  26  |  0.83    Ca    8.9      06 Apr 2022 06:23  Phos  5.0     04-05  Mg     2.20     04-06    TPro  6.5  /  Alb  4.2  /  TBili  0.6  /  DBili  x   /  AST  15  /  ALT  16  /  AlkPhos  83  04-06    LIVER FUNCTIONS - ( 06 Apr 2022 06:23 )  Alb: 4.2 g/dL / Pro: 6.5 g/dL / ALK PHOS: 83 U/L / ALT: 16 U/L / AST: 15 U/L / GGT: x             Interval Diagnostic Studies: see sunrise for full report

## 2022-04-06 NOTE — PROGRESS NOTE ADULT - ASSESSMENT
Impression:  # Esophageal stricture - s/p > 10 dilations, most recently 4/5/22  # Hiatal hernia     Recommendations:  - PO Dexilant 60 mg bid  - Carafate 1 g four times daily suspension  - Advance diet as tolerated. Patient can be discharged if tolerating soft diet.   - Outpatient follow up with Dr. Gerry Serrato in 2-4 weeks. Call [phone] to schedule.    GI will sign off. Please call us back as needed.    Recommendations preliminary until signed by attending.     Deniz Resendiz  Gastroenterology/Hepatology Fellow  Available via [employer] Teams    NON-URGENT CONSULTS:  Please email [email or web address]  AT NIGHT AND ON WEEKENDS:  Contact on-call GI fellow via answering service ([phone]) from 5pm-8am and on weekends/holidays  MONDAY-FRIDAY 8AM-5PM:  Pager# [phone] ([facility]) or [phone] ([facility])  GI Phone# [phone] ([facility])

## 2022-04-06 NOTE — PROGRESS NOTE ADULT - PROBLEM SELECTOR PLAN 1
s/p EGD with dilation  every esophagitis  - Continue PPI BID per GI recommendation  - Continue sucralfate    thoracic surg to see pt in house due to insurance issues

## 2022-04-06 NOTE — PROGRESS NOTE ADULT - SUBJECTIVE AND OBJECTIVE BOX
[facility] Division of Hospital Medicine  Melissa Espinoza MD  Pager [phone]    Patient is a 26y old  Male who presents with a chief complaint of Inability to tolerate PO       SUBJECTIVE / OVERNIGHT EVENTS: able to estela PO now but then has nausea, asked for zofran; no chest pain      MEDICATIONS  (STANDING):  buprenorphine 2 mG/naloxone 0.5 mG SL  Tablet 2 Tablet(s) SubLingual daily  pantoprazole  Injectable 40 milliGRAM(s) IV Push every 12 hours  sucralfate suspension 1 Gram(s) Oral four times a day    MEDICATIONS  (PRN):  acetaminophen     Tablet .. 650 milliGRAM(s) Oral every 6 hours PRN Temp greater or equal to 38C (100.4F), Mild Pain (1 - 3)  aluminum hydroxide/magnesium hydroxide/simethicone Suspension 30 milliLiter(s) Oral every 4 hours PRN Dyspepsia  melatonin 3 milliGRAM(s) Oral at bedtime PRN Insomnia  ondansetron Injectable 4 milliGRAM(s) IV Push every 8 hours PRN Nausea and/or Vomiting      PHYSICAL EXAM:  Vital Signs Last 24 Hrs    T(F): 98.2 (06 Apr 2022 05:34), Max: 99 (05 Apr 2022 21:50)  HR: 60 (06 Apr 2022 05:34) (56 - 76)  BP: 105/48 (06 Apr 2022 05:34) (105/48 - 120/60)  RR: 18 (06 Apr 2022 05:34) (12 - 18)  SpO2: 98% (06 Apr 2022 05:34) (98% - 100%)    CONSTITUTIONAL: NAD, thin  EYES: PERRLA; conjunctiva and sclera clear  ENMT: Moist oral mucosa; normal dentition  NECK: Supple, no palpable masses; no thyromegaly  RESPIRATORY: Normal respiratory effort; lungs are clear to auscultation bilaterally  CARDIOVASCULAR: Regular rate and rhythm; No lower extremity edema;  ABDOMEN: Nontender to palpation, normoactive bowel sounds  MUSCULOSKELETAL:  no clubbing or cyanosis of digits; no joint swelling or tenderness to palpation  PSYCH: A+O to person, place, and time; affect appropriate  NEUROLOGY: CN 2-12 are intact and symmetric; no gross sensory deficits   SKIN: No rashes; no palpable lesions    LABS:                        12.7   5.91  )-----------( 118      ( 06 Apr 2022 06:23 )             38.4     04-06    139  |  101  |  12  ----------------------------<  98  4.1   |  26  |  0.83    Ca    8.9      06 Apr 2022 06:23  Phos  5.0     04-05  Mg     2.20     04-06    TPro  6.5  /  Alb  4.2  /  TBili  0.6  /  DBili  x   /  AST  15  /  ALT  16  /  AlkPhos  83  04-06    PT/INR - ( 06 Apr 2022 06:23 )   PT: 12.3 sec;   INR: 1.06 ratio

## 2022-04-07 ENCOUNTER — TRANSCRIPTION ENCOUNTER (OUTPATIENT)
Age: 27
End: 2022-04-07

## 2022-04-07 LAB
ANION GAP SERPL CALC-SCNC: 12 MMOL/L — SIGNIFICANT CHANGE UP (ref 7–14)
BUN SERPL-MCNC: 12 MG/DL — SIGNIFICANT CHANGE UP (ref 7–23)
CALCIUM SERPL-MCNC: 9.5 MG/DL — SIGNIFICANT CHANGE UP (ref 8.4–10.5)
CHLORIDE SERPL-SCNC: 102 MMOL/L — SIGNIFICANT CHANGE UP (ref 98–107)
CO2 SERPL-SCNC: 24 MMOL/L — SIGNIFICANT CHANGE UP (ref 22–31)
CREAT SERPL-MCNC: 0.8 MG/DL — SIGNIFICANT CHANGE UP (ref 0.5–1.3)
EGFR: 125 ML/MIN/1.73M2 — SIGNIFICANT CHANGE UP
GLUCOSE SERPL-MCNC: 101 MG/DL — HIGH (ref 70–99)
HCT VFR BLD CALC: 40.5 % — SIGNIFICANT CHANGE UP (ref 39–50)
HGB BLD-MCNC: 13.9 G/DL — SIGNIFICANT CHANGE UP (ref 13–17)
MAGNESIUM SERPL-MCNC: 2 MG/DL — SIGNIFICANT CHANGE UP (ref 1.6–2.6)
MCHC RBC-ENTMCNC: 28.5 PG — SIGNIFICANT CHANGE UP (ref 27–34)
MCHC RBC-ENTMCNC: 34.3 GM/DL — SIGNIFICANT CHANGE UP (ref 32–36)
MCV RBC AUTO: 83 FL — SIGNIFICANT CHANGE UP (ref 80–100)
NRBC # BLD: 0 /100 WBCS — SIGNIFICANT CHANGE UP
NRBC # FLD: 0 K/UL — SIGNIFICANT CHANGE UP
PHOSPHATE SERPL-MCNC: 3.9 MG/DL — SIGNIFICANT CHANGE UP (ref 2.5–4.5)
PLATELET # BLD AUTO: 124 K/UL — LOW (ref 150–400)
POTASSIUM SERPL-MCNC: 4.4 MMOL/L — SIGNIFICANT CHANGE UP (ref 3.5–5.3)
POTASSIUM SERPL-SCNC: 4.4 MMOL/L — SIGNIFICANT CHANGE UP (ref 3.5–5.3)
RBC # BLD: 4.88 M/UL — SIGNIFICANT CHANGE UP (ref 4.2–5.8)
RBC # FLD: 14.5 % — SIGNIFICANT CHANGE UP (ref 10.3–14.5)
SODIUM SERPL-SCNC: 138 MMOL/L — SIGNIFICANT CHANGE UP (ref 135–145)
SURGICAL PATHOLOGY STUDY: SIGNIFICANT CHANGE UP
WBC # BLD: 4.09 K/UL — SIGNIFICANT CHANGE UP (ref 3.8–10.5)
WBC # FLD AUTO: 4.09 K/UL — SIGNIFICANT CHANGE UP (ref 3.8–10.5)

## 2022-04-07 PROCEDURE — 99232 SBSQ HOSP IP/OBS MODERATE 35: CPT

## 2022-04-07 PROCEDURE — 99232 SBSQ HOSP IP/OBS MODERATE 35: CPT | Mod: GC

## 2022-04-07 PROCEDURE — 99222 1ST HOSP IP/OBS MODERATE 55: CPT

## 2022-04-07 RX ADMIN — Medication 1 GRAM(S): at 11:32

## 2022-04-07 RX ADMIN — Medication 30 MILLILITER(S): at 11:32

## 2022-04-07 RX ADMIN — DEXLANSOPRAZOLE 60 MILLIGRAM(S): 30 CAPSULE, DELAYED RELEASE ORAL at 17:57

## 2022-04-07 RX ADMIN — BUPRENORPHINE AND NALOXONE 2 TABLET(S): 2; .5 TABLET SUBLINGUAL at 10:35

## 2022-04-07 RX ADMIN — Medication 1 GRAM(S): at 17:57

## 2022-04-07 RX ADMIN — Medication 1 GRAM(S): at 23:09

## 2022-04-07 RX ADMIN — DEXLANSOPRAZOLE 60 MILLIGRAM(S): 30 CAPSULE, DELAYED RELEASE ORAL at 05:16

## 2022-04-07 RX ADMIN — Medication 1 GRAM(S): at 05:12

## 2022-04-07 NOTE — DISCHARGE NOTE PROVIDER - CARE PROVIDERS DIRECT ADDRESSES
,DirectAddress_Unknown,DirectAddress_Unknown ,[email or web address]

## 2022-04-07 NOTE — DISCHARGE NOTE PROVIDER - INSTRUCTIONS
Tube feeds at 63cc/hr for 12hours a day. From evening to next morning. Suggest 9pm to 9am. Can also have soft and liquid based foods as tolerated.   no large amounts at one time. Stay upright for at least 1 hr after each meal. No carbonated beverages. Avoid all spicy or gassy foods. Eat 6 small meals per day.   Flush your J tube with 30-40ml water before and after each tube feeding session. And at least 2-3 x during the day to keep patent when not attached.   Do not lay flat to sleep. Head of bed must be at least 30-45 degrees at all times.

## 2022-04-07 NOTE — PROGRESS NOTE ADULT - SUBJECTIVE AND OBJECTIVE BOX
[facility] Division of Hospital Medicine  Melissa Espinoza MD  Pager [phone]    Patient is a 26y old  Male who presents with a chief complaint of Inability to tolerate PO       SUBJECTIVE / OVERNIGHT EVENTS: reports some abd pain, constipation, hard stool, estela liquids      MEDICATIONS  (STANDING):  buprenorphine 2 mG/naloxone 0.5 mG SL  Tablet 2 Tablet(s) SubLingual daily  dexlansoprazole DR 60 milliGRAM(s) Oral <User Schedule>  sucralfate suspension 1 Gram(s) Oral four times a day    MEDICATIONS  (PRN):  acetaminophen     Tablet .. 650 milliGRAM(s) Oral every 6 hours PRN Temp greater or equal to 38C (100.4F), Mild Pain (1 - 3)  aluminum hydroxide/magnesium hydroxide/simethicone Suspension 30 milliLiter(s) Oral every 4 hours PRN Dyspepsia  melatonin 3 milliGRAM(s) Oral at bedtime PRN Insomnia  ondansetron Injectable 4 milliGRAM(s) IV Push every 8 hours PRN Nausea and/or Vomiting        PHYSICAL EXAM:  Vital Signs Last 24 Hrs  T(F): 98.2 (07 Apr 2022 05:10), Max: 98.2 (07 Apr 2022 05:10)  HR: 60 (07 Apr 2022 05:10) (60 - 76)  BP: 129/62 (07 Apr 2022 05:10) (105/60 - 129/62)  RR: 18 (07 Apr 2022 05:10) (18 - 18)  SpO2: 98% (07 Apr 2022 05:10) (96% - 98%)    CONSTITUTIONAL: NAD, appears comfortable  EYES: PERRLA; conjunctiva and sclera clear  ENMT: Moist oral mucosa; normal dentition  RESPIRATORY: Normal respiratory effort; lungs are clear to auscultation bilaterally  CARDIOVASCULAR: Regular rate and rhythm; No lower extremity edema;  ABDOMEN: Nontender to palpation, normoactive bowel sounds  MUSCULOSKELETAL: no clubbing or cyanosis of digits; no joint swelling or tenderness to palpation  PSYCH: A+O to person, place, and time; affect appropriate  NEUROLOGY: CN 2-12 are intact and symmetric; no gross sensory deficits   SKIN: No rashes; no palpable lesions    LABS:                        13.9   4.09  )-----------( 124      ( 07 Apr 2022 07:50 )             40.5     04-07    138  |  102  |  12  ----------------------------<  101<H>  4.4   |  24  |  0.80    Ca    9.5      07 Apr 2022 07:50  Phos  3.9     04-07  Mg     2.00     04-07

## 2022-04-07 NOTE — DISCHARGE NOTE PROVIDER - DETAILS OF MALNUTRITION DIAGNOSIS/DIAGNOSES
This patient has been assessed with a concern for Malnutrition and was treated during this hospitalization for the following Nutrition diagnosis/diagnoses:     -  04/12/2022: Severe protein-calorie malnutrition

## 2022-04-07 NOTE — CONSULT NOTE ADULT - ASSESSMENT
ASSESSMENT: 25yo M w/ PMHx of hiatal hernia, esophageal spasm, GERD, esophageal stricture s/p multiple esophageal dilations, failed EGD stent(migration), and IVDU on Suboxone presenting with inability to tolerate oral intake for the past 30 days. Patient follows with Dr. Gerry Serrato and Thelma Whitaker. His last EGD was in December of 2021. Thoracic surgery being consulted for evaluation and possible intervention for esophageal stricture.     PLAN:    - Case to be reviewed and discussed with Dr. Madrigal, f/u plans to follow    Thoracic Surgery   [id number]

## 2022-04-07 NOTE — DISCHARGE NOTE PROVIDER - NSDCFUADDAPPT_GEN_ALL_CORE_FT
See Dr Shannon in 2 weeks- call for an appointment See Dr Shannon on May 9th. Call to confirm apt [phone]  Have a chest xray done prior to that apt and bring images with you  See either your own Psych as outpt or you can follow up with [facility] team through  [facility]  . Aftercare Recommendations	[facility] outpt. walk in clinic : [phone]   [facility] Outpatient clinic: [phone]  [facility] Substance Abuse Outpatient Program ([facility]): [phone]

## 2022-04-07 NOTE — DISCHARGE NOTE PROVIDER - NSDCCPTREATMENT_GEN_ALL_CORE_FT
PRINCIPAL PROCEDURE  Procedure: Robot-assisted Tyler Mahesh esophagectomy  Findings and Treatment:

## 2022-04-07 NOTE — DISCHARGE NOTE PROVIDER - NSDCFUADDINST_GEN_ALL_CORE_FT
Walk frequently. Continue to use incentive spirometer. You may leave wounds uncovered and shower daily. Sutures will be removed in office. Can place dry gauze dressing to feeding tube site daily.

## 2022-04-07 NOTE — DISCHARGE NOTE PROVIDER - NSDCCPCAREPLAN_GEN_ALL_CORE_FT
PRINCIPAL DISCHARGE DIAGNOSIS  Diagnosis: History of esophageal dilatation  Assessment and Plan of Treatment: - You had an Esophageal stricture.  - You underwent dailation  -Continue medication as prescribed  - Advance diet as tolerated  - follow up with GI within 1-2 weeks  -Follow up with Thoracic within 1-2 weeks.      SECONDARY DISCHARGE DIAGNOSES  Diagnosis: Substance abuse  Assessment and Plan of Treatment: -Continue medication as prescribed     PRINCIPAL DISCHARGE DIAGNOSIS  Diagnosis: Esophageal stricture  Assessment and Plan of Treatment:

## 2022-04-07 NOTE — PROGRESS NOTE ADULT - PROBLEM SELECTOR PROBLEM 2
BMI above normal limits, recommended weight loss, improve diet and follow up with internist. Substance abuse

## 2022-04-07 NOTE — PROGRESS NOTE ADULT - SUBJECTIVE AND OBJECTIVE BOX
Chief Complaint:  Patient is a 26y old  Male who presents with a chief complaint of Inability to tolerate PO (07 Apr 2022 08:46)    Reason for consult: dysphagia    Interval Events: Tolerating clears, wants to see thoracic surgery given no improvement w/ > 10 dilations.     Hospital Medications:  acetaminophen     Tablet .. 650 milliGRAM(s) Oral every 6 hours PRN  aluminum hydroxide/magnesium hydroxide/simethicone Suspension 30 milliLiter(s) Oral every 4 hours PRN  buprenorphine 2 mG/naloxone 0.5 mG SL  Tablet 2 Tablet(s) SubLingual daily  dexlansoprazole DR 60 milliGRAM(s) Oral <User Schedule>  melatonin 3 milliGRAM(s) Oral at bedtime PRN  ondansetron Injectable 4 milliGRAM(s) IV Push every 8 hours PRN  sucralfate suspension 1 Gram(s) Oral four times a day      ROS:   [x] 14 point ROS negative other than as above  [ ] Patient unable to provide    PHYSICAL EXAM:   Vital Signs:  Vital Signs Last 24 Hrs  T(C): 36.8 (07 Apr 2022 05:10), Max: 36.8 (07 Apr 2022 05:10)  T(F): 98.2 (07 Apr 2022 05:10), Max: 98.2 (07 Apr 2022 05:10)  HR: 60 (07 Apr 2022 05:10) (60 - 76)  BP: 129/62 (07 Apr 2022 05:10) (105/60 - 129/62)  BP(mean): --  RR: 18 (07 Apr 2022 05:10) (18 - 18)  SpO2: 98% (07 Apr 2022 05:10) (96% - 98%)  Daily     Daily     GENERAL: no acute distress  NEURO: alert  HEENT: anicteric sclera  CHEST: no respiratory distress, no accessory muscle use  ABDOMEN: soft, non-tender, non-distended, no rebound or guarding  EXTREMITIES: warm, well perfused, no edema  SKIN: no jaundice    LABS: reviewed                        13.9   4.09  )-----------( 124      ( 07 Apr 2022 07:50 )             40.5     04-07    138  |  102  |  12  ----------------------------<  101<H>  4.4   |  24  |  0.80    Ca    9.5      07 Apr 2022 07:50  Phos  3.9     04-07  Mg     2.00     04-07    TPro  6.5  /  Alb  4.2  /  TBili  0.6  /  DBili  x   /  AST  15  /  ALT  16  /  AlkPhos  83  04-06    LIVER FUNCTIONS - ( 06 Apr 2022 06:23 )  Alb: 4.2 g/dL / Pro: 6.5 g/dL / ALK PHOS: 83 U/L / ALT: 16 U/L / AST: 15 U/L / GGT: x             Interval Diagnostic Studies: see sunrise for full report

## 2022-04-07 NOTE — PROGRESS NOTE ADULT - ASSESSMENT
Impression:  # Esophageal stricture - s/p > 10 dilations, most recently 4/5/22  # Hiatal hernia     Recommendations:  - PO Dexilant 60 mg bid  - Carafate 1 g four times daily suspension  - Advance diet as tolerated  - F/u thoracic surgery recommendations, appreciate inpatient evaluation.   - Outpatient follow up with Dr. Gerry Serrato in 2-4 weeks. Call [phone] to schedule.      Recommendations preliminary until signed by attending.     Deniz Resendiz  Gastroenterology/Hepatology Fellow  Available via [employer] Teams    NON-URGENT CONSULTS:  Please email [email or web address]  [email or web address]  AT NIGHT AND ON WEEKENDS:  Contact on-call GI fellow via answering service ([phone]) from 5pm-8am and on weekends/holidays  MONDAY-FRIDAY 8AM-5PM:  Pager# [phone] ([facility]) or [phone] ([facility])  GI Phone# [phone] ([facility])

## 2022-04-07 NOTE — DISCHARGE NOTE PROVIDER - NSDCMRMEDTOKEN_GEN_ALL_CORE_FT
Protonix 40 mg oral delayed release tablet: 1 tab(s) orally 2 times a day   Suboxone 4 mg-1 mg sublingual film: 1 film(s) sublingual once a day  sucralfate 1 g/10 mL oral suspension: 10 milliliter(s) orally 4 times a day   Protonix 40 mg oral delayed release tablet: 1 tab(s) orally 2 times a day   Suboxone 4 mg-1 mg sublingual film: 1 film(s) sublingual once a day  sucralfate 1 g/10 mL oral suspension: 10 milliliter(s) orally 4 times a day  Tube Feeding Pump x 1; IV Pole x 1; Tube Feeding Bags x 30: STARLA: 99  Dx: 530.3  TwoCal HN Tube Feeding via Jejunostomy Tube @ 70 mL for 16 hours from 5pm - 9am.: Flush Jejunostomy tube w/ 100 mL of water every 6 hours. STARLA: 99, Dx: 530.3   Advil 200 mg oral tablet: 2 tab(s) orally every 6 hours, As Needed  Can alternate with Tylenol. Can take liquid form if prefer  clonazePAM 0.5 mg oral tablet: 1 tab(s) orally 2 times a day. Take 2 x per day tomorrow then take only 1 x per day until done. MDD:2  hydrOXYzine hydrochloride 50 mg oral tablet: 1 tab(s) orally every 6 hours, As Needed MDD:4  oxyCODONE 5 mg oral tablet: 1 tab(s) orally every 4 hours, As Needed -for severe pain. MDD:6  PARoxetine 20 mg oral tablet: 1 tab(s) orally once a day  polyethylene glycol 3350 oral powder for reconstitution: 17 gram(s) orally once a day, As needed, Constipation  Protonix 40 mg oral delayed release tablet: 1 tab(s) orally once a day   simethicone 80 mg oral tablet, chewable: 1 tab(s) orally 3 times a day, As Needed  Suboxone 4 mg-1 mg sublingual film: 1 film(s) sublingual once a day  Tylenol 325 mg oral tablet: 2 tab(s) orally every 6 hours, As Needed  Can take liquid form or crush if preferred

## 2022-04-07 NOTE — DISCHARGE NOTE PROVIDER - PROVIDER TOKENS
FREE:[LAST:[follow up with PCP within 1 week],PHONE:[(   )    -],FAX:[(   )    -]],FREE:[LAST:[Thoracic team follow up within 1-2 weeks],PHONE:[(   )    -],FAX:[(   )    -]] PROVIDER:[TOKEN:[[id number]],FOLLOWUP:[2 weeks],ESTABLISHEDPATIENT:[T]]

## 2022-04-07 NOTE — PROGRESS NOTE ADULT - PROBLEM SELECTOR PLAN 4
Low risk  Clear diet   Dispo: home      CM to look into pt changing insurance in order to facilitate ability to f/u in [facility] as this is where pt chooses his care

## 2022-04-08 LAB
ANION GAP SERPL CALC-SCNC: 13 MMOL/L — SIGNIFICANT CHANGE UP (ref 7–14)
BUN SERPL-MCNC: 13 MG/DL — SIGNIFICANT CHANGE UP (ref 7–23)
CALCIUM SERPL-MCNC: 9.5 MG/DL — SIGNIFICANT CHANGE UP (ref 8.4–10.5)
CHLORIDE SERPL-SCNC: 101 MMOL/L — SIGNIFICANT CHANGE UP (ref 98–107)
CO2 SERPL-SCNC: 22 MMOL/L — SIGNIFICANT CHANGE UP (ref 22–31)
CREAT SERPL-MCNC: 0.77 MG/DL — SIGNIFICANT CHANGE UP (ref 0.5–1.3)
EGFR: 127 ML/MIN/1.73M2 — SIGNIFICANT CHANGE UP
GLUCOSE SERPL-MCNC: 98 MG/DL — SIGNIFICANT CHANGE UP (ref 70–99)
HCT VFR BLD CALC: 40.9 % — SIGNIFICANT CHANGE UP (ref 39–50)
HGB BLD-MCNC: 13.8 G/DL — SIGNIFICANT CHANGE UP (ref 13–17)
MAGNESIUM SERPL-MCNC: 2 MG/DL — SIGNIFICANT CHANGE UP (ref 1.6–2.6)
MCHC RBC-ENTMCNC: 28.2 PG — SIGNIFICANT CHANGE UP (ref 27–34)
MCHC RBC-ENTMCNC: 33.7 GM/DL — SIGNIFICANT CHANGE UP (ref 32–36)
MCV RBC AUTO: 83.6 FL — SIGNIFICANT CHANGE UP (ref 80–100)
NRBC # BLD: 0 /100 WBCS — SIGNIFICANT CHANGE UP
NRBC # FLD: 0 K/UL — SIGNIFICANT CHANGE UP
PHOSPHATE SERPL-MCNC: 4.2 MG/DL — SIGNIFICANT CHANGE UP (ref 2.5–4.5)
PLATELET # BLD AUTO: 127 K/UL — LOW (ref 150–400)
POTASSIUM SERPL-MCNC: 4.2 MMOL/L — SIGNIFICANT CHANGE UP (ref 3.5–5.3)
POTASSIUM SERPL-SCNC: 4.2 MMOL/L — SIGNIFICANT CHANGE UP (ref 3.5–5.3)
RBC # BLD: 4.89 M/UL — SIGNIFICANT CHANGE UP (ref 4.2–5.8)
RBC # FLD: 14.2 % — SIGNIFICANT CHANGE UP (ref 10.3–14.5)
SODIUM SERPL-SCNC: 136 MMOL/L — SIGNIFICANT CHANGE UP (ref 135–145)
WBC # BLD: 4.15 K/UL — SIGNIFICANT CHANGE UP (ref 3.8–10.5)
WBC # FLD AUTO: 4.15 K/UL — SIGNIFICANT CHANGE UP (ref 3.8–10.5)

## 2022-04-08 PROCEDURE — 99232 SBSQ HOSP IP/OBS MODERATE 35: CPT | Mod: GC

## 2022-04-08 PROCEDURE — 99232 SBSQ HOSP IP/OBS MODERATE 35: CPT

## 2022-04-08 PROCEDURE — 99232 SBSQ HOSP IP/OBS MODERATE 35: CPT | Mod: 57

## 2022-04-08 RX ADMIN — Medication 30 MILLILITER(S): at 17:49

## 2022-04-08 RX ADMIN — Medication 30 MILLILITER(S): at 09:56

## 2022-04-08 RX ADMIN — Medication 1 GRAM(S): at 18:22

## 2022-04-08 RX ADMIN — DEXLANSOPRAZOLE 60 MILLIGRAM(S): 30 CAPSULE, DELAYED RELEASE ORAL at 05:28

## 2022-04-08 RX ADMIN — Medication 1 GRAM(S): at 05:28

## 2022-04-08 RX ADMIN — BUPRENORPHINE AND NALOXONE 2 TABLET(S): 2; .5 TABLET SUBLINGUAL at 09:44

## 2022-04-08 RX ADMIN — Medication 1 GRAM(S): at 11:16

## 2022-04-08 RX ADMIN — DEXLANSOPRAZOLE 60 MILLIGRAM(S): 30 CAPSULE, DELAYED RELEASE ORAL at 17:44

## 2022-04-08 NOTE — PROGRESS NOTE ADULT - SUBJECTIVE AND OBJECTIVE BOX
[facility] Division of Hospital Medicine  Melissa Espinoza MD  Pager [phone]    Patient is a 26y old Male who presents with a chief complaint of Inability to tolerate PO    SUBJECTIVE / OVERNIGHT EVENTS: estela liquids, abd pain resolved with hard BM      MEDICATIONS  (STANDING):  buprenorphine 2 mG/naloxone 0.5 mG SL  Tablet 2 Tablet(s) SubLingual daily  dexlansoprazole DR 60 milliGRAM(s) Oral <User Schedule>  sucralfate suspension 1 Gram(s) Oral four times a day    MEDICATIONS  (PRN):  acetaminophen     Tablet .. 650 milliGRAM(s) Oral every 6 hours PRN Temp greater or equal to 38C (100.4F), Mild Pain (1 - 3)  aluminum hydroxide/magnesium hydroxide/simethicone Suspension 30 milliLiter(s) Oral every 4 hours PRN Dyspepsia  melatonin 3 milliGRAM(s) Oral at bedtime PRN Insomnia  ondansetron Injectable 4 milliGRAM(s) IV Push every 8 hours PRN Nausea and/or Vomiting        PHYSICAL EXAM:  Vital Signs Last 24 Hrs  T(F): 97.8 (08 Apr 2022 12:25), Max: 98.2 (07 Apr 2022 14:38)  HR: 72 (08 Apr 2022 12:25) (70 - 99)  BP: 112/60 (08 Apr 2022 12:25) (105/65 - 112/60)  RR: 18 (08 Apr 2022 12:25) (18 - 18)  SpO2: 100% (08 Apr 2022 12:25) (99% - 100%)    CONSTITUTIONAL: NAD, appears comfortable  EYES: PERRLA; conjunctiva and sclera clear  ENMT: Moist oral mucosa; normal dentition  RESPIRATORY: Normal respiratory effort; lungs are clear to auscultation bilaterally  CARDIOVASCULAR: Regular rate and rhythm; No lower extremity edema;   ABDOMEN: Nontender to palpation, normoactive bowel sound  MUSCULOSKELETAL:  no clubbing or cyanosis of digits; no joint swelling or tenderness to palpation  PSYCH: A+O to person, place, and time; affect appropriate  NEUROLOGY: CN 2-12 are intact and symmetric; no gross sensory deficits   SKIN: No rashes; no palpable lesions    LABS:                        13.8   4.15  )-----------( 127      ( 08 Apr 2022 07:04 )             40.9     04-08    136  |  101  |  13  ----------------------------<  98  4.2   |  22  |  0.77    Ca    9.5      08 Apr 2022 07:04  Phos  4.2     04-08  Mg     2.00     04-08

## 2022-04-08 NOTE — PROGRESS NOTE ADULT - ASSESSMENT
27yo M w/ PMHx of esophageal stricture s/p multiple dilations and IVDU on Suboxone presenting with inability to tolerate oral intake for the past 30 days. GI planning for EGD 4/5 with dilation

## 2022-04-08 NOTE — PROGRESS NOTE ADULT - PROBLEM SELECTOR PLAN 1
s/p EGD with dilation  every esophagitis  - Continue PPI BID per GI recommendation  - Continue sucralfate    thoracic surg to see pt in house due to insurance issues, await final recs

## 2022-04-08 NOTE — PROGRESS NOTE ADULT - SUBJECTIVE AND OBJECTIVE BOX
pt seen and examined with Dr Shannon  pt mother at bedside  Subjective: "I can barely swallow liquids"  pt admits chest tightness  he states he has had over 15 EGD dilations      Vital Signs:  Vital Signs Last 24 Hrs  T(C): 36.6 (04-08-22 @ 12:25), Max: 36.8 (04-08-22 @ 05:25)  T(F): 97.8 (04-08-22 @ 12:25), Max: 98.2 (04-08-22 @ 05:25)  HR: 72 (04-08-22 @ 12:25) (70 - 78)  BP: 112/60 (04-08-22 @ 12:25) (105/65 - 112/60)  RR: 18 (04-08-22 @ 12:25) (18 - 18)  SpO2: 100% (04-08-22 @ 12:25) (99% - 100%) on (O2)    Telemetry/Alarms:  General: WN/WD NAD  Neurology: Awake, nonfocal, FERRARO x 4  Eyes: Scleras clear, PERRLA/ EOMI, Gross vision intact  ENT:Gross hearing intact, grossly patent pharynx, no stridor  Neck: Neck supple, trachea midline, No JVD,   Respiratory: CTA B/L, No wheezing, rales, rhonchi  CV: RRR, S1S2, no murmurs, rubs or gallops  Abdominal: Soft, NT, ND +BS,   Extremities: No edema, + peripheral pulses  Skin: No Rashes, Hematoma, Ecchymosis  Lymphatic: No Neck, axilla, groin LAD  Psych: Oriented x 3, normal affect    Relevant labs, radiology and Medications reviewed                        13.8   4.15  )-----------( 127      ( 08 Apr 2022 07:04 )             40.9     04-08    136  |  101  |  13  ----------------------------<  98  4.2   |  22  |  0.77    Ca    9.5      08 Apr 2022 07:04  Phos  4.2     04-08  Mg     2.00     04-08        MEDICATIONS  (STANDING):  buprenorphine 2 mG/naloxone 0.5 mG SL  Tablet 2 Tablet(s) SubLingual daily  dexlansoprazole DR 60 milliGRAM(s) Oral <User Schedule>  sucralfate suspension 1 Gram(s) Oral four times a day    MEDICATIONS  (PRN):  acetaminophen     Tablet .. 650 milliGRAM(s) Oral every 6 hours PRN Temp greater or equal to 38C (100.4F), Mild Pain (1 - 3)  aluminum hydroxide/magnesium hydroxide/simethicone Suspension 30 milliLiter(s) Oral every 4 hours PRN Dyspepsia  melatonin 3 milliGRAM(s) Oral at bedtime PRN Insomnia  ondansetron Injectable 4 milliGRAM(s) IV Push every 8 hours PRN Nausea and/or Vomiting    Pertinent Physical Exam  I&O's Summary    Social History (marital status, living situation, occupation, tobacco use, alcohol and drug use, and sexual history): [place] here visiting mom. Occassional tobacco use - 5 cigarettes a day. no etoh. Prior IVDU. Low vapes THC.          Assessment  26y Male  w/ PAST MEDICAL & SURGICAL HISTORY:  Heroin abuse    Cocaine abuse    GERD (gastroesophageal reflux disease)    Hiatal hernia    History of esophageal stricture    Intravenous drug abuse    Migration of pancreatic stent    Migrated esophageal stent     26y old  Male who presents with a chief complaint of Inability to tolerate PO (08 Apr 2022 12:30)  secondary to esophageal stricture    PLAN  pt tentatively booked for esophagectomy next Friday 4/15/2022  intervention described at length and patient and his mother's questions and concerns addressed  please obtain recent TTE and cardiology clearance for OR  continue care per primary medical team  ensures for nutritional optimization    Belén SANABRIA [id number]

## 2022-04-08 NOTE — PROGRESS NOTE ADULT - SUBJECTIVE AND OBJECTIVE BOX
Chief Complaint:  Patient is a 26y old  Male who presents with a chief complaint of Inability to tolerate PO (07 Apr 2022 15:41)    Reason for consult: esophageal stenosis    Interval Events: No changes, awaiting final Thoracic surgery recs.     Hospital Medications:  acetaminophen     Tablet .. 650 milliGRAM(s) Oral every 6 hours PRN  aluminum hydroxide/magnesium hydroxide/simethicone Suspension 30 milliLiter(s) Oral every 4 hours PRN  buprenorphine 2 mG/naloxone 0.5 mG SL  Tablet 2 Tablet(s) SubLingual daily  dexlansoprazole DR 60 milliGRAM(s) Oral <User Schedule>  melatonin 3 milliGRAM(s) Oral at bedtime PRN  ondansetron Injectable 4 milliGRAM(s) IV Push every 8 hours PRN  sucralfate suspension 1 Gram(s) Oral four times a day      ROS:   [x] 14 point ROS negative other than as above  [ ] Patient unable to provide    PHYSICAL EXAM:   Vital Signs:  Vital Signs Last 24 Hrs  T(C): 36.8 (08 Apr 2022 05:25), Max: 36.8 (07 Apr 2022 14:38)  T(F): 98.2 (08 Apr 2022 05:25), Max: 98.2 (07 Apr 2022 14:38)  HR: 78 (08 Apr 2022 05:25) (70 - 99)  BP: 110/58 (08 Apr 2022 05:25) (105/65 - 112/60)  BP(mean): --  RR: 18 (08 Apr 2022 05:25) (18 - 18)  SpO2: 99% (08 Apr 2022 05:25) (99% - 99%)  Daily     Daily     GENERAL: no acute distress  NEURO: alert  HEENT: anicteric sclera  CHEST: no respiratory distress, no accessory muscle use  ABDOMEN: soft, non-tender, non-distended, no rebound or guarding  EXTREMITIES: warm, well perfused, no edema  SKIN: no jaundice    LABS: reviewed                        13.8   4.15  )-----------( 127      ( 08 Apr 2022 07:04 )             40.9     04-08    136  |  101  |  13  ----------------------------<  98  4.2   |  22  |  0.77    Ca    9.5      08 Apr 2022 07:04  Phos  4.2     04-08  Mg     2.00     04-08          Interval Diagnostic Studies: see sunrise for full report

## 2022-04-08 NOTE — PROGRESS NOTE ADULT - PROBLEM SELECTOR PLAN 4
Low risk  Clear diet   Dispo: home      CM to look into pt changing insurance in order to facilitate ability to f/u in [facility] as this is where pt chooses his care

## 2022-04-08 NOTE — PROGRESS NOTE ADULT - ASSESSMENT
Impression:  # Esophageal stricture - s/p > 10 dilations, most recently 4/5/22  # Hiatal hernia     Recommendations:  - PO Dexilant 60 mg bid  - Carafate 1 g four times daily suspension  - Advance diet as tolerated  - F/u thoracic surgery recommendations, appreciate inpatient evaluation.   - Outpatient follow up with Dr. Gerry Serrato in 2-4 weeks. Call [phone] to schedule.    Recommendations preliminary until signed by attending.     Deniz Resendiz  Gastroenterology/Hepatology Fellow  Available via [employer] Teams    NON-URGENT CONSULTS:  Please email [email or web address]  AT NIGHT AND ON WEEKENDS:  Contact on-call GI fellow via answering service ([phone]) from 5pm-8am and on weekends/holidays  MONDAY-FRIDAY 8AM-5PM:  Pager# [phone] ([facility]) or [phone] ([facility])  GI Phone# [phone] ([facility])

## 2022-04-09 PROCEDURE — 99232 SBSQ HOSP IP/OBS MODERATE 35: CPT

## 2022-04-09 RX ORDER — SIMETHICONE 80 MG/1
80 TABLET, CHEWABLE ORAL
Refills: 0 | Status: DISCONTINUED | OUTPATIENT
Start: 2022-04-09 | End: 2022-04-15

## 2022-04-09 RX ADMIN — Medication 30 MILLILITER(S): at 11:13

## 2022-04-09 RX ADMIN — DEXLANSOPRAZOLE 60 MILLIGRAM(S): 30 CAPSULE, DELAYED RELEASE ORAL at 17:12

## 2022-04-09 RX ADMIN — Medication 1 GRAM(S): at 17:12

## 2022-04-09 RX ADMIN — Medication 1 GRAM(S): at 00:46

## 2022-04-09 RX ADMIN — SIMETHICONE 80 MILLIGRAM(S): 80 TABLET, CHEWABLE ORAL at 18:11

## 2022-04-09 RX ADMIN — DEXLANSOPRAZOLE 60 MILLIGRAM(S): 30 CAPSULE, DELAYED RELEASE ORAL at 05:09

## 2022-04-09 RX ADMIN — Medication 1 GRAM(S): at 05:10

## 2022-04-09 RX ADMIN — Medication 30 MILLILITER(S): at 00:46

## 2022-04-09 RX ADMIN — Medication 1 GRAM(S): at 11:08

## 2022-04-09 NOTE — PROGRESS NOTE ADULT - PROBLEM SELECTOR PLAN 2
- Continue Suboxone 4mg.  pt has been self titrating meds and would like to d/w psych further about his options

## 2022-04-09 NOTE — PROGRESS NOTE ADULT - SUBJECTIVE AND OBJECTIVE BOX
Subjective: Patient doing well this morning. Discussed upcoming esophagectomy further.      Vital Signs:  T(C): 36.7 (04-09-22 @ 05:05), Max: 36.8 (04-08-22 @ 21:30)  HR: 66 (04-09-22 @ 05:05) (66 - 75)  BP: 112/66 (04-09-22 @ 05:05) (112/60 - 123/65)  RR: 18 (04-09-22 @ 05:05) (18 - 18)  SpO2: 98% (04-09-22 @ 05:05) (98% - 100%)  CAPILLARY BLOOD GLUCOSE      Telemetry/Alarms:  General: WN/WD NAD  Neurology: Awake, nonfocal, FERRARO x 4  Eyes: Scleras clear, PERRLA/ EOMI, Gross vision intact  ENT:Gross hearing intact, grossly patent pharynx, no stridor  Neck: Neck supple, trachea midline, No JVD,   Respiratory: CTA B/L, No wheezing, rales, rhonchi  CV: RRR, S1S2, no murmurs, rubs or gallops  Abdominal: Soft, NT, ND +BS,   Extremities: No edema, + peripheral pulses  Skin: No Rashes, Hematoma, Ecchymosis  Lymphatic: No Neck, axilla, groin LAD  Psych: Oriented x 3, normal affect    Relevant lab and radiology reviewed              CBC (04-08 @ 07:04)                              13.8                           4.15    )----------------(  127<L>     --    % Neutrophils, --    % Lymphocytes, ANC: --                                  40.9                  BMP (04-08 @ 07:04)             136     |  101     |  13    		Ca++ --      Ca 9.5                ---------------------------------( 98    		Mg 2.00               4.2     |  22      |  0.77  			Ph 4.2         Esophagram canceled. Prior study adequate demonstrating distal stricture.        Assessment   26y old  Male who presents with a chief complaint of Inability to tolerate PO (08 Apr 2022 12:30)  secondary to esophageal stricture 2/2 severe GERD refractory to multiple dilations    PLAN  pt tentatively booked for esophagectomy next Friday 4/15/2022  please obtain recent TTE and cardiology clearance for OR  continue care per primary medical team  ensures for nutritional optimization    Ismael Wilkinson PGY5  Thoracic Surgery - [phone]   Subjective: Patient doing well this morning. Discussed upcoming esophagectomy further.      Vital Signs:  T(C): 36.7 (04-09-22 @ 05:05), Max: 36.8 (04-08-22 @ 21:30)  HR: 66 (04-09-22 @ 05:05) (66 - 75)  BP: 112/66 (04-09-22 @ 05:05) (112/60 - 123/65)  RR: 18 (04-09-22 @ 05:05) (18 - 18)  SpO2: 98% (04-09-22 @ 05:05) (98% - 100%)  CAPILLARY BLOOD GLUCOSE      Telemetry/Alarms:  General: WN/WD NAD  Neurology: Awake, nonfocal, FERRARO x 4  Eyes: Scleras clear, PERRLA/ EOMI, Gross vision intact  ENT:Gross hearing intact, grossly patent pharynx, no stridor  Neck: Neck supple, trachea midline, No JVD,   Respiratory: CTA B/L, No wheezing, rales, rhonchi  CV: RRR, S1S2, no murmurs, rubs or gallops  Abdominal: Soft, NT, ND +BS,   Extremities: No edema, + peripheral pulses  Skin: No Rashes, Hematoma, Ecchymosis  Lymphatic: No Neck, axilla, groin LAD  Psych: Oriented x 3, normal affect    Relevant lab and radiology reviewed              CBC (04-08 @ 07:04)                              13.8                           4.15    )----------------(  127<L>     --    % Neutrophils, --    % Lymphocytes, ANC: --                                  40.9                  BMP (04-08 @ 07:04)             136     |  101     |  13    		Ca++ --      Ca 9.5                ---------------------------------( 98    		Mg 2.00               4.2     |  22      |  0.77  			Ph 4.2         Esophagram canceled. Prior study adequate demonstrating distal stricture.        Assessment   26y old  Male who presents with a chief complaint of Inability to tolerate PO secondary to esophageal stricture 2/2 severe GERD refractory to multiple dilations.    PLAN  pt tentatively booked for esophagectomy next Friday 4/15/2022  please obtain recent TTE and cardiology clearance for OR  continue care per primary medical team  ensures for nutritional optimization    Ismael Wilkinson PGY5  Thoracic Surgery - [phone]

## 2022-04-09 NOTE — PROGRESS NOTE ADULT - PROBLEM SELECTOR PLAN 1
s/p EGD with dilation  severe esophagitis  - Continue PPI BID per GI recommendation  - Continue sucralfate    apprec thoracic surg eval and plans  ECHO and cards eval pending for surgical optimization

## 2022-04-09 NOTE — PROGRESS NOTE ADULT - SUBJECTIVE AND OBJECTIVE BOX
[facility] Division of Hospital Medicine  Melissa Espinoza MD  Pager [phone]    Patient is a 26y old  Male who presents with a chief complaint of Inability to tolerate PO      SUBJECTIVE / OVERNIGHT EVENTS: d/w pt plans for surgery next week and need for cardiac eval prior to surg; pt aware and willing; currently comfortable, estela liquids      MEDICATIONS  (STANDING):  buprenorphine 2 mG/naloxone 0.5 mG SL  Tablet 2 Tablet(s) SubLingual daily  dexlansoprazole DR 60 milliGRAM(s) Oral <User Schedule>  sucralfate suspension 1 Gram(s) Oral four times a day    MEDICATIONS  (PRN):  acetaminophen     Tablet .. 650 milliGRAM(s) Oral every 6 hours PRN Temp greater or equal to 38C (100.4F), Mild Pain (1 - 3)  aluminum hydroxide/magnesium hydroxide/simethicone Suspension 30 milliLiter(s) Oral every 4 hours PRN Dyspepsia  melatonin 3 milliGRAM(s) Oral at bedtime PRN Insomnia  ondansetron Injectable 4 milliGRAM(s) IV Push every 8 hours PRN Nausea and/or Vomiting        PHYSICAL EXAM:  Vital Signs Last 24 Hrs  T(F): 98 (09 Apr 2022 05:05), Max: 98.2 (08 Apr 2022 21:30)  HR: 66 (09 Apr 2022 05:05) (66 - 75)  BP: 112/66 (09 Apr 2022 05:05) (112/60 - 123/65)  RR: 18 (09 Apr 2022 05:05) (18 - 18)  SpO2: 98% (09 Apr 2022 05:05) (98% - 100%)    CONSTITUTIONAL: NAD, appears comfortaable  EYES: PERRLA; conjunctiva and sclera clear  ENMT: Moist oral mucosa; normal dentition  RESPIRATORY: Normal respiratory effort; lungs are clear to auscultation bilaterally  CARDIOVASCULAR: Regular rate and rhythm No lower extremity edema;   ABDOMEN: Nontender to palpation, normoactive bowel sounds  MUSCULOSKELETAL: no clubbing or cyanosis of digits; no joint swelling or tenderness to palpation  PSYCH: A+O to person, place, and time; affect appropriate  NEUROLOGY: CN 2-12 are intact and symmetric; no gross sensory deficits   SKIN: No rashes; no palpable lesions    LABS:

## 2022-04-09 NOTE — PROGRESS NOTE ADULT - ASSESSMENT
25yo M w/ PMHx of esophageal stricture s/p multiple dilations and IVDU on Suboxone presenting with inability to tolerate oral intake for the past 30 days. s/p EGD 4/5 with dilation, now plans for esophagectomy next week, will medically optimize prior to procedure

## 2022-04-10 PROCEDURE — 99232 SBSQ HOSP IP/OBS MODERATE 35: CPT

## 2022-04-10 RX ADMIN — Medication 1 GRAM(S): at 18:30

## 2022-04-10 RX ADMIN — Medication 1 GRAM(S): at 06:22

## 2022-04-10 RX ADMIN — Medication 1 GRAM(S): at 00:21

## 2022-04-10 RX ADMIN — Medication 1 GRAM(S): at 23:14

## 2022-04-10 RX ADMIN — SIMETHICONE 80 MILLIGRAM(S): 80 TABLET, CHEWABLE ORAL at 12:55

## 2022-04-10 RX ADMIN — SIMETHICONE 80 MILLIGRAM(S): 80 TABLET, CHEWABLE ORAL at 06:22

## 2022-04-10 RX ADMIN — DEXLANSOPRAZOLE 60 MILLIGRAM(S): 30 CAPSULE, DELAYED RELEASE ORAL at 06:22

## 2022-04-10 RX ADMIN — SIMETHICONE 80 MILLIGRAM(S): 80 TABLET, CHEWABLE ORAL at 23:14

## 2022-04-10 RX ADMIN — BUPRENORPHINE AND NALOXONE 1 TABLET(S): 2; .5 TABLET SUBLINGUAL at 12:51

## 2022-04-10 RX ADMIN — SIMETHICONE 80 MILLIGRAM(S): 80 TABLET, CHEWABLE ORAL at 18:31

## 2022-04-10 RX ADMIN — SIMETHICONE 80 MILLIGRAM(S): 80 TABLET, CHEWABLE ORAL at 00:21

## 2022-04-10 RX ADMIN — DEXLANSOPRAZOLE 60 MILLIGRAM(S): 30 CAPSULE, DELAYED RELEASE ORAL at 18:30

## 2022-04-10 RX ADMIN — Medication 1 GRAM(S): at 12:52

## 2022-04-10 NOTE — PROVIDER CONTACT NOTE (MEDICATION) - SITUATION
pt only one 1 tablet of suboxone. OLEKSANDR Ayon  [id number] and pharmacy made aware pt only one 1 tablet of suboxone. OLEKSANDR Ayon  [id number] and pharmacy made aware. one tab returned to pharmacy by RN

## 2022-04-10 NOTE — PROGRESS NOTE ADULT - SUBJECTIVE AND OBJECTIVE BOX
[facility] Division of Hospital Medicine  Melissa Espinoza MD  Pager [phone]    Patient is a 26y old  Male who presents with a chief complaint of Inability to tolerate PO       SUBJECTIVE / OVERNIGHT EVENTS: doing OK, eating yogurt without issue; BMs improved      MEDICATIONS  (STANDING):  buprenorphine 2 mG/naloxone 0.5 mG SL  Tablet 2 Tablet(s) SubLingual daily  dexlansoprazole DR 60 milliGRAM(s) Oral <User Schedule>  simethicone 80 milliGRAM(s) Chew four times a day  sucralfate suspension 1 Gram(s) Oral four times a day    MEDICATIONS  (PRN):  acetaminophen     Tablet .. 650 milliGRAM(s) Oral every 6 hours PRN Temp greater or equal to 38C (100.4F), Mild Pain (1 - 3)  aluminum hydroxide/magnesium hydroxide/simethicone Suspension 30 milliLiter(s) Oral every 4 hours PRN Dyspepsia  melatonin 3 milliGRAM(s) Oral at bedtime PRN Insomnia  ondansetron Injectable 4 milliGRAM(s) IV Push every 8 hours PRN Nausea and/or Vomiting      PHYSICAL EXAM:  Vital Signs Last 24 Hrs  T(F): 98.6 (10 Apr 2022 12:44), Max: 98.6 (10 Apr 2022 12:44)  HR: 95 (10 Apr 2022 12:44) (66 - 95)  BP: 107/74 (10 Apr 2022 12:44) (107/60 - 123/73)  RR: 18 (10 Apr 2022 12:44) (18 - 18)  SpO2: 100% (10 Apr 2022 12:44) (98% - 100%)    CONSTITUTIONAL: NAD, appears comfortable  EYES: PERRLA; conjunctiva and sclera clear  ENMT: Moist oral mucosa; normal dentition  RESPIRATORY: Normal respiratory effort; l  CARDIOVASCULAR: ; No lower extremity edema;   MUSCULOSKELETAL: no clubbing or cyanosis of digits; no joint swelling or tenderness to palpation  PSYCH: A+O to person, place, and time; affect appropriate  NEUROLOGY: CN 2-12 are intact and symmetric; no gross sensory deficits   SKIN: No rashes; no palpable lesions    LABS:

## 2022-04-10 NOTE — PROGRESS NOTE ADULT - PROBLEM SELECTOR PLAN 4
POST PROCEDURE NOTE    Procedure: random liver bx    Pre-Procedure Diagnosis: elev.liver enz.    Post-procedure Diagnosis: same    Findings: successf.bx,gelfoam used    Complications: none    Blood loss: min    Specimen Removed: 2x18G cores    Disposition:   Expected discharge home     Low risk  Clear diet   Dispo: eventual home    now awaiting thoracic surg for esophagectomy

## 2022-04-11 LAB — SARS-COV-2 RNA SPEC QL NAA+PROBE: SIGNIFICANT CHANGE UP

## 2022-04-11 PROCEDURE — 99233 SBSQ HOSP IP/OBS HIGH 50: CPT

## 2022-04-11 PROCEDURE — 93010 ELECTROCARDIOGRAM REPORT: CPT

## 2022-04-11 PROCEDURE — 99232 SBSQ HOSP IP/OBS MODERATE 35: CPT | Mod: GC

## 2022-04-11 PROCEDURE — 93306 TTE W/DOPPLER COMPLETE: CPT | Mod: 26

## 2022-04-11 RX ORDER — ONDANSETRON 8 MG/1
4 TABLET, FILM COATED ORAL ONCE
Refills: 0 | Status: COMPLETED | OUTPATIENT
Start: 2022-04-11 | End: 2022-04-11

## 2022-04-11 RX ORDER — BUPRENORPHINE AND NALOXONE 2; .5 MG/1; MG/1
2 TABLET SUBLINGUAL DAILY
Refills: 0 | Status: DISCONTINUED | OUTPATIENT
Start: 2022-04-11 | End: 2022-04-16

## 2022-04-11 RX ADMIN — SIMETHICONE 80 MILLIGRAM(S): 80 TABLET, CHEWABLE ORAL at 05:13

## 2022-04-11 RX ADMIN — Medication 1 GRAM(S): at 05:14

## 2022-04-11 RX ADMIN — DEXLANSOPRAZOLE 60 MILLIGRAM(S): 30 CAPSULE, DELAYED RELEASE ORAL at 05:14

## 2022-04-11 RX ADMIN — BUPRENORPHINE AND NALOXONE 2 TABLET(S): 2; .5 TABLET SUBLINGUAL at 11:00

## 2022-04-11 RX ADMIN — SIMETHICONE 80 MILLIGRAM(S): 80 TABLET, CHEWABLE ORAL at 12:26

## 2022-04-11 RX ADMIN — SIMETHICONE 80 MILLIGRAM(S): 80 TABLET, CHEWABLE ORAL at 18:03

## 2022-04-11 RX ADMIN — Medication 30 MILLILITER(S): at 00:40

## 2022-04-11 RX ADMIN — DEXLANSOPRAZOLE 60 MILLIGRAM(S): 30 CAPSULE, DELAYED RELEASE ORAL at 18:04

## 2022-04-11 RX ADMIN — Medication 1 GRAM(S): at 18:03

## 2022-04-11 RX ADMIN — ONDANSETRON 4 MILLIGRAM(S): 8 TABLET, FILM COATED ORAL at 18:02

## 2022-04-11 RX ADMIN — Medication 1 GRAM(S): at 12:25

## 2022-04-11 NOTE — PROGRESS NOTE ADULT - PROBLEM SELECTOR PLAN 2
- Continue Suboxone 4mg.  pt has been self titrating meds and would like to d/w psych further about his options - Continue Suboxone 4mg.  pt has been self titrating meds and would like to d/w psych further about his options  psych eval requested

## 2022-04-11 NOTE — CONSULT NOTE ADULT - NS ATTEND AMEND GEN_ALL_CORE FT
I reviewed the overnight course of events on the unit, re-confirming the patient history. I discussed the care with the patient and their family. The plan of care was discussed with the ACP team and modifications were made to the notation where appropriate. Differential diagnosis and plan of care discussed with patient after the evaluation. Advanced care planning was discussed with patient and family.  Advanced care planning forms were reviewed and discussed.  Risks, benefits and alternatives of cardiac procedures were discussed in detail and all questions were answered. 35 minutes spent on total encounter of which more than fifty percent of the encounter was spent counseling and/or coordinating care by the attending physician.
called by Dr. Whitaker regarding patient with recalcitrant stricture that has been dilated over 10 times. given his persistent stricture, thoracic was consulted for evaluation for esophagectomy.   discussed with patient and mother at bedside, regarding nature of operation, post operative course including prolonged NPO status and jtube feeds. risks and benefits.   will tentatively plan for next week.

## 2022-04-11 NOTE — CONSULT NOTE ADULT - ASSESSMENT
27yo M w/ PMHx of esophageal stricture s/p multiple dilations and IVDU on Suboxone presenting with inability to tolerate oral intake for the past 30 days.     EKG: none in chart     1. Pre-op assessment  -denies CP, SOB  -obtain EKG  -echo with grossly mild global LV systolic dysfunction (new compared to 2021) however patient denies CP or SOB on exertion. no plan for further cardiac w/u at this time     2. Dysphagia  -hx of esophageal stricture s/p multiple dilations  -plan for esophagectomy  -f/u GI and thoracic surgery  25yo M w/ PMHx of esophageal stricture s/p multiple dilations and IVDU on Suboxone presenting with inability to tolerate oral intake for the past 30 days.     EKG: none in chart     1. Pre-op assessment  -denies CP, SOB  -obtain EKG  -echo with grossly mild global LV systolic dysfunction (new compared to 2021) however patient denies CP or SOB on exertion. no plan for further cardiac if EKG normal     2. Dysphagia  -hx of esophageal stricture s/p multiple dilations  -plan for esophagectomy  -f/u GI and thoracic surgery

## 2022-04-11 NOTE — PROGRESS NOTE ADULT - SUBJECTIVE AND OBJECTIVE BOX
Patient is a 26y old  Male who presents with a chief complaint of Inability to tolerate PO (10 Apr 2022 12:51)      SUBJECTIVE / OVERNIGHT EVENTS:    MEDICATIONS  (STANDING):  buprenorphine 2 mG/naloxone 0.5 mG SL  Tablet 2 Tablet(s) SubLingual daily  dexlansoprazole DR 60 milliGRAM(s) Oral <User Schedule>  simethicone 80 milliGRAM(s) Chew four times a day  sucralfate suspension 1 Gram(s) Oral four times a day    MEDICATIONS  (PRN):  acetaminophen     Tablet .. 650 milliGRAM(s) Oral every 6 hours PRN Temp greater or equal to 38C (100.4F), Mild Pain (1 - 3)  aluminum hydroxide/magnesium hydroxide/simethicone Suspension 30 milliLiter(s) Oral every 4 hours PRN Dyspepsia  melatonin 3 milliGRAM(s) Oral at bedtime PRN Insomnia  ondansetron Injectable 4 milliGRAM(s) IV Push every 8 hours PRN Nausea and/or Vomiting      Vital Signs Last 24 Hrs  T(C): 36.5 (11 Apr 2022 05:15), Max: 37.1 (10 Apr 2022 21:52)  T(F): 97.7 (11 Apr 2022 05:15), Max: 98.7 (10 Apr 2022 21:52)  HR: 60 (11 Apr 2022 05:15) (60 - 95)  BP: 111/56 (11 Apr 2022 05:15) (107/74 - 120/74)  BP(mean): --  RR: 17 (11 Apr 2022 05:15) (17 - 18)  SpO2: 100% (11 Apr 2022 05:15) (100% - 100%)  CAPILLARY BLOOD GLUCOSE        I&O's Summary      PHYSICAL EXAM:  GENERAL: NAD, well-developed  HEAD:  Atraumatic, Normocephalic  EYES: EOMI, PERRLA, conjunctiva and sclera clear  NECK: Supple, No JVD  CHEST/LUNG: Clear to auscultation bilaterally; No wheeze  HEART: Regular rate and rhythm; No murmurs, rubs, or gallops  ABDOMEN: Soft, Nontender, Nondistended; Bowel sounds present  EXTREMITIES:  2+ Peripheral Pulses, No clubbing, cyanosis, or edema  PSYCH: AAOx3  NEUROLOGY: non-focal  SKIN: No rashes or lesions    LABS:                    RADIOLOGY & ADDITIONAL TESTS:    Imaging Personally Reviewed:    Consultant(s) Notes Reviewed:      Care Discussed with Consultants/Other Providers:   Patient is a 26y old  Male who presents with a chief complaint of Inability to tolerate PO (10 Apr 2022 12:51)      SUBJECTIVE / OVERNIGHT EVENTS: patient seen and examined by bedside, pt denies headache, dizziness, SOB, CP, Palpitations , N/V/D, abdominal pain at this time, pt was having nausea earlier when he had tried to eat Jello     MEDICATIONS  (STANDING):  buprenorphine 2 mG/naloxone 0.5 mG SL  Tablet 2 Tablet(s) SubLingual daily  dexlansoprazole DR 60 milliGRAM(s) Oral <User Schedule>  simethicone 80 milliGRAM(s) Chew four times a day  sucralfate suspension 1 Gram(s) Oral four times a day    MEDICATIONS  (PRN):  acetaminophen     Tablet .. 650 milliGRAM(s) Oral every 6 hours PRN Temp greater or equal to 38C (100.4F), Mild Pain (1 - 3)  aluminum hydroxide/magnesium hydroxide/simethicone Suspension 30 milliLiter(s) Oral every 4 hours PRN Dyspepsia  melatonin 3 milliGRAM(s) Oral at bedtime PRN Insomnia  ondansetron Injectable 4 milliGRAM(s) IV Push every 8 hours PRN Nausea and/or Vomiting      Vital Signs Last 24 Hrs  T(C): 36.5 (11 Apr 2022 05:15), Max: 37.1 (10 Apr 2022 21:52)  T(F): 97.7 (11 Apr 2022 05:15), Max: 98.7 (10 Apr 2022 21:52)  HR: 60 (11 Apr 2022 05:15) (60 - 95)  BP: 111/56 (11 Apr 2022 05:15) (107/74 - 120/74)  BP(mean): --  RR: 17 (11 Apr 2022 05:15) (17 - 18)  SpO2: 100% (11 Apr 2022 05:15) (100% - 100%)        PHYSICAL EXAM:  CONSTITUTIONAL: NAD, appears comfortable  EYES: PERRLA; conjunctiva and sclera clear  ENMT: Moist oral mucosa; normal dentition  RESPIRATORY: Normal respiratory effort; l  CARDIOVASCULAR: ; No lower extremity edema;   MUSCULOSKELETAL: no clubbing or cyanosis of digits; no joint swelling or tenderness to palpation  PSYCH: A+O to person, place, and time; affect appropriate  NEUROLOGY: CN 2-12 are intact and symmetric; no gross sensory deficits   SKIN: No rashes; no palpable lesions      LABS:        no new labs             RADIOLOGY & ADDITIONAL TESTS:  < from: Transthoracic Echocardiogram (04.11.22 @ 08:41) >  --------------------------------------------------------------------  CONCLUSIONS:  1. Normal mitral valve. Minimal mitral regurgitation.  2. Normal left ventricular internal dimensions and wall  thicknesses.  3. Endocardium not well visualized; grossly mild global  left ventricular systolic dysfunction.  4. Normal left ventricular diastolic function.  5. Normal right ventricular size and function.  --------------------------------------------------------------    < end of copied text >    Imaging Personally Reviewed:    Consultant(s) Notes Reviewed:      Care Discussed with Consultants/Other Providers:

## 2022-04-11 NOTE — CONSULT NOTE ADULT - SUBJECTIVE AND OBJECTIVE BOX
HPI:  25yo M w/ PMHx of esophageal stricture s/p multiple dilations reporting to the ED with lack of oral intake.   Patient reports a few week history of progressively worsening inability to tolerate solids and now only tolerates oral intake. Patient follows with Dr. Gerry Serrato of GI. His last ECG was in December of 2021. Patient was admitted in June of 2021 for esophageal stent migration with bacteremia (strep) and he required IV ABs. He reports he has lost about 30 pounds in the last few months      Allergies:  No Known Allergies    Home Medications:      Hospital Medications:  acetaminophen     Tablet .. 650 milliGRAM(s) Oral every 6 hours PRN  aluminum hydroxide/magnesium hydroxide/simethicone Suspension 30 milliLiter(s) Oral every 4 hours PRN  dextrose 5% + sodium chloride 0.9%. 1000 milliLiter(s) IV Continuous <Continuous>  melatonin 3 milliGRAM(s) Oral at bedtime PRN  ondansetron Injectable 4 milliGRAM(s) IV Push every 8 hours PRN  pantoprazole  Injectable 40 milliGRAM(s) IV Push daily      PMHX/PSHX:  Heroin abuse    Cocaine abuse    GERD (gastroesophageal reflux disease)    Hiatal hernia    History of esophageal stricture    Intravenous drug abuse    No significant past surgical history    Migration of pancreatic stent    Migrated esophageal stent        Family history:  FH: thyroid disease (Grandparent)        Social History: no smoking    ROS:   General:  No fevers, chills or night sweats  ENT:  No sore throat or dysphagia  CV:  No pain or palpitations  Resp:  No dyspnea, cough or  wheezing  GI:  as above  Skin:  No rash or edema  Neuro: no weakness   Hematologic: no bleeding  Musculoskeletal: no muscle pain or join pain  Psych: no agitation     : no dysuria      PHYSICAL EXAM:   GENERAL:  NAD, Appears stated age  HEENT:  NC/AT,  conjunctivae clear and pink, sclera -anicteric  CHEST:  CTA B/L, Normal effort  HEART:  RRR S1/S2,  ABDOMEN:  Soft, non-tender, non-distended,  no masses   EXTREMITIES:  No cyanosis or Edema  SKIN:  Warm & Dry. No rash or erythema  NEURO:  Alert, oriented, no focal deficit    Vital Signs:  Vital Signs Last 24 Hrs  T(C): 36.4 (04 Apr 2022 10:59), Max: 36.4 (04 Apr 2022 10:59)  T(F): 97.5 (04 Apr 2022 10:59), Max: 97.5 (04 Apr 2022 10:59)  HR: 92 (04 Apr 2022 12:19) (92 - 100)  BP: 125/74 (04 Apr 2022 12:19) (125/74 - 145/83)  BP(mean): --  RR: 17 (04 Apr 2022 12:19) (17 - 18)  SpO2: 100% (04 Apr 2022 12:19) (99% - 100%)  Daily Height in cm: 180.34 (04 Apr 2022 10:59)    Daily     LABS:                        14.7   6.92  )-----------( 134      ( 04 Apr 2022 12:08 )             43.4     Mean Cell Volume: 84.4 fL (04-04-22 @ 12:08)    04-04    136  |  101  |  12  ----------------------------<  89  5.1   |  21<L>  |  0.93    Ca    9.9      04 Apr 2022 12:08  Phos  4.0     04-04  Mg     1.80     04-04    TPro  7.2  /  Alb  4.6  /  TBili  0.9  /  DBili  x   /  AST  26  /  ALT  19  /  AlkPhos  85  04-04    LIVER FUNCTIONS - ( 04 Apr 2022 12:08 )  Alb: 4.6 g/dL / Pro: 7.2 g/dL / ALK PHOS: 85 U/L / ALT: 19 U/L / AST: 26 U/L / GGT: x                                       14.7   6.92  )-----------( 134      ( 04 Apr 2022 12:08 )             43.4     Imaging:  < from: Upper Endoscopy (12.17.21 @ 13:38) >  Findings:       LA Grade D (one or more mucosal breaks involving at least 75% of        esophageal circumference) esophagitis with bleeding was found.       One benign-appearing, intrinsic moderate stenosis was found. The        stenosis was traversed. A TTS dilator was passed through the scope.        Dilation with a 10-11-12 mm balloon dilator was performed to 15 mm. The        dilation site was examined following endoscope reinsertion and showed        mild mucosal disruption. A TTS dilator was passed through the scope.        Dilation with a 12-13.5-15 mm balloon dilator was performed.       The entire examined stomach was normal.       The examined duodenum was normal.       A medium-sized hiatal hernia was present.                                                                                   Impression:          - LA Grade D reflux esophagitis.                       - Benign-appearing esophageal stenosis. Dilated.                       - Normal stomach.                       - Normal examined duodenum.                       - No specimens collected.    < end of copied text >      
Christiano Hoyt MD  Interventional Cardiology / Advance Heart Failure and Cardiac Transplant Specialist  [place] Office : [address] [place] [address] [place] [address]  Tel:   [place] Office : [address] [facility] [place] [address]  Tel: [phone]      HISTORY OF PRESENTING ILLNESS:  27yo M w/ PMHx of esophageal stricture s/p multiple dilations and IVDU on Suboxone presenting with inability to tolerate oral intake for the past 30 days.  Patient reports a few week history of progressively worsening inability to tolerate solids and now only tolerates oral liquid intake. He is only able to tolerate smoothies or liquids. He reports he has lost about 30 pounds in the last few months. He reports nausea,  chills, no fever, night sweats. He was recently started on Suboxone this past month, which was tapered down to 4mg 3 days ago. Patient follows with Dr. Gerry Serrato of GI. His last EGD was in December of 2021. Of note,  Patient was admitted in June of 2021 for esophageal stent migration with bacteremia (strep) and he required IV ABs. Otherwise, no fevers, chills, abdominal pain, no D/C/ or emesis. Cardiology consulted for optimization for esophagectomy. Echo shows grossly mild global LV systolic dysfunction. Patient denies cardiac history. Denies chest pain or SOB on exertion.   	  MEDICATIONS:        acetaminophen     Tablet .. 650 milliGRAM(s) Oral every 6 hours PRN  melatonin 3 milliGRAM(s) Oral at bedtime PRN  ondansetron Injectable 4 milliGRAM(s) IV Push every 8 hours PRN    aluminum hydroxide/magnesium hydroxide/simethicone Suspension 30 milliLiter(s) Oral every 4 hours PRN  dexlansoprazole DR 60 milliGRAM(s) Oral <User Schedule>  simethicone 80 milliGRAM(s) Chew four times a day  sucralfate suspension 1 Gram(s) Oral four times a day          PAST MEDICAL/SURGICAL HISTORY  PAST MEDICAL & SURGICAL HISTORY:  Heroin abuse    Cocaine abuse    GERD (gastroesophageal reflux disease)    Hiatal hernia    History of esophageal stricture    Intravenous drug abuse    Migration of pancreatic stent    Migrated esophageal stent        SOCIAL HISTORY: Substance Use (street drugs): ( x ) never used  (  ) other:    FAMILY HISTORY:  FH: thyroid disease (Grandparent)        REVIEW OF SYSTEMS:  CONSTITUTIONAL: No fever, weight loss, or fatigue  EYES: No eye pain, visual disturbances, or discharge  ENMT:  No difficulty hearing, tinnitus, vertigo; No sinus or throat pain  BREASTS: No pain, masses, or nipple discharge  GASTROINTESTINAL: No abdominal or epigastric pain. No nausea, vomiting, or hematemesis; No diarrhea or constipation. No melena or hematochezia.  GENITOURINARY: No dysuria, frequency, hematuria, or incontinence  NEUROLOGICAL: No headaches, memory loss, loss of strength, numbness, or tremors  ENDOCRINE: No heat or cold intolerance; No hair loss  MUSCULOSKELETAL: No joint pain or swelling; No muscle, back, or extremity pain  PSYCHIATRIC: No depression, anxiety, mood swings, or difficulty sleeping  HEME/LYMPH: No easy bruising, or bleeding gums  All others negative    PHYSICAL EXAM:  T(C): 36.5 (04-11-22 @ 05:15), Max: 37.1 (04-10-22 @ 21:52)  HR: 60 (04-11-22 @ 05:15) (60 - 86)  BP: 111/56 (04-11-22 @ 05:15) (111/56 - 120/74)  RR: 17 (04-11-22 @ 05:15) (17 - 17)  SpO2: 100% (04-11-22 @ 05:15) (100% - 100%)  Wt(kg): --  I&O's Summary        GENERAL: NAD  EYES: EOMI, PERRLA, conjunctiva and sclera clear  ENMT: No tonsillar erythema, exudates, or enlargement  Cardiovascular: Normal S1 S2, No JVD, No murmurs, No edema  Respiratory: Lungs clear to auscultation	  Gastrointestinal:  Soft, Non-tender, + BS	  Extremities: No edema                        proBNP:   Lipid Profile:   HgA1c:   TSH:     Consultant(s) Notes Reviewed:  [x ] YES  [ ] NO    Care Discussed with Consultants/Other Providers [ x] YES  [ ] NO    Imaging Personally Reviewed independently:  [x] YES  [ ] NO    All labs, radiologic studies, vitals, orders and medications list reviewed. Patient is seen and examined at bedside. Case discussed with medical team.        
Thoracic Surgery Consultation Note  =====================================================  HPI: 25yo M w/ PMHx of hiatal hernia, esophageal spasm, GERD, esophageal stricture s/p multiple esophageal dilations, failed EGD stent(migration w/ bacteremia [strep]), and IVDU on Suboxone presenting with inability to tolerate oral intake for the past 30 days.  Patient reports a few week history of progressively worsening inability to tolerate solids and now only tolerates oral liquid intake. He is only able to tolerate smoothies or liquids. He reports he has lost about 35 pounds in the last few months(since December of 2021). He reports nausea,  chills, no fever, night sweats. He was recently started on Suboxone this past month, which was tapered down to 4mg 3 days ago. Patient follows with Dr. Gerry Serrato of GI. His last EGD was in December of 2021. Of note,  Patient was admitted in June of 2021 for esophageal stent migration with bacteremia (strep) and he required IV ABs. Otherwise, no fevers, chills, abdominal pain, no D/C/ or emesis.     Subjective: Pt seen and assessed at bedside, resting comfortably. Pt states that he is here for more permanent treatment for esophageal strictures. Pt endorsed that onset of symptoms began last year around the time of his brother's passing and that he's since had multiple dilations and failed stent placement without relief. Pt did not endorse any chest pain, SOB, fevers/chills, N/V/D.    PAST MEDICAL & SURGICAL HISTORY:  Heroin abuse  Cocaine abuse  GERD (gastroesophageal reflux disease)  Hiatal hernia  History of esophageal stricture  Intravenous drug abuse  Migration of pancreatic stent  Migrated esophageal stent      FAMILY HISTORY:  FH: thyroid disease (Grandparent)    SOCIAL HISTORY:   - Smokes 1 pack/week for 10yrs  - Marijuana user  - Does not consume alcohol  - Prior history of IVDA for 1 yr  - [place]     ADVANCE DIRECTIVES: Presumed Full Code      REVIEW OF SYSTEMS:    General: Non-Contributory  Skin/Breast: Non-Contributory  Ophthalmologic: Non-Contributory  ENMT: Non-Contributory  Respiratory and Thorax: Non-Contributory  Cardiovascular: Non-Contributory  Gastrointestinal: Non-Contributory  Genitourinary: Non-Contributory  Musculoskeletal: Non-Contributory  Neurological: Non-Contributory  Psychiatric: Non-Contributory  Hematology/Lymphatics: Non-Contributory  Endocrine: Non-Contributory  Allergic/Immunologic: Non-Contributory    CURRENT MEDICATIONS:   --------------------------------------------------------------------------------------  Neurologic Medications  acetaminophen     Tablet .. 650 milliGRAM(s) Oral every 6 hours PRN Temp greater or equal to 38C (100.4F), Mild Pain (1 - 3)  buprenorphine 2 mG/naloxone 0.5 mG SL  Tablet 2 Tablet(s) SubLingual daily  melatonin 3 milliGRAM(s) Oral at bedtime PRN Insomnia  ondansetron Injectable 4 milliGRAM(s) IV Push every 8 hours PRN Nausea and/or Vomiting    Respiratory Medications    Cardiovascular Medications    Gastrointestinal Medications  aluminum hydroxide/magnesium hydroxide/simethicone Suspension 30 milliLiter(s) Oral every 4 hours PRN Dyspepsia  dexlansoprazole DR 60 milliGRAM(s) Oral <User Schedule>  sucralfate suspension 1 Gram(s) Oral four times a day    Genitourinary Medications    Hematologic/Oncologic Medications    Antimicrobial/Immunologic Medications    Endocrine/Metabolic Medications    Topical/Other Medications    --------------------------------------------------------------------------------------  VITAL SIGNS, INS/OUTS (last 24 hours):  --------------------------------------------------------------------------------------  T(C): 36.8 (04-07-22 @ 14:38), Max: 36.8 (04-07-22 @ 05:10)  HR: 99 (04-07-22 @ 14:38) (60 - 99)  BP: 112/60 (04-07-22 @ 14:38) (111/58 - 129/62)  BP(mean): --  ABP: --  ABP(mean): --  RR: 18 (04-07-22 @ 14:38) (18 - 18)  SpO2: 99% (04-07-22 @ 14:38) (98% - 99%)  Wt(kg): --  CVP(mm Hg): --  CI: --  CAPILLARY BLOOD GLUCOSE       N/A  --------------------------------------------------------------------------------------    EXAM  NEUROLOGY  Exam: Normal, NAD, alert, oriented x 3, no focal deficits.  HEENT  Exam: Normocephalic, atraumatic.  EOMI.  RESPIRATORY  Exam: Lungs clear to auscultation, Normal expansion/effort.   CARDIOVASCULAR  Exam: S1, S2.  Regular rate and rhythm.   GI/NUTRITION  Exam: Abdomen soft, Non-tender, Non-distended.   Current Diet: CLD w/ Ensure clear cans  VASCULAR  Exam: Extremities warm, pink, well-perfused.   MUSCULOSKELETAL  Exam: All extremities moving spontaneously without limitations.   SKIN:  Exam: Good skin turgor, no skin breakdown.       LABS  --------------------------------------------------------------------------------------                                            13.9                  Neurophils% (auto):   x      (04-07 @ 07:50):    4.09 )-----------(124          Lymphocytes% (auto):  x                                             40.5                   Eosinphils% (auto):   x        Manual%: Neutrophils x    ; Lymphocytes x    ; Eosinophils x    ; Bands%: x    ; Blasts x          04-07    138  |  102  |  12  ----------------------------<  101<H>  4.4   |  24  |  0.80    Ca    9.5      07 Apr 2022 07:50  Phos  3.9     04-07  Mg     2.00     04-07    TPro  6.5  /  Alb  4.2  /  TBili  0.6  /  DBili  x   /  AST  15  /  ALT  16  /  AlkPhos  83  04-06          RECENT CULTURES:        PT/INR - ( 06 Apr 2022 06:23 )   PT: 12.3 sec;   INR: 1.06 ratio

## 2022-04-11 NOTE — PROGRESS NOTE ADULT - SUBJECTIVE AND OBJECTIVE BOX
Chief Complaint:  Patient is a 26y old  Male who presents with a chief complaint of Inability to tolerate PO (11 Apr 2022 08:54)    Reason for consult: dysphagia    Interval Events: Getting TTE for OR next week for esophagectomy w/ thoracic surgery, feels the same.     Hospital Medications:  acetaminophen     Tablet .. 650 milliGRAM(s) Oral every 6 hours PRN  aluminum hydroxide/magnesium hydroxide/simethicone Suspension 30 milliLiter(s) Oral every 4 hours PRN  buprenorphine 2 mG/naloxone 0.5 mG SL  Tablet 2 Tablet(s) SubLingual daily  dexlansoprazole DR 60 milliGRAM(s) Oral <User Schedule>  melatonin 3 milliGRAM(s) Oral at bedtime PRN  ondansetron Injectable 4 milliGRAM(s) IV Push every 8 hours PRN  simethicone 80 milliGRAM(s) Chew four times a day  sucralfate suspension 1 Gram(s) Oral four times a day      ROS:   [x] 14 point ROS negative other than as above  [ ] Patient unable to provide    PHYSICAL EXAM:   Vital Signs:  Vital Signs Last 24 Hrs  T(C): 36.5 (11 Apr 2022 05:15), Max: 37.1 (10 Apr 2022 21:52)  T(F): 97.7 (11 Apr 2022 05:15), Max: 98.7 (10 Apr 2022 21:52)  HR: 60 (11 Apr 2022 05:15) (60 - 95)  BP: 111/56 (11 Apr 2022 05:15) (107/74 - 120/74)  BP(mean): --  RR: 17 (11 Apr 2022 05:15) (17 - 18)  SpO2: 100% (11 Apr 2022 05:15) (100% - 100%)  Daily     Daily     GENERAL: no acute distress  NEURO: alert  HEENT: anicteric sclera  CHEST: no respiratory distress, no accessory muscle use  ABDOMEN: soft, non-tender, non-distended, no rebound or guarding  EXTREMITIES: warm, well perfused, no edema  SKIN: no jaundice    LABS: reviewed              Interval Diagnostic Studies: see sunrise for full report

## 2022-04-11 NOTE — PROGRESS NOTE ADULT - ASSESSMENT
27yo M w/ PMHx of esophageal stricture s/p multiple dilations and IVDU on Suboxone presenting with inability to tolerate oral intake for the past 30 days. s/p EGD 4/5 with dilation, now plans for esophagectomy next week, will medically optimize prior to procedure

## 2022-04-11 NOTE — PROGRESS NOTE ADULT - ASSESSMENT
Impression:  # Esophageal stricture - s/p > 10 dilations, most recently 4/5/22  # Hiatal hernia     Recommendations:  - PO Dexilant 60 mg bid  - Carafate 1 g four times daily suspension  - Diet as per thoracic surgery.  - Plan for OR w/ thoracic surgery this week. F/u recs    Recommendations preliminary until signed by attending.     Deniz Resendiz  Gastroenterology/Hepatology Fellow  Available via Microsoft Teams    NON-URGENT CONSULTS:  Please email [email or web address]  AT NIGHT AND ON WEEKENDS:  Contact on-call GI fellow via answering service ([phone]) from 5pm-8am and on weekends/holidays  MONDAY-FRIDAY 8AM-5PM:  Pager# [phone] ([facility]) or [phone] ([facility])  GI Phone# [phone] ([facility])

## 2022-04-11 NOTE — PROGRESS NOTE ADULT - PROBLEM SELECTOR PLAN 1
s/p EGD with dilation  severe esophagitis  - Continue PPI BID per GI recommendation  - Continue sucralfate    apprec thoracic surg eval and plans  ECHO and cards eval pending for surgical optimization s/p EGD with dilation  severe esophagitis  - Continue PPI BID per GI recommendation  - Continue sucralfate    apprec thoracic surg eval and plans  ECHO  noted as above , cards eval pending for surgical optimization

## 2022-04-12 LAB
ANION GAP SERPL CALC-SCNC: 15 MMOL/L — HIGH (ref 7–14)
BUN SERPL-MCNC: 16 MG/DL — SIGNIFICANT CHANGE UP (ref 7–23)
CALCIUM SERPL-MCNC: 9.1 MG/DL — SIGNIFICANT CHANGE UP (ref 8.4–10.5)
CHLORIDE SERPL-SCNC: 102 MMOL/L — SIGNIFICANT CHANGE UP (ref 98–107)
CO2 SERPL-SCNC: 22 MMOL/L — SIGNIFICANT CHANGE UP (ref 22–31)
CREAT SERPL-MCNC: 0.79 MG/DL — SIGNIFICANT CHANGE UP (ref 0.5–1.3)
EGFR: 126 ML/MIN/1.73M2 — SIGNIFICANT CHANGE UP
GLUCOSE SERPL-MCNC: 94 MG/DL — SIGNIFICANT CHANGE UP (ref 70–99)
HCT VFR BLD CALC: 38.1 % — LOW (ref 39–50)
HGB BLD-MCNC: 13 G/DL — SIGNIFICANT CHANGE UP (ref 13–17)
MAGNESIUM SERPL-MCNC: 2 MG/DL — SIGNIFICANT CHANGE UP (ref 1.6–2.6)
MCHC RBC-ENTMCNC: 28.4 PG — SIGNIFICANT CHANGE UP (ref 27–34)
MCHC RBC-ENTMCNC: 34.1 GM/DL — SIGNIFICANT CHANGE UP (ref 32–36)
MCV RBC AUTO: 83.2 FL — SIGNIFICANT CHANGE UP (ref 80–100)
NRBC # BLD: 0 /100 WBCS — SIGNIFICANT CHANGE UP
NRBC # FLD: 0 K/UL — SIGNIFICANT CHANGE UP
PHOSPHATE SERPL-MCNC: 4.2 MG/DL — SIGNIFICANT CHANGE UP (ref 2.5–4.5)
PLATELET # BLD AUTO: 115 K/UL — LOW (ref 150–400)
POTASSIUM SERPL-MCNC: 4.2 MMOL/L — SIGNIFICANT CHANGE UP (ref 3.5–5.3)
POTASSIUM SERPL-SCNC: 4.2 MMOL/L — SIGNIFICANT CHANGE UP (ref 3.5–5.3)
RBC # BLD: 4.58 M/UL — SIGNIFICANT CHANGE UP (ref 4.2–5.8)
RBC # FLD: 13.7 % — SIGNIFICANT CHANGE UP (ref 10.3–14.5)
SODIUM SERPL-SCNC: 139 MMOL/L — SIGNIFICANT CHANGE UP (ref 135–145)
WBC # BLD: 4.76 K/UL — SIGNIFICANT CHANGE UP (ref 3.8–10.5)
WBC # FLD AUTO: 4.76 K/UL — SIGNIFICANT CHANGE UP (ref 3.8–10.5)

## 2022-04-12 PROCEDURE — 99232 SBSQ HOSP IP/OBS MODERATE 35: CPT | Mod: 57

## 2022-04-12 PROCEDURE — 99221 1ST HOSP IP/OBS SF/LOW 40: CPT

## 2022-04-12 PROCEDURE — 99232 SBSQ HOSP IP/OBS MODERATE 35: CPT

## 2022-04-12 RX ORDER — HYDROXYZINE HCL 10 MG
25 TABLET ORAL EVERY 6 HOURS
Refills: 0 | Status: DISCONTINUED | OUTPATIENT
Start: 2022-04-12 | End: 2022-04-15

## 2022-04-12 RX ADMIN — DEXLANSOPRAZOLE 60 MILLIGRAM(S): 30 CAPSULE, DELAYED RELEASE ORAL at 17:35

## 2022-04-12 RX ADMIN — Medication 1 GRAM(S): at 17:35

## 2022-04-12 RX ADMIN — SIMETHICONE 80 MILLIGRAM(S): 80 TABLET, CHEWABLE ORAL at 00:40

## 2022-04-12 RX ADMIN — SIMETHICONE 80 MILLIGRAM(S): 80 TABLET, CHEWABLE ORAL at 13:10

## 2022-04-12 RX ADMIN — Medication 1 GRAM(S): at 00:40

## 2022-04-12 RX ADMIN — ONDANSETRON 4 MILLIGRAM(S): 8 TABLET, FILM COATED ORAL at 21:10

## 2022-04-12 RX ADMIN — BUPRENORPHINE AND NALOXONE 2 TABLET(S): 2; .5 TABLET SUBLINGUAL at 13:11

## 2022-04-12 RX ADMIN — SIMETHICONE 80 MILLIGRAM(S): 80 TABLET, CHEWABLE ORAL at 06:46

## 2022-04-12 RX ADMIN — SIMETHICONE 80 MILLIGRAM(S): 80 TABLET, CHEWABLE ORAL at 17:35

## 2022-04-12 RX ADMIN — DEXLANSOPRAZOLE 60 MILLIGRAM(S): 30 CAPSULE, DELAYED RELEASE ORAL at 06:46

## 2022-04-12 RX ADMIN — Medication 1 GRAM(S): at 06:45

## 2022-04-12 RX ADMIN — Medication 1 GRAM(S): at 13:11

## 2022-04-12 NOTE — DIETITIAN INITIAL EVALUATION ADULT - PERTINENT LABORATORY DATA
04-12    139  |  102  |  16  ----------------------------<  94  4.2   |  22  |  0.79    Ca    9.1      12 Apr 2022 07:23  Phos  4.2     04-12  Mg     2.00     04-12

## 2022-04-12 NOTE — PROGRESS NOTE ADULT - ASSESSMENT
ASSESSMENT: 27yo M w/ PMHx of hiatal hernia, esophageal spasm, GERD, esophageal stricture s/p multiple esophageal dilations, failed EGD stent(migration), and IVDU on Suboxone presenting with inability to tolerate oral intake for the past 30 days. Patient follows with Dr. Gerry Serrato and Thelma Whitaker. His last EGD was in December of 2021. Thoracic surgery being consulted for evaluation and possible intervention for esophageal stricture.     PLAN:    - Tentative OR plan for esophagectomy this Friday 4/15/2022  - F/u Insurance coverage for post op care  - Please preop Thurs for OR Friday(CBC, BMP w/ mag and phos, PT, PTT, COVID PCR)  - Please have 2 PRBCs on hold for OR Fri    Thoracic Surgery   [id number]

## 2022-04-12 NOTE — PROGRESS NOTE ADULT - ASSESSMENT
25yo M w/ PMHx of esophageal stricture s/p multiple dilations and IVDU on Suboxone presenting with inability to tolerate oral intake for the past 30 days.     EKG: NSR No acute changes    1. Pre-op assessment  -denies CP, SOB  -echo with grossly mild global LV systolic dysfunction (new compared to 2021) however patient denies CP or SOB on exertion.  -EKG with no ischemic changes  -no plan for further cardiac w/u  -optimized from cardiac standpoint for esophagectomy, RCRI class I, 3.9% risk of 30 day MACE    2. Dysphagia  -hx of esophageal stricture s/p multiple dilations  -plan for esophagectomy  -f/u GI and thoracic surgery

## 2022-04-12 NOTE — PROGRESS NOTE ADULT - PROBLEM SELECTOR PLAN 1
s/p EGD with dilation  severe esophagitis  - Continue PPI BID per GI recommendation  - Continue sucralfate    apprec thoracic surg eval and plans  ECHO  noted as above , cards eval pending for surgical optimization s/p EGD with dilation  severe esophagitis  - Continue PPI BID per GI recommendation  - Continue sucralfate    apprec thoracic surg eval and plans  ECHO  noted as above , cards eval  appreciated, will f/u EKG and f.u cardiology for medical optimization

## 2022-04-12 NOTE — BH CONSULTATION LIAISON ASSESSMENT NOTE - CASE SUMMARY
Chart reviewed. Pt seen, sleeping at the time when I saw him, and did not wish to be bothered. No focal neurological or withdrawal signs at that point. Later seen by trainee as above. Discussed case with trainee and agree w/ assessment/recs as above. Important to not indulge patient with controlled substances/benzodiazepines given chronic substance use hx. Is selective about what he is willing to accept but we wish to reduce iatrogenic harm. No indication for sitter or inpatient psych care, and no psych contraindication to discharge whenever medically/surgically cleared. Can be referred to outpt services for addiction/BH as needed via SBIRT and/or floor SW.

## 2022-04-12 NOTE — DIETITIAN NUTRITION RISK NOTIFICATION - TREATMENT: THE FOLLOWING DIET HAS BEEN RECOMMENDED
Diet, Clear Liquid:   Supplement Feeding Modality:  Oral  Ensure Clear Cans or Servings Per Day:  1       Frequency:  Three Times a day (04-04-22 @ 17:45) [Active]

## 2022-04-12 NOTE — DIETITIAN INITIAL EVALUATION ADULT - NS FNS WEIGHT CHANGE REASON
Reported UBW ~195 pounds. Pt's dosing wt is 76.1 kg. Pt's bed was zeroed out and reweighed by RD 4/12 @ 70.7 kg./unintentional

## 2022-04-12 NOTE — PROGRESS NOTE ADULT - SUBJECTIVE AND OBJECTIVE BOX
Patient is a 26y old  Male who presents with a chief complaint of Inability to tolerate PO (11 Apr 2022 15:07)      SUBJECTIVE / OVERNIGHT EVENTS:    MEDICATIONS  (STANDING):  buprenorphine 2 mG/naloxone 0.5 mG SL  Tablet 2 Tablet(s) SubLingual daily  dexlansoprazole DR 60 milliGRAM(s) Oral <User Schedule>  simethicone 80 milliGRAM(s) Chew four times a day  sucralfate suspension 1 Gram(s) Oral four times a day    MEDICATIONS  (PRN):  acetaminophen     Tablet .. 650 milliGRAM(s) Oral every 6 hours PRN Temp greater or equal to 38C (100.4F), Mild Pain (1 - 3)  aluminum hydroxide/magnesium hydroxide/simethicone Suspension 30 milliLiter(s) Oral every 4 hours PRN Dyspepsia  melatonin 3 milliGRAM(s) Oral at bedtime PRN Insomnia  ondansetron Injectable 4 milliGRAM(s) IV Push every 8 hours PRN Nausea and/or Vomiting      Vital Signs Last 24 Hrs  T(C): 36.6 (12 Apr 2022 06:47), Max: 37.1 (11 Apr 2022 15:14)  T(F): 97.8 (12 Apr 2022 06:47), Max: 98.8 (11 Apr 2022 15:14)  HR: 67 (12 Apr 2022 06:47) (63 - 67)  BP: 107/- (12 Apr 2022 06:47) (107/- - 113/64)  BP(mean): --  RR: 18 (12 Apr 2022 06:47) (17 - 18)  SpO2: 97% (12 Apr 2022 06:47) (97% - 99%)  CAPILLARY BLOOD GLUCOSE        I&O's Summary      PHYSICAL EXAM:  GENERAL: NAD, well-developed  HEAD:  Atraumatic, Normocephalic  EYES: EOMI, PERRLA, conjunctiva and sclera clear  NECK: Supple, No JVD  CHEST/LUNG: Clear to auscultation bilaterally; No wheeze  HEART: Regular rate and rhythm; No murmurs, rubs, or gallops  ABDOMEN: Soft, Nontender, Nondistended; Bowel sounds present  EXTREMITIES:  2+ Peripheral Pulses, No clubbing, cyanosis, or edema  PSYCH: AAOx3  NEUROLOGY: non-focal  SKIN: No rashes or lesions    LABS:                        13.0   4.76  )-----------( 115      ( 12 Apr 2022 07:23 )             38.1                     RADIOLOGY & ADDITIONAL TESTS:    Imaging Personally Reviewed:    Consultant(s) Notes Reviewed:      Care Discussed with Consultants/Other Providers:   Patient is a 26y old  Male who presents with a chief complaint of Inability to tolerate PO (11 Apr 2022 15:07)      SUBJECTIVE / OVERNIGHT EVENTS: patient seen and examined by bedside, pt c/o mild epigastric pain , denies headache, dizziness, SOB, CP, Palpitations , N/V/D,   pt tolerating full liquid diet without a problem     MEDICATIONS  (STANDING):  buprenorphine 2 mG/naloxone 0.5 mG SL  Tablet 2 Tablet(s) SubLingual daily  dexlansoprazole DR 60 milliGRAM(s) Oral <User Schedule>  simethicone 80 milliGRAM(s) Chew four times a day  sucralfate suspension 1 Gram(s) Oral four times a day    MEDICATIONS  (PRN):  acetaminophen     Tablet .. 650 milliGRAM(s) Oral every 6 hours PRN Temp greater or equal to 38C (100.4F), Mild Pain (1 - 3)  aluminum hydroxide/magnesium hydroxide/simethicone Suspension 30 milliLiter(s) Oral every 4 hours PRN Dyspepsia  melatonin 3 milliGRAM(s) Oral at bedtime PRN Insomnia  ondansetron Injectable 4 milliGRAM(s) IV Push every 8 hours PRN Nausea and/or Vomiting      Vital Signs Last 24 Hrs  T(C): 36.6 (12 Apr 2022 06:47), Max: 37.1 (11 Apr 2022 15:14)  T(F): 97.8 (12 Apr 2022 06:47), Max: 98.8 (11 Apr 2022 15:14)  HR: 67 (12 Apr 2022 06:47) (63 - 67)  BP: 107/- (12 Apr 2022 06:47) (107/- - 113/64)  BP(mean): --  RR: 18 (12 Apr 2022 06:47) (17 - 18)  SpO2: 97% (12 Apr 2022 06:47) (97% - 99%)  CAPILLARY BLOOD GLUCOSE        I&O's Summary      PHYSICAL EXAM:  CONSTITUTIONAL: NAD, appears comfortable  EYES: PERRLA; conjunctiva and sclera clear  ENMT: Moist oral mucosa; normal dentition  RESPIRATORY: Normal respiratory effort; l  CARDIOVASCULAR: ; No lower extremity edema;   MUSCULOSKELETAL: no clubbing or cyanosis of digits; no joint swelling or tenderness to palpation  PSYCH: A+O to person, place, and time; affect appropriate  NEUROLOGY: CN 2-12 are intact and symmetric; no gross sensory deficits   SKIN: No rashes; no palpable lesions        LABS:                        13.0   4.76  )-----------( 115      ( 12 Apr 2022 07:23 )             38.1                 < from: Transthoracic Echocardiogram (04.11.22 @ 08:41) >  OBSERVATIONS:  Mitral Valve: Normal mitral valve. Minimal mitral  regurgitation.  Aortic Root: Normal aortic root.  Aortic Valve: Aortic valve leaflet morphology not well  visualized.  Left Atrium: Normal left atrium.  LA volume index = 26  cc/m2.  Left Ventricle: Endocardium not well visualized; grossly  mild global left ventricular systolic dysfunction. Normal  left ventricular internal dimensions and wall thicknesses.  Normal left ventricular diastolic function.  Right Heart: Normal right atrium. Normal right ventricular  size and function. Normal tricuspid valve.  Minimal  tricuspid regurgitation. Normal pulmonic valve.  Mild  pulmonic regurgitation.  Pericardium/PleuraNormal pericardium with no pericardial  effusion.  ------------------------------------------------------------------------  CONCLUSIONS:  1. Normal mitral valve. Minimal mitral regurgitation.  2. Normal left ventricular internal dimensions and wall  thicknesses.  3. Endocardium not well visualized; grossly mild global  left ventricular systolic dysfunction.  4. Normal left ventricular diastolic function.  5. Normal right ventricular size and function.    < end of copied text >      RADIOLOGY & ADDITIONAL TESTS:    Imaging Personally Reviewed:    Consultant(s) Notes Reviewed:      Care Discussed with Consultants/Other Providers:

## 2022-04-12 NOTE — PROGRESS NOTE ADULT - NSPROGADDITIONALINFOA_GEN_ALL_CORE
once thoracic recs made, will determine dc plan
d/w patient and ACP
plan of care d/w patient and ACP

## 2022-04-12 NOTE — DIETITIAN INITIAL EVALUATION ADULT - ENERGY INTAKE
Inability to tolerate adequate calories in liquid diet. Pt has been ordered for [facility] for 8 days but he has been receiving regular Ensures and creamed soups from family.  Poor (<50%)

## 2022-04-12 NOTE — PROGRESS NOTE ADULT - SUBJECTIVE AND OBJECTIVE BOX
Subjective:    Vital Signs:  Vital Signs Last 24 Hrs  T(C): 36.9 (04-12-22 @ 13:23), Max: 37.1 (04-11-22 @ 15:14)  T(F): 98.4 (04-12-22 @ 13:23), Max: 98.8 (04-11-22 @ 15:14)  HR: 90 (04-12-22 @ 13:23) (63 - 90)  BP: 122/78 (04-12-22 @ 13:23) (107/- - 122/78)  RR: 18 (04-12-22 @ 13:23) (17 - 18)  SpO2: 97% (04-12-22 @ 13:23) (97% - 99%) on (O2)    Telemetry/Alarms:  General: WN/WD NAD  Neurology: Awake, nonfocal, FERRARO x 4  Eyes: Scleras clear, PERRLA/ EOMI, Gross vision intact  ENT:Gross hearing intact, grossly patent pharynx, no stridor  Neck: Neck supple, trachea midline, No JVD,   Respiratory: CTA B/L, No wheezing, rales, rhonchi  CV: RRR, S1S2, no murmurs, rubs or gallops  Abdominal: Soft, NT, ND +BS,   Extremities: No edema, + peripheral pulses  Skin: No Rashes, Hematoma, Ecchymosis  Lymphatic: No Neck, axilla, groin LAD  Psych: Oriented x 3, normal affect  Incisions:   Tubes:  Relevant labs, radiology and Medications reviewed                        13.0   4.76  )-----------( 115      ( 12 Apr 2022 07:23 )             38.1     04-12    139  |  102  |  16  ----------------------------<  94  4.2   |  22  |  0.79    Ca    9.1      12 Apr 2022 07:23  Phos  4.2     04-12  Mg     2.00     04-12        MEDICATIONS  (STANDING):  buprenorphine 2 mG/naloxone 0.5 mG SL  Tablet 2 Tablet(s) SubLingual daily  dexlansoprazole DR 60 milliGRAM(s) Oral <User Schedule>  simethicone 80 milliGRAM(s) Chew four times a day  sucralfate suspension 1 Gram(s) Oral four times a day    MEDICATIONS  (PRN):  acetaminophen     Tablet .. 650 milliGRAM(s) Oral every 6 hours PRN Temp greater or equal to 38C (100.4F), Mild Pain (1 - 3)  aluminum hydroxide/magnesium hydroxide/simethicone Suspension 30 milliLiter(s) Oral every 4 hours PRN Dyspepsia  melatonin 3 milliGRAM(s) Oral at bedtime PRN Insomnia  ondansetron Injectable 4 milliGRAM(s) IV Push every 8 hours PRN Nausea and/or Vomiting      Subjective: Pt seen and assessed at bedside by surgical team. Pt endorsed understanding and continued interests in pursuing surgical intervention. Pt did not endorse any chest pain, SOB, fevers/chills.    Vital Signs:  Vital Signs Last 24 Hrs  T(C): 36.9 (04-12-22 @ 13:23), Max: 37.1 (04-11-22 @ 15:14)  T(F): 98.4 (04-12-22 @ 13:23), Max: 98.8 (04-11-22 @ 15:14)  HR: 90 (04-12-22 @ 13:23) (63 - 90)  BP: 122/78 (04-12-22 @ 13:23) (107/- - 122/78)  RR: 18 (04-12-22 @ 13:23) (17 - 18)  SpO2: 97% (04-12-22 @ 13:23) (97% - 99%) on (O2)    Telemetry/Alarms:  General: WN/WD NAD  Neurology: Awake, nonfocal, FERRARO x 4  Eyes: Scleras clear, PERRLA/ EOMI, Gross vision intact  ENT:Gross hearing intact, grossly patent pharynx, no stridor  Neck: Neck supple, trachea midline, No JVD,   Respiratory: CTA B/L, No wheezing, rales, rhonchi  CV: RRR, S1S2, no murmurs, rubs or gallops  Abdominal: Soft, NT, ND +BS,   Extremities: No edema, + peripheral pulses  Skin: No Rashes, Hematoma, Ecchymosis  Lymphatic: No Neck, axilla, groin LAD  Psych: Oriented x 3, normal affect  Relevant labs, radiology and Medications reviewed                        13.0   4.76  )-----------( 115      ( 12 Apr 2022 07:23 )             38.1     04-12    139  |  102  |  16  ----------------------------<  94  4.2   |  22  |  0.79    Ca    9.1      12 Apr 2022 07:23  Phos  4.2     04-12  Mg     2.00     04-12        MEDICATIONS  (STANDING):  buprenorphine 2 mG/naloxone 0.5 mG SL  Tablet 2 Tablet(s) SubLingual daily  dexlansoprazole DR 60 milliGRAM(s) Oral <User Schedule>  simethicone 80 milliGRAM(s) Chew four times a day  sucralfate suspension 1 Gram(s) Oral four times a day    MEDICATIONS  (PRN):  acetaminophen     Tablet .. 650 milliGRAM(s) Oral every 6 hours PRN Temp greater or equal to 38C (100.4F), Mild Pain (1 - 3)  aluminum hydroxide/magnesium hydroxide/simethicone Suspension 30 milliLiter(s) Oral every 4 hours PRN Dyspepsia  melatonin 3 milliGRAM(s) Oral at bedtime PRN Insomnia  ondansetron Injectable 4 milliGRAM(s) IV Push every 8 hours PRN Nausea and/or Vomiting

## 2022-04-12 NOTE — BH CONSULTATION LIAISON ASSESSMENT NOTE - VIOLENCE RISK FACTORS:
History of violence prior to age 18/Substance abuse/History of being victimized/traumatized/History of violation of a legal mandate (e.g., parole, probation, AOT)/None Known

## 2022-04-12 NOTE — PROGRESS NOTE ADULT - SUBJECTIVE AND OBJECTIVE BOX
Christiano Hoyt MD  Interventional Cardiology / Advance Heart Failure and Cardiac Transplant Specialist  [place] Office : [phone] [place] [address] [address]  Tel:   [place] Office : [phone] [facility] [place] [address]  Tel: [phone]      Subjective/Overnight events: Pt is lying in bed comfortable not in distress, no chest pains no SOB no palpitations  	  MEDICATIONS:        acetaminophen     Tablet .. 650 milliGRAM(s) Oral every 6 hours PRN  buprenorphine 2 mG/naloxone 0.5 mG SL  Tablet 2 Tablet(s) SubLingual daily  melatonin 3 milliGRAM(s) Oral at bedtime PRN  ondansetron Injectable 4 milliGRAM(s) IV Push every 8 hours PRN    aluminum hydroxide/magnesium hydroxide/simethicone Suspension 30 milliLiter(s) Oral every 4 hours PRN  dexlansoprazole DR 60 milliGRAM(s) Oral <User Schedule>  simethicone 80 milliGRAM(s) Chew four times a day  sucralfate suspension 1 Gram(s) Oral four times a day          PAST MEDICAL/SURGICAL HISTORY  PAST MEDICAL & SURGICAL HISTORY:  Heroin abuse    Cocaine abuse    GERD (gastroesophageal reflux disease)    Hiatal hernia    History of esophageal stricture    Intravenous drug abuse    Migration of pancreatic stent    Migrated esophageal stent        SOCIAL HISTORY: Substance Use (street drugs): ( x ) never used  (  ) other:    FAMILY HISTORY:  FH: thyroid disease (Grandparent)        PHYSICAL EXAM:  T(C): 36.9 (04-12-22 @ 13:23), Max: 37.1 (04-11-22 @ 15:14)  HR: 90 (04-12-22 @ 13:23) (63 - 90)  BP: 122/78 (04-12-22 @ 13:23) (107/- - 122/78)  RR: 18 (04-12-22 @ 13:23) (17 - 18)  SpO2: 97% (04-12-22 @ 13:23) (97% - 99%)  Wt(kg): --  I&O's Summary            GENERAL: NAD  EYES: EOMI, PERRLA, conjunctiva and sclera clear  ENMT: No tonsillar erythema, exudates, or enlargement  Cardiovascular: Normal S1 S2, No JVD, No murmurs, No edema  Respiratory: Lungs clear to auscultation	  Gastrointestinal:  Soft, Non-tender, + BS	  Extremities: No edema                                13.0   4.76  )-----------( 115      ( 12 Apr 2022 07:23 )             38.1     04-12    139  |  102  |  16  ----------------------------<  94  4.2   |  22  |  0.79    Ca    9.1      12 Apr 2022 07:23  Phos  4.2     04-12  Mg     2.00     04-12      proBNP:   Lipid Profile:   HgA1c:   TSH:     Consultant(s) Notes Reviewed:  [x ] YES  [ ] NO    Care Discussed with Consultants/Other Providers [ x] YES  [ ] NO    Imaging Personally Reviewed independently:  [x] YES  [ ] NO    All labs, radiologic studies, vitals, orders and medications list reviewed. Patient is seen and examined at bedside. Case discussed with medical team.

## 2022-04-12 NOTE — BH CONSULTATION LIAISON ASSESSMENT NOTE - SUMMARY
25yo M w/ PMHx of esophageal stricture s/p multiple dilations and self reported past psychiatric history of Depression/Anxiety/PTSD and substance use disorder currently on Suboxone 4mg daily, presented to the hospital with progressive inability to tolerate oral intake for the past 30 days, first to solids and now only tolerates oral liquid intake. He was recently started on Suboxone this past month, which was tapered down to 4mg 3 days ago. Pt is s/p EGD 4/5/22 with dilation, now plans for esophagectomy next week. Pt currently seems adequately groomed, normal weight, normal posture, normal motor activity, cooperative with average eye contact. Pt reports his mood as depressed/anxious with depressed/congruent/appropriate/constricted affect. He has goal directed though process with no delusions, no perceptual disturbances, no suicidal/homicidal ideation/intent/plan. He is Alert and oriented x 3, intact attention and concentration, fair insight and judgement. He has history of 7 year incarceration, active substance use disorder, positive family history of mental illness, previous diagnosis of depression/anxiety/PTSD,  inconsistent compliance with medications, which all can be predisposing factors and he has no suicidal history and is living with his family and is currently  seeking for medical and psychiatric help which all can be protective factors. Pt has no access to lethal means and he has low risk for suicidality, violence.

## 2022-04-12 NOTE — BH CONSULTATION LIAISON ASSESSMENT NOTE - NSBHCHARTREVIEWVS_PSY_A_CORE FT
Vital Signs Last 24 Hrs  T(C): 36.6 (12 Apr 2022 06:47), Max: 37.1 (11 Apr 2022 15:14)  T(F): 97.8 (12 Apr 2022 06:47), Max: 98.8 (11 Apr 2022 15:14)  HR: 67 (12 Apr 2022 06:47) (63 - 67)  BP: 107/- (12 Apr 2022 06:47) (107/- - 113/64)  BP(mean): --  RR: 18 (12 Apr 2022 06:47) (17 - 18)  SpO2: 97% (12 Apr 2022 06:47) (97% - 99%)

## 2022-04-12 NOTE — BH CONSULTATION LIAISON ASSESSMENT NOTE - RISK ASSESSMENT
25yo M w/ PMHx of esophageal stricture s/p multiple dilations and self reported past psychiatric history of Depression/Anxiety/PTSD and substance use disorder currently on Suboxone 4mg daily, presented to the hospital with progressive inability to tolerate oral intake for the past 30 days, first to solids and now only tolerates oral liquid intake. He was recently started on Suboxone this past month, which was tapered down to 4mg 3 days ago. Pt is s/p EGD 4/5/22 with dilation, now plans for esophagectomy next week. Pt currently seems adequately groomed, normal weight, normal posture, normal motor activity, cooperative with average eye contact. Pt reports his mood as depressed/anxious with depressed/congruent/appropriate/constricted affect. He has goal directed though process with no delusions, no perceptual disturbances, no suicidal/homicidal ideation/intent/plan. He is Alert and oriented x 3, intact attention and concentration, fair insight and judgement.     He has history of 7 year incarceration, active substance use disorder, positive family history of mental illness, previous diagnosis of depression/anxiety/PTSD,  inconsistent compliance with medications, which all can be predisposing factors and he has no suicidal history and is living with his family and is currently  seeking for medical and psychiatric help which all can be protective factors. Pt has no access to lethal means and he has low risk for suicidality, violence.    Currently he is complaining of anxiety and depression and requests to be off Suboxone and being started on anxiolytic medication like Xanax or Ativan. He was explained that current dosage of Suboxone is low and will not interfere with his anesthetic medications effect and it is better for him to continue being on Suboxone, the same dosage, to have a better post op pain control. He verbalized understanding and agreed with the plan. As Pt has the h/o inconsistent compliance with medications and currently has no imminent psychiatric complaint which can be a threat to himself or others, will defer initiation of any Psychiatry medication to outpatient team.    - Continue with Suboxone 4mg daily  - Start Pt on Hydroxyzine 25mg Q8hr PRN for Anxiety  - Referral to Psychiatry outpatient clinic and substance use disorder services upon discharge 25yo M w/ PMHx of esophageal stricture s/p multiple dilations and self reported past psychiatric history of Depression/Anxiety/PTSD and substance use disorder currently on Suboxone 4mg daily, presented to the hospital with progressive inability to tolerate oral intake for the past 30 days, first to solids and now only tolerates oral liquid intake. He was recently started on Suboxone this past month, which was tapered down to 4mg 3 days ago. Pt is s/p EGD 4/5/22 with dilation, now plans for esophagectomy next week. Pt currently seems adequately groomed, normal weight, normal posture, normal motor activity, cooperative with average eye contact. Pt reports his mood as depressed/anxious with depressed/congruent/appropriate/constricted affect. He has goal directed though process with no delusions, no perceptual disturbances, no suicidal/homicidal ideation/intent/plan. He is Alert and oriented x 3, intact attention and concentration, fair insight and judgement.     He has history of 7 year incarceration, active substance use disorder, positive family history of mental illness, previous diagnosis of depression/anxiety/PTSD,  inconsistent compliance with medications, which all can be predisposing factors and he has no suicidal history and is living with his family and is currently  seeking for medical and psychiatric help which all can be protective factors. Pt has no access to lethal means and he has low risk for suicidality, violence.    Currently he is complaining of anxiety and depression and requests to be off Suboxone and being started on anxiolytic medication like Xanax or Ativan. He was explained that current dosage of Suboxone is low and will not interfere with his anesthetic medications effect and it is better for him to continue being on Suboxone, the same dosage, to have a better post op pain control. He verbalized understanding and agreed with the plan. As Pt has the h/o inconsistent compliance with medications and currently has no imminent psychiatric complaint which can be a threat to himself or others, will defer initiation of any Psychiatry medication to outpatient team.    - Continue with Suboxone 4mg daily, even through perioperative period.  - Start Pt on Hydroxyzine 25mg Q6hr PRN for Anxiety  - Referral to Psychiatry outpatient clinic and substance use disorder services upon discharge  - No indication for inpatient psych care or sitter.  - Will continue to follow PRN

## 2022-04-12 NOTE — DIETITIAN INITIAL EVALUATION ADULT - NS FNS DIET ORDER
Diet, Clear Liquid:   Supplement Feeding Modality:  Oral  Ensure Clear Cans or Servings Per Day:  1       Frequency:  Three Times a day (04-04-22 @ 17:45)

## 2022-04-12 NOTE — PROGRESS NOTE ADULT - PROBLEM SELECTOR PLAN 2
- Continue Suboxone 4mg.  pt has been self titrating meds and would like to d/w psych further about his options  psych eval requested

## 2022-04-12 NOTE — BH CONSULTATION LIAISON ASSESSMENT NOTE - MSE UNSTRUCTURED FT
Upon evaluation today, he seems adequately groomed, normal weight, normal posture, normal motor activity, cooperative with average eye contact. Pt reports his mood as depressed/anxious. His affect is depressed/congruent/appropriate/constricted. He has goal directed though process with no delusions, no perceptual disturbances, no suicidal/homicidal ideation/intent/plan. He is Alert and oriented x 3, intact attention and concentration, fair insight and judgement.
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
## 2022-04-12 NOTE — BH CONSULTATION LIAISON ASSESSMENT NOTE - CURRENT MEDICATION
MEDICATIONS  (STANDING):  buprenorphine 2 mG/naloxone 0.5 mG SL  Tablet 2 Tablet(s) SubLingual daily  dexlansoprazole DR 60 milliGRAM(s) Oral <User Schedule>  simethicone 80 milliGRAM(s) Chew four times a day  sucralfate suspension 1 Gram(s) Oral four times a day    MEDICATIONS  (PRN):  acetaminophen     Tablet .. 650 milliGRAM(s) Oral every 6 hours PRN Temp greater or equal to 38C (100.4F), Mild Pain (1 - 3)  aluminum hydroxide/magnesium hydroxide/simethicone Suspension 30 milliLiter(s) Oral every 4 hours PRN Dyspepsia  melatonin 3 milliGRAM(s) Oral at bedtime PRN Insomnia  ondansetron Injectable 4 milliGRAM(s) IV Push every 8 hours PRN Nausea and/or Vomiting

## 2022-04-12 NOTE — DIETITIAN INITIAL EVALUATION ADULT - OTHER INFO
A&Ox4. Plan for esophagectomy. Pt will need TF after surgery. Pt informed of need for PEG and possible progression to PO diet (considering dysphagia complication).

## 2022-04-12 NOTE — DIETITIAN INITIAL EVALUATION ADULT - REASON FOR ADMISSION
Other specified postprocedural state    27yo M w/ PMHx of esophageal stricture s/p multiple dilations and IVDU on Suboxone presenting with inability to tolerate oral intake for the past 30 days. s/p EGD 4/5 with dilation, now plans for esophagectomy next week, will medically optimize prior to procedure

## 2022-04-13 LAB
ANION GAP SERPL CALC-SCNC: 11 MMOL/L — SIGNIFICANT CHANGE UP (ref 7–14)
BUN SERPL-MCNC: 13 MG/DL — SIGNIFICANT CHANGE UP (ref 7–23)
CALCIUM SERPL-MCNC: 9.6 MG/DL — SIGNIFICANT CHANGE UP (ref 8.4–10.5)
CHLORIDE SERPL-SCNC: 101 MMOL/L — SIGNIFICANT CHANGE UP (ref 98–107)
CO2 SERPL-SCNC: 28 MMOL/L — SIGNIFICANT CHANGE UP (ref 22–31)
CREAT SERPL-MCNC: 0.85 MG/DL — SIGNIFICANT CHANGE UP (ref 0.5–1.3)
EGFR: 123 ML/MIN/1.73M2 — SIGNIFICANT CHANGE UP
GLUCOSE SERPL-MCNC: 85 MG/DL — SIGNIFICANT CHANGE UP (ref 70–99)
HCT VFR BLD CALC: 41.2 % — SIGNIFICANT CHANGE UP (ref 39–50)
HGB BLD-MCNC: 14 G/DL — SIGNIFICANT CHANGE UP (ref 13–17)
MAGNESIUM SERPL-MCNC: 2 MG/DL — SIGNIFICANT CHANGE UP (ref 1.6–2.6)
MCHC RBC-ENTMCNC: 28.6 PG — SIGNIFICANT CHANGE UP (ref 27–34)
MCHC RBC-ENTMCNC: 34 GM/DL — SIGNIFICANT CHANGE UP (ref 32–36)
MCV RBC AUTO: 84.3 FL — SIGNIFICANT CHANGE UP (ref 80–100)
NRBC # BLD: 0 /100 WBCS — SIGNIFICANT CHANGE UP
NRBC # FLD: 0 K/UL — SIGNIFICANT CHANGE UP
PHOSPHATE SERPL-MCNC: 4.2 MG/DL — SIGNIFICANT CHANGE UP (ref 2.5–4.5)
PLATELET # BLD AUTO: 116 K/UL — LOW (ref 150–400)
POTASSIUM SERPL-MCNC: 4.2 MMOL/L — SIGNIFICANT CHANGE UP (ref 3.5–5.3)
POTASSIUM SERPL-SCNC: 4.2 MMOL/L — SIGNIFICANT CHANGE UP (ref 3.5–5.3)
RBC # BLD: 4.89 M/UL — SIGNIFICANT CHANGE UP (ref 4.2–5.8)
RBC # FLD: 13.9 % — SIGNIFICANT CHANGE UP (ref 10.3–14.5)
SODIUM SERPL-SCNC: 140 MMOL/L — SIGNIFICANT CHANGE UP (ref 135–145)
WBC # BLD: 5.62 K/UL — SIGNIFICANT CHANGE UP (ref 3.8–10.5)
WBC # FLD AUTO: 5.62 K/UL — SIGNIFICANT CHANGE UP (ref 3.8–10.5)

## 2022-04-13 PROCEDURE — 99232 SBSQ HOSP IP/OBS MODERATE 35: CPT

## 2022-04-13 PROCEDURE — 71260 CT THORAX DX C+: CPT | Mod: 26

## 2022-04-13 PROCEDURE — 99232 SBSQ HOSP IP/OBS MODERATE 35: CPT | Mod: 57

## 2022-04-13 RX ADMIN — SIMETHICONE 80 MILLIGRAM(S): 80 TABLET, CHEWABLE ORAL at 05:25

## 2022-04-13 RX ADMIN — DEXLANSOPRAZOLE 60 MILLIGRAM(S): 30 CAPSULE, DELAYED RELEASE ORAL at 17:51

## 2022-04-13 RX ADMIN — Medication 1 GRAM(S): at 17:51

## 2022-04-13 RX ADMIN — Medication 1 GRAM(S): at 13:01

## 2022-04-13 RX ADMIN — Medication 1 GRAM(S): at 00:56

## 2022-04-13 RX ADMIN — SIMETHICONE 80 MILLIGRAM(S): 80 TABLET, CHEWABLE ORAL at 13:01

## 2022-04-13 RX ADMIN — SIMETHICONE 80 MILLIGRAM(S): 80 TABLET, CHEWABLE ORAL at 17:51

## 2022-04-13 RX ADMIN — Medication 25 MILLIGRAM(S): at 19:41

## 2022-04-13 RX ADMIN — Medication 1 GRAM(S): at 05:25

## 2022-04-13 RX ADMIN — SIMETHICONE 80 MILLIGRAM(S): 80 TABLET, CHEWABLE ORAL at 00:56

## 2022-04-13 RX ADMIN — Medication 1 GRAM(S): at 23:10

## 2022-04-13 RX ADMIN — BUPRENORPHINE AND NALOXONE 2 TABLET(S): 2; .5 TABLET SUBLINGUAL at 13:01

## 2022-04-13 RX ADMIN — SIMETHICONE 80 MILLIGRAM(S): 80 TABLET, CHEWABLE ORAL at 23:10

## 2022-04-13 RX ADMIN — DEXLANSOPRAZOLE 60 MILLIGRAM(S): 30 CAPSULE, DELAYED RELEASE ORAL at 05:25

## 2022-04-13 NOTE — PROGRESS NOTE ADULT - PROBLEM SELECTOR PLAN 4
Low risk  Clear diet   Dispo: eventual home    now awaiting thoracic surg for esophagectomy Low risk  Clear diet   Dispo: eventual home    now awaiting thoracic surg for esophagectomy on 4/15   plan of care d/w pt and ACP

## 2022-04-13 NOTE — PROGRESS NOTE ADULT - SUBJECTIVE AND OBJECTIVE BOX
Christiano Hoyt MD  Interventional Cardiology / Advance Heart Failure and Cardiac Transplant Specialist  [place] Office : [address] [place] [address] [address] [address]  Tel:   [place] Office : [address] [facility] [place] [address]  Tel: [phone]      Subjective/Overnight events: Pt is lying in bed comfortable not in distress, no chest pains no SOB no palpitations  	  MEDICATIONS:        acetaminophen     Tablet .. 650 milliGRAM(s) Oral every 6 hours PRN  buprenorphine 2 mG/naloxone 0.5 mG SL  Tablet 2 Tablet(s) SubLingual daily  hydrOXYzine hydrochloride 25 milliGRAM(s) Oral every 6 hours PRN  melatonin 3 milliGRAM(s) Oral at bedtime PRN  ondansetron Injectable 4 milliGRAM(s) IV Push every 8 hours PRN    aluminum hydroxide/magnesium hydroxide/simethicone Suspension 30 milliLiter(s) Oral every 4 hours PRN  dexlansoprazole DR 60 milliGRAM(s) Oral <User Schedule>  simethicone 80 milliGRAM(s) Chew four times a day  sucralfate suspension 1 Gram(s) Oral four times a day          PAST MEDICAL/SURGICAL HISTORY  PAST MEDICAL & SURGICAL HISTORY:  Heroin abuse    Cocaine abuse    GERD (gastroesophageal reflux disease)    Hiatal hernia    History of esophageal stricture    Intravenous drug abuse    Migration of pancreatic stent    Migrated esophageal stent        SOCIAL HISTORY: Substance Use (street drugs): ( x ) never used  (  ) other:    FAMILY HISTORY:  FH: thyroid disease (Grandparent)          PHYSICAL EXAM:  T(C): 36.7 (04-13-22 @ 05:15), Max: 36.8 (04-12-22 @ 21:00)  HR: 79 (04-13-22 @ 05:15) (79 - 85)  BP: 110/62 (04-13-22 @ 05:15) (110/62 - 120/64)  RR: 18 (04-13-22 @ 05:15) (18 - 18)  SpO2: 98% (04-13-22 @ 05:15) (98% - 98%)  Wt(kg): --  I&O's Summary          GENERAL: NAD  EYES: EOMI, PERRLA, conjunctiva and sclera clear  ENMT: No tonsillar erythema, exudates, or enlargement  Cardiovascular: Normal S1 S2, No JVD, No murmurs, No edema  Respiratory: Lungs clear to auscultation	  Gastrointestinal:  Soft, Non-tender, + BS	  Extremities: No edema                                      14.0   5.62  )-----------( 116      ( 13 Apr 2022 10:36 )             41.2     04-13    140  |  101  |  13  ----------------------------<  85  4.2   |  28  |  0.85    Ca    9.6      13 Apr 2022 10:36  Phos  4.2     04-13  Mg     2.00     04-13      proBNP:   Lipid Profile:   HgA1c:   TSH:     Consultant(s) Notes Reviewed:  [x ] YES  [ ] NO    Care Discussed with Consultants/Other Providers [ x] YES  [ ] NO    Imaging Personally Reviewed independently:  [x] YES  [ ] NO    All labs, radiologic studies, vitals, orders and medications list reviewed. Patient is seen and examined at bedside. Case discussed with medical team.

## 2022-04-13 NOTE — PROGRESS NOTE ADULT - PROBLEM SELECTOR PLAN 1
s/p EGD with dilation  severe esophagitis  - Continue PPI BID per GI recommendation  - Continue sucralfate    apprec thoracic surg eval and plans  ECHO  noted as above , cards eval  appreciated, will f/u EKG and f.u cardiology for medical optimization s/p EGD with dilation  severe esophagitis  - Continue PPI BID per GI recommendation  - Continue sucralfate    apprec thoracic surg eval and plans  ECHO  noted  , cards eval  appreciated,  EKG : NSR , pt optimized from cardiac standpoint for esophagectomy, RCRI class I, 3.9% risk of 30 day MACE

## 2022-04-13 NOTE — CHART NOTE - NSCHARTNOTEFT_GEN_A_CORE
S: Pt seen at bedside by Dr Shannon, plan for surgery this Friday discussed.     ICU Vital Signs Last 24 Hrs  T(C): 37.1 (13 Apr 2022 15:30), Max: 37.1 (13 Apr 2022 15:30)  T(F): 98.7 (13 Apr 2022 15:30), Max: 98.7 (13 Apr 2022 15:30)  HR: 78 (13 Apr 2022 15:30) (78 - 85)  BP: 109/80 (13 Apr 2022 15:30) (109/80 - 120/64)  BP(mean): --  ABP: --  ABP(mean): --  RR: 18 (13 Apr 2022 15:30) (18 - 18)  SpO2: 100% (13 Apr 2022 15:30) (98% - 100%)      A/P: 25yo M w/ PMHx of hiatal hernia, esophageal spasm, GERD, esophageal stricture s/p multiple esophageal dilations, failed EGD stent(migration), and IVDU on Suboxone presenting with inability to tolerate oral intake for the past 30 days. Patient follows with Dr. Gerry Serrato and Thelma Whitaker. His last EGD was in December of 2021. Thoracic surgery being consulted for evaluation and possible intervention for esophageal stricture.       PLAN:    - Tentative OR plan for esophagectomy this Friday 4/15 with Dr Shannon  - Please preop Thurs for OR Friday(CBC, BMP w/ mag and phos, PT, PTT, COVID PCR),  2 PRBCs on hold for OR  - Obtain bedside spirometry testing   - F/U CT chest today   - Will reach out to pain management regarding post op pain given hx of IVDU currently on suboxone     Thoracic Surgery S: Pt seen at bedside by Dr Shannon, plan for surgery this Friday discussed.     ICU Vital Signs Last 24 Hrs  T(C): 37.1 (13 Apr 2022 15:30), Max: 37.1 (13 Apr 2022 15:30)  T(F): 98.7 (13 Apr 2022 15:30), Max: 98.7 (13 Apr 2022 15:30)  HR: 78 (13 Apr 2022 15:30) (78 - 85)  BP: 109/80 (13 Apr 2022 15:30) (109/80 - 120/64)  BP(mean): --  ABP: --  ABP(mean): --  RR: 18 (13 Apr 2022 15:30) (18 - 18)  SpO2: 100% (13 Apr 2022 15:30) (98% - 100%)      A/P: 27yo M w/ PMHx of hiatal hernia, esophageal spasm, GERD, esophageal stricture s/p multiple esophageal dilations, failed EGD stent(migration), and IVDU on Suboxone presenting with inability to tolerate oral intake for the past 30 days. Patient follows with Dr. Gerry Serrato and Thelma Whitaker. His last EGD was in December of 2021. Thoracic surgery being consulted for evaluation and possible intervention for esophageal stricture.       PLAN:    - Tentative OR plan for esophagectomy this Friday 4/15 with Dr Shannon  - Please preop Thurs for OR Friday(CBC, BMP w/ mag and phos, PT, PTT, COVID PCR),  2 PRBCs on hold for OR  - Obtain bedside spirometry testing   - F/U CT chest today   - Will reach out to pain management regarding post op pain given hx of IVDU currently on suboxone     Thoracic Surgery      PLAN for OR friday. f/u chest ct .   discussed with patient again in detail surgery, alternatives, risks of surgery including an anastomotic leak and prolonged NPO status. in agreement and wishes to proceed

## 2022-04-13 NOTE — PROGRESS NOTE ADULT - SUBJECTIVE AND OBJECTIVE BOX
Patient is a 26y old  Male who presents with a chief complaint of Inability to tolerate PO (12 Apr 2022 13:27)      SUBJECTIVE / OVERNIGHT EVENTS:    MEDICATIONS  (STANDING):  buprenorphine 2 mG/naloxone 0.5 mG SL  Tablet 2 Tablet(s) SubLingual daily  dexlansoprazole DR 60 milliGRAM(s) Oral <User Schedule>  simethicone 80 milliGRAM(s) Chew four times a day  sucralfate suspension 1 Gram(s) Oral four times a day    MEDICATIONS  (PRN):  acetaminophen     Tablet .. 650 milliGRAM(s) Oral every 6 hours PRN Temp greater or equal to 38C (100.4F), Mild Pain (1 - 3)  aluminum hydroxide/magnesium hydroxide/simethicone Suspension 30 milliLiter(s) Oral every 4 hours PRN Dyspepsia  hydrOXYzine hydrochloride 25 milliGRAM(s) Oral every 6 hours PRN Anxiety  melatonin 3 milliGRAM(s) Oral at bedtime PRN Insomnia  ondansetron Injectable 4 milliGRAM(s) IV Push every 8 hours PRN Nausea and/or Vomiting      Vital Signs Last 24 Hrs  T(C): 36.7 (13 Apr 2022 05:15), Max: 36.9 (12 Apr 2022 13:23)  T(F): 98 (13 Apr 2022 05:15), Max: 98.4 (12 Apr 2022 13:23)  HR: 79 (13 Apr 2022 05:15) (79 - 90)  BP: 110/62 (13 Apr 2022 05:15) (110/62 - 122/78)  BP(mean): --  RR: 18 (13 Apr 2022 05:15) (18 - 18)  SpO2: 98% (13 Apr 2022 05:15) (97% - 98%)  CAPILLARY BLOOD GLUCOSE        I&O's Summary      PHYSICAL EXAM:  GENERAL: NAD, well-developed  HEAD:  Atraumatic, Normocephalic  EYES: EOMI, PERRLA, conjunctiva and sclera clear  NECK: Supple, No JVD  CHEST/LUNG: Clear to auscultation bilaterally; No wheeze  HEART: Regular rate and rhythm; No murmurs, rubs, or gallops  ABDOMEN: Soft, Nontender, Nondistended; Bowel sounds present  EXTREMITIES:  2+ Peripheral Pulses, No clubbing, cyanosis, or edema  PSYCH: AAOx3  NEUROLOGY: non-focal  SKIN: No rashes or lesions    LABS:                        13.0   4.76  )-----------( 115      ( 12 Apr 2022 07:23 )             38.1     04-12    139  |  102  |  16  ----------------------------<  94  4.2   |  22  |  0.79    Ca    9.1      12 Apr 2022 07:23  Phos  4.2     04-12  Mg     2.00     04-12                RADIOLOGY & ADDITIONAL TESTS:    Imaging Personally Reviewed:    Consultant(s) Notes Reviewed:      Care Discussed with Consultants/Other Providers:   Patient is a 26y old  Male who presents with a chief complaint of Inability to tolerate PO (12 Apr 2022 13:27)      SUBJECTIVE / OVERNIGHT EVENTS: patient seen and examined by bedside, pt still have mild discomfort in his sternal and epigastric region, denies headache, dizziness, SOB, CP, Palpitations , N/V/D, abdominal pain, tolerating full liquid diet         MEDICATIONS  (STANDING):  buprenorphine 2 mG/naloxone 0.5 mG SL  Tablet 2 Tablet(s) SubLingual daily  dexlansoprazole DR 60 milliGRAM(s) Oral <User Schedule>  simethicone 80 milliGRAM(s) Chew four times a day  sucralfate suspension 1 Gram(s) Oral four times a day    MEDICATIONS  (PRN):  acetaminophen     Tablet .. 650 milliGRAM(s) Oral every 6 hours PRN Temp greater or equal to 38C (100.4F), Mild Pain (1 - 3)  aluminum hydroxide/magnesium hydroxide/simethicone Suspension 30 milliLiter(s) Oral every 4 hours PRN Dyspepsia  hydrOXYzine hydrochloride 25 milliGRAM(s) Oral every 6 hours PRN Anxiety  melatonin 3 milliGRAM(s) Oral at bedtime PRN Insomnia  ondansetron Injectable 4 milliGRAM(s) IV Push every 8 hours PRN Nausea and/or Vomiting      Vital Signs Last 24 Hrs  T(C): 36.7 (13 Apr 2022 05:15), Max: 36.9 (12 Apr 2022 13:23)  T(F): 98 (13 Apr 2022 05:15), Max: 98.4 (12 Apr 2022 13:23)  HR: 79 (13 Apr 2022 05:15) (79 - 90)  BP: 110/62 (13 Apr 2022 05:15) (110/62 - 122/78)  BP(mean): --  RR: 18 (13 Apr 2022 05:15) (18 - 18)  SpO2: 98% (13 Apr 2022 05:15) (97% - 98%)    PHYSICAL EXAM:  CONSTITUTIONAL: NAD, appears comfortable  EYES: PERRLA; conjunctiva and sclera clear  ENMT: Moist oral mucosa; normal dentition  RESPIRATORY: Normal respiratory effort; l  CARDIOVASCULAR: ; No lower extremity edema;   MUSCULOSKELETAL: no clubbing or cyanosis of digits; no joint swelling or tenderness to palpation  PSYCH: A+O to person, place, and time; affect appropriate  NEUROLOGY: CN 2-12 are intact and symmetric; no gross sensory deficits   SKIN: No rashes; no palpable lesions        LABS:                        13.0   4.76  )-----------( 115      ( 12 Apr 2022 07:23 )             38.1     04-12    139  |  102  |  16  ----------------------------<  94  4.2   |  22  |  0.79    Ca    9.1      12 Apr 2022 07:23  Phos  4.2     04-12  Mg     2.00     04-12                RADIOLOGY & ADDITIONAL TESTS:    Imaging Personally Reviewed:    Consultant(s) Notes Reviewed:  thoracic sx , cardiology     Care Discussed with Consultants/Other Providers:

## 2022-04-13 NOTE — PROGRESS NOTE ADULT - PROBLEM SELECTOR PLAN 2
- Continue Suboxone 4mg.  pt has been self titrating meds and would like to d/w psych further about his options  psych eval requested - Continue Suboxone 4mg.  pt has been self titrating meds and would like to d/w psych further about his options  psych eval  appreciated, recommend to c/w 4 mg at this time and Start Pt on Hydroxyzine 25mg Q6hr PRN for Anxiety

## 2022-04-14 ENCOUNTER — TRANSCRIPTION ENCOUNTER (OUTPATIENT)
Age: 27
End: 2022-04-14

## 2022-04-14 DIAGNOSIS — Z01.818 ENCOUNTER FOR OTHER PREPROCEDURAL EXAMINATION: ICD-10-CM

## 2022-04-14 LAB
ANION GAP SERPL CALC-SCNC: 13 MMOL/L — SIGNIFICANT CHANGE UP (ref 7–14)
APTT BLD: 30.9 SEC — SIGNIFICANT CHANGE UP (ref 27–36.3)
BLD GP AB SCN SERPL QL: NEGATIVE — SIGNIFICANT CHANGE UP
BUN SERPL-MCNC: 14 MG/DL — SIGNIFICANT CHANGE UP (ref 7–23)
CALCIUM SERPL-MCNC: 9.2 MG/DL — SIGNIFICANT CHANGE UP (ref 8.4–10.5)
CHLORIDE SERPL-SCNC: 100 MMOL/L — SIGNIFICANT CHANGE UP (ref 98–107)
CO2 SERPL-SCNC: 26 MMOL/L — SIGNIFICANT CHANGE UP (ref 22–31)
CREAT SERPL-MCNC: 0.79 MG/DL — SIGNIFICANT CHANGE UP (ref 0.5–1.3)
EGFR: 126 ML/MIN/1.73M2 — SIGNIFICANT CHANGE UP
GLUCOSE SERPL-MCNC: 95 MG/DL — SIGNIFICANT CHANGE UP (ref 70–99)
HCT VFR BLD CALC: 40.3 % — SIGNIFICANT CHANGE UP (ref 39–50)
HGB BLD-MCNC: 13.5 G/DL — SIGNIFICANT CHANGE UP (ref 13–17)
INR BLD: 0.99 RATIO — SIGNIFICANT CHANGE UP (ref 0.88–1.16)
MAGNESIUM SERPL-MCNC: 2.1 MG/DL — SIGNIFICANT CHANGE UP (ref 1.6–2.6)
MCHC RBC-ENTMCNC: 28.4 PG — SIGNIFICANT CHANGE UP (ref 27–34)
MCHC RBC-ENTMCNC: 33.5 GM/DL — SIGNIFICANT CHANGE UP (ref 32–36)
MCV RBC AUTO: 84.7 FL — SIGNIFICANT CHANGE UP (ref 80–100)
NRBC # BLD: 0 /100 WBCS — SIGNIFICANT CHANGE UP
NRBC # FLD: 0 K/UL — SIGNIFICANT CHANGE UP
PHOSPHATE SERPL-MCNC: 5.4 MG/DL — HIGH (ref 2.5–4.5)
PLATELET # BLD AUTO: 119 K/UL — LOW (ref 150–400)
POTASSIUM SERPL-MCNC: 4.4 MMOL/L — SIGNIFICANT CHANGE UP (ref 3.5–5.3)
POTASSIUM SERPL-SCNC: 4.4 MMOL/L — SIGNIFICANT CHANGE UP (ref 3.5–5.3)
PROTHROM AB SERPL-ACNC: 11.5 SEC — SIGNIFICANT CHANGE UP (ref 10.5–13.4)
RBC # BLD: 4.76 M/UL — SIGNIFICANT CHANGE UP (ref 4.2–5.8)
RBC # FLD: 13.7 % — SIGNIFICANT CHANGE UP (ref 10.3–14.5)
RH IG SCN BLD-IMP: POSITIVE — SIGNIFICANT CHANGE UP
SARS-COV-2 RNA SPEC QL NAA+PROBE: SIGNIFICANT CHANGE UP
SODIUM SERPL-SCNC: 139 MMOL/L — SIGNIFICANT CHANGE UP (ref 135–145)
WBC # BLD: 4.32 K/UL — SIGNIFICANT CHANGE UP (ref 3.8–10.5)
WBC # FLD AUTO: 4.32 K/UL — SIGNIFICANT CHANGE UP (ref 3.8–10.5)

## 2022-04-14 PROCEDURE — 99233 SBSQ HOSP IP/OBS HIGH 50: CPT

## 2022-04-14 PROCEDURE — 99232 SBSQ HOSP IP/OBS MODERATE 35: CPT | Mod: 57

## 2022-04-14 RX ADMIN — Medication 1 GRAM(S): at 11:25

## 2022-04-14 RX ADMIN — SIMETHICONE 80 MILLIGRAM(S): 80 TABLET, CHEWABLE ORAL at 05:43

## 2022-04-14 RX ADMIN — SIMETHICONE 80 MILLIGRAM(S): 80 TABLET, CHEWABLE ORAL at 17:40

## 2022-04-14 RX ADMIN — Medication 1 GRAM(S): at 05:43

## 2022-04-14 RX ADMIN — BUPRENORPHINE AND NALOXONE 2 TABLET(S): 2; .5 TABLET SUBLINGUAL at 11:25

## 2022-04-14 RX ADMIN — Medication 25 MILLIGRAM(S): at 17:42

## 2022-04-14 RX ADMIN — DEXLANSOPRAZOLE 60 MILLIGRAM(S): 30 CAPSULE, DELAYED RELEASE ORAL at 17:39

## 2022-04-14 RX ADMIN — SIMETHICONE 80 MILLIGRAM(S): 80 TABLET, CHEWABLE ORAL at 23:07

## 2022-04-14 RX ADMIN — Medication 1 GRAM(S): at 23:07

## 2022-04-14 RX ADMIN — Medication 1 GRAM(S): at 17:40

## 2022-04-14 RX ADMIN — SIMETHICONE 80 MILLIGRAM(S): 80 TABLET, CHEWABLE ORAL at 11:26

## 2022-04-14 RX ADMIN — DEXLANSOPRAZOLE 60 MILLIGRAM(S): 30 CAPSULE, DELAYED RELEASE ORAL at 05:43

## 2022-04-14 RX ADMIN — Medication 25 MILLIGRAM(S): at 10:56

## 2022-04-14 NOTE — PROGRESS NOTE ADULT - PROBLEM SELECTOR PLAN 5
Low risk  Clear diet   Dispo: eventual home    now awaiting thoracic surg for esophagectomy on 4/15   plan of care d/w pt and ACP

## 2022-04-14 NOTE — PROGRESS NOTE ADULT - PROBLEM SELECTOR PLAN 4
Low risk  Clear diet   Dispo: eventual home    now awaiting thoracic surg for esophagectomy on 4/15   plan of care d/w pt and ACP declines nicotine patch

## 2022-04-14 NOTE — PROGRESS NOTE ADULT - PROBLEM SELECTOR PROBLEM 1
History of esophageal stricture

## 2022-04-14 NOTE — PROGRESS NOTE ADULT - PROBLEM SELECTOR PLAN 2
- Continue Suboxone 4mg.  pt has been self titrating meds and would like to d/w psych further about his options  psych eval  appreciated, recommend to c/w 4 mg at this time and Start Pt on Hydroxyzine 25mg Q6hr PRN for Anxiety pt denies CP, SOB, palpitations, no evidence of ongoing ACS, CHF or arrythmia    ECHO  noted  , cards eval  appreciated,  EKG : NSR ,   pt optimized medically for esophagectomy, RCRI class I, 3.9% risk of 30 day MACE

## 2022-04-14 NOTE — PROGRESS NOTE ADULT - SUBJECTIVE AND OBJECTIVE BOX
Patient is a 26y old  Male who presents with a chief complaint of Inability to tolerate PO (13 Apr 2022 14:33)      SUBJECTIVE / OVERNIGHT EVENTS:    MEDICATIONS  (STANDING):  buprenorphine 2 mG/naloxone 0.5 mG SL  Tablet 2 Tablet(s) SubLingual daily  dexlansoprazole DR 60 milliGRAM(s) Oral <User Schedule>  simethicone 80 milliGRAM(s) Chew four times a day  sucralfate suspension 1 Gram(s) Oral four times a day    MEDICATIONS  (PRN):  acetaminophen     Tablet .. 650 milliGRAM(s) Oral every 6 hours PRN Temp greater or equal to 38C (100.4F), Mild Pain (1 - 3)  aluminum hydroxide/magnesium hydroxide/simethicone Suspension 30 milliLiter(s) Oral every 4 hours PRN Dyspepsia  hydrOXYzine hydrochloride 25 milliGRAM(s) Oral every 6 hours PRN Anxiety  melatonin 3 milliGRAM(s) Oral at bedtime PRN Insomnia  ondansetron Injectable 4 milliGRAM(s) IV Push every 8 hours PRN Nausea and/or Vomiting      Vital Signs Last 24 Hrs  T(C): 36.6 (14 Apr 2022 05:52), Max: 37.1 (13 Apr 2022 15:30)  T(F): 97.8 (14 Apr 2022 05:52), Max: 98.7 (13 Apr 2022 15:30)  HR: 70 (14 Apr 2022 05:52) (70 - 78)  BP: 101/55 (14 Apr 2022 05:52) (101/55 - 121/60)  BP(mean): --  RR: 17 (14 Apr 2022 05:52) (17 - 18)  SpO2: 100% (14 Apr 2022 05:52) (100% - 100%)  CAPILLARY BLOOD GLUCOSE        I&O's Summary      PHYSICAL EXAM:  GENERAL: NAD, well-developed  HEAD:  Atraumatic, Normocephalic  EYES: EOMI, PERRLA, conjunctiva and sclera clear  NECK: Supple, No JVD  CHEST/LUNG: Clear to auscultation bilaterally; No wheeze  HEART: Regular rate and rhythm; No murmurs, rubs, or gallops  ABDOMEN: Soft, Nontender, Nondistended; Bowel sounds present  EXTREMITIES:  2+ Peripheral Pulses, No clubbing, cyanosis, or edema  PSYCH: AAOx3  NEUROLOGY: non-focal  SKIN: No rashes or lesions    LABS:                        13.5   4.32  )-----------( 119      ( 14 Apr 2022 06:10 )             40.3     04-14    139  |  100  |  14  ----------------------------<  95  4.4   |  26  |  0.79    Ca    9.2      14 Apr 2022 06:10  Phos  5.4     04-14  Mg     2.10     04-14      PT/INR - ( 14 Apr 2022 06:10 )   PT: 11.5 sec;   INR: 0.99 ratio         PTT - ( 14 Apr 2022 06:10 )  PTT:30.9 sec          RADIOLOGY & ADDITIONAL TESTS:    Imaging Personally Reviewed:    Consultant(s) Notes Reviewed:      Care Discussed with Consultants/Other Providers:   Patient is a 26y old  Male who presents with a chief complaint of Inability to tolerate PO (13 Apr 2022 14:33)      SUBJECTIVE / OVERNIGHT EVENTS: patient seen and examined by bedside, pt denies any acute complain, at this time, pt refusing to take Suboxone, denies headache, dizziness, SOB, CP, Palpitations , N/V/D, abdominal pain       MEDICATIONS  (STANDING):  buprenorphine 2 mG/naloxone 0.5 mG SL  Tablet 2 Tablet(s) SubLingual daily  dexlansoprazole DR 60 milliGRAM(s) Oral <User Schedule>  simethicone 80 milliGRAM(s) Chew four times a day  sucralfate suspension 1 Gram(s) Oral four times a day    MEDICATIONS  (PRN):  acetaminophen     Tablet .. 650 milliGRAM(s) Oral every 6 hours PRN Temp greater or equal to 38C (100.4F), Mild Pain (1 - 3)  aluminum hydroxide/magnesium hydroxide/simethicone Suspension 30 milliLiter(s) Oral every 4 hours PRN Dyspepsia  hydrOXYzine hydrochloride 25 milliGRAM(s) Oral every 6 hours PRN Anxiety  melatonin 3 milliGRAM(s) Oral at bedtime PRN Insomnia  ondansetron Injectable 4 milliGRAM(s) IV Push every 8 hours PRN Nausea and/or Vomiting      Vital Signs Last 24 Hrs  T(C): 36.6 (14 Apr 2022 05:52), Max: 37.1 (13 Apr 2022 15:30)  T(F): 97.8 (14 Apr 2022 05:52), Max: 98.7 (13 Apr 2022 15:30)  HR: 70 (14 Apr 2022 05:52) (70 - 78)  BP: 101/55 (14 Apr 2022 05:52) (101/55 - 121/60)  BP(mean): --  RR: 17 (14 Apr 2022 05:52) (17 - 18)  SpO2: 100% (14 Apr 2022 05:52) (100% - 100%)        PHYSICAL EXAM:  CONSTITUTIONAL: NAD, appears comfortable  EYES: PERRLA; conjunctiva and sclera clear  ENMT: Moist oral mucosa; normal dentition  RESPIRATORY: Normal respiratory effort; l  CARDIOVASCULAR: ; No lower extremity edema;   MUSCULOSKELETAL: no clubbing or cyanosis of digits; no joint swelling or tenderness to palpation  PSYCH: A+O to person, place, and time; affect appropriate  NEUROLOGY: CN 2-12 are intact and symmetric; no gross sensory deficits   SKIN: No rashes; no palpable lesions      LABS:                        13.5   4.32  )-----------( 119      ( 14 Apr 2022 06:10 )             40.3     04-14    139  |  100  |  14  ----------------------------<  95  4.4   |  26  |  0.79    Ca    9.2      14 Apr 2022 06:10  Phos  5.4     04-14  Mg     2.10     04-14      PT/INR - ( 14 Apr 2022 06:10 )   PT: 11.5 sec;   INR: 0.99 ratio         PTT - ( 14 Apr 2022 06:10 )  PTT:30.9 sec          RADIOLOGY & ADDITIONAL TESTS:    Imaging Personally Reviewed:    Consultant(s) Notes Reviewed:  cardiology     Care Discussed with Consultants/Other Providers:

## 2022-04-14 NOTE — PROGRESS NOTE ADULT - PROBLEM SELECTOR PLAN 1
s/p EGD with dilation  severe esophagitis  - Continue PPI BID per GI recommendation  - Continue sucralfate    apprec thoracic surg eval and plans  ECHO  noted  , cards eval  appreciated,  EKG : NSR , pt optimized from cardiac standpoint for esophagectomy, RCRI class I, 3.9% risk of 30 day MACE s/p EGD with dilation  severe esophagitis  - Continue PPI BID per GI recommendation  - Continue sucralfate  -apprec thoracic surg eval and plans , pt scheduled for esophagectomy tomorrow   -NPO after midnight tonight

## 2022-04-14 NOTE — CHART NOTE - NSCHARTNOTEFT_GEN_A_CORE
Pt refusing Suboxone. Discussed with psych, pt with capacity and can make that decision to discontinue medication. Risks/Benefits discussed with patient. Patient understands, continues to refuse.    Tosin Strickland NP  [id number]

## 2022-04-14 NOTE — PROGRESS NOTE ADULT - SUBJECTIVE AND OBJECTIVE BOX
pt seen and examined with Dr Shannon; pt mother at bedside  Subjective: pt ready for OR tomorrow  aware NPO p MN    Vital Signs:  Vital Signs Last 24 Hrs  T(C): 36.5 (04-14-22 @ 14:00), Max: 36.6 (04-14-22 @ 05:52)  T(F): 97.7 (04-14-22 @ 14:00), Max: 97.8 (04-14-22 @ 05:52)  HR: 72 (04-14-22 @ 14:00) (70 - 73)  BP: 115/59 (04-14-22 @ 14:00) (101/55 - 121/60)  RR: 18 (04-14-22 @ 14:00) (17 - 18)  SpO2: 100% (04-14-22 @ 14:00) (100% - 100%) on (O2)    Telemetry/Alarms:  General: thin,  NAD  Neurology: Awake, nonfocal, FERRARO x 4  Eyes: Scleras clear, PERRLA/ EOMI, Gross vision intact  ENT:Gross hearing intact, grossly patent pharynx, no stridor  Neck: Neck supple, trachea midline, No JVD,   Respiratory: CTA B/L, No wheezing, rales, rhonchi  CV: RRR, S1S2, no murmurs, rubs or gallops  Abdominal: Soft, NT, ND +BS,   Extremities: No edema, + peripheral pulses  Skin: No Rashes, Hematoma, Ecchymosis  Lymphatic: No Neck, axilla, groin LAD  Psych: Oriented x 3, normal affect    Relevant labs, radiology and Medications reviewed                        13.5   4.32  )-----------( 119      ( 14 Apr 2022 06:10 )             40.3     04-14    139  |  100  |  14  ----------------------------<  95  4.4   |  26  |  0.79    Ca    9.2      14 Apr 2022 06:10  Phos  5.4     04-14  Mg     2.10     04-14      PT/INR - ( 14 Apr 2022 06:10 )   PT: 11.5 sec;   INR: 0.99 ratio         PTT - ( 14 Apr 2022 06:10 )  PTT:30.9 sec  MEDICATIONS  (STANDING):  buprenorphine 2 mG/naloxone 0.5 mG SL  Tablet 2 Tablet(s) SubLingual daily  dexlansoprazole DR 60 milliGRAM(s) Oral <User Schedule>  simethicone 80 milliGRAM(s) Chew four times a day  sucralfate suspension 1 Gram(s) Oral four times a day    MEDICATIONS  (PRN):  acetaminophen     Tablet .. 650 milliGRAM(s) Oral every 6 hours PRN Temp greater or equal to 38C (100.4F), Mild Pain (1 - 3)  aluminum hydroxide/magnesium hydroxide/simethicone Suspension 30 milliLiter(s) Oral every 4 hours PRN Dyspepsia  hydrOXYzine hydrochloride 25 milliGRAM(s) Oral every 6 hours PRN Anxiety  melatonin 3 milliGRAM(s) Oral at bedtime PRN Insomnia  ondansetron Injectable 4 milliGRAM(s) IV Push every 8 hours PRN Nausea and/or Vomiting    Pertinent Physical Exam  I&O's Summary      Assessment  26y Male  w/ PAST MEDICAL & SURGICAL HISTORY:  Heroin abuse    Cocaine abuse    GERD (gastroesophageal reflux disease)    Hiatal hernia    History of esophageal stricture    Intravenous drug abuse    Migration of pancreatic stent    Migrated esophageal stent    preop esophagectomy and j tube placement tomorrow    PLAN  pt consented  for OR tomorrow  preop  NPO p Mn  valid type and screen, coags,     Belén SANABRIA [id number]

## 2022-04-14 NOTE — PROGRESS NOTE ADULT - SUBJECTIVE AND OBJECTIVE BOX
Christiano Hoyt MD  Interventional Cardiology / Advance Heart Failure and Cardiac Transplant Specialist  [place] Office : [phone] [place] [address] [place]. [address]  Tel:   [place] Office : [address] [facility] [place] [address]  Tel: [phone]      Subjective/Overnight events: Pt is lying in bed comfortable not in distress, no chest pains no SOB no palpitations  	  MEDICATIONS:        acetaminophen     Tablet .. 650 milliGRAM(s) Oral every 6 hours PRN  buprenorphine 2 mG/naloxone 0.5 mG SL  Tablet 2 Tablet(s) SubLingual daily  hydrOXYzine hydrochloride 25 milliGRAM(s) Oral every 6 hours PRN  melatonin 3 milliGRAM(s) Oral at bedtime PRN  ondansetron Injectable 4 milliGRAM(s) IV Push every 8 hours PRN    aluminum hydroxide/magnesium hydroxide/simethicone Suspension 30 milliLiter(s) Oral every 4 hours PRN  dexlansoprazole DR 60 milliGRAM(s) Oral <User Schedule>  simethicone 80 milliGRAM(s) Chew four times a day  sucralfate suspension 1 Gram(s) Oral four times a day          PAST MEDICAL/SURGICAL HISTORY  PAST MEDICAL & SURGICAL HISTORY:  Heroin abuse    Cocaine abuse    GERD (gastroesophageal reflux disease)    Hiatal hernia    History of esophageal stricture    Intravenous drug abuse    Migration of pancreatic stent    Migrated esophageal stent        SOCIAL HISTORY: Substance Use (street drugs): ( x ) never used  (  ) other:    FAMILY HISTORY:  FH: thyroid disease (Grandparent)        PHYSICAL EXAM:  T(C): 36.6 (04-14-22 @ 05:52), Max: 37.1 (04-13-22 @ 15:30)  HR: 70 (04-14-22 @ 05:52) (70 - 78)  BP: 101/55 (04-14-22 @ 05:52) (101/55 - 121/60)  RR: 17 (04-14-22 @ 05:52) (17 - 18)  SpO2: 100% (04-14-22 @ 05:52) (100% - 100%)  Wt(kg): --  I&O's Summary            GENERAL: NAD  EYES: EOMI, PERRLA, conjunctiva and sclera clear  ENMT: No tonsillar erythema, exudates, or enlargement  Cardiovascular: Normal S1 S2, No JVD, No murmurs, No edema  Respiratory: Lungs clear to auscultation	  Gastrointestinal:  Soft, Non-tender, + BS	  Extremities: No edema                                13.5   4.32  )-----------( 119      ( 14 Apr 2022 06:10 )             40.3     04-14    139  |  100  |  14  ----------------------------<  95  4.4   |  26  |  0.79    Ca    9.2      14 Apr 2022 06:10  Phos  5.4     04-14  Mg     2.10     04-14      proBNP:   Lipid Profile:   HgA1c:   TSH:     Consultant(s) Notes Reviewed:  [x ] YES  [ ] NO    Care Discussed with Consultants/Other Providers [ x] YES  [ ] NO    Imaging Personally Reviewed independently:  [x] YES  [ ] NO    All labs, radiologic studies, vitals, orders and medications list reviewed. Patient is seen and examined at bedside. Case discussed with medical team.

## 2022-04-14 NOTE — PROGRESS NOTE ADULT - PROBLEM SELECTOR PLAN 3
declines nicotine patch - pt on Suboxone 4mg.  pt has been self titrating meds and would like to d/w psych further about his options  psych maricruzal  appreciated, recommend to c/w 4 mg at this time and Start Pt on Hydroxyzine 25mg Q6hr PRN for Anxiety  pt still refusing to take Suboxone, does not want to take it ever again   will notify Psych

## 2022-04-15 ENCOUNTER — RESULT REVIEW (OUTPATIENT)
Age: 27
End: 2022-04-15

## 2022-04-15 ENCOUNTER — APPOINTMENT (OUTPATIENT)
Dept: THORACIC SURGERY | Facility: HOSPITAL | Age: 27
End: 2022-04-15

## 2022-04-15 LAB
ALBUMIN SERPL ELPH-MCNC: 4 G/DL — SIGNIFICANT CHANGE UP (ref 3.3–5)
ALP SERPL-CCNC: 89 U/L — SIGNIFICANT CHANGE UP (ref 40–120)
ALT FLD-CCNC: 11 U/L — SIGNIFICANT CHANGE UP (ref 4–41)
ANION GAP SERPL CALC-SCNC: 11 MMOL/L — SIGNIFICANT CHANGE UP (ref 7–14)
ANION GAP SERPL CALC-SCNC: 16 MMOL/L — HIGH (ref 7–14)
APTT BLD: 33.1 SEC — SIGNIFICANT CHANGE UP (ref 27–36.3)
AST SERPL-CCNC: 10 U/L — SIGNIFICANT CHANGE UP (ref 4–40)
BILIRUB SERPL-MCNC: 0.8 MG/DL — SIGNIFICANT CHANGE UP (ref 0.2–1.2)
BLOOD GAS ARTERIAL - LYTES,HGB,ICA,LACT RESULT: SIGNIFICANT CHANGE UP
BLOOD GAS ARTERIAL - LYTES,HGB,ICA,LACT RESULT: SIGNIFICANT CHANGE UP
BLOOD GAS ARTERIAL COMPREHENSIVE RESULT: SIGNIFICANT CHANGE UP
BUN SERPL-MCNC: 13 MG/DL — SIGNIFICANT CHANGE UP (ref 7–23)
BUN SERPL-MCNC: 15 MG/DL — SIGNIFICANT CHANGE UP (ref 7–23)
CALCIUM SERPL-MCNC: 8.4 MG/DL — SIGNIFICANT CHANGE UP (ref 8.4–10.5)
CALCIUM SERPL-MCNC: 9.2 MG/DL — SIGNIFICANT CHANGE UP (ref 8.4–10.5)
CHLORIDE SERPL-SCNC: 101 MMOL/L — SIGNIFICANT CHANGE UP (ref 98–107)
CHLORIDE SERPL-SCNC: 103 MMOL/L — SIGNIFICANT CHANGE UP (ref 98–107)
CO2 SERPL-SCNC: 21 MMOL/L — LOW (ref 22–31)
CO2 SERPL-SCNC: 25 MMOL/L — SIGNIFICANT CHANGE UP (ref 22–31)
CREAT SERPL-MCNC: 0.78 MG/DL — SIGNIFICANT CHANGE UP (ref 0.5–1.3)
CREAT SERPL-MCNC: 0.89 MG/DL — SIGNIFICANT CHANGE UP (ref 0.5–1.3)
EGFR: 121 ML/MIN/1.73M2 — SIGNIFICANT CHANGE UP
EGFR: 126 ML/MIN/1.73M2 — SIGNIFICANT CHANGE UP
GAS PNL BLDA: SIGNIFICANT CHANGE UP
GAS PNL BLDA: SIGNIFICANT CHANGE UP
GLUCOSE SERPL-MCNC: 147 MG/DL — HIGH (ref 70–99)
GLUCOSE SERPL-MCNC: 83 MG/DL — SIGNIFICANT CHANGE UP (ref 70–99)
HCT VFR BLD CALC: 34.9 % — LOW (ref 39–50)
HCT VFR BLD CALC: 36.7 % — LOW (ref 39–50)
HGB BLD-MCNC: 11.7 G/DL — LOW (ref 13–17)
HGB BLD-MCNC: 12 G/DL — LOW (ref 13–17)
INR BLD: 0.97 RATIO — SIGNIFICANT CHANGE UP (ref 0.88–1.16)
MAGNESIUM SERPL-MCNC: 1.5 MG/DL — LOW (ref 1.6–2.6)
MAGNESIUM SERPL-MCNC: 2.2 MG/DL — SIGNIFICANT CHANGE UP (ref 1.6–2.6)
MCHC RBC-ENTMCNC: 28.1 PG — SIGNIFICANT CHANGE UP (ref 27–34)
MCHC RBC-ENTMCNC: 28.5 PG — SIGNIFICANT CHANGE UP (ref 27–34)
MCHC RBC-ENTMCNC: 32.7 GM/DL — SIGNIFICANT CHANGE UP (ref 32–36)
MCHC RBC-ENTMCNC: 33.5 GM/DL — SIGNIFICANT CHANGE UP (ref 32–36)
MCV RBC AUTO: 84.9 FL — SIGNIFICANT CHANGE UP (ref 80–100)
MCV RBC AUTO: 85.9 FL — SIGNIFICANT CHANGE UP (ref 80–100)
NRBC # BLD: 0 /100 WBCS — SIGNIFICANT CHANGE UP
NRBC # BLD: 0 /100 WBCS — SIGNIFICANT CHANGE UP
NRBC # FLD: 0 K/UL — SIGNIFICANT CHANGE UP
NRBC # FLD: 0 K/UL — SIGNIFICANT CHANGE UP
PHOSPHATE SERPL-MCNC: 3.4 MG/DL — SIGNIFICANT CHANGE UP (ref 2.5–4.5)
PHOSPHATE SERPL-MCNC: 5 MG/DL — HIGH (ref 2.5–4.5)
PLATELET # BLD AUTO: 114 K/UL — LOW (ref 150–400)
PLATELET # BLD AUTO: 94 K/UL — LOW (ref 150–400)
POTASSIUM SERPL-MCNC: 4.1 MMOL/L — SIGNIFICANT CHANGE UP (ref 3.5–5.3)
POTASSIUM SERPL-MCNC: 4.2 MMOL/L — SIGNIFICANT CHANGE UP (ref 3.5–5.3)
POTASSIUM SERPL-SCNC: 4.1 MMOL/L — SIGNIFICANT CHANGE UP (ref 3.5–5.3)
POTASSIUM SERPL-SCNC: 4.2 MMOL/L — SIGNIFICANT CHANGE UP (ref 3.5–5.3)
PROT SERPL-MCNC: 6.1 G/DL — SIGNIFICANT CHANGE UP (ref 6–8.3)
PROTHROM AB SERPL-ACNC: 11.3 SEC — SIGNIFICANT CHANGE UP (ref 10.5–13.4)
RBC # BLD: 4.11 M/UL — LOW (ref 4.2–5.8)
RBC # BLD: 4.27 M/UL — SIGNIFICANT CHANGE UP (ref 4.2–5.8)
RBC # FLD: 14.1 % — SIGNIFICANT CHANGE UP (ref 10.3–14.5)
RBC # FLD: 14.3 % — SIGNIFICANT CHANGE UP (ref 10.3–14.5)
SODIUM SERPL-SCNC: 138 MMOL/L — SIGNIFICANT CHANGE UP (ref 135–145)
SODIUM SERPL-SCNC: 139 MMOL/L — SIGNIFICANT CHANGE UP (ref 135–145)
WBC # BLD: 4.23 K/UL — SIGNIFICANT CHANGE UP (ref 3.8–10.5)
WBC # BLD: 8.55 K/UL — SIGNIFICANT CHANGE UP (ref 3.8–10.5)
WBC # FLD AUTO: 4.23 K/UL — SIGNIFICANT CHANGE UP (ref 3.8–10.5)
WBC # FLD AUTO: 8.55 K/UL — SIGNIFICANT CHANGE UP (ref 3.8–10.5)

## 2022-04-15 PROCEDURE — 44186 LAP JEJUNOSTOMY: CPT

## 2022-04-15 PROCEDURE — 99233 SBSQ HOSP IP/OBS HIGH 50: CPT

## 2022-04-15 PROCEDURE — 71045 X-RAY EXAM CHEST 1 VIEW: CPT | Mod: 26

## 2022-04-15 PROCEDURE — 43800 PYLOROPLASTY: CPT

## 2022-04-15 PROCEDURE — S2900 ROBOTIC SURGICAL SYSTEM: CPT | Mod: NC

## 2022-04-15 PROCEDURE — 43287 ESPHG DSTL 2/3 W/LAPS MOBLJ: CPT

## 2022-04-15 PROCEDURE — 88342 IMHCHEM/IMCYTCHM 1ST ANTB: CPT | Mod: 26

## 2022-04-15 PROCEDURE — 43235 EGD DIAGNOSTIC BRUSH WASH: CPT | Mod: 59

## 2022-04-15 PROCEDURE — 88312 SPECIAL STAINS GROUP 1: CPT | Mod: 26

## 2022-04-15 PROCEDURE — 38747 REMOVE ABDOMINAL LYMPH NODES: CPT

## 2022-04-15 PROCEDURE — 88305 TISSUE EXAM BY PATHOLOGIST: CPT | Mod: 26

## 2022-04-15 PROCEDURE — 31645 BRNCHSC W/THER ASPIR 1ST: CPT

## 2022-04-15 DEVICE — STAPLER COVIDIEN EEA ORVIL 25MM TRANS ORAL DELIVERY: Type: IMPLANTABLE DEVICE | Status: FUNCTIONAL

## 2022-04-15 DEVICE — CHEST DRAIN THORACIC ARGYLE PVC 28FR STRAIGHT: Type: IMPLANTABLE DEVICE | Status: FUNCTIONAL

## 2022-04-15 DEVICE — STAPLER COVIDIEN TRI-STAPLE 45MM TAN RELOAD: Type: IMPLANTABLE DEVICE | Status: FUNCTIONAL

## 2022-04-15 DEVICE — STAPLER COVIDIEN TRI-STAPLE 45MM PURPLE RELOAD: Type: IMPLANTABLE DEVICE | Status: FUNCTIONAL

## 2022-04-15 DEVICE — IMPLANTABLE DEVICE: Type: IMPLANTABLE DEVICE | Status: FUNCTIONAL

## 2022-04-15 DEVICE — STAPLER COVIDIEN TRI-STAPLE 60MM PURPLE RELOAD: Type: IMPLANTABLE DEVICE | Status: FUNCTIONAL

## 2022-04-15 DEVICE — SURGICEL 2 X 14": Type: IMPLANTABLE DEVICE | Status: FUNCTIONAL

## 2022-04-15 DEVICE — STAPLER COVIDIEN TRI-STAPLE 30MM PURPLE RELOAD: Type: IMPLANTABLE DEVICE | Status: FUNCTIONAL

## 2022-04-15 RX ORDER — CEFOTETAN DISODIUM 1 G
VIAL (EA) INJECTION
Refills: 0 | Status: DISCONTINUED | OUTPATIENT
Start: 2022-04-15 | End: 2022-04-15

## 2022-04-15 RX ORDER — HYDROMORPHONE HYDROCHLORIDE 2 MG/ML
3 INJECTION INTRAMUSCULAR; INTRAVENOUS; SUBCUTANEOUS ONCE
Refills: 0 | Status: DISCONTINUED | OUTPATIENT
Start: 2022-04-15 | End: 2022-04-15

## 2022-04-15 RX ORDER — MAGNESIUM SULFATE 500 MG/ML
2 VIAL (ML) INJECTION ONCE
Refills: 0 | Status: COMPLETED | OUTPATIENT
Start: 2022-04-15 | End: 2022-04-15

## 2022-04-15 RX ORDER — ACETAMINOPHEN 500 MG
1000 TABLET ORAL ONCE
Refills: 0 | Status: COMPLETED | OUTPATIENT
Start: 2022-04-15 | End: 2022-04-15

## 2022-04-15 RX ORDER — SODIUM CHLORIDE 9 MG/ML
1000 INJECTION, SOLUTION INTRAVENOUS
Refills: 0 | Status: DISCONTINUED | OUTPATIENT
Start: 2022-04-15 | End: 2022-04-21

## 2022-04-15 RX ORDER — NALOXONE HYDROCHLORIDE 4 MG/.1ML
0.1 SPRAY NASAL
Refills: 0 | Status: DISCONTINUED | OUTPATIENT
Start: 2022-04-15 | End: 2022-04-15

## 2022-04-15 RX ORDER — HYDROMORPHONE HYDROCHLORIDE 2 MG/ML
0.5 INJECTION INTRAMUSCULAR; INTRAVENOUS; SUBCUTANEOUS
Refills: 0 | Status: DISCONTINUED | OUTPATIENT
Start: 2022-04-15 | End: 2022-04-16

## 2022-04-15 RX ORDER — CEFOTETAN DISODIUM 1 G
1 VIAL (EA) INJECTION ONCE
Refills: 0 | Status: COMPLETED | OUTPATIENT
Start: 2022-04-15 | End: 2022-04-15

## 2022-04-15 RX ORDER — HYDROMORPHONE HYDROCHLORIDE 2 MG/ML
30 INJECTION INTRAMUSCULAR; INTRAVENOUS; SUBCUTANEOUS
Refills: 0 | Status: DISCONTINUED | OUTPATIENT
Start: 2022-04-15 | End: 2022-04-16

## 2022-04-15 RX ORDER — HYDROXYZINE HCL 10 MG
25 TABLET ORAL EVERY 6 HOURS
Refills: 0 | Status: DISCONTINUED | OUTPATIENT
Start: 2022-04-15 | End: 2022-04-24

## 2022-04-15 RX ORDER — PANTOPRAZOLE SODIUM 20 MG/1
40 TABLET, DELAYED RELEASE ORAL DAILY
Refills: 0 | Status: DISCONTINUED | OUTPATIENT
Start: 2022-04-15 | End: 2022-04-24

## 2022-04-15 RX ORDER — HEPARIN SODIUM 5000 [USP'U]/ML
5000 INJECTION INTRAVENOUS; SUBCUTANEOUS EVERY 8 HOURS
Refills: 0 | Status: DISCONTINUED | OUTPATIENT
Start: 2022-04-15 | End: 2022-04-27

## 2022-04-15 RX ORDER — ONDANSETRON 8 MG/1
4 TABLET, FILM COATED ORAL EVERY 6 HOURS
Refills: 0 | Status: DISCONTINUED | OUTPATIENT
Start: 2022-04-15 | End: 2022-04-27

## 2022-04-15 RX ORDER — HYDROMORPHONE HYDROCHLORIDE 2 MG/ML
1 INJECTION INTRAMUSCULAR; INTRAVENOUS; SUBCUTANEOUS ONCE
Refills: 0 | Status: DISCONTINUED | OUTPATIENT
Start: 2022-04-15 | End: 2022-04-15

## 2022-04-15 RX ORDER — VANCOMYCIN HCL 1 G
1000 VIAL (EA) INTRAVENOUS EVERY 12 HOURS
Refills: 0 | Status: DISCONTINUED | OUTPATIENT
Start: 2022-04-15 | End: 2022-04-15

## 2022-04-15 RX ORDER — PIPERACILLIN AND TAZOBACTAM 4; .5 G/20ML; G/20ML
3.38 INJECTION, POWDER, LYOPHILIZED, FOR SOLUTION INTRAVENOUS EVERY 8 HOURS
Refills: 0 | Status: DISCONTINUED | OUTPATIENT
Start: 2022-04-15 | End: 2022-04-15

## 2022-04-15 RX ORDER — HYDROMORPHONE HYDROCHLORIDE 2 MG/ML
4 INJECTION INTRAMUSCULAR; INTRAVENOUS; SUBCUTANEOUS
Refills: 0 | Status: DISCONTINUED | OUTPATIENT
Start: 2022-04-15 | End: 2022-04-15

## 2022-04-15 RX ORDER — DIPHENHYDRAMINE HCL 50 MG
25 CAPSULE ORAL EVERY 4 HOURS
Refills: 0 | Status: DISCONTINUED | OUTPATIENT
Start: 2022-04-15 | End: 2022-04-24

## 2022-04-15 RX ORDER — PIPERACILLIN AND TAZOBACTAM 4; .5 G/20ML; G/20ML
3.38 INJECTION, POWDER, LYOPHILIZED, FOR SOLUTION INTRAVENOUS ONCE
Refills: 0 | Status: DISCONTINUED | OUTPATIENT
Start: 2022-04-15 | End: 2022-04-15

## 2022-04-15 RX ORDER — NALOXONE HYDROCHLORIDE 4 MG/.1ML
0.1 SPRAY NASAL
Refills: 0 | Status: DISCONTINUED | OUTPATIENT
Start: 2022-04-15 | End: 2022-04-27

## 2022-04-15 RX ORDER — HYDROMORPHONE HYDROCHLORIDE 2 MG/ML
6 INJECTION INTRAMUSCULAR; INTRAVENOUS; SUBCUTANEOUS
Refills: 0 | Status: DISCONTINUED | OUTPATIENT
Start: 2022-04-15 | End: 2022-04-16

## 2022-04-15 RX ORDER — CEFOTETAN DISODIUM 1 G
1 VIAL (EA) INJECTION EVERY 12 HOURS
Refills: 0 | Status: COMPLETED | OUTPATIENT
Start: 2022-04-16 | End: 2022-04-18

## 2022-04-15 RX ORDER — SODIUM CHLORIDE 9 MG/ML
1000 INJECTION, SOLUTION INTRAVENOUS ONCE
Refills: 0 | Status: COMPLETED | OUTPATIENT
Start: 2022-04-15 | End: 2022-04-15

## 2022-04-15 RX ADMIN — Medication 25 GRAM(S): at 23:39

## 2022-04-15 RX ADMIN — Medication 1000 MILLIGRAM(S): at 22:00

## 2022-04-15 RX ADMIN — DEXLANSOPRAZOLE 60 MILLIGRAM(S): 30 CAPSULE, DELAYED RELEASE ORAL at 05:42

## 2022-04-15 RX ADMIN — SIMETHICONE 80 MILLIGRAM(S): 80 TABLET, CHEWABLE ORAL at 05:42

## 2022-04-15 RX ADMIN — HYDROMORPHONE HYDROCHLORIDE 1 MILLIGRAM(S): 2 INJECTION INTRAMUSCULAR; INTRAVENOUS; SUBCUTANEOUS at 20:30

## 2022-04-15 RX ADMIN — Medication 1 GRAM(S): at 05:42

## 2022-04-15 RX ADMIN — HYDROMORPHONE HYDROCHLORIDE 3 MILLIGRAM(S): 2 INJECTION INTRAMUSCULAR; INTRAVENOUS; SUBCUTANEOUS at 23:00

## 2022-04-15 RX ADMIN — HYDROMORPHONE HYDROCHLORIDE 1 MILLIGRAM(S): 2 INJECTION INTRAMUSCULAR; INTRAVENOUS; SUBCUTANEOUS at 20:29

## 2022-04-15 RX ADMIN — HYDROMORPHONE HYDROCHLORIDE 4 MILLIGRAM(S): 2 INJECTION INTRAMUSCULAR; INTRAVENOUS; SUBCUTANEOUS at 23:50

## 2022-04-15 RX ADMIN — SODIUM CHLORIDE 100 MILLILITER(S): 9 INJECTION, SOLUTION INTRAVENOUS at 20:30

## 2022-04-15 RX ADMIN — HYDROMORPHONE HYDROCHLORIDE 1 MILLIGRAM(S): 2 INJECTION INTRAMUSCULAR; INTRAVENOUS; SUBCUTANEOUS at 22:15

## 2022-04-15 RX ADMIN — HYDROMORPHONE HYDROCHLORIDE 1 MILLIGRAM(S): 2 INJECTION INTRAMUSCULAR; INTRAVENOUS; SUBCUTANEOUS at 22:00

## 2022-04-15 RX ADMIN — PANTOPRAZOLE SODIUM 40 MILLIGRAM(S): 20 TABLET, DELAYED RELEASE ORAL at 21:59

## 2022-04-15 RX ADMIN — HYDROMORPHONE HYDROCHLORIDE 4 MILLIGRAM(S): 2 INJECTION INTRAMUSCULAR; INTRAVENOUS; SUBCUTANEOUS at 23:15

## 2022-04-15 RX ADMIN — HYDROMORPHONE HYDROCHLORIDE 3 MILLIGRAM(S): 2 INJECTION INTRAMUSCULAR; INTRAVENOUS; SUBCUTANEOUS at 22:40

## 2022-04-15 RX ADMIN — Medication 400 MILLIGRAM(S): at 21:00

## 2022-04-15 RX ADMIN — Medication 100 GRAM(S): at 20:29

## 2022-04-15 RX ADMIN — SODIUM CHLORIDE 1000 MILLILITER(S): 9 INJECTION, SOLUTION INTRAVENOUS at 20:30

## 2022-04-15 RX ADMIN — HYDROMORPHONE HYDROCHLORIDE 1 MILLIGRAM(S): 2 INJECTION INTRAMUSCULAR; INTRAVENOUS; SUBCUTANEOUS at 21:30

## 2022-04-15 NOTE — CHART NOTE - NSCHARTNOTEFT_GEN_A_CORE
unable to see patient as patient in OR   case d/w Thoracic sx, pt will be transferred to CTICU for postop care   Medicine to sign off   please call with questions

## 2022-04-15 NOTE — PROGRESS NOTE ADULT - SUBJECTIVE AND OBJECTIVE BOX
CHIEF COMPLAINT: FOLLOW UP IN ICU FOR POSTOPERATIVE CARE OF PATIENT WHO IS S/P  Paterson Mahesh esophagectomy            PROCEDURES:         FB, EGD, Robotic Assisted Tyler Mahesh esophagectomy, J tube placement.  15-Apr-2022            ISSUES:       Esophageal stricture   Postoperative pain   Chest tubes in place   Substance abuse(cocaine, heroin, IVDU), on suboxone         INTERVAL EVENTS:      OR today. Extubated in OR. Transferred to CTICU.               HISTORY:      Patient reports moderate pain at chest wall incision sites which is worse with coughing and deep breathing without associated fever or dyspnea. Pain is improved with use of pain meds.           PHYSICAL EXAM:      Gen: Comfortable, No acute distress     Eyes: Sclera white, Conjunctiva normal, Eyelids normal, Pupils symmetrical      ENT: Mucous membranes moist,  NGT in place    Neck: Trachea midline,  ,  ,  ,  ,  ,       CV: Rate regular, Rhythm regular,  ,  ,       Resp: Breath sounds clear, No accessory muscles use, R chest tubes in place,  ,       Abd: Soft, Non-distended, Non-tender, Bowel sounds normal,  jejunostomy tube in place      Skin: Warm, No peripheral edema of lower extremities,  ,       :  baum     Neuro: Moving all 4 extremities,       Psych: A&Ox3               ASSESSMENT AND PLAN:           NEURO:     Post-operative Pain - Stable. Pain control with PCEA and Tylenol IV PRN.           RESPIRATORY:   Hypoxia - Wean nasal cannula for goal O2sat above 92. Obtain CXR. Incentive spirometry. Chest PT and frequent suctioning. Continue bronchodilators. OOB to chair & ambulate w/ assistance. Continuous pulse oximetry for support & to prevent decompensation.     Chest tube – Pleurevac regulated suctioning. Monitor chest tube output.            CARDIOVASCULAR:     Hemodynamically stable - Not on pressors. Continue hemodynamic monitoring.     Telemetry (medical test) - Reviewed by me today independently. Normal sinus rhythm.             RENAL:     Stable - Monitor IOs and electrolytes. Keep K above 4.0 and Mg above 2.0. D5LR@100ml/h, monitor hourly urine output            GASTROINTESTINAL:     GI prophylaxis    Zofran and Reglan IV PRN for nausea     NPO    NGT to low continuous suction    J tube to gravity     Flush J tube and NGT Q4h            HEMATOLOGIC:     No signs of active bleeding. Monitor Hgb in CBC in AM     DVT prophylaxis with heparin subQ and SCDs.           INFECTIOUS DISEASE:     All surgical sites appear clean. No signs of active infection. Will monitor for fever and leukocytosis.           ENDOCRINE:     Stable – Monitor glucose fingersticks for goal 120-180.             Pertinent clinical, laboratory, radiographic, hemodynamic, echocardiographic, respiratory data, microbiologic data and chart were reviewed by myself and analyzed frequently throughout the course of the day and night by myself.     Plan discussed at length with the CTICU staff and Attending CT Surgeon -   Dr. Zenia Shannon    Patient's status was discussed with patient at bedside.     _____________________________________________      _________________________  VITAL SIGNS:  Vital Signs Last 24 Hrs  T(C): 36.6 (15 Apr 2022 07:00), Max: 36.9 (14 Apr 2022 21:28)  T(F): 97.9 (15 Apr 2022 07:00), Max: 98.4 (14 Apr 2022 21:28)  HR: 62 (15 Apr 2022 07:00) (60 - 88)  BP: 110/61 (15 Apr 2022 07:00) (102/56 - 114/63)  BP(mean): --  RR: 16 (15 Apr 2022 07:00) (16 - 17)  SpO2: 96% (15 Apr 2022 07:00) (96% - 99%)  I/Os:   I&O's Detail          MEDICATIONS:  MEDICATIONS  (STANDING):  buprenorphine 2 mG/naloxone 0.5 mG SL  Tablet 2 Tablet(s) SubLingual daily  cefoTEtan  IVPB      dextrose 5% + lactated ringers. 1000 milliLiter(s) (100 mL/Hr) IV Continuous <Continuous>  heparin   Injectable 5000 Unit(s) SubCutaneous every 8 hours  HYDROmorphone  Injectable 1 milliGRAM(s) IV Push once  hydromorphone (10 MICROgram(s)/mL) + bupivacaine 0.0625% in 0.9% Sodium Chloride PCEA 250 milliLiter(s) Epidural PCA Continuous  pantoprazole  Injectable 40 milliGRAM(s) IV Push daily    MEDICATIONS  (PRN):  hydromorphone (10 MICROgram(s)/mL) + bupivacaine 0.0625% in 0.9% Sodium Chloride PCEA Rescue Clinician  Bolus 5 milliLiter(s) Epidural every 15 minutes PRN for Pain Score greater than 6  hydrOXYzine hydrochloride Syrup 25 milliGRAM(s) Oral every 6 hours PRN Anxiety  naloxone Injectable 0.1 milliGRAM(s) IV Push every 3 minutes PRN For ANY of the following changes in patient status:  A. RR LESS THAN 10 breaths per minute, B. Oxygen saturation LESS THAN 90%, C. Sedation score of 6  ondansetron Injectable 4 milliGRAM(s) IV Push every 8 hours PRN Nausea and/or Vomiting      LABS:  Laboratory data was independently reviewed by me today.                           12.0   4.23  )-----------( 114      ( 15 Apr 2022 05:52 )             36.7     04-15    139  |  103  |  13  ----------------------------<  83  4.1   |  25  |  0.78    Ca    9.2      15 Apr 2022 05:52  Phos  5.0     04-15  Mg     2.20     04-15    TPro  6.1  /  Alb  4.0  /  TBili  0.8  /  DBili  x   /  AST  10  /  ALT  11  /  AlkPhos  89  04-15    LIVER FUNCTIONS - ( 15 Apr 2022 05:52 )  Alb: 4.0 g/dL / Pro: 6.1 g/dL / ALK PHOS: 89 U/L / ALT: 11 U/L / AST: 10 U/L / GGT: x           PT/INR - ( 15 Apr 2022 05:52 )   PT: 11.3 sec;   INR: 0.97 ratio         PTT - ( 15 Apr 2022 05:52 )  PTT:33.1 sec  ABG - ( 15 Apr 2022 16:33 )  pH, Arterial: 7.21  pH, Blood: x     /  pCO2: 67    /  pO2: 114   / HCO3: 27    / Base Excess: -2.2  /  SaO2: 97.9                  RADIOLOGY:   Radiology images were independently reviewed by me today. Reports were reviewed by me today.

## 2022-04-16 LAB
ANION GAP SERPL CALC-SCNC: 10 MMOL/L — SIGNIFICANT CHANGE UP (ref 7–14)
APTT BLD: 28.3 SEC — SIGNIFICANT CHANGE UP (ref 27–36.3)
BASOPHILS # BLD AUTO: 0.01 K/UL — SIGNIFICANT CHANGE UP (ref 0–0.2)
BASOPHILS NFR BLD AUTO: 0.1 % — SIGNIFICANT CHANGE UP (ref 0–2)
BLOOD GAS ARTERIAL COMPREHENSIVE RESULT: SIGNIFICANT CHANGE UP
BUN SERPL-MCNC: 15 MG/DL — SIGNIFICANT CHANGE UP (ref 7–23)
CALCIUM SERPL-MCNC: 8.4 MG/DL — SIGNIFICANT CHANGE UP (ref 8.4–10.5)
CHLORIDE SERPL-SCNC: 103 MMOL/L — SIGNIFICANT CHANGE UP (ref 98–107)
CO2 SERPL-SCNC: 27 MMOL/L — SIGNIFICANT CHANGE UP (ref 22–31)
CREAT SERPL-MCNC: 0.82 MG/DL — SIGNIFICANT CHANGE UP (ref 0.5–1.3)
EGFR: 124 ML/MIN/1.73M2 — SIGNIFICANT CHANGE UP
EOSINOPHIL # BLD AUTO: 0 K/UL — SIGNIFICANT CHANGE UP (ref 0–0.5)
EOSINOPHIL NFR BLD AUTO: 0 % — SIGNIFICANT CHANGE UP (ref 0–6)
GLUCOSE SERPL-MCNC: 112 MG/DL — HIGH (ref 70–99)
HCT VFR BLD CALC: 31.7 % — LOW (ref 39–50)
HGB BLD-MCNC: 10.6 G/DL — LOW (ref 13–17)
IANC: 6.26 K/UL — SIGNIFICANT CHANGE UP (ref 1.8–7.4)
IMM GRANULOCYTES NFR BLD AUTO: 0.4 % — SIGNIFICANT CHANGE UP (ref 0–1.5)
INR BLD: 1.19 RATIO — HIGH (ref 0.88–1.16)
LYMPHOCYTES # BLD AUTO: 1.25 K/UL — SIGNIFICANT CHANGE UP (ref 1–3.3)
LYMPHOCYTES # BLD AUTO: 15.1 % — SIGNIFICANT CHANGE UP (ref 13–44)
MAGNESIUM SERPL-MCNC: 2.2 MG/DL — SIGNIFICANT CHANGE UP (ref 1.6–2.6)
MCHC RBC-ENTMCNC: 28.3 PG — SIGNIFICANT CHANGE UP (ref 27–34)
MCHC RBC-ENTMCNC: 33.4 GM/DL — SIGNIFICANT CHANGE UP (ref 32–36)
MCV RBC AUTO: 84.5 FL — SIGNIFICANT CHANGE UP (ref 80–100)
MONOCYTES # BLD AUTO: 0.73 K/UL — SIGNIFICANT CHANGE UP (ref 0–0.9)
MONOCYTES NFR BLD AUTO: 8.8 % — SIGNIFICANT CHANGE UP (ref 2–14)
NEUTROPHILS # BLD AUTO: 6.26 K/UL — SIGNIFICANT CHANGE UP (ref 1.8–7.4)
NEUTROPHILS NFR BLD AUTO: 75.6 % — SIGNIFICANT CHANGE UP (ref 43–77)
NRBC # BLD: 0 /100 WBCS — SIGNIFICANT CHANGE UP
NRBC # FLD: 0 K/UL — SIGNIFICANT CHANGE UP
PLATELET # BLD AUTO: 94 K/UL — LOW (ref 150–400)
POTASSIUM SERPL-MCNC: 4.2 MMOL/L — SIGNIFICANT CHANGE UP (ref 3.5–5.3)
POTASSIUM SERPL-SCNC: 4.2 MMOL/L — SIGNIFICANT CHANGE UP (ref 3.5–5.3)
PROTHROM AB SERPL-ACNC: 13.8 SEC — HIGH (ref 10.5–13.4)
RBC # BLD: 3.75 M/UL — LOW (ref 4.2–5.8)
RBC # FLD: 14.6 % — HIGH (ref 10.3–14.5)
SODIUM SERPL-SCNC: 140 MMOL/L — SIGNIFICANT CHANGE UP (ref 135–145)
WBC # BLD: 8.28 K/UL — SIGNIFICANT CHANGE UP (ref 3.8–10.5)
WBC # FLD AUTO: 8.28 K/UL — SIGNIFICANT CHANGE UP (ref 3.8–10.5)

## 2022-04-16 PROCEDURE — 99291 CRITICAL CARE FIRST HOUR: CPT

## 2022-04-16 PROCEDURE — 71045 X-RAY EXAM CHEST 1 VIEW: CPT | Mod: 26

## 2022-04-16 RX ORDER — HYDROMORPHONE HYDROCHLORIDE 2 MG/ML
250 INJECTION INTRAMUSCULAR; INTRAVENOUS; SUBCUTANEOUS
Refills: 0 | Status: DISCONTINUED | OUTPATIENT
Start: 2022-04-16 | End: 2022-04-19

## 2022-04-16 RX ORDER — HYDROMORPHONE HYDROCHLORIDE 2 MG/ML
4 INJECTION INTRAMUSCULAR; INTRAVENOUS; SUBCUTANEOUS
Refills: 0 | Status: DISCONTINUED | OUTPATIENT
Start: 2022-04-16 | End: 2022-04-16

## 2022-04-16 RX ORDER — LIDOCAINE 4 G/100G
1 CREAM TOPICAL EVERY 24 HOURS
Refills: 0 | Status: COMPLETED | OUTPATIENT
Start: 2022-04-16 | End: 2022-04-20

## 2022-04-16 RX ORDER — METHADONE HYDROCHLORIDE 40 MG/1
10 TABLET ORAL ONCE
Refills: 0 | Status: DISCONTINUED | OUTPATIENT
Start: 2022-04-16 | End: 2022-04-16

## 2022-04-16 RX ORDER — KETAMINE HYDROCHLORIDE 100 MG/ML
1.25 INJECTION INTRAMUSCULAR; INTRAVENOUS
Qty: 1000 | Refills: 0 | Status: DISCONTINUED | OUTPATIENT
Start: 2022-04-16 | End: 2022-04-18

## 2022-04-16 RX ORDER — METHADONE HYDROCHLORIDE 40 MG/1
5 TABLET ORAL EVERY 8 HOURS
Refills: 0 | Status: DISCONTINUED | OUTPATIENT
Start: 2022-04-16 | End: 2022-04-16

## 2022-04-16 RX ORDER — KETAMINE HYDROCHLORIDE 100 MG/ML
1.25 INJECTION INTRAMUSCULAR; INTRAVENOUS
Qty: 1000 | Refills: 0 | Status: DISCONTINUED | OUTPATIENT
Start: 2022-04-16 | End: 2022-04-16

## 2022-04-16 RX ORDER — HYDROMORPHONE HYDROCHLORIDE 2 MG/ML
0.5 INJECTION INTRAMUSCULAR; INTRAVENOUS; SUBCUTANEOUS
Refills: 0 | Status: DISCONTINUED | OUTPATIENT
Start: 2022-04-16 | End: 2022-04-19

## 2022-04-16 RX ORDER — ACETAMINOPHEN 500 MG
1000 TABLET ORAL ONCE
Refills: 0 | Status: COMPLETED | OUTPATIENT
Start: 2022-04-16 | End: 2022-04-16

## 2022-04-16 RX ORDER — METHADONE HYDROCHLORIDE 40 MG/1
10 TABLET ORAL EVERY 8 HOURS
Refills: 0 | Status: DISCONTINUED | OUTPATIENT
Start: 2022-04-16 | End: 2022-04-16

## 2022-04-16 RX ORDER — KETAMINE HYDROCHLORIDE 100 MG/ML
0.1 INJECTION INTRAMUSCULAR; INTRAVENOUS
Qty: 200 | Refills: 0 | Status: DISCONTINUED | OUTPATIENT
Start: 2022-04-16 | End: 2022-04-16

## 2022-04-16 RX ORDER — HYDROMORPHONE HYDROCHLORIDE 2 MG/ML
0.5 INJECTION INTRAMUSCULAR; INTRAVENOUS; SUBCUTANEOUS ONCE
Refills: 0 | Status: DISCONTINUED | OUTPATIENT
Start: 2022-04-16 | End: 2022-04-16

## 2022-04-16 RX ORDER — ACETAMINOPHEN 500 MG
1000 TABLET ORAL ONCE
Refills: 0 | Status: DISCONTINUED | OUTPATIENT
Start: 2022-04-16 | End: 2022-04-24

## 2022-04-16 RX ORDER — METHADONE HYDROCHLORIDE 40 MG/1
5 TABLET ORAL EVERY 8 HOURS
Refills: 0 | Status: DISCONTINUED | OUTPATIENT
Start: 2022-04-16 | End: 2022-04-18

## 2022-04-16 RX ADMIN — KETAMINE HYDROCHLORIDE 9.51 MG/KG/HR: 100 INJECTION INTRAMUSCULAR; INTRAVENOUS at 19:58

## 2022-04-16 RX ADMIN — KETAMINE HYDROCHLORIDE 3.81 MG/KG/HR: 100 INJECTION INTRAMUSCULAR; INTRAVENOUS at 01:46

## 2022-04-16 RX ADMIN — HYDROMORPHONE HYDROCHLORIDE 0.5 MILLIGRAM(S): 2 INJECTION INTRAMUSCULAR; INTRAVENOUS; SUBCUTANEOUS at 20:39

## 2022-04-16 RX ADMIN — Medication 400 MILLIGRAM(S): at 17:38

## 2022-04-16 RX ADMIN — HYDROMORPHONE HYDROCHLORIDE 4 MILLIGRAM(S): 2 INJECTION INTRAMUSCULAR; INTRAVENOUS; SUBCUTANEOUS at 00:11

## 2022-04-16 RX ADMIN — METHADONE HYDROCHLORIDE 5 MILLIGRAM(S): 40 TABLET ORAL at 13:55

## 2022-04-16 RX ADMIN — LIDOCAINE 1 PATCH: 4 CREAM TOPICAL at 22:00

## 2022-04-16 RX ADMIN — HEPARIN SODIUM 5000 UNIT(S): 5000 INJECTION INTRAVENOUS; SUBCUTANEOUS at 13:55

## 2022-04-16 RX ADMIN — METHADONE HYDROCHLORIDE 5 MILLIGRAM(S): 40 TABLET ORAL at 10:26

## 2022-04-16 RX ADMIN — HYDROMORPHONE HYDROCHLORIDE 0.5 MILLIGRAM(S): 2 INJECTION INTRAMUSCULAR; INTRAVENOUS; SUBCUTANEOUS at 20:55

## 2022-04-16 RX ADMIN — Medication 1 MILLIGRAM(S): at 12:14

## 2022-04-16 RX ADMIN — HYDROMORPHONE HYDROCHLORIDE 0.5 MILLIGRAM(S): 2 INJECTION INTRAMUSCULAR; INTRAVENOUS; SUBCUTANEOUS at 17:22

## 2022-04-16 RX ADMIN — HYDROMORPHONE HYDROCHLORIDE 0.5 MILLIGRAM(S): 2 INJECTION INTRAMUSCULAR; INTRAVENOUS; SUBCUTANEOUS at 12:10

## 2022-04-16 RX ADMIN — Medication 25 MILLIGRAM(S): at 17:54

## 2022-04-16 RX ADMIN — HYDROMORPHONE HYDROCHLORIDE 0.5 MILLIGRAM(S): 2 INJECTION INTRAMUSCULAR; INTRAVENOUS; SUBCUTANEOUS at 05:48

## 2022-04-16 RX ADMIN — LIDOCAINE 1 PATCH: 4 CREAM TOPICAL at 10:57

## 2022-04-16 RX ADMIN — HYDROMORPHONE HYDROCHLORIDE 0.5 MILLIGRAM(S): 2 INJECTION INTRAMUSCULAR; INTRAVENOUS; SUBCUTANEOUS at 11:54

## 2022-04-16 RX ADMIN — Medication 1000 MILLIGRAM(S): at 10:25

## 2022-04-16 RX ADMIN — KETAMINE HYDROCHLORIDE 9.51 MG/KG/HR: 100 INJECTION INTRAMUSCULAR; INTRAVENOUS at 03:07

## 2022-04-16 RX ADMIN — HYDROMORPHONE HYDROCHLORIDE 250 MILLILITER(S): 2 INJECTION INTRAMUSCULAR; INTRAVENOUS; SUBCUTANEOUS at 19:28

## 2022-04-16 RX ADMIN — SODIUM CHLORIDE 100 MILLILITER(S): 9 INJECTION, SOLUTION INTRAVENOUS at 19:58

## 2022-04-16 RX ADMIN — Medication 400 MILLIGRAM(S): at 10:10

## 2022-04-16 RX ADMIN — HYDROMORPHONE HYDROCHLORIDE 0.5 MILLIGRAM(S): 2 INJECTION INTRAMUSCULAR; INTRAVENOUS; SUBCUTANEOUS at 03:30

## 2022-04-16 RX ADMIN — METHADONE HYDROCHLORIDE 10 MILLIGRAM(S): 40 TABLET ORAL at 00:30

## 2022-04-16 RX ADMIN — HYDROMORPHONE HYDROCHLORIDE 0.5 MILLIGRAM(S): 2 INJECTION INTRAMUSCULAR; INTRAVENOUS; SUBCUTANEOUS at 23:40

## 2022-04-16 RX ADMIN — METHADONE HYDROCHLORIDE 5 MILLIGRAM(S): 40 TABLET ORAL at 22:51

## 2022-04-16 RX ADMIN — PANTOPRAZOLE SODIUM 40 MILLIGRAM(S): 20 TABLET, DELAYED RELEASE ORAL at 13:55

## 2022-04-16 RX ADMIN — HYDROMORPHONE HYDROCHLORIDE 4 MILLIGRAM(S): 2 INJECTION INTRAMUSCULAR; INTRAVENOUS; SUBCUTANEOUS at 01:00

## 2022-04-16 RX ADMIN — Medication 100 GRAM(S): at 19:59

## 2022-04-16 RX ADMIN — Medication 1000 MILLIGRAM(S): at 17:53

## 2022-04-16 RX ADMIN — Medication 1 MILLIGRAM(S): at 04:45

## 2022-04-16 RX ADMIN — HEPARIN SODIUM 5000 UNIT(S): 5000 INJECTION INTRAVENOUS; SUBCUTANEOUS at 22:49

## 2022-04-16 RX ADMIN — LIDOCAINE 1 PATCH: 4 CREAM TOPICAL at 20:04

## 2022-04-16 RX ADMIN — HYDROMORPHONE HYDROCHLORIDE 0.5 MILLIGRAM(S): 2 INJECTION INTRAMUSCULAR; INTRAVENOUS; SUBCUTANEOUS at 02:48

## 2022-04-16 RX ADMIN — HYDROMORPHONE HYDROCHLORIDE 30 MILLILITER(S): 2 INJECTION INTRAMUSCULAR; INTRAVENOUS; SUBCUTANEOUS at 00:24

## 2022-04-16 RX ADMIN — HYDROMORPHONE HYDROCHLORIDE 0.5 MILLIGRAM(S): 2 INJECTION INTRAMUSCULAR; INTRAVENOUS; SUBCUTANEOUS at 17:53

## 2022-04-16 RX ADMIN — HYDROMORPHONE HYDROCHLORIDE 250 MILLILITER(S): 2 INJECTION INTRAMUSCULAR; INTRAVENOUS; SUBCUTANEOUS at 12:03

## 2022-04-16 RX ADMIN — Medication 100 GRAM(S): at 09:00

## 2022-04-16 NOTE — PROGRESS NOTE ADULT - SUBJECTIVE AND OBJECTIVE BOX
Day ___ of Anesthesia Pain Management Service    SUBJECTIVE:  Pain Scale Score	At rest: ___ 	With Activity: ___ 	[  ] Refer to charted pain scores    THERAPY:  [ ] Epidural Bupivacaine 0.0625% and Hydromorphone  		[ ] 10 micrograms/mL	[ ] 5 micrograms/mL  [ ] Epidural Bupivacaine 0.0625% and Fentanyl - 2 micrograms/mL  [ ] Epidural Ropivacaine 0.1% plain – 1 mg/mL  [ ] Patient Controlled Regional Anesthesia (PCRA) Ropivacaine  		[ ] 0.2%			[ ] 0.1%    Demand dose ___ lockout ___ (minutes) Continuous Rate ___ Total: ___ Daily      MEDICATIONS  (STANDING):  buprenorphine 2 mG/naloxone 0.5 mG SL  Tablet 2 Tablet(s) SubLingual daily  cefoTEtan  IVPB 1 Gram(s) IV Intermittent every 12 hours  dextrose 5% + lactated ringers. 1000 milliLiter(s) (100 mL/Hr) IV Continuous <Continuous>  heparin   Injectable 5000 Unit(s) SubCutaneous every 8 hours  HYDROmorphone PCA (1 mG/mL) 30 milliLiter(s) PCA Continuous PCA Continuous  ketamine Infusion. 1.25 mG/kG/Hr (9.51 mL/Hr) IV Continuous <Continuous>  pantoprazole  Injectable 40 milliGRAM(s) IV Push daily    MEDICATIONS  (PRN):  diphenhydrAMINE Injectable 25 milliGRAM(s) IV Push every 4 hours PRN Pruritus  HYDROmorphone PCA (1 mG/mL) Rescue Clinician Bolus 0.5 milliGRAM(s) IV Push every 15 minutes PRN for Pain Scale GREATER THAN 6  hydrOXYzine hydrochloride Syrup 25 milliGRAM(s) Oral every 6 hours PRN Anxiety  naloxone Injectable 0.1 milliGRAM(s) IV Push every 3 minutes PRN For ANY of the following changes in patient status:  A. RR LESS THAN 10 breaths per minute, B. Oxygen saturation LESS THAN 90%, C. Sedation score of 6  ondansetron Injectable 4 milliGRAM(s) IV Push every 6 hours PRN Nausea      OBJECTIVE:    Assessment of Catheter Site:	[ ] Left	[ ] Right  [ ] Epidural 	[ ] Femoral	      [ ] Saphenous   [ ] Supraclavicular   [ ] Other:    [x ] Dressing intact	[x ] Site non-tender	[x ] Site without erythema, discharge, edema  [x ] Epidural tubing and connection checked	[ [ Gross neurological exam within normal limits  [ ] Catheter removed – tip intact		[x ] Afebrile	[ ] Febrile: ___    PT/INR - ( 16 Apr 2022 04:14 )   PT: 13.8 sec;   INR: 1.19 ratio         PTT - ( 16 Apr 2022 04:14 )  PTT:28.3 sec                      10.6   8.28  )-----------( 94       ( 16 Apr 2022 04:14 )             31.7     Vital Signs Last 24 Hrs  T(C): 37.1 (04-16-22 @ 04:00), Max: 37.7 (04-16-22 @ 00:00)  T(F): 98.7 (04-16-22 @ 04:00), Max: 99.9 (04-16-22 @ 00:00)  HR: 82 (04-16-22 @ 06:00) (60 - 98)  BP: 146/81 (04-16-22 @ 06:00) (110/61 - 150/89)  BP(mean): 94 (04-16-22 @ 06:00) (77 - 105)  RR: 15 (04-16-22 @ 06:00) (6 - 21)  SpO2: 98% (04-16-22 @ 06:00) (93% - 99%)      Sedation Score:	[ ] Alert	[x ] Drowsy	[ ] Arousable	[ ] Asleep	[ ] Unresponsive    Side Effects:	[x ] None	[ ] Nausea	[ ] Vomiting	[ ] Pruritus  		[ ] Weakness		[ ] Numbness	[ ] Other:    ASSESSMENT/ PLAN:    Spoke with patient, his mother, in addition to CT ICU team regarding the patient's evening and pain relief.   Patient had been experiencing a significant amount of pain and the CT ICU team questioned the efficacy of the PCEA. Similarly, the PCEA was discontinued and the patient was started on a IV PVA (hydromorphone), ketamine ggt, in addition to a single-dose of methadone and lorazepam. The patient's mother and nurse reports that the patient was unable to sleep more than 90 minutes (after receiving the alprazolam) and was unable to be repositioned and respire deeply without significant discomfort.    I explained to the patient and the mother that I would consider replacing his epidural after assessing it - 10cc of 2% lidocaine with 1:200,000 epinephrine was administered through the patient's epidural catheter after ensuring that heme nor CSF was aspiration from the existing catheter in place. I observed the patient's hemodynamics for the next 20 minutes and the CT ICU staff and I were able to see a progressive drop in the patient's MAP (roughly by about 10mmHg), and more importantly and improvement in his pain (the patient was able to breathe deeper and be repositioned in his bed without significant pain).       I made recommendations to the CT ICU team to discontinue the patient's IV PCA (hydromorphone), and ketamine infusion in order to restart his epidural PCEA. We will have to consider starting the patient at a different infusion rate and concentration in effort to treat his pain.     Pain service will continue to follow.     Therapy to  be:	[x ] Continue   [ ] Discontinued   [ ] Change to prn Analgesics    Documentation and Verification of current medications:  [ X ] Done	[ ] Not done, not eligible, reason:    Comments:

## 2022-04-16 NOTE — PROGRESS NOTE ADULT - SUBJECTIVE AND OBJECTIVE BOX
Anesthesia Pain Management Service: Day __ of Epidural    SUBJECTIVE: Patient reports pain all over but mostly at surgical site and has not had much pain relief. Patient reports he stopped using Heroin about 8 months ago and was on Suboxone 4mg at home. He endorses weaning himself down to Suboxone 2mg prior to surgery.   Pain Scale Score: 10/10   Refer to charted pain scores    THERAPY:  [x ] Epidural Bupivacaine 0.0625% and Hydromorphone  		[ X] 10 micrograms/mL	[ ] 5 micrograms/mL  [ ] Epidural Bupivacaine 0.0625% and Fentanyl - 2 micrograms/mL  [ ] Epidural Ropivacaine 0.1% plain – 1 mg/mL  [ ] Patient Controlled Regional Anesthesia (PCRA) Ropivacaine  		[ ] 0.2%			[ ] 0.1%    Demand dose __5_ lockout __15_ (minutes) Continuous Rate _10__ Total: __60__ ml used (in past 24 hours)      MEDICATIONS  (STANDING):  acetaminophen   IVPB .. 1000 milliGRAM(s) IV Intermittent once  cefoTEtan  IVPB 1 Gram(s) IV Intermittent every 12 hours  dextrose 5% + lactated ringers. 1000 milliLiter(s) (100 mL/Hr) IV Continuous <Continuous>  heparin   Injectable 5000 Unit(s) SubCutaneous every 8 hours  hydromorphone (10 MICROgram(s)/mL) + bupivacaine 0.0625% in 0.9% Sodium Chloride PCEA 250 milliLiter(s) Epidural PCA Continuous  ketamine Infusion. 1.25 mG/kG/Hr (9.51 mL/Hr) IV Continuous <Continuous>  lidocaine   4% Patch 1 Patch Transdermal every 24 hours  methadone Injectable 5 milliGRAM(s) IV Push every 8 hours  pantoprazole  Injectable 40 milliGRAM(s) IV Push daily    MEDICATIONS  (PRN):  diphenhydrAMINE Injectable 25 milliGRAM(s) IV Push every 4 hours PRN Pruritus  HYDROmorphone  Injectable 0.5 milliGRAM(s) IV Push every 3 hours PRN Severe breakthrough Pain (7 - 10)  hydrOXYzine hydrochloride Syrup 25 milliGRAM(s) Oral every 6 hours PRN Anxiety  naloxone Injectable 0.1 milliGRAM(s) IV Push every 3 minutes PRN For ANY of the following changes in patient status:  A. RR LESS THAN 10 breaths per minute, B. Oxygen saturation LESS THAN 90%, C. Sedation score of 6  ondansetron Injectable 4 milliGRAM(s) IV Push every 6 hours PRN Nausea      OBJECTIVE: Patient sitting up in bed with NGT, chest tube x2. Mother at bedside.     Assessment of Catheter Site:	[ ] Left	[ ] Right  [x ] Epidural 	[ ] Femoral	      [ ] Saphenous   [ ] Supraclavicular   [ ] Other:    [x ] Dressing intact	[x ] Site non-tender	[ x] Site without erythema, discharge, edema  [x ] Epidural tubing and connection checked	[x] Gross neurological exam within normal limits  [ ] Catheter removed – tip intact		[ ] Afebrile  	[ ] Febrile: ___   [ X] see Temp under VS below)    PT/INR - ( 16 Apr 2022 04:14 )   PT: 13.8 sec;   INR: 1.19 ratio         PTT - ( 16 Apr 2022 04:14 )  PTT:28.3 sec                      10.6   8.28  )-----------( 94       ( 16 Apr 2022 04:14 )             31.7     Vital Signs Last 24 Hrs  T(C): 36.9 (04-16-22 @ 12:00), Max: 37.7 (04-16-22 @ 00:00)  T(F): 98.5 (04-16-22 @ 12:00), Max: 99.9 (04-16-22 @ 00:00)  HR: 79 (04-16-22 @ 12:00) (73 - 98)  BP: 142/81 (04-16-22 @ 12:00) (124/67 - 158/90)  BP(mean): 94 (04-16-22 @ 12:00) (77 - 106)  RR: 21 (04-16-22 @ 12:00) (6 - 21)  SpO2: 92% (04-16-22 @ 12:00) (92% - 98%)      Sedation Score:	[x ] Alert	[ ] Drowsy	[ ] Arousable	[ ] Asleep	[ ] Unresponsive    Side Effects:	[x ] None	[ ] Nausea	[ ] Vomiting	[ ] Pruritus  		[ ] Weakness		[ ] Numbness	[ ] Other:    ASSESSMENT/ PLAN:    Therapy to  be:	[x ] Continue   [ ] Discontinued   [ ] Change to prn Analgesics    Documentation and Verification of current medications:  [ X ] Done	[ ] Not done, not eligible, reason:    Comments: Patient was experiencing pain overnight, Dr. Carcamo had pushed medication through epidural which appeared to be working. Rounded on patient this morning with Dr. Coyne present at bedside. Test dose of Bupivacaine 0.25% @5cc was pushed by Dr. Coyne and epidural was started after test dose worked. IV PCA discontinued, Ketamine infusion continued and will be weaned down. Ordered Methadone 5mg IVP Q8H and IVP Dilaudid 0.5mg Q3H PRN for severe breathrough pain. Discussed patient with CTICU team, continue. PCEA.    Progress Note written now but Patient was seen earlier. Anesthesia Pain Management Service: PostOp Day _1_ of Epidural    SUBJECTIVE: Patient reports pain all over but mostly at surgical site and has not had much pain relief. Patient reports he stopped using Heroin about 8 months ago and was on Suboxone 4mg at home. He endorses weaning himself down to Suboxone 2mg prior to surgery.   Pain Scale Score: 10/10   Refer to charted pain scores    THERAPY:  [x ] Epidural Bupivacaine 0.0625% and Hydromorphone  		[ X] 10 micrograms/mL	[ ] 5 micrograms/mL  [ ] Epidural Bupivacaine 0.0625% and Fentanyl - 2 micrograms/mL  [ ] Epidural Ropivacaine 0.1% plain – 1 mg/mL  [ ] Patient Controlled Regional Anesthesia (PCRA) Ropivacaine  		[ ] 0.2%			[ ] 0.1%    Demand dose __5_ lockout __15_ (minutes) Continuous Rate _10__ Total: _34___ ml used (in past 24 hours)      MEDICATIONS  (STANDING):  acetaminophen   IVPB .. 1000 milliGRAM(s) IV Intermittent once  cefoTEtan  IVPB 1 Gram(s) IV Intermittent every 12 hours  dextrose 5% + lactated ringers. 1000 milliLiter(s) (100 mL/Hr) IV Continuous <Continuous>  heparin   Injectable 5000 Unit(s) SubCutaneous every 8 hours  hydromorphone (10 MICROgram(s)/mL) + bupivacaine 0.0625% in 0.9% Sodium Chloride PCEA 250 milliLiter(s) Epidural PCA Continuous  ketamine Infusion. 1.25 mG/kG/Hr (9.51 mL/Hr) IV Continuous <Continuous>  lidocaine   4% Patch 1 Patch Transdermal every 24 hours  methadone Injectable 5 milliGRAM(s) IV Push every 8 hours  pantoprazole  Injectable 40 milliGRAM(s) IV Push daily    MEDICATIONS  (PRN):  diphenhydrAMINE Injectable 25 milliGRAM(s) IV Push every 4 hours PRN Pruritus  HYDROmorphone  Injectable 0.5 milliGRAM(s) IV Push every 3 hours PRN Severe breakthrough Pain (7 - 10)  hydrOXYzine hydrochloride Syrup 25 milliGRAM(s) Oral every 6 hours PRN Anxiety  naloxone Injectable 0.1 milliGRAM(s) IV Push every 3 minutes PRN For ANY of the following changes in patient status:  A. RR LESS THAN 10 breaths per minute, B. Oxygen saturation LESS THAN 90%, C. Sedation score of 6  ondansetron Injectable 4 milliGRAM(s) IV Push every 6 hours PRN Nausea      OBJECTIVE: Patient sitting up in bed with NGT, chest tube x2. Mother at bedside.     Assessment of Catheter Site:	[ ] Left	[ ] Right  [x ] Epidural 	[ ] Femoral	      [ ] Saphenous   [ ] Supraclavicular   [ ] Other:    [x ] Dressing intact	[x ] Site non-tender	[ x] Site without erythema, discharge, edema  [x ] Epidural tubing and connection checked	[x] Gross neurological exam within normal limits  [ ] Catheter removed – tip intact		[ ] Afebrile  	[ ] Febrile: ___   [ X] see Temp under VS below)    PT/INR - ( 16 Apr 2022 04:14 )   PT: 13.8 sec;   INR: 1.19 ratio         PTT - ( 16 Apr 2022 04:14 )  PTT:28.3 sec                      10.6   8.28  )-----------( 94       ( 16 Apr 2022 04:14 )             31.7     Vital Signs Last 24 Hrs  T(C): 36.9 (04-16-22 @ 12:00), Max: 37.7 (04-16-22 @ 00:00)  T(F): 98.5 (04-16-22 @ 12:00), Max: 99.9 (04-16-22 @ 00:00)  HR: 79 (04-16-22 @ 12:00) (73 - 98)  BP: 142/81 (04-16-22 @ 12:00) (124/67 - 158/90)  BP(mean): 94 (04-16-22 @ 12:00) (77 - 106)  RR: 21 (04-16-22 @ 12:00) (6 - 21)  SpO2: 92% (04-16-22 @ 12:00) (92% - 98%)      Sedation Score:	[x ] Alert	[ ] Drowsy	[ ] Arousable	[ ] Asleep	[ ] Unresponsive    Side Effects:	[x ] None	[ ] Nausea	[ ] Vomiting	[ ] Pruritus  		[ ] Weakness		[ ] Numbness	[ ] Other:    ASSESSMENT/ PLAN:    Therapy to  be:	[x ] Continue   [ ] Discontinued   [ ] Change to prn Analgesics    Documentation and Verification of current medications:  [ X ] Done	[ ] Not done, not eligible, reason:    Comments: Patient was experiencing pain overnight, Dr. Carcamo had pushed medication through epidural which appeared to be working. Rounded on patient this morning with Dr. Coyne present at bedside. Test dose of Bupivacaine 0.25% @5cc was pushed by Dr. Coyne and epidural was started after test dose worked. IV PCA discontinued, Ketamine infusion continued and will be weaned down. Ordered Methadone 5mg IVP Q8H and IVP Dilaudid 0.5mg Q3H PRN for severe breathrough pain. Discussed patient with CTICU team, continue PCEA.    Progress Note written now but Patient was seen earlier. Anesthesia Pain Management Service: PostOp Day _1_ of Epidural    SUBJECTIVE: Patient reports pain all over but mostly at surgical site and has not had much pain relief. Patient reports he stopped using Heroin about 8 months ago and was on Suboxone 4mg at home. He endorses weaning himself down to Suboxone 2mg prior to surgery.   Pain Scale Score: 10/10   Refer to charted pain scores    THERAPY:  [x ] Epidural Bupivacaine 0.0625% and Hydromorphone  		[ X] 10 micrograms/mL	[ ] 5 micrograms/mL  [ ] Epidural Bupivacaine 0.0625% and Fentanyl - 2 micrograms/mL  [ ] Epidural Ropivacaine 0.1% plain – 1 mg/mL  [ ] Patient Controlled Regional Anesthesia (PCRA) Ropivacaine  		[ ] 0.2%			[ ] 0.1%    Demand dose __5_ lockout __15_ (minutes) Continuous Rate _10__ Total: _34___ ml used (in past 24 hours)      MEDICATIONS  (STANDING):  acetaminophen   IVPB .. 1000 milliGRAM(s) IV Intermittent once  cefoTEtan  IVPB 1 Gram(s) IV Intermittent every 12 hours  dextrose 5% + lactated ringers. 1000 milliLiter(s) (100 mL/Hr) IV Continuous <Continuous>  heparin   Injectable 5000 Unit(s) SubCutaneous every 8 hours  hydromorphone (10 MICROgram(s)/mL) + bupivacaine 0.0625% in 0.9% Sodium Chloride PCEA 250 milliLiter(s) Epidural PCA Continuous  ketamine Infusion. 1.25 mG/kG/Hr (9.51 mL/Hr) IV Continuous <Continuous>  lidocaine   4% Patch 1 Patch Transdermal every 24 hours  methadone Injectable 5 milliGRAM(s) IV Push every 8 hours  pantoprazole  Injectable 40 milliGRAM(s) IV Push daily    MEDICATIONS  (PRN):  diphenhydrAMINE Injectable 25 milliGRAM(s) IV Push every 4 hours PRN Pruritus  HYDROmorphone  Injectable 0.5 milliGRAM(s) IV Push every 3 hours PRN Severe breakthrough Pain (7 - 10)  hydrOXYzine hydrochloride Syrup 25 milliGRAM(s) Oral every 6 hours PRN Anxiety  naloxone Injectable 0.1 milliGRAM(s) IV Push every 3 minutes PRN For ANY of the following changes in patient status:  A. RR LESS THAN 10 breaths per minute, B. Oxygen saturation LESS THAN 90%, C. Sedation score of 6  ondansetron Injectable 4 milliGRAM(s) IV Push every 6 hours PRN Nausea      OBJECTIVE: Patient sitting up in bed with NGT, chest tube x2. Mother at bedside.     Assessment of Catheter Site:	[ ] Left	[ ] Right  [x ] Epidural 	[ ] Femoral	      [ ] Saphenous   [ ] Supraclavicular   [ ] Other:    [x ] Dressing intact	[x ] Site non-tender	[ x] Site without erythema, discharge, edema  [x ] Epidural tubing and connection checked	[x] Gross neurological exam within normal limits  [ ] Catheter removed – tip intact		[ ] Afebrile  	[ ] Febrile: ___   [ X] see Temp under VS below)    PT/INR - ( 16 Apr 2022 04:14 )   PT: 13.8 sec;   INR: 1.19 ratio         PTT - ( 16 Apr 2022 04:14 )  PTT:28.3 sec                      10.6   8.28  )-----------( 94       ( 16 Apr 2022 04:14 )             31.7     Vital Signs Last 24 Hrs  T(C): 36.9 (04-16-22 @ 12:00), Max: 37.7 (04-16-22 @ 00:00)  T(F): 98.5 (04-16-22 @ 12:00), Max: 99.9 (04-16-22 @ 00:00)  HR: 79 (04-16-22 @ 12:00) (73 - 98)  BP: 142/81 (04-16-22 @ 12:00) (124/67 - 158/90)  BP(mean): 94 (04-16-22 @ 12:00) (77 - 106)  RR: 21 (04-16-22 @ 12:00) (6 - 21)  SpO2: 92% (04-16-22 @ 12:00) (92% - 98%)      Sedation Score:	[x ] Alert	[ ] Drowsy	[ ] Arousable	[ ] Asleep	[ ] Unresponsive    Side Effects:	[x ] None	[ ] Nausea	[ ] Vomiting	[ ] Pruritus  		[ ] Weakness		[ ] Numbness	[ ] Other:    ASSESSMENT/ PLAN:    Therapy to  be:	[x ] Continue   [ ] Discontinued   [ ] Change to prn Analgesics    Documentation and Verification of current medications:  [ X ] Done	[ ] Not done, not eligible, reason:    Comments: Patient was experiencing pain overnight, Dr. Carcamo came to bedside and pushed medication through epidural. As per patient's mother, the patient had some pain relief for 45 minutes. Rounded on patient this morning with Dr. Coyne present at bedside. Test dose of Bupivacaine 0.25% @5cc was pushed by Dr. Coyne and patient had pain relief, epidural was restarted. IV PCA orders discontinued and recommend weaning off Ketamine infusion while starting IV Methadone 5mg Q8H. Recommend baseline EKG. Ordered IVP Dilaudid 0.5mg Q3H PRN for severe breathrough pain. Discussed patient with CTICU team, continue PCEA.    Progress Note written now but Patient was seen earlier.

## 2022-04-16 NOTE — CHART NOTE - NSCHARTNOTEFT_GEN_A_CORE
Acute pain service alerted about nonfunctional epidural.   -PCEA on hold, dressing checked, appears to be leaking but catheter in place.  -pt mentions absolute no pain relief with epidural at 10 ml/hr and does not want to continue it  -Attending Dr Clark informed about case  -plan: pt has h/o drug abuse and was on suboxone 2 mg at home, which he has refused to take today.   Anesthesia team will discontinue PCEA for now, catheter will be taken out in AM after set of coags. PCA ordered along with IV methadone 10 mg one dose to provide basal coverage for pain.   -If methadone and Dilaudid PCA does not help with pain, next option would be adding ketamine gtt with close monitoring while in ICU.     For tonight, pain service can be reached at [phone] for resident and [phone] for attending.      Jona Frye, CA3 resident

## 2022-04-16 NOTE — PROGRESS NOTE ADULT - SUBJECTIVE AND OBJECTIVE BOX
ABNER MORA                     [id number]    HPI:  25yo M w/ PMHx of esophageal stricture s/p multiple dilations and IVDU on Suboxone presenting with inability to tolerate oral intake for the past 30 days.  Patient reports a few week history of progressively worsening inability to tolerate solids and now only tolerates oral liquid intake. He is only able to tolerate smoothies or liquids. He reports he has lost about 30 pounds in the last few months. He reports nausea,  chills, no fever, night sweats. He was recently started on Suboxone this past month, which was tapered down to 4mg 3 days ago. Patient follows with Dr. Gerry Serrato of GI. His last EGD was in December of 2021. Of note,  Patient was admitted in June of 2021 for esophageal stent migration with bacteremia (strep) and he required IV ABs. Otherwise, no fevers, chills, abdominal pain, no D/C/ or emesis.    (04 Apr 2022 13:44)    PROCEDURES:      FB, EGD, Robotic Assisted [place] [facility] esophagectomy, J tube placement.  15-Apr-2022     ISSUES:     Esophageal stricture   Postoperative pain   Chest tubes in place   Substance abuse(cocaine, heroin, IVDU), on suboxone        PAST MEDICAL & SURGICAL HISTORY:  Heroin abuse    Cocaine abuse    GERD (gastroesophageal reflux disease)    Hiatal hernia    History of esophageal stricture    Intravenous drug abuse    Migration of pancreatic stent    Migrated esophageal stent              VITAL SIGNS:  Vital Signs Last 24 Hrs  T(C): 37.3 (16 Apr 2022 08:00), Max: 37.7 (16 Apr 2022 00:00)  T(F): 99.2 (16 Apr 2022 08:00), Max: 99.9 (16 Apr 2022 00:00)  HR: 82 (16 Apr 2022 11:00) (73 - 98)  BP: 150/84 (16 Apr 2022 11:00) (124/67 - 158/90)  BP(mean): 99 (16 Apr 2022 11:00) (77 - 106)  RR: 16 (16 Apr 2022 11:00) (6 - 21)  SpO2: 93% (16 Apr 2022 11:00) (93% - 98%)    I/Os:   I&O's Detail    15 Apr 2022 07:01  -  16 Apr 2022 07:00  --------------------------------------------------------  IN:    dextrose 5% + lactated ringers: 1300 mL    Enteral Tube Flush: 90 mL    Enteral Tube Flush: 90 mL    IV PiggyBack: 250 mL    Ketamine: 38 mL    Ketamine: 47.6 mL  Total IN: 1815.6 mL    OUT:    Chest Tube (mL): 75 mL    Chest Tube (mL): 280 mL    Indwelling Catheter - Urethral (mL): 1970 mL    Nasogastric/Oral tube (mL): 60 mL  Total OUT: 2385 mL    Total NET: -569.4 mL      16 Apr 2022 07:01  -  16 Apr 2022 11:15  --------------------------------------------------------  IN:    dextrose 5% + lactated ringers: 500 mL    Enteral Tube Flush: 60 mL    Enteral Tube Flush: 60 mL  Total IN: 620 mL    OUT:    Chest Tube (mL): 15 mL    Chest Tube (mL): 100 mL    Indwelling Catheter - Urethral (mL): 1175 mL  Total OUT: 1290 mL    Total NET: -670 mL          CAPILLARY BLOOD GLUCOSE          =======================MEDICATIONS===================  MEDICATIONS  (STANDING):  acetaminophen   IVPB .. 1000 milliGRAM(s) IV Intermittent once  cefoTEtan  IVPB 1 Gram(s) IV Intermittent every 12 hours  dextrose 5% + lactated ringers. 1000 milliLiter(s) (100 mL/Hr) IV Continuous <Continuous>  heparin   Injectable 5000 Unit(s) SubCutaneous every 8 hours  hydromorphone (10 MICROgram(s)/mL) + bupivacaine 0.0625% in 0.9% Sodium Chloride PCEA 250 milliLiter(s) Epidural PCA Continuous  ketamine Infusion. 1.25 mG/kG/Hr (9.51 mL/Hr) IV Continuous <Continuous>  lidocaine   4% Patch 1 Patch Transdermal every 24 hours  methadone Injectable 5 milliGRAM(s) IV Push every 8 hours  pantoprazole  Injectable 40 milliGRAM(s) IV Push daily    MEDICATIONS  (PRN):  diphenhydrAMINE Injectable 25 milliGRAM(s) IV Push every 4 hours PRN Pruritus  HYDROmorphone  Injectable 0.5 milliGRAM(s) IV Push every 3 hours PRN Severe breakthrough Pain (7 - 10)  hydrOXYzine hydrochloride Syrup 25 milliGRAM(s) Oral every 6 hours PRN Anxiety  naloxone Injectable 0.1 milliGRAM(s) IV Push every 3 minutes PRN For ANY of the following changes in patient status:  A. RR LESS THAN 10 breaths per minute, B. Oxygen saturation LESS THAN 90%, C. Sedation score of 6  ondansetron Injectable 4 milliGRAM(s) IV Push every 6 hours PRN Nausea      PHYSICAL EXAM============================  General:                         Awake, alert, not in any distress  Neuro:                            Moving all extremities to commands.   Respiratory:	Air entry fair and  bilateral conducted sounds                                           Effort even and unlabored.  CV:		Regular rate and rhythm. Normal S1/S2                                          Distal pulses present.  Abdomen:	                     Soft, non-distended. Bowel sounds present   Skin:		No rash.  Extremities:	Warm, no cyanosis or edema.  Palpable pulses    ============================LABS=========================                        10.6   8.28  )-----------( 94       ( 16 Apr 2022 04:14 )             31.7     04-16    140  |  103  |  15  ----------------------------<  112<H>  4.2   |  27  |  0.82    Ca    8.4      16 Apr 2022 04:14  Phos  3.4     04-15  Mg     2.20     04-16    TPro  6.1  /  Alb  4.0  /  TBili  0.8  /  DBili  x   /  AST  10  /  ALT  11  /  AlkPhos  89  04-15    LIVER FUNCTIONS - ( 15 Apr 2022 05:52 )  Alb: 4.0 g/dL / Pro: 6.1 g/dL / ALK PHOS: 89 U/L / ALT: 11 U/L / AST: 10 U/L / GGT: x           PT/INR - ( 16 Apr 2022 04:14 )   PT: 13.8 sec;   INR: 1.19 ratio         PTT - ( 16 Apr 2022 04:14 )  PTT:28.3 sec  ABG - ( 15 Apr 2022 23:44 )  pH, Arterial: 7.36  pH, Blood: x     /  pCO2: 50    /  pO2: 84    / HCO3: 28    / Base Excess: 2.1   /  SaO2: 97.1               ASSESSMENT AND PLAN:    NEURO:   Post-operative Pain - Stable. Pain control with PCEA and Tylenol IV PRN.   Pain service at bedside, added methadone and Ketamine       RESPIRATORY:   Hypoxia - Wean nasal cannula for goal O2sat above 92. Obtain CXR. Incentive spirometry. Chest PT and frequent suctioning. Continue bronchodilators. OOB to chair & ambulate w/ assistance. Continuous pulse oximetry for support & to prevent decompensation.     Chest tube – Pleurevac regulated suctioning. Monitor chest tube output.        CARDIOVASCULAR:     Hemodynamically stable - Not on pressors. Continue hemodynamic monitoring.     Telemetry (medical test) - Reviewed by me today independently. Normal sinus rhythm.        RENAL:     Stable - Monitor IOs and electrolytes. Keep K above 4.0 and Mg above 2.0. D5LR@100ml/h, monitor hourly urine output        GASTROINTESTINAL:     GI prophylaxis    Zofran and Reglan IV PRN for nausea     NPO    NGT to low continuous suction    J tube to gravity     Flush J tube and NGT Q4h            HEMATOLOGIC:     No signs of active bleeding. Monitor Hgb in CBC in AM     DVT prophylaxis with heparin subQ and SCDs.         INFECTIOUS DISEASE:     All surgical sites appear clean. No signs of active infection. Will monitor for fever and leukocytosis.           ENDOCRINE:     Stable – Monitor glucose fingersticks for goal 120-180.          Pertinent clinical, laboratory, radiographic, hemodynamic, echocardiographic, respiratory data, microbiologic data and chart were reviewed by myself and analyzed frequently throughout the course of the day and night by myself.     Plan discussed at length with the CTICU staff and Attending CT Surgeon -   Dr. Zenia Shannon    Patient's status was discussed with patient at bedside.   I spent 35 minutes with 20 minutes at bedside providing care post op      Rudy Watson DO, FACEP

## 2022-04-16 NOTE — PROGRESS NOTE ADULT - ASSESSMENT
25yo M w/ PMHx of esophageal stricture s/p multiple dilations and IVDU on Suboxone presenting with inability to tolerate oral intake for the past 30 days.     EKG: NSR No acute changes    1. Post-op assessment  -denies CP, SOB  -echo with grossly mild global LV systolic dysfunction (new compared to 2021) however patient denies CP or SOB on exertion.no plan for further cardiac w/u  -s/p esophagectomy in pain. f/u pain c/s     2. Dysphagia  -hx of esophageal stricture s/p multiple dilations  -s/p esophagectomy   -f/u GI and thoracic surgery

## 2022-04-16 NOTE — PROGRESS NOTE ADULT - SUBJECTIVE AND OBJECTIVE BOX
Christiano Hoyt MD  Interventional Cardiology / Advance Heart Failure and Cardiac Transplant Specialist  [place] Office : [phone] [place] [id number]  Tel:   [place] Office : [address] [facility] [address] [address]  Tel: [phone]      Subjective/Overnight events: Pt is lying in bed in SICU s/p esophagectomy. denies CP or SOB  	  MEDICATIONS:  heparin   Injectable 5000 Unit(s) SubCutaneous every 8 hours    cefoTEtan  IVPB 1 Gram(s) IV Intermittent every 12 hours    diphenhydrAMINE Injectable 25 milliGRAM(s) IV Push every 4 hours PRN    acetaminophen   IVPB .. 1000 milliGRAM(s) IV Intermittent once  HYDROmorphone  Injectable 0.5 milliGRAM(s) IV Push every 3 hours PRN  hydromorphone (10 MICROgram(s)/mL) + bupivacaine 0.0625% in 0.9% Sodium Chloride PCEA 250 milliLiter(s) Epidural PCA Continuous  hydrOXYzine hydrochloride Syrup 25 milliGRAM(s) Oral every 6 hours PRN  ketamine Infusion. 1.25 mG/kG/Hr IV Continuous <Continuous>  methadone Injectable 5 milliGRAM(s) IV Push every 8 hours  ondansetron Injectable 4 milliGRAM(s) IV Push every 6 hours PRN    pantoprazole  Injectable 40 milliGRAM(s) IV Push daily      dextrose 5% + lactated ringers. 1000 milliLiter(s) IV Continuous <Continuous>  lidocaine   4% Patch 1 Patch Transdermal every 24 hours      PAST MEDICAL/SURGICAL HISTORY  PAST MEDICAL & SURGICAL HISTORY:  Heroin abuse    Cocaine abuse    GERD (gastroesophageal reflux disease)    Hiatal hernia    History of esophageal stricture    Intravenous drug abuse    Migration of pancreatic stent    Migrated esophageal stent        SOCIAL HISTORY: Substance Use (street drugs): ( x ) never used  (  ) other:    FAMILY HISTORY:  FH: thyroid disease (Grandparent)        PHYSICAL EXAM:  T(C): 36.9 (04-16-22 @ 12:00), Max: 37.7 (04-16-22 @ 00:00)  HR: 79 (04-16-22 @ 12:00) (73 - 98)  BP: 142/81 (04-16-22 @ 12:00) (124/67 - 158/90)  RR: 21 (04-16-22 @ 12:00) (6 - 21)  SpO2: 92% (04-16-22 @ 12:00) (92% - 98%)  Wt(kg): --  I&O's Summary    15 Apr 2022 07:01  -  16 Apr 2022 07:00  --------------------------------------------------------  IN: 1815.6 mL / OUT: 2385 mL / NET: -569.4 mL    16 Apr 2022 07:01  -  16 Apr 2022 12:55  --------------------------------------------------------  IN: 620 mL / OUT: 1610 mL / NET: -990 mL          GENERAL: NAD  EYES: EOMI, PERRLA, conjunctiva and sclera clear  ENMT: No tonsillar erythema, exudates, or enlargement  Cardiovascular: Normal S1 S2, No JVD, No murmurs, No edema  Respiratory: Lungs clear to auscultation	  Gastrointestinal:  s/p surgery 	  Extremities: No edema                                      10.6   8.28  )-----------( 94       ( 16 Apr 2022 04:14 )             31.7     04-16    140  |  103  |  15  ----------------------------<  112<H>  4.2   |  27  |  0.82    Ca    8.4      16 Apr 2022 04:14  Phos  3.4     04-15  Mg     2.20     04-16    TPro  6.1  /  Alb  4.0  /  TBili  0.8  /  DBili  x   /  AST  10  /  ALT  11  /  AlkPhos  89  04-15    proBNP:   Lipid Profile:   HgA1c:   TSH:     Consultant(s) Notes Reviewed:  [x ] YES  [ ] NO    Care Discussed with Consultants/Other Providers [ x] YES  [ ] NO    Imaging Personally Reviewed independently:  [x] YES  [ ] NO    All labs, radiologic studies, vitals, orders and medications list reviewed. Patient is seen and examined at bedside. Case discussed with medical team.

## 2022-04-17 LAB
ANION GAP SERPL CALC-SCNC: 11 MMOL/L — SIGNIFICANT CHANGE UP (ref 7–14)
APTT BLD: 30.2 SEC — SIGNIFICANT CHANGE UP (ref 27–36.3)
BLOOD GAS ARTERIAL COMPREHENSIVE RESULT: SIGNIFICANT CHANGE UP
BUN SERPL-MCNC: 8 MG/DL — SIGNIFICANT CHANGE UP (ref 7–23)
CA-I BLD-SCNC: 1.15 MMOL/L — SIGNIFICANT CHANGE UP (ref 1.15–1.29)
CALCIUM SERPL-MCNC: 8.8 MG/DL — SIGNIFICANT CHANGE UP (ref 8.4–10.5)
CHLORIDE SERPL-SCNC: 99 MMOL/L — SIGNIFICANT CHANGE UP (ref 98–107)
CO2 SERPL-SCNC: 25 MMOL/L — SIGNIFICANT CHANGE UP (ref 22–31)
CREAT SERPL-MCNC: 0.72 MG/DL — SIGNIFICANT CHANGE UP (ref 0.5–1.3)
EGFR: 129 ML/MIN/1.73M2 — SIGNIFICANT CHANGE UP
GLUCOSE SERPL-MCNC: 110 MG/DL — HIGH (ref 70–99)
HCT VFR BLD CALC: 35.8 % — LOW (ref 39–50)
HGB BLD-MCNC: 12.3 G/DL — LOW (ref 13–17)
INR BLD: 1.23 RATIO — HIGH (ref 0.88–1.16)
MAGNESIUM SERPL-MCNC: 1.8 MG/DL — SIGNIFICANT CHANGE UP (ref 1.6–2.6)
MCHC RBC-ENTMCNC: 28.7 PG — SIGNIFICANT CHANGE UP (ref 27–34)
MCHC RBC-ENTMCNC: 34.4 GM/DL — SIGNIFICANT CHANGE UP (ref 32–36)
MCV RBC AUTO: 83.4 FL — SIGNIFICANT CHANGE UP (ref 80–100)
NRBC # BLD: 0 /100 WBCS — SIGNIFICANT CHANGE UP
NRBC # FLD: 0 K/UL — SIGNIFICANT CHANGE UP
PHOSPHATE SERPL-MCNC: 2.4 MG/DL — LOW (ref 2.5–4.5)
PLATELET # BLD AUTO: 101 K/UL — LOW (ref 150–400)
POTASSIUM SERPL-MCNC: 3.8 MMOL/L — SIGNIFICANT CHANGE UP (ref 3.5–5.3)
POTASSIUM SERPL-SCNC: 3.8 MMOL/L — SIGNIFICANT CHANGE UP (ref 3.5–5.3)
PROTHROM AB SERPL-ACNC: 14.3 SEC — HIGH (ref 10.5–13.4)
RBC # BLD: 4.29 M/UL — SIGNIFICANT CHANGE UP (ref 4.2–5.8)
RBC # FLD: 14.6 % — HIGH (ref 10.3–14.5)
SODIUM SERPL-SCNC: 135 MMOL/L — SIGNIFICANT CHANGE UP (ref 135–145)
WBC # BLD: 7.69 K/UL — SIGNIFICANT CHANGE UP (ref 3.8–10.5)
WBC # FLD AUTO: 7.69 K/UL — SIGNIFICANT CHANGE UP (ref 3.8–10.5)

## 2022-04-17 PROCEDURE — 99233 SBSQ HOSP IP/OBS HIGH 50: CPT

## 2022-04-17 PROCEDURE — 71045 X-RAY EXAM CHEST 1 VIEW: CPT | Mod: 26

## 2022-04-17 RX ORDER — POTASSIUM PHOSPHATE, MONOBASIC POTASSIUM PHOSPHATE, DIBASIC 236; 224 MG/ML; MG/ML
15 INJECTION, SOLUTION INTRAVENOUS ONCE
Refills: 0 | Status: COMPLETED | OUTPATIENT
Start: 2022-04-17 | End: 2022-04-17

## 2022-04-17 RX ORDER — ACETAMINOPHEN 500 MG
1000 TABLET ORAL ONCE
Refills: 0 | Status: COMPLETED | OUTPATIENT
Start: 2022-04-17 | End: 2022-04-17

## 2022-04-17 RX ORDER — MAGNESIUM SULFATE 500 MG/ML
2 VIAL (ML) INJECTION ONCE
Refills: 0 | Status: COMPLETED | OUTPATIENT
Start: 2022-04-17 | End: 2022-04-17

## 2022-04-17 RX ORDER — TETRACAINE/BENZOCAINE/BUTAMBEN 2%-14%-2%
1 OINTMENT (GRAM) TOPICAL
Refills: 0 | Status: DISCONTINUED | OUTPATIENT
Start: 2022-04-17 | End: 2022-04-27

## 2022-04-17 RX ADMIN — METHADONE HYDROCHLORIDE 5 MILLIGRAM(S): 40 TABLET ORAL at 05:00

## 2022-04-17 RX ADMIN — Medication 1 MILLIGRAM(S): at 22:35

## 2022-04-17 RX ADMIN — Medication 100 GRAM(S): at 09:50

## 2022-04-17 RX ADMIN — POTASSIUM PHOSPHATE, MONOBASIC POTASSIUM PHOSPHATE, DIBASIC 62.5 MILLIMOLE(S): 236; 224 INJECTION, SOLUTION INTRAVENOUS at 07:56

## 2022-04-17 RX ADMIN — HYDROMORPHONE HYDROCHLORIDE 250 MILLILITER(S): 2 INJECTION INTRAMUSCULAR; INTRAVENOUS; SUBCUTANEOUS at 19:39

## 2022-04-17 RX ADMIN — ONDANSETRON 4 MILLIGRAM(S): 8 TABLET, FILM COATED ORAL at 16:23

## 2022-04-17 RX ADMIN — Medication 1 MILLIGRAM(S): at 09:50

## 2022-04-17 RX ADMIN — HYDROMORPHONE HYDROCHLORIDE 250 MILLILITER(S): 2 INJECTION INTRAMUSCULAR; INTRAVENOUS; SUBCUTANEOUS at 16:15

## 2022-04-17 RX ADMIN — Medication 1000 MILLIGRAM(S): at 03:10

## 2022-04-17 RX ADMIN — HYDROMORPHONE HYDROCHLORIDE 0.5 MILLIGRAM(S): 2 INJECTION INTRAMUSCULAR; INTRAVENOUS; SUBCUTANEOUS at 07:54

## 2022-04-17 RX ADMIN — HEPARIN SODIUM 5000 UNIT(S): 5000 INJECTION INTRAVENOUS; SUBCUTANEOUS at 13:49

## 2022-04-17 RX ADMIN — Medication 25 GRAM(S): at 04:42

## 2022-04-17 RX ADMIN — LIDOCAINE 1 PATCH: 4 CREAM TOPICAL at 10:00

## 2022-04-17 RX ADMIN — Medication 100 GRAM(S): at 20:33

## 2022-04-17 RX ADMIN — PANTOPRAZOLE SODIUM 40 MILLIGRAM(S): 20 TABLET, DELAYED RELEASE ORAL at 11:50

## 2022-04-17 RX ADMIN — METHADONE HYDROCHLORIDE 5 MILLIGRAM(S): 40 TABLET ORAL at 13:53

## 2022-04-17 RX ADMIN — HYDROMORPHONE HYDROCHLORIDE 0.5 MILLIGRAM(S): 2 INJECTION INTRAMUSCULAR; INTRAVENOUS; SUBCUTANEOUS at 07:38

## 2022-04-17 RX ADMIN — LIDOCAINE 1 PATCH: 4 CREAM TOPICAL at 22:02

## 2022-04-17 RX ADMIN — HEPARIN SODIUM 5000 UNIT(S): 5000 INJECTION INTRAVENOUS; SUBCUTANEOUS at 22:36

## 2022-04-17 RX ADMIN — HYDROMORPHONE HYDROCHLORIDE 0.5 MILLIGRAM(S): 2 INJECTION INTRAMUSCULAR; INTRAVENOUS; SUBCUTANEOUS at 03:10

## 2022-04-17 RX ADMIN — HYDROMORPHONE HYDROCHLORIDE 0.5 MILLIGRAM(S): 2 INJECTION INTRAMUSCULAR; INTRAVENOUS; SUBCUTANEOUS at 00:00

## 2022-04-17 RX ADMIN — HYDROMORPHONE HYDROCHLORIDE 0.5 MILLIGRAM(S): 2 INJECTION INTRAMUSCULAR; INTRAVENOUS; SUBCUTANEOUS at 10:45

## 2022-04-17 RX ADMIN — HEPARIN SODIUM 5000 UNIT(S): 5000 INJECTION INTRAVENOUS; SUBCUTANEOUS at 05:00

## 2022-04-17 RX ADMIN — HYDROMORPHONE HYDROCHLORIDE 0.5 MILLIGRAM(S): 2 INJECTION INTRAMUSCULAR; INTRAVENOUS; SUBCUTANEOUS at 02:40

## 2022-04-17 RX ADMIN — KETAMINE HYDROCHLORIDE 9.51 MG/KG/HR: 100 INJECTION INTRAMUSCULAR; INTRAVENOUS at 19:40

## 2022-04-17 RX ADMIN — LIDOCAINE 1 PATCH: 4 CREAM TOPICAL at 19:03

## 2022-04-17 RX ADMIN — HYDROMORPHONE HYDROCHLORIDE 250 MILLILITER(S): 2 INJECTION INTRAMUSCULAR; INTRAVENOUS; SUBCUTANEOUS at 07:44

## 2022-04-17 RX ADMIN — Medication 1 SPRAY(S): at 10:19

## 2022-04-17 RX ADMIN — METHADONE HYDROCHLORIDE 5 MILLIGRAM(S): 40 TABLET ORAL at 22:36

## 2022-04-17 RX ADMIN — HYDROMORPHONE HYDROCHLORIDE 0.5 MILLIGRAM(S): 2 INJECTION INTRAMUSCULAR; INTRAVENOUS; SUBCUTANEOUS at 11:00

## 2022-04-17 RX ADMIN — Medication 400 MILLIGRAM(S): at 02:14

## 2022-04-17 RX ADMIN — KETAMINE HYDROCHLORIDE 9.51 MG/KG/HR: 100 INJECTION INTRAMUSCULAR; INTRAVENOUS at 11:49

## 2022-04-17 RX ADMIN — SODIUM CHLORIDE 100 MILLILITER(S): 9 INJECTION, SOLUTION INTRAVENOUS at 19:40

## 2022-04-17 NOTE — PROVIDER CONTACT NOTE (OTHER) - SITUATION
Patient c/o unable to void. Last use restroom 17:00.
Pt removed NG tube
Pt wants to taper himself off of buprenorphine 2mg/naloxone 0.5mg SL tabs; only took one tab.

## 2022-04-17 NOTE — PROVIDER CONTACT NOTE (OTHER) - REASON
Patient c/o unable to void. Last use restroom 17:00.
buprenorphine 2mg/naloxone 0.5mg SL tabs
Pt removed NG tube

## 2022-04-17 NOTE — PROVIDER CONTACT NOTE (OTHER) - ASSESSMENT
Pt asymptomatic, vitals stable
C/o upper abdominal discomfort, burning and sharp pain. PRN maalox administered. States last used restroom at 17:00
Pt AOx4, VSS

## 2022-04-17 NOTE — PROGRESS NOTE ADULT - ASSESSMENT
25yo M w/ PMHx of esophageal stricture s/p multiple dilations and IVDU on Suboxone presenting with inability to tolerate oral intake for the past 30 days.     EKG: NSR No acute changes    1. Post-op assessment  -denies CP, SOB  -echo with grossly mild global LV systolic dysfunction (new compared to 2021) however patient denies CP or SOB on exertion.no plan for further cardiac w/u  -s/p esophagectomy.  f/u pain c/s   -care per SICU team     2. Dysphagia  -hx of esophageal stricture s/p multiple dilations  -s/p esophagectomy   -f/u GI and thoracic surgery

## 2022-04-17 NOTE — PROGRESS NOTE ADULT - SUBJECTIVE AND OBJECTIVE BOX
Anesthesia Pain Management Service: Day _2_ of Epidural    SUBJECTIVE: Patient doing well with PCEA and no problems.  Pain Scale Score:   Refer to charted pain scores    THERAPY:  [x ] Epidural Bupivacaine 0.0625% and Hydromorphone  		[x ] 10 micrograms/mL	[ ] 5 micrograms/mL  [ ] Epidural Bupivacaine 0.0625% and Fentanyl - 2 micrograms/mL  [ ] Epidural Ropivacaine 0.1% plain – 1 mg/mL  [ ] Patient Controlled Regional Anesthesia (PCRA) Ropivacaine  		[ ] 0.2%			[ ] 0.1%    Demand dose __5 lockout __15_ (minutes) Continuous Rate _10__ Total: __262cc_ Daily      MEDICATIONS  (STANDING):  acetaminophen   IVPB .. 1000 milliGRAM(s) IV Intermittent once  cefoTEtan  IVPB 1 Gram(s) IV Intermittent every 12 hours  dextrose 5% + lactated ringers. 1000 milliLiter(s) (100 mL/Hr) IV Continuous <Continuous>  heparin   Injectable 5000 Unit(s) SubCutaneous every 8 hours  hydromorphone (10 MICROgram(s)/mL) + bupivacaine 0.0625% in 0.9% Sodium Chloride PCEA 250 milliLiter(s) Epidural PCA Continuous  ketamine Infusion. 1.25 mG/kG/Hr (9.51 mL/Hr) IV Continuous <Continuous>  lidocaine   4% Patch 1 Patch Transdermal every 24 hours  methadone Injectable 5 milliGRAM(s) IV Push every 8 hours  pantoprazole  Injectable 40 milliGRAM(s) IV Push daily    MEDICATIONS  (PRN):  diphenhydrAMINE Injectable 25 milliGRAM(s) IV Push every 4 hours PRN Pruritus  HYDROmorphone  Injectable 0.5 milliGRAM(s) IV Push every 3 hours PRN Severe breakthrough Pain (7 - 10)  hydrOXYzine hydrochloride Syrup 25 milliGRAM(s) Oral every 6 hours PRN Anxiety  naloxone Injectable 0.1 milliGRAM(s) IV Push every 3 minutes PRN For ANY of the following changes in patient status:  A. RR LESS THAN 10 breaths per minute, B. Oxygen saturation LESS THAN 90%, C. Sedation score of 6  ondansetron Injectable 4 milliGRAM(s) IV Push every 6 hours PRN Nausea      OBJECTIVE:    Assessment of Catheter Site:	[ ] Left	[ ] Right  [x ] Epidural 	[ ] Femoral	      [ ] Saphenous   [ ] Supraclavicular   [ ] Other:    [x ] Dressing intact	[x ] Site non-tender	[ x] Site without erythema, discharge, edema  [x ] Epidural tubing and connection checked	[x] Gross neurological exam within normal limits  [ ] Catheter removed – tip intact		[x ] Afebrile	[ ] Febrile: ___    PT/INR - ( 17 Apr 2022 03:22 )   PT: 14.3 sec;   INR: 1.23 ratio         PTT - ( 17 Apr 2022 03:22 )  PTT:30.2 sec                      12.3   7.69  )-----------( 101      ( 17 Apr 2022 03:22 )             35.8     Vital Signs Last 24 Hrs  T(C): 35.8 (04-17-22 @ 08:00), Max: 37.2 (04-16-22 @ 16:00)  T(F): 96.5 (04-17-22 @ 08:00), Max: 99 (04-16-22 @ 16:00)  HR: 66 (04-17-22 @ 08:00) (64 - 93)  BP: 142/81 (04-16-22 @ 12:00) (139/87 - 152/95)  BP(mean): 94 (04-16-22 @ 12:00) (94 - 106)  RR: 12 (04-17-22 @ 08:00) (10 - 24)  SpO2: 95% (04-17-22 @ 08:00) (92% - 95%)      Sedation Score:	[x ] Alert	[ ] Drowsy	[ ] Arousable	[ ] Asleep	[ ] Unresponsive    Side Effects:	[x ] None	[ ] Nausea	[ ] Vomiting	[ ] Pruritus  		[ ] Weakness		[ ] Numbness	[ ] Other:    ASSESSMENT/ PLAN:    Therapy to  be:	[x ] Continue   [ ] Discontinued   [ ] Change to prn Analgesics    Documentation and Verification of current medications:  [ X ] Done	[ ] Not done, not eligible, reason:    Comments: Doing OK with epidural and may continue. IV ketamine restarted for agitation

## 2022-04-17 NOTE — PROVIDER CONTACT NOTE (OTHER) - BACKGROUND
Pt admitted for difficulty swallowing, hx of drug abuse
Pt s/p esophagectomy 4/15, NG tube placed to low wall suction
Pt admitted for difficulty swallowing, hx of drug abuse

## 2022-04-17 NOTE — PROGRESS NOTE ADULT - SUBJECTIVE AND OBJECTIVE BOX
Christiano Hoyt MD  Interventional Cardiology / Advance Heart Failure and Cardiac Transplant Specialist  [place] Office : [address] [place] [address] [facility] [address]  Tel:   [place] Office : [address] [facility] [place] [address]  Tel: [phone]      Subjective/Overnight events: Pt is lying in bed in SICU, no chest pains no SOB no palpitations  	  MEDICATIONS:  heparin   Injectable 5000 Unit(s) SubCutaneous every 8 hours    cefoTEtan  IVPB 1 Gram(s) IV Intermittent every 12 hours    diphenhydrAMINE Injectable 25 milliGRAM(s) IV Push every 4 hours PRN    acetaminophen   IVPB .. 1000 milliGRAM(s) IV Intermittent once  HYDROmorphone  Injectable 0.5 milliGRAM(s) IV Push every 3 hours PRN  hydromorphone (10 MICROgram(s)/mL) + bupivacaine 0.0625% in 0.9% Sodium Chloride PCEA 250 milliLiter(s) Epidural PCA Continuous  hydrOXYzine hydrochloride Syrup 25 milliGRAM(s) Oral every 6 hours PRN  ketamine Infusion. 1.25 mG/kG/Hr IV Continuous <Continuous>  methadone Injectable 5 milliGRAM(s) IV Push every 8 hours  ondansetron Injectable 4 milliGRAM(s) IV Push every 6 hours PRN    pantoprazole  Injectable 40 milliGRAM(s) IV Push daily      dextrose 5% + lactated ringers. 1000 milliLiter(s) IV Continuous <Continuous>  lidocaine   4% Patch 1 Patch Transdermal every 24 hours  tetracaine/benzocaine/butamben Spray 1 Spray(s) Topical every 3 hours PRN      PAST MEDICAL/SURGICAL HISTORY  PAST MEDICAL & SURGICAL HISTORY:  Heroin abuse    Cocaine abuse    GERD (gastroesophageal reflux disease)    Hiatal hernia    History of esophageal stricture    Intravenous drug abuse    Migration of pancreatic stent    Migrated esophageal stent        SOCIAL HISTORY: Substance Use (street drugs): ( x ) never used  (  ) other:    FAMILY HISTORY:  FH: thyroid disease (Grandparent)          PHYSICAL EXAM:  T(C): 35.8 (04-17-22 @ 08:00), Max: 37.2 (04-16-22 @ 16:00)  HR: 71 (04-17-22 @ 10:00) (64 - 93)  BP: 142/81 (04-16-22 @ 12:00) (142/81 - 142/81)  RR: 13 (04-17-22 @ 10:00) (10 - 24)  SpO2: 95% (04-17-22 @ 10:00) (92% - 95%)  Wt(kg): --  I&O's Summary    16 Apr 2022 07:01  -  17 Apr 2022 07:00  --------------------------------------------------------  IN: 3143.4 mL / OUT: 5845 mL / NET: -2701.6 mL    17 Apr 2022 07:01  -  17 Apr 2022 11:06  --------------------------------------------------------  IN: 626 mL / OUT: 185 mL / NET: 441 mL          GENERAL: NAD  EYES: EOMI, PERRLA, conjunctiva and sclera clear  ENMT: No tonsillar erythema, exudates, or enlargement  Cardiovascular: Normal S1 S2, No JVD, No murmurs, No edema  Respiratory: Lungs clear to auscultation	  Gastrointestinal:  s/p surgery 	  Extremities: No edema                                    12.3   7.69  )-----------( 101      ( 17 Apr 2022 03:22 )             35.8     04-17    135  |  99  |  8   ----------------------------<  110<H>  3.8   |  25  |  0.72    Ca    8.8      17 Apr 2022 03:22  Phos  2.4     04-17  Mg     1.80     04-17      proBNP:   Lipid Profile:   HgA1c:   TSH:     Consultant(s) Notes Reviewed:  [x ] YES  [ ] NO    Care Discussed with Consultants/Other Providers [ x] YES  [ ] NO    Imaging Personally Reviewed independently:  [x] YES  [ ] NO    All labs, radiologic studies, vitals, orders and medications list reviewed. Patient is seen and examined at bedside. Case discussed with medical team.

## 2022-04-17 NOTE — CHART NOTE - NSCHARTNOTEFT_GEN_A_CORE
Source: Patient [ X]    Family [ X] at bedside    other [X ] electronic chart, RN    Diet : Diet, NPO with Tube Feed:   Tube Feeding Modality: Jejunostomy  TwoCal HN (TWOCALHNRTH)  Total Volume for 24 Hours (mL): 240  Continuous  Starting Tube Feed Rate {mL per Hour}: 10  Until Goal Tube Feed Rate (mL per Hour): 10  Tube Feed Duration (in Hours): 24  Tube Feed Start Time: 10:00 (04-17-22 @ 09:31)    Nutrition follow-up note, consulted for TF. Per chart review, 25y/o Male with medical history of esophageal stricture s/p multiple dilations and IVDU on Suboxone presenting with inability to tolerate oral intake for the past 30 days PTA. S/p EGD 4/5 with dilation, esophagectomy and J tube placement on 4/15. Patient started on TF feeds today of TwoCal HN at 10ml/hr tolerating well. No nausea/vomiting/diarrhea/constipation reported. Suggest titrate TF to goal rate of  46 mL/hr x 24 hrs (1104 mL, 2208 pop, 92 gm pro) to better meet estimated energy needs (refer to last Initial Dietitian Delisaal on 4/12/22).     Weight (kg): 76.1 (04-15 @ 06:56)-dosing  4/12-bed was zeroed out and reweighed by RD 4/12 @ 70.7 kg.  4/17-67.1kg        Pertinent Medications: acetaminophen   IVPB ..  dextrose 5% + lactated ringers.  hydromorphone (10 MICROgram(s)/mL) + bupivacaine 0.0625% in 0.9% Sodium Chloride PCEA  ketamine Infusion.  methadone Injectable  pantoprazole  Injectable    Pertinent Labs:  04-17 Na135 mmol/L Glu 110 mg/dL<H> K+ 3.8 mmol/L Cr  0.72 mg/dL BUN 8 mg/dL 04-17 Phos 2.4 mg/dL<L> 04-15 Alb 4.0 g/dL      Skin: surgical incision.   No edema noted.     Estimated Needs: based on actual weight 67.1 kg (4/17)  Estimated Energy Needs From (pop/kg) 30-35= 2121-2474kcal/d  Estimated Protein Needs To (g/kg) (1.2-1.4g/kg)= 80.5g-94g pro/d      Previous Nutrition Diagnosis:      [X ] Malnutrition, Severe-chronic illness     Nutrition Diagnosis is [X ] ongoing  [ ] resolved [ ] not applicable    Additional Recommendations:     1. As stated above, recommend titrate TF via J tube at TwoCAL HN to goal rate of 46 mL/hr x 24 hrs (1104 mL, 2208 pop, 92 gm pro).  2. Monitor weights, labs, BM's, skin integrity, tolerance to EN.   3. Reconsult nutrition services as needed. Source: Patient [ X]    Family [ X] at bedside    other [X ] electronic chart, RN, Medical Team     Diet : Diet, NPO with Tube Feed:   Tube Feeding Modality: Jejunostomy  TwoCal HN (TWOCALHNRTH)  Total Volume for 24 Hours (mL): 240  Continuous  Starting Tube Feed Rate {mL per Hour}: 10  Until Goal Tube Feed Rate (mL per Hour): 10  Tube Feed Duration (in Hours): 24  Tube Feed Start Time: 10:00 (04-17-22 @ 09:31)    Nutrition follow-up note, consulted for TF. Per chart review, 25y/o Male with medical history of esophageal stricture s/p multiple dilations and IVDU on Suboxone presenting with inability to tolerate oral intake for the past 30 days PTA. S/p EGD 4/5 with dilation, esophagectomy and J tube placement on 4/15. Plan to start TF today of TwoCal HN at 10ml/hr per discussion with medical team. No nausea/vomiting/diarrhea/constipation reported. Suggest titrate TF to goal rate of  46 mL/hr x 24 hrs (1104 mL, 2208 pop, 92 gm pro) to better meet estimated energy needs (refer to last Initial Dietitian Eval on 4/12/22).     Weight (kg): 76.1 (04-15 @ 06:56)-dosing  4/12-bed was zeroed out and reweighed by RD 4/12 @ 70.7 kg.  4/17-67.1kg        Pertinent Medications: acetaminophen   IVPB ..  dextrose 5% + lactated ringers.  hydromorphone (10 MICROgram(s)/mL) + bupivacaine 0.0625% in 0.9% Sodium Chloride PCEA  ketamine Infusion.  methadone Injectable  pantoprazole  Injectable    Pertinent Labs:  04-17 Na135 mmol/L Glu 110 mg/dL<H> K+ 3.8 mmol/L Cr  0.72 mg/dL BUN 8 mg/dL 04-17 Phos 2.4 mg/dL<L> 04-15 Alb 4.0 g/dL      Skin: surgical incision.   No edema noted.     Estimated Needs: based on actual weight 67.1 kg (4/17)  Estimated Energy Needs From (pop/kg) 30-35= 2121-2474kcal/d  Estimated Protein Needs To (g/kg) (1.2-1.4g/kg)= 80.5g-94g pro/d      Previous Nutrition Diagnosis:      [X ] Malnutrition, Severe-chronic illness     Nutrition Diagnosis is [X ] ongoing  [ ] resolved [ ] not applicable    Additional Recommendations:     1. As stated above, recommend titrate TF via J tube at TwoCAL HN to goal rate of 46 mL/hr x 24 hrs (1104 mL, 2208 pop, 92 gm pro).  2. Monitor weights, labs, BM's, skin integrity, tolerance to EN.   3. Reconsult nutrition services as needed.

## 2022-04-17 NOTE — PROGRESS NOTE ADULT - SUBJECTIVE AND OBJECTIVE BOX
CHIEF COMPLAINT: FOLLOW UP IN ICU FOR POSTOPERATIVE CARE OF PATIENT WHO IS S/P  Tyler Mahesh esophagectomy            PROCEDURES:         FB, EGD, Robotic Assisted [place] Mahesh esophagectomy, J tube placement.  15-Apr-2022            ISSUES:       Esophageal stricture   Postoperative pain   Chest tubes in place   Substance abuse(cocaine, heroin, IVDU), on suboxone         INTERVAL EVENTS:      Patient had severe uncontrolled pain postop. PCEA working and restarted. Also requiring methadone IV Q8h, ketamine drip for pain.  Improved pain with above interventions.  Afebrile, stable hemodynamics  To get started on J tube feeds today               HISTORY:      Patient reports severe pain at chest wall incision sites which is worse with coughing and deep breathing without associated fever or dyspnea. Pain is improved with use of pain meds.           PHYSICAL EXAM:      Gen: pain improved, not moaning in pain now    Eyes: Sclera white, Conjunctiva normal, Eyelids normal, Pupils symmetrical      ENT: Mucous membranes moist,  NGT in place    Neck: Trachea midline,  ,  ,  ,  ,  ,       CV: Rate regular, Rhythm regular,  ,  ,       Resp: Breath sounds clear, No accessory muscles use, R chest tubes in place,  ,       Abd: Soft, Non-distended, Non-tender, Bowel sounds normal,  jejunostomy tube in place      Skin: Warm, No peripheral edema of lower extremities,  ,       :  baum     Neuro: Moving all 4 extremities,       Psych: A&Ox3               ASSESSMENT AND PLAN:           NEURO:     Post-operative Pain - Stable. Pain control with PCEA and Tylenol IV PRN.  Continue methadone, ketamine.  Ativan as needed  Pain managements recs appreciated    Monitor respiratory rate and ABG, risk of respiratory depression          RESPIRATORY:   Hypoxia - Wean nasal cannula for goal O2sat above 92. Obtain CXR. Incentive spirometry. Chest PT and frequent suctioning. Continue bronchodilators. OOB to chair & ambulate w/ assistance. Continuous pulse oximetry for support & to prevent decompensation.     Chest tube – Pleurevac regulated suctioning. Monitor chest tube output.            CARDIOVASCULAR:     Hemodynamically stable - Not on pressors. Continue hemodynamic monitoring.     Telemetry (medical test) - Reviewed by me today independently. Normal sinus rhythm.             RENAL:     Stable - Monitor IOs and electrolytes. Keep K above 4.0 and Mg above 2.0. D5LR@100ml/h, monitor hourly urine output            GASTROINTESTINAL:     GI prophylaxis    Zofran and Reglan IV PRN for nausea     NPO    NGT to low continuous suction    J tube feeds @10ml/h     Flush J tube and NGT Q4h            HEMATOLOGIC:     No signs of active bleeding. Monitor Hgb in CBC in AM     DVT prophylaxis with heparin subQ and SCDs.           INFECTIOUS DISEASE:     All surgical sites appear clean. No signs of active infection. Will monitor for fever and leukocytosis.           ENDOCRINE:     Stable – Monitor glucose fingersticks for goal 120-180.             Pertinent clinical, laboratory, radiographic, hemodynamic, echocardiographic, respiratory data, microbiologic data and chart were reviewed by myself and analyzed frequently throughout the course of the day and night by myself.     Plan discussed at length with the CTICU staff and Attending CT Surgeon -   Dr. Zenia Shannon    Patient's status was discussed with patient at bedside.     _____________________________________________     _________________________  VITAL SIGNS:  Vital Signs Last 24 Hrs  T(C): 35.8 (17 Apr 2022 08:00), Max: 37.2 (16 Apr 2022 16:00)  T(F): 96.5 (17 Apr 2022 08:00), Max: 99 (16 Apr 2022 16:00)  HR: 74 (17 Apr 2022 12:00) (64 - 93)  BP: --  BP(mean): --  RR: 13 (17 Apr 2022 12:00) (12 - 24)  SpO2: 95% (17 Apr 2022 12:00) (92% - 95%)  I/Os:   I&O's Detail    16 Apr 2022 07:01  -  17 Apr 2022 07:00  --------------------------------------------------------  IN:    dextrose 5% + lactated ringers: 2400 mL    Enteral Tube Flush: 180 mL    Enteral Tube Flush: 180 mL    IV PiggyBack: 200 mL    Ketamine: 183.4 mL  Total IN: 3143.4 mL    OUT:    Chest Tube (mL): 330 mL    Chest Tube (mL): 45 mL    Indwelling Catheter - Urethral (mL): 4670 mL    Jejunostomy Tube (mL): 400 mL    Nasogastric/Oral tube (mL): 400 mL  Total OUT: 5845 mL    Total NET: -2701.6 mL      17 Apr 2022 07:01  -  17 Apr 2022 13:30  --------------------------------------------------------  IN:    dextrose 5% + lactated ringers: 600 mL    Enteral Tube Flush: 30 mL    Enteral Tube Flush: 60 mL    IV PiggyBack: 300 mL    Ketamine: 57 mL  Total IN: 1047 mL    OUT:    Chest Tube (mL): 0 mL    Chest Tube (mL): 0 mL    Indwelling Catheter - Urethral (mL): 470 mL    Jejunostomy Tube (mL): 200 mL  Total OUT: 670 mL    Total NET: 377 mL              MEDICATIONS:  MEDICATIONS  (STANDING):  acetaminophen   IVPB .. 1000 milliGRAM(s) IV Intermittent once  cefoTEtan  IVPB 1 Gram(s) IV Intermittent every 12 hours  dextrose 5% + lactated ringers. 1000 milliLiter(s) (100 mL/Hr) IV Continuous <Continuous>  heparin   Injectable 5000 Unit(s) SubCutaneous every 8 hours  hydromorphone (10 MICROgram(s)/mL) + bupivacaine 0.0625% in 0.9% Sodium Chloride PCEA 250 milliLiter(s) Epidural PCA Continuous  ketamine Infusion. 1.25 mG/kG/Hr (9.51 mL/Hr) IV Continuous <Continuous>  lidocaine   4% Patch 1 Patch Transdermal every 24 hours  methadone Injectable 5 milliGRAM(s) IV Push every 8 hours  pantoprazole  Injectable 40 milliGRAM(s) IV Push daily    MEDICATIONS  (PRN):  diphenhydrAMINE Injectable 25 milliGRAM(s) IV Push every 4 hours PRN Pruritus  HYDROmorphone  Injectable 0.5 milliGRAM(s) IV Push every 3 hours PRN Severe breakthrough Pain (7 - 10)  hydrOXYzine hydrochloride Syrup 25 milliGRAM(s) Oral every 6 hours PRN Anxiety  naloxone Injectable 0.1 milliGRAM(s) IV Push every 3 minutes PRN For ANY of the following changes in patient status:  A. RR LESS THAN 10 breaths per minute, B. Oxygen saturation LESS THAN 90%, C. Sedation score of 6  ondansetron Injectable 4 milliGRAM(s) IV Push every 6 hours PRN Nausea  tetracaine/benzocaine/butamben Spray 1 Spray(s) Topical every 3 hours PRN pain      LABS:  Laboratory data was independently reviewed by me today.                           12.3   7.69  )-----------( 101      ( 17 Apr 2022 03:22 )             35.8     04-17    135  |  99  |  8   ----------------------------<  110<H>  3.8   |  25  |  0.72    Ca    8.8      17 Apr 2022 03:22  Phos  2.4     04-17  Mg     1.80     04-17        PT/INR - ( 17 Apr 2022 03:22 )   PT: 14.3 sec;   INR: 1.23 ratio         PTT - ( 17 Apr 2022 03:22 )  PTT:30.2 sec  ABG - ( 17 Apr 2022 03:22 )  pH, Arterial: 7.46  pH, Blood: x     /  pCO2: 43    /  pO2: 128   / HCO3: 31    / Base Excess: 6.1   /  SaO2: 98.6                  RADIOLOGY:   Radiology images were independently reviewed by me today. Reports were reviewed by me today.    Xray Chest 1 View- PORTABLE-Routine:   ACC: [id number] EXAM:  XR CHEST PORTABLE ROUTINE 1V                          PROCEDURE DATE:  04/17/2022          INTERPRETATION:  TIME OF EXAM: April 17, 2022 at 5:24 AM    CLINICAL INFORMATION: Postop follow-up    TECHNIQUE:   Portable chest    INTERPRETATION:    Right-sided chest tubes and enteric tube are unchanged. Lungs remain   clear with gastric pull-through and normal heart size. No effusions or   pneumothorax.      COMPARISON:  April 16      IMPRESSION:  Status post Iver Mahesh esophagectomy.    --- End of Report ---            JADEN MENON MD; Attending Radiologist  This document has been electronically signed. Apr 17 2022 10:51AM (04-17-22 @ 05:43)  Xray Chest 1 View- PORTABLE-Urgent:   ACC: [id number] EXAM:  XR CHEST PORTABLE URGENT 1V                        ACC: [id number] EXAM:  XR CHEST PORTABLE URGENT 1V                          PROCEDURE DATE:  04/16/2022          INTERPRETATION:  TIME OF EXAM: April 15, 2015, 2022 at 7:23 PM and April 16 at 6:35 AM    CLINICAL INFORMATION: Follow-up post esophagectomy    TECHNIQUE:   Portable chest    INTERPRETATION:    April 15 at 7:23 PM:  An enteric tube has its sidehole above the diaphragm and its tip just   past the GE junction.    Right paracardiac opacity with air representing gastric pull-through.   Tubes right-sided chest tubes are present. Small patchy opacity in the   right subclavicular area likely atelectasis or hemorrhage. Small amount   of subcutaneous emphysema but no pneumothorax. Small pneumoperitoneum.    April 16 at 6:35 AM:  No significant interval change. 2 right-sided chest tubes and enteric   tube in place. Good expansion of the lungs without effusion or   pneumothorax.    Postop pneumoperitoneum and subcutaneous emphysema.      COMPARISON:  April 13 chest CT      IMPRESSION:  Status post Iver Mahesh esophagectomy with tubes and lines in   place and good expansion of the lung.    --- End of Report ---            JADEN MENON MD; Attending Radiologist  This document has been electronically signed. Apr 16 2022 11:09AM (04-16-22 @ 06:44)  Xray Chest 1 View- PORTABLE-Urgent:   ACC: [id number] EXAM:  XR CHEST PORTABLE URGENT 1V                        ACC: [id number] EXAM:  XR CHEST PORTABLE URGENT 1V                          PROCEDURE DATE:  04/16/2022          INTERPRETATION:  TIME OF EXAM: April 15, 2015, 2022 at 7:23 PM and April 16 at 6:35 AM    CLINICAL INFORMATION: Follow-up post esophagectomy    TECHNIQUE:   Portable chest    INTERPRETATION:    April 15 at 7:23 PM:  An enteric tube has its sidehole above the diaphragm and its tip just   past the GE junction.    Right paracardiac opacity with air representing gastric pull-through.   Tubes right-sided chest tubes are present. Small patchy opacity in the   right subclavicular area likely atelectasis or hemorrhage. Small amount   of subcutaneous emphysema but no pneumothorax. Small pneumoperitoneum.    April 16 at 6:35 AM:  No significant interval change. 2 right-sided chest tubes and enteric   tube in place. Good expansion of the lungs without effusion or   pneumothorax.    Postop pneumoperitoneum and subcutaneous emphysema.      COMPARISON:  April 13 chest CT      IMPRESSION:  Status post Iver Mahesh esophagectomy with tubes and lines in   place and good expansion of the lung.    --- End of Report ---            JADEN MENON MD; Attending Radiologist  This document has been electronically signed. Apr 16 2022 11:09AM (04-15-22 @ 19:42)

## 2022-04-18 LAB
ANION GAP SERPL CALC-SCNC: 11 MMOL/L — SIGNIFICANT CHANGE UP (ref 7–14)
APTT BLD: 28.1 SEC — SIGNIFICANT CHANGE UP (ref 27–36.3)
BASE EXCESS BLDA CALC-SCNC: 4.2 MMOL/L — HIGH (ref -2–3)
BUN SERPL-MCNC: 9 MG/DL — SIGNIFICANT CHANGE UP (ref 7–23)
CA-I BLD-SCNC: 1.13 MMOL/L — LOW (ref 1.15–1.29)
CALCIUM SERPL-MCNC: 8.9 MG/DL — SIGNIFICANT CHANGE UP (ref 8.4–10.5)
CHLORIDE SERPL-SCNC: 100 MMOL/L — SIGNIFICANT CHANGE UP (ref 98–107)
CO2 BLDA-SCNC: 31 MMOL/L — HIGH (ref 19–24)
CO2 SERPL-SCNC: 25 MMOL/L — SIGNIFICANT CHANGE UP (ref 22–31)
CREAT SERPL-MCNC: 0.68 MG/DL — SIGNIFICANT CHANGE UP (ref 0.5–1.3)
EGFR: 131 ML/MIN/1.73M2 — SIGNIFICANT CHANGE UP
GLUCOSE SERPL-MCNC: 106 MG/DL — HIGH (ref 70–99)
HCO3 BLDA-SCNC: 29 MMOL/L — HIGH (ref 21–28)
HCT VFR BLD CALC: 35.4 % — LOW (ref 39–50)
HGB BLD-MCNC: 11.8 G/DL — LOW (ref 13–17)
INR BLD: 1.07 RATIO — SIGNIFICANT CHANGE UP (ref 0.88–1.16)
MAGNESIUM SERPL-MCNC: 1.8 MG/DL — SIGNIFICANT CHANGE UP (ref 1.6–2.6)
MCHC RBC-ENTMCNC: 28.3 PG — SIGNIFICANT CHANGE UP (ref 27–34)
MCHC RBC-ENTMCNC: 33.3 GM/DL — SIGNIFICANT CHANGE UP (ref 32–36)
MCV RBC AUTO: 84.9 FL — SIGNIFICANT CHANGE UP (ref 80–100)
NRBC # BLD: 0 /100 WBCS — SIGNIFICANT CHANGE UP
NRBC # FLD: 0 K/UL — SIGNIFICANT CHANGE UP
PCO2 BLDA: 44 MMHG — SIGNIFICANT CHANGE UP (ref 35–48)
PH BLDA: 7.43 — SIGNIFICANT CHANGE UP (ref 7.35–7.45)
PHOSPHATE SERPL-MCNC: 3.2 MG/DL — SIGNIFICANT CHANGE UP (ref 2.5–4.5)
PLATELET # BLD AUTO: 96 K/UL — LOW (ref 150–400)
PO2 BLDA: 125 MMHG — HIGH (ref 83–108)
POTASSIUM SERPL-MCNC: 3.9 MMOL/L — SIGNIFICANT CHANGE UP (ref 3.5–5.3)
POTASSIUM SERPL-SCNC: 3.9 MMOL/L — SIGNIFICANT CHANGE UP (ref 3.5–5.3)
PROTHROM AB SERPL-ACNC: 12.4 SEC — SIGNIFICANT CHANGE UP (ref 10.5–13.4)
RBC # BLD: 4.17 M/UL — LOW (ref 4.2–5.8)
RBC # FLD: 14.6 % — HIGH (ref 10.3–14.5)
SAO2 % BLDA: 98.9 % — HIGH (ref 94–98)
SODIUM SERPL-SCNC: 136 MMOL/L — SIGNIFICANT CHANGE UP (ref 135–145)
WBC # BLD: 6.08 K/UL — SIGNIFICANT CHANGE UP (ref 3.8–10.5)
WBC # FLD AUTO: 6.08 K/UL — SIGNIFICANT CHANGE UP (ref 3.8–10.5)

## 2022-04-18 PROCEDURE — 93970 EXTREMITY STUDY: CPT | Mod: 26

## 2022-04-18 PROCEDURE — 99233 SBSQ HOSP IP/OBS HIGH 50: CPT

## 2022-04-18 PROCEDURE — 71045 X-RAY EXAM CHEST 1 VIEW: CPT | Mod: 26,77

## 2022-04-18 PROCEDURE — 71045 X-RAY EXAM CHEST 1 VIEW: CPT | Mod: 26

## 2022-04-18 RX ORDER — KETOROLAC TROMETHAMINE 30 MG/ML
30 SYRINGE (ML) INJECTION ONCE
Refills: 0 | Status: DISCONTINUED | OUTPATIENT
Start: 2022-04-18 | End: 2022-04-18

## 2022-04-18 RX ORDER — POTASSIUM CHLORIDE 20 MEQ
10 PACKET (EA) ORAL ONCE
Refills: 0 | Status: DISCONTINUED | OUTPATIENT
Start: 2022-04-18 | End: 2022-04-18

## 2022-04-18 RX ORDER — ACETAMINOPHEN 500 MG
1000 TABLET ORAL ONCE
Refills: 0 | Status: COMPLETED | OUTPATIENT
Start: 2022-04-18 | End: 2022-04-18

## 2022-04-18 RX ORDER — CEFEPIME 1 G/1
INJECTION, POWDER, FOR SOLUTION INTRAMUSCULAR; INTRAVENOUS
Refills: 0 | Status: DISCONTINUED | OUTPATIENT
Start: 2022-04-18 | End: 2022-04-18

## 2022-04-18 RX ORDER — KETAMINE HYDROCHLORIDE 100 MG/ML
1 INJECTION INTRAMUSCULAR; INTRAVENOUS
Qty: 1000 | Refills: 0 | Status: DISCONTINUED | OUTPATIENT
Start: 2022-04-18 | End: 2022-04-19

## 2022-04-18 RX ORDER — DIAZEPAM 5 MG
5 TABLET ORAL ONCE
Refills: 0 | Status: DISCONTINUED | OUTPATIENT
Start: 2022-04-18 | End: 2022-04-18

## 2022-04-18 RX ORDER — POTASSIUM CHLORIDE 20 MEQ
10 PACKET (EA) ORAL ONCE
Refills: 0 | Status: COMPLETED | OUTPATIENT
Start: 2022-04-18 | End: 2022-04-18

## 2022-04-18 RX ORDER — MAGNESIUM SULFATE 500 MG/ML
2 VIAL (ML) INJECTION ONCE
Refills: 0 | Status: COMPLETED | OUTPATIENT
Start: 2022-04-18 | End: 2022-04-18

## 2022-04-18 RX ADMIN — Medication 400 MILLIGRAM(S): at 14:21

## 2022-04-18 RX ADMIN — HEPARIN SODIUM 5000 UNIT(S): 5000 INJECTION INTRAVENOUS; SUBCUTANEOUS at 21:34

## 2022-04-18 RX ADMIN — Medication 30 MILLIGRAM(S): at 21:38

## 2022-04-18 RX ADMIN — HEPARIN SODIUM 5000 UNIT(S): 5000 INJECTION INTRAVENOUS; SUBCUTANEOUS at 06:06

## 2022-04-18 RX ADMIN — HYDROMORPHONE HYDROCHLORIDE 0.5 MILLIGRAM(S): 2 INJECTION INTRAMUSCULAR; INTRAVENOUS; SUBCUTANEOUS at 17:04

## 2022-04-18 RX ADMIN — Medication 30 MILLIGRAM(S): at 19:47

## 2022-04-18 RX ADMIN — KETAMINE HYDROCHLORIDE 7.61 MG/KG/HR: 100 INJECTION INTRAMUSCULAR; INTRAVENOUS at 19:57

## 2022-04-18 RX ADMIN — HYDROMORPHONE HYDROCHLORIDE 250 MILLILITER(S): 2 INJECTION INTRAMUSCULAR; INTRAVENOUS; SUBCUTANEOUS at 09:56

## 2022-04-18 RX ADMIN — HYDROMORPHONE HYDROCHLORIDE 250 MILLILITER(S): 2 INJECTION INTRAMUSCULAR; INTRAVENOUS; SUBCUTANEOUS at 07:26

## 2022-04-18 RX ADMIN — HYDROMORPHONE HYDROCHLORIDE 250 MILLILITER(S): 2 INJECTION INTRAMUSCULAR; INTRAVENOUS; SUBCUTANEOUS at 19:23

## 2022-04-18 RX ADMIN — SODIUM CHLORIDE 50 MILLILITER(S): 9 INJECTION, SOLUTION INTRAVENOUS at 19:24

## 2022-04-18 RX ADMIN — Medication 25 GRAM(S): at 09:05

## 2022-04-18 RX ADMIN — METHADONE HYDROCHLORIDE 5 MILLIGRAM(S): 40 TABLET ORAL at 06:05

## 2022-04-18 RX ADMIN — KETAMINE HYDROCHLORIDE 9.51 MG/KG/HR: 100 INJECTION INTRAMUSCULAR; INTRAVENOUS at 07:36

## 2022-04-18 RX ADMIN — KETAMINE HYDROCHLORIDE 7.61 MG/KG/HR: 100 INJECTION INTRAMUSCULAR; INTRAVENOUS at 09:06

## 2022-04-18 RX ADMIN — Medication 100 MILLIEQUIVALENT(S): at 06:50

## 2022-04-18 RX ADMIN — SODIUM CHLORIDE 100 MILLILITER(S): 9 INJECTION, SOLUTION INTRAVENOUS at 09:06

## 2022-04-18 RX ADMIN — Medication 5 MILLIGRAM(S): at 21:31

## 2022-04-18 RX ADMIN — LIDOCAINE 1 PATCH: 4 CREAM TOPICAL at 12:57

## 2022-04-18 RX ADMIN — Medication 100 GRAM(S): at 10:58

## 2022-04-18 RX ADMIN — LIDOCAINE 1 PATCH: 4 CREAM TOPICAL at 19:37

## 2022-04-18 RX ADMIN — HYDROMORPHONE HYDROCHLORIDE 0.5 MILLIGRAM(S): 2 INJECTION INTRAMUSCULAR; INTRAVENOUS; SUBCUTANEOUS at 15:21

## 2022-04-18 RX ADMIN — HEPARIN SODIUM 5000 UNIT(S): 5000 INJECTION INTRAVENOUS; SUBCUTANEOUS at 13:02

## 2022-04-18 RX ADMIN — Medication 1000 MILLIGRAM(S): at 14:51

## 2022-04-18 RX ADMIN — PANTOPRAZOLE SODIUM 40 MILLIGRAM(S): 20 TABLET, DELAYED RELEASE ORAL at 13:01

## 2022-04-18 NOTE — CHART NOTE - NSCHARTNOTEFT_GEN_A_CORE
Patient is severely malnourished because of [place] Mahesh esophagectomy with J tube placement due to [ESOPHAGEAL  STRICTURES S/P MULTIPLE DILATIONS]. The patient is severely malnourished with a BMI of BMI (kg/m2): 23.4  . He has loST 30lbs in the past 2 months. He requires tube feeds, [tWO Lito] at a rate of [69Cc/h x 16hours] in order to obtain sufficient nutrients to improve His weight, strength, and overall health status. The amount of time the patient may not be able to eat is greater than 3 months, and this may be a permanent condition. The medical necessity of these tube feeds is absolute. There are no possible substitutions or alternatives. The length of need is greater than 3 months.

## 2022-04-18 NOTE — PROGRESS NOTE ADULT - SUBJECTIVE AND OBJECTIVE BOX
Christiano Hoyt MD  Interventional Cardiology / Endovascular Specialist  [place] Office : [phone] [place] [address] [place] [address]  Tel:   [place] Office : [phone] [facility] [place] [address]  Tel: [phone]    Subjective/Overnight events: Pt is lying in bed in CTICU, no chest pains no SOB no palpitations  	  MEDICATIONS:  heparin   Injectable 5000 Unit(s) SubCutaneous every 8 hours      diphenhydrAMINE Injectable 25 milliGRAM(s) IV Push every 4 hours PRN    acetaminophen   IVPB .. 1000 milliGRAM(s) IV Intermittent once  acetaminophen   IVPB .. 1000 milliGRAM(s) IV Intermittent once  HYDROmorphone  Injectable 0.5 milliGRAM(s) IV Push every 3 hours PRN  hydromorphone (10 MICROgram(s)/mL) + bupivacaine 0.0625% in 0.9% Sodium Chloride PCEA 250 milliLiter(s) Epidural PCA Continuous  hydrOXYzine hydrochloride Syrup 25 milliGRAM(s) Oral every 6 hours PRN  ketamine Infusion. 1 mG/kG/Hr IV Continuous <Continuous>  ondansetron Injectable 4 milliGRAM(s) IV Push every 6 hours PRN    pantoprazole  Injectable 40 milliGRAM(s) IV Push daily      dextrose 5% + lactated ringers. 1000 milliLiter(s) IV Continuous <Continuous>  lidocaine   4% Patch 1 Patch Transdermal every 24 hours  tetracaine/benzocaine/butamben Spray 1 Spray(s) Topical every 3 hours PRN      PAST MEDICAL/SURGICAL HISTORY  PAST MEDICAL & SURGICAL HISTORY:  Heroin abuse    Cocaine abuse    GERD (gastroesophageal reflux disease)    Hiatal hernia    History of esophageal stricture    Intravenous drug abuse    Migration of pancreatic stent    Migrated esophageal stent        SOCIAL HISTORY: Substance Use (street drugs): ( x ) never used  (  ) other:    FAMILY HISTORY:  FH: thyroid disease (Grandparent)        REVIEW OF SYSTEMS:  CONSTITUTIONAL: No fever, weight loss, or fatigue  EYES: No eye pain, visual disturbances, or discharge  ENMT:  No difficulty hearing, tinnitus, vertigo; No sinus or throat pain  BREASTS: No pain, masses, or nipple discharge  GASTROINTESTINAL: No abdominal or epigastric pain. No nausea, vomiting, or hematemesis; No diarrhea or constipation. No melena or hematochezia.  GENITOURINARY: No dysuria, frequency, hematuria, or incontinence  NEUROLOGICAL: No headaches, memory loss, loss of strength, numbness, or tremors  ENDOCRINE: No heat or cold intolerance; No hair loss  MUSCULOSKELETAL: No joint pain or swelling; No muscle, back, or extremity pain  PSYCHIATRIC: No depression, anxiety, mood swings, or difficulty sleeping  HEME/LYMPH: No easy bruising, or bleeding gums  All others negative    PHYSICAL EXAM:  T(C): 36.8 (04-18-22 @ 12:00), Max: 37.2 (04-17-22 @ 20:00)  HR: 70 (04-18-22 @ 13:00) (65 - 85)  BP: --  RR: 10 (04-18-22 @ 13:00) (10 - 19)  SpO2: 95% (04-18-22 @ 13:00) (94% - 98%)  Wt(kg): --  I&O's Summary    17 Apr 2022 07:01  -  18 Apr 2022 07:00  --------------------------------------------------------  IN: 3472.5 mL / OUT: 3055 mL / NET: 417.5 mL    18 Apr 2022 07:01  -  18 Apr 2022 14:20  --------------------------------------------------------  IN: 660 mL / OUT: 530 mL / NET: 130 mL          GENERAL: NAD  EYES: EOMI, PERRLA, conjunctiva and sclera clear  ENMT: No tonsillar erythema, exudates, or enlargement  Cardiovascular: Normal S1 S2, No JVD, No murmurs, No edema  Respiratory: Lungs clear to auscultation	  Gastrointestinal:  s/p surgery 	  Extremities: No edema                                11.8   6.08  )-----------( 96       ( 18 Apr 2022 03:56 )             35.4     04-18    136  |  100  |  9   ----------------------------<  106<H>  3.9   |  25  |  0.68    Ca    8.9      18 Apr 2022 03:57  Phos  3.2     04-18  Mg     1.80     04-18      proBNP:   Lipid Profile:   HgA1c:   TSH:     Consultant(s) Notes Reviewed:  [x ] YES  [ ] NO    Care Discussed with Consultants/Other Providers [ x] YES  [ ] NO    Imaging Personally Reviewed independently:  [x] YES  [ ] NO    All labs, radiologic studies, vitals, orders and medications list reviewed. Patient is seen and examined at bedside. Case discussed with medical team.

## 2022-04-18 NOTE — PROGRESS NOTE ADULT - SUBJECTIVE AND OBJECTIVE BOX
Anesthesia Pain Management Service: PostOp Day _3_ of Epidural    SUBJECTIVE: Patient reports his pain is better controlled. Patient states that he does not want to continue with his Suboxone when he is discharged to home and will not return to see the doctor who prescribed the Suboxone. As per mother, the patient had a one time relapse when he was with his father and was then placed on Suboxone. Patient is requesting for an Addiction Psych consult.  Pain Scale Score:   Refer to charted pain scores    THERAPY:  [x ] Epidural Bupivacaine 0.0625% and Hydromorphone  		[ X] 10 micrograms/mL	[ ] 5 micrograms/mL  [ ] Epidural Bupivacaine 0.0625% and Fentanyl - 2 micrograms/mL  [ ] Epidural Ropivacaine 0.1% plain – 1 mg/mL  [ ] Patient Controlled Regional Anesthesia (PCRA) Ropivacaine  		[ ] 0.2%			[ ] 0.1%    Demand dose __3_ lockout __15_ (minutes) Continuous Rate _10__ Total: __196.35__ ml used (in past 24 hours)      MEDICATIONS  (STANDING):  acetaminophen   IVPB .. 1000 milliGRAM(s) IV Intermittent once  dextrose 5% + lactated ringers. 1000 milliLiter(s) (50 mL/Hr) IV Continuous <Continuous>  heparin   Injectable 5000 Unit(s) SubCutaneous every 8 hours  hydromorphone (10 MICROgram(s)/mL) + bupivacaine 0.0625% in 0.9% Sodium Chloride PCEA 250 milliLiter(s) Epidural PCA Continuous  ketamine Infusion. 1 mG/kG/Hr (7.61 mL/Hr) IV Continuous <Continuous>  lidocaine   4% Patch 1 Patch Transdermal every 24 hours  pantoprazole  Injectable 40 milliGRAM(s) IV Push daily    MEDICATIONS  (PRN):  diphenhydrAMINE Injectable 25 milliGRAM(s) IV Push every 4 hours PRN Pruritus  HYDROmorphone  Injectable 0.5 milliGRAM(s) IV Push every 3 hours PRN Severe breakthrough Pain (7 - 10)  hydrOXYzine hydrochloride Syrup 25 milliGRAM(s) Oral every 6 hours PRN Anxiety  naloxone Injectable 0.1 milliGRAM(s) IV Push every 3 minutes PRN For ANY of the following changes in patient status:  A. RR LESS THAN 10 breaths per minute, B. Oxygen saturation LESS THAN 90%, C. Sedation score of 6  ondansetron Injectable 4 milliGRAM(s) IV Push every 6 hours PRN Nausea  tetracaine/benzocaine/butamben Spray 1 Spray(s) Topical every 3 hours PRN pain      OBJECTIVE: Patient sitting up in chair, chest tube x1.     Assessment of Catheter Site:	[ ] Left	[ ] Right  [x ] Epidural 	[ ] Femoral	      [ ] Saphenous   [ ] Supraclavicular   [ ] Other:    [x ] Dressing intact	[x ] Site non-tender	[ x] Site without erythema, discharge, edema  [x ] Epidural tubing and connection checked	[x] Gross neurological exam within normal limits  [ ] Catheter removed – tip intact		[ ] Afebrile  	[ ] Febrile: ___   [ X] see Temp under VS below)    PT/INR - ( 18 Apr 2022 03:56 )   PT: 12.4 sec;   INR: 1.07 ratio         PTT - ( 18 Apr 2022 03:56 )  PTT:28.1 sec                      11.8   6.08  )-----------( 96       ( 18 Apr 2022 03:56 )             35.4     Vital Signs Last 24 Hrs  T(C): 37.1 (04-18-22 @ 08:00), Max: 37.2 (04-17-22 @ 20:00)  T(F): 98.7 (04-18-22 @ 08:00), Max: 98.9 (04-17-22 @ 20:00)  HR: 67 (04-18-22 @ 11:00) (65 - 85)  BP: --  BP(mean): --  RR: 16 (04-18-22 @ 11:00) (11 - 19)  SpO2: 97% (04-18-22 @ 11:00) (94% - 98%)      Sedation Score:	[x ] Alert	[ ] Drowsy	[ ] Arousable	[ ] Asleep	[ ] Unresponsive    Side Effects:	[x ] None	[ ] Nausea	[ ] Vomiting	[ ] Pruritus  		[ ] Weakness		[ ] Numbness	[ ] Other:    ASSESSMENT/ PLAN:    Therapy to  be:	[x ] Continue   [ ] Discontinued   [ ] Change to prn Analgesics    Documentation and Verification of current medications:  [ X ] Done	[ ] Not done, not eligible, reason:    Comments: Continue PCEA. Anesthesia Pain Service Attending at morning rounds, recommended to discontinue Methadone and continue to wean-off of Ketamine. Also recommended Addiction Psych consult.    Progress Note written now but Patient was seen earlier. Anesthesia Pain Management Service: PostOp Day _3_ of Epidural    SUBJECTIVE: Patient reports his pain is better controlled. Patient states that he does not want to continue with his Suboxone when he is discharged to home and will not return to see the doctor who prescribed the Suboxone. As per mother, the patient had a one time relapse when he was with his father and was then placed on Suboxone. Patient is requesting for an Addiction Psych consult.  Pain Scale Score:   Refer to charted pain scores    THERAPY:  [x ] Epidural Bupivacaine 0.0625% and Hydromorphone  		[ X] 10 micrograms/mL	[ ] 5 micrograms/mL  [ ] Epidural Bupivacaine 0.0625% and Fentanyl - 2 micrograms/mL  [ ] Epidural Ropivacaine 0.1% plain – 1 mg/mL  [ ] Patient Controlled Regional Anesthesia (PCRA) Ropivacaine  		[ ] 0.2%			[ ] 0.1%    Demand dose __3_ lockout __15_ (minutes) Continuous Rate _10__ Total: __196.35__ ml used (in past 24 hours)      MEDICATIONS  (STANDING):  acetaminophen   IVPB .. 1000 milliGRAM(s) IV Intermittent once  dextrose 5% + lactated ringers. 1000 milliLiter(s) (50 mL/Hr) IV Continuous <Continuous>  heparin   Injectable 5000 Unit(s) SubCutaneous every 8 hours  hydromorphone (10 MICROgram(s)/mL) + bupivacaine 0.0625% in 0.9% Sodium Chloride PCEA 250 milliLiter(s) Epidural PCA Continuous  ketamine Infusion. 1 mG/kG/Hr (7.61 mL/Hr) IV Continuous <Continuous>  lidocaine   4% Patch 1 Patch Transdermal every 24 hours  pantoprazole  Injectable 40 milliGRAM(s) IV Push daily    MEDICATIONS  (PRN):  diphenhydrAMINE Injectable 25 milliGRAM(s) IV Push every 4 hours PRN Pruritus  HYDROmorphone  Injectable 0.5 milliGRAM(s) IV Push every 3 hours PRN Severe breakthrough Pain (7 - 10)  hydrOXYzine hydrochloride Syrup 25 milliGRAM(s) Oral every 6 hours PRN Anxiety  naloxone Injectable 0.1 milliGRAM(s) IV Push every 3 minutes PRN For ANY of the following changes in patient status:  A. RR LESS THAN 10 breaths per minute, B. Oxygen saturation LESS THAN 90%, C. Sedation score of 6  ondansetron Injectable 4 milliGRAM(s) IV Push every 6 hours PRN Nausea  tetracaine/benzocaine/butamben Spray 1 Spray(s) Topical every 3 hours PRN pain      OBJECTIVE: Patient sitting up in chair, chest tube x1.     Assessment of Catheter Site:	[ ] Left	[ ] Right  [x ] Epidural 	[ ] Femoral	      [ ] Saphenous   [ ] Supraclavicular   [ ] Other:    [x ] Dressing intact	[x ] Site non-tender	[ x] Site without erythema, discharge, edema  [x ] Epidural tubing and connection checked	[x] Gross neurological exam within normal limits  [ ] Catheter removed – tip intact		[ ] Afebrile  	[ ] Febrile: ___   [ X] see Temp under VS below)    PT/INR - ( 18 Apr 2022 03:56 )   PT: 12.4 sec;   INR: 1.07 ratio         PTT - ( 18 Apr 2022 03:56 )  PTT:28.1 sec                      11.8   6.08  )-----------( 96       ( 18 Apr 2022 03:56 )             35.4     Vital Signs Last 24 Hrs  T(C): 37.1 (04-18-22 @ 08:00), Max: 37.2 (04-17-22 @ 20:00)  T(F): 98.7 (04-18-22 @ 08:00), Max: 98.9 (04-17-22 @ 20:00)  HR: 67 (04-18-22 @ 11:00) (65 - 85)  BP: --  BP(mean): --  RR: 16 (04-18-22 @ 11:00) (11 - 19)  SpO2: 97% (04-18-22 @ 11:00) (94% - 98%)      Sedation Score:	[x ] Alert	[ ] Drowsy	[ ] Arousable	[ ] Asleep	[ ] Unresponsive    Side Effects:	[x ] None	[ ] Nausea	[ ] Vomiting	[ ] Pruritus  		[ ] Weakness		[ ] Numbness	[ ] Other:    ASSESSMENT/ PLAN:    Therapy to  be:	[x ] Continue   [ ] Discontinued   [ ] Change to prn Analgesics    Documentation and Verification of current medications:  [ X ] Done	[ ] Not done, not eligible, reason:    Comments: Continue PCEA. Anesthesia Pain Service Attending at morning rounds, recommended to discontinue Methadone and continue to wean-off of Ketamine. Also recommended Addiction Psych consult for history of substance abuse and if patient can be discharged home with opioids due to history of substance abuse.    Progress Note written now but Patient was seen earlier. Anesthesia Pain Management Service: PostOp Day _3_ of Epidural    SUBJECTIVE: Patient reports his pain is better controlled. Patient states that he does not want to continue with his Suboxone when he is discharged to home and will not return to see the doctor who prescribed the Suboxone. As per mother, the patient had a one time relapse when he was with his father and was then placed on Suboxone. Mother states patient was going through a difficult time as his brother had passed away 15 months ago. Patient is requesting for an Addiction Psych consult.  Pain Scale Score:   Refer to charted pain scores    THERAPY:  [x ] Epidural Bupivacaine 0.0625% and Hydromorphone  		[ X] 10 micrograms/mL	[ ] 5 micrograms/mL  [ ] Epidural Bupivacaine 0.0625% and Fentanyl - 2 micrograms/mL  [ ] Epidural Ropivacaine 0.1% plain – 1 mg/mL  [ ] Patient Controlled Regional Anesthesia (PCRA) Ropivacaine  		[ ] 0.2%			[ ] 0.1%    Demand dose __3_ lockout __15_ (minutes) Continuous Rate _10__ Total: __196.35__ ml used (in past 24 hours)      MEDICATIONS  (STANDING):  acetaminophen   IVPB .. 1000 milliGRAM(s) IV Intermittent once  dextrose 5% + lactated ringers. 1000 milliLiter(s) (50 mL/Hr) IV Continuous <Continuous>  heparin   Injectable 5000 Unit(s) SubCutaneous every 8 hours  hydromorphone (10 MICROgram(s)/mL) + bupivacaine 0.0625% in 0.9% Sodium Chloride PCEA 250 milliLiter(s) Epidural PCA Continuous  ketamine Infusion. 1 mG/kG/Hr (7.61 mL/Hr) IV Continuous <Continuous>  lidocaine   4% Patch 1 Patch Transdermal every 24 hours  pantoprazole  Injectable 40 milliGRAM(s) IV Push daily    MEDICATIONS  (PRN):  diphenhydrAMINE Injectable 25 milliGRAM(s) IV Push every 4 hours PRN Pruritus  HYDROmorphone  Injectable 0.5 milliGRAM(s) IV Push every 3 hours PRN Severe breakthrough Pain (7 - 10)  hydrOXYzine hydrochloride Syrup 25 milliGRAM(s) Oral every 6 hours PRN Anxiety  naloxone Injectable 0.1 milliGRAM(s) IV Push every 3 minutes PRN For ANY of the following changes in patient status:  A. RR LESS THAN 10 breaths per minute, B. Oxygen saturation LESS THAN 90%, C. Sedation score of 6  ondansetron Injectable 4 milliGRAM(s) IV Push every 6 hours PRN Nausea  tetracaine/benzocaine/butamben Spray 1 Spray(s) Topical every 3 hours PRN pain      OBJECTIVE: Patient sitting up in chair, chest tube x1.     Assessment of Catheter Site:	[ ] Left	[ ] Right  [x ] Epidural 	[ ] Femoral	      [ ] Saphenous   [ ] Supraclavicular   [ ] Other:    [x ] Dressing intact	[x ] Site non-tender	[ x] Site without erythema, discharge, edema  [x ] Epidural tubing and connection checked	[x] Gross neurological exam within normal limits  [ ] Catheter removed – tip intact		[ ] Afebrile  	[ ] Febrile: ___   [ X] see Temp under VS below)    PT/INR - ( 18 Apr 2022 03:56 )   PT: 12.4 sec;   INR: 1.07 ratio         PTT - ( 18 Apr 2022 03:56 )  PTT:28.1 sec                      11.8   6.08  )-----------( 96       ( 18 Apr 2022 03:56 )             35.4     Vital Signs Last 24 Hrs  T(C): 37.1 (04-18-22 @ 08:00), Max: 37.2 (04-17-22 @ 20:00)  T(F): 98.7 (04-18-22 @ 08:00), Max: 98.9 (04-17-22 @ 20:00)  HR: 67 (04-18-22 @ 11:00) (65 - 85)  BP: --  BP(mean): --  RR: 16 (04-18-22 @ 11:00) (11 - 19)  SpO2: 97% (04-18-22 @ 11:00) (94% - 98%)      Sedation Score:	[x ] Alert	[ ] Drowsy	[ ] Arousable	[ ] Asleep	[ ] Unresponsive    Side Effects:	[x ] None	[ ] Nausea	[ ] Vomiting	[ ] Pruritus  		[ ] Weakness		[ ] Numbness	[ ] Other:    ASSESSMENT/ PLAN:    Therapy to  be:	[x ] Continue   [ ] Discontinued   [ ] Change to prn Analgesics    Documentation and Verification of current medications:  [ X ] Done	[ ] Not done, not eligible, reason:    Comments: Continue PCEA. Anesthesia Pain Service Attending at morning rounds, recommended to discontinue Methadone and continue to wean-off of Ketamine. Also recommended Addiction Psych consult for history of substance abuse, anxiety and depression, and if patient can be discharged home with opioids due to history of substance abuse.    Progress Note written now but Patient was seen earlier.

## 2022-04-18 NOTE — PROGRESS NOTE ADULT - SUBJECTIVE AND OBJECTIVE BOX
Anesthesia Pain Management Service- Attending Addendum    SUBJECTIVE: Pt doing well with ketamine, PRN analgesics.     Therapy:	  [ X] IV PCA	   [ ] Epidural           [ ] s/p Spinal Opoid              [ ] Postpartum infusion	  [ ] Patient controlled regional anesthesia (PCRA)    [x ] prn Analgesics    Allergies    No Known Allergies    Intolerances      MEDICATIONS  (STANDING):  acetaminophen   IVPB .. 1000 milliGRAM(s) IV Intermittent once  dextrose 5% + lactated ringers. 1000 milliLiter(s) (50 mL/Hr) IV Continuous <Continuous>  heparin   Injectable 5000 Unit(s) SubCutaneous every 8 hours  hydromorphone (10 MICROgram(s)/mL) + bupivacaine 0.0625% in 0.9% Sodium Chloride PCEA 250 milliLiter(s) Epidural PCA Continuous  ketamine Infusion. 1 mG/kG/Hr (7.61 mL/Hr) IV Continuous <Continuous>  lidocaine   4% Patch 1 Patch Transdermal every 24 hours  pantoprazole  Injectable 40 milliGRAM(s) IV Push daily    MEDICATIONS  (PRN):  diphenhydrAMINE Injectable 25 milliGRAM(s) IV Push every 4 hours PRN Pruritus  HYDROmorphone  Injectable 0.5 milliGRAM(s) IV Push every 3 hours PRN Severe breakthrough Pain (7 - 10)  hydrOXYzine hydrochloride Syrup 25 milliGRAM(s) Oral every 6 hours PRN Anxiety  naloxone Injectable 0.1 milliGRAM(s) IV Push every 3 minutes PRN For ANY of the following changes in patient status:  A. RR LESS THAN 10 breaths per minute, B. Oxygen saturation LESS THAN 90%, C. Sedation score of 6  ondansetron Injectable 4 milliGRAM(s) IV Push every 6 hours PRN Nausea  tetracaine/benzocaine/butamben Spray 1 Spray(s) Topical every 3 hours PRN pain      OBJECTIVE:   [X] No new signs     [ ] Other:    Side Effects:  [X ] None			[ ] Other:    Assessment of Catheter Site:		[ ] Intact		[ ] Other:    ASSESSMENT/PLAN  Patient has a history of substance abuse who was on suboxone prior to admission. Today, the patient reports that the suboxone was not indicated but he was taking it due to a "situation." He does not wish to restart this medication. Ketamine infusion appears to be working, likely the maximal effect of the infusion has worn off and the patient can be weaned off. He reported some adverse side effects to the infusion initially.     Plan:  - Wean off ketamine infusion  - Stop methadone since he was not on the medication before and continue with PRN dilaudid  - Continue with epidural infusion   - Consider an addiction medicine consult inpatient with the intention of outpatient follow up.       Comments:    Note entered after patient seen

## 2022-04-18 NOTE — PROGRESS NOTE ADULT - SUBJECTIVE AND OBJECTIVE BOX
ABNER MORA                     MRN-[id number]    HPI:  27yo M w/ PMHx of esophageal stricture s/p multiple dilations and IVDU on Suboxone presenting with inability to tolerate oral intake for the past 30 days.  Patient reports a few week history of progressively worsening inability to tolerate solids and now only tolerates oral liquid intake. He is only able to tolerate smoothies or liquids. He reports he has lost about 30 pounds in the last few months. He reports nausea,  chills, no fever, night sweats. He was recently started on Suboxone this past month, which was tapered down to 4mg 3 days ago. Patient follows with Dr. Gerry Serrato of GI. His last EGD was in December of 2021. Of note,  Patient was admitted in June of 2021 for esophageal stent migration with bacteremia (strep) and he required IV ABs. Otherwise, no fevers, chills, abdominal pain, no D/C/ or emesis.    (04 Apr 2022 13:44)       PROCEDURES:         FB, EGD, Robotic Assisted Ahmeek Mahesh esophagectomy, J tube placement.  15-Apr-2022         ISSUES:     Esophageal stricture   Postoperative pain   Chest tubes in place   Substance abuse(cocaine, heroin, IVDU), on suboxone             PAST MEDICAL & SURGICAL HISTORY:  Heroin abuse    Cocaine abuse    GERD (gastroesophageal reflux disease)    Hiatal hernia    History of esophageal stricture    Intravenous drug abuse    Migration of pancreatic stent    Migrated esophageal stent              VITAL SIGNS:  Vital Signs Last 24 Hrs  T(C): 37.1 (18 Apr 2022 08:00), Max: 37.2 (17 Apr 2022 20:00)  T(F): 98.7 (18 Apr 2022 08:00), Max: 98.9 (17 Apr 2022 20:00)  HR: 67 (18 Apr 2022 11:00) (65 - 85)  BP: --  BP(mean): --  RR: 16 (18 Apr 2022 11:00) (11 - 19)  SpO2: 97% (18 Apr 2022 11:00) (94% - 98%)    I/Os:   I&O's Detail    17 Apr 2022 07:01  -  18 Apr 2022 07:00  --------------------------------------------------------  IN:    dextrose 5% + lactated ringers: 2500 mL    Enteral Tube Flush: 90 mL    Enteral Tube Flush: 90 mL    IV PiggyBack: 365 mL    Ketamine: 237.5 mL    TwoCal HN: 190 mL  Total IN: 3472.5 mL    OUT:    Chest Tube (mL): 210 mL    Chest Tube (mL): 35 mL    Indwelling Catheter - Urethral (mL): 2560 mL    Jejunostomy Tube (mL): 200 mL    Nasogastric/Oral tube (mL): 50 mL  Total OUT: 3055 mL    Total NET: 417.5 mL      18 Apr 2022 07:01  -  18 Apr 2022 12:30  --------------------------------------------------------  IN:    dextrose 5% + lactated ringers: 300 mL    Enteral Tube Flush: 30 mL  Total IN: 330 mL    OUT:    Chest Tube (mL): 0 mL    Chest Tube (mL): 30 mL    Indwelling Catheter - Urethral (mL): 150 mL  Total OUT: 180 mL    Total NET: 150 mL          CAPILLARY BLOOD GLUCOSE          =======================MEDICATIONS===================  MEDICATIONS  (STANDING):  acetaminophen   IVPB .. 1000 milliGRAM(s) IV Intermittent once  dextrose 5% + lactated ringers. 1000 milliLiter(s) (50 mL/Hr) IV Continuous <Continuous>  heparin   Injectable 5000 Unit(s) SubCutaneous every 8 hours  hydromorphone (10 MICROgram(s)/mL) + bupivacaine 0.0625% in 0.9% Sodium Chloride PCEA 250 milliLiter(s) Epidural PCA Continuous  ketamine Infusion. 1 mG/kG/Hr (7.61 mL/Hr) IV Continuous <Continuous>  lidocaine   4% Patch 1 Patch Transdermal every 24 hours  pantoprazole  Injectable 40 milliGRAM(s) IV Push daily    MEDICATIONS  (PRN):  diphenhydrAMINE Injectable 25 milliGRAM(s) IV Push every 4 hours PRN Pruritus  HYDROmorphone  Injectable 0.5 milliGRAM(s) IV Push every 3 hours PRN Severe breakthrough Pain (7 - 10)  hydrOXYzine hydrochloride Syrup 25 milliGRAM(s) Oral every 6 hours PRN Anxiety  naloxone Injectable 0.1 milliGRAM(s) IV Push every 3 minutes PRN For ANY of the following changes in patient status:  A. RR LESS THAN 10 breaths per minute, B. Oxygen saturation LESS THAN 90%, C. Sedation score of 6  ondansetron Injectable 4 milliGRAM(s) IV Push every 6 hours PRN Nausea  tetracaine/benzocaine/butamben Spray 1 Spray(s) Topical every 3 hours PRN pain        PHYSICAL EXAM============================  General:                         Awake, alert, not in any distress  Neuro:                            Moving all extremities to commands.   Respiratory:	Air entry fair and  bilateral conducted sounds                                           Effort even and unlabored.  CV:		Regular rate and rhythm. Normal S1/S2                                          Distal pulses present.  Abdomen:	                     Soft, non-distended. Bowel sounds present   Skin:		No rash.  Extremities:	Warm, no cyanosis or edema.  Palpable pulses    ============================LABS=========================                        11.8   6.08  )-----------( 96       ( 18 Apr 2022 03:56 )             35.4     04-18    136  |  100  |  9   ----------------------------<  106<H>  3.9   |  25  |  0.68    Ca    8.9      18 Apr 2022 03:57  Phos  3.2     04-18  Mg     1.80     04-18        PT/INR - ( 18 Apr 2022 03:56 )   PT: 12.4 sec;   INR: 1.07 ratio         PTT - ( 18 Apr 2022 03:56 )  PTT:28.1 sec  ABG - ( 18 Apr 2022 03:56 )  pH, Arterial: 7.43  pH, Blood: x     /  pCO2: 44    /  pO2: 125   / HCO3: 29    / Base Excess: 4.2   /  SaO2: 98.9            ASSESSMENT AND PLAN:    NEURO:     Post-operative Pain - Stable. Pain control with PCEA and Tylenol IV PRN.  Continue ketamine.  Ativan as needed   Pain managements recs appreciated    Monitor respiratory rate and ABG, risk of respiratory depression          RESPIRATORY:   Hypoxia - Wean nasal cannula for goal O2sat above 92. Obtain CXR. Incentive spirometry. Chest PT and frequent suctioning. Continue bronchodilators. OOB to chair & ambulate w/ assistance. Continuous pulse oximetry for support & to prevent decompensation.     Chest tube – Pleurevac regulated suctioning. Monitor chest tube output.         CARDIOVASCULAR:     Hemodynamically stable - Not on pressors. Continue hemodynamic monitoring.     Telemetry (medical test) - Reviewed by me today independently. Normal sinus rhythm.     RENAL:     Stable - Monitor IOs and electrolytes. Keep K above 4.0 and Mg above 2.0. D5LR@100ml/h, monitor hourly urine output        GASTROINTESTINAL:     GI prophylaxis    Zofran and Reglan IV PRN for nausea     NPO, TF titrate to goal rate    NGT to low continuous suction    J tube feeds @10ml/h     Flush J tube and NGT Q4h            HEMATOLOGIC:     No signs of active bleeding. Monitor Hgb in CBC in AM     DVT prophylaxis with heparin subQ and SCDs.           INFECTIOUS DISEASE:     All surgical sites appear clean. No signs of active infection. Will monitor for fever and leukocytosis.           ENDOCRINE:     Stable – Monitor glucose fingersticks for goal 120-180.             Pertinent clinical, laboratory, radiographic, hemodynamic, echocardiographic, respiratory data, microbiologic data and chart were reviewed by myself and analyzed frequently throughout the course of the day and night by myself.     Plan discussed at length with the CTICU staff and Attending CT Surgeon -   Dr. Zenia Shannon    Patient's status was discussed with patient at bedside.   I spent 35 minutes with 20 minutes at bedside providing care post op    Rudy Watson DO, FACEP

## 2022-04-18 NOTE — PROGRESS NOTE ADULT - ASSESSMENT
25yo M w/ PMHx of esophageal stricture s/p multiple dilations and IVDU on Suboxone presenting with inability to tolerate oral intake for the past 30 days.     EKG: NSR No acute changes    1. Post-op assessment  -denies CP, SOB  -echo with grossly mild global LV systolic dysfunction (new compared to 2021) however patient denies CP or SOB on exertion.no plan for further cardiac w/u  -s/p esophagectomy.  f/u pain c/s   -care per CTICU team     2. Dysphagia  -hx of esophageal stricture s/p multiple dilations  -s/p esophagectomy   -f/u GI and thoracic surgery  negative...

## 2022-04-19 LAB
ANION GAP SERPL CALC-SCNC: 11 MMOL/L — SIGNIFICANT CHANGE UP (ref 7–14)
APTT BLD: 29.4 SEC — SIGNIFICANT CHANGE UP (ref 27–36.3)
BUN SERPL-MCNC: 14 MG/DL — SIGNIFICANT CHANGE UP (ref 7–23)
CALCIUM SERPL-MCNC: 9.2 MG/DL — SIGNIFICANT CHANGE UP (ref 8.4–10.5)
CHLORIDE SERPL-SCNC: 101 MMOL/L — SIGNIFICANT CHANGE UP (ref 98–107)
CO2 SERPL-SCNC: 25 MMOL/L — SIGNIFICANT CHANGE UP (ref 22–31)
CREAT SERPL-MCNC: 0.69 MG/DL — SIGNIFICANT CHANGE UP (ref 0.5–1.3)
EGFR: 131 ML/MIN/1.73M2 — SIGNIFICANT CHANGE UP
GLUCOSE BLDC GLUCOMTR-MCNC: 107 MG/DL — HIGH (ref 70–99)
GLUCOSE SERPL-MCNC: 92 MG/DL — SIGNIFICANT CHANGE UP (ref 70–99)
HCT VFR BLD CALC: 35.4 % — LOW (ref 39–50)
HGB BLD-MCNC: 12 G/DL — LOW (ref 13–17)
INR BLD: 0.99 RATIO — SIGNIFICANT CHANGE UP (ref 0.88–1.16)
MAGNESIUM SERPL-MCNC: 1.8 MG/DL — SIGNIFICANT CHANGE UP (ref 1.6–2.6)
MCHC RBC-ENTMCNC: 28.8 PG — SIGNIFICANT CHANGE UP (ref 27–34)
MCHC RBC-ENTMCNC: 33.9 GM/DL — SIGNIFICANT CHANGE UP (ref 32–36)
MCV RBC AUTO: 85.1 FL — SIGNIFICANT CHANGE UP (ref 80–100)
NRBC # BLD: 0 /100 WBCS — SIGNIFICANT CHANGE UP
NRBC # FLD: 0 K/UL — SIGNIFICANT CHANGE UP
PHOSPHATE SERPL-MCNC: 4.2 MG/DL — SIGNIFICANT CHANGE UP (ref 2.5–4.5)
PLATELET # BLD AUTO: 93 K/UL — LOW (ref 150–400)
POTASSIUM SERPL-MCNC: 3.9 MMOL/L — SIGNIFICANT CHANGE UP (ref 3.5–5.3)
POTASSIUM SERPL-SCNC: 3.9 MMOL/L — SIGNIFICANT CHANGE UP (ref 3.5–5.3)
PROTHROM AB SERPL-ACNC: 11.5 SEC — SIGNIFICANT CHANGE UP (ref 10.5–13.4)
RBC # BLD: 4.16 M/UL — LOW (ref 4.2–5.8)
RBC # FLD: 14.6 % — HIGH (ref 10.3–14.5)
SODIUM SERPL-SCNC: 137 MMOL/L — SIGNIFICANT CHANGE UP (ref 135–145)
WBC # BLD: 5.01 K/UL — SIGNIFICANT CHANGE UP (ref 3.8–10.5)
WBC # FLD AUTO: 5.01 K/UL — SIGNIFICANT CHANGE UP (ref 3.8–10.5)

## 2022-04-19 PROCEDURE — 99233 SBSQ HOSP IP/OBS HIGH 50: CPT

## 2022-04-19 PROCEDURE — 71045 X-RAY EXAM CHEST 1 VIEW: CPT | Mod: 26

## 2022-04-19 RX ORDER — KETAMINE HYDROCHLORIDE 100 MG/ML
0.5 INJECTION INTRAMUSCULAR; INTRAVENOUS
Qty: 1000 | Refills: 0 | Status: DISCONTINUED | OUTPATIENT
Start: 2022-04-19 | End: 2022-04-21

## 2022-04-19 RX ORDER — SODIUM CHLORIDE 9 MG/ML
500 INJECTION, SOLUTION INTRAVENOUS ONCE
Refills: 0 | Status: COMPLETED | OUTPATIENT
Start: 2022-04-19 | End: 2022-04-19

## 2022-04-19 RX ORDER — MAGNESIUM SULFATE 500 MG/ML
2 VIAL (ML) INJECTION ONCE
Refills: 0 | Status: COMPLETED | OUTPATIENT
Start: 2022-04-19 | End: 2022-04-19

## 2022-04-19 RX ORDER — CLONAZEPAM 1 MG
1 TABLET ORAL
Refills: 0 | Status: DISCONTINUED | OUTPATIENT
Start: 2022-04-19 | End: 2022-04-24

## 2022-04-19 RX ORDER — CLONAZEPAM 1 MG
1 TABLET ORAL
Refills: 0 | Status: DISCONTINUED | OUTPATIENT
Start: 2022-04-19 | End: 2022-04-19

## 2022-04-19 RX ORDER — KETOROLAC TROMETHAMINE 30 MG/ML
15 SYRINGE (ML) INJECTION EVERY 6 HOURS
Refills: 0 | Status: DISCONTINUED | OUTPATIENT
Start: 2022-04-19 | End: 2022-04-24

## 2022-04-19 RX ORDER — HYDROMORPHONE HYDROCHLORIDE 2 MG/ML
30 INJECTION INTRAMUSCULAR; INTRAVENOUS; SUBCUTANEOUS
Refills: 0 | Status: DISCONTINUED | OUTPATIENT
Start: 2022-04-19 | End: 2022-04-21

## 2022-04-19 RX ORDER — OXYCODONE HYDROCHLORIDE 5 MG/1
10 TABLET ORAL EVERY 4 HOURS
Refills: 0 | Status: DISCONTINUED | OUTPATIENT
Start: 2022-04-19 | End: 2022-04-20

## 2022-04-19 RX ORDER — ACETAMINOPHEN 500 MG
1000 TABLET ORAL EVERY 6 HOURS
Refills: 0 | Status: COMPLETED | OUTPATIENT
Start: 2022-04-19 | End: 2022-04-20

## 2022-04-19 RX ORDER — HYDROMORPHONE HYDROCHLORIDE 2 MG/ML
0.5 INJECTION INTRAMUSCULAR; INTRAVENOUS; SUBCUTANEOUS
Refills: 0 | Status: DISCONTINUED | OUTPATIENT
Start: 2022-04-19 | End: 2022-04-21

## 2022-04-19 RX ORDER — NALBUPHINE HYDROCHLORIDE 10 MG/ML
2.5 INJECTION, SOLUTION INTRAMUSCULAR; INTRAVENOUS; SUBCUTANEOUS EVERY 6 HOURS
Refills: 0 | Status: DISCONTINUED | OUTPATIENT
Start: 2022-04-19 | End: 2022-04-24

## 2022-04-19 RX ORDER — HYDROMORPHONE HYDROCHLORIDE 2 MG/ML
2 INJECTION INTRAMUSCULAR; INTRAVENOUS; SUBCUTANEOUS ONCE
Refills: 0 | Status: DISCONTINUED | OUTPATIENT
Start: 2022-04-19 | End: 2022-04-19

## 2022-04-19 RX ORDER — OXYCODONE HYDROCHLORIDE 5 MG/1
5 TABLET ORAL EVERY 4 HOURS
Refills: 0 | Status: DISCONTINUED | OUTPATIENT
Start: 2022-04-19 | End: 2022-04-20

## 2022-04-19 RX ORDER — HYDROMORPHONE HYDROCHLORIDE 2 MG/ML
0.5 INJECTION INTRAMUSCULAR; INTRAVENOUS; SUBCUTANEOUS EVERY 4 HOURS
Refills: 0 | Status: DISCONTINUED | OUTPATIENT
Start: 2022-04-19 | End: 2022-04-19

## 2022-04-19 RX ADMIN — HYDROMORPHONE HYDROCHLORIDE 0.5 MILLIGRAM(S): 2 INJECTION INTRAMUSCULAR; INTRAVENOUS; SUBCUTANEOUS at 17:34

## 2022-04-19 RX ADMIN — SODIUM CHLORIDE 500 MILLILITER(S): 9 INJECTION, SOLUTION INTRAVENOUS at 16:19

## 2022-04-19 RX ADMIN — Medication 400 MILLIGRAM(S): at 23:07

## 2022-04-19 RX ADMIN — KETAMINE HYDROCHLORIDE 3.81 MG/KG/HR: 100 INJECTION INTRAMUSCULAR; INTRAVENOUS at 20:57

## 2022-04-19 RX ADMIN — Medication 20 MILLIGRAM(S): at 23:07

## 2022-04-19 RX ADMIN — HEPARIN SODIUM 5000 UNIT(S): 5000 INJECTION INTRAVENOUS; SUBCUTANEOUS at 23:07

## 2022-04-19 RX ADMIN — Medication 15 MILLIGRAM(S): at 20:30

## 2022-04-19 RX ADMIN — Medication 400 MILLIGRAM(S): at 18:16

## 2022-04-19 RX ADMIN — HEPARIN SODIUM 5000 UNIT(S): 5000 INJECTION INTRAVENOUS; SUBCUTANEOUS at 15:09

## 2022-04-19 RX ADMIN — Medication 400 MILLIGRAM(S): at 12:05

## 2022-04-19 RX ADMIN — LIDOCAINE 1 PATCH: 4 CREAM TOPICAL at 09:59

## 2022-04-19 RX ADMIN — OXYCODONE HYDROCHLORIDE 10 MILLIGRAM(S): 5 TABLET ORAL at 11:16

## 2022-04-19 RX ADMIN — Medication 1000 MILLIGRAM(S): at 23:55

## 2022-04-19 RX ADMIN — Medication 15 MILLIGRAM(S): at 15:00

## 2022-04-19 RX ADMIN — Medication 1000 MILLIGRAM(S): at 18:46

## 2022-04-19 RX ADMIN — Medication 15 MILLIGRAM(S): at 08:53

## 2022-04-19 RX ADMIN — OXYCODONE HYDROCHLORIDE 10 MILLIGRAM(S): 5 TABLET ORAL at 10:46

## 2022-04-19 RX ADMIN — Medication 25 MILLIGRAM(S): at 15:18

## 2022-04-19 RX ADMIN — LIDOCAINE 1 PATCH: 4 CREAM TOPICAL at 21:06

## 2022-04-19 RX ADMIN — OXYCODONE HYDROCHLORIDE 10 MILLIGRAM(S): 5 TABLET ORAL at 16:34

## 2022-04-19 RX ADMIN — OXYCODONE HYDROCHLORIDE 5 MILLIGRAM(S): 5 TABLET ORAL at 18:35

## 2022-04-19 RX ADMIN — Medication 15 MILLIGRAM(S): at 21:06

## 2022-04-19 RX ADMIN — HYDROMORPHONE HYDROCHLORIDE 0.5 MILLIGRAM(S): 2 INJECTION INTRAMUSCULAR; INTRAVENOUS; SUBCUTANEOUS at 17:04

## 2022-04-19 RX ADMIN — HYDROMORPHONE HYDROCHLORIDE 30 MILLILITER(S): 2 INJECTION INTRAMUSCULAR; INTRAVENOUS; SUBCUTANEOUS at 20:58

## 2022-04-19 RX ADMIN — OXYCODONE HYDROCHLORIDE 10 MILLIGRAM(S): 5 TABLET ORAL at 16:04

## 2022-04-19 RX ADMIN — HEPARIN SODIUM 5000 UNIT(S): 5000 INJECTION INTRAVENOUS; SUBCUTANEOUS at 05:43

## 2022-04-19 RX ADMIN — Medication 15 MILLIGRAM(S): at 14:30

## 2022-04-19 RX ADMIN — SODIUM CHLORIDE 50 MILLILITER(S): 9 INJECTION, SOLUTION INTRAVENOUS at 08:06

## 2022-04-19 RX ADMIN — HYDROMORPHONE HYDROCHLORIDE 0.5 MILLIGRAM(S): 2 INJECTION INTRAMUSCULAR; INTRAVENOUS; SUBCUTANEOUS at 12:35

## 2022-04-19 RX ADMIN — LIDOCAINE 1 PATCH: 4 CREAM TOPICAL at 20:35

## 2022-04-19 RX ADMIN — Medication 15 MILLIGRAM(S): at 08:23

## 2022-04-19 RX ADMIN — HYDROMORPHONE HYDROCHLORIDE 250 MILLILITER(S): 2 INJECTION INTRAMUSCULAR; INTRAVENOUS; SUBCUTANEOUS at 07:01

## 2022-04-19 RX ADMIN — OXYCODONE HYDROCHLORIDE 5 MILLIGRAM(S): 5 TABLET ORAL at 18:05

## 2022-04-19 RX ADMIN — HYDROMORPHONE HYDROCHLORIDE 0.5 MILLIGRAM(S): 2 INJECTION INTRAMUSCULAR; INTRAVENOUS; SUBCUTANEOUS at 12:50

## 2022-04-19 RX ADMIN — Medication 1 MILLIGRAM(S): at 17:51

## 2022-04-19 RX ADMIN — Medication 1 MILLIGRAM(S): at 23:12

## 2022-04-19 RX ADMIN — Medication 25 GRAM(S): at 06:18

## 2022-04-19 RX ADMIN — Medication 1000 MILLIGRAM(S): at 12:35

## 2022-04-19 RX ADMIN — KETAMINE HYDROCHLORIDE 7.61 MG/KG/HR: 100 INJECTION INTRAMUSCULAR; INTRAVENOUS at 08:05

## 2022-04-19 RX ADMIN — PANTOPRAZOLE SODIUM 40 MILLIGRAM(S): 20 TABLET, DELAYED RELEASE ORAL at 12:09

## 2022-04-19 RX ADMIN — HYDROMORPHONE HYDROCHLORIDE 0.5 MILLIGRAM(S): 2 INJECTION INTRAMUSCULAR; INTRAVENOUS; SUBCUTANEOUS at 21:55

## 2022-04-19 RX ADMIN — SODIUM CHLORIDE 50 MILLILITER(S): 9 INJECTION, SOLUTION INTRAVENOUS at 21:03

## 2022-04-19 NOTE — PROGRESS NOTE ADULT - SUBJECTIVE AND OBJECTIVE BOX
Anesthesia Pain Management Service: Day _5_ of Epidural    SUBJECTIVE: Patient tolerating PCEA. Patient perseverating on anxiety, states that pain is 5/10 and tolerable. Patient states that anxiety is making pain worse. Recommended suboxone and emphasized need for outpatient follow-up, but patient continuing to refuse suboxone/methadone, stating that he does not like the way they make him feel. Discussed options for pain control, will transition to oxycodone solution. Patient currently on ketamine infusion.    Pain Scale Score:   Refer to charted pain scores    THERAPY:  [x ] Epidural Bupivacaine 0.0625% and Hydromorphone  		[X ] 10 micrograms/mL	[ ] 5 micrograms/mL  [ ] Epidural Bupivacaine 0.0625% and Fentanyl - 2 micrograms/mL  [ ] Epidural Ropivacaine 0.1% plain – 1 mg/mL  [ ] Patient Controlled Regional Anesthesia (PCRA) Ropivacaine  		[ ] 0.2%			[ ] 0.1%    Demand dose __3_ lockout __15_ (minutes) Continuous Rate ___ Total: _334__ Daily      MEDICATIONS  (STANDING):  acetaminophen   IVPB .. 1000 milliGRAM(s) IV Intermittent once  dextrose 5% + lactated ringers. 1000 milliLiter(s) (50 mL/Hr) IV Continuous <Continuous>  heparin   Injectable 5000 Unit(s) SubCutaneous every 8 hours  ketamine Infusion. 1 mG/kG/Hr (7.61 mL/Hr) IV Continuous <Continuous>  ketorolac   Injectable 15 milliGRAM(s) IV Push every 6 hours  lidocaine   4% Patch 1 Patch Transdermal every 24 hours  pantoprazole  Injectable 40 milliGRAM(s) IV Push daily    MEDICATIONS  (PRN):  diphenhydrAMINE Injectable 25 milliGRAM(s) IV Push every 4 hours PRN Pruritus  HYDROmorphone  Injectable 0.5 milliGRAM(s) IV Push every 4 hours PRN Severe breakthrough Pain (7 - 10)  hydrOXYzine hydrochloride Syrup 25 milliGRAM(s) Oral every 6 hours PRN Anxiety  naloxone Injectable 0.1 milliGRAM(s) IV Push every 3 minutes PRN For ANY of the following changes in patient status:  A. RR LESS THAN 10 breaths per minute, B. Oxygen saturation LESS THAN 90%, C. Sedation score of 6  ondansetron Injectable 4 milliGRAM(s) IV Push every 6 hours PRN Nausea  oxyCODONE    Solution 5 milliGRAM(s) Enteral Tube every 4 hours PRN Moderate Pain (4 - 6)  oxyCODONE    Solution 10 milliGRAM(s) Enteral Tube every 4 hours PRN Severe Pain (7 - 10)  tetracaine/benzocaine/butamben Spray 1 Spray(s) Topical every 3 hours PRN pain      OBJECTIVE: Sitting in chair, chest tube in place.    Assessment of Catheter Site:	[ ] Left	[ ] Right  [x ] Epidural 	[ ] Femoral	      [ ] Saphenous   [ ] Supraclavicular   [ ] Other:    [x ] Dressing intact	[x ] Site non-tender	[ x] Site without erythema, discharge, edema  [x ] Epidural tubing and connection checked	[x] Gross neurological exam within normal limits  [X ] Catheter removed – tip intact		[ ] Afebrile	  [ ] Febrile: ___       [ X] see Temp under VS below)    PT/INR - ( 19 Apr 2022 04:33 )   PT: 11.5 sec;   INR: 0.99 ratio         PTT - ( 19 Apr 2022 04:33 )  PTT:29.4 sec                      12.0   5.01  )-----------( 93       ( 19 Apr 2022 04:33 )             35.4     Vital Signs Last 24 Hrs  T(C): 36.8 (04-19-22 @ 08:00), Max: 37 (04-18-22 @ 20:00)  T(F): 98.3 (04-19-22 @ 08:00), Max: 98.6 (04-18-22 @ 20:00)  HR: 63 (04-19-22 @ 11:00) (57 - 74)  BP: 128/78 (04-19-22 @ 11:00) (115/61 - 141/98)  BP(mean): 89 (04-19-22 @ 11:00) (74 - 107)  RR: 17 (04-19-22 @ 11:00) (10 - 20)  SpO2: 97% (04-19-22 @ 11:00) (92% - 99%)      Sedation Score:	[x ] Alert	[ ] Drowsy	[ ] Arousable	[ ] Asleep	[ ] Unresponsive    Side Effects:	[x ] None	[ ] Nausea	[ ] Vomiting	[ ] Pruritus  		[ ] Weakness		[ ] Numbness	[ ] Other:    ASSESSMENT/ PLAN:    Therapy to  be:	[ ] Continue   [ X] Discontinued   [ X] Change to prn Analgesics    Documentation and Verification of current medications:  [ X ] Done	[ ] Not done, not eligible, reason:    Comments: Changed to IV/oral opioid and/or non-opioid. Adjuvant analgesics to be used at this point. Recommended suboxone, but patient refusing. Recommend psych for management of patient anxiety/substance use. Patient currently on ketamine infusion; recommend d/c infusion. Infusion may be contributing to anxiety. Recommendations discussed with primary team.    Progress Note written now but Patient was seen earlier.

## 2022-04-19 NOTE — PROGRESS NOTE ADULT - SUBJECTIVE AND OBJECTIVE BOX
CHIEF COMPLAINT: FOLLOW UP IN ICU FOR POSTOPERATIVE CARE OF PATIENT WHO IS S/P ESOPHAGECTOMY       PROCEDURES:    - Robotic Assisted Tyler Mahesh esophagectomy, J tube placement.  15-Apr-2022        ISSUES:    Esophageal stricture s/p multiple dilations  Hiatal hernia  Postoperative pain  Chest tubes in place  Anxiety  GERD  Hx of Substance abuse (cocaine, heroin, IVDU), on suboxone         INTERVAL EVENTS:    Epidural removed. Ketamine weaned off. Pain meds adjusted by Pain Management consult.  Psych consulted for anxiety. Started on clonazepam and lorazepam.   Tube feeds increased           HISTORY:    Patient reports moderate pain at chest wall incision sites which is worse with coughing and deep breathing without associated fever or dyspnea. Pain is improved with use of pain meds.           PHYSICAL EXAM:      Gen: Comfortable    Eyes: Sclera white, Conjunctiva normal, Eyelids normal, Pupils symmetrical      ENT: Mucous membranes moist,     Neck: Trachea midline,  ,  ,  ,  ,  ,       CV: Rate regular, Rhythm regular,  ,  ,       Resp: Breath sounds clear, No accessory muscles use, R chest tubes in place,  ,       Abd: Soft, Non-distended, Non-tender, Bowel sounds normal,  jejunostomy tube in place      Skin: Warm, No peripheral edema of lower extremities,  ,       :  No baum    Neuro: Moving all 4 extremities,       Psych: A&Ox3               ASSESSMENT AND PLAN:           NEURO:     Post-operative Pain - Stable. Pain control with oxycodone, dilaudid IV, toradol PRN per Pain Management recommendations.  Anxiety - stable. Paroxetine, Clonazepam and lorazepam per Psych consult.    Hx of substance abuse - stable.           RESPIRATORY:   Hypoxia - Wean nasal cannula for goal O2sat above 92. Obtain CXR. Incentive spirometry. Chest PT and frequent suctioning. Continue bronchodilators. OOB to chair & ambulate w/ assistance. Continuous pulse oximetry for support & to prevent decompensation.     Chest tube – Pleurevac regulated suctioning. Monitor chest tube output.            CARDIOVASCULAR:     Hemodynamically stable - Not on pressors. Continue hemodynamic monitoring.     Telemetry (medical test) - Reviewed by me today independently. Normal sinus rhythm.             RENAL:     Stable - Monitor IOs and electrolytes. Keep K above 4.0 and Mg above 2.0. D5LR@100ml/h, monitor hourly urine output            GASTROINTESTINAL:     GI prophylaxis not indicated    Zofran and Reglan IV PRN for nausea     NPO until swallow study    J tube feeds     Flush J tube and NGT Q4h    GERD - stable. Continue IV pantoprazole.  Hiatal hernia - s/p esophagectomy. Stable.           HEMATOLOGIC:     No signs of active bleeding. Monitor Hgb in CBC in AM     DVT prophylaxis with heparin subQ and SCDs.           INFECTIOUS DISEASE:     All surgical sites appear clean. No signs of active infection. Will monitor for fever and leukocytosis.           ENDOCRINE:     Stable – Monitor glucose fingersticks for goal 120-180.             Pertinent clinical, laboratory, radiographic, hemodynamic, echocardiographic, respiratory data, microbiologic data and chart were reviewed by myself and analyzed frequently throughout the course of the day and night by myself.     Plan discussed at length with the CTICU staff and Attending CT Surgeon -   Dr. Zenia Shannon    Patient's status was discussed with patient at bedside.         _________________________  VITAL SIGNS:  Vital Signs Last 24 Hrs  T(C): 36.9 (19 Apr 2022 16:00), Max: 37 (18 Apr 2022 20:00)  T(F): 98.5 (19 Apr 2022 16:00), Max: 98.6 (18 Apr 2022 20:00)  HR: 61 (19 Apr 2022 19:00) (61 - 78)  BP: 127/62 (19 Apr 2022 19:00) (112/83 - 145/107)  BP(mean): 78 (19 Apr 2022 19:00) (74 - 116)  RR: 20 (19 Apr 2022 19:00) (14 - 22)  SpO2: 98% (19 Apr 2022 19:00) (92% - 100%)  I/Os:   I&O's Detail    18 Apr 2022 07:01  -  19 Apr 2022 07:00  --------------------------------------------------------  IN:    dextrose 5% + lactated ringers: 1250 mL    Enteral Tube Flush: 150 mL    Ketamine: 119.7 mL    TwoCal HN: 240 mL  Total IN: 1759.7 mL    OUT:    Chest Tube (mL): 0 mL    Chest Tube (mL): 120 mL    Indwelling Catheter - Urethral (mL): 500 mL    Voided (mL): 1150 mL  Total OUT: 1770 mL    Total NET: -10.3 mL      19 Apr 2022 07:01  -  19 Apr 2022 19:37  --------------------------------------------------------  IN:    dextrose 5% + lactated ringers: 550 mL    Enteral Tube Flush: 90 mL    IV PiggyBack: 200 mL    Ketamine: 62.7 mL    Lactated Ringers Bolus: 500 mL    TwoCal HN: 260 mL  Total IN: 1662.7 mL    OUT:    Chest Tube (mL): 30 mL    Voided (mL): 350 mL  Total OUT: 380 mL    Total NET: 1282.7 mL              MEDICATIONS:  MEDICATIONS  (STANDING):  acetaminophen   IVPB .. 1000 milliGRAM(s) IV Intermittent every 6 hours  acetaminophen   IVPB .. 1000 milliGRAM(s) IV Intermittent once  clonazePAM  Tablet 1 milliGRAM(s) Oral two times a day  dextrose 5% + lactated ringers. 1000 milliLiter(s) (50 mL/Hr) IV Continuous <Continuous>  heparin   Injectable 5000 Unit(s) SubCutaneous every 8 hours  ketorolac   Injectable 15 milliGRAM(s) IV Push every 6 hours  lidocaine   4% Patch 1 Patch Transdermal every 24 hours  pantoprazole  Injectable 40 milliGRAM(s) IV Push daily  PARoxetine 20 milliGRAM(s) Oral daily    MEDICATIONS  (PRN):  diphenhydrAMINE Injectable 25 milliGRAM(s) IV Push every 4 hours PRN Pruritus  HYDROmorphone  Injectable 0.5 milliGRAM(s) IV Push every 4 hours PRN Severe breakthrough Pain (7 - 10)  hydrOXYzine hydrochloride Syrup 25 milliGRAM(s) Oral every 6 hours PRN Anxiety  LORazepam   Injectable 1 milliGRAM(s) IV Push every 8 hours PRN Anxiety  naloxone Injectable 0.1 milliGRAM(s) IV Push every 3 minutes PRN For ANY of the following changes in patient status:  A. RR LESS THAN 10 breaths per minute, B. Oxygen saturation LESS THAN 90%, C. Sedation score of 6  ondansetron Injectable 4 milliGRAM(s) IV Push every 6 hours PRN Nausea  oxyCODONE    Solution 5 milliGRAM(s) Enteral Tube every 4 hours PRN Moderate Pain (4 - 6)  oxyCODONE    Solution 10 milliGRAM(s) Enteral Tube every 4 hours PRN Severe Pain (7 - 10)  tetracaine/benzocaine/butamben Spray 1 Spray(s) Topical every 3 hours PRN pain      LABS:  Laboratory data was independently reviewed by me today.                           12.0   5.01  )-----------( 93       ( 19 Apr 2022 04:33 )             35.4     04-19    137  |  101  |  14  ----------------------------<  92  3.9   |  25  |  0.69    Ca    9.2      19 Apr 2022 04:33  Phos  4.2     04-19  Mg     1.80     04-19        PT/INR - ( 19 Apr 2022 04:33 )   PT: 11.5 sec;   INR: 0.99 ratio         PTT - ( 19 Apr 2022 04:33 )  PTT:29.4 sec  ABG - ( 18 Apr 2022 03:56 )  pH, Arterial: 7.43  pH, Blood: x     /  pCO2: 44    /  pO2: 125   / HCO3: 29    / Base Excess: 4.2   /  SaO2: 98.9                  RADIOLOGY:   Radiology images were independently reviewed by me today. Reports were reviewed by me today.    Xray Chest 1 View- PORTABLE-Urgent:   ACC: [id number] EXAM:  XR CHEST PORTABLE URGENT 1V                          PROCEDURE DATE:  04/18/2022          INTERPRETATION:  CLINICAL INFORMATION: Status post removal of the right   chest tube. History of esophageal stricture status post Iver Mahesh   esophagectomy.    TECHNIQUE: Single frontal view of the chest.    COMPARISON: Chest radiograph 4/18/2022 5:49 AM    FINDINGS:  Interval removal of a right chest tube. Trace right pneumothorax.  A right chest tube remains in place.    Persistent right pericardiac opacity with suture lines representing   gastric pull-through with mild adjacent passive atelectasis.    Trace pneumoperitoneum.  The heart size is normal.  The visualized osseous structures are unremarkable.  Subcutaneous emphysema along the right lateral chest wall and neck.    IMPRESSION:    Interval removal of a right chest tube. Trace right pneumothorax.    --- End of Report ---          JU GALLARDO MD; Resident Radiologist  This document has been electronically signed.  PEARL CARBALLO DO; Attending Radiologist  This document has been electronically signed. Apr 18 2022 11:20AM (04-18-22 @ 11:06)  Xray Chest 1 View- PORTABLE-Routine:   ACC: [id number] EXAM:  XR CHEST PORTABLE ROUTINE 1V                          PROCEDURE DATE:  04/18/2022          INTERPRETATION:  Chest one view    HISTORY: Postop    COMPARISON STUDY: 4/17/2022    Frontal expiratory view of the chest shows the heart to be normal in   size. Right chest tubes are present. A small amount of free air is seen   under the right hemidiaphragm.    The lungs show mild right base atelectasis and there is no evidence of   pneumothorax nor definite pleural effusion.    IMPRESSION:  Small amount of free abdominal air. No pneumothorax.        Thank you for the courtesy of this referral.    --- End of Report ---            HAKAN LOAIZA MD; Attending Interventional Radiologist  This document has been electronically signed. Apr 18 2022  4:06PM (04-18-22 @ 06:07)  Xray Chest 1 View- PORTABLE-Routine:   ACC: [id number] EXAM:  XR CHEST PORTABLE ROUTINE 1V                          PROCEDURE DATE:  04/17/2022          INTERPRETATION:  TIME OF EXAM: April 17, 2022 at 5:24 AM    CLINICAL INFORMATION: Postop follow-up    TECHNIQUE:   Portable chest    INTERPRETATION:    Right-sided chest tubes and enteric tube are unchanged. Lungs remain   clear with gastric pull-through and normal heart size. No effusions or   pneumothorax.      COMPARISON:  April 16      IMPRESSION:  Status post Iver Mahesh esophagectomy.    --- End of Report ---            JADEN MENON MD; Attending Radiologist  This document has been electronically signed. Apr 17 2022 10:51AM (04-17-22 @ 05:43)

## 2022-04-19 NOTE — PROGRESS NOTE ADULT - SUBJECTIVE AND OBJECTIVE BOX
Christiano Hoyt MD  Interventional Cardiology / Endovascular Specialist  [place] Office : [address] [place] [address] [place]. [address]  Tel:   [place] Office : [address] [facility] [place] [address]  Tel: [phone]      Subjective/Overnight events: Patient lying in bed in CTICU  	  MEDICATIONS:  heparin   Injectable 5000 Unit(s) SubCutaneous every 8 hours      diphenhydrAMINE Injectable 25 milliGRAM(s) IV Push every 4 hours PRN    acetaminophen   IVPB .. 1000 milliGRAM(s) IV Intermittent every 6 hours  acetaminophen   IVPB .. 1000 milliGRAM(s) IV Intermittent once  HYDROmorphone  Injectable 0.5 milliGRAM(s) IV Push every 4 hours PRN  hydrOXYzine hydrochloride Syrup 25 milliGRAM(s) Oral every 6 hours PRN  ketamine Infusion. 1 mG/kG/Hr IV Continuous <Continuous>  ketorolac   Injectable 15 milliGRAM(s) IV Push every 6 hours  ondansetron Injectable 4 milliGRAM(s) IV Push every 6 hours PRN  oxyCODONE    Solution 5 milliGRAM(s) Enteral Tube every 4 hours PRN  oxyCODONE    Solution 10 milliGRAM(s) Enteral Tube every 4 hours PRN    pantoprazole  Injectable 40 milliGRAM(s) IV Push daily      dextrose 5% + lactated ringers. 1000 milliLiter(s) IV Continuous <Continuous>  lidocaine   4% Patch 1 Patch Transdermal every 24 hours  tetracaine/benzocaine/butamben Spray 1 Spray(s) Topical every 3 hours PRN      PAST MEDICAL/SURGICAL HISTORY  PAST MEDICAL & SURGICAL HISTORY:  Heroin abuse    Cocaine abuse    GERD (gastroesophageal reflux disease)    Hiatal hernia    History of esophageal stricture    Intravenous drug abuse    Migration of pancreatic stent    Migrated esophageal stent        SOCIAL HISTORY: Substance Use (street drugs): ( x ) never used  (  ) other:    FAMILY HISTORY:  FH: thyroid disease (Grandparent)          PHYSICAL EXAM:  T(C): 36.8 (04-19-22 @ 08:00), Max: 37 (04-18-22 @ 20:00)  HR: 62 (04-19-22 @ 14:15) (57 - 78)  BP: 137/83 (04-19-22 @ 14:15) (115/61 - 145/107)  RR: 22 (04-19-22 @ 14:15) (14 - 22)  SpO2: 99% (04-19-22 @ 14:15) (92% - 99%)  Wt(kg): --  I&O's Summary    18 Apr 2022 07:01  -  19 Apr 2022 07:00  --------------------------------------------------------  IN: 1759.7 mL / OUT: 1770 mL / NET: -10.3 mL    19 Apr 2022 07:01  -  19 Apr 2022 14:23  --------------------------------------------------------  IN: 388.5 mL / OUT: 30 mL / NET: 358.5 mL          GENERAL: NAD  EYES: EOMI, PERRLA, conjunctiva and sclera clear  ENMT: No tonsillar erythema, exudates, or enlargement  Cardiovascular: Normal S1 S2, No JVD, No murmurs, No edema  Respiratory: Lungs clear to auscultation	  Gastrointestinal:  s/p surgery 	  Extremities: No edema                                    12.0   5.01  )-----------( 93       ( 19 Apr 2022 04:33 )             35.4     04-19    137  |  101  |  14  ----------------------------<  92  3.9   |  25  |  0.69    Ca    9.2      19 Apr 2022 04:33  Phos  4.2     04-19  Mg     1.80     04-19      proBNP:   Lipid Profile:   HgA1c:   TSH:     Consultant(s) Notes Reviewed:  [x ] YES  [ ] NO    Care Discussed with Consultants/Other Providers [ x] YES  [ ] NO    Imaging Personally Reviewed independently:  [x] YES  [ ] NO    All labs, radiologic studies, vitals, orders and medications list reviewed. Patient is seen and examined at bedside. Case discussed with medical team.                 Christiano Hoyt MD  Interventional Cardiology / Endovascular Specialist  [place] Office : [address] [place] [address] [place]. [address]  Tel:   [place] Office : [address] [facility] [place] [address]  Tel: [phone]      Subjective/Overnight events: Patient lying in bed in CTICU c/o surgical site pain epidural d/kristine   	  MEDICATIONS:  heparin   Injectable 5000 Unit(s) SubCutaneous every 8 hours  diphenhydrAMINE Injectable 25 milliGRAM(s) IV Push every 4 hours PRN  acetaminophen   IVPB .. 1000 milliGRAM(s) IV Intermittent every 6 hours  acetaminophen   IVPB .. 1000 milliGRAM(s) IV Intermittent once  HYDROmorphone  Injectable 0.5 milliGRAM(s) IV Push every 4 hours PRN  hydrOXYzine hydrochloride Syrup 25 milliGRAM(s) Oral every 6 hours PRN  ketamine Infusion. 1 mG/kG/Hr IV Continuous <Continuous>  ketorolac   Injectable 15 milliGRAM(s) IV Push every 6 hours  ondansetron Injectable 4 milliGRAM(s) IV Push every 6 hours PRN  oxyCODONE    Solution 5 milliGRAM(s) Enteral Tube every 4 hours PRN  oxyCODONE    Solution 10 milliGRAM(s) Enteral Tube every 4 hours PRN  pantoprazole  Injectable 40 milliGRAM(s) IV Push daily  dextrose 5% + lactated ringers. 1000 milliLiter(s) IV Continuous <Continuous>  lidocaine   4% Patch 1 Patch Transdermal every 24 hours  tetracaine/benzocaine/butamben Spray 1 Spray(s) Topical every 3 hours PRN      PAST MEDICAL/SURGICAL HISTORY  PAST MEDICAL & SURGICAL HISTORY:  Heroin abuse    Cocaine abuse    GERD (gastroesophageal reflux disease)    Hiatal hernia    History of esophageal stricture    Intravenous drug abuse    Migration of pancreatic stent    Migrated esophageal stent        SOCIAL HISTORY: Substance Use (street drugs): ( x ) never used  (  ) other:    FAMILY HISTORY:  FH: thyroid disease (Grandparent)          PHYSICAL EXAM:  T(C): 36.8 (04-19-22 @ 08:00), Max: 37 (04-18-22 @ 20:00)  HR: 62 (04-19-22 @ 14:15) (57 - 78)  BP: 137/83 (04-19-22 @ 14:15) (115/61 - 145/107)  RR: 22 (04-19-22 @ 14:15) (14 - 22)  SpO2: 99% (04-19-22 @ 14:15) (92% - 99%)  Wt(kg): --  I&O's Summary    18 Apr 2022 07:01  -  19 Apr 2022 07:00  --------------------------------------------------------  IN: 1759.7 mL / OUT: 1770 mL / NET: -10.3 mL    19 Apr 2022 07:01  -  19 Apr 2022 14:23  --------------------------------------------------------  IN: 388.5 mL / OUT: 30 mL / NET: 358.5 mL          GENERAL: NAD  EYES: EOMI, PERRLA, conjunctiva and sclera clear  ENMT: No tonsillar erythema, exudates, or enlargement  Cardiovascular: Normal S1 S2, No JVD, No murmurs, No edema  Respiratory: Lungs clear to auscultation	  Gastrointestinal:  s/p surgery 	  Extremities: No edema                                    12.0   5.01  )-----------( 93       ( 19 Apr 2022 04:33 )             35.4     04-19    137  |  101  |  14  ----------------------------<  92  3.9   |  25  |  0.69    Ca    9.2      19 Apr 2022 04:33  Phos  4.2     04-19  Mg     1.80     04-19      proBNP:   Lipid Profile:   HgA1c:   TSH:     Consultant(s) Notes Reviewed:  [x ] YES  [ ] NO    Care Discussed with Consultants/Other Providers [ x] YES  [ ] NO    Imaging Personally Reviewed independently:  [x] YES  [ ] NO    All labs, radiologic studies, vitals, orders and medications list reviewed. Patient is seen and examined at bedside. Case discussed with medical team.

## 2022-04-19 NOTE — BH CONSULTATION LIAISON PROGRESS NOTE - NSBHASSESSMENTFT_PSY_ALL_CORE
25yo M w/ PMHx of esophageal stricture s/p multiple dilations and self reported past psychiatric history of Depression/Anxiety/PTSD and substance use disorder,  presented to the hospital with progressive inability to tolerate oral intake for the past 30 days, s/p esophagectomy and j-tube placement on 4/15/22. Today he was seen to evaluate his anxiety.   Upon evaluation today in the CTI, he seems adequately groomed, normal weight, normal posture, increased motor activity, cooperative/demanding with average eye contact. He is lying down on the stretcher. When this writer entered the room, he was crying. He seems being anxious/irritable, not being able to stay still, keeps moving his extremities. He requests to be started on Benzodiazepine to control his anxiety while being hospitalized. He reports his pain not getting better due to severe anxiety. He reports h/o using K2, Heroin, Cocaine which he quit several months ago. He also reports being on medical Marijuana, tincture (5mg THC and 5mg CBD BID) and a pill (5mg THC and 5mg CBD TID). He is happy that his Suboxone and Methadone were discontinued cause he does not want to be on them. He reports being started on Suboxone 3 months ago, just because of his father's recommendation, while he was not using Opioid. He stated that he wants something for his anxiety right now, otherwise he will leave the hospital. He reported that his mood is not depressed. Pt denies any h/o suicidal attempts. Currently he denies any perceptual disturbances including auditory/visual/tactile hallucinations, suicidal or homicidal ideation/intent/plan.    Plan:  - Continue with Hydroxyzine 25mg Q6hr PRN for Anxiety  - Referral to Psychiatry outpatient clinic and substance use disorder services upon discharge  - No indication for inpatient psych care or sitter.  - Will continue to follow PRN   25yo M w/ PMHx of esophageal stricture s/p multiple dilations and self reported past psychiatric history of Depression/Anxiety/PTSD and substance use disorder,  presented to the hospital with progressive inability to tolerate oral intake for the past 30 days, s/p esophagectomy and j-tube placement on 4/15/22. Today he was seen to evaluate his anxiety.   Upon evaluation today in the CTI, he seems adequately groomed, normal weight, normal posture, increased motor activity, cooperative/demanding with average eye contact. He is lying down on the stretcher. When this writer entered the room, he was crying. He seems being anxious/irritable, not being able to stay still, keeps moving his extremities. He requests to be started on Benzodiazepine to control his anxiety while being hospitalized. He reports his pain not getting better due to severe anxiety. He reports h/o using K2, Heroin, Cocaine which he quit several months ago. He also reports being on medical Marijuana, tincture (5mg THC and 5mg CBD BID) and a pill (5mg THC and 5mg CBD TID). He is happy that his Suboxone and Methadone were discontinued cause he does not want to be on them. He reports being started on Suboxone 3 months ago, just because of his father's recommendation, while he was not using Opioid. He stated that he wants something for his anxiety right now, otherwise he will leave the hospital. He reported that his mood is not depressed. Pt denies any h/o suicidal attempts. Currently he denies any perceptual disturbances including auditory/visual/tactile hallucinations, suicidal or homicidal ideation/intent/plan.  Pt is currently severely anxious and has multiple risk factors including the recent major surgery, h/o substance use disorder, recent discontinuation of Suboxone, extended h/o anxiety/depression.    Plan:  - Start the Pt on Klonopin 1mg BID (standing) and Ativan 1mg Q8hr PRN fr anxiety  - Start the Pt on Paxil 20mg daily as of today  - Continue with Hydroxyzine 25mg Q6hr PRN for Anxiety  - Referral to Psychiatry outpatient clinic and substance use disorder services upon discharge  - No indication for inpatient psych care or sitter.  - Will continue to follow PRN   27yo M w/ PMHx of esophageal stricture s/p multiple dilations and self reported past psychiatric history of Depression/Anxiety/PTSD and substance use disorder,  presented to the hospital with progressive inability to tolerate oral intake for the past 30 days, s/p esophagectomy and j-tube placement on 4/15/22. Today he was seen to evaluate his anxiety.   Upon evaluation today in the CTI, he seems adequately groomed, normal weight, normal posture, increased motor activity, cooperative/demanding with average eye contact. He is lying down on the stretcher. When this writer entered the room, he was crying. He seems being anxious/irritable, not being able to stay still, keeps moving his extremities. He requests to be started on Benzodiazepine to control his anxiety while being hospitalized. He reports his pain not getting better due to severe anxiety. He reports h/o using K2, Heroin, Cocaine which he quit several months ago. He also reports being on medical Marijuana, tincture (5mg THC and 5mg CBD BID) and a pill (5mg THC and 5mg CBD TID). He is happy that his Suboxone and Methadone were discontinued cause he does not want to be on them. He reports being started on Suboxone 3 months ago, just because of his father's recommendation, while he was not using Opioid. He stated that he wants something for his anxiety right now, otherwise he will leave the hospital. He reported that his mood is not depressed. Pt denies any h/o suicidal attempts. Currently he denies any perceptual disturbances including auditory/visual/tactile hallucinations, suicidal or homicidal ideation/intent/plan.  Pt is currently severely anxious and has multiple risk factors including the recent major surgery, h/o substance use disorder, recent discontinuation of Suboxone, extended h/o anxiety/depression.    Plan:  - Start Klonopin 1mg BID (standing) and Ativan 1mg Q8hr PRN fr anxiety  - Start Paxil 20mg daily as of today  - Continue with Hydroxyzine 25mg Q6hr PRN for Anxiety  - Referral to Psychiatry outpatient clinic and substance use disorder services upon discharge  - No indication for inpatient psych care or sitter.  - Will continue to follow PRN

## 2022-04-19 NOTE — PROGRESS NOTE ADULT - ASSESSMENT
27yo M w/ PMHx of esophageal stricture s/p multiple dilations and IVDU on Suboxone presenting with inability to tolerate oral intake for the past 30 days.     EKG: NSR No acute changes    1. Post-op assessment  -denies CP, SOB  -echo with grossly mild global LV systolic dysfunction (new compared to 2021) however patient denies CP or SOB on exertion.no plan for further cardiac w/u  -s/p esophagectomy.  f/u pain c/s   -care per [facility] team     2. Dysphagia  -hx of esophageal stricture s/p multiple dilations  -s/p esophagectomy and j-tube placement  -f/u GI and thoracic surgery  25yo M w/ PMHx of esophageal stricture s/p multiple dilations and IVDU on Suboxone presenting with inability to tolerate oral intake for the past 30 days.     EKG: NSR No acute changes    1. Post-op assessment  -denies CP, SOB  -echo with grossly mild global LV systolic dysfunction (new compared to 2021) however patient denies CP or SOB on exertion.no plan for further cardiac w/u  -s/p esophagectomy.  f/u pain c/s   -care per [facility] team     2. Dysphagia  -hx of esophageal stricture s/p multiple dilations  -s/p esophagectomy and j-tube placement  -f/u GI and thoracic surgery     3. DVT PPX  - heparin

## 2022-04-20 LAB
ANION GAP SERPL CALC-SCNC: 10 MMOL/L — SIGNIFICANT CHANGE UP (ref 7–14)
BUN SERPL-MCNC: 14 MG/DL — SIGNIFICANT CHANGE UP (ref 7–23)
CALCIUM SERPL-MCNC: 8.9 MG/DL — SIGNIFICANT CHANGE UP (ref 8.4–10.5)
CHLORIDE SERPL-SCNC: 104 MMOL/L — SIGNIFICANT CHANGE UP (ref 98–107)
CO2 SERPL-SCNC: 25 MMOL/L — SIGNIFICANT CHANGE UP (ref 22–31)
CREAT SERPL-MCNC: 0.62 MG/DL — SIGNIFICANT CHANGE UP (ref 0.5–1.3)
EGFR: 135 ML/MIN/1.73M2 — SIGNIFICANT CHANGE UP
GLUCOSE SERPL-MCNC: 98 MG/DL — SIGNIFICANT CHANGE UP (ref 70–99)
HCT VFR BLD CALC: 32.5 % — LOW (ref 39–50)
HGB BLD-MCNC: 10.9 G/DL — LOW (ref 13–17)
MAGNESIUM SERPL-MCNC: 1.9 MG/DL — SIGNIFICANT CHANGE UP (ref 1.6–2.6)
MCHC RBC-ENTMCNC: 28.5 PG — SIGNIFICANT CHANGE UP (ref 27–34)
MCHC RBC-ENTMCNC: 33.5 GM/DL — SIGNIFICANT CHANGE UP (ref 32–36)
MCV RBC AUTO: 84.9 FL — SIGNIFICANT CHANGE UP (ref 80–100)
NRBC # BLD: 0 /100 WBCS — SIGNIFICANT CHANGE UP
NRBC # FLD: 0 K/UL — SIGNIFICANT CHANGE UP
PHOSPHATE SERPL-MCNC: 4 MG/DL — SIGNIFICANT CHANGE UP (ref 2.5–4.5)
PLATELET # BLD AUTO: 93 K/UL — LOW (ref 150–400)
POTASSIUM SERPL-MCNC: 3.8 MMOL/L — SIGNIFICANT CHANGE UP (ref 3.5–5.3)
POTASSIUM SERPL-SCNC: 3.8 MMOL/L — SIGNIFICANT CHANGE UP (ref 3.5–5.3)
RBC # BLD: 3.83 M/UL — LOW (ref 4.2–5.8)
RBC # FLD: 14.5 % — SIGNIFICANT CHANGE UP (ref 10.3–14.5)
SODIUM SERPL-SCNC: 139 MMOL/L — SIGNIFICANT CHANGE UP (ref 135–145)
WBC # BLD: 3.88 K/UL — SIGNIFICANT CHANGE UP (ref 3.8–10.5)
WBC # FLD AUTO: 3.88 K/UL — SIGNIFICANT CHANGE UP (ref 3.8–10.5)

## 2022-04-20 PROCEDURE — 71045 X-RAY EXAM CHEST 1 VIEW: CPT | Mod: 26

## 2022-04-20 PROCEDURE — 99233 SBSQ HOSP IP/OBS HIGH 50: CPT

## 2022-04-20 RX ORDER — POTASSIUM CHLORIDE 20 MEQ
20 PACKET (EA) ORAL ONCE
Refills: 0 | Status: COMPLETED | OUTPATIENT
Start: 2022-04-20 | End: 2022-04-20

## 2022-04-20 RX ADMIN — HEPARIN SODIUM 5000 UNIT(S): 5000 INJECTION INTRAVENOUS; SUBCUTANEOUS at 06:04

## 2022-04-20 RX ADMIN — HEPARIN SODIUM 5000 UNIT(S): 5000 INJECTION INTRAVENOUS; SUBCUTANEOUS at 21:11

## 2022-04-20 RX ADMIN — Medication 1 MILLIGRAM(S): at 17:56

## 2022-04-20 RX ADMIN — HYDROMORPHONE HYDROCHLORIDE 0.5 MILLIGRAM(S): 2 INJECTION INTRAMUSCULAR; INTRAVENOUS; SUBCUTANEOUS at 04:30

## 2022-04-20 RX ADMIN — PANTOPRAZOLE SODIUM 40 MILLIGRAM(S): 20 TABLET, DELAYED RELEASE ORAL at 11:58

## 2022-04-20 RX ADMIN — Medication 1 MILLIGRAM(S): at 20:35

## 2022-04-20 RX ADMIN — Medication 15 MILLIGRAM(S): at 13:29

## 2022-04-20 RX ADMIN — Medication 1 MILLIGRAM(S): at 11:59

## 2022-04-20 RX ADMIN — Medication 20 MILLIGRAM(S): at 11:58

## 2022-04-20 RX ADMIN — HYDROMORPHONE HYDROCHLORIDE 0.5 MILLIGRAM(S): 2 INJECTION INTRAMUSCULAR; INTRAVENOUS; SUBCUTANEOUS at 06:25

## 2022-04-20 RX ADMIN — SODIUM CHLORIDE 50 MILLILITER(S): 9 INJECTION, SOLUTION INTRAVENOUS at 23:10

## 2022-04-20 RX ADMIN — LIDOCAINE 1 PATCH: 4 CREAM TOPICAL at 00:00

## 2022-04-20 RX ADMIN — Medication 1000 MILLIGRAM(S): at 06:52

## 2022-04-20 RX ADMIN — LIDOCAINE 1 PATCH: 4 CREAM TOPICAL at 20:16

## 2022-04-20 RX ADMIN — Medication 25 MILLIGRAM(S): at 23:39

## 2022-04-20 RX ADMIN — Medication 1 MILLIGRAM(S): at 06:06

## 2022-04-20 RX ADMIN — LIDOCAINE 1 PATCH: 4 CREAM TOPICAL at 20:51

## 2022-04-20 RX ADMIN — LIDOCAINE 1 PATCH: 4 CREAM TOPICAL at 08:32

## 2022-04-20 RX ADMIN — HEPARIN SODIUM 5000 UNIT(S): 5000 INJECTION INTRAVENOUS; SUBCUTANEOUS at 13:29

## 2022-04-20 RX ADMIN — HYDROMORPHONE HYDROCHLORIDE 30 MILLILITER(S): 2 INJECTION INTRAMUSCULAR; INTRAVENOUS; SUBCUTANEOUS at 07:46

## 2022-04-20 RX ADMIN — Medication 15 MILLIGRAM(S): at 08:32

## 2022-04-20 RX ADMIN — SODIUM CHLORIDE 50 MILLILITER(S): 9 INJECTION, SOLUTION INTRAVENOUS at 07:54

## 2022-04-20 RX ADMIN — HYDROMORPHONE HYDROCHLORIDE 0.5 MILLIGRAM(S): 2 INJECTION INTRAMUSCULAR; INTRAVENOUS; SUBCUTANEOUS at 01:46

## 2022-04-20 RX ADMIN — HYDROMORPHONE HYDROCHLORIDE 30 MILLILITER(S): 2 INJECTION INTRAMUSCULAR; INTRAVENOUS; SUBCUTANEOUS at 19:05

## 2022-04-20 RX ADMIN — Medication 15 MILLIGRAM(S): at 14:00

## 2022-04-20 RX ADMIN — Medication 400 MILLIGRAM(S): at 06:06

## 2022-04-20 RX ADMIN — Medication 15 MILLIGRAM(S): at 20:35

## 2022-04-20 RX ADMIN — HYDROMORPHONE HYDROCHLORIDE 0.5 MILLIGRAM(S): 2 INJECTION INTRAMUSCULAR; INTRAVENOUS; SUBCUTANEOUS at 16:31

## 2022-04-20 RX ADMIN — Medication 15 MILLIGRAM(S): at 09:00

## 2022-04-20 RX ADMIN — Medication 20 MILLIEQUIVALENT(S): at 06:52

## 2022-04-20 NOTE — BH CONSULTATION LIAISON PROGRESS NOTE - NSBHASSESSMENTFT_PSY_ALL_CORE
25yo M w/ PMHx of esophageal stricture s/p multiple dilations and self reported past psychiatric history of Depression/Anxiety/PTSD and substance use disorder,  presented to the hospital with progressive inability to tolerate oral intake for the past 30 days, s/p esophagectomy and j-tube placement on 4/15/22. Today he was seen to evaluate his anxiety.   Upon evaluation today in the CTI, he seems adequately groomed, normal weight, normal posture, normal motor activity, cooperative, with average eye contact. He is lying down on the bed. He reports his anxiety and his pain getting better. Pt reports good appetite and good sleep and denies any delusions, perceptual disturbances including auditory/visual/tactile hallucinations, suicidal or homicidal ideation/intent/plan.    Plan:  - C/W Klonopin 1mg BID (standing) and Ativan 1mg Q8hr PRN for anxiety  - C/W Paxil 20mg daily as of today  - C/W Hydroxyzine 25mg Q6hr PRN for Anxiety  - Referral to Psychiatry outpatient clinic and substance use disorder services upon discharge  - No indication for inpatient psych care or sitter.  - Will continue to follow PRN   27yo M w/ PMHx of esophageal stricture s/p multiple dilations and self reported past psychiatric history of Depression/Anxiety/PTSD and substance use disorder,  presented to the hospital with progressive inability to tolerate oral intake for the past 30 days, s/p esophagectomy and j-tube placement on 4/15/22. Today he was seen to evaluate his anxiety.   Upon evaluation today in the CTI, he seems adequately groomed, normal weight, normal posture, normal motor activity, cooperative, with average eye contact. He is lying down on the bed. He reports his anxiety and his pain getting better. Pt reports good appetite and good sleep and denies any delusions, perceptual disturbances including auditory/visual/tactile hallucinations, suicidal or homicidal ideation/intent/plan.    Plan:  - C/W Klonopin 1mg BID (standing) and Ativan 1mg Q8hr PRN for anxiety  - C/W Paxil 20mg daily  - C/W Hydroxyzine 25mg Q6hr PRN for Anxiety  - Referral to Psychiatry outpatient clinic and substance use disorder services upon discharge  - No indication for inpatient psych care or sitter.  - Will continue to follow PRN

## 2022-04-20 NOTE — PROGRESS NOTE ADULT - SUBJECTIVE AND OBJECTIVE BOX
ABNER MORA                     MRN-[id number]    HPI:  27yo M w/ PMHx of esophageal stricture s/p multiple dilations and IVDU on Suboxone presenting with inability to tolerate oral intake for the past 30 days.  Patient reports a few week history of progressively worsening inability to tolerate solids and now only tolerates oral liquid intake. He is only able to tolerate smoothies or liquids. He reports he has lost about 30 pounds in the last few months. He reports nausea,  chills, no fever, night sweats. He was recently started on Suboxone this past month, which was tapered down to 4mg 3 days ago. Patient follows with Dr. Gerry Serrato of GI. His last EGD was in December of 2021. Of note,  Patient was admitted in June of 2021 for esophageal stent migration with bacteremia (strep) and he required IV ABs. Otherwise, no fevers, chills, abdominal pain, no D/C/ or emesis.    (04 Apr 2022 13:44)    PROCEDURES:    - Robotic Assisted Tyler Mahesh esophagectomy, J tube placement.  15-Apr-2022        ISSUES:    Esophageal stricture s/p multiple dilations  Hiatal hernia  Postoperative pain  Chest tubes in place  Anxiety  GERD  Hx of Substance abuse (cocaine, heroin, IVDU)      PAST MEDICAL & SURGICAL HISTORY:  Heroin abuse    Cocaine abuse    GERD (gastroesophageal reflux disease)    Hiatal hernia    History of esophageal stricture    Intravenous drug abuse    Migration of pancreatic stent    Migrated esophageal stent              VITAL SIGNS:  Vital Signs Last 24 Hrs  T(C): 36.9 (20 Apr 2022 08:00), Max: 36.9 (19 Apr 2022 16:00)  T(F): 98.4 (20 Apr 2022 08:00), Max: 98.5 (19 Apr 2022 16:00)  HR: 71 (20 Apr 2022 11:00) (49 - 86)  BP: 115/60 (20 Apr 2022 11:00) (95/81 - 145/107)  BP(mean): 72 (20 Apr 2022 11:00) (70 - 116)  RR: 17 (20 Apr 2022 11:00) (13 - 22)  SpO2: 99% (20 Apr 2022 11:00) (95% - 100%)    I/Os:   I&O's Detail    19 Apr 2022 07:01  -  20 Apr 2022 07:00  --------------------------------------------------------  IN:    dextrose 5% + lactated ringers: 1150 mL    Enteral Tube Flush: 180 mL    IV PiggyBack: 400 mL    Ketamine: 57 mL    Ketamine: 40 mL    Lactated Ringers Bolus: 500 mL    TwoCal HN: 620 mL  Total IN: 2947 mL    OUT:    Chest Tube (mL): 110 mL    Voided (mL): 1350 mL  Total OUT: 1460 mL    Total NET: 1487 mL          CAPILLARY BLOOD GLUCOSE      POCT Blood Glucose.: 107 mg/dL (19 Apr 2022 23:16)      =======================MEDICATIONS===================  MEDICATIONS  (STANDING):  acetaminophen   IVPB .. 1000 milliGRAM(s) IV Intermittent once  clonazePAM  Tablet 1 milliGRAM(s) Oral two times a day  dextrose 5% + lactated ringers. 1000 milliLiter(s) (50 mL/Hr) IV Continuous <Continuous>  heparin   Injectable 5000 Unit(s) SubCutaneous every 8 hours  HYDROmorphone PCA (1 mG/mL) 30 milliLiter(s) PCA Continuous PCA Continuous  ketamine Infusion. 0.5 mG/kG/Hr (3.81 mL/Hr) IV Continuous <Continuous>  ketorolac   Injectable 15 milliGRAM(s) IV Push every 6 hours  pantoprazole  Injectable 40 milliGRAM(s) IV Push daily  PARoxetine 20 milliGRAM(s) Oral daily    MEDICATIONS  (PRN):  diphenhydrAMINE Injectable 25 milliGRAM(s) IV Push every 4 hours PRN Pruritus  HYDROmorphone PCA (1 mG/mL) Rescue Clinician Bolus 0.5 milliGRAM(s) IV Push every 15 minutes PRN for Pain Scale GREATER THAN 6  hydrOXYzine hydrochloride Syrup 25 milliGRAM(s) Oral every 6 hours PRN Anxiety  LORazepam   Injectable 1 milliGRAM(s) IV Push every 8 hours PRN Anxiety  nalbuphine Injectable 2.5 milliGRAM(s) IV Push every 6 hours PRN Pruritus  naloxone Injectable 0.1 milliGRAM(s) IV Push every 3 minutes PRN For ANY of the following changes in patient status:  A. RR LESS THAN 10 breaths per minute, B. Oxygen saturation LESS THAN 90%, C. Sedation score of 6  ondansetron Injectable 4 milliGRAM(s) IV Push every 6 hours PRN Nausea  tetracaine/benzocaine/butamben Spray 1 Spray(s) Topical every 3 hours PRN pain      PHYSICAL EXAM============================  General:                         Awake, alert, not in any distress  Neuro:                            Moving all extremities to commands.   Respiratory:	Air entry fair and  bilateral conducted sounds                                           Effort even and unlabored.  CV:		Regular rate and rhythm. Normal S1/S2                                          Distal pulses present.  Abdomen:	                     Soft, non-distended. Bowel sounds present   Skin:		No rash.  Extremities:	Warm, no cyanosis or edema.  Palpable pulses    ============================LABS=========================                        10.9   3.88  )-----------( 93       ( 20 Apr 2022 05:06 )             32.5     04-20    139  |  104  |  14  ----------------------------<  98  3.8   |  25  |  0.62    Ca    8.9      20 Apr 2022 05:06  Phos  4.0     04-20  Mg     1.90     04-20        PT/INR - ( 19 Apr 2022 04:33 )   PT: 11.5 sec;   INR: 0.99 ratio         PTT - ( 19 Apr 2022 04:33 )  PTT:29.4 sec          ASSESSMENT AND PLAN:    NEURO:     Post-operative Pain - Stable. Pain control with PCA,  dilaudid IV, toradol PRN per Pain Management recommendations.  Anxiety - stable. Paroxetine, Clonazepam and lorazepam per Psych consult.  Hx of substance abuse - stable.           RESPIRATORY:   Hypoxia - Wean nasal cannula for goal O2sat above 92. Obtain CXR. Incentive spirometry. Chest PT and frequent suctioning. Continue bronchodilators. OOB to chair & ambulate w/ assistance. Continuous pulse oximetry for support & to prevent decompensation.     Chest tube – Pleurevac regulated suctioning. Monitor chest tube output.       CARDIOVASCULAR:     Hemodynamically stable - Not on pressors. Continue hemodynamic monitoring.     Telemetry (medical test) - Reviewed by me today independently. Normal sinus rhythm.         RENAL:     Stable - Monitor IOs and electrolytes. Keep K above 4.0 and Mg above 2.0. D5LR@100ml/h, monitor hourly urine output       GASTROINTESTINAL:     GI prophylaxis not indicated    Zofran and Reglan IV PRN for nausea     NPO until swallow study, IVF    J tube feeds     Flush J tube and NGT Q4h    GERD - stable. Continue IV pantoprazole.  Hiatal hernia - s/p esophagectomy. Stable.           HEMATOLOGIC:     No signs of active bleeding. Monitor Hgb in CBC in AM     DVT prophylaxis with heparin subQ and SCDs.           INFECTIOUS DISEASE:   All surgical sites appear clean. No signs of active infection. Will monitor for fever and leukocytosis.         ENDOCRINE:     Stable – Monitor glucose fingersticks for goal 120-180.             Pertinent clinical, laboratory, radiographic, hemodynamic, echocardiographic, respiratory data, microbiologic data and chart were reviewed by myself and analyzed frequently throughout the course of the day and night by myself.     Plan discussed at length with the CTICU staff and Attending CT Surgeon -   Dr. Zenia Shannon    Patient's status was discussed with patient at bedside.       Rudy ZAPATAP

## 2022-04-20 NOTE — PROGRESS NOTE ADULT - SUBJECTIVE AND OBJECTIVE BOX
Anesthesia Pain Management Service    SUBJECTIVE: Patient is doing well with IV PCA and no significant problems reported. Patient states he was having a lot of pain last night around 20:00-21:00 and needed boluses which helped him. He reports he was finally able to sleep well. He also reports the Toradol, Tylenol and Klonopin are helping him in addition to the IV PCA. Patient states his anxiety is much better now.    Pain Scale Score	At rest: ___ 	With Activity: ___ 	[X ] Refer to charted pain scores    THERAPY:    [ ] IV PCA Morphine		[ ] 5 mg/mL	[ ] 1 mg/mL  [X ] IV PCA Hydromorphone	[ ] 5 mg/mL	[X ] 1 mg/mL  [ ] IV PCA Fentanyl		[ ] 50 micrograms/mL    Demand dose __0.2_ lockout __6_ (minutes) Continuous Rate _0__ Total: _10_  mg used (in past 24 hours)      MEDICATIONS  (STANDING):  acetaminophen   IVPB .. 1000 milliGRAM(s) IV Intermittent once  clonazePAM  Tablet 1 milliGRAM(s) Oral two times a day  dextrose 5% + lactated ringers. 1000 milliLiter(s) (50 mL/Hr) IV Continuous <Continuous>  heparin   Injectable 5000 Unit(s) SubCutaneous every 8 hours  HYDROmorphone PCA (1 mG/mL) 30 milliLiter(s) PCA Continuous PCA Continuous  ketamine Infusion. 0.5 mG/kG/Hr (3.81 mL/Hr) IV Continuous <Continuous>  ketorolac   Injectable 15 milliGRAM(s) IV Push every 6 hours  pantoprazole  Injectable 40 milliGRAM(s) IV Push daily  PARoxetine 20 milliGRAM(s) Oral daily    MEDICATIONS  (PRN):  diphenhydrAMINE Injectable 25 milliGRAM(s) IV Push every 4 hours PRN Pruritus  HYDROmorphone PCA (1 mG/mL) Rescue Clinician Bolus 0.5 milliGRAM(s) IV Push every 15 minutes PRN for Pain Scale GREATER THAN 6  hydrOXYzine hydrochloride Syrup 25 milliGRAM(s) Oral every 6 hours PRN Anxiety  LORazepam   Injectable 1 milliGRAM(s) IV Push every 8 hours PRN Anxiety  nalbuphine Injectable 2.5 milliGRAM(s) IV Push every 6 hours PRN Pruritus  naloxone Injectable 0.1 milliGRAM(s) IV Push every 3 minutes PRN For ANY of the following changes in patient status:  A. RR LESS THAN 10 breaths per minute, B. Oxygen saturation LESS THAN 90%, C. Sedation score of 6  ondansetron Injectable 4 milliGRAM(s) IV Push every 6 hours PRN Nausea  tetracaine/benzocaine/butamben Spray 1 Spray(s) Topical every 3 hours PRN pain      OBJECTIVE: Patient sitting up in chair, chest tube x1. Mother at bedside.     Sedation Score:	[ X] Alert	[ ] Drowsy 	[ ] Arousable	[ ] Asleep	[ ] Unresponsive    Side Effects:	[X ] None	[ ] Nausea	[ ] Vomiting	[ ] Pruritus  		[ ] Other:    Vital Signs Last 24 Hrs  T(C): 36.9 (20 Apr 2022 08:00), Max: 36.9 (19 Apr 2022 16:00)  T(F): 98.4 (20 Apr 2022 08:00), Max: 98.5 (19 Apr 2022 16:00)  HR: 71 (20 Apr 2022 11:00) (49 - 86)  BP: 115/60 (20 Apr 2022 11:00) (95/81 - 145/107)  BP(mean): 72 (20 Apr 2022 11:00) (70 - 116)  RR: 17 (20 Apr 2022 11:00) (13 - 22)  SpO2: 99% (20 Apr 2022 11:00) (95% - 100%)    ASSESSMENT/ PLAN    Therapy to  be:	[ X] Continue   [ ] Discontinued   [ ] Change to prn Analgesics    Documentation and Verification of current medications:   [X] Done	[ ] Not done, not elligible    Comments: Continue IV PCA. Recommend non-opioid adjuvant analgesics to be used when possible and when allowed by primary surgical team.    Progress Note written now but Patient was seen earlier.

## 2022-04-20 NOTE — PROGRESS NOTE ADULT - ASSESSMENT
25yo M w/ PMHx of esophageal stricture s/p multiple dilations and IVDU on Suboxone presenting with inability to tolerate oral intake for the past 30 days.     EKG: NSR No acute changes    1. Post-op assessment  -denies CP, SOB  -echo with grossly mild global LV systolic dysfunction (new compared to 2021) however patient denies CP or SOB on exertion.no plan for further cardiac w/u  -s/p esophagectomy.  off ketamine gtt. on PCA pump and IV Toradol with better pain control. f/u pain c/s   -care per [facility] team     2. Dysphagia  -hx of esophageal stricture s/p multiple dilations  -s/p esophagectomy and j-tube placement  -f/u GI and thoracic surgery     3. DVT PPX  - heparin

## 2022-04-20 NOTE — PROGRESS NOTE ADULT - SUBJECTIVE AND OBJECTIVE BOX
Christiano Hoyt MD  Interventional Cardiology / Endovascular Specialist  [place] Office : [phone] [place] [address] [place] [address]  Tel:   [place] Office : [phone] [facility] [place] [address]  Tel: [phone]      Subjective/Overnight events: Patient sitting up in chair comfortably. No acute distress. Denies chest pain, SOB or palpitations. pain better controlled  	  MEDICATIONS:  heparin   Injectable 5000 Unit(s) SubCutaneous every 8 hours      diphenhydrAMINE Injectable 25 milliGRAM(s) IV Push every 4 hours PRN    acetaminophen   IVPB .. 1000 milliGRAM(s) IV Intermittent once  clonazePAM  Tablet 1 milliGRAM(s) Oral two times a day  HYDROmorphone PCA (1 mG/mL) 30 milliLiter(s) PCA Continuous PCA Continuous  HYDROmorphone PCA (1 mG/mL) Rescue Clinician Bolus 0.5 milliGRAM(s) IV Push every 15 minutes PRN  hydrOXYzine hydrochloride Syrup 25 milliGRAM(s) Oral every 6 hours PRN  ketamine Infusion. 0.5 mG/kG/Hr IV Continuous <Continuous>  ketorolac   Injectable 15 milliGRAM(s) IV Push every 6 hours  LORazepam   Injectable 1 milliGRAM(s) IV Push every 8 hours PRN  nalbuphine Injectable 2.5 milliGRAM(s) IV Push every 6 hours PRN  ondansetron Injectable 4 milliGRAM(s) IV Push every 6 hours PRN  PARoxetine 20 milliGRAM(s) Oral daily    pantoprazole  Injectable 40 milliGRAM(s) IV Push daily      dextrose 5% + lactated ringers. 1000 milliLiter(s) IV Continuous <Continuous>  tetracaine/benzocaine/butamben Spray 1 Spray(s) Topical every 3 hours PRN      PAST MEDICAL/SURGICAL HISTORY  PAST MEDICAL & SURGICAL HISTORY:  Heroin abuse    Cocaine abuse    GERD (gastroesophageal reflux disease)    Hiatal hernia    History of esophageal stricture    Intravenous drug abuse    Migration of pancreatic stent    Migrated esophageal stent        SOCIAL HISTORY: Substance Use (street drugs): ( x ) never used  (  ) other:    FAMILY HISTORY:  FH: thyroid disease (Grandparent)            PHYSICAL EXAM:  T(C): 36.9 (04-20-22 @ 08:00), Max: 36.9 (04-19-22 @ 16:00)  HR: 86 (04-20-22 @ 10:00) (49 - 86)  BP: 95/81 (04-20-22 @ 09:00) (95/81 - 145/107)  RR: 18 (04-20-22 @ 10:00) (13 - 22)  SpO2: 98% (04-20-22 @ 10:00) (95% - 100%)  Wt(kg): --  I&O's Summary    19 Apr 2022 07:01  -  20 Apr 2022 07:00  --------------------------------------------------------  IN: 2947 mL / OUT: 1460 mL / NET: 1487 mL          GENERAL: NAD  EYES: EOMI, PERRLA, conjunctiva and sclera clear  ENMT: No tonsillar erythema, exudates, or enlargement  Cardiovascular: Normal S1 S2, No JVD, No murmurs, No edema  Respiratory: Lungs clear to auscultation	  Gastrointestinal:  s/p surgery 	  Extremities: No edema                                    10.9   3.88  )-----------( 93       ( 20 Apr 2022 05:06 )             32.5     04-20    139  |  104  |  14  ----------------------------<  98  3.8   |  25  |  0.62    Ca    8.9      20 Apr 2022 05:06  Phos  4.0     04-20  Mg     1.90     04-20      proBNP:   Lipid Profile:   HgA1c:   TSH:     Consultant(s) Notes Reviewed:  [x ] YES  [ ] NO    Care Discussed with Consultants/Other Providers [ x] YES  [ ] NO    Imaging Personally Reviewed independently:  [x] YES  [ ] NO    All labs, radiologic studies, vitals, orders and medications list reviewed. Patient is seen and examined at bedside. Case discussed with medical team.

## 2022-04-20 NOTE — PROGRESS NOTE ADULT - ATTENDING COMMENTS
Agree with the assessment and plan.
agree with assessment and plan.
26 year old male, well known to me, who has a reflux related stricture of the distal esophagus. Pt has been dilated numerous times in the past. S/P dilation yesterday.    Plan: Repeat dilation in 2 weeks.
Patient seen and examined with the GI fellow. I agree with the above assessment and plan. Thank you for allowing us to care for your patient.    Plan per surgical therapy.
26 year old male with reflux associated esophageal stricture.     Plan: Chest Surgery consult.
Esophageal stricture of unclear etiology s/p dilation 2 days ago. He has undergone multiple dilations and possible stent at an OSH (s/p removal). Path always shows benign etiology. Negative for EoE.     Symptoms improved post dilation. Pt requesting thoracic surgery consult.

## 2022-04-20 NOTE — PROGRESS NOTE ADULT - SUBJECTIVE AND OBJECTIVE BOX
Anesthesia Pain Management Service    SUBJECTIVE: Patient is doing well with IV PCA and no significant problems reported. Patient states he was having a lot of pain last night around 20:00-21:00 and needed boluses which helped him. He reports he was finally able to sleep well. He also reports the Toradol, Tylenol and Klonopin are helping him in addition to the IV PCA. Patient states his anxiety is much better now.    Pain Scale Score	At rest: ___ 	With Activity: ___ 	[X ] Refer to charted pain scores    THERAPY:    [ ] IV PCA Morphine		[ ] 5 mg/mL	[ ] 1 mg/mL  [X ] IV PCA Hydromorphone	[ ] 5 mg/mL	[X ] 1 mg/mL  [ ] IV PCA Fentanyl		[ ] 50 micrograms/mL    Demand dose __0.2_ lockout __6_ (minutes) Continuous Rate _0__ Total: _10_  mg used (in past 24 hours)      MEDICATIONS  (STANDING):  acetaminophen   IVPB .. 1000 milliGRAM(s) IV Intermittent once  clonazePAM  Tablet 1 milliGRAM(s) Oral two times a day  dextrose 5% + lactated ringers. 1000 milliLiter(s) (50 mL/Hr) IV Continuous <Continuous>  heparin   Injectable 5000 Unit(s) SubCutaneous every 8 hours  HYDROmorphone PCA (1 mG/mL) 30 milliLiter(s) PCA Continuous PCA Continuous  ketamine Infusion. 0.5 mG/kG/Hr (3.81 mL/Hr) IV Continuous <Continuous>  ketorolac   Injectable 15 milliGRAM(s) IV Push every 6 hours  pantoprazole  Injectable 40 milliGRAM(s) IV Push daily  PARoxetine 20 milliGRAM(s) Oral daily    MEDICATIONS  (PRN):  diphenhydrAMINE Injectable 25 milliGRAM(s) IV Push every 4 hours PRN Pruritus  HYDROmorphone PCA (1 mG/mL) Rescue Clinician Bolus 0.5 milliGRAM(s) IV Push every 15 minutes PRN for Pain Scale GREATER THAN 6  hydrOXYzine hydrochloride Syrup 25 milliGRAM(s) Oral every 6 hours PRN Anxiety  LORazepam   Injectable 1 milliGRAM(s) IV Push every 8 hours PRN Anxiety  nalbuphine Injectable 2.5 milliGRAM(s) IV Push every 6 hours PRN Pruritus  naloxone Injectable 0.1 milliGRAM(s) IV Push every 3 minutes PRN For ANY of the following changes in patient status:  A. RR LESS THAN 10 breaths per minute, B. Oxygen saturation LESS THAN 90%, C. Sedation score of 6  ondansetron Injectable 4 milliGRAM(s) IV Push every 6 hours PRN Nausea  tetracaine/benzocaine/butamben Spray 1 Spray(s) Topical every 3 hours PRN pain      OBJECTIVE: Patient sitting up in chair, chest tube x1. Mother at bedside.     Sedation Score:	[ X] Alert	[ ] Drowsy 	[ ] Arousable	[ ] Asleep	[ ] Unresponsive    Side Effects:	[X ] None	[ ] Nausea	[ ] Vomiting	[ ] Pruritus  		[ ] Other:    Vital Signs Last 24 Hrs  T(C): 36.9 (20 Apr 2022 08:00), Max: 36.9 (19 Apr 2022 16:00)  T(F): 98.4 (20 Apr 2022 08:00), Max: 98.5 (19 Apr 2022 16:00)  HR: 71 (20 Apr 2022 11:00) (49 - 86)  BP: 115/60 (20 Apr 2022 11:00) (95/81 - 145/107)  BP(mean): 72 (20 Apr 2022 11:00) (70 - 116)  RR: 17 (20 Apr 2022 11:00) (13 - 22)  SpO2: 99% (20 Apr 2022 11:00) (95% - 100%)    ASSESSMENT/ PLAN    Therapy to  be:	[ X] Continue   [ ] Discontinued   [ ] Change to prn Analgesics    Documentation and Verification of current medications:   [X] Done	[ ] Not done, not elligible    Comments: Continue IV PCA. Recommend non-opioid adjuvant analgesics to be used when possible and when allowed by primary surgical team.    Progress Note written now but Patient was seen earlier. Anesthesia Pain Management Service    SUBJECTIVE: Patient is doing well with IV PCA and no significant problems reported. Patient states he was having a lot of pain last night around 20:00-21:00 and needed boluses which helped him. He reports he was finally able to sleep well. He also reports the Toradol, Tylenol and Klonopin are helping him in addition to the IV PCA. Patient states his anxiety is much better now.    Pain Scale Score	At rest: ___ 	With Activity: ___ 	[X ] Refer to charted pain scores    THERAPY:    [ ] IV PCA Morphine		[ ] 5 mg/mL	[ ] 1 mg/mL  [X ] IV PCA Hydromorphone	[ ] 5 mg/mL	[X ] 1 mg/mL  [ ] IV PCA Fentanyl		[ ] 50 micrograms/mL    Demand dose __0.2_ lockout __6_ (minutes) Continuous Rate _0__ Total: _10_  mg used (in past 24 hours)      MEDICATIONS  (STANDING):  acetaminophen   IVPB .. 1000 milliGRAM(s) IV Intermittent once  clonazePAM  Tablet 1 milliGRAM(s) Oral two times a day  dextrose 5% + lactated ringers. 1000 milliLiter(s) (50 mL/Hr) IV Continuous <Continuous>  heparin   Injectable 5000 Unit(s) SubCutaneous every 8 hours  HYDROmorphone PCA (1 mG/mL) 30 milliLiter(s) PCA Continuous PCA Continuous  ketamine Infusion. 0.5 mG/kG/Hr (3.81 mL/Hr) IV Continuous <Continuous>  ketorolac   Injectable 15 milliGRAM(s) IV Push every 6 hours  pantoprazole  Injectable 40 milliGRAM(s) IV Push daily  PARoxetine 20 milliGRAM(s) Oral daily    MEDICATIONS  (PRN):  diphenhydrAMINE Injectable 25 milliGRAM(s) IV Push every 4 hours PRN Pruritus  HYDROmorphone PCA (1 mG/mL) Rescue Clinician Bolus 0.5 milliGRAM(s) IV Push every 15 minutes PRN for Pain Scale GREATER THAN 6  hydrOXYzine hydrochloride Syrup 25 milliGRAM(s) Oral every 6 hours PRN Anxiety  LORazepam   Injectable 1 milliGRAM(s) IV Push every 8 hours PRN Anxiety  nalbuphine Injectable 2.5 milliGRAM(s) IV Push every 6 hours PRN Pruritus  naloxone Injectable 0.1 milliGRAM(s) IV Push every 3 minutes PRN For ANY of the following changes in patient status:  A. RR LESS THAN 10 breaths per minute, B. Oxygen saturation LESS THAN 90%, C. Sedation score of 6  ondansetron Injectable 4 milliGRAM(s) IV Push every 6 hours PRN Nausea  tetracaine/benzocaine/butamben Spray 1 Spray(s) Topical every 3 hours PRN pain      OBJECTIVE: Patient sitting up in chair, chest tube x1. Mother at bedside.     Sedation Score:	[ X] Alert	[ ] Drowsy 	[ ] Arousable	[ ] Asleep	[ ] Unresponsive    Side Effects:	[X ] None	[ ] Nausea	[ ] Vomiting	[ ] Pruritus  		[ ] Other:    Vital Signs Last 24 Hrs  T(C): 36.9 (20 Apr 2022 08:00), Max: 36.9 (19 Apr 2022 16:00)  T(F): 98.4 (20 Apr 2022 08:00), Max: 98.5 (19 Apr 2022 16:00)  HR: 71 (20 Apr 2022 11:00) (49 - 86)  BP: 115/60 (20 Apr 2022 11:00) (95/81 - 145/107)  BP(mean): 72 (20 Apr 2022 11:00) (70 - 116)  RR: 17 (20 Apr 2022 11:00) (13 - 22)  SpO2: 99% (20 Apr 2022 11:00) (95% - 100%)    ASSESSMENT/ PLAN    Therapy to  be:	[ X] Continue   [ ] Discontinued   [ ] Change to prn Analgesics    Documentation and Verification of current medications:   [X] Done	[ ] Not done, not elligible    Comments: Recommended patient to be discontinued off Ketamine. Discussed patient with CTICU team, team to wean patient off Ketamine. Continue IV PCA. Recommend non-opioid adjuvant analgesics to be used when possible and when allowed by primary surgical team.    Progress Note written now but Patient was seen earlier.

## 2022-04-21 LAB
ANION GAP SERPL CALC-SCNC: 10 MMOL/L — SIGNIFICANT CHANGE UP (ref 7–14)
BUN SERPL-MCNC: 15 MG/DL — SIGNIFICANT CHANGE UP (ref 7–23)
CALCIUM SERPL-MCNC: 8.8 MG/DL — SIGNIFICANT CHANGE UP (ref 8.4–10.5)
CHLORIDE SERPL-SCNC: 104 MMOL/L — SIGNIFICANT CHANGE UP (ref 98–107)
CO2 SERPL-SCNC: 27 MMOL/L — SIGNIFICANT CHANGE UP (ref 22–31)
CREAT SERPL-MCNC: 0.63 MG/DL — SIGNIFICANT CHANGE UP (ref 0.5–1.3)
EGFR: 135 ML/MIN/1.73M2 — SIGNIFICANT CHANGE UP
GLUCOSE SERPL-MCNC: 93 MG/DL — SIGNIFICANT CHANGE UP (ref 70–99)
HCT VFR BLD CALC: 31.2 % — LOW (ref 39–50)
HGB BLD-MCNC: 10.4 G/DL — LOW (ref 13–17)
MAGNESIUM SERPL-MCNC: 1.8 MG/DL — SIGNIFICANT CHANGE UP (ref 1.6–2.6)
MCHC RBC-ENTMCNC: 28.6 PG — SIGNIFICANT CHANGE UP (ref 27–34)
MCHC RBC-ENTMCNC: 33.3 GM/DL — SIGNIFICANT CHANGE UP (ref 32–36)
MCV RBC AUTO: 85.7 FL — SIGNIFICANT CHANGE UP (ref 80–100)
NRBC # BLD: 0 /100 WBCS — SIGNIFICANT CHANGE UP
NRBC # FLD: 0 K/UL — SIGNIFICANT CHANGE UP
PHOSPHATE SERPL-MCNC: 4.3 MG/DL — SIGNIFICANT CHANGE UP (ref 2.5–4.5)
PLATELET # BLD AUTO: 104 K/UL — LOW (ref 150–400)
POTASSIUM SERPL-MCNC: 3.9 MMOL/L — SIGNIFICANT CHANGE UP (ref 3.5–5.3)
POTASSIUM SERPL-SCNC: 3.9 MMOL/L — SIGNIFICANT CHANGE UP (ref 3.5–5.3)
RBC # BLD: 3.64 M/UL — LOW (ref 4.2–5.8)
RBC # FLD: 14.8 % — HIGH (ref 10.3–14.5)
SODIUM SERPL-SCNC: 141 MMOL/L — SIGNIFICANT CHANGE UP (ref 135–145)
WBC # BLD: 3.53 K/UL — LOW (ref 3.8–10.5)
WBC # FLD AUTO: 3.53 K/UL — LOW (ref 3.8–10.5)

## 2022-04-21 PROCEDURE — 99233 SBSQ HOSP IP/OBS HIGH 50: CPT

## 2022-04-21 PROCEDURE — 74220 X-RAY XM ESOPHAGUS 1CNTRST: CPT | Mod: 26

## 2022-04-21 PROCEDURE — 71045 X-RAY EXAM CHEST 1 VIEW: CPT | Mod: 26

## 2022-04-21 RX ORDER — MAGNESIUM SULFATE 500 MG/ML
2 VIAL (ML) INJECTION ONCE
Refills: 0 | Status: COMPLETED | OUTPATIENT
Start: 2022-04-21 | End: 2022-04-21

## 2022-04-21 RX ORDER — HYDROMORPHONE HYDROCHLORIDE 2 MG/ML
0.3 INJECTION INTRAMUSCULAR; INTRAVENOUS; SUBCUTANEOUS
Refills: 0 | Status: DISCONTINUED | OUTPATIENT
Start: 2022-04-21 | End: 2022-04-24

## 2022-04-21 RX ORDER — ACETAMINOPHEN 500 MG
650 TABLET ORAL EVERY 6 HOURS
Refills: 0 | Status: COMPLETED | OUTPATIENT
Start: 2022-04-21 | End: 2022-04-23

## 2022-04-21 RX ORDER — HYDROMORPHONE HYDROCHLORIDE 2 MG/ML
0.5 INJECTION INTRAMUSCULAR; INTRAVENOUS; SUBCUTANEOUS
Refills: 0 | Status: DISCONTINUED | OUTPATIENT
Start: 2022-04-21 | End: 2022-04-24

## 2022-04-21 RX ORDER — HYDROMORPHONE HYDROCHLORIDE 2 MG/ML
1 INJECTION INTRAMUSCULAR; INTRAVENOUS; SUBCUTANEOUS EVERY 4 HOURS
Refills: 0 | Status: DISCONTINUED | OUTPATIENT
Start: 2022-04-21 | End: 2022-04-24

## 2022-04-21 RX ADMIN — HEPARIN SODIUM 5000 UNIT(S): 5000 INJECTION INTRAVENOUS; SUBCUTANEOUS at 05:03

## 2022-04-21 RX ADMIN — PANTOPRAZOLE SODIUM 40 MILLIGRAM(S): 20 TABLET, DELAYED RELEASE ORAL at 14:58

## 2022-04-21 RX ADMIN — Medication 1 MILLIGRAM(S): at 05:03

## 2022-04-21 RX ADMIN — Medication 15 MILLIGRAM(S): at 15:30

## 2022-04-21 RX ADMIN — Medication 25 MILLIGRAM(S): at 00:30

## 2022-04-21 RX ADMIN — Medication 15 MILLIGRAM(S): at 14:59

## 2022-04-21 RX ADMIN — HYDROMORPHONE HYDROCHLORIDE 1 MILLIGRAM(S): 2 INJECTION INTRAMUSCULAR; INTRAVENOUS; SUBCUTANEOUS at 19:45

## 2022-04-21 RX ADMIN — Medication 1 MILLIGRAM(S): at 17:36

## 2022-04-21 RX ADMIN — HYDROMORPHONE HYDROCHLORIDE 1 MILLIGRAM(S): 2 INJECTION INTRAMUSCULAR; INTRAVENOUS; SUBCUTANEOUS at 15:45

## 2022-04-21 RX ADMIN — Medication 15 MILLIGRAM(S): at 19:28

## 2022-04-21 RX ADMIN — HYDROMORPHONE HYDROCHLORIDE 0.5 MILLIGRAM(S): 2 INJECTION INTRAMUSCULAR; INTRAVENOUS; SUBCUTANEOUS at 05:30

## 2022-04-21 RX ADMIN — Medication 15 MILLIGRAM(S): at 01:03

## 2022-04-21 RX ADMIN — Medication 15 MILLIGRAM(S): at 08:01

## 2022-04-21 RX ADMIN — HYDROMORPHONE HYDROCHLORIDE 1 MILLIGRAM(S): 2 INJECTION INTRAMUSCULAR; INTRAVENOUS; SUBCUTANEOUS at 19:29

## 2022-04-21 RX ADMIN — Medication 25 MILLIGRAM(S): at 12:01

## 2022-04-21 RX ADMIN — HEPARIN SODIUM 5000 UNIT(S): 5000 INJECTION INTRAVENOUS; SUBCUTANEOUS at 14:58

## 2022-04-21 RX ADMIN — Medication 650 MILLIGRAM(S): at 12:30

## 2022-04-21 RX ADMIN — HYDROMORPHONE HYDROCHLORIDE 0.5 MILLIGRAM(S): 2 INJECTION INTRAMUSCULAR; INTRAVENOUS; SUBCUTANEOUS at 01:06

## 2022-04-21 RX ADMIN — Medication 25 GRAM(S): at 06:49

## 2022-04-21 RX ADMIN — HEPARIN SODIUM 5000 UNIT(S): 5000 INJECTION INTRAVENOUS; SUBCUTANEOUS at 22:53

## 2022-04-21 RX ADMIN — HYDROMORPHONE HYDROCHLORIDE 30 MILLILITER(S): 2 INJECTION INTRAMUSCULAR; INTRAVENOUS; SUBCUTANEOUS at 07:47

## 2022-04-21 RX ADMIN — Medication 25 MILLIGRAM(S): at 22:53

## 2022-04-21 RX ADMIN — HYDROMORPHONE HYDROCHLORIDE 1 MILLIGRAM(S): 2 INJECTION INTRAMUSCULAR; INTRAVENOUS; SUBCUTANEOUS at 15:24

## 2022-04-21 RX ADMIN — Medication 650 MILLIGRAM(S): at 12:00

## 2022-04-21 RX ADMIN — Medication 650 MILLIGRAM(S): at 23:02

## 2022-04-21 RX ADMIN — Medication 20 MILLIGRAM(S): at 12:01

## 2022-04-21 RX ADMIN — HYDROMORPHONE HYDROCHLORIDE 0.5 MILLIGRAM(S): 2 INJECTION INTRAMUSCULAR; INTRAVENOUS; SUBCUTANEOUS at 12:01

## 2022-04-21 RX ADMIN — Medication 650 MILLIGRAM(S): at 18:15

## 2022-04-21 RX ADMIN — Medication 15 MILLIGRAM(S): at 19:45

## 2022-04-21 RX ADMIN — HYDROMORPHONE HYDROCHLORIDE 0.5 MILLIGRAM(S): 2 INJECTION INTRAMUSCULAR; INTRAVENOUS; SUBCUTANEOUS at 12:15

## 2022-04-21 RX ADMIN — Medication 650 MILLIGRAM(S): at 17:36

## 2022-04-21 RX ADMIN — Medication 15 MILLIGRAM(S): at 08:15

## 2022-04-21 NOTE — PROGRESS NOTE ADULT - SUBJECTIVE AND OBJECTIVE BOX
Anesthesia Pain Management Service    SUBJECTIVE: Patient is doing well with IV PCA and no significant problems reported.     Pain Scale Score	At rest: 3/10___ 	With Activity: ___ 	[X ] Refer to charted pain scores    THERAPY:    [ ] IV PCA Morphine		[ ] 5 mg/mL	[ ] 1 mg/mL  [X ] IV PCA Hydromorphone	[ ] 5 mg/mL	[X ] 1 mg/mL  [ ] IV PCA Fentanyl		[ ] 50 micrograms/mL    Demand dose __0.2_ lockout __6_ (minutes) Continuous Rate _0__ Total: _15__   mg used (in past 24 hrs)      MEDICATIONS  (STANDING):  acetaminophen   IVPB .. 1000 milliGRAM(s) IV Intermittent once  clonazePAM  Tablet 1 milliGRAM(s) Oral two times a day  dextrose 5% + lactated ringers. 1000 milliLiter(s) (50 mL/Hr) IV Continuous <Continuous>  heparin   Injectable 5000 Unit(s) SubCutaneous every 8 hours  HYDROmorphone PCA (1 mG/mL) 30 milliLiter(s) PCA Continuous PCA Continuous  ketorolac   Injectable 15 milliGRAM(s) IV Push every 6 hours  pantoprazole  Injectable 40 milliGRAM(s) IV Push daily  PARoxetine 20 milliGRAM(s) Oral daily    MEDICATIONS  (PRN):  diphenhydrAMINE Injectable 25 milliGRAM(s) IV Push every 4 hours PRN Pruritus  HYDROmorphone PCA (1 mG/mL) Rescue Clinician Bolus 0.5 milliGRAM(s) IV Push every 15 minutes PRN for Pain Scale GREATER THAN 6  hydrOXYzine hydrochloride Syrup 25 milliGRAM(s) Oral every 6 hours PRN Anxiety  LORazepam   Injectable 1 milliGRAM(s) IV Push every 8 hours PRN Anxiety  nalbuphine Injectable 2.5 milliGRAM(s) IV Push every 6 hours PRN Pruritus  naloxone Injectable 0.1 milliGRAM(s) IV Push every 3 minutes PRN For ANY of the following changes in patient status:  A. RR LESS THAN 10 breaths per minute, B. Oxygen saturation LESS THAN 90%, C. Sedation score of 6  ondansetron Injectable 4 milliGRAM(s) IV Push every 6 hours PRN Nausea  tetracaine/benzocaine/butamben Spray 1 Spray(s) Topical every 3 hours PRN pain      OBJECTIVE: Patient standing in room, chest tube x1. Mother present.    Sedation Score:	[ X] Alert	[ ] Drowsy 	[ ] Arousable	[ ] Asleep	[ ] Unresponsive    Side Effects:	[X ] None	[ ] Nausea	[ ] Vomiting	[ ] Pruritus  		[ ] Other:    Vital Signs Last 24 Hrs  T(C): 36.4 (21 Apr 2022 04:00), Max: 36.8 (20 Apr 2022 16:00)  T(F): 97.6 (21 Apr 2022 04:00), Max: 98.2 (20 Apr 2022 16:00)  HR: 107 (21 Apr 2022 08:00) (51 - 107)  BP: 122/78 (21 Apr 2022 08:00) (106/63 - 154/81)  BP(mean): 86 (21 Apr 2022 08:00) (68 - 108)  RR: 20 (21 Apr 2022 08:00) (12 - 20)  SpO2: 99% (21 Apr 2022 08:00) (97% - 100%)    ASSESSMENT/ PLAN    Therapy to  be:	[ ] Continue   [ X] Discontinued   [X ] Change to prn Analgesics    Documentation and Verification of current medications:   [X] Done	[ ] Not done, not elligible    Comments: Discussed patient with CTICU team, IV PCA discontinued. Discussed with patient plan to change pain regimen to IVP Dilaudid at this time and then transition to orals tomorrow. PRN Oral/IV opioids and/or Adjuvant non-opioid medication to be ordered at this point.    Progress Note written now but Patient was seen earlier.

## 2022-04-21 NOTE — PROGRESS NOTE ADULT - SUBJECTIVE AND OBJECTIVE BOX
CHIEF COMPLAINT: FOLLOW UP IN ICU FOR POSTOPERATIVE CARE OF PATIENT WHO IS S/P ESOPHAGECTOMY       PROCEDURES:    - Robotic Assisted [place] [facility]hesh esophagectomy, J tube placement.  15-Apr-2022        ISSUES:    Esophageal stricture s/p multiple dilations  Hiatal hernia  Postoperative pain  Chest tubes in place  Anxiety  GERD  Hx of Substance abuse (cocaine, heroin, IVDU), on suboxone         INTERVAL EVENTS:    Swallow study without leak.           HISTORY:    Patient reports moderate pain at chest wall incision sites which is worse with coughing and deep breathing without associated fever or dyspnea. Pain is improved with use of pain meds.           PHYSICAL EXAM:      Gen: Comfortable    Eyes: Sclera white, Conjunctiva normal, Eyelids normal, Pupils symmetrical      ENT: Mucous membranes moist,     Neck: Trachea midline,  ,  ,  ,  ,  ,       CV: Rate regular, Rhythm regular,  ,  ,       Resp: Breath sounds clear, No accessory muscles use, R chest tubes in place,  ,       Abd: Soft, Non-distended, Non-tender, Bowel sounds normal,  jejunostomy tube in place      Skin: Warm, No peripheral edema of lower extremities,  ,       :  No baum    Neuro: Moving all 4 extremities,       Psych: A&Ox3               ASSESSMENT AND PLAN:           NEURO:     Post-operative Pain - Stable. Pain control with dilaudid IV, toradol PRN per Pain Management recommendations.  Anxiety - stable. Paroxetine, Clonazepam and lorazepam per Psych consult.    Hx of substance abuse - stable.           RESPIRATORY:   Hypoxia - Wean nasal cannula for goal O2sat above 92. Obtain CXR. Incentive spirometry. Chest PT and frequent suctioning. Continue bronchodilators. OOB to chair & ambulate w/ assistance. Continuous pulse oximetry for support & to prevent decompensation.     Chest tube – Pleurevac regulated suctioning. Monitor chest tube output.            CARDIOVASCULAR:     Hemodynamically stable - Not on pressors. Continue hemodynamic monitoring.     Telemetry (medical test) - Reviewed by me today independently. Normal sinus rhythm.             RENAL:     Stable - Monitor IOs and electrolytes. Keep K above 4.0 and Mg above 2.0.             GASTROINTESTINAL:     GI prophylaxis not indicated    Zofran and Reglan IV PRN for nausea     J tube feeds     GERD - stable. Continue pantoprazole.  Hiatal hernia - s/p esophagectomy. Stable.           HEMATOLOGIC:     No signs of active bleeding. Monitor Hgb in CBC in AM     DVT prophylaxis with heparin subQ and SCDs.           INFECTIOUS DISEASE:     All surgical sites appear clean. No signs of active infection. Will monitor for fever and leukocytosis.           ENDOCRINE:     Stable – Monitor glucose fingersticks for goal 120-180.             Pertinent clinical, laboratory, radiographic, hemodynamic, echocardiographic, respiratory data, microbiologic data and chart were reviewed by myself and analyzed frequently throughout the course of the day and night by myself.     Plan discussed at length with the CTICU staff and Attending CT Surgeon -   Dr. Zenia Shannon    Patient's status was discussed with patient at bedside.       _________________________  VITAL SIGNS:  Vital Signs Last 24 Hrs  T(C): 36.8 (21 Apr 2022 12:00), Max: 36.8 (20 Apr 2022 16:00)  T(F): 98.2 (21 Apr 2022 12:00), Max: 98.2 (20 Apr 2022 16:00)  HR: 83 (21 Apr 2022 12:00) (51 - 107)  BP: 96/80 (21 Apr 2022 12:00) (96/80 - 154/81)  BP(mean): 83 (21 Apr 2022 12:00) (68 - 108)  RR: 16 (21 Apr 2022 12:00) (12 - 21)  SpO2: 99% (21 Apr 2022 12:00) (98% - 100%)  I/Os:   I&O's Detail    20 Apr 2022 07:01  -  21 Apr 2022 07:00  --------------------------------------------------------  IN:    dextrose 5% + lactated ringers: 1200 mL    Enteral Tube Flush: 120 mL    TwoCal HN: 996 mL  Total IN: 2316 mL    OUT:    Chest Tube (mL): 300 mL    Voided (mL): 1150 mL  Total OUT: 1450 mL    Total NET: 866 mL      21 Apr 2022 07:01  -  21 Apr 2022 13:28  --------------------------------------------------------  IN:    dextrose 5% + lactated ringers: 150 mL    Enteral Tube Flush: 60 mL    TwoCal HN: 138 mL  Total IN: 348 mL    OUT:    Chest Tube (mL): 0 mL    Voided (mL): 300 mL  Total OUT: 300 mL    Total NET: 48 mL              MEDICATIONS:  MEDICATIONS  (STANDING):  acetaminophen    Suspension .. 650 milliGRAM(s) Enteral Tube every 6 hours  acetaminophen   IVPB .. 1000 milliGRAM(s) IV Intermittent once  clonazePAM  Tablet 1 milliGRAM(s) Oral two times a day  heparin   Injectable 5000 Unit(s) SubCutaneous every 8 hours  ketorolac   Injectable 15 milliGRAM(s) IV Push every 6 hours  pantoprazole  Injectable 40 milliGRAM(s) IV Push daily  PARoxetine 20 milliGRAM(s) Oral daily    MEDICATIONS  (PRN):  diphenhydrAMINE Injectable 25 milliGRAM(s) IV Push every 4 hours PRN Pruritus  HYDROmorphone  Injectable 0.5 milliGRAM(s) IV Push every 3 hours PRN Moderate Pain (4 - 6)  HYDROmorphone  Injectable 1 milliGRAM(s) IV Push every 4 hours PRN Severe Pain (7 - 10)  HYDROmorphone  Injectable 0.3 milliGRAM(s) IV Push every 3 hours PRN Severe breakthrough Pain (7 - 10)  hydrOXYzine hydrochloride Syrup 25 milliGRAM(s) Oral every 6 hours PRN Anxiety  LORazepam   Injectable 1 milliGRAM(s) IV Push every 8 hours PRN Anxiety  nalbuphine Injectable 2.5 milliGRAM(s) IV Push every 6 hours PRN Pruritus  naloxone Injectable 0.1 milliGRAM(s) IV Push every 3 minutes PRN For ANY of the following changes in patient status:  A. RR LESS THAN 10 breaths per minute, B. Oxygen saturation LESS THAN 90%, C. Sedation score of 6  ondansetron Injectable 4 milliGRAM(s) IV Push every 6 hours PRN Nausea  tetracaine/benzocaine/butamben Spray 1 Spray(s) Topical every 3 hours PRN pain      LABS:  Laboratory data was independently reviewed by me today.                           10.4   3.53  )-----------( 104      ( 21 Apr 2022 05:56 )             31.2     04-21    141  |  104  |  15  ----------------------------<  93  3.9   |  27  |  0.63    Ca    8.8      21 Apr 2022 05:56  Phos  4.3     04-21  Mg     1.80     04-21                RADIOLOGY:   Radiology images were independently reviewed by me today. Reports were reviewed by me today.    Xray Chest 1 View- PORTABLE-Routine:   ACC: [id number] EXAM:  XR CHEST PORTABLE ROUTINE 1V                          PROCEDURE DATE:  04/20/2022          INTERPRETATION:  Chest one view    HISTORY: Postop esophagectomy    COMPARISON STUDY: 4/19/2022    Frontal expiratory view of the chest shows the heart to be normal in   size. Right chest tube remains present. Right paracardiac density is   compatible with intrathoracic stomach.    The lungs are clear and there is no evidence of pneumothorax nor pleural   effusion.    IMPRESSION:  No active pulmonary disease.        Thank you for the courtesy of this referral.    --- End of Report ---            HAKAN LOAIZA MD; Attending Interventional Radiologist  This document has been electronically signed. Apr 20 2022  3:44PM (04-20-22 @ 07:09)  Xray Chest 1 View- PORTABLE-Routine:   ACC: [id number] EXAM:  XR CHEST PORTABLE ROUTINE 1V                          PROCEDURE DATE:  04/19/2022          INTERPRETATION:  Chest one view    HISTORY: Postop    COMPARISON STUDY: 4/18/2022    Frontal expiratory view of the chest shows the heart to be normal in   size. Right chest tube and thin radiopaque wire over the chest are again   noted.    The lungs are clear and there is no evidence of pneumothorax nor pleural   effusion.    IMPRESSION:  No interval change.        Thank you for the courtesy of this referral.    --- End of Report ---            HAKAN LOAIZA MD; Attending Interventional Radiologist  This document has been electronically signed. Apr 20 2022 12:24PM (04-19-22 @ 07:20)  Xray Chest 1 View- PORTABLE-Urgent:   ACC: [id number] EXAM:  XR CHEST PORTABLE URGENT 1V                          PROCEDURE DATE:  04/18/2022          INTERPRETATION:  CLINICAL INFORMATION: Status post removal of the right   chest tube. History of esophageal stricture status post [place] [facility]   esophagectomy.    TECHNIQUE: Single frontal view of the chest.    COMPARISON: Chest radiograph 4/18/2022 5:49 AM    FINDINGS:  Interval removal of a right chest tube. Trace right pneumothorax.  A right chest tube remains in place.    Persistent right pericardiac opacity with suture lines representing   gastric pull-through with mild adjacent passive atelectasis.    Trace pneumoperitoneum.  The heart size is normal.  The visualized osseous structures are unremarkable.  Subcutaneous emphysema along the right lateral chest wall and neck.    IMPRESSION:    Interval removal of a right chest tube. Trace right pneumothorax.    --- End of Report ---          JU GALLARDO MD; Resident Radiologist  This document has been electronically signed.  PEARL CARBALLO DO; Attending Radiologist  This document has been electronically signed. Apr 18 2022 11:20AM (04-18-22 @ 11:06)

## 2022-04-21 NOTE — BH CONSULTATION LIAISON PROGRESS NOTE - NSCDBILL_PSY_A_CORE
Date of Service: 05/22/2017    HISTORY:  This is an 11-year-old, young man who fell off the monkey bars on 05/18/2017.  Was seen in walk-in.  Radiographs showed a nondisplaced left distal radius/ulnar shaft fractures.  He was placed in a sugar tong splint and referred to orthopedics for further evaluation and treatment.  He is very comfortable in the splint.  Avoiding use of his left upper extremity helps modify the quality and duration of any minimal pain.  There are no other associated signs or symptoms.    REVIEW OF SYSTEMS:  Does not volunteer fever, chills, constitutional symptoms.    PHYSICAL EXAMINATION:  Well developed, well-nourished, 11-year-old, young man in no distress. He is alert, oriented, and appropriate to exam.    He is here with his mom. He is moving his fingers well.  He has good capillary refill.  Sensory intact, at least to light touch.  Sugar tong splint intact.    RADIOGRAPHS:  Deferred but reviewed.    IMPRESSION:  Nondisplaced left distal radius/ulnar shaft fractures.    PLAN:  Alignment is good position with no displacement.  I think he will do very well with conservative measures.  We are going to keep him in a sugar tong splint and have him come back in 8-9 days, at which time we will remove the splint and get new radiographs, and then perhaps go into a cast.  Joseluis's mom knows to get him back to the emergency room immediately if there are any problems with this left upper extremity.      Dictated By: Shabbir Vargas MD  Signing Provider: MD ISHA Roberson/klarissa ([id number])  DD: 05/22/2017 08:27:25 TD: 05/23/2017 07:14:49    Copy Sent To:   
This office note has been dictated.  [id number]  
[id number] - Inpatient, high complexity
[id number] - Inpatient, high complexity
[id number] - Inpatient, high complexity

## 2022-04-21 NOTE — PROGRESS NOTE ADULT - SUBJECTIVE AND OBJECTIVE BOX
Anesthesia Pain Management Service- Attending Addendum    SUBJECTIVE: Patient's pain control adequate    Therapy:	  [ X] IV PCA	   [ ] Epidural           [ ] s/p Spinal Opoid              [ ] Postpartum infusion	  [ ] Patient controlled regional anesthesia (PCRA)    [ ] prn Analgesics    Allergies    No Known Allergies    Intolerances      MEDICATIONS  (STANDING):  acetaminophen    Suspension .. 650 milliGRAM(s) Enteral Tube every 6 hours  acetaminophen   IVPB .. 1000 milliGRAM(s) IV Intermittent once  clonazePAM  Tablet 1 milliGRAM(s) Oral two times a day  heparin   Injectable 5000 Unit(s) SubCutaneous every 8 hours  ketorolac   Injectable 15 milliGRAM(s) IV Push every 6 hours  pantoprazole  Injectable 40 milliGRAM(s) IV Push daily  PARoxetine 20 milliGRAM(s) Oral daily    MEDICATIONS  (PRN):  diphenhydrAMINE Injectable 25 milliGRAM(s) IV Push every 4 hours PRN Pruritus  HYDROmorphone  Injectable 0.5 milliGRAM(s) IV Push every 3 hours PRN Moderate Pain (4 - 6)  HYDROmorphone  Injectable 1 milliGRAM(s) IV Push every 4 hours PRN Severe Pain (7 - 10)  HYDROmorphone  Injectable 0.3 milliGRAM(s) IV Push every 3 hours PRN Severe breakthrough Pain (7 - 10)  hydrOXYzine hydrochloride Syrup 25 milliGRAM(s) Oral every 6 hours PRN Anxiety  LORazepam   Injectable 1 milliGRAM(s) IV Push every 8 hours PRN Anxiety  nalbuphine Injectable 2.5 milliGRAM(s) IV Push every 6 hours PRN Pruritus  naloxone Injectable 0.1 milliGRAM(s) IV Push every 3 minutes PRN For ANY of the following changes in patient status:  A. RR LESS THAN 10 breaths per minute, B. Oxygen saturation LESS THAN 90%, C. Sedation score of 6  ondansetron Injectable 4 milliGRAM(s) IV Push every 6 hours PRN Nausea  tetracaine/benzocaine/butamben Spray 1 Spray(s) Topical every 3 hours PRN pain      OBJECTIVE:   [X] No new signs     [ ] Other:    Side Effects:  [X ] None			[ ] Other:      ASSESSMENT/PLAN  -Discontinue current therapy    [ ] Therapy changed to:    [ ] IV PCA       [ ] Epidural     [ X] prn Analgesics     Comments: Pain management per primary team, APS to sign off

## 2022-04-21 NOTE — PROGRESS NOTE ADULT - SUBJECTIVE AND OBJECTIVE BOX
Christiano Hoyt MD  Interventional Cardiology / Endovascular Specialist  [place] Office : [address] [place] [address] [place] [address]  Tel:   [place] Office : [address] [facility] [place] [address]  Tel: [phone]    Subjective/Overnight events: Patient sitting up in chair.Denies chest pain, SOB or palpitations.   	  MEDICATIONS:  heparin   Injectable 5000 Unit(s) SubCutaneous every 8 hours      diphenhydrAMINE Injectable 25 milliGRAM(s) IV Push every 4 hours PRN    acetaminophen    Suspension .. 650 milliGRAM(s) Enteral Tube every 6 hours  acetaminophen   IVPB .. 1000 milliGRAM(s) IV Intermittent once  clonazePAM  Tablet 1 milliGRAM(s) Oral two times a day  HYDROmorphone  Injectable 0.5 milliGRAM(s) IV Push every 3 hours PRN  HYDROmorphone  Injectable 1 milliGRAM(s) IV Push every 4 hours PRN  HYDROmorphone  Injectable 0.3 milliGRAM(s) IV Push every 3 hours PRN  hydrOXYzine hydrochloride Syrup 25 milliGRAM(s) Oral every 6 hours PRN  ketorolac   Injectable 15 milliGRAM(s) IV Push every 6 hours  LORazepam   Injectable 1 milliGRAM(s) IV Push every 8 hours PRN  nalbuphine Injectable 2.5 milliGRAM(s) IV Push every 6 hours PRN  ondansetron Injectable 4 milliGRAM(s) IV Push every 6 hours PRN  PARoxetine 20 milliGRAM(s) Oral daily    pantoprazole  Injectable 40 milliGRAM(s) IV Push daily      tetracaine/benzocaine/butamben Spray 1 Spray(s) Topical every 3 hours PRN      PAST MEDICAL/SURGICAL HISTORY  PAST MEDICAL & SURGICAL HISTORY:  Heroin abuse    Cocaine abuse    GERD (gastroesophageal reflux disease)    Hiatal hernia    History of esophageal stricture    Intravenous drug abuse    Migration of pancreatic stent    Migrated esophageal stent        SOCIAL HISTORY: Substance Use (street drugs): ( x ) never used  (  ) other:    FAMILY HISTORY:  FH: thyroid disease (Grandparent)        REVIEW OF SYSTEMS:  CONSTITUTIONAL: No fever, weight loss, or fatigue  EYES: No eye pain, visual disturbances, or discharge  ENMT:  No difficulty hearing, tinnitus, vertigo; No sinus or throat pain  BREASTS: No pain, masses, or nipple discharge  GASTROINTESTINAL: No abdominal or epigastric pain. No nausea, vomiting, or hematemesis; No diarrhea or constipation. No melena or hematochezia.  GENITOURINARY: No dysuria, frequency, hematuria, or incontinence  NEUROLOGICAL: No headaches, memory loss, loss of strength, numbness, or tremors  ENDOCRINE: No heat or cold intolerance; No hair loss  MUSCULOSKELETAL: No joint pain or swelling; No muscle, back, or extremity pain  PSYCHIATRIC: No depression, anxiety, mood swings, or difficulty sleeping  HEME/LYMPH: No easy bruising, or bleeding gums  All others negative    PHYSICAL EXAM:  T(C): 36.8 (04-21-22 @ 12:00), Max: 36.8 (04-20-22 @ 16:00)  HR: 83 (04-21-22 @ 12:00) (51 - 107)  BP: 96/80 (04-21-22 @ 12:00) (96/80 - 154/81)  RR: 16 (04-21-22 @ 12:00) (12 - 21)  SpO2: 99% (04-21-22 @ 12:00) (98% - 100%)  Wt(kg): --  I&O's Summary    20 Apr 2022 07:01  -  21 Apr 2022 07:00  --------------------------------------------------------  IN: 2316 mL / OUT: 1450 mL / NET: 866 mL    21 Apr 2022 07:01  -  21 Apr 2022 14:13  --------------------------------------------------------  IN: 348 mL / OUT: 300 mL / NET: 48 mL      GENERAL: NAD  EYES: EOMI, PERRLA, conjunctiva and sclera clear  ENMT: No tonsillar erythema, exudates, or enlargement  Cardiovascular: Normal S1 S2, No JVD, No murmurs, No edema  Respiratory: Lungs clear to auscultation	  Gastrointestinal:  s/p surgery 	  Extremities: No edema                        10.4   3.53  )-----------( 104      ( 21 Apr 2022 05:56 )             31.2     04-21    141  |  104  |  15  ----------------------------<  93  3.9   |  27  |  0.63    Ca    8.8      21 Apr 2022 05:56  Phos  4.3     04-21  Mg     1.80     04-21      proBNP:   Lipid Profile:   HgA1c:   TSH:     Consultant(s) Notes Reviewed:  [x ] YES  [ ] NO    Care Discussed with Consultants/Other Providers [ x] YES  [ ] NO    Imaging Personally Reviewed independently:  [x] YES  [ ] NO    All labs, radiologic studies, vitals, orders and medications list reviewed. Patient is seen and examined at bedside. Case discussed with medical team.

## 2022-04-21 NOTE — BH CONSULTATION LIAISON PROGRESS NOTE - NSBHASSESSMENTFT_PSY_ALL_CORE
25yo M w/ PMHx of esophageal stricture s/p multiple dilations and self reported past psychiatric history of Depression/Anxiety/PTSD and substance use disorder,  presented to the hospital with progressive inability to tolerate oral intake for the past 30 days, s/p esophagectomy and j-tube placement on 4/15/22. Today he was seen to evaluate his anxiety.   Upon evaluation today in the CTI, he seems adequately groomed, normal weight, normal posture, normal motor activity, cooperative, with average eye contact. He is sitting on the stretcher. He reports no anxiety and better pain control. Pt reports good appetite and good sleep and denies any delusions, perceptual disturbances including auditory/visual/tactile hallucinations, suicidal or homicidal ideation/intent/plan.    Plan:  - C/W Klonopin 1mg BID (standing)  - C/W Ativan 1mg Q8hr PRN for anxiety  - C/W Paxil 20mg daily  - C/W Hydroxyzine 25mg Q6hr PRN for Anxiety  - Referral to Psychiatry outpatient clinic and substance use disorder services upon discharge  - No indication for inpatient psych care or sitter.  - Will continue to follow PRN   27yo M w/ PMHx of esophageal stricture s/p multiple dilations and self reported past psychiatric history of Depression/Anxiety/PTSD and substance use disorder,  presented to the hospital with progressive inability to tolerate oral intake for the past 30 days, s/p esophagectomy and j-tube placement on 4/15/22. Today he was seen to evaluate his anxiety.   Upon evaluation today in the CTI, he seems adequately groomed, normal weight, normal posture, normal motor activity, cooperative, with average eye contact. He is sitting on the stretcher. He reports no anxiety and better pain control. Pt reports good appetite and good sleep and denies any delusions, perceptual disturbances including auditory/visual/tactile hallucinations, suicidal or homicidal ideation/intent/plan.    Plan:  - C/W Klonopin 1mg BID (standing)  - Please change Ativan PRN to: Ativan 1mg Q8hr PRN for anxiety  - C/W Paxil 20mg daily  - C/W Hydroxyzine 25mg Q6hr PRN for Anxiety  - Referral to Psychiatry outpatient clinic and substance use disorder services upon discharge  - No indication for inpatient psych care or sitter.  - Will continue to follow PRN

## 2022-04-21 NOTE — PROGRESS NOTE ADULT - ASSESSMENT
27yo M w/ PMHx of esophageal stricture s/p multiple dilations and IVDU on Suboxone presenting with inability to tolerate oral intake for the past 30 days.     EKG: NSR No acute changes    1. Post-op assessment  -denies CP, SOB  -echo with grossly mild global LV systolic dysfunction (new compared to 2021) however patient denies CP or SOB on exertion.no plan for further cardiac w/u  -s/p esophagectomy.  off ketamine gtt. on PCA pump and IV Toradol with better pain control. f/u pain c/s   -care per CTICU team     2. Dysphagia  -hx of esophageal stricture s/p multiple dilations  -s/p esophagectomy and j-tube placement  -f/u GI and thoracic surgery     3. DVT PPX  - heparin

## 2022-04-22 LAB
ANION GAP SERPL CALC-SCNC: 10 MMOL/L — SIGNIFICANT CHANGE UP (ref 7–14)
BUN SERPL-MCNC: 12 MG/DL — SIGNIFICANT CHANGE UP (ref 7–23)
CALCIUM SERPL-MCNC: 9.1 MG/DL — SIGNIFICANT CHANGE UP (ref 8.4–10.5)
CHLORIDE SERPL-SCNC: 103 MMOL/L — SIGNIFICANT CHANGE UP (ref 98–107)
CO2 SERPL-SCNC: 26 MMOL/L — SIGNIFICANT CHANGE UP (ref 22–31)
CREAT SERPL-MCNC: 0.6 MG/DL — SIGNIFICANT CHANGE UP (ref 0.5–1.3)
EGFR: 137 ML/MIN/1.73M2 — SIGNIFICANT CHANGE UP
GLUCOSE SERPL-MCNC: 102 MG/DL — HIGH (ref 70–99)
HCT VFR BLD CALC: 33.2 % — LOW (ref 39–50)
HGB BLD-MCNC: 11.3 G/DL — LOW (ref 13–17)
MAGNESIUM SERPL-MCNC: 2.1 MG/DL — SIGNIFICANT CHANGE UP (ref 1.6–2.6)
MCHC RBC-ENTMCNC: 28.6 PG — SIGNIFICANT CHANGE UP (ref 27–34)
MCHC RBC-ENTMCNC: 34 GM/DL — SIGNIFICANT CHANGE UP (ref 32–36)
MCV RBC AUTO: 84.1 FL — SIGNIFICANT CHANGE UP (ref 80–100)
NRBC # BLD: 0 /100 WBCS — SIGNIFICANT CHANGE UP
NRBC # FLD: 0 K/UL — SIGNIFICANT CHANGE UP
PHOSPHATE SERPL-MCNC: 4.2 MG/DL — SIGNIFICANT CHANGE UP (ref 2.5–4.5)
PLATELET # BLD AUTO: 126 K/UL — LOW (ref 150–400)
POTASSIUM SERPL-MCNC: 4.5 MMOL/L — SIGNIFICANT CHANGE UP (ref 3.5–5.3)
POTASSIUM SERPL-SCNC: 4.5 MMOL/L — SIGNIFICANT CHANGE UP (ref 3.5–5.3)
RBC # BLD: 3.95 M/UL — LOW (ref 4.2–5.8)
RBC # FLD: 14.8 % — HIGH (ref 10.3–14.5)
SODIUM SERPL-SCNC: 139 MMOL/L — SIGNIFICANT CHANGE UP (ref 135–145)
SURGICAL PATHOLOGY STUDY: SIGNIFICANT CHANGE UP
WBC # BLD: 4.92 K/UL — SIGNIFICANT CHANGE UP (ref 3.8–10.5)
WBC # FLD AUTO: 4.92 K/UL — SIGNIFICANT CHANGE UP (ref 3.8–10.5)

## 2022-04-22 PROCEDURE — 71045 X-RAY EXAM CHEST 1 VIEW: CPT | Mod: 26

## 2022-04-22 PROCEDURE — 99233 SBSQ HOSP IP/OBS HIGH 50: CPT

## 2022-04-22 RX ORDER — LIDOCAINE 4 G/100G
1 CREAM TOPICAL DAILY
Refills: 0 | Status: DISCONTINUED | OUTPATIENT
Start: 2022-04-22 | End: 2022-04-27

## 2022-04-22 RX ADMIN — Medication 15 MILLIGRAM(S): at 15:10

## 2022-04-22 RX ADMIN — Medication 15 MILLIGRAM(S): at 01:11

## 2022-04-22 RX ADMIN — HYDROMORPHONE HYDROCHLORIDE 0.5 MILLIGRAM(S): 2 INJECTION INTRAMUSCULAR; INTRAVENOUS; SUBCUTANEOUS at 08:11

## 2022-04-22 RX ADMIN — Medication 15 MILLIGRAM(S): at 14:56

## 2022-04-22 RX ADMIN — HYDROMORPHONE HYDROCHLORIDE 1 MILLIGRAM(S): 2 INJECTION INTRAMUSCULAR; INTRAVENOUS; SUBCUTANEOUS at 16:27

## 2022-04-22 RX ADMIN — HYDROMORPHONE HYDROCHLORIDE 1 MILLIGRAM(S): 2 INJECTION INTRAMUSCULAR; INTRAVENOUS; SUBCUTANEOUS at 20:17

## 2022-04-22 RX ADMIN — Medication 650 MILLIGRAM(S): at 17:23

## 2022-04-22 RX ADMIN — Medication 1 MILLIGRAM(S): at 05:03

## 2022-04-22 RX ADMIN — HYDROMORPHONE HYDROCHLORIDE 1 MILLIGRAM(S): 2 INJECTION INTRAMUSCULAR; INTRAVENOUS; SUBCUTANEOUS at 21:41

## 2022-04-22 RX ADMIN — HYDROMORPHONE HYDROCHLORIDE 1 MILLIGRAM(S): 2 INJECTION INTRAMUSCULAR; INTRAVENOUS; SUBCUTANEOUS at 03:29

## 2022-04-22 RX ADMIN — Medication 1 MILLIGRAM(S): at 21:48

## 2022-04-22 RX ADMIN — Medication 650 MILLIGRAM(S): at 12:10

## 2022-04-22 RX ADMIN — Medication 15 MILLIGRAM(S): at 22:00

## 2022-04-22 RX ADMIN — Medication 25 MILLIGRAM(S): at 12:34

## 2022-04-22 RX ADMIN — HEPARIN SODIUM 5000 UNIT(S): 5000 INJECTION INTRAVENOUS; SUBCUTANEOUS at 05:03

## 2022-04-22 RX ADMIN — PANTOPRAZOLE SODIUM 40 MILLIGRAM(S): 20 TABLET, DELAYED RELEASE ORAL at 11:41

## 2022-04-22 RX ADMIN — Medication 650 MILLIGRAM(S): at 23:53

## 2022-04-22 RX ADMIN — Medication 15 MILLIGRAM(S): at 21:41

## 2022-04-22 RX ADMIN — HYDROMORPHONE HYDROCHLORIDE 1 MILLIGRAM(S): 2 INJECTION INTRAMUSCULAR; INTRAVENOUS; SUBCUTANEOUS at 12:35

## 2022-04-22 RX ADMIN — Medication 20 MILLIGRAM(S): at 11:42

## 2022-04-22 RX ADMIN — Medication 650 MILLIGRAM(S): at 23:03

## 2022-04-22 RX ADMIN — HYDROMORPHONE HYDROCHLORIDE 1 MILLIGRAM(S): 2 INJECTION INTRAMUSCULAR; INTRAVENOUS; SUBCUTANEOUS at 12:19

## 2022-04-22 RX ADMIN — Medication 650 MILLIGRAM(S): at 17:53

## 2022-04-22 RX ADMIN — HYDROMORPHONE HYDROCHLORIDE 1 MILLIGRAM(S): 2 INJECTION INTRAMUSCULAR; INTRAVENOUS; SUBCUTANEOUS at 16:22

## 2022-04-22 RX ADMIN — Medication 650 MILLIGRAM(S): at 11:41

## 2022-04-22 RX ADMIN — Medication 25 MILLIGRAM(S): at 01:11

## 2022-04-22 RX ADMIN — LIDOCAINE 1 PATCH: 4 CREAM TOPICAL at 21:49

## 2022-04-22 RX ADMIN — Medication 650 MILLIGRAM(S): at 05:03

## 2022-04-22 RX ADMIN — HEPARIN SODIUM 5000 UNIT(S): 5000 INJECTION INTRAVENOUS; SUBCUTANEOUS at 21:41

## 2022-04-22 RX ADMIN — Medication 15 MILLIGRAM(S): at 08:04

## 2022-04-22 RX ADMIN — Medication 1 MILLIGRAM(S): at 17:23

## 2022-04-22 RX ADMIN — HEPARIN SODIUM 5000 UNIT(S): 5000 INJECTION INTRAVENOUS; SUBCUTANEOUS at 15:07

## 2022-04-22 RX ADMIN — HYDROMORPHONE HYDROCHLORIDE 0.5 MILLIGRAM(S): 2 INJECTION INTRAMUSCULAR; INTRAVENOUS; SUBCUTANEOUS at 08:15

## 2022-04-22 RX ADMIN — Medication 15 MILLIGRAM(S): at 08:15

## 2022-04-22 RX ADMIN — HYDROMORPHONE HYDROCHLORIDE 1 MILLIGRAM(S): 2 INJECTION INTRAMUSCULAR; INTRAVENOUS; SUBCUTANEOUS at 03:45

## 2022-04-22 NOTE — BH CONSULTATION LIAISON PROGRESS NOTE - NSBHASSESSMENTFT_PSY_ALL_CORE
27yo M w/ PMHx of esophageal stricture s/p multiple dilations and self reported past psychiatric history of Depression/Anxiety/PTSD and substance use disorder,  presented to the hospital with progressive inability to tolerate oral intake for the past 30 days, s/p esophagectomy and j-tube placement on 4/15/22. Today he was seen to evaluate his anxiety.   Upon evaluation today in the CTI, he seems adequately groomed, normal weight, normal posture, normal motor activity, cooperative, with average eye contact. He is sitting on the stretcher. He reports no anxiety and better pain control. Pt reports good appetite and good sleep and denies any delusions, perceptual disturbances including auditory/visual/tactile hallucinations, suicidal or homicidal ideation/intent/plan.    Plan:  - Start tapering down the Klonopin to 0.5mg BID (standing). Klonopin can be discontinued on discharge date. Pt does not need to be discharged on Klonopin.  - Please C/W Ativan 1mg Q8hr PRN for anxiety  - C/W Paxil 20mg daily  - C/W Hydroxyzine 25mg Q6hr PRN for Anxiety. Benadryl can be discontinued (Hydroxyzine and Benadryl are very similar and Pt can be on just one of them)  - Referral to Psychiatry outpatient clinic and substance use disorder services upon discharge  - No indication for inpatient psych care or sitter.  - Will continue to follow PRN   25yo M w/ PMHx of esophageal stricture s/p multiple dilations and self reported past psychiatric history of Depression/Anxiety/PTSD and substance use disorder,  presented to the hospital with progressive inability to tolerate oral intake for the past 30 days, s/p esophagectomy and j-tube placement on 4/15/22. Today he was seen to evaluate his anxiety.   Upon evaluation today in the CTI, he seems adequately groomed, normal weight, normal posture, normal motor activity, cooperative, with average eye contact. He is sitting on the stretcher. He reports no anxiety and better pain control. Pt reports good appetite and good sleep and denies any delusions, perceptual disturbances including auditory/visual/tactile hallucinations, suicidal or homicidal ideation/intent/plan.    Plan:  - Start tapering down the Klonopin to 0.5mg BID (standing today). Klonopin can be discontinued on discharge date. Pt does not need to be discharged on Klonopin.  - Please C/W Ativan 1mg Q8hr PRN for anxiety  - C/W Paxil 20mg daily, can be prescribed as outpt.  - C/W Hydroxyzine 25mg Q6hr PRN for Anxiety. Benadryl can be discontinued (Hydroxyzine and Benadryl are very similar and Pt can be on just one of them)  - Referral to Psychiatry outpatient clinic and substance use disorder services upon discharge ([facility]: [phone]).  - No indication for inpatient psych care or sitter.  - Will continue to follow PRN

## 2022-04-22 NOTE — PROGRESS NOTE ADULT - SUBJECTIVE AND OBJECTIVE BOX
Christiano Hoyt MD  Interventional Cardiology / Endovascular Specialist  [place] Office : [address] [place] [address] [place] [address]  Tel:   [place] Office : [address] [facility] [place] [address]  Tel: [phone]    Subjective/Overnight events: Patient sitting up in chair.Denies chest pain, SOB or palpitations.   	  MEDICATIONS:  heparin   Injectable 5000 Unit(s) SubCutaneous every 8 hours      diphenhydrAMINE Injectable 25 milliGRAM(s) IV Push every 4 hours PRN    acetaminophen    Suspension .. 650 milliGRAM(s) Enteral Tube every 6 hours  acetaminophen   IVPB .. 1000 milliGRAM(s) IV Intermittent once  clonazePAM  Tablet 1 milliGRAM(s) Oral two times a day  HYDROmorphone  Injectable 0.5 milliGRAM(s) IV Push every 3 hours PRN  HYDROmorphone  Injectable 1 milliGRAM(s) IV Push every 4 hours PRN  HYDROmorphone  Injectable 0.3 milliGRAM(s) IV Push every 3 hours PRN  hydrOXYzine hydrochloride Syrup 25 milliGRAM(s) Oral every 6 hours PRN  ketorolac   Injectable 15 milliGRAM(s) IV Push every 6 hours  LORazepam   Injectable 1 milliGRAM(s) IV Push every 8 hours PRN  nalbuphine Injectable 2.5 milliGRAM(s) IV Push every 6 hours PRN  ondansetron Injectable 4 milliGRAM(s) IV Push every 6 hours PRN  PARoxetine 20 milliGRAM(s) Oral daily    pantoprazole  Injectable 40 milliGRAM(s) IV Push daily      tetracaine/benzocaine/butamben Spray 1 Spray(s) Topical every 3 hours PRN      PAST MEDICAL/SURGICAL HISTORY  PAST MEDICAL & SURGICAL HISTORY:  Heroin abuse    Cocaine abuse    GERD (gastroesophageal reflux disease)    Hiatal hernia    History of esophageal stricture    Intravenous drug abuse    Migration of pancreatic stent    Migrated esophageal stent        SOCIAL HISTORY: Substance Use (street drugs): ( x ) never used  (  ) other:    FAMILY HISTORY:  FH: thyroid disease (Grandparent)        REVIEW OF SYSTEMS:  CONSTITUTIONAL: No fever, weight loss, or fatigue  EYES: No eye pain, visual disturbances, or discharge  ENMT:  No difficulty hearing, tinnitus, vertigo; No sinus or throat pain  BREASTS: No pain, masses, or nipple discharge  GASTROINTESTINAL: No abdominal or epigastric pain. No nausea, vomiting, or hematemesis; No diarrhea or constipation. No melena or hematochezia.  GENITOURINARY: No dysuria, frequency, hematuria, or incontinence  NEUROLOGICAL: No headaches, memory loss, loss of strength, numbness, or tremors  ENDOCRINE: No heat or cold intolerance; No hair loss  MUSCULOSKELETAL: No joint pain or swelling; No muscle, back, or extremity pain  PSYCHIATRIC: No depression, anxiety, mood swings, or difficulty sleeping  HEME/LYMPH: No easy bruising, or bleeding gums  All others negative    PHYSICAL EXAM:  T(C): 36.8 (04-22-22 @ 08:00), Max: 36.9 (04-22-22 @ 00:00)  HR: 66 (04-22-22 @ 08:00) (50 - 80)  BP: 135/68 (04-22-22 @ 04:00) (117/92 - 149/58)  RR: 20 (04-22-22 @ 08:00) (15 - 20)  SpO2: 98% (04-22-22 @ 08:00) (97% - 100%)  Wt(kg): --  I&O's Summary    21 Apr 2022 07:01  -  22 Apr 2022 07:00  --------------------------------------------------------  IN: 1448 mL / OUT: 1954 mL / NET: -506 mL    22 Apr 2022 07:01  -  22 Apr 2022 12:14  --------------------------------------------------------  IN: 170 mL / OUT: 0 mL / NET: 170 mL    GENERAL: NAD  EYES: EOMI, PERRLA, conjunctiva and sclera clear  ENMT: No tonsillar erythema, exudates, or enlargement  Cardiovascular: Normal S1 S2, No JVD, No murmurs, No edema  Respiratory: Lungs clear to auscultation	  Gastrointestinal:  s/p surgery 	  Extremities: No edema                            11.3   4.92  )-----------( 126      ( 22 Apr 2022 05:00 )             33.2     04-22    139  |  103  |  12  ----------------------------<  102<H>  4.5   |  26  |  0.60    Ca    9.1      22 Apr 2022 05:00  Phos  4.2     04-22  Mg     2.10     04-22      proBNP:   Lipid Profile:   HgA1c:   TSH:     Consultant(s) Notes Reviewed:  [x ] YES  [ ] NO    Care Discussed with Consultants/Other Providers [ x] YES  [ ] NO    Imaging Personally Reviewed independently:  [x] YES  [ ] NO    All labs, radiologic studies, vitals, orders and medications list reviewed. Patient is seen and examined at bedside. Case discussed with medical team.

## 2022-04-22 NOTE — PROGRESS NOTE ADULT - SUBJECTIVE AND OBJECTIVE BOX
CHIEF COMPLAINT: FOLLOW UP IN ICU FOR POSTOPERATIVE CARE OF PATIENT WHO IS S/P ESOPHAGECTOMY       PROCEDURES:    - Robotic Assisted [place] Mahesh esophagectomy, J tube placement.  15-Apr-2022        ISSUES:    Esophageal stricture s/p multiple dilations  Hiatal hernia  Postoperative pain  Chest tubes in place  Anxiety  GERD  Hx of Substance abuse (cocaine, heroin, IVDU), on suboxone         INTERVAL EVENTS:      Started on clear liquids yesterda  Afebrile, stable hemodynamics, stable spo2 on RA, walking in ICU  Pain controlled        HISTORY:    Patient reports moderate pain at chest wall incision sites which is worse with coughing and deep breathing without associated fever or dyspnea. Pain is improved with use of pain meds.           PHYSICAL EXAM:      Gen: Comfortable    Eyes: Sclera white, Conjunctiva normal, Eyelids normal, Pupils symmetrical      ENT: Mucous membranes moist,     Neck: Trachea midline,  ,  ,  ,  ,  ,       CV: Rate regular, Rhythm regular,  ,  ,       Resp: Breath sounds clear, No accessory muscles use, R chest tube in place,  ,       Abd: Soft, Non-distended, Non-tender, Bowel sounds normal,  jejunostomy tube in place      Skin: Warm, No peripheral edema of lower extremities,  ,       :  No baum    Neuro: Moving all 4 extremities,       Psych: A&Ox3               ASSESSMENT AND PLAN:           NEURO:     Post-operative Pain - Stable. Pain control with dilaudid IV, toradol PRN per Pain Management recommendations.  Anxiety - stable. Paroxetine, Clonazepam and lorazepam per Psych consult.    Hx of substance abuse - stable.           RESPIRATORY:   Hypoxia - Wean nasal cannula for goal O2sat above 92. Obtain CXR. Incentive spirometry. Chest PT and frequent suctioning. Continue bronchodilators. OOB to chair & ambulate w/ assistance. Continuous pulse oximetry for support & to prevent decompensation.     Chest tube – Pleurevac regulated water seal. Monitor chest tube output.            CARDIOVASCULAR:     Hemodynamically stable - Not on pressors. Continue hemodynamic monitoring.     Telemetry (medical test) - Reviewed by me today independently. Normal sinus rhythm.             RENAL:     Stable - Monitor IOs and electrolytes. Keep K above 4.0 and Mg above 2.0.             GASTROINTESTINAL:     GI prophylaxis not indicated    Zofran and Reglan IV PRN for nausea     J tube feeds at goal     GERD - stable. Continue pantoprazole.  Hiatal hernia - s/p esophagectomy. Stable.           HEMATOLOGIC:     No signs of active bleeding. Monitor Hgb in CBC in AM     DVT prophylaxis with heparin subQ and SCDs.           INFECTIOUS DISEASE:     All surgical sites appear clean. No signs of active infection. Will monitor for fever and leukocytosis.           ENDOCRINE:     Stable – Monitor glucose fingersticks for goal 120-180.             Pertinent clinical, laboratory, radiographic, hemodynamic, echocardiographic, respiratory data, microbiologic data and chart were reviewed by myself and analyzed frequently throughout the course of the day and night by myself.     Plan discussed at length with the CTICU staff and Attending CT Surgeon -   Dr. Zenia Shannon    Patient's status was discussed with patient at bedside.     _________________________  VITAL SIGNS:  Vital Signs Last 24 Hrs  T(C): 36.7 (22 Apr 2022 12:00), Max: 36.9 (22 Apr 2022 00:00)  T(F): 98 (22 Apr 2022 12:00), Max: 98.4 (22 Apr 2022 00:00)  HR: 72 (22 Apr 2022 12:00) (50 - 80)  BP: 136/78 (22 Apr 2022 12:00) (123/67 - 149/58)  BP(mean): 109 (22 Apr 2022 12:00) (71 - 109)  RR: 22 (22 Apr 2022 12:00) (15 - 22)  SpO2: 99% (22 Apr 2022 12:00) (97% - 99%)  I/Os:   I&O's Detail    21 Apr 2022 07:01  -  22 Apr 2022 07:00  --------------------------------------------------------  IN:    dextrose 5% + lactated ringers: 150 mL    Enteral Tube Flush: 180 mL    TwoCal HN: 1118 mL  Total IN: 1448 mL    OUT:    Chest Tube (mL): 50 mL    Stool (mL): 4 mL    Voided (mL): 1900 mL  Total OUT: 1954 mL    Total NET: -506 mL      22 Apr 2022 07:01  -  22 Apr 2022 15:43  --------------------------------------------------------  IN:    Enteral Tube Flush: 60 mL    TwoCal HN: 140 mL  Total IN: 200 mL    OUT:    Chest Tube (mL): 0 mL    Stool (mL): 3 mL    Voided (mL): 800 mL  Total OUT: 803 mL    Total NET: -603 mL              MEDICATIONS:  MEDICATIONS  (STANDING):  acetaminophen    Suspension .. 650 milliGRAM(s) Enteral Tube every 6 hours  acetaminophen   IVPB .. 1000 milliGRAM(s) IV Intermittent once  clonazePAM  Tablet 1 milliGRAM(s) Oral two times a day  heparin   Injectable 5000 Unit(s) SubCutaneous every 8 hours  ketorolac   Injectable 15 milliGRAM(s) IV Push every 6 hours  pantoprazole  Injectable 40 milliGRAM(s) IV Push daily  PARoxetine 20 milliGRAM(s) Oral daily    MEDICATIONS  (PRN):  diphenhydrAMINE Injectable 25 milliGRAM(s) IV Push every 4 hours PRN Pruritus  HYDROmorphone  Injectable 0.5 milliGRAM(s) IV Push every 3 hours PRN Moderate Pain (4 - 6)  HYDROmorphone  Injectable 1 milliGRAM(s) IV Push every 4 hours PRN Severe Pain (7 - 10)  HYDROmorphone  Injectable 0.3 milliGRAM(s) IV Push every 3 hours PRN Severe breakthrough Pain (7 - 10)  hydrOXYzine hydrochloride Syrup 25 milliGRAM(s) Oral every 6 hours PRN Anxiety  LORazepam   Injectable 1 milliGRAM(s) IV Push every 8 hours PRN Anxiety  nalbuphine Injectable 2.5 milliGRAM(s) IV Push every 6 hours PRN Pruritus  naloxone Injectable 0.1 milliGRAM(s) IV Push every 3 minutes PRN For ANY of the following changes in patient status:  A. RR LESS THAN 10 breaths per minute, B. Oxygen saturation LESS THAN 90%, C. Sedation score of 6  ondansetron Injectable 4 milliGRAM(s) IV Push every 6 hours PRN Nausea  tetracaine/benzocaine/butamben Spray 1 Spray(s) Topical every 3 hours PRN pain      LABS:  Laboratory data was independently reviewed by me today.                           11.3   4.92  )-----------( 126      ( 22 Apr 2022 05:00 )             33.2     04-22    139  |  103  |  12  ----------------------------<  102<H>  4.5   |  26  |  0.60    Ca    9.1      22 Apr 2022 05:00  Phos  4.2     04-22  Mg     2.10     04-22                RADIOLOGY:   Radiology images were independently reviewed by me today. Reports were reviewed by me today.    Xray Chest 1 View- PORTABLE-Routine:   ACC: [id number] EXAM:  XR CHEST PORTABLE ROUTINE 1V                          PROCEDURE DATE:  04/21/2022          INTERPRETATION:  TIME OF EXAM: April 21, 2022 at 6:14 AM.    CLINICAL INFORMATION: Status post esophagectomy. Follow-up.    COMPARISON:April 20, 2022.    TECHNIQUE:   AP Portable chest x-ray. Costophrenic angles excluded from   image.    INTERPRETATION:    The heart is not enlarged.  Opacity projecting over the medial right hemithorax corresponds to a   gastric pull-through status post esophagectomy.  Right chest tube unchanged in position.  No focal lung consolidation, pleural effusion, or pneumothorax.  No acute bony abnormality.  Minimal right subcutaneous emphysema, slightly decreased.      IMPRESSION:  Status post esophagectomy with gastric pull-through again   noted.    Right chest tube unchanged in position. No pneumothorax.    Minimal right subcutaneous emphysema, slightly decreased.    --- End of Report ---            CASH GUIDO MD; Attending Radiologist  This document has been electronically signed. Apr 21 2022  4:58PM (04-21-22 @ 06:46)  Xray Chest 1 View- PORTABLE-Routine:   ACC: [id number] EXAM:  XR CHEST PORTABLE ROUTINE 1V                          PROCEDURE DATE:  04/20/2022          INTERPRETATION:  Chest one view    HISTORY: Postop esophagectomy    COMPARISON STUDY: 4/19/2022    Frontal expiratory view of the chest shows the heart to be normal in   size. Right chest tube remains present. Right paracardiac density is   compatible with intrathoracic stomach.    The lungs are clear and there is no evidence of pneumothorax nor pleural   effusion.    IMPRESSION:  No active pulmonary disease.        Thank you for the courtesy of this referral.    --- End of Report ---            HAKAN LOAIZA MD; Attending Interventional Radiologist  This document has been electronically signed. Apr 20 2022  3:44PM (04-20-22 @ 07:09)  Xray Chest 1 View- PORTABLE-Routine:   ACC: [id number] EXAM:  XR CHEST PORTABLE ROUTINE 1V                          PROCEDURE DATE:  04/19/2022          INTERPRETATION:  Chest one view    HISTORY: Postop    COMPARISON STUDY: 4/18/2022    Frontal expiratory view of the chest shows the heart to be normal in   size. Right chest tube and thin radiopaque wire over the chest are again   noted.    The lungs are clear and there is no evidence of pneumothorax nor pleural   effusion.    IMPRESSION:  No interval change.        Thank you for the courtesy of this referral.    --- End of Report ---            HAKAN LOAIZA MD; Attending Interventional Radiologist  This document has been electronically signed. Apr 20 2022 12:24PM (04-19-22 @ 07:20)

## 2022-04-22 NOTE — CHART NOTE - NSCHARTNOTEFT_GEN_A_CORE
Patient last seen by RDN on 4/17, now for malnutrition follow up. Spoke with PA, RN and obtained subjective information from extensive chart review.     Current Diet : Diet, NPO with Tube Feed:   Tube Feeding Modality: Jejunostomy  TwoCal HN (TWOCALHNRTH)  Total Volume for 24 Hours (mL): 1008  Continuous  Starting Tube Feed Rate {mL per Hour}: 63  Until Goal Tube Feed Rate (mL per Hour): 63  Tube Feed Duration (in Hours): 16  Tube Feed Start Time: 17:00 (04-22-22 @ 15:32)    TF Provides:  2016 kcals  85 gms protein  705 mL free H2O  1008 mL total volume    Current Weight: 67.1 kg (4/17), 70.7 kg (4/12)  Height (cm): 180.3   Dosing Weight (kg): 76.1   BMI (kg/m2): 23.4   IBW: 74.8 kg    Nutrition Interval Events: Pt now s/p esophagectomy with J-tube placement. Pt had been receiving TF @ 70 mL/hr x 16 hrs however RN said that pt was starting to experience diarrhea. TF @ 70 mL was in excess of pt's nutrition needs based on Critical Care Guidelines for Enteral Feeding. Suggested to PA to lower rate of TF to 63 mL/hr x 16 hrs which would offer pt 2016 kcals, 85 gms protein, 705 mL free H2O in 1008 mL total volume and give pt 30 kcal/kg and 1.3 gms protein/kg of current weight (67.1 kg). Request new weight to assess trend as pt newly TF with weight loss of 9 kg since admission. No edema nor pressure injuries identified. Pt remains at severe risk of malnutrition as he was identified with weight loss PTA and has continued to experience loss of weight since admission. RDN services to remain available as needed.     __________________ Pertinent Medications__________________   MEDICATIONS  (STANDING):  acetaminophen    Suspension .. 650 milliGRAM(s) Enteral Tube every 6 hours  acetaminophen   IVPB .. 1000 milliGRAM(s) IV Intermittent once  clonazePAM  Tablet 1 milliGRAM(s) Oral two times a day  heparin   Injectable 5000 Unit(s) SubCutaneous every 8 hours  ketorolac   Injectable 15 milliGRAM(s) IV Push every 6 hours  pantoprazole  Injectable 40 milliGRAM(s) IV Push daily  PARoxetine 20 milliGRAM(s) Oral daily    MEDICATIONS  (PRN):  diphenhydrAMINE Injectable 25 milliGRAM(s) IV Push every 4 hours PRN Pruritus  HYDROmorphone  Injectable 0.5 milliGRAM(s) IV Push every 3 hours PRN Moderate Pain (4 - 6)  HYDROmorphone  Injectable 1 milliGRAM(s) IV Push every 4 hours PRN Severe Pain (7 - 10)  HYDROmorphone  Injectable 0.3 milliGRAM(s) IV Push every 3 hours PRN Severe breakthrough Pain (7 - 10)  hydrOXYzine hydrochloride Syrup 25 milliGRAM(s) Oral every 6 hours PRN Anxiety  LORazepam   Injectable 1 milliGRAM(s) IV Push every 8 hours PRN Anxiety  nalbuphine Injectable 2.5 milliGRAM(s) IV Push every 6 hours PRN Pruritus  naloxone Injectable 0.1 milliGRAM(s) IV Push every 3 minutes PRN For ANY of the following changes in patient status:  A. RR LESS THAN 10 breaths per minute, B. Oxygen saturation LESS THAN 90%, C. Sedation score of 6  ondansetron Injectable 4 milliGRAM(s) IV Push every 6 hours PRN Nausea  tetracaine/benzocaine/butamben Spray 1 Spray(s) Topical every 3 hours PRN pain      __________________ Pertinent Labs__________________   04-22 Na139 mmol/L Glu 102 mg/dL<H> K+ 4.5 mmol/L Cr  0.60 mg/dL BUN 12 mg/dL 04-22 Phos 4.2 mg/dL      Estimated Needs (based on current weight 67.1 kg):     1678 - 2013 kcals/day (25-30 kcals/kg)  80 - 94 gms protein/day (1.2-1.4 gms protein/kg)      Nutrition Diagnosis: Severe malnutrition  [x] ongoing    Goal(s):  1. Patient to meet > 75% estimated energy needs    Recommendations:   1. TwoCal HN @ goal rate of 63 mL/hr x 16 hrs.    Monitoring and Evaluation:   1. Monitor weights, labs, BMs, skin integrity, enteral tolerance and edema.  2. RD services to remain available. Request obtaining new weight to assess trend and determine adequacy of TF.

## 2022-04-23 LAB
ANION GAP SERPL CALC-SCNC: 12 MMOL/L — SIGNIFICANT CHANGE UP (ref 7–14)
BUN SERPL-MCNC: 14 MG/DL — SIGNIFICANT CHANGE UP (ref 7–23)
CA-I BLD-SCNC: 1.11 MMOL/L — LOW (ref 1.15–1.29)
CALCIUM SERPL-MCNC: 9 MG/DL — SIGNIFICANT CHANGE UP (ref 8.4–10.5)
CHLORIDE SERPL-SCNC: 104 MMOL/L — SIGNIFICANT CHANGE UP (ref 98–107)
CO2 SERPL-SCNC: 22 MMOL/L — SIGNIFICANT CHANGE UP (ref 22–31)
CREAT SERPL-MCNC: 0.6 MG/DL — SIGNIFICANT CHANGE UP (ref 0.5–1.3)
EGFR: 137 ML/MIN/1.73M2 — SIGNIFICANT CHANGE UP
GLUCOSE SERPL-MCNC: 101 MG/DL — HIGH (ref 70–99)
HCT VFR BLD CALC: 34.2 % — LOW (ref 39–50)
HGB BLD-MCNC: 11.4 G/DL — LOW (ref 13–17)
MAGNESIUM SERPL-MCNC: 2 MG/DL — SIGNIFICANT CHANGE UP (ref 1.6–2.6)
MCHC RBC-ENTMCNC: 28.4 PG — SIGNIFICANT CHANGE UP (ref 27–34)
MCHC RBC-ENTMCNC: 33.3 GM/DL — SIGNIFICANT CHANGE UP (ref 32–36)
MCV RBC AUTO: 85.3 FL — SIGNIFICANT CHANGE UP (ref 80–100)
NRBC # BLD: 0 /100 WBCS — SIGNIFICANT CHANGE UP
NRBC # FLD: 0 K/UL — SIGNIFICANT CHANGE UP
PHOSPHATE SERPL-MCNC: 4 MG/DL — SIGNIFICANT CHANGE UP (ref 2.5–4.5)
PLATELET # BLD AUTO: 137 K/UL — LOW (ref 150–400)
POTASSIUM SERPL-MCNC: 4.5 MMOL/L — SIGNIFICANT CHANGE UP (ref 3.5–5.3)
POTASSIUM SERPL-SCNC: 4.5 MMOL/L — SIGNIFICANT CHANGE UP (ref 3.5–5.3)
RBC # BLD: 4.01 M/UL — LOW (ref 4.2–5.8)
RBC # FLD: 14.9 % — HIGH (ref 10.3–14.5)
SARS-COV-2 RNA SPEC QL NAA+PROBE: SIGNIFICANT CHANGE UP
SODIUM SERPL-SCNC: 138 MMOL/L — SIGNIFICANT CHANGE UP (ref 135–145)
WBC # BLD: 4.54 K/UL — SIGNIFICANT CHANGE UP (ref 3.8–10.5)
WBC # FLD AUTO: 4.54 K/UL — SIGNIFICANT CHANGE UP (ref 3.8–10.5)

## 2022-04-23 PROCEDURE — 71045 X-RAY EXAM CHEST 1 VIEW: CPT | Mod: 26

## 2022-04-23 PROCEDURE — 99233 SBSQ HOSP IP/OBS HIGH 50: CPT

## 2022-04-23 RX ORDER — SIMETHICONE 80 MG/1
80 TABLET, CHEWABLE ORAL ONCE
Refills: 0 | Status: COMPLETED | OUTPATIENT
Start: 2022-04-23 | End: 2022-04-23

## 2022-04-23 RX ORDER — ACETAMINOPHEN 500 MG
1000 TABLET ORAL ONCE
Refills: 0 | Status: COMPLETED | OUTPATIENT
Start: 2022-04-23 | End: 2022-04-23

## 2022-04-23 RX ADMIN — HYDROMORPHONE HYDROCHLORIDE 1 MILLIGRAM(S): 2 INJECTION INTRAMUSCULAR; INTRAVENOUS; SUBCUTANEOUS at 12:40

## 2022-04-23 RX ADMIN — HYDROMORPHONE HYDROCHLORIDE 1 MILLIGRAM(S): 2 INJECTION INTRAMUSCULAR; INTRAVENOUS; SUBCUTANEOUS at 00:13

## 2022-04-23 RX ADMIN — LIDOCAINE 1 PATCH: 4 CREAM TOPICAL at 12:52

## 2022-04-23 RX ADMIN — Medication 15 MILLIGRAM(S): at 21:52

## 2022-04-23 RX ADMIN — PANTOPRAZOLE SODIUM 40 MILLIGRAM(S): 20 TABLET, DELAYED RELEASE ORAL at 12:53

## 2022-04-23 RX ADMIN — HYDROMORPHONE HYDROCHLORIDE 1 MILLIGRAM(S): 2 INJECTION INTRAMUSCULAR; INTRAVENOUS; SUBCUTANEOUS at 01:00

## 2022-04-23 RX ADMIN — Medication 20 MILLIGRAM(S): at 12:53

## 2022-04-23 RX ADMIN — Medication 400 MILLIGRAM(S): at 15:30

## 2022-04-23 RX ADMIN — Medication 1 MILLIGRAM(S): at 17:08

## 2022-04-23 RX ADMIN — HYDROMORPHONE HYDROCHLORIDE 1 MILLIGRAM(S): 2 INJECTION INTRAMUSCULAR; INTRAVENOUS; SUBCUTANEOUS at 04:20

## 2022-04-23 RX ADMIN — LIDOCAINE 1 PATCH: 4 CREAM TOPICAL at 09:05

## 2022-04-23 RX ADMIN — Medication 15 MILLIGRAM(S): at 07:51

## 2022-04-23 RX ADMIN — Medication 1 MILLIGRAM(S): at 06:02

## 2022-04-23 RX ADMIN — HYDROMORPHONE HYDROCHLORIDE 1 MILLIGRAM(S): 2 INJECTION INTRAMUSCULAR; INTRAVENOUS; SUBCUTANEOUS at 13:00

## 2022-04-23 RX ADMIN — HYDROMORPHONE HYDROCHLORIDE 1 MILLIGRAM(S): 2 INJECTION INTRAMUSCULAR; INTRAVENOUS; SUBCUTANEOUS at 20:35

## 2022-04-23 RX ADMIN — Medication 1 MILLIGRAM(S): at 10:58

## 2022-04-23 RX ADMIN — Medication 25 MILLIGRAM(S): at 21:17

## 2022-04-23 RX ADMIN — HYDROMORPHONE HYDROCHLORIDE 1 MILLIGRAM(S): 2 INJECTION INTRAMUSCULAR; INTRAVENOUS; SUBCUTANEOUS at 16:45

## 2022-04-23 RX ADMIN — Medication 15 MILLIGRAM(S): at 13:30

## 2022-04-23 RX ADMIN — Medication 15 MILLIGRAM(S): at 20:35

## 2022-04-23 RX ADMIN — Medication 15 MILLIGRAM(S): at 03:32

## 2022-04-23 RX ADMIN — HEPARIN SODIUM 5000 UNIT(S): 5000 INJECTION INTRAVENOUS; SUBCUTANEOUS at 21:16

## 2022-04-23 RX ADMIN — HYDROMORPHONE HYDROCHLORIDE 1 MILLIGRAM(S): 2 INJECTION INTRAMUSCULAR; INTRAVENOUS; SUBCUTANEOUS at 08:45

## 2022-04-23 RX ADMIN — HEPARIN SODIUM 5000 UNIT(S): 5000 INJECTION INTRAVENOUS; SUBCUTANEOUS at 06:01

## 2022-04-23 RX ADMIN — HEPARIN SODIUM 5000 UNIT(S): 5000 INJECTION INTRAVENOUS; SUBCUTANEOUS at 13:54

## 2022-04-23 RX ADMIN — SIMETHICONE 80 MILLIGRAM(S): 80 TABLET, CHEWABLE ORAL at 22:38

## 2022-04-23 RX ADMIN — Medication 15 MILLIGRAM(S): at 08:00

## 2022-04-23 RX ADMIN — Medication 15 MILLIGRAM(S): at 02:11

## 2022-04-23 RX ADMIN — HYDROMORPHONE HYDROCHLORIDE 1 MILLIGRAM(S): 2 INJECTION INTRAMUSCULAR; INTRAVENOUS; SUBCUTANEOUS at 16:30

## 2022-04-23 RX ADMIN — Medication 650 MILLIGRAM(S): at 06:20

## 2022-04-23 RX ADMIN — HYDROMORPHONE HYDROCHLORIDE 1 MILLIGRAM(S): 2 INJECTION INTRAMUSCULAR; INTRAVENOUS; SUBCUTANEOUS at 08:31

## 2022-04-23 RX ADMIN — HYDROMORPHONE HYDROCHLORIDE 1 MILLIGRAM(S): 2 INJECTION INTRAMUSCULAR; INTRAVENOUS; SUBCUTANEOUS at 21:08

## 2022-04-23 RX ADMIN — Medication 1000 MILLIGRAM(S): at 16:00

## 2022-04-23 RX ADMIN — LIDOCAINE 1 PATCH: 4 CREAM TOPICAL at 20:54

## 2022-04-23 RX ADMIN — Medication 15 MILLIGRAM(S): at 13:55

## 2022-04-23 RX ADMIN — Medication 1 MILLIGRAM(S): at 22:38

## 2022-04-23 RX ADMIN — Medication 650 MILLIGRAM(S): at 06:00

## 2022-04-23 NOTE — PROGRESS NOTE ADULT - SUBJECTIVE AND OBJECTIVE BOX
Christiano Hoyt MD  Interventional Cardiology / Endovascular Specialist  [place] Office : [phone] [place] [address] [place] [address]  Tel:   [place] Office : [phone] [facility] [place] [address]  Tel: [phone]    Subjective/Overnight events: Patient sitting up in chair.Denies chest pain, SOB or palpitations.   	  MEDICATIONS:  heparin   Injectable 5000 Unit(s) SubCutaneous every 8 hours      diphenhydrAMINE Injectable 25 milliGRAM(s) IV Push every 4 hours PRN    acetaminophen   IVPB .. 1000 milliGRAM(s) IV Intermittent once  clonazePAM  Tablet 1 milliGRAM(s) Oral two times a day  HYDROmorphone  Injectable 0.5 milliGRAM(s) IV Push every 3 hours PRN  HYDROmorphone  Injectable 1 milliGRAM(s) IV Push every 4 hours PRN  HYDROmorphone  Injectable 0.3 milliGRAM(s) IV Push every 3 hours PRN  hydrOXYzine hydrochloride Syrup 25 milliGRAM(s) Oral every 6 hours PRN  ketorolac   Injectable 15 milliGRAM(s) IV Push every 6 hours  LORazepam   Injectable 1 milliGRAM(s) IV Push every 8 hours PRN  nalbuphine Injectable 2.5 milliGRAM(s) IV Push every 6 hours PRN  ondansetron Injectable 4 milliGRAM(s) IV Push every 6 hours PRN  PARoxetine 20 milliGRAM(s) Oral daily    pantoprazole  Injectable 40 milliGRAM(s) IV Push daily      lidocaine   4% Patch 1 Patch Transdermal daily  tetracaine/benzocaine/butamben Spray 1 Spray(s) Topical every 3 hours PRN      PAST MEDICAL/SURGICAL HISTORY  PAST MEDICAL & SURGICAL HISTORY:  Heroin abuse    Cocaine abuse    GERD (gastroesophageal reflux disease)    Hiatal hernia    History of esophageal stricture    Intravenous drug abuse    Migration of pancreatic stent    Migrated esophageal stent        SOCIAL HISTORY: Substance Use (street drugs): ( x ) never used  (  ) other:    FAMILY HISTORY:  FH: thyroid disease (Grandparent)        REVIEW OF SYSTEMS:  CONSTITUTIONAL: No fever, weight loss, or fatigue  EYES: No eye pain, visual disturbances, or discharge  ENMT:  No difficulty hearing, tinnitus, vertigo; No sinus or throat pain  BREASTS: No pain, masses, or nipple discharge  GASTROINTESTINAL: No abdominal or epigastric pain. No nausea, vomiting, or hematemesis; No diarrhea or constipation. No melena or hematochezia.  GENITOURINARY: No dysuria, frequency, hematuria, or incontinence  NEUROLOGICAL: No headaches, memory loss, loss of strength, numbness, or tremors  ENDOCRINE: No heat or cold intolerance; No hair loss  MUSCULOSKELETAL: No joint pain or swelling; No muscle, back, or extremity pain  PSYCHIATRIC: No depression, anxiety, mood swings, or difficulty sleeping  HEME/LYMPH: No easy bruising, or bleeding gums  All others negative    PHYSICAL EXAM:  T(C): 36.7 (04-23-22 @ 12:00), Max: 36.9 (04-22-22 @ 16:00)  HR: 68 (04-23-22 @ 12:00) (54 - 86)  BP: 130/60 (04-23-22 @ 12:00) (118/60 - 132/76)  RR: 19 (04-23-22 @ 12:00) (15 - 21)  SpO2: 98% (04-23-22 @ 12:00) (97% - 99%)  Wt(kg): --  I&O's Summary    22 Apr 2022 07:01  -  23 Apr 2022 07:00  --------------------------------------------------------  IN: 983 mL / OUT: 1503 mL / NET: -520 mL    23 Apr 2022 07:01  -  23 Apr 2022 15:03  --------------------------------------------------------  IN: 396 mL / OUT: 650 mL / NET: -254 mL      GENERAL: NAD  EYES: EOMI, PERRLA, conjunctiva and sclera clear  ENMT: No tonsillar erythema, exudates, or enlargement  Cardiovascular: Normal S1 S2, No JVD, No murmurs, No edema  Respiratory: Lungs clear to auscultation	  Gastrointestinal:  s/p surgery 	  Extremities: No edema                            11.4   4.54  )-----------( 137      ( 23 Apr 2022 04:32 )             34.2     04-23    138  |  104  |  14  ----------------------------<  101<H>  4.5   |  22  |  0.60    Ca    9.0      23 Apr 2022 04:32  Phos  4.0     04-23  Mg     2.00     04-23      proBNP:   Lipid Profile:   HgA1c:   TSH:     Consultant(s) Notes Reviewed:  [x ] YES  [ ] NO    Care Discussed with Consultants/Other Providers [ x] YES  [ ] NO    Imaging Personally Reviewed independently:  [x] YES  [ ] NO    All labs, radiologic studies, vitals, orders and medications list reviewed. Patient is seen and examined at bedside. Case discussed with medical team.

## 2022-04-23 NOTE — PROGRESS NOTE ADULT - SUBJECTIVE AND OBJECTIVE BOX
CHIEF COMPLAINT: FOLLOW UP IN ICU FOR POSTOPERATIVE CARE OF PATIENT WHO IS S/P ESOPHAGECTOMY       PROCEDURES:    - Robotic Assisted [place] Mahesh esophagectomy, J tube placement.  15-Apr-2022        ISSUES:    Esophageal stricture s/p multiple dilations  Hiatal hernia  Postoperative pain  Chest tubes in place  Anxiety  GERD  Hx of Substance abuse (cocaine, heroin, IVDU), on suboxone         INTERVAL EVENTS:    Chest tube without airleak.         HISTORY:    Patient reports moderate pain at chest wall incision sites which is worse with coughing and deep breathing without associated fever or dyspnea. Pain is improved with use of pain meds.           PHYSICAL EXAM:      Gen: Comfortable    Eyes: Sclera white, Conjunctiva normal, Eyelids normal, Pupils symmetrical      ENT: Mucous membranes moist,     Neck: Trachea midline,  ,  ,  ,  ,  ,       CV: Rate regular, Rhythm regular,  ,  ,       Resp: Breath sounds clear, No accessory muscles use, R chest tube in place,  ,       Abd: Soft, Non-distended, Non-tender, Bowel sounds normal,  jejunostomy tube in place      Skin: Warm, No peripheral edema of lower extremities,  ,       :  No baum    Neuro: Moving all 4 extremities,       Psych: A&Ox3               ASSESSMENT AND PLAN:           NEURO:     Post-operative Pain - Stable. Pain control with dilaudid IV, toradol PRN per Pain Management recommendations.  Anxiety - stable. Paroxetine, Clonazepam and lorazepam per Psych consult.    Hx of substance abuse - stable.           RESPIRATORY:   Hypoxia - Wean nasal cannula for goal O2sat above 92. Obtain CXR. Incentive spirometry. Chest PT and frequent suctioning. Continue bronchodilators. OOB to chair & ambulate w/ assistance. Continuous pulse oximetry for support & to prevent decompensation.     Discontinue chest tube      CARDIOVASCULAR:     Hemodynamically stable - Not on pressors. Continue hemodynamic monitoring.     Telemetry (medical test) - Reviewed by me today independently. Normal sinus rhythm.             RENAL:     Stable - Monitor IOs and electrolytes. Keep K above 4.0 and Mg above 2.0.             GASTROINTESTINAL:     GI prophylaxis not indicated    Zofran and Reglan IV PRN for nausea     J tube feeds     GERD - stable. Continue pantoprazole.  Hiatal hernia - s/p esophagectomy. Stable.           HEMATOLOGIC:     No signs of active bleeding. Monitor Hgb in CBC in AM     DVT prophylaxis with heparin subQ and SCDs.           INFECTIOUS DISEASE:     All surgical sites appear clean. No signs of active infection. Will monitor for fever and leukocytosis.           ENDOCRINE:     Stable – Monitor glucose fingersticks for goal 120-180.             Pertinent clinical, laboratory, radiographic, hemodynamic, echocardiographic, respiratory data, microbiologic data and chart were reviewed by myself and analyzed frequently throughout the course of the day and night by myself.     Plan discussed at length with the CTICU staff and Attending CT Surgeon -   Dr. Deniz Potter  Patient's status was discussed with patient at bedside.     _________________________  VITAL SIGNS:  Vital Signs Last 24 Hrs  T(C): 36.4 (23 Apr 2022 08:00), Max: 36.9 (22 Apr 2022 16:00)  T(F): 97.5 (23 Apr 2022 08:00), Max: 98.4 (22 Apr 2022 16:00)  HR: 86 (23 Apr 2022 08:00) (54 - 86)  BP: 126/66 (23 Apr 2022 08:00) (118/60 - 132/76)  BP(mean): 81 (23 Apr 2022 08:00) (73 - 87)  RR: 18 (23 Apr 2022 08:00) (15 - 21)  SpO2: 98% (23 Apr 2022 08:00) (97% - 99%)  I/Os:   I&O's Detail    22 Apr 2022 07:01  -  23 Apr 2022 07:00  --------------------------------------------------------  IN:    Enteral Tube Flush: 150 mL    TwoCal HN: 833 mL  Total IN: 983 mL    OUT:    Chest Tube (mL): 100 mL    Stool (mL): 3 mL    Voided (mL): 1400 mL  Total OUT: 1503 mL    Total NET: -520 mL      23 Apr 2022 07:01  -  23 Apr 2022 12:42  --------------------------------------------------------  IN:    TwoCal HN: 126 mL  Total IN: 126 mL    OUT:    Chest Tube (mL): 50 mL  Total OUT: 50 mL    Total NET: 76 mL              MEDICATIONS:  MEDICATIONS  (STANDING):  acetaminophen   IVPB .. 1000 milliGRAM(s) IV Intermittent once  clonazePAM  Tablet 1 milliGRAM(s) Oral two times a day  heparin   Injectable 5000 Unit(s) SubCutaneous every 8 hours  ketorolac   Injectable 15 milliGRAM(s) IV Push every 6 hours  lidocaine   4% Patch 1 Patch Transdermal daily  pantoprazole  Injectable 40 milliGRAM(s) IV Push daily  PARoxetine 20 milliGRAM(s) Oral daily    MEDICATIONS  (PRN):  diphenhydrAMINE Injectable 25 milliGRAM(s) IV Push every 4 hours PRN Pruritus  HYDROmorphone  Injectable 0.5 milliGRAM(s) IV Push every 3 hours PRN Moderate Pain (4 - 6)  HYDROmorphone  Injectable 1 milliGRAM(s) IV Push every 4 hours PRN Severe Pain (7 - 10)  HYDROmorphone  Injectable 0.3 milliGRAM(s) IV Push every 3 hours PRN Severe breakthrough Pain (7 - 10)  hydrOXYzine hydrochloride Syrup 25 milliGRAM(s) Oral every 6 hours PRN Anxiety  LORazepam   Injectable 1 milliGRAM(s) IV Push every 8 hours PRN Anxiety  nalbuphine Injectable 2.5 milliGRAM(s) IV Push every 6 hours PRN Pruritus  naloxone Injectable 0.1 milliGRAM(s) IV Push every 3 minutes PRN For ANY of the following changes in patient status:  A. RR LESS THAN 10 breaths per minute, B. Oxygen saturation LESS THAN 90%, C. Sedation score of 6  ondansetron Injectable 4 milliGRAM(s) IV Push every 6 hours PRN Nausea  tetracaine/benzocaine/butamben Spray 1 Spray(s) Topical every 3 hours PRN pain      LABS:  Laboratory data was independently reviewed by me today.                           11.4   4.54  )-----------( 137      ( 23 Apr 2022 04:32 )             34.2     04-23    138  |  104  |  14  ----------------------------<  101<H>  4.5   |  22  |  0.60    Ca    9.0      23 Apr 2022 04:32  Phos  4.0     04-23  Mg     2.00     04-23                RADIOLOGY:   Radiology images were independently reviewed by me today. Reports were reviewed by me today.    Xray Chest 1 View- PORTABLE-Routine:   ACC: [id number] EXAM:  XR CHEST PORTABLE ROUTINE 1V                          PROCEDURE DATE:  04/22/2022          INTERPRETATION:  Chest one view    HISTORY: Postop esophagectomy    COMPARISON STUDY: 4/21/2022    Frontal expiratory view of the chest shows the heart to be normal in   size. Right chest tube remains present.    The lungs show clear left lung with similar medial right chest density   compatible with interposed stomach and there is no evidence of   pneumothorax nor pleural effusion.    IMPRESSION:  No active pulmonary disease.        Thank you for the courtesy of this referral.    --- End of Report ---            HAKAN LOAIZA MD; Attending Interventional Radiologist  This document has been electronically signed. Apr 22 2022  4:14PM (04-22-22 @ 06:51)  Xray Chest 1 View- PORTABLE-Routine:   ACC: [id number] EXAM:  XR CHEST PORTABLE ROUTINE 1V                          PROCEDURE DATE:  04/21/2022          INTERPRETATION:  TIME OF EXAM: April 21, 2022 at 6:14 AM.    CLINICAL INFORMATION: Status post esophagectomy. Follow-up.    COMPARISON:April 20, 2022.    TECHNIQUE:   AP Portable chest x-ray. Costophrenic angles excluded from   image.    INTERPRETATION:    The heart is not enlarged.  Opacity projecting over the medial right hemithorax corresponds to a   gastric pull-through status post esophagectomy.  Right chest tube unchanged in position.  No focal lung consolidation, pleural effusion, or pneumothorax.  No acute bony abnormality.  Minimal right subcutaneous emphysema, slightly decreased.      IMPRESSION:  Status post esophagectomy with gastric pull-through again   noted.    Right chest tube unchanged in position. No pneumothorax.    Minimal right subcutaneous emphysema, slightly decreased.    --- End of Report ---            CASH GUIDO MD; Attending Radiologist  This document has been electronically signed. Apr 21 2022  4:58PM (04-21-22 @ 06:46)  Xray Chest 1 View- PORTABLE-Routine:   ACC: [id number] EXAM:  XR CHEST PORTABLE ROUTINE 1V                          PROCEDURE DATE:  04/20/2022          INTERPRETATION:  Chest one view    HISTORY: Postop esophagectomy    COMPARISON STUDY: 4/19/2022    Frontal expiratory view of the chest shows the heart to be normal in   size. Right chest tube remains present. Right paracardiac density is   compatible with intrathoracic stomach.    The lungs are clear and there is no evidence of pneumothorax nor pleural   effusion.    IMPRESSION:  No active pulmonary disease.        Thank you for the courtesy of this referral.    --- End of Report ---            HAKAN LOAIZA MD; Attending Interventional Radiologist  This document has been electronically signed. Apr 20 2022  3:44PM (04-20-22 @ 07:09)

## 2022-04-24 LAB
ANION GAP SERPL CALC-SCNC: 10 MMOL/L — SIGNIFICANT CHANGE UP (ref 7–14)
BUN SERPL-MCNC: 15 MG/DL — SIGNIFICANT CHANGE UP (ref 7–23)
CALCIUM SERPL-MCNC: 9 MG/DL — SIGNIFICANT CHANGE UP (ref 8.4–10.5)
CHLORIDE SERPL-SCNC: 102 MMOL/L — SIGNIFICANT CHANGE UP (ref 98–107)
CO2 SERPL-SCNC: 23 MMOL/L — SIGNIFICANT CHANGE UP (ref 22–31)
CREAT SERPL-MCNC: 0.57 MG/DL — SIGNIFICANT CHANGE UP (ref 0.5–1.3)
EGFR: 139 ML/MIN/1.73M2 — SIGNIFICANT CHANGE UP
GLUCOSE SERPL-MCNC: 116 MG/DL — HIGH (ref 70–99)
HCT VFR BLD CALC: 33.6 % — LOW (ref 39–50)
HGB BLD-MCNC: 11.1 G/DL — LOW (ref 13–17)
MAGNESIUM SERPL-MCNC: 1.9 MG/DL — SIGNIFICANT CHANGE UP (ref 1.6–2.6)
MCHC RBC-ENTMCNC: 27.7 PG — SIGNIFICANT CHANGE UP (ref 27–34)
MCHC RBC-ENTMCNC: 33 GM/DL — SIGNIFICANT CHANGE UP (ref 32–36)
MCV RBC AUTO: 83.8 FL — SIGNIFICANT CHANGE UP (ref 80–100)
NRBC # BLD: 0 /100 WBCS — SIGNIFICANT CHANGE UP
NRBC # FLD: 0 K/UL — SIGNIFICANT CHANGE UP
PHOSPHATE SERPL-MCNC: 4.1 MG/DL — SIGNIFICANT CHANGE UP (ref 2.5–4.5)
PLATELET # BLD AUTO: 151 K/UL — SIGNIFICANT CHANGE UP (ref 150–400)
POTASSIUM SERPL-MCNC: 4.4 MMOL/L — SIGNIFICANT CHANGE UP (ref 3.5–5.3)
POTASSIUM SERPL-SCNC: 4.4 MMOL/L — SIGNIFICANT CHANGE UP (ref 3.5–5.3)
RBC # BLD: 4.01 M/UL — LOW (ref 4.2–5.8)
RBC # FLD: 14.8 % — HIGH (ref 10.3–14.5)
SODIUM SERPL-SCNC: 135 MMOL/L — SIGNIFICANT CHANGE UP (ref 135–145)
WBC # BLD: 4.58 K/UL — SIGNIFICANT CHANGE UP (ref 3.8–10.5)
WBC # FLD AUTO: 4.58 K/UL — SIGNIFICANT CHANGE UP (ref 3.8–10.5)

## 2022-04-24 PROCEDURE — 71045 X-RAY EXAM CHEST 1 VIEW: CPT | Mod: 26

## 2022-04-24 PROCEDURE — 74019 RADEX ABDOMEN 2 VIEWS: CPT | Mod: 26

## 2022-04-24 RX ORDER — IBUPROFEN 200 MG
400 TABLET ORAL EVERY 6 HOURS
Refills: 0 | Status: COMPLETED | OUTPATIENT
Start: 2022-04-24 | End: 2022-04-27

## 2022-04-24 RX ORDER — CLONAZEPAM 1 MG
0.5 TABLET ORAL
Refills: 0 | Status: DISCONTINUED | OUTPATIENT
Start: 2022-04-24 | End: 2022-04-27

## 2022-04-24 RX ORDER — OXYCODONE HYDROCHLORIDE 5 MG/1
5 TABLET ORAL
Refills: 0 | Status: DISCONTINUED | OUTPATIENT
Start: 2022-04-24 | End: 2022-04-26

## 2022-04-24 RX ORDER — SIMETHICONE 80 MG/1
80 TABLET, CHEWABLE ORAL THREE TIMES A DAY
Refills: 0 | Status: DISCONTINUED | OUTPATIENT
Start: 2022-04-24 | End: 2022-04-27

## 2022-04-24 RX ORDER — ACETAMINOPHEN 500 MG
650 TABLET ORAL EVERY 6 HOURS
Refills: 0 | Status: DISCONTINUED | OUTPATIENT
Start: 2022-04-24 | End: 2022-04-24

## 2022-04-24 RX ORDER — ACETAMINOPHEN 500 MG
1000 TABLET ORAL ONCE
Refills: 0 | Status: COMPLETED | OUTPATIENT
Start: 2022-04-24 | End: 2022-04-24

## 2022-04-24 RX ORDER — OXYCODONE HYDROCHLORIDE 5 MG/1
10 TABLET ORAL
Refills: 0 | Status: DISCONTINUED | OUTPATIENT
Start: 2022-04-24 | End: 2022-04-24

## 2022-04-24 RX ORDER — OXYCODONE HYDROCHLORIDE 5 MG/1
10 TABLET ORAL
Refills: 0 | Status: DISCONTINUED | OUTPATIENT
Start: 2022-04-24 | End: 2022-04-27

## 2022-04-24 RX ORDER — HYDROXYZINE HCL 10 MG
25 TABLET ORAL EVERY 6 HOURS
Refills: 0 | Status: DISCONTINUED | OUTPATIENT
Start: 2022-04-24 | End: 2022-04-27

## 2022-04-24 RX ORDER — PANTOPRAZOLE SODIUM 20 MG/1
40 TABLET, DELAYED RELEASE ORAL
Refills: 0 | Status: DISCONTINUED | OUTPATIENT
Start: 2022-04-24 | End: 2022-04-27

## 2022-04-24 RX ADMIN — Medication 400 MILLIGRAM(S): at 16:15

## 2022-04-24 RX ADMIN — Medication 15 MILLIGRAM(S): at 01:00

## 2022-04-24 RX ADMIN — HEPARIN SODIUM 5000 UNIT(S): 5000 INJECTION INTRAVENOUS; SUBCUTANEOUS at 21:29

## 2022-04-24 RX ADMIN — OXYCODONE HYDROCHLORIDE 10 MILLIGRAM(S): 5 TABLET ORAL at 13:46

## 2022-04-24 RX ADMIN — Medication 1000 MILLIGRAM(S): at 17:23

## 2022-04-24 RX ADMIN — HYDROMORPHONE HYDROCHLORIDE 1 MILLIGRAM(S): 2 INJECTION INTRAMUSCULAR; INTRAVENOUS; SUBCUTANEOUS at 02:00

## 2022-04-24 RX ADMIN — SIMETHICONE 80 MILLIGRAM(S): 80 TABLET, CHEWABLE ORAL at 22:41

## 2022-04-24 RX ADMIN — HEPARIN SODIUM 5000 UNIT(S): 5000 INJECTION INTRAVENOUS; SUBCUTANEOUS at 05:46

## 2022-04-24 RX ADMIN — Medication 0.5 MILLIGRAM(S): at 17:46

## 2022-04-24 RX ADMIN — LIDOCAINE 1 PATCH: 4 CREAM TOPICAL at 01:00

## 2022-04-24 RX ADMIN — OXYCODONE HYDROCHLORIDE 10 MILLIGRAM(S): 5 TABLET ORAL at 13:16

## 2022-04-24 RX ADMIN — Medication 1 MILLIGRAM(S): at 05:46

## 2022-04-24 RX ADMIN — Medication 20 MILLIGRAM(S): at 13:01

## 2022-04-24 RX ADMIN — OXYCODONE HYDROCHLORIDE 10 MILLIGRAM(S): 5 TABLET ORAL at 17:46

## 2022-04-24 RX ADMIN — SIMETHICONE 80 MILLIGRAM(S): 80 TABLET, CHEWABLE ORAL at 22:35

## 2022-04-24 RX ADMIN — PANTOPRAZOLE SODIUM 40 MILLIGRAM(S): 20 TABLET, DELAYED RELEASE ORAL at 21:29

## 2022-04-24 RX ADMIN — HYDROMORPHONE HYDROCHLORIDE 1 MILLIGRAM(S): 2 INJECTION INTRAMUSCULAR; INTRAVENOUS; SUBCUTANEOUS at 10:32

## 2022-04-24 RX ADMIN — OXYCODONE HYDROCHLORIDE 10 MILLIGRAM(S): 5 TABLET ORAL at 22:36

## 2022-04-24 RX ADMIN — LIDOCAINE 1 PATCH: 4 CREAM TOPICAL at 19:49

## 2022-04-24 RX ADMIN — Medication 15 MILLIGRAM(S): at 01:13

## 2022-04-24 RX ADMIN — HYDROMORPHONE HYDROCHLORIDE 1 MILLIGRAM(S): 2 INJECTION INTRAMUSCULAR; INTRAVENOUS; SUBCUTANEOUS at 01:13

## 2022-04-24 RX ADMIN — HYDROMORPHONE HYDROCHLORIDE 1 MILLIGRAM(S): 2 INJECTION INTRAMUSCULAR; INTRAVENOUS; SUBCUTANEOUS at 10:02

## 2022-04-24 RX ADMIN — Medication 400 MILLIGRAM(S): at 21:30

## 2022-04-24 RX ADMIN — OXYCODONE HYDROCHLORIDE 10 MILLIGRAM(S): 5 TABLET ORAL at 18:55

## 2022-04-24 RX ADMIN — LIDOCAINE 1 PATCH: 4 CREAM TOPICAL at 13:00

## 2022-04-24 RX ADMIN — Medication 400 MILLIGRAM(S): at 22:00

## 2022-04-24 RX ADMIN — OXYCODONE HYDROCHLORIDE 10 MILLIGRAM(S): 5 TABLET ORAL at 23:06

## 2022-04-24 RX ADMIN — HEPARIN SODIUM 5000 UNIT(S): 5000 INJECTION INTRAVENOUS; SUBCUTANEOUS at 13:18

## 2022-04-24 RX ADMIN — Medication 400 MILLIGRAM(S): at 14:01

## 2022-04-24 RX ADMIN — HYDROMORPHONE HYDROCHLORIDE 1 MILLIGRAM(S): 2 INJECTION INTRAMUSCULAR; INTRAVENOUS; SUBCUTANEOUS at 05:46

## 2022-04-24 RX ADMIN — SIMETHICONE 80 MILLIGRAM(S): 80 TABLET, CHEWABLE ORAL at 13:00

## 2022-04-24 NOTE — PROGRESS NOTE ADULT - SUBJECTIVE AND OBJECTIVE BOX
Subjective: "I feel really good today, better up here" Pt states having a good day so far. States BMs becoming more formed, less frequent. C/o feeling gassy/bloated at times. Amb ad zhane throughout room. No CP or SOB. Off oxygen. Pt states able to flush and manage J tube independently. Discussed w pt plans for transitioning to oral pain meds only, pt in agreement.     Vital Signs:  Vital Signs Last 24 Hrs  T(C): 36.7 (04-24-22 @ 13:00), Max: 36.7 (04-24-22 @ 13:00)  T(F): 98 (04-24-22 @ 13:00), Max: 98 (04-24-22 @ 13:00)  HR: 73 (04-24-22 @ 13:00) (57 - 81)  BP: 122/68 (04-24-22 @ 13:00) (114/99 - 132/73)  RR: 18 (04-24-22 @ 13:00) (17 - 20)  SpO2: 99% (04-24-22 @ 13:00) (96% - 100%) on (O2)    Telemetry/Alarms: Sr/ST  General: WN/WD NAD  Neurology: Awake, nonfocal, FERRARO x 4  Eyes: Scleras clear, PERRLA/ EOMI, Gross vision intact  ENT:Gross hearing intact, grossly patent pharynx, no stridor  Neck: Neck supple, trachea midline, No JVD,   Respiratory: slight Dec BS to rt base  CV: RRR, S1S2, no murmurs, rubs or gallops  Abdominal: Soft, NT, slight tender at J tube site, however generally very soft, NT abd. + flatus, +BM. Saran noct TF overnight.   Extremities: No edema, + peripheral pulses  Skin: No Rashes, Hematoma, Ecchymosis  Lymphatic: No Neck, axilla, groin LAD  Psych: Oriented x 3, normal affect  Incisions: Rt CHest incision c/d/i. tubes removed. Abd incisions all MACEY, c/d/i.   Tubes:  J tube site c/d/i.   Relevant labs, radiology and Medications reviewed           CXR stable             11.1   4.58  )-----------( 151      ( 24 Apr 2022 05:00 )             33.6     04-24    135  |  102  |  15  ----------------------------<  116<H>  4.4   |  23  |  0.57    Ca    9.0      24 Apr 2022 05:00  Phos  4.1     04-24  Mg     1.90     04-24        MEDICATIONS  (STANDING):  acetaminophen   IVPB .. 1000 milliGRAM(s) IV Intermittent once  clonazePAM  Tablet 0.5 milliGRAM(s) Oral two times a day  heparin   Injectable 5000 Unit(s) SubCutaneous every 8 hours  ibuprofen  Suspension. 400 milliGRAM(s) Oral every 6 hours  lidocaine   4% Patch 1 Patch Transdermal daily  pantoprazole    Tablet 40 milliGRAM(s) Oral before breakfast  PARoxetine 20 milliGRAM(s) Oral daily  simethicone 80 milliGRAM(s) Chew three times a day    MEDICATIONS  (PRN):  hydrOXYzine hydrochloride 25 milliGRAM(s) Oral every 6 hours PRN Anxiety  LORazepam     Tablet 1 milliGRAM(s) Oral every 8 hours PRN Anxiety  naloxone Injectable 0.1 milliGRAM(s) IV Push every 3 minutes PRN For ANY of the following changes in patient status:  A. RR LESS THAN 10 breaths per minute, B. Oxygen saturation LESS THAN 90%, C. Sedation score of 6  ondansetron Injectable 4 milliGRAM(s) IV Push every 6 hours PRN Nausea  oxyCODONE    IR 5 milliGRAM(s) Oral every 3 hours PRN Moderate Pain (4 - 6)  oxyCODONE    IR 10 milliGRAM(s) Oral every 3 hours PRN Severe Pain (7 - 10)  tetracaine/benzocaine/butamben Spray 1 Spray(s) Topical every 3 hours PRN pain    Pertinent Physical Exam  I&O's Summary    23 Apr 2022 07:01  -  24 Apr 2022 07:00  --------------------------------------------------------  IN: 1758 mL / OUT: 2251 mL / NET: -493 mL        Assessment  26y Male  w/ PAST MEDICAL & SURGICAL HISTORY:  Heroin abuse    Cocaine abuse    GERD (gastroesophageal reflux disease)    Hiatal hernia    History of esophageal stricture    Intravenous drug abuse    Migration of pancreatic stent    Migrated esophageal stent    admitted with complaints of Patient is a 26y old  Male who presents with a chief complaint of Inability to tolerate PO (24 Apr 2022 13:32)     PROCEDURES:    - Robotic Assisted [place] Mahesh esophagectomy, J tube placement.  15-Apr-2022        ISSUES:    Esophageal stricture s/p multiple dilations  Hiatal hernia  Postoperative pain  Chest tubes in place  Anxiety  GERD  Hx of Substance abuse (cocaine, heroin, IVDU), on suboxone        Post op Pt seen by Psychiatry, Pain management, Cardiology. Pt now on nocturnal TF, tolerating it well. Still working on home insurance approval for feeds. Last CT removed on 4/23. Pt given teaching for J tube management. 4/21-Barium swallow showed no leak. 4/24 pt transferred to . Reccs from Psych noted, orders changed. Will wean off Klonopin. Can cont other meds for anxiety. Today, plan d/w with pain management to change pain regiment to po only. D/c planning in place, awaiting TF approval.     PLAN  Neuro: Pain management. Reccs per pain management team.   Pulm: Encourage coughing, deep breathing and use of incentive spirometry.  Daily CXR.   Cardio: Monitor telemetry/alarms  GI: NPO. On Nocturnal tube feeds. Free water.   Renal: monitor urine output, supplement electrolytes as needed  Vasc: Heparin SC/SCDs for DVT prophylaxis  Heme: Stable H/H. .   ID: Off antibiotics. Stable.  Therapy: OOB/ambulate  Disposition: Aim to D/C to home once TF approved and arranged.   Discussed with Cardiothoracic Team at AM rounds.

## 2022-04-24 NOTE — PROGRESS NOTE ADULT - ASSESSMENT
27yo M w/ PMHx of esophageal stricture s/p multiple dilations and IVDU on Suboxone presenting with inability to tolerate oral intake for the past 30 days.     EKG: NSR No acute changes    1. Post-op assessment  -denies CP, SOB  -echo with grossly mild global LV systolic dysfunction (new compared to 2021) however patient denies CP or SOB on exertion.no plan for further cardiac w/u  -s/p esophagectomy.  off ketamine gtt. on PCA pump and IV Toradol with better pain control. f/u pain c/s       2. Dysphagia  -hx of esophageal stricture s/p multiple dilations  -s/p esophagectomy and j-tube placement  -f/u GI and thoracic surgery     3. DVT PPX  - heparin

## 2022-04-24 NOTE — PROGRESS NOTE ADULT - SUBJECTIVE AND OBJECTIVE BOX
Christiano Hoyt MD  Interventional Cardiology / Endovascular Specialist  [place] Office : [phone] [place] [address] [place] [address]  Tel:   [place] Office : [phone] [facility] [place] [address]  Tel: [phone]      Subjective/Overnight events: Patient sitting up in chair.Denies chest pain, SOB or palpitations. 	  MEDICATIONS:  heparin   Injectable 5000 Unit(s) SubCutaneous every 8 hours        acetaminophen    Suspension .. 650 milliGRAM(s) Oral every 6 hours  clonazePAM  Tablet 0.5 milliGRAM(s) Oral two times a day  hydrOXYzine hydrochloride 25 milliGRAM(s) Oral every 6 hours PRN  ibuprofen  Suspension. 400 milliGRAM(s) Oral every 6 hours  LORazepam     Tablet 1 milliGRAM(s) Oral every 8 hours PRN  ondansetron Injectable 4 milliGRAM(s) IV Push every 6 hours PRN  oxyCODONE    IR 5 milliGRAM(s) Oral every 3 hours PRN  oxyCODONE    IR 10 milliGRAM(s) Oral every 3 hours PRN  PARoxetine 20 milliGRAM(s) Oral daily    pantoprazole    Tablet 40 milliGRAM(s) Oral before breakfast  simethicone 80 milliGRAM(s) Chew three times a day      lidocaine   4% Patch 1 Patch Transdermal daily  tetracaine/benzocaine/butamben Spray 1 Spray(s) Topical every 3 hours PRN      PAST MEDICAL/SURGICAL HISTORY  PAST MEDICAL & SURGICAL HISTORY:  Heroin abuse    Cocaine abuse    GERD (gastroesophageal reflux disease)    Hiatal hernia    History of esophageal stricture    Intravenous drug abuse    Migration of pancreatic stent    Migrated esophageal stent        SOCIAL HISTORY: Substance Use (street drugs): ( x ) never used  (  ) other:    FAMILY HISTORY:  FH: thyroid disease (Grandparent)        REVIEW OF SYSTEMS:  CONSTITUTIONAL: No fever, weight loss, or fatigue  EYES: No eye pain, visual disturbances, or discharge  ENMT:  No difficulty hearing, tinnitus, vertigo; No sinus or throat pain  BREASTS: No pain, masses, or nipple discharge  GASTROINTESTINAL: No abdominal or epigastric pain. No nausea, vomiting, or hematemesis; No diarrhea or constipation. No melena or hematochezia.  GENITOURINARY: No dysuria, frequency, hematuria, or incontinence  NEUROLOGICAL: No headaches, memory loss, loss of strength, numbness, or tremors  ENDOCRINE: No heat or cold intolerance; No hair loss  MUSCULOSKELETAL: No joint pain or swelling; No muscle, back, or extremity pain  PSYCHIATRIC: No depression, anxiety, mood swings, or difficulty sleeping  HEME/LYMPH: No easy bruising, or bleeding gums  All others negative    PHYSICAL EXAM:  T(C): 36.7 (04-24-22 @ 13:00), Max: 36.7 (04-24-22 @ 13:00)  HR: 73 (04-24-22 @ 13:00) (57 - 81)  BP: 122/68 (04-24-22 @ 13:00) (114/99 - 132/73)  RR: 18 (04-24-22 @ 13:00) (17 - 20)  SpO2: 99% (04-24-22 @ 13:00) (96% - 100%)  Wt(kg): --  I&O's Summary    23 Apr 2022 07:01  -  24 Apr 2022 07:00  --------------------------------------------------------  IN: 1758 mL / OUT: 2251 mL / NET: -493 mL      GENERAL: NAD  EYES: EOMI, PERRLA, conjunctiva and sclera clear  ENMT: No tonsillar erythema, exudates, or enlargement  Cardiovascular: Normal S1 S2, No JVD, No murmurs, No edema  Respiratory: Lungs clear to auscultation	  Gastrointestinal:  s/p surgery 	  Extremities: No edema                            11.1   4.58  )-----------( 151      ( 24 Apr 2022 05:00 )             33.6     04-24    135  |  102  |  15  ----------------------------<  116<H>  4.4   |  23  |  0.57    Ca    9.0      24 Apr 2022 05:00  Phos  4.1     04-24  Mg     1.90     04-24      proBNP:   Lipid Profile:   HgA1c:   TSH:     Consultant(s) Notes Reviewed:  [x ] YES  [ ] NO    Care Discussed with Consultants/Other Providers [ x] YES  [ ] NO    Imaging Personally Reviewed independently:  [x] YES  [ ] NO    All labs, radiologic studies, vitals, orders and medications list reviewed. Patient is seen and examined at bedside. Case discussed with medical team.

## 2022-04-24 NOTE — CHART NOTE - NSCHARTNOTEFT_GEN_A_CORE
Called by RN to sabra pt for inc'd c/o pain  Pt seen, sitting in chair. Mother at bedside.   Pt states he was in bathroom and strained to have BM, now  is having severe, very lower abd pain.   Requesting IVP Dilaudid.   Pt seen and examined. Abd exam grossly normal  Pt pointing to left very lower, almost at groin, abd   pain. Slightly worse w palpation. Denies any other abd complaints.   no n/v/d or reflux. Pt did not have BM  Pt told would be best to avoid IVP narcotics and give other pain meds  a chance to work. Will give IV Ofirmev and   Will order Abd Xray to r/o any other pathology.

## 2022-04-24 NOTE — PROGRESS NOTE ADULT - SUBJECTIVE AND OBJECTIVE BOX
Anesthesia Pain Management Service    SUBJECTIVE: Patient is doing well with PRN IV Dilaudid and no significant problems reported.    Pain Scale Score	At rest:3	With Activity:5	[X ] Refer to charted pain scores        MEDICATIONS  (STANDING):  acetaminophen   IVPB .. 1000 milliGRAM(s) IV Intermittent once  clonazePAM  Tablet 0.5 milliGRAM(s) Oral two times a day  heparin   Injectable 5000 Unit(s) SubCutaneous every 8 hours  lidocaine   4% Patch 1 Patch Transdermal daily  pantoprazole    Tablet 40 milliGRAM(s) Oral before breakfast  PARoxetine 20 milliGRAM(s) Oral daily  simethicone 80 milliGRAM(s) Chew three times a day    MEDICATIONS  (PRN):  HYDROmorphone  Injectable 0.3 milliGRAM(s) IV Push every 3 hours PRN Severe breakthrough Pain (7 - 10)  hydrOXYzine hydrochloride 25 milliGRAM(s) Oral every 6 hours PRN Anxiety  LORazepam     Tablet 1 milliGRAM(s) Oral every 8 hours PRN Anxiety  naloxone Injectable 0.1 milliGRAM(s) IV Push every 3 minutes PRN For ANY of the following changes in patient status:  A. RR LESS THAN 10 breaths per minute, B. Oxygen saturation LESS THAN 90%, C. Sedation score of 6  ondansetron Injectable 4 milliGRAM(s) IV Push every 6 hours PRN Nausea  oxyCODONE    IR 5 milliGRAM(s) Oral every 3 hours PRN Moderate Pain (4 - 6)  oxyCODONE    IR 10 milliGRAM(s) Oral every 3 hours PRN Severe Pain (7 - 10)  tetracaine/benzocaine/butamben Spray 1 Spray(s) Topical every 3 hours PRN pain      OBJECTIVE:    Sedation Score:	[ X] Alert	[ ] Drowsy 	[ ] Arousable	[ ] Asleep	[ ] Unresponsive    Side Effects:	[X ] None	[ ] Nausea	[ ] Vomiting	[ ] Pruritus  		[ ] Other:    Vital Signs Last 24 Hrs  T(C): 36.6 (24 Apr 2022 07:02), Max: 36.6 (23 Apr 2022 16:00)  T(F): 97.8 (24 Apr 2022 07:02), Max: 97.9 (23 Apr 2022 16:00)  HR: 81 (24 Apr 2022 07:02) (57 - 81)  BP: 117/50 (24 Apr 2022 07:02) (114/99 - 132/73)  BP(mean): 87 (24 Apr 2022 04:54) (81 - 102)  RR: 19 (24 Apr 2022 07:02) (17 - 20)  SpO2: 100% (24 Apr 2022 07:02) (96% - 100%)    ASSESSMENT/ PLAN    Therapy to  be:	[ ] Continue   [ X] Discontinued   [X ] Change to prn Analgesics    Documentation and Verification of current medications:   [X] Done	[ ] Not done, not elligible    Comments: PRN Oral/IV opioids and/or Adjuvant non-opioid medication to be ordered at this point.    Progress Note written now but Patient was seen earlier.

## 2022-04-25 LAB
ANION GAP SERPL CALC-SCNC: 14 MMOL/L — SIGNIFICANT CHANGE UP (ref 7–14)
BUN SERPL-MCNC: 19 MG/DL — SIGNIFICANT CHANGE UP (ref 7–23)
CALCIUM SERPL-MCNC: 9.8 MG/DL — SIGNIFICANT CHANGE UP (ref 8.4–10.5)
CHLORIDE SERPL-SCNC: 98 MMOL/L — SIGNIFICANT CHANGE UP (ref 98–107)
CO2 SERPL-SCNC: 25 MMOL/L — SIGNIFICANT CHANGE UP (ref 22–31)
CREAT SERPL-MCNC: 0.68 MG/DL — SIGNIFICANT CHANGE UP (ref 0.5–1.3)
EGFR: 131 ML/MIN/1.73M2 — SIGNIFICANT CHANGE UP
GLUCOSE SERPL-MCNC: 115 MG/DL — HIGH (ref 70–99)
HCT VFR BLD CALC: 39.3 % — SIGNIFICANT CHANGE UP (ref 39–50)
HGB BLD-MCNC: 13.1 G/DL — SIGNIFICANT CHANGE UP (ref 13–17)
MCHC RBC-ENTMCNC: 28.4 PG — SIGNIFICANT CHANGE UP (ref 27–34)
MCHC RBC-ENTMCNC: 33.3 GM/DL — SIGNIFICANT CHANGE UP (ref 32–36)
MCV RBC AUTO: 85.1 FL — SIGNIFICANT CHANGE UP (ref 80–100)
NRBC # BLD: 0 /100 WBCS — SIGNIFICANT CHANGE UP
NRBC # FLD: 0 K/UL — SIGNIFICANT CHANGE UP
PLATELET # BLD AUTO: 211 K/UL — SIGNIFICANT CHANGE UP (ref 150–400)
POTASSIUM SERPL-MCNC: 4.7 MMOL/L — SIGNIFICANT CHANGE UP (ref 3.5–5.3)
POTASSIUM SERPL-SCNC: 4.7 MMOL/L — SIGNIFICANT CHANGE UP (ref 3.5–5.3)
RBC # BLD: 4.62 M/UL — SIGNIFICANT CHANGE UP (ref 4.2–5.8)
RBC # FLD: 14.9 % — HIGH (ref 10.3–14.5)
SODIUM SERPL-SCNC: 137 MMOL/L — SIGNIFICANT CHANGE UP (ref 135–145)
WBC # BLD: 6.95 K/UL — SIGNIFICANT CHANGE UP (ref 3.8–10.5)
WBC # FLD AUTO: 6.95 K/UL — SIGNIFICANT CHANGE UP (ref 3.8–10.5)

## 2022-04-25 PROCEDURE — 99232 SBSQ HOSP IP/OBS MODERATE 35: CPT

## 2022-04-25 PROCEDURE — 71045 X-RAY EXAM CHEST 1 VIEW: CPT | Mod: 26

## 2022-04-25 RX ORDER — POLYETHYLENE GLYCOL 3350 17 G/17G
17 POWDER, FOR SOLUTION ORAL DAILY
Refills: 0 | Status: DISCONTINUED | OUTPATIENT
Start: 2022-04-25 | End: 2022-04-27

## 2022-04-25 RX ORDER — ACETAMINOPHEN 500 MG
975 TABLET ORAL EVERY 6 HOURS
Refills: 0 | Status: DISCONTINUED | OUTPATIENT
Start: 2022-04-25 | End: 2022-04-27

## 2022-04-25 RX ADMIN — Medication 400 MILLIGRAM(S): at 08:51

## 2022-04-25 RX ADMIN — OXYCODONE HYDROCHLORIDE 5 MILLIGRAM(S): 5 TABLET ORAL at 21:58

## 2022-04-25 RX ADMIN — SIMETHICONE 80 MILLIGRAM(S): 80 TABLET, CHEWABLE ORAL at 05:21

## 2022-04-25 RX ADMIN — Medication 0.5 MILLIGRAM(S): at 17:20

## 2022-04-25 RX ADMIN — OXYCODONE HYDROCHLORIDE 10 MILLIGRAM(S): 5 TABLET ORAL at 06:54

## 2022-04-25 RX ADMIN — Medication 400 MILLIGRAM(S): at 13:13

## 2022-04-25 RX ADMIN — POLYETHYLENE GLYCOL 3350 17 GRAM(S): 17 POWDER, FOR SOLUTION ORAL at 14:05

## 2022-04-25 RX ADMIN — Medication 400 MILLIGRAM(S): at 13:24

## 2022-04-25 RX ADMIN — Medication 400 MILLIGRAM(S): at 09:14

## 2022-04-25 RX ADMIN — OXYCODONE HYDROCHLORIDE 5 MILLIGRAM(S): 5 TABLET ORAL at 00:56

## 2022-04-25 RX ADMIN — Medication 1 MILLIGRAM(S): at 01:58

## 2022-04-25 RX ADMIN — OXYCODONE HYDROCHLORIDE 5 MILLIGRAM(S): 5 TABLET ORAL at 16:12

## 2022-04-25 RX ADMIN — Medication 975 MILLIGRAM(S): at 17:51

## 2022-04-25 RX ADMIN — OXYCODONE HYDROCHLORIDE 10 MILLIGRAM(S): 5 TABLET ORAL at 07:39

## 2022-04-25 RX ADMIN — SIMETHICONE 80 MILLIGRAM(S): 80 TABLET, CHEWABLE ORAL at 21:54

## 2022-04-25 RX ADMIN — Medication 400 MILLIGRAM(S): at 03:30

## 2022-04-25 RX ADMIN — SIMETHICONE 80 MILLIGRAM(S): 80 TABLET, CHEWABLE ORAL at 11:20

## 2022-04-25 RX ADMIN — OXYCODONE HYDROCHLORIDE 5 MILLIGRAM(S): 5 TABLET ORAL at 00:26

## 2022-04-25 RX ADMIN — Medication 20 MILLIGRAM(S): at 11:20

## 2022-04-25 RX ADMIN — Medication 975 MILLIGRAM(S): at 11:55

## 2022-04-25 RX ADMIN — OXYCODONE HYDROCHLORIDE 10 MILLIGRAM(S): 5 TABLET ORAL at 04:20

## 2022-04-25 RX ADMIN — Medication 25 MILLIGRAM(S): at 08:56

## 2022-04-25 RX ADMIN — Medication 400 MILLIGRAM(S): at 21:55

## 2022-04-25 RX ADMIN — OXYCODONE HYDROCHLORIDE 5 MILLIGRAM(S): 5 TABLET ORAL at 22:28

## 2022-04-25 RX ADMIN — LIDOCAINE 1 PATCH: 4 CREAM TOPICAL at 01:09

## 2022-04-25 RX ADMIN — OXYCODONE HYDROCHLORIDE 10 MILLIGRAM(S): 5 TABLET ORAL at 03:40

## 2022-04-25 RX ADMIN — Medication 0.5 MILLIGRAM(S): at 05:21

## 2022-04-25 RX ADMIN — OXYCODONE HYDROCHLORIDE 10 MILLIGRAM(S): 5 TABLET ORAL at 10:35

## 2022-04-25 RX ADMIN — OXYCODONE HYDROCHLORIDE 5 MILLIGRAM(S): 5 TABLET ORAL at 17:10

## 2022-04-25 RX ADMIN — PANTOPRAZOLE SODIUM 40 MILLIGRAM(S): 20 TABLET, DELAYED RELEASE ORAL at 06:54

## 2022-04-25 RX ADMIN — Medication 400 MILLIGRAM(S): at 02:41

## 2022-04-25 RX ADMIN — Medication 1 MILLIGRAM(S): at 13:13

## 2022-04-25 RX ADMIN — HEPARIN SODIUM 5000 UNIT(S): 5000 INJECTION INTRAVENOUS; SUBCUTANEOUS at 05:21

## 2022-04-25 RX ADMIN — OXYCODONE HYDROCHLORIDE 10 MILLIGRAM(S): 5 TABLET ORAL at 11:30

## 2022-04-25 RX ADMIN — Medication 975 MILLIGRAM(S): at 11:20

## 2022-04-25 RX ADMIN — Medication 975 MILLIGRAM(S): at 17:20

## 2022-04-25 NOTE — BH CONSULTATION LIAISON PROGRESS NOTE - CURRENT MEDICATION
MEDICATIONS  (STANDING):  acetaminophen    Suspension .. 650 milliGRAM(s) Enteral Tube every 6 hours  acetaminophen   IVPB .. 1000 milliGRAM(s) IV Intermittent once  clonazePAM  Tablet 1 milliGRAM(s) Oral two times a day  heparin   Injectable 5000 Unit(s) SubCutaneous every 8 hours  ketorolac   Injectable 15 milliGRAM(s) IV Push every 6 hours  pantoprazole  Injectable 40 milliGRAM(s) IV Push daily  PARoxetine 20 milliGRAM(s) Oral daily    MEDICATIONS  (PRN):  diphenhydrAMINE Injectable 25 milliGRAM(s) IV Push every 4 hours PRN Pruritus  HYDROmorphone  Injectable 0.5 milliGRAM(s) IV Push every 3 hours PRN Moderate Pain (4 - 6)  HYDROmorphone  Injectable 1 milliGRAM(s) IV Push every 4 hours PRN Severe Pain (7 - 10)  HYDROmorphone  Injectable 0.3 milliGRAM(s) IV Push every 3 hours PRN Severe breakthrough Pain (7 - 10)  hydrOXYzine hydrochloride Syrup 25 milliGRAM(s) Oral every 6 hours PRN Anxiety  LORazepam   Injectable 1 milliGRAM(s) IV Push every 8 hours PRN Anxiety  nalbuphine Injectable 2.5 milliGRAM(s) IV Push every 6 hours PRN Pruritus  naloxone Injectable 0.1 milliGRAM(s) IV Push every 3 minutes PRN For ANY of the following changes in patient status:  A. RR LESS THAN 10 breaths per minute, B. Oxygen saturation LESS THAN 90%, C. Sedation score of 6  ondansetron Injectable 4 milliGRAM(s) IV Push every 6 hours PRN Nausea  tetracaine/benzocaine/butamben Spray 1 Spray(s) Topical every 3 hours PRN pain  
MEDICATIONS  (STANDING):  acetaminophen    Suspension .. 975 milliGRAM(s) Oral every 6 hours  clonazePAM  Tablet 0.5 milliGRAM(s) Oral two times a day  heparin   Injectable 5000 Unit(s) SubCutaneous every 8 hours  ibuprofen  Suspension. 400 milliGRAM(s) Oral every 6 hours  lidocaine   4% Patch 1 Patch Transdermal daily  pantoprazole    Tablet 40 milliGRAM(s) Oral before breakfast  PARoxetine 20 milliGRAM(s) Oral daily  simethicone 80 milliGRAM(s) Chew three times a day    MEDICATIONS  (PRN):  hydrOXYzine hydrochloride 25 milliGRAM(s) Oral every 6 hours PRN Anxiety  LORazepam     Tablet 1 milliGRAM(s) Oral every 8 hours PRN Anxiety  naloxone Injectable 0.1 milliGRAM(s) IV Push every 3 minutes PRN For ANY of the following changes in patient status:  A. RR LESS THAN 10 breaths per minute, B. Oxygen saturation LESS THAN 90%, C. Sedation score of 6  ondansetron Injectable 4 milliGRAM(s) IV Push every 6 hours PRN Nausea  oxyCODONE    IR 5 milliGRAM(s) Oral every 3 hours PRN Moderate Pain (4 - 6)  oxyCODONE    IR 10 milliGRAM(s) Oral every 3 hours PRN Severe Pain (7 - 10)  tetracaine/benzocaine/butamben Spray 1 Spray(s) Topical every 3 hours PRN pain  
MEDICATIONS  (STANDING):  acetaminophen   IVPB .. 1000 milliGRAM(s) IV Intermittent every 6 hours  acetaminophen   IVPB .. 1000 milliGRAM(s) IV Intermittent once  dextrose 5% + lactated ringers. 1000 milliLiter(s) (50 mL/Hr) IV Continuous <Continuous>  heparin   Injectable 5000 Unit(s) SubCutaneous every 8 hours  ketamine Infusion. 1 mG/kG/Hr (7.61 mL/Hr) IV Continuous <Continuous>  ketorolac   Injectable 15 milliGRAM(s) IV Push every 6 hours  lidocaine   4% Patch 1 Patch Transdermal every 24 hours  pantoprazole  Injectable 40 milliGRAM(s) IV Push daily    MEDICATIONS  (PRN):  diphenhydrAMINE Injectable 25 milliGRAM(s) IV Push every 4 hours PRN Pruritus  HYDROmorphone  Injectable 0.5 milliGRAM(s) IV Push every 4 hours PRN Severe breakthrough Pain (7 - 10)  hydrOXYzine hydrochloride Syrup 25 milliGRAM(s) Oral every 6 hours PRN Anxiety  naloxone Injectable 0.1 milliGRAM(s) IV Push every 3 minutes PRN For ANY of the following changes in patient status:  A. RR LESS THAN 10 breaths per minute, B. Oxygen saturation LESS THAN 90%, C. Sedation score of 6  ondansetron Injectable 4 milliGRAM(s) IV Push every 6 hours PRN Nausea  oxyCODONE    Solution 5 milliGRAM(s) Enteral Tube every 4 hours PRN Moderate Pain (4 - 6)  oxyCODONE    Solution 10 milliGRAM(s) Enteral Tube every 4 hours PRN Severe Pain (7 - 10)  tetracaine/benzocaine/butamben Spray 1 Spray(s) Topical every 3 hours PRN pain  
MEDICATIONS  (STANDING):  acetaminophen    Suspension .. 650 milliGRAM(s) Enteral Tube every 6 hours  acetaminophen   IVPB .. 1000 milliGRAM(s) IV Intermittent once  clonazePAM  Tablet 1 milliGRAM(s) Oral two times a day  heparin   Injectable 5000 Unit(s) SubCutaneous every 8 hours  ketorolac   Injectable 15 milliGRAM(s) IV Push every 6 hours  pantoprazole  Injectable 40 milliGRAM(s) IV Push daily  PARoxetine 20 milliGRAM(s) Oral daily    MEDICATIONS  (PRN):  diphenhydrAMINE Injectable 25 milliGRAM(s) IV Push every 4 hours PRN Pruritus  HYDROmorphone  Injectable 0.5 milliGRAM(s) IV Push every 3 hours PRN Moderate Pain (4 - 6)  HYDROmorphone  Injectable 1 milliGRAM(s) IV Push every 4 hours PRN Severe Pain (7 - 10)  HYDROmorphone  Injectable 0.3 milliGRAM(s) IV Push every 3 hours PRN Severe breakthrough Pain (7 - 10)  hydrOXYzine hydrochloride Syrup 25 milliGRAM(s) Oral every 6 hours PRN Anxiety  LORazepam   Injectable 1 milliGRAM(s) IV Push every 8 hours PRN Anxiety  nalbuphine Injectable 2.5 milliGRAM(s) IV Push every 6 hours PRN Pruritus  naloxone Injectable 0.1 milliGRAM(s) IV Push every 3 minutes PRN For ANY of the following changes in patient status:  A. RR LESS THAN 10 breaths per minute, B. Oxygen saturation LESS THAN 90%, C. Sedation score of 6  ondansetron Injectable 4 milliGRAM(s) IV Push every 6 hours PRN Nausea  tetracaine/benzocaine/butamben Spray 1 Spray(s) Topical every 3 hours PRN pain  
MEDICATIONS  (STANDING):  acetaminophen   IVPB .. 1000 milliGRAM(s) IV Intermittent once  clonazePAM  Tablet 1 milliGRAM(s) Oral two times a day  dextrose 5% + lactated ringers. 1000 milliLiter(s) (50 mL/Hr) IV Continuous <Continuous>  heparin   Injectable 5000 Unit(s) SubCutaneous every 8 hours  HYDROmorphone PCA (1 mG/mL) 30 milliLiter(s) PCA Continuous PCA Continuous  ketamine Infusion. 0.5 mG/kG/Hr (3.81 mL/Hr) IV Continuous <Continuous>  ketorolac   Injectable 15 milliGRAM(s) IV Push every 6 hours  pantoprazole  Injectable 40 milliGRAM(s) IV Push daily  PARoxetine 20 milliGRAM(s) Oral daily    MEDICATIONS  (PRN):  diphenhydrAMINE Injectable 25 milliGRAM(s) IV Push every 4 hours PRN Pruritus  HYDROmorphone PCA (1 mG/mL) Rescue Clinician Bolus 0.5 milliGRAM(s) IV Push every 15 minutes PRN for Pain Scale GREATER THAN 6  hydrOXYzine hydrochloride Syrup 25 milliGRAM(s) Oral every 6 hours PRN Anxiety  LORazepam   Injectable 1 milliGRAM(s) IV Push every 8 hours PRN Anxiety  nalbuphine Injectable 2.5 milliGRAM(s) IV Push every 6 hours PRN Pruritus  naloxone Injectable 0.1 milliGRAM(s) IV Push every 3 minutes PRN For ANY of the following changes in patient status:  A. RR LESS THAN 10 breaths per minute, B. Oxygen saturation LESS THAN 90%, C. Sedation score of 6  ondansetron Injectable 4 milliGRAM(s) IV Push every 6 hours PRN Nausea  tetracaine/benzocaine/butamben Spray 1 Spray(s) Topical every 3 hours PRN pain

## 2022-04-25 NOTE — BH CONSULTATION LIAISON PROGRESS NOTE - NSBHCHARTREVIEWVS_PSY_A_CORE FT
Vital Signs Last 24 Hrs  T(C): 36.6 (20 Apr 2022 12:00), Max: 36.9 (19 Apr 2022 16:00)  T(F): 97.8 (20 Apr 2022 12:00), Max: 98.5 (19 Apr 2022 16:00)  HR: 87 (20 Apr 2022 15:00) (49 - 87)  BP: 119/82 (20 Apr 2022 15:00) (95/81 - 132/84)  BP(mean): 91 (20 Apr 2022 15:00) (70 - 96)  RR: 17 (20 Apr 2022 15:00) (13 - 21)  SpO2: 100% (20 Apr 2022 15:00) (97% - 100%)
Vital Signs Last 24 Hrs  T(C): 36.8 (19 Apr 2022 08:00), Max: 37 (18 Apr 2022 20:00)  T(F): 98.3 (19 Apr 2022 08:00), Max: 98.6 (18 Apr 2022 20:00)  HR: 62 (19 Apr 2022 14:15) (57 - 78)  BP: 137/83 (19 Apr 2022 14:15) (115/61 - 145/107)  BP(mean): 94 (19 Apr 2022 14:15) (74 - 116)  RR: 22 (19 Apr 2022 14:15) (14 - 22)  SpO2: 99% (19 Apr 2022 14:15) (92% - 99%)
Vital Signs Last 24 Hrs  T(C): 36.8 (21 Apr 2022 12:00), Max: 36.8 (21 Apr 2022 12:00)  T(F): 98.2 (21 Apr 2022 12:00), Max: 98.2 (21 Apr 2022 12:00)  HR: 70 (21 Apr 2022 16:00) (51 - 107)  BP: 117/92 (21 Apr 2022 15:00) (96/80 - 154/81)  BP(mean): 97 (21 Apr 2022 15:00) (68 - 108)  RR: 18 (21 Apr 2022 16:00) (12 - 21)  SpO2: 94% (21 Apr 2022 16:00) (94% - 100%)
Vital Signs Last 24 Hrs  T(C): 36.7 (22 Apr 2022 12:00), Max: 36.9 (22 Apr 2022 00:00)  T(F): 98 (22 Apr 2022 12:00), Max: 98.4 (22 Apr 2022 00:00)  HR: 72 (22 Apr 2022 12:00) (50 - 80)  BP: 136/78 (22 Apr 2022 12:00) (117/92 - 149/58)  BP(mean): 109 (22 Apr 2022 12:00) (71 - 109)  RR: 22 (22 Apr 2022 12:00) (15 - 22)  SpO2: 99% (22 Apr 2022 12:00) (97% - 100%)
Vital Signs Last 24 Hrs  T(C): 36.9 (25 Apr 2022 10:29), Max: 37.1 (25 Apr 2022 04:19)  T(F): 98.5 (25 Apr 2022 10:29), Max: 98.8 (25 Apr 2022 04:19)  HR: 60 (25 Apr 2022 10:29) (60 - 100)  BP: 112/60 (25 Apr 2022 10:29) (112/60 - 137/77)  BP(mean): --  RR: 18 (25 Apr 2022 10:29) (18 - 18)  SpO2: 98% (25 Apr 2022 10:29) (96% - 100%)

## 2022-04-25 NOTE — BH CONSULTATION LIAISON PROGRESS NOTE - NSBHASSESSMENTFT_PSY_ALL_CORE
27yo M w/ PMHx of esophageal stricture s/p multiple dilations and self reported past psychiatric history of Depression/Anxiety/PTSD and substance use disorder,  presented to the hospital with progressive inability to tolerate oral intake for the past 30 days, s/p esophagectomy and j-tube placement on 4/15/22. Today he was seen to evaluate his anxiety.   Upon evaluation today in the CTI, he seems adequately groomed, normal weight, normal posture, normal motor activity, cooperative, with average eye contact. He is sitting on the stretcher. He reports no anxiety and better pain control. Pt reports good appetite and good sleep and denies any delusions, perceptual disturbances including auditory/visual/tactile hallucinations, suicidal or homicidal ideation/intent/plan.    Plan:  - Pt is currently on Klonopin 0.5mg BID, Paxil 20mg daily, Ativan 1mg Q8hr PRN for anxiety and hydroxyzine 25mg Q6hr PRN for Anxiety. Please do not discharge the Pt on any BZDs. Pt can be discharged on Hydroxyzine 50mg Q6hr PRN.  - C/W Paxil 20mg daily, can be prescribed as outpt.  - Referral to Psychiatry outpatient clinic and substance use disorder services upon discharge ([facility]: [phone]).  - No indication for inpatient psych care or sitter.  - Will continue to follow PRN   27yo M w/ PMHx of esophageal stricture s/p multiple dilations and self reported past psychiatric history of Depression/Anxiety/PTSD and substance use disorder,  presented to the hospital with progressive inability to tolerate oral intake for the past 30 days, s/p esophagectomy and j-tube placement on 4/15/22. Today he was seen to evaluate his anxiety.   Upon evaluation today in the unit, he seems adequately groomed, normal weight, normal posture, normal motor activity, cooperative, with average eye contact. He is standing by the stretcher. He reports no anxiety and better pain control at this time but he had a rough day yesterday because of the abdominal pain which was better controlled with Anti Anxiety medications including Ativan and Hydroxyzine in compare with pain medications alone. Pt reports good appetite (he is currently on full liquid diet) and good sleep and denies any delusions, perceptual disturbances including auditory/visual/tactile hallucinations, suicidal or homicidal ideation/intent/plan.    Plan:  - Pt is currently on Klonopin 0.5mg BID, Paxil 20mg daily, Ativan 1mg Q8hr PO PRN for anxiety and hydroxyzine 25mg Q6hr PRN for Anxiety. Please do not discharge the Pt on any BZDs. Pt can be discharged on Hydroxyzine 50mg Q6hr PRN.  - C/W Paxil 20mg daily, can be prescribed as outpt.  - Referral to Psychiatry outpatient clinic and substance use disorder services upon discharge ([facility]: [phone]).  - No indication for inpatient psych care or sitter.  - Will continue to follow PRN

## 2022-04-25 NOTE — BH CONSULTATION LIAISON PROGRESS NOTE - NSBHCHARTREVIEWLAB_PSY_A_CORE FT
13.1   6.95  )-----------( 211      ( 25 Apr 2022 06:54 )             39.3       04-25    137  |  98  |  19  ----------------------------<  115<H>  4.7   |  25  |  0.68    Ca    9.8      25 Apr 2022 06:54  Phos  4.1     04-24  Mg     1.90     04-24                              CAPILLARY BLOOD GLUCOSE              
                      10.4   3.53  )-----------( 104      ( 21 Apr 2022 05:56 )             31.2       04-21    141  |  104  |  15  ----------------------------<  93  3.9   |  27  |  0.63    Ca    8.8      21 Apr 2022 05:56  Phos  4.3     04-21  Mg     1.80     04-21                              CAPILLARY BLOOD GLUCOSE      POCT Blood Glucose.: 107 mg/dL (19 Apr 2022 23:16)          
                      10.9   3.88  )-----------( 93       ( 20 Apr 2022 05:06 )             32.5       04-20    139  |  104  |  14  ----------------------------<  98  3.8   |  25  |  0.62    Ca    8.9      20 Apr 2022 05:06  Phos  4.0     04-20  Mg     1.90     04-20                    PT/INR - ( 19 Apr 2022 04:33 )   PT: 11.5 sec;   INR: 0.99 ratio         PTT - ( 19 Apr 2022 04:33 )  PTT:29.4 sec          CAPILLARY BLOOD GLUCOSE      POCT Blood Glucose.: 107 mg/dL (19 Apr 2022 23:16)          
                      11.3   4.92  )-----------( 126      ( 22 Apr 2022 05:00 )             33.2       04-22    139  |  103  |  12  ----------------------------<  102<H>  4.5   |  26  |  0.60    Ca    9.1      22 Apr 2022 05:00  Phos  4.2     04-22  Mg     2.10     04-22                              CAPILLARY BLOOD GLUCOSE              
                      12.0   5.01  )-----------( 93       ( 19 Apr 2022 04:33 )             35.4       04-19    137  |  101  |  14  ----------------------------<  92  3.9   |  25  |  0.69    Ca    9.2      19 Apr 2022 04:33  Phos  4.2     04-19  Mg     1.80     04-19            ABG - ( 18 Apr 2022 03:56 )  pH, Arterial: 7.43  pH, Blood: x     /  pCO2: 44    /  pO2: 125   / HCO3: 29    / Base Excess: 4.2   /  SaO2: 98.9                    PT/INR - ( 19 Apr 2022 04:33 )   PT: 11.5 sec;   INR: 0.99 ratio         PTT - ( 19 Apr 2022 04:33 )  PTT:29.4 sec          CAPILLARY BLOOD GLUCOSE

## 2022-04-25 NOTE — CHART NOTE - NSCHARTNOTESELECT_GEN_ALL_CORE
Nutrition Follow Up/Nutrition Services
Thoracic surgery/Event Note
Event Note
Nutrition Follow-up Note-Registered Dietitian/Nutrition Services
Tube feeding JUSTIFICATION Note/Event Note

## 2022-04-25 NOTE — BH CONSULTATION LIAISON PROGRESS NOTE - NSBHFUPINTERVALCCFT_PSY_A_CORE
"I am feeling better"
"I am doing great"
"I am feeling Anxious"
"I am doing fine but had a rough day yesterday"
"I am fine"

## 2022-04-25 NOTE — BH CONSULTATION LIAISON PROGRESS NOTE - NSICDXBHSECONDARYDX_PSY_ALL_CORE
Substance abuse   F19.10  

## 2022-04-25 NOTE — BH CONSULTATION LIAISON PROGRESS NOTE - NSBHCONSULTMEDPRNREASON_PSY_A_CORE
anxiety.../agitation...

## 2022-04-25 NOTE — CHART NOTE - NSCHARTNOTEFT_GEN_A_CORE
Patient requesting to see Pain Service regarding pain medication. Patient seen at bedside and reports he is having stomach discomfort after taking Tylenol and Oxycodone. He then states that when he takes his Klonopin and Ativan his pain is completely relieved. Discussed with patient the Oxycodone is given PRN for pain and he should take medication with food to minimize stomach discomfort. Recommended he continue to take his Suboxone, which the patient is refusing to take. Also recommended patient follow up with Psych and pain management outpatient after discharge.

## 2022-04-25 NOTE — BH CONSULTATION LIAISON PROGRESS NOTE - NSICDXBHPRIMARYDX_PSY_ALL_CORE
Depression with anxiety   F41.8  

## 2022-04-25 NOTE — BH CONSULTATION LIAISON PROGRESS NOTE - NSBHMSEJUDGE_PSY_A_CORE
Catheter inserted using US guidance.  Pt tolerated fairly well.  
Compression stocking applied to left leg  
Laser procedure performed  
Patient position supine. Patient prepped and draped per unit standard.    Safety straps applied:N/A  
Procedure start  
Good
Fair
Good
Fair
Good

## 2022-04-25 NOTE — PROGRESS NOTE ADULT - ASSESSMENT
25yo M w/ PMHx of esophageal stricture s/p multiple dilations and IVDU on Suboxone presenting with inability to tolerate oral intake for the past 30 days.     EKG: NSR No acute changes    1. Post-op assessment  -denies CP, SOB  -echo with grossly mild global LV systolic dysfunction (new compared to 2021) however patient denies CP or SOB on exertion.no plan for further cardiac w/u  -s/p esophagectomy.  off ketamine gtt and PCA pump   - f/u pain c/s recs      2. Dysphagia  -hx of esophageal stricture s/p multiple dilations  -s/p esophagectomy and j-tube placement  -f/u GI and thoracic surgery     3. DVT PPX  - heparin

## 2022-04-25 NOTE — BH CONSULTATION LIAISON PROGRESS NOTE - NSBHFUPINTERVALHXFT_PSY_A_CORE
Pt was evaluated and chart was reviewed.  25yo M w/ PMHx of esophageal stricture s/p multiple dilations and self reported past psychiatric history of Depression/Anxiety/PTSD and substance use disorder, presented to the hospital with progressive inability to tolerate oral intake for the past 30 days, s/p esophagectomy and j-tube placement on 4/15/22.   Pt was evaluated for severe anxiety on 4/19/22 and was started on Clonazepam 1mg BID, Paxil 20mg daily and Ativan 1mg Q8hr PRN for anxiety. As per report, he has received Ativan PRN 1mg at 23:00 on 4/19 and at 11:00 today.    Upon evaluation today in the CTI, he seems adequately groomed, normal weight, normal posture, normal motor activity, cooperative, with average eye contact. He is lying down on the bed. He reports significant improvement in her anxiety which led to better pain control. He reports good sleep, good appetite. Currently he denies any delusions, perceptual disturbances including auditory/visual/tactile hallucinations, suicidal or homicidal ideation/intent/plan.    
Pt was evaluated and chart was reviewed.  25yo M w/ PMHx of esophageal stricture s/p multiple dilations and self reported past psychiatric history of Depression/Anxiety/PTSD and substance use disorder, presented to the hospital with progressive inability to tolerate oral intake for the past 30 days, s/p esophagectomy and j-tube placement on 4/15/22.   Pt was evaluated for severe anxiety on 4/19/22 and was started on Clonazepam 1mg BID, Paxil 20mg daily and Ativan 1mg Q8hr PRN for anxiety. As per report, he has received Ativan PRN 1mg at 11:00 and 20:00 yesterday and Hydroxyzine 25mg at 00 and 12:00 today.    Upon evaluation today in the CTI, he seems adequately groomed, normal weight, normal posture, normal motor activity, cooperative, with average eye contact. He is sitting on the stretcher. He reports no anxiety feeling and better pain control. He reports better sleep, good appetite. Currently he denies any delusions, perceptual disturbances including auditory/visual/tactile hallucinations, suicidal or homicidal ideation/intent/plan.    
Pt was evaluated and chart was reviewed.  25yo M w/ PMHx of esophageal stricture s/p multiple dilations and self reported past psychiatric history of Depression/Anxiety/PTSD and substance use disorder, presented to the hospital with progressive inability to tolerate oral intake for the past 30 days, s/p esophagectomy and j-tube placement on 4/15/22.     Reason for CL consult: Anxiety    Upon evaluation today in the CTI, he seems adequately groomed, normal weight, normal posture, increased motor activity, cooperative/demanding with average eye contact. He is lying down on the stretcher. When this writer entered the room, he was crying. He seems being anxious/irritable, not being able to stay still, keeps moving his extremities. He requests to be started on Benzodiazepine to control his anxiety while being hospitalized. He reports his pain not getting better due to severe anxiety. He reports the h/o being on different SSRIs to control his anxiety before which never helped him (although he reports using each of them less than 1-2 month). He reports h/o using K2, Heroin, Cocaine which he quit several months ago. He also reports being on medical Marijuana, tincture (5mg THC and 5mg CBD BID) and a pill (5mg THC and 5mg CBD TID). He is happy that his Suboxone and Methadone were discontinued cause he does not want to be on them. He reports being started on Suboxone 3 months ago, just because of his father's recommendation, while he was not using Opioid. He stated that he wants something for his anxiety right now, otherwise he will leave the hospital. He reported that his mood is not depressed and even he is excited because he is getting medically better. Pt denies any h/o suicidal attempts. Currently he denies any perceptual disturbances including auditory/visual/tactile hallucinations, suicidal or homicidal ideation/intent/plan.    
Pt was evaluated and chart was reviewed.  27yo M w/ PMHx of esophageal stricture s/p multiple dilations and self reported past psychiatric history of Depression/Anxiety/PTSD and substance use disorder, presented to the hospital with progressive inability to tolerate oral intake for the past 30 days, s/p esophagectomy and j-tube placement on 4/15/22. He is s/p CT ICU admission and has been transferred to inpatient unit.  Pt was evaluated for severe anxiety on 4/19/22 and was started on Clonazepam 1mg BID, Paxil 20mg daily and Ativan 1mg Q8hr PRN for anxiety. As per report, he has received Hydroxyzine 25mg at 09:00PM on 4/23 and 8am this morning and received Ativan 1mg IV at 10:00AM and 10:00 PM on 4/23 and 1mg  PO at 1:00am this morning.     Upon evaluation today in the unit, he seems adequately groomed, normal weight, normal posture, normal motor activity, cooperative, with average eye contact. He is standing by his stretcher. He reports had a rough day yesterday because of abdominal pain which was not getting better by Oxycodone but was better responsive to Anti Anxiety medications, Ativan or Hydroxyzine. He denies any current anxiety feeling and states that his pain is better controlled. He reports good sleep, good appetite. Currently he denies any delusions, perceptual disturbances including auditory/visual/tactile hallucinations, suicidal or homicidal ideation/intent/plan. He was told about the plan of not to discharge him on any Benzodiazepine and he agreed with the plan and asked if he can be discharged on Hydroxyzine. He was explained about the necessity of being followed up with outpatient Psychiatry clinic. He verbalized understanding and agreed.
Pt was evaluated and chart was reviewed.  27yo M w/ PMHx of esophageal stricture s/p multiple dilations and self reported past psychiatric history of Depression/Anxiety/PTSD and substance use disorder, presented to the hospital with progressive inability to tolerate oral intake for the past 30 days, s/p esophagectomy and j-tube placement on 4/15/22.   Pt was evaluated for severe anxiety on 4/19/22 and was started on Clonazepam 1mg BID, Paxil 20mg daily and Ativan 1mg Q8hr PRN for anxiety. As per report, he has received Hydroxyzine 25mg at 12:00PM and 10:00PM yesterday.     Upon evaluation today in the CTI, he seems adequately groomed, normal weight, normal posture, normal motor activity, cooperative, with average eye contact. He is sitting on the stretcher. He reports no anxiety feeling and much better pain control. He reports good sleep, good appetite. Currently he denies any delusions, perceptual disturbances including auditory/visual/tactile hallucinations, suicidal or homicidal ideation/intent/plan. He was told about the plan of tapering Klonopin down towards his discharge date. He agreed with the plan and asked if he can be discharged on low dose benzodiazepine. He was explained about the disadvantages of long term BZD and the necessity of being followed up with outpatient Psychiatry clinic. He verbalized understanding and agreed.

## 2022-04-25 NOTE — BH CONSULTATION LIAISON PROGRESS NOTE - CASE SUMMARY
Chart reviewed. Pt seen in ICU, calmly sitting in chair, smiling, engaging with someone on phone. Mother visiting. No focal neuro deficits and not sedated; tolerating recent medication changes well. Denies safety concerns. Agree with assessment as above, including recs. Will continue to follow as needed
Chart reviewed. Pt seen with trainee. Presented as alert/oriened, calm, appropriate. No focal neuro deficits. Denied acute distress/anxiety or SI/safety concerns as above. Anticipates discharge on Monday. Agree with above assessment and recs. Will attempt to see on Monday 4/25 if possible, but please call us before then if needed. There are no psych contraindications to discharge when medically cleared and can attend outpatient psych/substance use services as indicated above.
Chart reviewed, pt. seen/evaluated with trainee, I agree with above assessment/plan. Patient oriented, euthymic, calm/cooperative, reports improvement in his anxiety, denies SI and HI. Plan as above, Call with questions.

## 2022-04-25 NOTE — PROGRESS NOTE ADULT - SUBJECTIVE AND OBJECTIVE BOX
Christiano Hoyt MD  Interventional Cardiology / Advance Heart Failure and Cardiac Transplant Specialist  [place] Office : [address] [place] [address] [place]. [address]  Tel:   [place] Office : [address] [facility] [place] [address]  Tel: [phone]      Subjective/Overnight events: Pt is sitting up in chair comfortable not in distress, no chest pains no SOB no palpitations  	  MEDICATIONS:  heparin   Injectable 5000 Unit(s) SubCutaneous every 8 hours        acetaminophen    Suspension .. 975 milliGRAM(s) Oral every 6 hours  clonazePAM  Tablet 0.5 milliGRAM(s) Oral two times a day  hydrOXYzine hydrochloride 25 milliGRAM(s) Oral every 6 hours PRN  ibuprofen  Suspension. 400 milliGRAM(s) Oral every 6 hours  LORazepam     Tablet 1 milliGRAM(s) Oral every 8 hours PRN  ondansetron Injectable 4 milliGRAM(s) IV Push every 6 hours PRN  oxyCODONE    IR 5 milliGRAM(s) Oral every 3 hours PRN  oxyCODONE    IR 10 milliGRAM(s) Oral every 3 hours PRN  PARoxetine 20 milliGRAM(s) Oral daily    pantoprazole    Tablet 40 milliGRAM(s) Oral before breakfast  polyethylene glycol 3350 17 Gram(s) Oral daily PRN  simethicone 80 milliGRAM(s) Chew three times a day      lidocaine   4% Patch 1 Patch Transdermal daily  tetracaine/benzocaine/butamben Spray 1 Spray(s) Topical every 3 hours PRN      PAST MEDICAL/SURGICAL HISTORY  PAST MEDICAL & SURGICAL HISTORY:  Heroin abuse    Cocaine abuse    GERD (gastroesophageal reflux disease)    Hiatal hernia    History of esophageal stricture    Intravenous drug abuse    Migration of pancreatic stent    Migrated esophageal stent        SOCIAL HISTORY: Substance Use (street drugs): ( x ) never used  (  ) other:    FAMILY HISTORY:  FH: thyroid disease (Grandparent)        PHYSICAL EXAM:  T(C): 36.9 (04-25-22 @ 10:29), Max: 37.1 (04-25-22 @ 04:19)  HR: 60 (04-25-22 @ 10:29) (60 - 100)  BP: 112/60 (04-25-22 @ 10:29) (112/60 - 137/77)  RR: 18 (04-25-22 @ 10:29) (18 - 18)  SpO2: 98% (04-25-22 @ 10:29) (96% - 100%)  Wt(kg): --  I&O's Summary    24 Apr 2022 07:01  -  25 Apr 2022 07:00  --------------------------------------------------------  IN: 594 mL / OUT: 1450 mL / NET: -856 mL    25 Apr 2022 07:01  -  25 Apr 2022 14:24  --------------------------------------------------------  IN: 0 mL / OUT: 750 mL / NET: -750 mL        Weight (kg): 67.5 (04-25 @ 12:00)          GENERAL: NAD  EYES: EOMI, PERRLA, conjunctiva and sclera clear  ENMT: No tonsillar erythema, exudates, or enlargement  Cardiovascular: Normal S1 S2, No JVD, No murmurs, No edema  Respiratory: Lungs clear to auscultation	  Gastrointestinal:  s/p surgery 	  Extremities: No edema                                    13.1   6.95  )-----------( 211      ( 25 Apr 2022 06:54 )             39.3     04-25    137  |  98  |  19  ----------------------------<  115<H>  4.7   |  25  |  0.68    Ca    9.8      25 Apr 2022 06:54  Phos  4.1     04-24  Mg     1.90     04-24      proBNP:   Lipid Profile:   HgA1c:   TSH:     Consultant(s) Notes Reviewed:  [x ] YES  [ ] NO    Care Discussed with Consultants/Other Providers [ x] YES  [ ] NO    Imaging Personally Reviewed independently:  [x] YES  [ ] NO    All labs, radiologic studies, vitals, orders and medications list reviewed. Patient is seen and examined at bedside. Case discussed with medical team.

## 2022-04-25 NOTE — BH CONSULTATION LIAISON PROGRESS NOTE - NSBHCONSULTFOLLOWAFTERCARE_PSY_A_CORE FT
see above [facility] outpt. walk in clinic : [phone]   [facility] Outpatient clinic: [phone]  [facility] Substance Abuse Outpatient Program ([facility]): [phone]

## 2022-04-25 NOTE — PROGRESS NOTE ADULT - SUBJECTIVE AND OBJECTIVE BOX
Subjective: "I still have some general abdominal pain sometimes but I'm able to deal with it" Pt interested in minimizing narcotic use and maximizing Tylenol and non narcotics. STates pain improved whenever he takes his anti anxiety meds. Requesting to see Pain management and Psych team today. OOB and amb ad zhane. No CP or SOB. No fevers. Tolerating tube feeds. States no BM since yesterday morning.     Vital Signs:  Vital Signs Last 24 Hrs  T(C): 36.9 (04-25-22 @ 10:29), Max: 37.1 (04-25-22 @ 04:19)  T(F): 98.5 (04-25-22 @ 10:29), Max: 98.8 (04-25-22 @ 04:19)  HR: 60 (04-25-22 @ 10:29) (60 - 100)  BP: 112/60 (04-25-22 @ 10:29) (112/60 - 137/77)  RR: 18 (04-25-22 @ 10:29) (18 - 18)  SpO2: 98% (04-25-22 @ 10:29) (96% - 100%) on (O2)    Telemetry/Alarms:  General: WN/WD NAD  Neurology: Awake, nonfocal, FERRARO x 4  Eyes: Scleras clear, PERRLA/ EOMI, Gross vision intact  ENT:Gross hearing intact, grossly patent pharynx, no stridor  Neck: Neck supple, trachea midline, No JVD,   Respiratory: CTA B/L, No wheezing, rales, rhonchi  CV: RRR, S1S2, no murmurs, rubs or gallops  Abdominal: Soft, NT, ND +BS, soft, minimally tender upon deep palpation to left LQ.   Extremities: No edema, + peripheral pulses  Skin: No Rashes, Hematoma, Ecchymosis  Lymphatic: No Neck, axilla, groin LAD  Psych: Oriented x 3, normal affect  Incisions: Rt chest incisions c/d/i. Abd incisions c/d/i.   Tubes: J tube site c/d/i.   Relevant labs, radiology and Medications reviewed           CXR clear             13.1   6.95  )-----------( 211      ( 25 Apr 2022 06:54 )             39.3     04-25    137  |  98  |  19  ----------------------------<  115<H>  4.7   |  25  |  0.68    Ca    9.8      25 Apr 2022 06:54  Phos  4.1     04-24  Mg     1.90     04-24        MEDICATIONS  (STANDING):  acetaminophen    Suspension .. 975 milliGRAM(s) Oral every 6 hours  clonazePAM  Tablet 0.5 milliGRAM(s) Oral two times a day  heparin   Injectable 5000 Unit(s) SubCutaneous every 8 hours  ibuprofen  Suspension. 400 milliGRAM(s) Oral every 6 hours  lidocaine   4% Patch 1 Patch Transdermal daily  pantoprazole    Tablet 40 milliGRAM(s) Oral before breakfast  PARoxetine 20 milliGRAM(s) Oral daily  simethicone 80 milliGRAM(s) Chew three times a day    MEDICATIONS  (PRN):  hydrOXYzine hydrochloride 25 milliGRAM(s) Oral every 6 hours PRN Anxiety  LORazepam     Tablet 1 milliGRAM(s) Oral every 8 hours PRN Anxiety  naloxone Injectable 0.1 milliGRAM(s) IV Push every 3 minutes PRN For ANY of the following changes in patient status:  A. RR LESS THAN 10 breaths per minute, B. Oxygen saturation LESS THAN 90%, C. Sedation score of 6  ondansetron Injectable 4 milliGRAM(s) IV Push every 6 hours PRN Nausea  oxyCODONE    IR 5 milliGRAM(s) Oral every 3 hours PRN Moderate Pain (4 - 6)  oxyCODONE    IR 10 milliGRAM(s) Oral every 3 hours PRN Severe Pain (7 - 10)  polyethylene glycol 3350 17 Gram(s) Oral daily PRN Constipation  tetracaine/benzocaine/butamben Spray 1 Spray(s) Topical every 3 hours PRN pain    Pertinent Physical Exam  I&O's Summary    24 Apr 2022 07:01  -  25 Apr 2022 07:00  --------------------------------------------------------  IN: 594 mL / OUT: 1450 mL / NET: -856 mL    25 Apr 2022 07:01  -  25 Apr 2022 14:19  --------------------------------------------------------  IN: 0 mL / OUT: 750 mL / NET: -750 mL        Assessment  26y Male  w/ PAST MEDICAL & SURGICAL HISTORY:  Heroin abuse    Cocaine abuse    GERD (gastroesophageal reflux disease)    Hiatal hernia    History of esophageal stricture    Intravenous drug abuse    Migration of pancreatic stent    Migrated esophageal stent    admitted with complaints of Patient is a 26y old  Male who presents with a chief complaint of Inability to tolerate PO (24 Apr 2022 13:32)  .  PROCEDURES:    - Robotic Assisted [place] Mahesh esophagectomy, J tube placement.  15-Apr-2022        ISSUES:    Esophageal stricture s/p multiple dilations  Hiatal hernia  Postoperative pain  Chest tubes in place  Anxiety  GERD  Hx of Substance abuse (cocaine, heroin, IVDU), on suboxone        Post op Pt seen by Psychiatry, Pain management, Cardiology. Pt now on nocturnal TF, tolerating it well. Still working on home insurance approval for feeds. Last CT removed on 4/23. Pt given teaching for J tube management. 4/21-Barium swallow showed no leak. 4/24 pt transferred to . Reccs from Psych noted, orders changed. Will wean off Klonopin. Can cont other meds for anxiety. Today, plan d/w with pain management to change pain regiment to po only. D/c planning in place, awaiting TF approval.       PLAN  Neuro: Pain management. Cont current regiment. Tylenol and MOtrin ATC. Will cont oxy for now. FU any new pain reccs. Psych- FU any discharge reccs  Pulm: Encourage coughing, deep breathing and use of incentive spirometry. . Daily CXR.   Cardio: Monitor telemetry/alarms  GI: Cont TF at goal. Add miralax PRN  Renal: monitor urine output, supplement electrolytes as needed  Vasc: Heparin SC/SCDs for DVT prophylaxis  Heme: Stable H/H. .   ID: Off antibiotics. Stable.  Therapy: OOB/ambulate  Disposition: Aim to D/C to home once TF approved and delivered  Discussed with Cardiothoracic Team at AM rounds.

## 2022-04-26 LAB
ANION GAP SERPL CALC-SCNC: 12 MMOL/L — SIGNIFICANT CHANGE UP (ref 7–14)
BUN SERPL-MCNC: 16 MG/DL — SIGNIFICANT CHANGE UP (ref 7–23)
CALCIUM SERPL-MCNC: 9.1 MG/DL — SIGNIFICANT CHANGE UP (ref 8.4–10.5)
CHLORIDE SERPL-SCNC: 101 MMOL/L — SIGNIFICANT CHANGE UP (ref 98–107)
CO2 SERPL-SCNC: 26 MMOL/L — SIGNIFICANT CHANGE UP (ref 22–31)
CREAT SERPL-MCNC: 0.68 MG/DL — SIGNIFICANT CHANGE UP (ref 0.5–1.3)
EGFR: 131 ML/MIN/1.73M2 — SIGNIFICANT CHANGE UP
GLUCOSE BLDC GLUCOMTR-MCNC: 112 MG/DL — HIGH (ref 70–99)
GLUCOSE BLDC GLUCOMTR-MCNC: 125 MG/DL — HIGH (ref 70–99)
GLUCOSE SERPL-MCNC: 110 MG/DL — HIGH (ref 70–99)
HCT VFR BLD CALC: 37 % — LOW (ref 39–50)
HGB BLD-MCNC: 12.4 G/DL — LOW (ref 13–17)
MCHC RBC-ENTMCNC: 28.1 PG — SIGNIFICANT CHANGE UP (ref 27–34)
MCHC RBC-ENTMCNC: 33.5 GM/DL — SIGNIFICANT CHANGE UP (ref 32–36)
MCV RBC AUTO: 83.9 FL — SIGNIFICANT CHANGE UP (ref 80–100)
NRBC # BLD: 0 /100 WBCS — SIGNIFICANT CHANGE UP
NRBC # FLD: 0 K/UL — SIGNIFICANT CHANGE UP
PLATELET # BLD AUTO: 203 K/UL — SIGNIFICANT CHANGE UP (ref 150–400)
POTASSIUM SERPL-MCNC: 4.7 MMOL/L — SIGNIFICANT CHANGE UP (ref 3.5–5.3)
POTASSIUM SERPL-SCNC: 4.7 MMOL/L — SIGNIFICANT CHANGE UP (ref 3.5–5.3)
RBC # BLD: 4.41 M/UL — SIGNIFICANT CHANGE UP (ref 4.2–5.8)
RBC # FLD: 14.8 % — HIGH (ref 10.3–14.5)
SODIUM SERPL-SCNC: 139 MMOL/L — SIGNIFICANT CHANGE UP (ref 135–145)
WBC # BLD: 4.53 K/UL — SIGNIFICANT CHANGE UP (ref 3.8–10.5)
WBC # FLD AUTO: 4.53 K/UL — SIGNIFICANT CHANGE UP (ref 3.8–10.5)

## 2022-04-26 PROCEDURE — 71045 X-RAY EXAM CHEST 1 VIEW: CPT | Mod: 26

## 2022-04-26 RX ADMIN — LIDOCAINE 1 PATCH: 4 CREAM TOPICAL at 11:56

## 2022-04-26 RX ADMIN — Medication 400 MILLIGRAM(S): at 20:59

## 2022-04-26 RX ADMIN — SIMETHICONE 80 MILLIGRAM(S): 80 TABLET, CHEWABLE ORAL at 23:06

## 2022-04-26 RX ADMIN — OXYCODONE HYDROCHLORIDE 5 MILLIGRAM(S): 5 TABLET ORAL at 09:57

## 2022-04-26 RX ADMIN — SIMETHICONE 80 MILLIGRAM(S): 80 TABLET, CHEWABLE ORAL at 05:35

## 2022-04-26 RX ADMIN — OXYCODONE HYDROCHLORIDE 10 MILLIGRAM(S): 5 TABLET ORAL at 20:28

## 2022-04-26 RX ADMIN — Medication 25 MILLIGRAM(S): at 16:05

## 2022-04-26 RX ADMIN — Medication 400 MILLIGRAM(S): at 14:38

## 2022-04-26 RX ADMIN — Medication 975 MILLIGRAM(S): at 23:39

## 2022-04-26 RX ADMIN — SIMETHICONE 80 MILLIGRAM(S): 80 TABLET, CHEWABLE ORAL at 14:38

## 2022-04-26 RX ADMIN — HEPARIN SODIUM 5000 UNIT(S): 5000 INJECTION INTRAVENOUS; SUBCUTANEOUS at 14:38

## 2022-04-26 RX ADMIN — OXYCODONE HYDROCHLORIDE 10 MILLIGRAM(S): 5 TABLET ORAL at 20:58

## 2022-04-26 RX ADMIN — Medication 975 MILLIGRAM(S): at 00:37

## 2022-04-26 RX ADMIN — Medication 975 MILLIGRAM(S): at 06:42

## 2022-04-26 RX ADMIN — OXYCODONE HYDROCHLORIDE 5 MILLIGRAM(S): 5 TABLET ORAL at 05:34

## 2022-04-26 RX ADMIN — Medication 0.5 MILLIGRAM(S): at 16:35

## 2022-04-26 RX ADMIN — Medication 975 MILLIGRAM(S): at 05:34

## 2022-04-26 RX ADMIN — Medication 20 MILLIGRAM(S): at 11:56

## 2022-04-26 RX ADMIN — Medication 400 MILLIGRAM(S): at 08:37

## 2022-04-26 RX ADMIN — Medication 975 MILLIGRAM(S): at 23:09

## 2022-04-26 RX ADMIN — HEPARIN SODIUM 5000 UNIT(S): 5000 INJECTION INTRAVENOUS; SUBCUTANEOUS at 05:35

## 2022-04-26 RX ADMIN — Medication 0.5 MILLIGRAM(S): at 05:34

## 2022-04-26 RX ADMIN — Medication 1 MILLIGRAM(S): at 08:48

## 2022-04-26 RX ADMIN — PANTOPRAZOLE SODIUM 40 MILLIGRAM(S): 20 TABLET, DELAYED RELEASE ORAL at 05:35

## 2022-04-26 RX ADMIN — Medication 975 MILLIGRAM(S): at 01:07

## 2022-04-26 RX ADMIN — Medication 400 MILLIGRAM(S): at 20:29

## 2022-04-26 RX ADMIN — Medication 1 MILLIGRAM(S): at 23:10

## 2022-04-26 RX ADMIN — Medication 975 MILLIGRAM(S): at 11:55

## 2022-04-26 RX ADMIN — OXYCODONE HYDROCHLORIDE 5 MILLIGRAM(S): 5 TABLET ORAL at 10:30

## 2022-04-26 RX ADMIN — Medication 1 MILLIGRAM(S): at 00:34

## 2022-04-26 RX ADMIN — Medication 975 MILLIGRAM(S): at 19:19

## 2022-04-26 RX ADMIN — Medication 0.5 MILLIGRAM(S): at 19:18

## 2022-04-26 NOTE — PROGRESS NOTE ADULT - NS ATTEND OPT1 GEN_ALL_CORE

## 2022-04-26 NOTE — PROGRESS NOTE ADULT - SUBJECTIVE AND OBJECTIVE BOX
Christiano Hoyt MD  Interventional Cardiology / Advance Heart Failure and Cardiac Transplant Specialist  [place] Office : [address] [place] [address] [place]. [address]  Tel:   [place] Office : [address] [facility] [place] [address]  Tel: [phone]      Subjective/Overnight events: Pt is lying in bed comfortable not in distress, no chest pains no SOB no palpitations  	  MEDICATIONS:  heparin   Injectable 5000 Unit(s) SubCutaneous every 8 hours        acetaminophen    Suspension .. 975 milliGRAM(s) Oral every 6 hours  clonazePAM  Tablet 0.5 milliGRAM(s) Oral two times a day  hydrOXYzine hydrochloride 25 milliGRAM(s) Oral every 6 hours PRN  ibuprofen  Suspension. 400 milliGRAM(s) Oral every 6 hours  LORazepam     Tablet 1 milliGRAM(s) Oral every 8 hours PRN  ondansetron Injectable 4 milliGRAM(s) IV Push every 6 hours PRN  oxyCODONE    IR 5 milliGRAM(s) Oral every 3 hours PRN  oxyCODONE    IR 10 milliGRAM(s) Oral every 3 hours PRN  PARoxetine 20 milliGRAM(s) Oral daily    pantoprazole    Tablet 40 milliGRAM(s) Oral before breakfast  polyethylene glycol 3350 17 Gram(s) Oral daily PRN  simethicone 80 milliGRAM(s) Chew three times a day      lidocaine   4% Patch 1 Patch Transdermal daily  tetracaine/benzocaine/butamben Spray 1 Spray(s) Topical every 3 hours PRN      PAST MEDICAL/SURGICAL HISTORY  PAST MEDICAL & SURGICAL HISTORY:  Heroin abuse    Cocaine abuse    GERD (gastroesophageal reflux disease)    Hiatal hernia    History of esophageal stricture    Intravenous drug abuse    Migration of pancreatic stent    Migrated esophageal stent        SOCIAL HISTORY: Substance Use (street drugs): ( x ) never used  (  ) other:    FAMILY HISTORY:  FH: thyroid disease (Grandparent)          PHYSICAL EXAM:  T(C): 37.3 (04-26-22 @ 09:02), Max: 37.3 (04-26-22 @ 09:02)  HR: 80 (04-26-22 @ 09:02) (55 - 85)  BP: 110/70 (04-26-22 @ 09:02) (110/70 - 133/65)  RR: 17 (04-26-22 @ 09:02) (17 - 18)  SpO2: 100% (04-26-22 @ 09:02) (98% - 100%)  Wt(kg): --  I&O's Summary    25 Apr 2022 07:01  -  26 Apr 2022 07:00  --------------------------------------------------------  IN: 438 mL / OUT: 1950 mL / NET: -1512 mL    26 Apr 2022 07:01  -  26 Apr 2022 12:13  --------------------------------------------------------  IN: 200 mL / OUT: 0 mL / NET: 200 mL          GENERAL: NAD  EYES: EOMI, PERRLA, conjunctiva and sclera clear  ENMT: No tonsillar erythema, exudates, or enlargement  Cardiovascular: Normal S1 S2, No JVD, No murmurs, No edema  Respiratory: Lungs clear to auscultation	  Gastrointestinal:  s/p surgery 	  Extremities: No edema                                  12.4   4.53  )-----------( 203      ( 26 Apr 2022 06:40 )             37.0     04-26    139  |  101  |  16  ----------------------------<  110<H>  4.7   |  26  |  0.68    Ca    9.1      26 Apr 2022 06:40      proBNP:   Lipid Profile:   HgA1c:   TSH:     Consultant(s) Notes Reviewed:  [x ] YES  [ ] NO    Care Discussed with Consultants/Other Providers [ x] YES  [ ] NO    Imaging Personally Reviewed independently:  [x] YES  [ ] NO    All labs, radiologic studies, vitals, orders and medications list reviewed. Patient is seen and examined at bedside. Case discussed with medical team.

## 2022-04-26 NOTE — PROGRESS NOTE ADULT - ASSESSMENT
25yo M w/ PMHx of esophageal stricture s/p multiple dilations and IVDU on Suboxone presenting with inability to tolerate oral intake for the past 30 days.     EKG: NSR No acute changes    1. Post-op assessment  -denies CP, SOB  -echo with grossly mild global LV systolic dysfunction (new compared to 2021) however patient denies CP or SOB on exertion.no plan for further cardiac w/u  -s/p esophagectomy.  off ketamine gtt and PCA pump   - f/u pain c/s recs  -bradys to 40s overnight while asleep, asymptomatic. continue to monitor     2. Dysphagia  -hx of esophageal stricture s/p multiple dilations  -s/p esophagectomy and j-tube placement  -f/u GI and thoracic surgery     3. DVT PPX  - heparin

## 2022-04-26 NOTE — PROGRESS NOTE ADULT - SUBJECTIVE AND OBJECTIVE BOX
Subjective: Pt feels well  no acute complaints  tolerating tube feeds, flushing J tube independently  ambulatory ad zhane  +formed BM this AM  pain management and psych following      Vital Signs:  Vital Signs Last 24 Hrs  T(C): 37.3 (04-26-22 @ 09:02), Max: 37.3 (04-26-22 @ 09:02)  T(F): 99.1 (04-26-22 @ 09:02), Max: 99.1 (04-26-22 @ 09:02)  HR: 80 (04-26-22 @ 09:02) (55 - 85)  BP: 110/70 (04-26-22 @ 09:02) (110/70 - 133/65)  RR: 17 (04-26-22 @ 09:02) (17 - 18)  SpO2: 100% (04-26-22 @ 09:02) (98% - 100%) on (O2)    Telemetry/Alarms: sr  General: WN/WD NAD  Neurology: Awake, nonfocal, FERRARO x 4  Eyes: Scleras clear, PERRLA/ EOMI, Gross vision intact  ENT:Gross hearing intact, grossly patent pharynx, no stridor  Neck: Neck supple, trachea midline, No JVD,   Respiratory: CTA B/L, No wheezing, rales, rhonchi  CV: RRR, S1S2, no murmurs, rubs or gallops  Abdominal: Soft, NT, ND +BS,   Extremities: No edema, + peripheral pulses  Skin: No Rashes, Hematoma, Ecchymosis  Lymphatic: No Neck, axilla, groin LAD  Psych: Oriented x 3, normal affect  Incisions: c,d,i  Tubes: Jtube intact  Relevant labs, radiology and Medications reviewed                        12.4   4.53  )-----------( 203      ( 26 Apr 2022 06:40 )             37.0     04-26    139  |  101  |  16  ----------------------------<  110<H>  4.7   |  26  |  0.68    Ca    9.1      26 Apr 2022 06:40        MEDICATIONS  (STANDING):  acetaminophen    Suspension .. 975 milliGRAM(s) Oral every 6 hours  clonazePAM  Tablet 0.5 milliGRAM(s) Oral two times a day  heparin   Injectable 5000 Unit(s) SubCutaneous every 8 hours  ibuprofen  Suspension. 400 milliGRAM(s) Oral every 6 hours  lidocaine   4% Patch 1 Patch Transdermal daily  pantoprazole    Tablet 40 milliGRAM(s) Oral before breakfast  PARoxetine 20 milliGRAM(s) Oral daily  simethicone 80 milliGRAM(s) Chew three times a day    MEDICATIONS  (PRN):  hydrOXYzine hydrochloride 25 milliGRAM(s) Oral every 6 hours PRN Anxiety  LORazepam     Tablet 1 milliGRAM(s) Oral every 8 hours PRN Anxiety  naloxone Injectable 0.1 milliGRAM(s) IV Push every 3 minutes PRN For ANY of the following changes in patient status:  A. RR LESS THAN 10 breaths per minute, B. Oxygen saturation LESS THAN 90%, C. Sedation score of 6  ondansetron Injectable 4 milliGRAM(s) IV Push every 6 hours PRN Nausea  oxyCODONE    IR 5 milliGRAM(s) Oral every 3 hours PRN Moderate Pain (4 - 6)  oxyCODONE    IR 10 milliGRAM(s) Oral every 3 hours PRN Severe Pain (7 - 10)  polyethylene glycol 3350 17 Gram(s) Oral daily PRN Constipation  tetracaine/benzocaine/butamben Spray 1 Spray(s) Topical every 3 hours PRN pain    Pertinent Physical Exam  I&O's Summary    25 Apr 2022 07:01  -  26 Apr 2022 07:00  --------------------------------------------------------  IN: 438 mL / OUT: 1950 mL / NET: -1512 mL    26 Apr 2022 07:01  -  26 Apr 2022 10:11  --------------------------------------------------------  IN: 200 mL / OUT: 0 mL / NET: 200 mL        Assessment  26y Male  w/ PAST MEDICAL & SURGICAL HISTORY:  Heroin abuse    Cocaine abuse    GERD (gastroesophageal reflux disease)    Hiatal hernia    History of esophageal stricture    Intravenous drug abuse    Migration of pancreatic stent    Migrated esophageal stent    admitted with complaints of Patient is a 26y old  Male who presents with a chief complaint of Inability to tolerate PO (25 Apr 2022 14:23)  .  PROCEDURES:    - Robotic Assisted [place] Mahesh esophagectomy, J tube placement.  15-Apr-2022        ISSUES:    Esophageal stricture s/p multiple dilations  Hiatal hernia  Postoperative pain  Chest tubes in place  Anxiety  GERD  Hx of Substance abuse (cocaine, heroin, IVDU), on suboxone        Post op Pt seen by Psychiatry, Pain management, Cardiology. Pt now on nocturnal TF, tolerating it well. Still working on home insurance approval for feeds. Last CT removed on 4/23. Pt given teaching for J tube management. 4/21-Barium swallow showed no leak. 4/24 pt transferred to . Recs from Psych noted, orders changed. Will wean off Klonopin. Can cont other meds for anxiety. 4/35 plan d/w with pain management to change pain regiment to po only. D/c planning in place, tube feeding and supplies to be delivered today       PLAN  Neuro: Pain management.   Psych follow up  Pulm: Encourage coughing, deep breathing and use of incentive spirometry.  Cardio: Monitor telemetry/alarms  GI: Cont TF at goal. Add miralax PRN  Renal: monitor urine output, supplement electrolytes as needed  Vasc: Heparin SC/SCDs for DVT prophylaxis  Heme: Stable H/H.   ID: Off antibiotics. Stable.  Therapy: OOB/ambulate  Disposition: tube feeding and supplies to be delivered today, plan for DC home tmrw  Discussed with Cardiothoracic Team at AM rounds.

## 2022-04-26 NOTE — PROGRESS NOTE ADULT - NS ATTEND AMEND GEN_ALL_CORE FT
I reviewed the overnight course of events on the unit, re-confirming the patient history. I discussed the care with the patient and their family. The plan of care was discussed with the ACP team and modifications were made to the notation where appropriate. Differential diagnosis and plan of care discussed with patient after the evaluation. Advanced care planning was discussed with patient and family.  Advanced care planning forms were reviewed and discussed.  Risks, benefits and alternatives of cardiac procedures were discussed in detail and all questions were answered. 35 minutes spent on total encounter of which more than fifty percent of the encounter was spent counseling and/or coordinating care by the attending physician.
I reviewed the overnight course of events on the unit, re-confirming the patient history. I discussed the care with the patient and their family. The plan of care was discussed with the ACP team and modifications were made to the notation where appropriate. Differential diagnosis and plan of care discussed with patient after the evaluation. Advanced care planning was discussed with patient and family.  Advanced care planning forms were reviewed and discussed.  Risks, benefits and alternatives of cardiac procedures were discussed in detail and all questions were answered. 35 minutes spent on total encounter of which more than fifty percent of the encounter was spent counseling and/or coordinating care by the attending physician.
tentatively on schedule for Friday 4/15.   obtain cardiology clearance given h/o IVDA   all questions answered. Will verify with  regarding insurance issues so patient may be able to follow up post operatively
I reviewed the overnight course of events on the unit, re-confirming the patient history. I discussed the care with the patient and their family. The plan of care was discussed with the ACP team and modifications were made to the notation where appropriate. Differential diagnosis and plan of care discussed with patient after the evaluation. Advanced care planning was discussed with patient and family.  Advanced care planning forms were reviewed and discussed.  Risks, benefits and alternatives of cardiac procedures were discussed in detail and all questions were answered. 35 minutes spent on total encounter of which more than fifty percent of the encounter was spent counseling and/or coordinating care by the attending physician.
patient seen with mother at bedside, discussed surgery for Friday including possible open procedure given his multiple dilations and possible scar tissue.   discussed at length procedure including risks not limited to bleeding, infection, scarring, injury to surrounding structures, prolonged NPO status, anastomotic leak. all questions answered, and agreeable to proceed
I reviewed the overnight course of events on the unit, re-confirming the patient history. I discussed the care with the patient and their family. The plan of care was discussed with the ACP team and modifications were made to the notation where appropriate. Differential diagnosis and plan of care discussed with patient after the evaluation. Advanced care planning was discussed with patient and family.  Advanced care planning forms were reviewed and discussed.  Risks, benefits and alternatives of cardiac procedures were discussed in detail and all questions were answered. 35 minutes spent on total encounter of which more than fifty percent of the encounter was spent counseling and/or coordinating care by the attending physician.
I reviewed the overnight course of events on the unit, re-confirming the patient history. I discussed the care with the patient and their family. The plan of care was discussed with the ACP team and modifications were made to the notation where appropriate. Differential diagnosis and plan of care discussed with patient after the evaluation. Advanced care planning was discussed with patient and family.  Advanced care planning forms were reviewed and discussed.  Risks, benefits and alternatives of cardiac procedures were discussed in detail and all questions were answered. 35 minutes spent on total encounter of which more than fifty percent of the encounter was spent counseling and/or coordinating care by the attending physician.

## 2022-04-26 NOTE — PROGRESS NOTE ADULT - NS ATTEND BILL GEN_ALL_CORE
Attending to bill
Unknown if ever smoked

## 2022-04-26 NOTE — PROGRESS NOTE ADULT - NUTRITIONAL ASSESSMENT
This patient has been assessed with a concern for Malnutrition and has been determined to have a diagnosis/diagnoses of Severe protein-calorie malnutrition.    This patient is being managed with:   Diet Full Liquid-  Supplement Feeding Modality:  Oral  Ensure Enlive Cans or Servings Per Day:  1       Frequency:  Three Times a day  Entered: Apr 12 2022  5:38PM    
This patient has been assessed with a concern for Malnutrition and has been determined to have a diagnosis/diagnoses of Severe protein-calorie malnutrition.    This patient is being managed with:   Diet NPO with Tube Feed-  Tube Feeding Modality: Jejunostomy  TwoCal HN (TWOCALHNRTH)  Total Volume for 24 Hours (mL): 1008  Continuous  Starting Tube Feed Rate {mL per Hour}: 63  Until Goal Tube Feed Rate (mL per Hour): 63  Tube Feed Duration (in Hours): 16  Tube Feed Start Time: 17:00  Entered: Apr 22 2022  3:32PM    
This patient has been assessed with a concern for Malnutrition and has been determined to have a diagnosis/diagnoses of Severe protein-calorie malnutrition.    This patient is being managed with:   Diet NPO with Tube Feed-  Tube Feeding Modality: Jejunostomy  TwoCal HN (TWOCALHNRTH)  Total Volume for 24 Hours (mL): 1120  Continuous  Starting Tube Feed Rate {mL per Hour}: 70  Until Goal Tube Feed Rate (mL per Hour): 70  Tube Feed Duration (in Hours): 16  Tube Feed Start Time: 17:00  Entered: Apr 21 2022 11:10AM    
This patient has been assessed with a concern for Malnutrition and has been determined to have a diagnosis/diagnoses of Severe protein-calorie malnutrition.    This patient is being managed with:   Diet NPO with Tube Feed-  Tube Feeding Modality: Jejunostomy  TwoCal HN (TWOCALHNRTH)  Total Volume for 24 Hours (mL): 240  Continuous  Starting Tube Feed Rate {mL per Hour}: 10  Until Goal Tube Feed Rate (mL per Hour): 10  Tube Feed Duration (in Hours): 24  Tube Feed Start Time: 10:00  Entered: Apr 17 2022  9:31AM    
This patient has been assessed with a concern for Malnutrition and has been determined to have a diagnosis/diagnoses of Severe protein-calorie malnutrition.    This patient is being managed with:   Diet NPO with Tube Feed-  Tube Feeding Modality: Jejunostomy  TwoCal HN (TWOCALHNRTH)  Total Volume for 24 Hours (mL): 756  Continuous  Until Goal Tube Feed Rate (mL per Hour): 63  Tube Feed Duration (in Hours): 12  Tube Feed Start Time: 21:00  Tube Feed Stop Time: 09:00  Entered: Apr 25 2022  3:50PM    
This patient has been assessed with a concern for Malnutrition and has been determined to have a diagnosis/diagnoses of Severe protein-calorie malnutrition.    This patient is being managed with:   Diet NPO with Tube Feed-  Tube Feeding Modality: Jejunostomy  TwoCal HN (TWOCALHNRTH)  Total Volume for 24 Hours (mL): 882  Continuous  Until Goal Tube Feed Rate (mL per Hour): 63  Tube Feed Duration (in Hours): 14  Tube Feed Start Time: 18:00  Tube Feed Stop Time: 08:00  Entered: Apr 25 2022  9:06AM    
This patient has been assessed with a concern for Malnutrition and has been determined to have a diagnosis/diagnoses of Severe protein-calorie malnutrition.    This patient is being managed with:   Diet NPO-  Entered: Apr 15 2022  7:04PM    
This patient has been assessed with a concern for Malnutrition and has been determined to have a diagnosis/diagnoses of Severe protein-calorie malnutrition.    This patient is being managed with:   Diet NPO with Tube Feed-  Tube Feeding Modality: Jejunostomy  TwoCal HN (TWOCALHNRTH)  Total Volume for 24 Hours (mL): 1008  Continuous  Starting Tube Feed Rate {mL per Hour}: 63  Until Goal Tube Feed Rate (mL per Hour): 63  Tube Feed Duration (in Hours): 16  Tube Feed Start Time: 17:00  Entered: Apr 22 2022  3:32PM    
This patient has been assessed with a concern for Malnutrition and has been determined to have a diagnosis/diagnoses of Severe protein-calorie malnutrition.    This patient is being managed with:   Diet NPO with Tube Feed-  Tube Feeding Modality: Jejunostomy  TwoCal HN (TWOCALHNRTH)  Total Volume for 24 Hours (mL): 480  Continuous  Starting Tube Feed Rate {mL per Hour}: 20  Until Goal Tube Feed Rate (mL per Hour): 20  Tube Feed Duration (in Hours): 24  Tube Feed Start Time: 10:00  Entered: Apr 18 2022  8:29AM      This patient has been assessed with a concern for Malnutrition and has been determined to have a diagnosis/diagnoses of Severe protein-calorie malnutrition.    This patient is being managed with:   Diet NPO with Tube Feed-  Tube Feeding Modality: Jejunostomy  TwoCal HN (TWOCALHNRTH)  Total Volume for 24 Hours (mL): 480  Continuous  Starting Tube Feed Rate {mL per Hour}: 20  Until Goal Tube Feed Rate (mL per Hour): 20  Tube Feed Duration (in Hours): 24  Tube Feed Start Time: 10:00  Entered: Apr 18 2022  8:29AM    
This patient has been assessed with a concern for Malnutrition and has been determined to have a diagnosis/diagnoses of Severe protein-calorie malnutrition.    This patient is being managed with:   Diet NPO with Tube Feed-  Tube Feeding Modality: Jejunostomy  TwoCal HN (TWOCALHNRTH)  Total Volume for 24 Hours (mL): 720  Continuous  Starting Tube Feed Rate {mL per Hour}: 30  Until Goal Tube Feed Rate (mL per Hour): 30  Tube Feed Duration (in Hours): 24  Tube Feed Start Time: 10:00  Entered: Apr 19 2022 12:27PM    
This patient has been assessed with a concern for Malnutrition and has been determined to have a diagnosis/diagnoses of Severe protein-calorie malnutrition.    This patient is being managed with:   Diet NPO-  Entered: Apr 15 2022  7:04PM    
This patient has been assessed with a concern for Malnutrition and has been determined to have a diagnosis/diagnoses of Severe protein-calorie malnutrition.    This patient is being managed with:   Diet Full Liquid-  Supplement Feeding Modality:  Oral  Ensure Enlive Cans or Servings Per Day:  1       Frequency:  Three Times a day  Entered: Apr 14 2022  7:27AM    Diet NPO after Midnight-     NPO Start Date: 14-Apr-2022   NPO Start Time: 23:59  Except Medications  Entered: Apr 14 2022  7:25AM    
This patient has been assessed with a concern for Malnutrition and has been determined to have a diagnosis/diagnoses of Severe protein-calorie malnutrition.    This patient is being managed with:   Diet NPO with Tube Feed-  Tube Feeding Modality: Jejunostomy  TwoCal HN (TWOCALHNRTH)  Total Volume for 24 Hours (mL): 1008  Continuous  Starting Tube Feed Rate {mL per Hour}: 63  Until Goal Tube Feed Rate (mL per Hour): 63  Tube Feed Duration (in Hours): 16  Tube Feed Start Time: 17:00  Entered: Apr 22 2022  3:32PM    
This patient has been assessed with a concern for Malnutrition and has been determined to have a diagnosis/diagnoses of Severe protein-calorie malnutrition.    This patient is being managed with:   Diet NPO with Tube Feed-  Tube Feeding Modality: Jejunostomy  TwoCal HN (TWOCALHNRTH)  Total Volume for 24 Hours (mL): 720  Continuous  Starting Tube Feed Rate {mL per Hour}: 30  Until Goal Tube Feed Rate (mL per Hour): 30  Tube Feed Duration (in Hours): 24  Tube Feed Start Time: 10:00  Entered: Apr 19 2022 12:27PM    
This patient has been assessed with a concern for Malnutrition and has been determined to have a diagnosis/diagnoses of Severe protein-calorie malnutrition.    This patient is being managed with:   Diet NPO-  Entered: Apr 15 2022  7:04PM    
This patient has been assessed with a concern for Malnutrition and has been determined to have a diagnosis/diagnoses of Severe protein-calorie malnutrition.    This patient is being managed with:   Diet Full Liquid-  Supplement Feeding Modality:  Oral  Ensure Enlive Cans or Servings Per Day:  1       Frequency:  Three Times a day  Entered: Apr 14 2022  7:27AM    Diet NPO after Midnight-     NPO Start Date: 14-Apr-2022   NPO Start Time: 23:59  Except Medications  Entered: Apr 14 2022  7:25AM

## 2022-04-27 ENCOUNTER — TRANSCRIPTION ENCOUNTER (OUTPATIENT)
Age: 27
End: 2022-04-27

## 2022-04-27 VITALS
DIASTOLIC BLOOD PRESSURE: 70 MMHG | SYSTOLIC BLOOD PRESSURE: 127 MMHG | RESPIRATION RATE: 18 BRPM | OXYGEN SATURATION: 99 % | HEART RATE: 90 BPM | TEMPERATURE: 98 F

## 2022-04-27 LAB
ANION GAP SERPL CALC-SCNC: 12 MMOL/L — SIGNIFICANT CHANGE UP (ref 7–14)
BUN SERPL-MCNC: 14 MG/DL — SIGNIFICANT CHANGE UP (ref 7–23)
CALCIUM SERPL-MCNC: 9.2 MG/DL — SIGNIFICANT CHANGE UP (ref 8.4–10.5)
CHLORIDE SERPL-SCNC: 99 MMOL/L — SIGNIFICANT CHANGE UP (ref 98–107)
CO2 SERPL-SCNC: 26 MMOL/L — SIGNIFICANT CHANGE UP (ref 22–31)
CREAT SERPL-MCNC: 0.7 MG/DL — SIGNIFICANT CHANGE UP (ref 0.5–1.3)
EGFR: 130 ML/MIN/1.73M2 — SIGNIFICANT CHANGE UP
GLUCOSE SERPL-MCNC: 102 MG/DL — HIGH (ref 70–99)
HCT VFR BLD CALC: 34.9 % — LOW (ref 39–50)
HGB BLD-MCNC: 11.6 G/DL — LOW (ref 13–17)
MCHC RBC-ENTMCNC: 27.9 PG — SIGNIFICANT CHANGE UP (ref 27–34)
MCHC RBC-ENTMCNC: 33.2 GM/DL — SIGNIFICANT CHANGE UP (ref 32–36)
MCV RBC AUTO: 83.9 FL — SIGNIFICANT CHANGE UP (ref 80–100)
NRBC # BLD: 0 /100 WBCS — SIGNIFICANT CHANGE UP
NRBC # FLD: 0 K/UL — SIGNIFICANT CHANGE UP
PLATELET # BLD AUTO: 201 K/UL — SIGNIFICANT CHANGE UP (ref 150–400)
POTASSIUM SERPL-MCNC: 4.6 MMOL/L — SIGNIFICANT CHANGE UP (ref 3.5–5.3)
POTASSIUM SERPL-SCNC: 4.6 MMOL/L — SIGNIFICANT CHANGE UP (ref 3.5–5.3)
RBC # BLD: 4.16 M/UL — LOW (ref 4.2–5.8)
RBC # FLD: 14.6 % — HIGH (ref 10.3–14.5)
SODIUM SERPL-SCNC: 137 MMOL/L — SIGNIFICANT CHANGE UP (ref 135–145)
WBC # BLD: 4.38 K/UL — SIGNIFICANT CHANGE UP (ref 3.8–10.5)
WBC # FLD AUTO: 4.38 K/UL — SIGNIFICANT CHANGE UP (ref 3.8–10.5)

## 2022-04-27 PROCEDURE — 71045 X-RAY EXAM CHEST 1 VIEW: CPT | Mod: 26

## 2022-04-27 RX ORDER — HYDROXYZINE HCL 10 MG
1 TABLET ORAL
Qty: 120 | Refills: 0
Start: 2022-04-27 | End: 2022-05-26

## 2022-04-27 RX ORDER — OXYCODONE HYDROCHLORIDE 5 MG/1
1 TABLET ORAL
Qty: 30 | Refills: 0
Start: 2022-04-27 | End: 2022-05-01

## 2022-04-27 RX ORDER — CLONAZEPAM 1 MG
1 TABLET ORAL
Qty: 6 | Refills: 0
Start: 2022-04-27 | End: 2022-04-29

## 2022-04-27 RX ORDER — SIMETHICONE 80 MG/1
1 TABLET, CHEWABLE ORAL
Qty: 42 | Refills: 0
Start: 2022-04-27 | End: 2022-05-10

## 2022-04-27 RX ORDER — PANTOPRAZOLE SODIUM 20 MG/1
1 TABLET, DELAYED RELEASE ORAL
Qty: 30 | Refills: 0
Start: 2022-04-27 | End: 2022-05-26

## 2022-04-27 RX ORDER — POLYETHYLENE GLYCOL 3350 17 G/17G
17 POWDER, FOR SOLUTION ORAL
Qty: 0 | Refills: 0 | DISCHARGE
Start: 2022-04-27

## 2022-04-27 RX ADMIN — Medication 975 MILLIGRAM(S): at 11:36

## 2022-04-27 RX ADMIN — Medication 975 MILLIGRAM(S): at 05:25

## 2022-04-27 RX ADMIN — Medication 20 MILLIGRAM(S): at 11:40

## 2022-04-27 RX ADMIN — PANTOPRAZOLE SODIUM 40 MILLIGRAM(S): 20 TABLET, DELAYED RELEASE ORAL at 05:26

## 2022-04-27 RX ADMIN — LIDOCAINE 1 PATCH: 4 CREAM TOPICAL at 11:40

## 2022-04-27 RX ADMIN — OXYCODONE HYDROCHLORIDE 10 MILLIGRAM(S): 5 TABLET ORAL at 12:06

## 2022-04-27 RX ADMIN — Medication 0.5 MILLIGRAM(S): at 05:26

## 2022-04-27 RX ADMIN — SIMETHICONE 80 MILLIGRAM(S): 80 TABLET, CHEWABLE ORAL at 05:27

## 2022-04-27 RX ADMIN — Medication 975 MILLIGRAM(S): at 12:30

## 2022-04-27 RX ADMIN — OXYCODONE HYDROCHLORIDE 10 MILLIGRAM(S): 5 TABLET ORAL at 06:18

## 2022-04-27 RX ADMIN — OXYCODONE HYDROCHLORIDE 10 MILLIGRAM(S): 5 TABLET ORAL at 11:35

## 2022-04-27 RX ADMIN — OXYCODONE HYDROCHLORIDE 10 MILLIGRAM(S): 5 TABLET ORAL at 01:18

## 2022-04-27 RX ADMIN — OXYCODONE HYDROCHLORIDE 10 MILLIGRAM(S): 5 TABLET ORAL at 00:48

## 2022-04-27 NOTE — DISCHARGE NOTE NURSING/CASE MANAGEMENT/SOCIAL WORK - NSDCPEFALRISK_GEN_ALL_CORE
For information on Fall & Injury Prevention, visit: [email or web address] OR  [email or web address] OR  https://www.cdc.gov/steadi/patient.html

## 2022-04-27 NOTE — PROGRESS NOTE ADULT - REASON FOR ADMISSION
Inability to tolerate PO
Dakota Mahesh esophagectomy
Inability to tolerate PO

## 2022-04-27 NOTE — PROGRESS NOTE ADULT - SUBJECTIVE AND OBJECTIVE BOX
Christiano Hoyt MD  Interventional Cardiology / Advance Heart Failure and Cardiac Transplant Specialist  [place] Office : [phone] [place] [address] [address] [address]  Tel:   [place] Office : [phone] [facility] [place] [address]  Tel: [phone]      Subjective/Overnight events: Pt is lying in bed comfortable not in distress, no chest pains no SOB no palpitations  	  MEDICATIONS:  heparin   Injectable 5000 Unit(s) SubCutaneous every 8 hours        acetaminophen    Suspension .. 975 milliGRAM(s) Oral every 6 hours  clonazePAM  Tablet 0.5 milliGRAM(s) Oral two times a day  hydrOXYzine hydrochloride 25 milliGRAM(s) Oral every 6 hours PRN  LORazepam     Tablet 1 milliGRAM(s) Oral every 8 hours PRN  ondansetron Injectable 4 milliGRAM(s) IV Push every 6 hours PRN  oxyCODONE    IR 5 milliGRAM(s) Oral every 3 hours PRN  PARoxetine 20 milliGRAM(s) Oral daily    pantoprazole    Tablet 40 milliGRAM(s) Oral before breakfast  polyethylene glycol 3350 17 Gram(s) Oral daily PRN  simethicone 80 milliGRAM(s) Chew three times a day      lidocaine   4% Patch 1 Patch Transdermal daily  tetracaine/benzocaine/butamben Spray 1 Spray(s) Topical every 3 hours PRN      PAST MEDICAL/SURGICAL HISTORY  PAST MEDICAL & SURGICAL HISTORY:  Heroin abuse    Cocaine abuse    GERD (gastroesophageal reflux disease)    Hiatal hernia    History of esophageal stricture    Intravenous drug abuse    Migration of pancreatic stent    Migrated esophageal stent        SOCIAL HISTORY: Substance Use (street drugs): ( x ) never used  (  ) other:    FAMILY HISTORY:  FH: thyroid disease (Grandparent)      PHYSICAL EXAM:  T(C): 36.7 (04-27-22 @ 12:06), Max: 37.2 (04-26-22 @ 16:30)  HR: 90 (04-27-22 @ 12:06) (61 - 92)  BP: 127/70 (04-27-22 @ 12:06) (119/63 - 128/78)  RR: 18 (04-27-22 @ 12:06) (16 - 18)  SpO2: 99% (04-27-22 @ 12:06) (97% - 99%)  Wt(kg): --  I&O's Summary    26 Apr 2022 07:01  -  27 Apr 2022 07:00  --------------------------------------------------------  IN: 260 mL / OUT: 1400 mL / NET: -1140 mL          GENERAL: NAD  EYES: EOMI, PERRLA, conjunctiva and sclera clear  ENMT: No tonsillar erythema, exudates, or enlargement  Cardiovascular: Normal S1 S2, No JVD, No murmurs, No edema  Respiratory: Lungs clear to auscultation	  Gastrointestinal:  s/p surgery 	  Extremities: No edema                              11.6   4.38  )-----------( 201      ( 27 Apr 2022 06:38 )             34.9     04-27    137  |  99  |  14  ----------------------------<  102<H>  4.6   |  26  |  0.70    Ca    9.2      27 Apr 2022 06:38      proBNP:   Lipid Profile:   HgA1c:   TSH:     Consultant(s) Notes Reviewed:  [x ] YES  [ ] NO    Care Discussed with Consultants/Other Providers [ x] YES  [ ] NO    Imaging Personally Reviewed independently:  [x] YES  [ ] NO    All labs, radiologic studies, vitals, orders and medications list reviewed. Patient is seen and examined at bedside. Case discussed with medical team.

## 2022-04-27 NOTE — DISCHARGE NOTE NURSING/CASE MANAGEMENT/SOCIAL WORK - PATIENT PORTAL LINK FT
You can access the FollowMyHealth Patient Portal offered by [facility] by registering at the following website: [email or web address]. By joining [employer]’s FollowMyHealth portal, you will also be able to view your health information using other applications (apps) compatible with our system.

## 2022-04-27 NOTE — PROGRESS NOTE ADULT - PROVIDER SPECIALTY LIST ADULT
Cardiology
Pain Medicine
Anesthesia
Cardiology
Critical Care
Critical Care
Gastroenterology
Hospitalist
Pain Medicine
Thoracic Surgery
CT Surgery
Cardiology
Critical Care
Gastroenterology
Hospitalist
Pain Medicine
CT Surgery
Cardiology
Critical Care
Gastroenterology
Gastroenterology
Thoracic Surgery
Hospitalist
Hospitalist
Cardiology
Hospitalist

## 2022-04-30 ENCOUNTER — INPATIENT (INPATIENT)
Facility: HOSPITAL | Age: 27
LOS: 5 days | Discharge: HOME | End: 2022-05-06
Attending: STUDENT IN AN ORGANIZED HEALTH CARE EDUCATION/TRAINING PROGRAM | Admitting: STUDENT IN AN ORGANIZED HEALTH CARE EDUCATION/TRAINING PROGRAM
Payer: MEDICAID

## 2022-04-30 VITALS
TEMPERATURE: 98 F | RESPIRATION RATE: 16 BRPM | DIASTOLIC BLOOD PRESSURE: 60 MMHG | HEIGHT: 71 IN | HEART RATE: 84 BPM | OXYGEN SATURATION: 100 % | SYSTOLIC BLOOD PRESSURE: 131 MMHG

## 2022-04-30 DIAGNOSIS — T85.528A DISPLACEMENT OF OTHER GASTROINTESTINAL PROSTHETIC DEVICES, IMPLANTS AND GRAFTS, INITIAL ENCOUNTER: Chronic | ICD-10-CM

## 2022-04-30 DIAGNOSIS — K52.9 NONINFECTIVE GASTROENTERITIS AND COLITIS, UNSPECIFIED: ICD-10-CM

## 2022-04-30 LAB
ALBUMIN SERPL ELPH-MCNC: 4.3 G/DL — SIGNIFICANT CHANGE UP (ref 3.3–5)
ALP SERPL-CCNC: 221 U/L — HIGH (ref 40–120)
ALT FLD-CCNC: 175 U/L — HIGH (ref 4–41)
ANION GAP SERPL CALC-SCNC: 9 MMOL/L — SIGNIFICANT CHANGE UP (ref 7–14)
APTT BLD: 30.1 SEC — SIGNIFICANT CHANGE UP (ref 27–36.3)
AST SERPL-CCNC: 69 U/L — HIGH (ref 4–40)
B PERT DNA SPEC QL NAA+PROBE: SIGNIFICANT CHANGE UP
B PERT+PARAPERT DNA PNL SPEC NAA+PROBE: SIGNIFICANT CHANGE UP
BASE EXCESS BLDV CALC-SCNC: 0.1 MMOL/L — SIGNIFICANT CHANGE UP (ref -2–3)
BASOPHILS # BLD AUTO: 0.05 K/UL — SIGNIFICANT CHANGE UP (ref 0–0.2)
BASOPHILS NFR BLD AUTO: 0.9 % — SIGNIFICANT CHANGE UP (ref 0–2)
BILIRUB SERPL-MCNC: 0.4 MG/DL — SIGNIFICANT CHANGE UP (ref 0.2–1.2)
BLD GP AB SCN SERPL QL: NEGATIVE — SIGNIFICANT CHANGE UP
BLOOD GAS VENOUS COMPREHENSIVE RESULT: SIGNIFICANT CHANGE UP
BORDETELLA PARAPERTUSSIS (RAPRVP): SIGNIFICANT CHANGE UP
BUN SERPL-MCNC: 36 MG/DL — HIGH (ref 7–23)
C PNEUM DNA SPEC QL NAA+PROBE: SIGNIFICANT CHANGE UP
CALCIUM SERPL-MCNC: 8.9 MG/DL — SIGNIFICANT CHANGE UP (ref 8.4–10.5)
CHLORIDE BLDV-SCNC: 104 MMOL/L — SIGNIFICANT CHANGE UP (ref 96–108)
CHLORIDE SERPL-SCNC: 103 MMOL/L — SIGNIFICANT CHANGE UP (ref 98–107)
CO2 BLDV-SCNC: 27.9 MMOL/L — HIGH (ref 22–26)
CO2 SERPL-SCNC: 25 MMOL/L — SIGNIFICANT CHANGE UP (ref 22–31)
CREAT SERPL-MCNC: 0.84 MG/DL — SIGNIFICANT CHANGE UP (ref 0.5–1.3)
EGFR: 123 ML/MIN/1.73M2 — SIGNIFICANT CHANGE UP
EOSINOPHIL # BLD AUTO: 0.37 K/UL — SIGNIFICANT CHANGE UP (ref 0–0.5)
EOSINOPHIL NFR BLD AUTO: 6.6 % — HIGH (ref 0–6)
FLUAV SUBTYP SPEC NAA+PROBE: SIGNIFICANT CHANGE UP
FLUBV RNA SPEC QL NAA+PROBE: SIGNIFICANT CHANGE UP
GAS PNL BLDV: 137 MMOL/L — SIGNIFICANT CHANGE UP (ref 136–145)
GLUCOSE BLDV-MCNC: 78 MG/DL — SIGNIFICANT CHANGE UP (ref 70–99)
GLUCOSE SERPL-MCNC: 86 MG/DL — SIGNIFICANT CHANGE UP (ref 70–99)
HADV DNA SPEC QL NAA+PROBE: SIGNIFICANT CHANGE UP
HCO3 BLDV-SCNC: 26 MMOL/L — SIGNIFICANT CHANGE UP (ref 22–29)
HCOV 229E RNA SPEC QL NAA+PROBE: SIGNIFICANT CHANGE UP
HCOV HKU1 RNA SPEC QL NAA+PROBE: SIGNIFICANT CHANGE UP
HCOV NL63 RNA SPEC QL NAA+PROBE: SIGNIFICANT CHANGE UP
HCOV OC43 RNA SPEC QL NAA+PROBE: SIGNIFICANT CHANGE UP
HCT VFR BLD CALC: 34.6 % — LOW (ref 39–50)
HCT VFR BLDA CALC: 34 % — LOW (ref 39–51)
HGB BLD CALC-MCNC: 11.4 G/DL — LOW (ref 13–17)
HGB BLD-MCNC: 11.3 G/DL — LOW (ref 13–17)
HMPV RNA SPEC QL NAA+PROBE: SIGNIFICANT CHANGE UP
HPIV1 RNA SPEC QL NAA+PROBE: SIGNIFICANT CHANGE UP
HPIV2 RNA SPEC QL NAA+PROBE: SIGNIFICANT CHANGE UP
HPIV3 RNA SPEC QL NAA+PROBE: SIGNIFICANT CHANGE UP
HPIV4 RNA SPEC QL NAA+PROBE: SIGNIFICANT CHANGE UP
IANC: 3.48 K/UL — SIGNIFICANT CHANGE UP (ref 1.8–7.4)
IMM GRANULOCYTES NFR BLD AUTO: 0.2 % — SIGNIFICANT CHANGE UP (ref 0–1.5)
INR BLD: 0.97 RATIO — SIGNIFICANT CHANGE UP (ref 0.88–1.16)
LACTATE BLDV-MCNC: 0.8 MMOL/L — SIGNIFICANT CHANGE UP (ref 0.5–2)
LIDOCAIN IGE QN: 14 U/L — SIGNIFICANT CHANGE UP (ref 7–60)
LYMPHOCYTES # BLD AUTO: 1.3 K/UL — SIGNIFICANT CHANGE UP (ref 1–3.3)
LYMPHOCYTES # BLD AUTO: 23.1 % — SIGNIFICANT CHANGE UP (ref 13–44)
M PNEUMO DNA SPEC QL NAA+PROBE: SIGNIFICANT CHANGE UP
MAGNESIUM SERPL-MCNC: 2 MG/DL — SIGNIFICANT CHANGE UP (ref 1.6–2.6)
MCHC RBC-ENTMCNC: 28.5 PG — SIGNIFICANT CHANGE UP (ref 27–34)
MCHC RBC-ENTMCNC: 32.7 GM/DL — SIGNIFICANT CHANGE UP (ref 32–36)
MCV RBC AUTO: 87.4 FL — SIGNIFICANT CHANGE UP (ref 80–100)
MONOCYTES # BLD AUTO: 0.42 K/UL — SIGNIFICANT CHANGE UP (ref 0–0.9)
MONOCYTES NFR BLD AUTO: 7.5 % — SIGNIFICANT CHANGE UP (ref 2–14)
NEUTROPHILS # BLD AUTO: 3.48 K/UL — SIGNIFICANT CHANGE UP (ref 1.8–7.4)
NEUTROPHILS NFR BLD AUTO: 61.7 % — SIGNIFICANT CHANGE UP (ref 43–77)
NRBC # BLD: 0 /100 WBCS — SIGNIFICANT CHANGE UP
NRBC # FLD: 0 K/UL — SIGNIFICANT CHANGE UP
PCO2 BLDV: 49 MMHG — SIGNIFICANT CHANGE UP (ref 42–55)
PH BLDV: 7.34 — SIGNIFICANT CHANGE UP (ref 7.32–7.43)
PHOSPHATE SERPL-MCNC: 4.7 MG/DL — HIGH (ref 2.5–4.5)
PLATELET # BLD AUTO: 242 K/UL — SIGNIFICANT CHANGE UP (ref 150–400)
PO2 BLDV: 46 MMHG — SIGNIFICANT CHANGE UP
POTASSIUM BLDV-SCNC: 4.4 MMOL/L — SIGNIFICANT CHANGE UP (ref 3.5–5.1)
POTASSIUM SERPL-MCNC: 4.7 MMOL/L — SIGNIFICANT CHANGE UP (ref 3.5–5.3)
POTASSIUM SERPL-SCNC: 4.7 MMOL/L — SIGNIFICANT CHANGE UP (ref 3.5–5.3)
PROCALCITONIN SERPL-MCNC: 0.07 NG/ML — SIGNIFICANT CHANGE UP (ref 0.02–0.1)
PROT SERPL-MCNC: 6.9 G/DL — SIGNIFICANT CHANGE UP (ref 6–8.3)
PROTHROM AB SERPL-ACNC: 11.3 SEC — SIGNIFICANT CHANGE UP (ref 10.5–13.4)
RAPID RVP RESULT: SIGNIFICANT CHANGE UP
RBC # BLD: 3.96 M/UL — LOW (ref 4.2–5.8)
RBC # FLD: 15.5 % — HIGH (ref 10.3–14.5)
RH IG SCN BLD-IMP: POSITIVE — SIGNIFICANT CHANGE UP
RSV RNA SPEC QL NAA+PROBE: SIGNIFICANT CHANGE UP
RV+EV RNA SPEC QL NAA+PROBE: SIGNIFICANT CHANGE UP
SAO2 % BLDV: 78.7 % — SIGNIFICANT CHANGE UP
SARS-COV-2 RNA SPEC QL NAA+PROBE: SIGNIFICANT CHANGE UP
SODIUM SERPL-SCNC: 137 MMOL/L — SIGNIFICANT CHANGE UP (ref 135–145)
WBC # BLD: 5.63 K/UL — SIGNIFICANT CHANGE UP (ref 3.8–10.5)
WBC # FLD AUTO: 5.63 K/UL — SIGNIFICANT CHANGE UP (ref 3.8–10.5)

## 2022-04-30 PROCEDURE — 99285 EMERGENCY DEPT VISIT HI MDM: CPT

## 2022-04-30 PROCEDURE — 74177 CT ABD & PELVIS W/CONTRAST: CPT | Mod: 26,QQ

## 2022-04-30 PROCEDURE — 76705 ECHO EXAM OF ABDOMEN: CPT | Mod: 26

## 2022-04-30 PROCEDURE — 71260 CT THORAX DX C+: CPT | Mod: 26,QQ

## 2022-04-30 RX ORDER — SODIUM CHLORIDE 9 MG/ML
1000 INJECTION INTRAMUSCULAR; INTRAVENOUS; SUBCUTANEOUS ONCE
Refills: 0 | Status: COMPLETED | OUTPATIENT
Start: 2022-04-30 | End: 2022-04-30

## 2022-04-30 RX ORDER — MORPHINE SULFATE 50 MG/1
4 CAPSULE, EXTENDED RELEASE ORAL ONCE
Refills: 0 | Status: DISCONTINUED | OUTPATIENT
Start: 2022-04-30 | End: 2022-04-30

## 2022-04-30 RX ORDER — ONDANSETRON 8 MG/1
4 TABLET, FILM COATED ORAL ONCE
Refills: 0 | Status: COMPLETED | OUTPATIENT
Start: 2022-04-30 | End: 2022-04-30

## 2022-04-30 RX ORDER — HYDROMORPHONE HYDROCHLORIDE 2 MG/ML
0.5 INJECTION INTRAMUSCULAR; INTRAVENOUS; SUBCUTANEOUS ONCE
Refills: 0 | Status: DISCONTINUED | OUTPATIENT
Start: 2022-04-30 | End: 2022-04-30

## 2022-04-30 RX ORDER — FAMOTIDINE 10 MG/ML
20 INJECTION INTRAVENOUS ONCE
Refills: 0 | Status: COMPLETED | OUTPATIENT
Start: 2022-04-30 | End: 2022-04-30

## 2022-04-30 RX ADMIN — HYDROMORPHONE HYDROCHLORIDE 0.5 MILLIGRAM(S): 2 INJECTION INTRAMUSCULAR; INTRAVENOUS; SUBCUTANEOUS at 22:41

## 2022-04-30 RX ADMIN — SODIUM CHLORIDE 1000 MILLILITER(S): 9 INJECTION INTRAMUSCULAR; INTRAVENOUS; SUBCUTANEOUS at 21:14

## 2022-04-30 RX ADMIN — SODIUM CHLORIDE 1000 MILLILITER(S): 9 INJECTION INTRAMUSCULAR; INTRAVENOUS; SUBCUTANEOUS at 23:27

## 2022-04-30 RX ADMIN — FAMOTIDINE 20 MILLIGRAM(S): 10 INJECTION INTRAVENOUS at 21:03

## 2022-04-30 RX ADMIN — MORPHINE SULFATE 4 MILLIGRAM(S): 50 CAPSULE, EXTENDED RELEASE ORAL at 21:03

## 2022-04-30 RX ADMIN — ONDANSETRON 4 MILLIGRAM(S): 8 TABLET, FILM COATED ORAL at 21:03

## 2022-04-30 NOTE — ED PROVIDER NOTE - OBJECTIVE STATEMENT
The patient is a 26y Male with pmhx of esophageal stricture s/p multiple dilations, esophagectomy, and IVDU on Suboxone presenting with profuse watery diarrhea since 11am today. Pt had esophagectomy procedure 2 weeks ago (done by Dr. Zenia Shanonn, thoracic surgery). Pt had b/l chest tubes placed at the time due to post-op hemothorax. PEG tube was placed for nighttime feeds. Was discharged home 3 days ago. Was on IV abx during hospital stay. Pt presenting with lower abdominal pain, accompanied with constant diarrhea that started off brown and became more yellow. Denies any pain, swelling, or warmth at site of PEG tube. Says he missed his nighttime feed through the PEG tube yesterday. Endorsing mild nausea, but no vomiting. Decreased PO intake. No fevers, cp, sob, leg swelling, urinary complaints. Has been taking oxycodone for pain as needed. NKDA.

## 2022-04-30 NOTE — ED PROVIDER NOTE - NS ED ROS FT
GENERAL: No fever or chills  EYES: No change in vision  HEENT: No trouble swallowing or speaking  CARDIAC: No chest pain  PULMONARY: No cough or SOB  GI: +abdominal pain, nausea, diarrhea  : No changes in urination  SKIN: No rashes  NEURO: No headache, no numbness  MSK: No joint pain  Otherwise as HPI or negative.

## 2022-04-30 NOTE — ED PROVIDER NOTE - ATTENDING CONTRIBUTION TO CARE
27 yo male with PMH esophageal stricture s/p multiple dilations, esophagectomy, and IVDU on Suboxone, s/p esophagectomy 2 weeks ago for evaluation of profuse watery diarrhea since 11am today. PE: protecting airway, abd ttp generalized A/P Labs, imaging, medicate, adm

## 2022-04-30 NOTE — ED PROVIDER NOTE - PHYSICAL EXAMINATION
Gen: In moderate distress. A&Ox4. Non-toxic appearing.  HEENT: Normocephalic and atraumatic. PERRL, EOMI, no nasal discharge, mucous membranes dry, no scleral icterus.  CV: Regular rate and rhythm, +S1/S2, no M/R/G. No significant lower extremity edema. Radial and DP pulses present and symmetrical. Capillary refill less than 2 seconds.  Resp: Normal effort and rate. CTAB, no rales, rhonchi, or wheezes.  GI: Abdomen soft, non-distended, TTP diffusely. Bowel sounds present. PEG tube seen without any surrounding erythema, swelling, warmth, or purulent discharge. Well healing surgical scars from esophagectomy surgery.  MSK: No CVAT bilaterally  Neuro: Following commands, speaking in full sentences, moving extremities spontaneously  Psych: Appropriate mood, cooperative

## 2022-04-30 NOTE — ED PROVIDER NOTE - CLINICAL SUMMARY MEDICAL DECISION MAKING FREE TEXT BOX
Jaya Zaman MD (PGY-2): The patient is a 26y Male with pmhx of esophageal stricture s/p multiple dilations, esophagectomy, and IVDU on Suboxone presenting with profuse watery diarrhea since 11am today. Concern for post-surgical abscess. Will evaluate for C. diff infection. IVF, zofran, pain control, labs, ekg, ct chest, ctap, ruq us. Pt in moderate distress due to pain. Will consult surgery if any concerning changes on CT scans. If CT scans wnl, will be admitted to Medicine for IV hydration.

## 2022-04-30 NOTE — ED ADULT TRIAGE NOTE - CHIEF COMPLAINT QUOTE
Pt had esophagectomy procedure 2 weeks ago presents with abdominal pain, accompanied with diarrhea. Peg tube and incision sites look clean, dry and intact. Pt appears very uncomfortable in triage related to pain, hunched over in pain.

## 2022-05-01 DIAGNOSIS — R10.9 UNSPECIFIED ABDOMINAL PAIN: ICD-10-CM

## 2022-05-01 DIAGNOSIS — R19.7 DIARRHEA, UNSPECIFIED: ICD-10-CM

## 2022-05-01 DIAGNOSIS — R63.8 OTHER SYMPTOMS AND SIGNS CONCERNING FOOD AND FLUID INTAKE: ICD-10-CM

## 2022-05-01 DIAGNOSIS — R74.01 ELEVATION OF LEVELS OF LIVER TRANSAMINASE LEVELS: ICD-10-CM

## 2022-05-01 DIAGNOSIS — Z87.19 PERSONAL HISTORY OF OTHER DISEASES OF THE DIGESTIVE SYSTEM: ICD-10-CM

## 2022-05-01 DIAGNOSIS — K52.9 NONINFECTIVE GASTROENTERITIS AND COLITIS, UNSPECIFIED: ICD-10-CM

## 2022-05-01 LAB
ALBUMIN SERPL ELPH-MCNC: 3.6 G/DL — SIGNIFICANT CHANGE UP (ref 3.3–5)
ALP SERPL-CCNC: 172 U/L — HIGH (ref 40–120)
ALT FLD-CCNC: 140 U/L — HIGH (ref 4–41)
ANION GAP SERPL CALC-SCNC: 10 MMOL/L — SIGNIFICANT CHANGE UP (ref 7–14)
APPEARANCE UR: CLEAR — SIGNIFICANT CHANGE UP
AST SERPL-CCNC: 50 U/L — HIGH (ref 4–40)
BASOPHILS # BLD AUTO: 0.05 K/UL — SIGNIFICANT CHANGE UP (ref 0–0.2)
BASOPHILS NFR BLD AUTO: 1.1 % — SIGNIFICANT CHANGE UP (ref 0–2)
BILIRUB SERPL-MCNC: 0.8 MG/DL — SIGNIFICANT CHANGE UP (ref 0.2–1.2)
BILIRUB UR-MCNC: NEGATIVE — SIGNIFICANT CHANGE UP
BLD GP AB SCN SERPL QL: NEGATIVE — SIGNIFICANT CHANGE UP
BUN SERPL-MCNC: 21 MG/DL — SIGNIFICANT CHANGE UP (ref 7–23)
CALCIUM SERPL-MCNC: 8.8 MG/DL — SIGNIFICANT CHANGE UP (ref 8.4–10.5)
CHLORIDE SERPL-SCNC: 107 MMOL/L — SIGNIFICANT CHANGE UP (ref 98–107)
CO2 SERPL-SCNC: 23 MMOL/L — SIGNIFICANT CHANGE UP (ref 22–31)
COLOR SPEC: SIGNIFICANT CHANGE UP
CREAT SERPL-MCNC: 0.79 MG/DL — SIGNIFICANT CHANGE UP (ref 0.5–1.3)
DIFF PNL FLD: NEGATIVE — SIGNIFICANT CHANGE UP
EGFR: 126 ML/MIN/1.73M2 — SIGNIFICANT CHANGE UP
EOSINOPHIL # BLD AUTO: 0.45 K/UL — SIGNIFICANT CHANGE UP (ref 0–0.5)
EOSINOPHIL NFR BLD AUTO: 10 % — HIGH (ref 0–6)
GGT SERPL-CCNC: 235 U/L — HIGH (ref 8–61)
GLUCOSE BLDC GLUCOMTR-MCNC: 112 MG/DL — HIGH (ref 70–99)
GLUCOSE BLDC GLUCOMTR-MCNC: 115 MG/DL — HIGH (ref 70–99)
GLUCOSE BLDC GLUCOMTR-MCNC: 95 MG/DL — SIGNIFICANT CHANGE UP (ref 70–99)
GLUCOSE BLDC GLUCOMTR-MCNC: 97 MG/DL — SIGNIFICANT CHANGE UP (ref 70–99)
GLUCOSE SERPL-MCNC: 90 MG/DL — SIGNIFICANT CHANGE UP (ref 70–99)
GLUCOSE UR QL: NEGATIVE — SIGNIFICANT CHANGE UP
HCT VFR BLD CALC: 31.7 % — LOW (ref 39–50)
HGB BLD-MCNC: 10.5 G/DL — LOW (ref 13–17)
IANC: 2.23 K/UL — SIGNIFICANT CHANGE UP (ref 1.8–7.4)
IMM GRANULOCYTES NFR BLD AUTO: 0.4 % — SIGNIFICANT CHANGE UP (ref 0–1.5)
KETONES UR-MCNC: NEGATIVE — SIGNIFICANT CHANGE UP
LEUKOCYTE ESTERASE UR-ACNC: NEGATIVE — SIGNIFICANT CHANGE UP
LYMPHOCYTES # BLD AUTO: 1.18 K/UL — SIGNIFICANT CHANGE UP (ref 1–3.3)
LYMPHOCYTES # BLD AUTO: 26.3 % — SIGNIFICANT CHANGE UP (ref 13–44)
MAGNESIUM SERPL-MCNC: 1.9 MG/DL — SIGNIFICANT CHANGE UP (ref 1.6–2.6)
MCHC RBC-ENTMCNC: 28.9 PG — SIGNIFICANT CHANGE UP (ref 27–34)
MCHC RBC-ENTMCNC: 33.1 GM/DL — SIGNIFICANT CHANGE UP (ref 32–36)
MCV RBC AUTO: 87.3 FL — SIGNIFICANT CHANGE UP (ref 80–100)
MONOCYTES # BLD AUTO: 0.55 K/UL — SIGNIFICANT CHANGE UP (ref 0–0.9)
MONOCYTES NFR BLD AUTO: 12.3 % — SIGNIFICANT CHANGE UP (ref 2–14)
NEUTROPHILS # BLD AUTO: 2.23 K/UL — SIGNIFICANT CHANGE UP (ref 1.8–7.4)
NEUTROPHILS NFR BLD AUTO: 49.9 % — SIGNIFICANT CHANGE UP (ref 43–77)
NITRITE UR-MCNC: NEGATIVE — SIGNIFICANT CHANGE UP
NRBC # BLD: 0 /100 WBCS — SIGNIFICANT CHANGE UP
NRBC # FLD: 0 K/UL — SIGNIFICANT CHANGE UP
PH UR: 6.5 — SIGNIFICANT CHANGE UP (ref 5–8)
PHOSPHATE SERPL-MCNC: 3.1 MG/DL — SIGNIFICANT CHANGE UP (ref 2.5–4.5)
PLATELET # BLD AUTO: 179 K/UL — SIGNIFICANT CHANGE UP (ref 150–400)
POTASSIUM SERPL-MCNC: 4.3 MMOL/L — SIGNIFICANT CHANGE UP (ref 3.5–5.3)
POTASSIUM SERPL-SCNC: 4.3 MMOL/L — SIGNIFICANT CHANGE UP (ref 3.5–5.3)
PROT SERPL-MCNC: 5.8 G/DL — LOW (ref 6–8.3)
PROT UR-MCNC: NEGATIVE — SIGNIFICANT CHANGE UP
RBC # BLD: 3.63 M/UL — LOW (ref 4.2–5.8)
RBC # FLD: 15.7 % — HIGH (ref 10.3–14.5)
RH IG SCN BLD-IMP: POSITIVE — SIGNIFICANT CHANGE UP
SODIUM SERPL-SCNC: 140 MMOL/L — SIGNIFICANT CHANGE UP (ref 135–145)
SP GR SPEC: 1.03 — SIGNIFICANT CHANGE UP (ref 1–1.05)
UROBILINOGEN FLD QL: SIGNIFICANT CHANGE UP
WBC # BLD: 4.48 K/UL — SIGNIFICANT CHANGE UP (ref 3.8–10.5)
WBC # FLD AUTO: 4.48 K/UL — SIGNIFICANT CHANGE UP (ref 3.8–10.5)

## 2022-05-01 PROCEDURE — 12345: CPT | Mod: NC

## 2022-05-01 PROCEDURE — 99223 1ST HOSP IP/OBS HIGH 75: CPT

## 2022-05-01 RX ORDER — HYDROMORPHONE HYDROCHLORIDE 2 MG/ML
0.5 INJECTION INTRAMUSCULAR; INTRAVENOUS; SUBCUTANEOUS EVERY 4 HOURS
Refills: 0 | Status: DISCONTINUED | OUTPATIENT
Start: 2022-05-01 | End: 2022-05-01

## 2022-05-01 RX ORDER — METRONIDAZOLE 500 MG
500 TABLET ORAL EVERY 8 HOURS
Refills: 0 | Status: COMPLETED | OUTPATIENT
Start: 2022-05-01 | End: 2022-05-05

## 2022-05-01 RX ORDER — DEXTROSE 50 % IN WATER 50 %
25 SYRINGE (ML) INTRAVENOUS ONCE
Refills: 0 | Status: DISCONTINUED | OUTPATIENT
Start: 2022-05-01 | End: 2022-05-06

## 2022-05-01 RX ORDER — BUPRENORPHINE AND NALOXONE 2; .5 MG/1; MG/1
1 TABLET SUBLINGUAL
Qty: 0 | Refills: 0 | DISCHARGE

## 2022-05-01 RX ORDER — DEXTROSE 50 % IN WATER 50 %
12.5 SYRINGE (ML) INTRAVENOUS ONCE
Refills: 0 | Status: DISCONTINUED | OUTPATIENT
Start: 2022-05-01 | End: 2022-05-06

## 2022-05-01 RX ORDER — HEPARIN SODIUM 5000 [USP'U]/ML
5000 INJECTION INTRAVENOUS; SUBCUTANEOUS EVERY 12 HOURS
Refills: 0 | Status: DISCONTINUED | OUTPATIENT
Start: 2022-05-01 | End: 2022-05-06

## 2022-05-01 RX ORDER — PANTOPRAZOLE SODIUM 20 MG/1
40 TABLET, DELAYED RELEASE ORAL DAILY
Refills: 0 | Status: DISCONTINUED | OUTPATIENT
Start: 2022-05-01 | End: 2022-05-06

## 2022-05-01 RX ORDER — SIMETHICONE 80 MG/1
80 TABLET, CHEWABLE ORAL THREE TIMES A DAY
Refills: 0 | Status: DISCONTINUED | OUTPATIENT
Start: 2022-05-01 | End: 2022-05-03

## 2022-05-01 RX ORDER — DEXTROSE 50 % IN WATER 50 %
15 SYRINGE (ML) INTRAVENOUS ONCE
Refills: 0 | Status: DISCONTINUED | OUTPATIENT
Start: 2022-05-01 | End: 2022-05-06

## 2022-05-01 RX ORDER — CIPROFLOXACIN LACTATE 400MG/40ML
500 VIAL (ML) INTRAVENOUS EVERY 12 HOURS
Refills: 0 | Status: COMPLETED | OUTPATIENT
Start: 2022-05-01 | End: 2022-05-05

## 2022-05-01 RX ORDER — HYDROXYZINE HCL 10 MG
50 TABLET ORAL EVERY 6 HOURS
Refills: 0 | Status: DISCONTINUED | OUTPATIENT
Start: 2022-05-01 | End: 2022-05-06

## 2022-05-01 RX ORDER — ACETAMINOPHEN 500 MG
1000 TABLET ORAL ONCE
Refills: 0 | Status: COMPLETED | OUTPATIENT
Start: 2022-05-01 | End: 2022-05-01

## 2022-05-01 RX ORDER — KETOROLAC TROMETHAMINE 30 MG/ML
15 SYRINGE (ML) INJECTION ONCE
Refills: 0 | Status: DISCONTINUED | OUTPATIENT
Start: 2022-05-01 | End: 2022-05-01

## 2022-05-01 RX ORDER — HYDROMORPHONE HYDROCHLORIDE 2 MG/ML
1 INJECTION INTRAMUSCULAR; INTRAVENOUS; SUBCUTANEOUS ONCE
Refills: 0 | Status: DISCONTINUED | OUTPATIENT
Start: 2022-05-01 | End: 2022-05-01

## 2022-05-01 RX ORDER — HYDROMORPHONE HYDROCHLORIDE 2 MG/ML
1 INJECTION INTRAMUSCULAR; INTRAVENOUS; SUBCUTANEOUS EVERY 4 HOURS
Refills: 0 | Status: DISCONTINUED | OUTPATIENT
Start: 2022-05-01 | End: 2022-05-02

## 2022-05-01 RX ORDER — ACETAMINOPHEN 500 MG
650 TABLET ORAL EVERY 6 HOURS
Refills: 0 | Status: DISCONTINUED | OUTPATIENT
Start: 2022-05-01 | End: 2022-05-02

## 2022-05-01 RX ORDER — SODIUM CHLORIDE 9 MG/ML
1000 INJECTION, SOLUTION INTRAVENOUS
Refills: 0 | Status: DISCONTINUED | OUTPATIENT
Start: 2022-05-01 | End: 2022-05-02

## 2022-05-01 RX ORDER — HYDROMORPHONE HYDROCHLORIDE 2 MG/ML
0.5 INJECTION INTRAMUSCULAR; INTRAVENOUS; SUBCUTANEOUS EVERY 4 HOURS
Refills: 0 | Status: DISCONTINUED | OUTPATIENT
Start: 2022-05-01 | End: 2022-05-02

## 2022-05-01 RX ORDER — GLUCAGON INJECTION, SOLUTION 0.5 MG/.1ML
1 INJECTION, SOLUTION SUBCUTANEOUS ONCE
Refills: 0 | Status: DISCONTINUED | OUTPATIENT
Start: 2022-05-01 | End: 2022-05-06

## 2022-05-01 RX ORDER — GABAPENTIN 400 MG/1
100 CAPSULE ORAL THREE TIMES A DAY
Refills: 0 | Status: DISCONTINUED | OUTPATIENT
Start: 2022-05-01 | End: 2022-05-02

## 2022-05-01 RX ORDER — IBUPROFEN 200 MG
2 TABLET ORAL
Qty: 0 | Refills: 0 | DISCHARGE

## 2022-05-01 RX ORDER — HYDROMORPHONE HYDROCHLORIDE 2 MG/ML
2 INJECTION INTRAMUSCULAR; INTRAVENOUS; SUBCUTANEOUS EVERY 4 HOURS
Refills: 0 | Status: DISCONTINUED | OUTPATIENT
Start: 2022-05-01 | End: 2022-05-01

## 2022-05-01 RX ADMIN — HYDROMORPHONE HYDROCHLORIDE 2 MILLIGRAM(S): 2 INJECTION INTRAMUSCULAR; INTRAVENOUS; SUBCUTANEOUS at 04:37

## 2022-05-01 RX ADMIN — Medication 15 MILLIGRAM(S): at 13:44

## 2022-05-01 RX ADMIN — PANTOPRAZOLE SODIUM 40 MILLIGRAM(S): 20 TABLET, DELAYED RELEASE ORAL at 12:05

## 2022-05-01 RX ADMIN — SIMETHICONE 80 MILLIGRAM(S): 80 TABLET, CHEWABLE ORAL at 17:30

## 2022-05-01 RX ADMIN — HYDROMORPHONE HYDROCHLORIDE 0.5 MILLIGRAM(S): 2 INJECTION INTRAMUSCULAR; INTRAVENOUS; SUBCUTANEOUS at 09:11

## 2022-05-01 RX ADMIN — Medication 100 MILLIGRAM(S): at 13:01

## 2022-05-01 RX ADMIN — SODIUM CHLORIDE 125 MILLILITER(S): 9 INJECTION, SOLUTION INTRAVENOUS at 13:04

## 2022-05-01 RX ADMIN — Medication 1000 MILLIGRAM(S): at 14:12

## 2022-05-01 RX ADMIN — GABAPENTIN 100 MILLIGRAM(S): 400 CAPSULE ORAL at 22:30

## 2022-05-01 RX ADMIN — Medication 1000 MILLIGRAM(S): at 22:59

## 2022-05-01 RX ADMIN — HYDROMORPHONE HYDROCHLORIDE 0.5 MILLIGRAM(S): 2 INJECTION INTRAMUSCULAR; INTRAVENOUS; SUBCUTANEOUS at 06:42

## 2022-05-01 RX ADMIN — SODIUM CHLORIDE 125 MILLILITER(S): 9 INJECTION, SOLUTION INTRAVENOUS at 00:41

## 2022-05-01 RX ADMIN — HYDROMORPHONE HYDROCHLORIDE 2 MILLIGRAM(S): 2 INJECTION INTRAMUSCULAR; INTRAVENOUS; SUBCUTANEOUS at 01:30

## 2022-05-01 RX ADMIN — HYDROMORPHONE HYDROCHLORIDE 2 MILLIGRAM(S): 2 INJECTION INTRAMUSCULAR; INTRAVENOUS; SUBCUTANEOUS at 00:38

## 2022-05-01 RX ADMIN — Medication 500 MILLIGRAM(S): at 05:45

## 2022-05-01 RX ADMIN — Medication 15 MILLIGRAM(S): at 14:12

## 2022-05-01 RX ADMIN — Medication 500 MILLIGRAM(S): at 17:30

## 2022-05-01 RX ADMIN — SIMETHICONE 80 MILLIGRAM(S): 80 TABLET, CHEWABLE ORAL at 12:59

## 2022-05-01 RX ADMIN — Medication 100 MILLIGRAM(S): at 05:09

## 2022-05-01 RX ADMIN — Medication 100 MILLIGRAM(S): at 21:20

## 2022-05-01 RX ADMIN — HYDROMORPHONE HYDROCHLORIDE 0.5 MILLIGRAM(S): 2 INJECTION INTRAMUSCULAR; INTRAVENOUS; SUBCUTANEOUS at 12:59

## 2022-05-01 RX ADMIN — SODIUM CHLORIDE 125 MILLILITER(S): 9 INJECTION, SOLUTION INTRAVENOUS at 08:56

## 2022-05-01 RX ADMIN — HYDROMORPHONE HYDROCHLORIDE 1 MILLIGRAM(S): 2 INJECTION INTRAMUSCULAR; INTRAVENOUS; SUBCUTANEOUS at 14:41

## 2022-05-01 RX ADMIN — Medication 400 MILLIGRAM(S): at 22:29

## 2022-05-01 RX ADMIN — HYDROMORPHONE HYDROCHLORIDE 1 MILLIGRAM(S): 2 INJECTION INTRAMUSCULAR; INTRAVENOUS; SUBCUTANEOUS at 21:19

## 2022-05-01 RX ADMIN — GABAPENTIN 100 MILLIGRAM(S): 400 CAPSULE ORAL at 13:33

## 2022-05-01 RX ADMIN — HYDROMORPHONE HYDROCHLORIDE 2 MILLIGRAM(S): 2 INJECTION INTRAMUSCULAR; INTRAVENOUS; SUBCUTANEOUS at 05:32

## 2022-05-01 RX ADMIN — HYDROMORPHONE HYDROCHLORIDE 1 MILLIGRAM(S): 2 INJECTION INTRAMUSCULAR; INTRAVENOUS; SUBCUTANEOUS at 14:26

## 2022-05-01 RX ADMIN — HYDROMORPHONE HYDROCHLORIDE 1 MILLIGRAM(S): 2 INJECTION INTRAMUSCULAR; INTRAVENOUS; SUBCUTANEOUS at 17:15

## 2022-05-01 RX ADMIN — SIMETHICONE 80 MILLIGRAM(S): 80 TABLET, CHEWABLE ORAL at 22:29

## 2022-05-01 RX ADMIN — HYDROMORPHONE HYDROCHLORIDE 1 MILLIGRAM(S): 2 INJECTION INTRAMUSCULAR; INTRAVENOUS; SUBCUTANEOUS at 21:49

## 2022-05-01 RX ADMIN — Medication 400 MILLIGRAM(S): at 13:57

## 2022-05-01 RX ADMIN — HYDROMORPHONE HYDROCHLORIDE 0.5 MILLIGRAM(S): 2 INJECTION INTRAMUSCULAR; INTRAVENOUS; SUBCUTANEOUS at 05:43

## 2022-05-01 RX ADMIN — HYDROMORPHONE HYDROCHLORIDE 0.5 MILLIGRAM(S): 2 INJECTION INTRAMUSCULAR; INTRAVENOUS; SUBCUTANEOUS at 13:14

## 2022-05-01 RX ADMIN — HYDROMORPHONE HYDROCHLORIDE 0.5 MILLIGRAM(S): 2 INJECTION INTRAMUSCULAR; INTRAVENOUS; SUBCUTANEOUS at 08:56

## 2022-05-01 RX ADMIN — HYDROMORPHONE HYDROCHLORIDE 0.5 MILLIGRAM(S): 2 INJECTION INTRAMUSCULAR; INTRAVENOUS; SUBCUTANEOUS at 01:48

## 2022-05-01 RX ADMIN — HYDROMORPHONE HYDROCHLORIDE 1 MILLIGRAM(S): 2 INJECTION INTRAMUSCULAR; INTRAVENOUS; SUBCUTANEOUS at 17:30

## 2022-05-01 RX ADMIN — Medication 20 MILLIGRAM(S): at 12:05

## 2022-05-01 NOTE — CONSULT NOTE ADULT - SUBJECTIVE AND OBJECTIVE BOX
Chief Complaint:  Patient is a 26y old  Male who presents with a chief complaint of     HPI: 27 y/o M hx of esophageal stricture s/p multiple dilations and Tyler-Mahesh esophagectomy 04/15/22, prior IVDU no longer on suboxone who presents due to diarrhea 7-8x that started day of admission (). He had abdominal pain as well, and Tylenol helped only mildly. He has J-tube that he is running out of sterile water to flush, and also did not have clean caps for it. He last used the J-tube 1 night PTA. Denies drainage or skin rash around the site. Denies fevers, chills, melena, hematochezia, vomiting, dysuria, polyuria, LE edema, skin rash, cp, sob, or palpitations. Last EGD on  showed  LA Grade D reflux esophagitis, GE flap valve classified as Hill Grade IV, 7 cm HH, benign-appearing esophageal stenosis s/p dilation, bx (path showing ***) and injected w/ triamcinolone for muscle relaxation. 4/15/22 underwent Robotic Tyler-Mahesh Esophagectomy, and J-tube placement by thoracic surgery. Post-op course c/b hemothorax. Since admission VSS, labs notable for Hgb 10-11 (BL 11-13), normal WBC, mild liver enzyme elevations. CT A/P notable for multiple small bowel loops with mural thickening, no bowel obstruction. RUQ US with no cholelithiasis or sonographic evidence of acute cholecystitis.  GI stool PCR and C. diff sent. GI consulted for diarrhea.    Otherwise, patient denies fevers, chills, weight loss, dysphagia, odynophagia, early satiety, poor oral intake, abdominal pain, nausea, vomiting, diarrhea, melena, hematemesis, hematochezia, change in stool caliber, or family history of GI-related cancers.    Allergies:  No Known Allergies      Home Medications:    Hospital Medications:  acetaminophen     Tablet .. 650 milliGRAM(s) Oral every 6 hours PRN  ciprofloxacin     Tablet 500 milliGRAM(s) Oral every 12 hours  dextrose 50% Injectable 25 Gram(s) IV Push once  dextrose 50% Injectable 12.5 Gram(s) IV Push once  dextrose 50% Injectable 25 Gram(s) IV Push once  dextrose Oral Gel 15 Gram(s) Oral once  glucagon  Injectable 1 milliGRAM(s) IntraMuscular once  heparin   Injectable 5000 Unit(s) SubCutaneous every 12 hours  HYDROmorphone  Injectable 1 milliGRAM(s) IV Push every 4 hours PRN  HYDROmorphone  Injectable 0.5 milliGRAM(s) IV Push every 4 hours PRN  hydrOXYzine hydrochloride 50 milliGRAM(s) Oral every 6 hours PRN  lactated ringers. 1000 milliLiter(s) IV Continuous <Continuous>  metroNIDAZOLE  IVPB 500 milliGRAM(s) IV Intermittent every 8 hours  pantoprazole   Suspension 40 milliGRAM(s) Oral daily  PARoxetine 20 milliGRAM(s) Oral daily  simethicone 80 milliGRAM(s) Chew three times a day PRN      PMHX/PSHX:  Heroin abuse    Cocaine abuse    GERD (gastroesophageal reflux disease)    Hiatal hernia    History of esophageal stricture    Intravenous drug abuse    No significant past surgical history    Migration of pancreatic stent    Migrated esophageal stent        Family history:  FH: thyroid disease (Grandparent)     Denies any family history of GI-related disease or cancers.    Social History:   ETOH: denies  Tobacco: denies  Illicit drug use: +h/o IVDU; cocaine and heroin use    ROS: 14 point ROS negative unless otherwise stated in HPI      Vital Signs:  Vital Signs Last 24 Hrs  T(C): 36.6 (01 May 2022 10:48), Max: 37.2 (2022 22:08)  T(F): 97.9 (01 May 2022 10:48), Max: 99 (2022 22:08)  HR: 62 (01 May 2022 10:48) (54 - 84)  BP: 111/56 (01 May 2022 10:48) (103/57 - 131/60)  BP(mean): --  RR: 15 (01 May 2022 10:48) (15 - 18)  SpO2: 98% (01 May 2022 10:48) (98% - 100%)  Daily Height in cm: 180.34 (2022 19:42)    Daily     PHYSICAL EXAM:     GENERAL:  Appears stated age, well-groomed, well-nourished, no distress  HEENT:  NC/AT,  conjunctivae clear and pink  CHEST:  Full & symmetric excursion, no increased effort, breath sounds clear  HEART:  Regular rhythm, S1, S2, no murmur/rub/S3/S4  ABDOMEN:  Soft, non-tender, non-distended, +bowel sounds, +J-tube  EXTREMITIES:  no cyanosis,clubbing or edema  SKIN:  No rash/erythema/ecchymoses/petechiae/wounds/abscess/warm/dry  NEURO:  Alert, orientedx3      LABS:                        10.5   4.48  )-----------( 179      ( 01 May 2022 06:28 )             31.7     05-01    140  |  107  |  21  ----------------------------<  90  4.3   |  23  |  0.79    Ca    8.8      01 May 2022 06:28  Phos  3.1     05-  Mg     1.90     -    TPro  5.8<L>  /  Alb  3.6  /  TBili  0.8  /  DBili  x   /  AST  50<H>  /  ALT  140<H>  /  AlkPhos  172<H>      LIVER FUNCTIONS - ( 01 May 2022 06:28 )  Alb: 3.6 g/dL / Pro: 5.8 g/dL / ALK PHOS: 172 U/L / ALT: 140 U/L / AST: 50 U/L / GGT: x           PT/INR - ( 2022 21:25 )   PT: 11.3 sec;   INR: 0.97 ratio         PTT - ( 2022 21:25 )  PTT:30.1 sec  Urinalysis Basic - ( 01 May 2022 07:04 )    Color: Light Yellow / Appearance: Clear / S.031 / pH: x  Gluc: x / Ketone: Negative  / Bili: Negative / Urobili: <2 mg/dL   Blood: x / Protein: Negative / Nitrite: Negative   Leuk Esterase: Negative / RBC: x / WBC x   Sq Epi: x / Non Sq Epi: x / Bacteria: x      Amylase Serum--      Lipase serum14       Ammonia--      Imaging:     ACC: [id number] EXAM:  CT CHEST IC                        ACC: [id number] EXAM:  CT ABDOMEN AND PELVIS IC                          PROCEDURE DATE:  2022          INTERPRETATION:  CLINICAL INFORMATION: Abdominal pain and diarrhea,   history of esophagectomy and gastric pull-through.    COMPARISON: CT abdomen and pelvis 2021.    CONTRAST/COMPLICATIONS:  IV Contrast: Omnipaque 350  90 cc administered   10 cc discarded  Oral Contrast: NONE  Complications: None reported at time of study completion    PROCEDURE:  CT of the Chest, Abdomen and Pelvis was performed.  Sagittal and coronal reformats were performed.    FINDINGS:  CHEST:  LUNGS, PLEURA, AND LARGE AIRWAYS: Patent central airways. No parenchymal   consolidation. No pleural effusion or pneumothorax.  VESSELS: Within normal limits.  HEART: Heart size is normal. No pericardial effusion.  MEDIASTINUM AND JOHN: No lymphadenopathy.  CHEST WALL AND LOWER NECK: Within normal limits.    ABDOMEN AND PELVIS:  LIVER: Within normal limits.  BILE DUCTS: Normal caliber.  GALLBLADDER: Within normal limits.  SPLEEN: Within normal limits.  PANCREAS: Within normal limits.  ADRENALS: Within normal limits.  KIDNEYS/URETERS: Within normal limits.    BLADDER: Minimally distended.  REPRODUCTIVE ORGANS: Prostate within normal limits.    BOWEL: Gastric pull-up with postsurgical changes. Several loops of small   bowel in the midabdomen with mural thickening. Air and stool-filled colon   without evidence of bowel obstruction.. Appendix is normal with mural   calcifications noted.  PERITONEUM: No ascites. Left lower quadrant drainage catheter.  VESSELS: Within normal limits.  RETROPERITONEUM/LYMPH NODES: No lymphadenopathy.  ABDOMINAL WALL: Within normal limits.  BONES: Within normal limits.    IMPRESSION:  Air and fluid filled loops of colon without evidence of bowel obstruction.    Multiple loops of small bowel with mural thickening which can be seen in   the setting of enteritis.        ACC: [id number] EXAM:  US ABDOMEN RT UPR QUADRANT                          PROCEDURE DATE:  2022          INTERPRETATION:  CLINICAL INDICATION: Abdominal pain, postop, assess   cholecystitis.    TECHNIQUE: Targeted right upper quadrant ultrasound of the abdomen was   performed.    COMPARISON: CT 2022.    FINDINGS: This study was technically difficult due to bowel gas.    LIVER: 17.5 cm in length. Mildly increased echogenicity, which may be due   to fatty infiltration.  BILIARY: No intrahepatic or extrahepatic biliary dilatation. Visualized   common bile duct measuring up to 0.3 cm.  GALLBLADDER: No obvious gallstone, significant gallbladder wall   thickening or pericholecystic fluid. Negative sonographic Angulo sign.  PANCREAS: Poorly visualized due to bowel gas.  RIGHT KIDNEY: 12.0 cm in length. No hydronephrosis.  ADDITIONAL: No ascites.    IMPRESSION:    No cholelithiasis or sonographic evidence of acute cholecystitis.         Chief Complaint:  Patient is a 26y old  Male who presents with a chief complaint of     HPI: 25 y/o M hx of esophageal stricture s/p multiple dilations and Tyler-Mahesh esophagectomy 04/15/22, prior IVDU no longer on suboxone who presents due to diarrhea 7-8x that started day of admission (). He had abdominal pain as well. He takes 50% nutrition via J-tube and 50% by mouth (on low carb, lactose free kosher diet). Last solid food he consumed was chicken and fish prior to having diarrhea (no sick contacts but reports no one else around him had the chicken or fish). No new medication changes prior to diarrhea onset. Denies drainage or skin rash around the J-tube site. Denies fevers, chills, melena, hematochezia, N/V. Last EGD on  showed  LA Grade D reflux esophagitis, GE flap valve classified as Hill Grade IV, 7 cm HH, benign-appearing esophageal stenosis s/p dilation, bx (path neg H pylori) and injected w/ triamcinolone for muscle relaxation. 4/15/22 underwent Robotic Tyler-Mahesh Esophagectomy, and J-tube placement by thoracic surgery. Post-op course c/b hemothorax. Since admission VSS, labs notable for Hgb 10-11 (BL 11-13), normal WBC, mild liver enzyme elevations. CT A/P notable for multiple small bowel loops with mural thickening, no bowel obstruction. RUQ US with no cholelithiasis or sonographic evidence of acute cholecystitis. GI stool PCR and C. diff sent. GI consulted for diarrhea.    Allergies:  No Known Allergies      Home Medications:    Hospital Medications:  acetaminophen     Tablet .. 650 milliGRAM(s) Oral every 6 hours PRN  ciprofloxacin     Tablet 500 milliGRAM(s) Oral every 12 hours  dextrose 50% Injectable 25 Gram(s) IV Push once  dextrose 50% Injectable 12.5 Gram(s) IV Push once  dextrose 50% Injectable 25 Gram(s) IV Push once  dextrose Oral Gel 15 Gram(s) Oral once  glucagon  Injectable 1 milliGRAM(s) IntraMuscular once  heparin   Injectable 5000 Unit(s) SubCutaneous every 12 hours  HYDROmorphone  Injectable 1 milliGRAM(s) IV Push every 4 hours PRN  HYDROmorphone  Injectable 0.5 milliGRAM(s) IV Push every 4 hours PRN  hydrOXYzine hydrochloride 50 milliGRAM(s) Oral every 6 hours PRN  lactated ringers. 1000 milliLiter(s) IV Continuous <Continuous>  metroNIDAZOLE  IVPB 500 milliGRAM(s) IV Intermittent every 8 hours  pantoprazole   Suspension 40 milliGRAM(s) Oral daily  PARoxetine 20 milliGRAM(s) Oral daily  simethicone 80 milliGRAM(s) Chew three times a day PRN      PMHX/PSHX:  Heroin abuse    Cocaine abuse    GERD (gastroesophageal reflux disease)    Hiatal hernia    History of esophageal stricture    Intravenous drug abuse    No significant past surgical history    Migration of pancreatic stent    Migrated esophageal stent        Family history:  FH: thyroid disease (Grandparent)     Denies any family history of GI-related disease or cancers.    Social History:   ETOH: denies  Tobacco: denies  Illicit drug use: +h/o IVDU; cocaine and heroin use    ROS: 14 point ROS negative unless otherwise stated in HPI      Vital Signs:  Vital Signs Last 24 Hrs  T(C): 36.6 (01 May 2022 10:48), Max: 37.2 (2022 22:08)  T(F): 97.9 (01 May 2022 10:48), Max: 99 (2022 22:08)  HR: 62 (01 May 2022 10:48) (54 - 84)  BP: 111/56 (01 May 2022 10:48) (103/57 - 131/60)  BP(mean): --  RR: 15 (01 May 2022 10:48) (15 - 18)  SpO2: 98% (01 May 2022 10:48) (98% - 100%)  Daily Height in cm: 180.34 (2022 19:42)    Daily     PHYSICAL EXAM:     GENERAL:  Appears stated age, well-groomed, well-nourished, +moderate painful distress  HEENT:  NC/AT,  conjunctivae clear and pink  CHEST:  Full & symmetric excursion, no increased effort, breath sounds clear  HEART:  Regular rhythm, S1, S2, no murmur/rub/S3/S4  ABDOMEN:  Soft, +diffusely tender, non-distended, +bowel sounds, +J-tube  EXTREMITIES:  no cyanosis,clubbing or edema  SKIN:  No rash/erythema/ecchymoses/petechiae/wounds/abscess/warm/dry  NEURO:  Alert, orientedx3      LABS:                        10.5   4.48  )-----------( 179      ( 01 May 2022 06:28 )             31.7         140  |  107  |  21  ----------------------------<  90  4.3   |  23  |  0.79    Ca    8.8      01 May 2022 06:28  Phos  3.1     -  Mg     1.90         TPro  5.8<L>  /  Alb  3.6  /  TBili  0.8  /  DBili  x   /  AST  50<H>  /  ALT  140<H>  /  AlkPhos  172<H>      LIVER FUNCTIONS - ( 01 May 2022 06:28 )  Alb: 3.6 g/dL / Pro: 5.8 g/dL / ALK PHOS: 172 U/L / ALT: 140 U/L / AST: 50 U/L / GGT: x           PT/INR - ( 2022 21:25 )   PT: 11.3 sec;   INR: 0.97 ratio         PTT - ( 2022 21:25 )  PTT:30.1 sec  Urinalysis Basic - ( 01 May 2022 07:04 )    Color: Light Yellow / Appearance: Clear / S.031 / pH: x  Gluc: x / Ketone: Negative  / Bili: Negative / Urobili: <2 mg/dL   Blood: x / Protein: Negative / Nitrite: Negative   Leuk Esterase: Negative / RBC: x / WBC x   Sq Epi: x / Non Sq Epi: x / Bacteria: x      Amylase Serum--      Lipase serum14       Ammonia--      Imaging:     ACC: [id number] EXAM:  CT CHEST IC                        ACC: [id number] EXAM:  CT ABDOMEN AND PELVIS IC                          PROCEDURE DATE:  2022          INTERPRETATION:  CLINICAL INFORMATION: Abdominal pain and diarrhea,   history of esophagectomy and gastric pull-through.    COMPARISON: CT abdomen and pelvis 2021.    CONTRAST/COMPLICATIONS:  IV Contrast: Omnipaque 350  90 cc administered   10 cc discarded  Oral Contrast: NONE  Complications: None reported at time of study completion    PROCEDURE:  CT of the Chest, Abdomen and Pelvis was performed.  Sagittal and coronal reformats were performed.    FINDINGS:  CHEST:  LUNGS, PLEURA, AND LARGE AIRWAYS: Patent central airways. No parenchymal   consolidation. No pleural effusion or pneumothorax.  VESSELS: Within normal limits.  HEART: Heart size is normal. No pericardial effusion.  MEDIASTINUM AND JOHN: No lymphadenopathy.  CHEST WALL AND LOWER NECK: Within normal limits.    ABDOMEN AND PELVIS:  LIVER: Within normal limits.  BILE DUCTS: Normal caliber.  GALLBLADDER: Within normal limits.  SPLEEN: Within normal limits.  PANCREAS: Within normal limits.  ADRENALS: Within normal limits.  KIDNEYS/URETERS: Within normal limits.    BLADDER: Minimally distended.  REPRODUCTIVE ORGANS: Prostate within normal limits.    BOWEL: Gastric pull-up with postsurgical changes. Several loops of small   bowel in the midabdomen with mural thickening. Air and stool-filled colon   without evidence of bowel obstruction.. Appendix is normal with mural   calcifications noted.  PERITONEUM: No ascites. Left lower quadrant drainage catheter.  VESSELS: Within normal limits.  RETROPERITONEUM/LYMPH NODES: No lymphadenopathy.  ABDOMINAL WALL: Within normal limits.  BONES: Within normal limits.    IMPRESSION:  Air and fluid filled loops of colon without evidence of bowel obstruction.    Multiple loops of small bowel with mural thickening which can be seen in   the setting of enteritis.        ACC: [id number] EXAM:  US ABDOMEN RT UPR QUADRANT                          PROCEDURE DATE:  2022          INTERPRETATION:  CLINICAL INDICATION: Abdominal pain, postop, assess   cholecystitis.    TECHNIQUE: Targeted right upper quadrant ultrasound of the abdomen was   performed.    COMPARISON: CT 2022.    FINDINGS: This study was technically difficult due to bowel gas.    LIVER: 17.5 cm in length. Mildly increased echogenicity, which may be due   to fatty infiltration.  BILIARY: No intrahepatic or extrahepatic biliary dilatation. Visualized   common bile duct measuring up to 0.3 cm.  GALLBLADDER: No obvious gallstone, significant gallbladder wall   thickening or pericholecystic fluid. Negative sonographic Angulo sign.  PANCREAS: Poorly visualized due to bowel gas.  RIGHT KIDNEY: 12.0 cm in length. No hydronephrosis.  ADDITIONAL: No ascites.    IMPRESSION:    No cholelithiasis or sonographic evidence of acute cholecystitis.       HPI: 25 y/o M hx of esophageal stricture s/p multiple dilations and Hamilton-Mahesh esophagectomy 04/15/22, prior IVDU no longer on Suboxone who presented for diarrhea 7-8x that started day of admission (). He had abdominal pain as well. He takes 50% nutrition via J-tube and 50% by mouth (on low carb, lactose free kosher diet). Last solid food he consumed was chicken and fish prior to having diarrhea (no sick contacts but reports no one else around him had the chicken or fish). No new medication changes prior to diarrhea onset. Denies drainage or skin rash around the J-tube site. Denies fevers, chills, melena, hematochezia, N/V. Last EGD on  showed  LA Grade D reflux esophagitis, GE flap valve classified as Hill Grade IV, 7 cm HH, benign-appearing esophageal stenosis s/p dilation, bx (path neg H pylori) and injected w/ triamcinolone for muscle relaxation. 4/15/22 underwent Robotic Tyler-Mahesh Esophagectomy, and J-tube placement by thoracic surgery. Post-op course c/b hemothorax. Since admission VSS, labs notable for Hgb 10-11 (BL 11-13), normal WBC, mild liver enzyme elevations. CT A/P notable for multiple small bowel loops with mural thickening, no bowel obstruction. RUQ US with no cholelithiasis or sonographic evidence of acute cholecystitis. GI stool PCR and C. diff sent. GI consulted for diarrhea.    Allergies:  No Known Allergies      Home Medications:    Hospital Medications:  acetaminophen     Tablet .. 650 milliGRAM(s) Oral every 6 hours PRN  ciprofloxacin     Tablet 500 milliGRAM(s) Oral every 12 hours  dextrose 50% Injectable 25 Gram(s) IV Push once  dextrose 50% Injectable 12.5 Gram(s) IV Push once  dextrose 50% Injectable 25 Gram(s) IV Push once  dextrose Oral Gel 15 Gram(s) Oral once  glucagon  Injectable 1 milliGRAM(s) IntraMuscular once  heparin   Injectable 5000 Unit(s) SubCutaneous every 12 hours  HYDROmorphone  Injectable 1 milliGRAM(s) IV Push every 4 hours PRN  HYDROmorphone  Injectable 0.5 milliGRAM(s) IV Push every 4 hours PRN  hydrOXYzine hydrochloride 50 milliGRAM(s) Oral every 6 hours PRN  lactated ringers. 1000 milliLiter(s) IV Continuous <Continuous>  metroNIDAZOLE  IVPB 500 milliGRAM(s) IV Intermittent every 8 hours  pantoprazole   Suspension 40 milliGRAM(s) Oral daily  PARoxetine 20 milliGRAM(s) Oral daily  simethicone 80 milliGRAM(s) Chew three times a day PRN      PMHX/PSHX:  Heroin abuse    Cocaine abuse    GERD (gastroesophageal reflux disease)    Hiatal hernia    History of esophageal stricture    Intravenous drug abuse    No significant past surgical history    Migration of pancreatic stent    Migrated esophageal stent        Family history:  FH: thyroid disease (Grandparent)     Denies any family history of GI-related disease or cancers.    Social History:   ETOH: denies  Tobacco: denies  Illicit drug use: +h/o IVDU; cocaine and heroin use    ROS: 14 point ROS negative unless otherwise stated in HPI      Vital Signs:  Vital Signs Last 24 Hrs  T(C): 36.6 (01 May 2022 10:48), Max: 37.2 (2022 22:08)  T(F): 97.9 (01 May 2022 10:48), Max: 99 (2022 22:08)  HR: 62 (01 May 2022 10:48) (54 - 84)  BP: 111/56 (01 May 2022 10:48) (103/57 - 131/60)  BP(mean): --  RR: 15 (01 May 2022 10:48) (15 - 18)  SpO2: 98% (01 May 2022 10:48) (98% - 100%)  Daily Height in cm: 180.34 (2022 19:42)    Daily     PHYSICAL EXAM:     GENERAL:  Appears stated age, well-groomed, well-nourished, +moderate painful distress  HEENT:  NC/AT,  conjunctivae clear and pink  CHEST:  Full & symmetric excursion, no increased effort, breath sounds clear  HEART:  Regular rhythm, S1, S2, no murmur/rub/S3/S4  ABDOMEN:  Soft, +diffusely tender, non-distended, +bowel sounds, +J-tube  EXTREMITIES:  no cyanosis,clubbing or edema  SKIN:  No rash/erythema/ecchymoses/petechiae/wounds/abscess/warm/dry  NEURO:  Alert, orientedx3      LABS:                        10.5   4.48  )-----------( 179      ( 01 May 2022 06:28 )             31.7     -    140  |  107  |  21  ----------------------------<  90  4.3   |  23  |  0.79    Ca    8.8      01 May 2022 06:28  Phos  3.1     05-  Mg     1.90     -    TPro  5.8<L>  /  Alb  3.6  /  TBili  0.8  /  DBili  x   /  AST  50<H>  /  ALT  140<H>  /  AlkPhos  172<H>      LIVER FUNCTIONS - ( 01 May 2022 06:28 )  Alb: 3.6 g/dL / Pro: 5.8 g/dL / ALK PHOS: 172 U/L / ALT: 140 U/L / AST: 50 U/L / GGT: x           PT/INR - ( 2022 21:25 )   PT: 11.3 sec;   INR: 0.97 ratio         PTT - ( 2022 21:25 )  PTT:30.1 sec  Urinalysis Basic - ( 01 May 2022 07:04 )    Color: Light Yellow / Appearance: Clear / S.031 / pH: x  Gluc: x / Ketone: Negative  / Bili: Negative / Urobili: <2 mg/dL   Blood: x / Protein: Negative / Nitrite: Negative   Leuk Esterase: Negative / RBC: x / WBC x   Sq Epi: x / Non Sq Epi: x / Bacteria: x      Amylase Serum--      Lipase serum14       Ammonia--      Imaging:     ACC: [id number] EXAM:  CT CHEST IC                        ACC: [id number] EXAM:  CT ABDOMEN AND PELVIS IC                          PROCEDURE DATE:  2022          INTERPRETATION:  CLINICAL INFORMATION: Abdominal pain and diarrhea,   history of esophagectomy and gastric pull-through.    COMPARISON: CT abdomen and pelvis 2021.    CONTRAST/COMPLICATIONS:  IV Contrast: Omnipaque 350  90 cc administered   10 cc discarded  Oral Contrast: NONE  Complications: None reported at time of study completion    PROCEDURE:  CT of the Chest, Abdomen and Pelvis was performed.  Sagittal and coronal reformats were performed.    FINDINGS:  CHEST:  LUNGS, PLEURA, AND LARGE AIRWAYS: Patent central airways. No parenchymal   consolidation. No pleural effusion or pneumothorax.  VESSELS: Within normal limits.  HEART: Heart size is normal. No pericardial effusion.  MEDIASTINUM AND JOHN: No lymphadenopathy.  CHEST WALL AND LOWER NECK: Within normal limits.    ABDOMEN AND PELVIS:  LIVER: Within normal limits.  BILE DUCTS: Normal caliber.  GALLBLADDER: Within normal limits.  SPLEEN: Within normal limits.  PANCREAS: Within normal limits.  ADRENALS: Within normal limits.  KIDNEYS/URETERS: Within normal limits.    BLADDER: Minimally distended.  REPRODUCTIVE ORGANS: Prostate within normal limits.    BOWEL: Gastric pull-up with postsurgical changes. Several loops of small   bowel in the midabdomen with mural thickening. Air and stool-filled colon   without evidence of bowel obstruction.. Appendix is normal with mural   calcifications noted.  PERITONEUM: No ascites. Left lower quadrant drainage catheter.  VESSELS: Within normal limits.  RETROPERITONEUM/LYMPH NODES: No lymphadenopathy.  ABDOMINAL WALL: Within normal limits.  BONES: Within normal limits.    IMPRESSION:  Air and fluid filled loops of colon without evidence of bowel obstruction.    Multiple loops of small bowel with mural thickening which can be seen in   the setting of enteritis.        ACC: [id number] EXAM:  US ABDOMEN RT UPR QUADRANT                          PROCEDURE DATE:  2022          INTERPRETATION:  CLINICAL INDICATION: Abdominal pain, postop, assess   cholecystitis.    TECHNIQUE: Targeted right upper quadrant ultrasound of the abdomen was   performed.    COMPARISON: CT 2022.    FINDINGS: This study was technically difficult due to bowel gas.    LIVER: 17.5 cm in length. Mildly increased echogenicity, which may be due   to fatty infiltration.  BILIARY: No intrahepatic or extrahepatic biliary dilatation. Visualized   common bile duct measuring up to 0.3 cm.  GALLBLADDER: No obvious gallstone, significant gallbladder wall   thickening or pericholecystic fluid. Negative sonographic Angulo sign.  PANCREAS: Poorly visualized due to bowel gas.  RIGHT KIDNEY: 12.0 cm in length. No hydronephrosis.  ADDITIONAL: No ascites.    IMPRESSION:    No cholelithiasis or sonographic evidence of acute cholecystitis.

## 2022-05-01 NOTE — PROGRESS NOTE ADULT - SUBJECTIVE AND OBJECTIVE BOX
Kadeem Keita MD  [employer]ist  Pager [phone]  Email: [email or web address]          PROGRESS NOTE:     Patient is a 26y old  Male who presents with a chief complaint of     SUBJECTIVE / OVERNIGHT EVENTS:  Patient seen and examined this morning. This morning c/o uncontrolled abdominal pain that is severe, asking if he can have the dilaudid every 2 or 3 hours instead of 4.   ADDITIONAL REVIEW OF SYSTEMS:  No vomiting while here    MEDICATIONS  (STANDING):  acetaminophen   IVPB .. 1000 milliGRAM(s) IV Intermittent once  ciprofloxacin     Tablet 500 milliGRAM(s) Oral every 12 hours  dextrose 50% Injectable 25 Gram(s) IV Push once  dextrose 50% Injectable 12.5 Gram(s) IV Push once  dextrose 50% Injectable 25 Gram(s) IV Push once  dextrose Oral Gel 15 Gram(s) Oral once  gabapentin Solution 100 milliGRAM(s) Oral three times a day  glucagon  Injectable 1 milliGRAM(s) IntraMuscular once  heparin   Injectable 5000 Unit(s) SubCutaneous every 12 hours  lactated ringers. 1000 milliLiter(s) (125 mL/Hr) IV Continuous <Continuous>  metroNIDAZOLE  IVPB 500 milliGRAM(s) IV Intermittent every 8 hours  pantoprazole   Suspension 40 milliGRAM(s) Oral daily  PARoxetine 20 milliGRAM(s) Oral daily    MEDICATIONS  (PRN):  acetaminophen     Tablet .. 650 milliGRAM(s) Oral every 6 hours PRN Temp greater or equal to 38C (100.4F), Mild Pain (1 - 3)  HYDROmorphone  Injectable 1 milliGRAM(s) IV Push every 4 hours PRN Severe Pain (7 - 10)  HYDROmorphone  Injectable 0.5 milliGRAM(s) IV Push every 4 hours PRN Moderate Pain (4 - 6)  hydrOXYzine hydrochloride 50 milliGRAM(s) Oral every 6 hours PRN Anxiety  simethicone 80 milliGRAM(s) Chew three times a day PRN Gas      CAPILLARY BLOOD GLUCOSE      POCT Blood Glucose.: 112 mg/dL (01 May 2022 12:31)  POCT Blood Glucose.: 97 mg/dL (01 May 2022 03:08)  POCT Blood Glucose.: 90 mg/dL (2022 21:02)  POCT Blood Glucose.: 61 mg/dL (2022 21:01)    I&O's Summary    01 May 2022 07:01  -  01 May 2022 13:52  --------------------------------------------------------  IN: 700 mL / OUT: 400 mL / NET: 300 mL        PHYSICAL EXAM:  Vital Signs Last 24 Hrs  T(C): 37.1 (01 May 2022 13:05), Max: 37.2 (2022 22:08)  T(F): 98.7 (01 May 2022 13:05), Max: 99 (2022 22:08)  HR: 66 (01 May 2022 13:05) (54 - 84)  BP: 99/54 (01 May 2022 13:05) (99/54 - 131/60)  BP(mean): --  RR: 16 (01 May 2022 13:05) (15 - 18)  SpO2: 98% (01 May 2022 13:05) (98% - 100%)     PHYSICAL EXAM:  GENERAL: NAD, well-developed  HEAD:  Atraumatic, Normocephalic  EYES: EOMI, PERRLA, conjunctiva and sclera clear  NECK: Supple, No JVD  CHEST/LUNG: Clear to auscultation bilaterally; No wheeze  HEART: Regular rate and rhythm; No murmurs, rubs, or gallops  ABDOMEN: Soft, tender throughout to palpation, nondistended, J tube in place no erythema, or warmth noted  EXTREMITIES:  2+ Peripheral Pulses, No clubbing, cyanosis, or edema  PSYCH: AAOx3  NEUROLOGY: non-focal  SKIN: No rashes or lesions      LABS:                        10.5   4.48  )-----------( 179      ( 01 May 2022 06:28 )             31.7     05-    140  |  107  |  21  ----------------------------<  90  4.3   |  23  |  0.79    Ca    8.8      01 May 2022 06:28  Phos  3.1     05-  Mg     1.90     05-    TPro  5.8<L>  /  Alb  3.6  /  TBili  0.8  /  DBili  x   /  AST  50<H>  /  ALT  140<H>  /  AlkPhos  172<H>  05-    PT/INR - ( 2022 21:25 )   PT: 11.3 sec;   INR: 0.97 ratio         PTT - ( 2022 21:25 )  PTT:30.1 sec      Urinalysis Basic - ( 01 May 2022 07:04 )    Color: Light Yellow / Appearance: Clear / S.031 / pH: x  Gluc: x / Ketone: Negative  / Bili: Negative / Urobili: <2 mg/dL   Blood: x / Protein: Negative / Nitrite: Negative   Leuk Esterase: Negative / RBC: x / WBC x   Sq Epi: x / Non Sq Epi: x / Bacteria: x          RADIOLOGY & ADDITIONAL TESTS:  Results Reviewed:   Imaging Personally Reviewed:  Electrocardiogram Personally Reviewed:    COORDINATION OF CARE:  Care Discussed with Consultants/Other Providers [Y/N]:  Prior or Outpatient Records Reviewed [Y/N]:

## 2022-05-01 NOTE — CONSULT NOTE ADULT - ATTENDING COMMENTS
Seen/staffed with GI fellow on 5/2    #Diarrhea: Acute onset, CT with findings suggestive of enteritis, slow improvement since admission  #Acute on chronic abdominal pain: Likely in setting of recent surgery and possibly exacerbated from recent suspected infection  #Elevated liver tests: ALT > AST, ALP elevated, normal TB, CT and US without biliary abnormalities, possibly elevated in setting of acute infection    --Please send infectious workup, including C diff and GI PCR  --Trend liver tests  --Agree with pain management consultation  --Additional recommendations as above    Discussed with primary team.

## 2022-05-01 NOTE — CONSULT NOTE ADULT - ASSESSMENT
25yo M w/ PMHx of esophageal stricture s/p multiple dilations and IVDU on Suboxone presenting with inability to tolerate oral intake for the past 30 days.     EKG: NSR No acute changes    1. Diarrhea   - CT shows enteritis, on abx f/u stool studies f/u GI    2. Mild LV dysfunction   - asymptomatic    3. Dysphagia  -hx of esophageal stricture s/p multiple dilations  -s/p esophagectomy and j-tube placement  -f/u GI and thoracic surgery     3. DVT PPX  - heparin

## 2022-05-01 NOTE — H&P ADULT - PROBLEM SELECTOR PLAN 1
-multiple loose watery stools, no blood or melena  -CT iv con shows small bowel enteritis, no obstruction  -possibly due to J tube, email sent to GI  -f/u GI stool PCR and C. diff studies  -if studies above negative consider d/cing antibiotics, possibly due to viral gastroenteritis  -can c/w cipro and flagyl for now given pt has instrumentation w/ J tube  -s/p 2 L NS can c/w 125 cc/hr LR -multiple loose watery stools, no blood or melena  -CT iv con shows small bowel enteritis, no obstruction  -possibly due to J tube, email sent to GI  -f/u GI stool PCR and C. diff studies  -if studies above negative consider d/cing antibiotics, possibly due to viral gastroenteritis  -can c/w cipro and flagyl for now given pt has instrumentation w/ J tube  -s/p 2 L NS can c/w 125 cc/hr LR  -please reach out to general surgery in am as pt had recent procedure

## 2022-05-01 NOTE — PROGRESS NOTE ADULT - SUBJECTIVE AND OBJECTIVE BOX
pt known to thoracic surgery service, POD 16 s/p esophagectomy w surgeon, Dr Lew  Subjective: pt with complaints of abdominal pain and cramping  pt admits diarrhea for the last week  "I fell full and bloated"  "I feel like I have gas"  pt is ambulatory  taking his medications  recommended for 12h tube feeds overnight but pt not tolerating  pt reports maintaining weight, no weight loss    Vital Signs:  Vital Signs Last 24 Hrs  T(C): 36.6 (05-01-22 @ 10:48), Max: 37.2 (04-30-22 @ 22:08)  T(F): 97.9 (05-01-22 @ 10:48), Max: 99 (04-30-22 @ 22:08)  HR: 62 (05-01-22 @ 10:48) (54 - 84)  BP: 111/56 (05-01-22 @ 10:48) (103/57 - 131/60)  RR: 15 (05-01-22 @ 10:48) (15 - 18)  SpO2: 98% (05-01-22 @ 10:48) (98% - 100%) on (O2)    Telemetry/Alarms: sr  General: WN/WD NAD  Neurology: Awake, nonfocal, FERRARO x 4  Eyes: Scleras clear, PERRLA/ EOMI, Gross vision intact  ENT:Gross hearing intact, grossly patent pharynx, no stridor  Neck: Neck supple, trachea midline, No JVD,   Respiratory: CTA B/L, No wheezing, rales, rhonchi  CV: RRR, S1S2, no murmurs, rubs or gallops  Abdominal: Soft, NT, ND +BS,   Extremities: No edema, + peripheral pulses  Skin: No Rashes, Hematoma, Ecchymosis  Lymphatic: No Neck, axilla, groin LAD  Psych: Oriented x 3, normal affect  Incisions: c,d,i  Tubes: J tube intact  Relevant labs, radiology and Medications reviewed                        10.5 4.48  )-----------( 179      ( 01 May 2022 06:28 )             31.7     05-01    140  |  107  |  21  ----------------------------<  90  4.3   |  23  |  0.79    Ca    8.8      01 May 2022 06:28  Phos  3.1     05-01  Mg     1.90     05-01    TPro  5.8<L>  /  Alb  3.6  /  TBili  0.8  /  DBili  x   /  AST  50<H>  /  ALT  140<H>  /  AlkPhos  172<H>  05-01    PT/INR - ( 30 Apr 2022 21:25 )   PT: 11.3 sec;   INR: 0.97 ratio         PTT - ( 30 Apr 2022 21:25 )  PTT:30.1 sec  MEDICATIONS  (STANDING):  ciprofloxacin     Tablet 500 milliGRAM(s) Oral every 12 hours  dextrose 50% Injectable 25 Gram(s) IV Push once  dextrose 50% Injectable 12.5 Gram(s) IV Push once  dextrose 50% Injectable 25 Gram(s) IV Push once  dextrose Oral Gel 15 Gram(s) Oral once  glucagon  Injectable 1 milliGRAM(s) IntraMuscular once  heparin   Injectable 5000 Unit(s) SubCutaneous every 12 hours  lactated ringers. 1000 milliLiter(s) (125 mL/Hr) IV Continuous <Continuous>  metroNIDAZOLE  IVPB 500 milliGRAM(s) IV Intermittent every 8 hours  pantoprazole   Suspension 40 milliGRAM(s) Oral daily  PARoxetine 20 milliGRAM(s) Oral daily    MEDICATIONS  (PRN):  acetaminophen     Tablet .. 650 milliGRAM(s) Oral every 6 hours PRN Temp greater or equal to 38C (100.4F), Mild Pain (1 - 3)  HYDROmorphone  Injectable 1 milliGRAM(s) IV Push every 4 hours PRN Severe Pain (7 - 10)  HYDROmorphone  Injectable 0.5 milliGRAM(s) IV Push every 4 hours PRN Moderate Pain (4 - 6)  hydrOXYzine hydrochloride 50 milliGRAM(s) Oral every 6 hours PRN Anxiety  simethicone 80 milliGRAM(s) Chew three times a day PRN Gas    Pertinent Physical Exam  I&O's Summary    01 May 2022 07:01  -  01 May 2022 13:00  --------------------------------------------------------  IN: 700 mL / OUT: 400 mL / NET: 300 mL    < from: US Abdomen Upper Quadrant Right (04.30.22 @ 23:03) >    ACC: [id number] EXAM:  US ABDOMEN RT UPR QUADRANT                          PROCEDURE DATE:  04/30/2022          INTERPRETATION:  CLINICAL INDICATION: Abdominal pain, postop, assess   cholecystitis.    TECHNIQUE: Targeted right upper quadrant ultrasound of the abdomen was   performed.    COMPARISON: CT 4/30/2022.    FINDINGS: This study was technically difficult due to bowel gas.    LIVER: 17.5 cm in length. Mildly increased echogenicity, which may be due   to fatty infiltration.  BILIARY: No intrahepatic or extrahepatic biliary dilatation. Visualized   common bile duct measuring up to 0.3 cm.  GALLBLADDER: No obvious gallstone, significant gallbladder wall   thickening or pericholecystic fluid. Negative sonographic Angulo sign.  PANCREAS: Poorly visualized due to bowel gas.  RIGHT KIDNEY: 12.0 cm in length. No hydronephrosis.  ADDITIONAL: No ascites.    IMPRESSION:    No cholelithiasis or sonographic evidence of acute cholecystitis.    --- End of Report ---            GERMANIA WINSLOW MD; Attending Radiologist  This document has been electronically signed. May  1 2022  7:37AM    < end of copied text >  < from: CT Chest w/ IV Cont (04.30.22 @ 21:27) >    ACC: [id number] EXAM:  CT CHEST IC                        ACC: [id number] EXAM:  CT ABDOMEN AND PELVIS IC                          PROCEDURE DATE:  04/30/2022          INTERPRETATION:  CLINICAL INFORMATION: Abdominal pain and diarrhea,   history of esophagectomy and gastric pull-through.    COMPARISON: CT abdomen and pelvis 6/1/2021.    CONTRAST/COMPLICATIONS:  IV Contrast: Omnipaque 350  90 cc administered   10 cc discarded  Oral Contrast: NONE  Complications: None reported at time of study completion    PROCEDURE:  CT of the Chest, Abdomen and Pelvis was performed.  Sagittal and coronal reformats were performed.    FINDINGS:  CHEST:  LUNGS, PLEURA, AND LARGE AIRWAYS: Patent central airways. No parenchymal   consolidation. No pleural effusion or pneumothorax.  VESSELS: Within normal limits.  HEART: Heart size is normal. No pericardial effusion.  MEDIASTINUM AND JOHN: No lymphadenopathy.  CHEST WALL AND LOWER NECK: Within normal limits.    ABDOMEN AND PELVIS:  LIVER: Within normal limits.  BILE DUCTS: Normal caliber.  GALLBLADDER: Within normal limits.  SPLEEN: Within normal limits.  PANCREAS: Within normal limits.  ADRENALS: Within normal limits.  KIDNEYS/URETERS: Within normal limits.    BLADDER: Minimally distended.  REPRODUCTIVE ORGANS: Prostate within normal limits.    BOWEL: Gastric pull-up with postsurgical changes. Several loops of small   bowel in the midabdomen with mural thickening. Air and stool-filled colon   without evidence of bowel obstruction.. Appendix is normal with mural   calcifications noted.  PERITONEUM: No ascites. Left lower quadrant drainage catheter.  VESSELS: Within normal limits.  RETROPERITONEUM/LYMPH NODES: No lymphadenopathy.  ABDOMINAL WALL: Within normal limits.  BONES: Within normal limits.    IMPRESSION:  Air and fluid filled loops of colon without evidence of bowel obstruction.    Multiple loops of small bowel with mural thickening which can be seen in   the setting of enteritis.        --- End of Report ---          LARA RUBIO MD; Resident Radiology  This document has been electronically signed.  AKI LADD MD; Attending Radiologist  This document has been electronically signed. Apr 30 2022 10:22PM    < end of copied text >      Assessment  26y Male  w/ PAST MEDICAL & SURGICAL HISTORY:  Heroin abuse    Cocaine abuse    GERD (gastroesophageal reflux disease)    Hiatal hernia    History of esophageal stricture    Intravenous drug abuse    Migration of pancreatic stent    Migrated esophageal sten    on 4/15/22 he underwent a FB, EGD, Robotic [place]-Mahesh Esophagectomy, and J-tube placement with thoracic surgery, Dr Shannon  pt readmitted on medical service w c/o diarrhea and bloating    no findings on CT C/A/P or abdominal ultrasound    PLAN  pt on antibiotics for enteritis  pain control, added gabapentin  simethicone for gas  pt tolerating PO ensures and soft diet - no carbonated beverages  J tube can be flushed w tap water or drinking water  r/o c diff - sample collected    above d/w pt  will d/w Dr Shannon      Discussed with Cardiothoracic Team at AM rounds.

## 2022-05-01 NOTE — H&P ADULT - NSHPLABSRESULTS_GEN_ALL_CORE
.  LABS:                         11.3   5.63  )-----------( 242      ( 30 Apr 2022 21:25 )             34.6     04-30    137  |  103  |  36<H>  ----------------------------<  86  4.7   |  25  |  0.84    Ca    8.9      30 Apr 2022 21:25  Phos  4.7     04-30  Mg     2.00     04-30    TPro  6.9  /  Alb  4.3  /  TBili  0.4  /  DBili  x   /  AST  69<H>  /  ALT  175<H>  /  AlkPhos  221<H>  04-30    PT/INR - ( 30 Apr 2022 21:25 )   PT: 11.3 sec;   INR: 0.97 ratio         PTT - ( 30 Apr 2022 21:25 )  PTT:30.1 sec

## 2022-05-01 NOTE — PROGRESS NOTE ADULT - ASSESSMENT
Pt is a 27 y/o M hx of esophageal stricture s/p multiple dilations and esophagectomy, prior IVDU not on suboxone anymore who presents due to diarrhea 7-8 times loose stools since 11 am.

## 2022-05-01 NOTE — H&P ADULT - HISTORY OF PRESENT ILLNESS
Pt is a 25 y/o M hx of esophageal stricture s/p multiple dilations and esophagectomy, prior IVDU not on suboxone anymore who presents due to diarrhea 7-8 times loose stools since 11 am. An EGD on 4/5 showed esophagitis that required dilation.  THoracic surgery evaluated him and on 4/15/22. he underwent a FB, EGD, Robotic [place]-Mahesh Esophagectomy, and J-tube placement. He had complication then w/ post op hemothorax. He had abdominal pain as well, and Tylenol helped only mildly. He has J-tube that he is running out of sterile water to flush, and also did not have clean caps for it. He last used the J-tube 1 night ago. Denies drainage or skin rash around the site. Denies fevers, chills, melena, hematochezia, vomiting, dysuria, polyuria, LE edema, skin rash, cp, sob, or palpitations.  ED course CT iv con notable for small bowel enteritis, no bowel obstruction.   GI stool PCR and C. diff sent

## 2022-05-01 NOTE — H&P ADULT - NSHPPHYSICALEXAM_GEN_ALL_CORE
PHYSICAL EXAM:  GENERAL: NAD, well-developed  HEAD:  Atraumatic, Normocephalic  EYES: EOMI, PERRLA, conjunctiva and sclera clear  NECK: Supple, No JVD  CHEST/LUNG: Clear to auscultation bilaterally; No wheeze  HEART: Regular rate and rhythm; No murmurs, rubs, or gallops  ABDOMEN: Soft, tender throughout to palpation, nondistended, J tube in place no erythema, or warmth noted  EXTREMITIES:  2+ Peripheral Pulses, No clubbing, cyanosis, or edema  PSYCH: AAOx3  NEUROLOGY: non-focal  SKIN: No rashes or lesions

## 2022-05-01 NOTE — PATIENT PROFILE ADULT - NSTRANSFERBELONGINGSRESP_GEN_A_NUR
How Severe Is It?: moderate Is This A New Presentation, Or A Follow-Up?: Follow Up Hidradenitis Suppurativa Additional History: Needs refills no

## 2022-05-01 NOTE — H&P ADULT - PROBLEM SELECTOR PLAN 3
-recent esophagectomy  -email sent to GI  -had procedure last visit, pt can c/w protonix for now  -pt tolerates soft diet at home

## 2022-05-01 NOTE — CONSULT NOTE ADULT - ASSESSMENT
25 y/o M hx of esophageal stricture s/p multiple dilations and [place]-Mahesh esophagectomy 04/15/22, prior IVDU no longer on suboxone who presents due to diarrhea 7-8x that started day of admission (4/30). He had abdominal pain as well.  GI consulted for diarrhea.    Impression:  #diarrhea 2/2 suspected infectious enteritis given acute onset of sx   Last solid food he consumed was chicken and fish prior to having diarrhea (no sick contacts but reports no one else around him had the chicken or fish). No new medication changes prior to diarrhea onset. VSS, labs notable for Hgb 10-11 (BL 11-13), normal WBC, mild liver enzyme elevations. CT A/P notable for multiple small bowel loops with mural thickening, no bowel obstruction. GI stool PCR and C. diff pending.    #h/o  esophageal stricture s/p multiple dilations and [place]-Mahesh esophagectomy 04/15/22  Last EGD on 4/5 showed  LA Grade D reflux esophagitis, GE flap valve classified as Hill Grade IV, 7 cm HH, benign-appearing esophageal stenosis s/p dilation, bx (path neg H pylori) and injected w/ triamcinolone for muscle relaxation. 4/15/22 underwent Robotic [place]-Mahesh Esophagectomy, and J-tube placement by thoracic surgery. Post-op course c/b hemothorax.    #J-tube for nutrition- He takes 50% nutrition via J-tube and 50% by mouth (on low carb, lactose free kosher diet)    Recommendations:  -rule out infectious etiology with C diff, GI panel  -avoid imodium until infectious workup results  -no plan for endoscopic eval at this time  -supportive care per primary team    **THIS NOTE IS NOT FINALIZED UNTIL SIGNED BY THE ATTENDING**    Kamilla Hare MD  GI Fellow, PGY-4  Available via [employer] Teams    NON-URGENT CONSULTS:  Please email [email or web address]  AT NIGHT AND ON WEEKENDS:  Contact on-call GI fellow via answering service ([phone]) from 5pm-8am and on weekends/holidays  MONDAY-FRIDAY 8AM-5PM:  Pager# [phone] ([facility]) or [phone] ([facility])  GI Phone# [phone] ([facility])   25 y/o M hx of esophageal stricture s/p multiple dilations and [place]-Mahesh esophagectomy 04/15/22, prior IVDU no longer on suboxone who presents due to diarrhea 7-8x that started day of admission (4/30). He had abdominal pain as well.  GI consulted for diarrhea.    Impression:  #diarrhea 2/2 suspected infectious enteritis given acute onset of sx   Last solid food he consumed was chicken and fish prior to having diarrhea (no sick contacts but reports no one else around him had the chicken or fish). No new medication changes prior to diarrhea onset. VSS, labs notable for Hgb 10-11 (BL 11-13), normal WBC, mild liver enzyme elevations. CT A/P notable for multiple small bowel loops with mural thickening, no bowel obstruction. GI stool PCR and C. diff pending.    #h/o  esophageal stricture s/p multiple dilations and [place]-Mahesh esophagectomy 04/15/22  Last EGD on 4/5 showed  LA Grade D reflux esophagitis, GE flap valve classified as Hill Grade IV, 7 cm HH, benign-appearing esophageal stenosis s/p dilation, bx (path neg H pylori) and injected w/ triamcinolone for muscle relaxation. 4/15/22 underwent Robotic [place]-Mahesh Esophagectomy, and J-tube placement by thoracic surgery. Post-op course c/b hemothorax.    #J-tube for nutrition- He takes 50% nutrition via J-tube and 50% by mouth (on low carb, lactose free kosher diet)    #elevated AST/ALT/ALP   -> 176, AST 69 -> 93;  -> 173; could be in the setting of acute gastroenteritis; less likely biliary etiology given normal Tbili, but no cholelithiasis or sonographic evidence of acute cholecystitis. Appears to have some baseline underlying fatty liver.    Recommendations:  -rule out infectious etiology with C diff, GI panel  -avoid imodium until infectious workup results  -no plan for endoscopic eval at this time  -supportive care per primary team  -if liver enzymes do not improve with supportive care, can consider hepatology eval    **THIS NOTE IS NOT FINALIZED UNTIL SIGNED BY THE ATTENDING**    Kamilla Hare MD  GI Fellow, PGY-4  Available via [employer] Teams    NON-URGENT CONSULTS:  Please email [email or web address]  AT NIGHT AND ON WEEKENDS:  Contact on-call GI fellow via answering service ([phone]) from 5pm-8am and on weekends/holidays  MONDAY-FRIDAY 8AM-5PM:  Pager# [phone] ([facility]) or [phone] ([facility])  GI Phone# [phone] ([facility])   25 y/o M hx of esophageal stricture s/p multiple dilations and [place]-Mahesh esophagectomy 04/15/22, prior IVDU no longer on suboxone who presents due to diarrhea 7-8x that started day of admission (4/30). He had abdominal pain as well.  GI consulted for diarrhea.    Impression:  #diarrhea 2/2 suspected infectious enteritis given acute onset of sx and CT findings  Last solid food he consumed was chicken and fish prior to having diarrhea (no sick contacts but reports no one else around him had the chicken or fish). No new medication changes prior to diarrhea onset. VSS, labs notable for Hgb 10-11 (BL 11-13), normal WBC, mild liver enzyme elevations. CT A/P notable for multiple small bowel loops with mural thickening, no bowel obstruction. GI stool PCR and C. diff pending.    #h/o  esophageal stricture s/p multiple dilations and [place]-Mahesh esophagectomy 04/15/22  Last EGD on 4/5 showed  LA Grade D reflux esophagitis, GE flap valve classified as Hill Grade IV, 7 cm HH, benign-appearing esophageal stenosis s/p dilation, bx (path neg H pylori) and injected w/ triamcinolone for muscle relaxation. 4/15/22 underwent Robotic [place]-Mahesh Esophagectomy, and J-tube placement by thoracic surgery. Post-op course c/b hemothorax.    #J-tube for nutrition- He takes 50% nutrition via J-tube and 50% by mouth (on low carb, lactose free kosher diet)    #elevated AST/ALT/ALP   -> 176, AST 69 -> 93;  -> 173; could be in the setting of acute gastroenteritis; less likely biliary etiology given normal Tbili, but no cholelithiasis or sonographic evidence of acute cholecystitis. Appears to have some baseline underlying fatty liver.    Recommendations:  -rule out infectious etiology with C diff, GI panel  -avoid imodium until infectious workup results  -no role for endoscopic eval at this time  -supportive care per primary team  -continue to trend liver tests  -if liver enzymes do not improve with supportive care, can consider MRCP and/or hepatology eval    **THIS NOTE IS NOT FINALIZED UNTIL SIGNED BY THE ATTENDING**    Kamilla Hare MD  GI Fellow, PGY-4  Available via [employer] Teams    NON-URGENT CONSULTS:  Please email [email or web address]  AT NIGHT AND ON WEEKENDS:  Contact on-call GI fellow via answering service ([phone]) from 5pm-8am and on weekends/holidays  MONDAY-FRIDAY 8AM-5PM:  Pager# [phone] ([facility]) or [phone] ([facility])  GI Phone# [phone] ([facility])

## 2022-05-01 NOTE — PATIENT PROFILE ADULT - MONEY FOR FOOD
Pre EP Procedure H&P    Patient: Jerry Serrano Date of Service: 2021   : 2001      PCP: Wiliam Serra MD     CHIEF COMPLAINT:    Palpitations    HISTORY OF PRESENT ILLNESS   Jerry Serrano is a 19 year old male with a past medical history of palpitations for many years.  First started when he was 16, heart rate would be >200 sitting still.  Episodes last anywhere from 15 minutes to an hour or so.  Mild light headedness, and if they last more than an hour he starts to get mild chest discomfort.  Sometimes responsive to vagal maneuvers, other times will persist and will go away 'eventually' if he lies down.  No syncope or near syncope.  No chest pains or chest tightness. Exercise tolerance is good.  Works in construction.  Here today for EPS possible ablation.  Feels about the same, still having intermittent palpitations.  No chest pain or chest tightness. No cough, fevers or chills.      REVIEW OF SYSTEMS   Constitutional: Negative for fever, chills, change in appetite or fatigue.  Skin: Negative except for HPI  HEENT: Negative except for HPI  Respiratory: Negative shortness of breath.  Cardiovascular: Negative for chest pain, or chest pressure.  Gastrointestinal: Negative for nausea, vomiting, diarrhea, abdominal pain, black or tarry stools.  Genitourinary: Negative except for HPI  Extremities:  Negative except for HPI  Neurologic:  Negative except for HPI  Endocrine: Negative for weight loss or gain.  Hematological: Negative except for HPI  Psychiatric: Negative except for HPI  All other systems are reviewed and are negative except as documented in the HPI.       HISTORIES   ALLERGIES:  No Known Allergies  No current facility-administered medications for this encounter.      Facility-Administered Medications Ordered in Other Encounters   Medication Dose Route Frequency Provider Last Rate Last Admin   • MIDAZolam (VERSED) injection   Intravenous PRN Marsha Jesus MD   2 mg at 21 0834    • fentaNYL (SUBLIMAZE) injection   Intravenous PRN Marsha Jesus MD   100 mcg at 02/01/21 0830     Past Medical History:   Diagnosis Date   • Congenital pectus excavatum    • Pectus excavatum    • SVT (supraventricular tachycardia) (CMS/HCC)      Past Surgical History:   Procedure Laterality Date   • Testicle surgery      at age 7     Social History     Tobacco Use   • Smoking status: Former Smoker     Quit date: 11/20/2017     Years since quitting: 3.2   • Smokeless tobacco: Never Used   Substance Use Topics   • Alcohol use: Never     Frequency: Never   • Drug use: Not Currently     Types: Marijuana     Comment: pt states he quit 3 months ago     Family History   Problem Relation Age of Onset   • Hypertension Mother    • Atrial Fibrilliation Maternal Grandmother      I have reviewed the past medical history, family history, social history, medications and allergies listed in the medical record as obtained by my nursing staff and support staff and agree with their documentation.    Medications     No current facility-administered medications for this encounter.      Facility-Administered Medications Ordered in Other Encounters   Medication   • MIDAZolam (VERSED) injection   • fentaNYL (SUBLIMAZE) injection        PHYSICAL EXAM   Vital Signs:    Vitals:    01/29/21 0900 02/01/21 0723   BP:  (!) 152/86   BP Location:  RUE - Right upper extremity   Patient Position:  Sitting   Pulse:  89   Resp:  17   Temp: (!) 32 °F (0 °C) 98.2 °F (36.8 °C)   TempSrc:  Temporal   SpO2:  99%   Weight:  78.9 kg (173 lb 15.1 oz)   Height: 6' 2\" (1.88 m) 6' 1.98\" (1.879 m)     General: Well developed, well nourished, non-toxic appearing  HEENT: NCAT, OP clear, no JVD, sclera anicteric   CV: RRR, normal s1 s2, no s3 s4, no murmurs appreciated, warm well perfused, +mild pectus excavatum  Pulm: CTAB, no wheeze, no stridor  Abd: Soft, non-tender, no HSM, +BS  Ext: warm, well perfused, no edema, 2+ distal pulses  Neuro: normal tone, and  coordination, alert and oriented x3  Skin: no skin breakdown noted, no rashes    LABORATORY DATA     Sodium   Date Value Ref Range Status   01/27/2021 146 (H) 135 - 145 mmol/L Final   12/03/2020 141 135 - 145 mmol/L Final     Potassium   Date Value Ref Range Status   01/27/2021 3.9 3.4 - 5.1 mmol/L Final   12/03/2020 4.2 3.4 - 5.1 mmol/L Final     Chloride   Date Value Ref Range Status   01/27/2021 107 98 - 107 mmol/L Final   12/03/2020 104 98 - 107 mmol/L Final     Carbon Dioxide   Date Value Ref Range Status   01/27/2021 29 21 - 32 mmol/L Final   12/03/2020 31 21 - 32 mmol/L Final     Anion Gap   Date Value Ref Range Status   01/27/2021 14 10 - 20 mmol/L Final   12/03/2020 10 10 - 20 mmol/L Final     Glucose   Date Value Ref Range Status   01/27/2021 86 65 - 99 mg/dL Final   12/03/2020 68 65 - 99 mg/dL Final     BUN   Date Value Ref Range Status   01/27/2021 11 6 - 20 mg/dL Final   12/03/2020 11 6 - 20 mg/dL Final     Creatinine   Date Value Ref Range Status   01/27/2021 0.79 0.67 - 1.17 mg/dL Final   12/03/2020 0.69 0.67 - 1.17 mg/dL Final     GFR Estimate,    Date Value Ref Range Status   12/03/2020 >90  Final     Comment:     eGFR results = or >90 mL/min/1.73m2 = Normal kidney function.     GFR Estimate, Non    Date Value Ref Range Status   12/03/2020 >90  Final     Comment:     eGFR results = or >90 mL/min/1.73m2 = Normal kidney function.     BUN/Creatinine Ratio   Date Value Ref Range Status   12/03/2020 16 7 - 25 Final     BUN/ Creatinine Ratio   Date Value Ref Range Status   01/27/2021 14 7 - 25 Final     CALCIUM   Date Value Ref Range Status   12/03/2020 9.3 8.4 - 10.2 mg/dL Final     Calcium   Date Value Ref Range Status   01/27/2021 9.0 8.4 - 10.2 mg/dL Final     Bilirubin, Total   Date Value Ref Range Status   01/27/2021 0.5 0.2 - 1.0 mg/dL Final     GOT/AST   Date Value Ref Range Status   01/27/2021 22 <=37 Units/L Final     GPT/ALT   Date Value Ref Range Status    01/27/2021 57 <64 Units/L Final     Alkaline Phosphatase   Date Value Ref Range Status   01/27/2021 108 55 - 220 Units/L Final     Protein, Total   Date Value Ref Range Status   01/27/2021 7.3 6.4 - 8.2 g/dL Final     Albumin   Date Value Ref Range Status   01/27/2021 4.3 3.6 - 5.1 g/dL Final     Globulin   Date Value Ref Range Status   01/27/2021 3.0 2.0 - 4.0 g/dL Final     A/G Ratio   Date Value Ref Range Status   01/27/2021 1.4 1.0 - 2.4 Final     TSH (mcUnits/mL)   Date Value   12/03/2020 1.093       DIAGNOSTIC IMAGING     EKG independently reviewed and evaluated: NSR, incomplete RBBB, normal OH, normal QC.  Monitor reviewed, episodes of narrow complex regular tachycardia at 200 bpm, no discernable P waves.      1/27/21  1. Left ventricle: The cavity size is normal. Wall thickness is normal.     Systolic function is normal. The ejection fraction was measured by     biplane method of disks. Wall motion is normal; there are no regional     wall motion abnormalities. Left ventricular diastolic function     parameters are normal. The ejection fraction is 62%.  2. No valvular abnormalities.    ASSESSMENT & PLAN     Jerry was seen today for consultation, palpitations and tachycardia.    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  TSH wnl, never had an echo.  Does have mild pectus excavatum, but otherwise normal exam.  TTE unremarkable  -plan for EPS, possible ablation under MAC  The risks, benefits, alternatives were explained to the patient in detail. The risks include, but are not limited to: bleeding, infection, vascular injury, AV block requiring pacemaker, cardiac perforation and tamponade, heart attack, stroke and death. Discussed in layman's terms.  The patient understands and would like to proceed. All  questions were answered, consent signed.    The patient indicated understanding of the diagnosis and agreed with the plan of care.    Jaswant Griffiths MD  Electrophysiology     no

## 2022-05-01 NOTE — H&P ADULT - ASSESSMENT
Pt is a 25 y/o M hx of esophageal stricture s/p multiple dilations and esophagectomy, prior IVDU not on suboxone anymore who presents due to diarrhea 7-8 times loose stools since 11 am.

## 2022-05-01 NOTE — CONSULT NOTE ADULT - SUBJECTIVE AND OBJECTIVE BOX
Christiano Hoyt MD  Interventional Cardiology / Advance Heart Failure and Cardiac Transplant Specialist  [place] Office : [phone] [place] [address] [place] [address]  Tel:   [place] Office : [address] [facility] [place] [address]  Tel: [phone]         HISTORY OF PRESENTING ILLNESS:  HPI:  Pt is a 27 y/o M hx of esophageal stricture s/p multiple dilations and esophagectomy, prior IVDU not on suboxone anymore who presents due to diarrhea 7-8 times loose stools since 11 am. An EGD on 4/5 showed esophagitis that required dilation.  THoracic surgery evaluated him and on 4/15/22. he underwent a FB, EGD, Robotic Tyler-Mahesh Esophagectomy, and J-tube placement. He had complication then w/ post op hemothorax. He had abdominal pain as well, and Tylenol helped only mildly. He has J-tube that he is running out of sterile water to flush, and also did not have clean caps for it. He last used the J-tube 1 night ago. Denies drainage or skin rash around the site. Denies fevers, chills, melena, hematochezia, vomiting, dysuria, polyuria, LE edema, skin rash, cp, sob, or palpitations.  ED course CT iv con notable for small bowel enteritis, no bowel obstruction.   GI stool PCR and C. diff sent    PAST MEDICAL & SURGICAL HISTORY:  Heroin abuse    Cocaine abuse    GERD (gastroesophageal reflux disease)    Hiatal hernia    History of esophageal stricture    Intravenous drug abuse    Migration of pancreatic stent    Migrated esophageal stent        SOCIAL HISTORY: Substance Use (street drugs): ( x ) never used  (  ) other:    FAMILY HISTORY:  FH: thyroid disease (Grandparent)        REVIEW OF SYSTEMS:  CONSTITUTIONAL: No fever, weight loss, or fatigue  EYES: No eye pain, visual disturbances, or discharge  ENMT:  No difficulty hearing, tinnitus, vertigo; No sinus or throat pain  BREASTS: No pain, masses, or nipple discharge  GASTROINTESTINAL: ABD PAIN + , WATERRY DIARRHEA  GENITOURINARY: No dysuria, frequency, hematuria, or incontinence  NEUROLOGICAL: No headaches, memory loss, loss of strength, numbness, or tremors  ENDOCRINE: No heat or cold intolerance; No hair loss  MUSCULOSKELETAL: No joint pain or swelling; No muscle, back, or extremity pain  PSYCHIATRIC: No depression, anxiety, mood swings, or difficulty sleeping  HEME/LYMPH: No easy bruising, or bleeding gums  All others negative    MEDICATIONS:  heparin   Injectable 5000 Unit(s) SubCutaneous every 12 hours    ciprofloxacin     Tablet 500 milliGRAM(s) Oral every 12 hours  metroNIDAZOLE  IVPB 500 milliGRAM(s) IV Intermittent every 8 hours      acetaminophen     Tablet .. 650 milliGRAM(s) Oral every 6 hours PRN  gabapentin Solution 100 milliGRAM(s) Oral three times a day  HYDROmorphone  Injectable 1 milliGRAM(s) IV Push every 4 hours PRN  HYDROmorphone  Injectable 0.5 milliGRAM(s) IV Push every 4 hours PRN  hydrOXYzine hydrochloride 50 milliGRAM(s) Oral every 6 hours PRN  PARoxetine 20 milliGRAM(s) Oral daily    pantoprazole   Suspension 40 milliGRAM(s) Oral daily  simethicone 80 milliGRAM(s) Chew three times a day PRN    dextrose 50% Injectable 25 Gram(s) IV Push once  dextrose 50% Injectable 12.5 Gram(s) IV Push once  dextrose 50% Injectable 25 Gram(s) IV Push once  dextrose Oral Gel 15 Gram(s) Oral once  glucagon  Injectable 1 milliGRAM(s) IntraMuscular once    lactated ringers. 1000 milliLiter(s) IV Continuous <Continuous>      FAMILY HISTORY:  FH: thyroid disease (Grandparent)          Allergies    No Known Allergies    Intolerances    	      PHYSICAL EXAM:  T(C): 37.1 (05-01-22 @ 13:05), Max: 37.2 (04-30-22 @ 22:08)  HR: 66 (05-01-22 @ 13:05) (54 - 84)  BP: 99/54 (05-01-22 @ 13:05) (99/54 - 131/60)  RR: 16 (05-01-22 @ 13:05) (15 - 18)  SpO2: 98% (05-01-22 @ 13:05) (98% - 100%)  Wt(kg): --  I&O's Summary    01 May 2022 07:01  -  01 May 2022 13:33  --------------------------------------------------------  IN: 700 mL / OUT: 400 mL / NET: 300 mL        GENERAL: NAD   EYES: EOMI, PERRLA, conjunctiva and sclera clear  ENMT: No tonsillar erythema, exudates, or enlargement; Moist mucous membranes, Good dentition, No lesions  Cardiovascular: Normal S1 S2, No JVD, No murmurs, No edema  Respiratory: Lungs clear to auscultation	  Gastrointestinal:  s/p surgery and J tube  Extremities: NO EDEMA      LABS:	 	    CARDIAC MARKERS:                                  10.5   4.48  )-----------( 179      ( 01 May 2022 06:28 )             31.7     05-01    140  |  107  |  21  ----------------------------<  90  4.3   |  23  |  0.79    Ca    8.8      01 May 2022 06:28  Phos  3.1     05-01  Mg     1.90     05-01    TPro  5.8<L>  /  Alb  3.6  /  TBili  0.8  /  DBili  x   /  AST  50<H>  /  ALT  140<H>  /  AlkPhos  172<H>  05-01    proBNP:   Lipid Profile:   HgA1c:   TSH:     Consultant(s) Notes Reviewed:  [x ] YES  [ ] NO    Care Discussed with Consultants/Other Providers [ x] YES  [ ] NO    Imaging Personally Reviewed independently:  [x] YES  [ ] NO    All labs, radiologic studies, vitals, orders and medications list reviewed. Patient is seen and examined at bedside. Case discussed with medical team.    ASSESSMENT/PLAN:

## 2022-05-02 ENCOUNTER — TRANSCRIPTION ENCOUNTER (OUTPATIENT)
Age: 27
End: 2022-05-02

## 2022-05-02 LAB
ALBUMIN SERPL ELPH-MCNC: 4 G/DL — SIGNIFICANT CHANGE UP (ref 3.3–5)
ALP SERPL-CCNC: 176 U/L — HIGH (ref 40–120)
ALT FLD-CCNC: 173 U/L — HIGH (ref 4–41)
ANION GAP SERPL CALC-SCNC: 9 MMOL/L — SIGNIFICANT CHANGE UP (ref 7–14)
AST SERPL-CCNC: 93 U/L — HIGH (ref 4–40)
BILIRUB SERPL-MCNC: 0.7 MG/DL — SIGNIFICANT CHANGE UP (ref 0.2–1.2)
BUN SERPL-MCNC: 15 MG/DL — SIGNIFICANT CHANGE UP (ref 7–23)
CALCIUM SERPL-MCNC: 9.2 MG/DL — SIGNIFICANT CHANGE UP (ref 8.4–10.5)
CHLORIDE SERPL-SCNC: 102 MMOL/L — SIGNIFICANT CHANGE UP (ref 98–107)
CO2 SERPL-SCNC: 25 MMOL/L — SIGNIFICANT CHANGE UP (ref 22–31)
CREAT SERPL-MCNC: 0.78 MG/DL — SIGNIFICANT CHANGE UP (ref 0.5–1.3)
EGFR: 126 ML/MIN/1.73M2 — SIGNIFICANT CHANGE UP
GLUCOSE BLDC GLUCOMTR-MCNC: 155 MG/DL — HIGH (ref 70–99)
GLUCOSE BLDC GLUCOMTR-MCNC: 91 MG/DL — SIGNIFICANT CHANGE UP (ref 70–99)
GLUCOSE SERPL-MCNC: 86 MG/DL — SIGNIFICANT CHANGE UP (ref 70–99)
HCT VFR BLD CALC: 33.1 % — LOW (ref 39–50)
HGB BLD-MCNC: 11.1 G/DL — LOW (ref 13–17)
INR BLD: 1.05 RATIO — SIGNIFICANT CHANGE UP (ref 0.88–1.16)
LACTATE SERPL-SCNC: 0.9 MMOL/L — SIGNIFICANT CHANGE UP (ref 0.5–2)
MAGNESIUM SERPL-MCNC: 2 MG/DL — SIGNIFICANT CHANGE UP (ref 1.6–2.6)
MCHC RBC-ENTMCNC: 29.1 PG — SIGNIFICANT CHANGE UP (ref 27–34)
MCHC RBC-ENTMCNC: 33.5 GM/DL — SIGNIFICANT CHANGE UP (ref 32–36)
MCV RBC AUTO: 86.9 FL — SIGNIFICANT CHANGE UP (ref 80–100)
MELD SCORE WITH DIALYSIS: 21 POINTS — SIGNIFICANT CHANGE UP
MELD SCORE WITHOUT DIALYSIS: 7 POINTS — SIGNIFICANT CHANGE UP
NRBC # BLD: 0 /100 WBCS — SIGNIFICANT CHANGE UP
NRBC # FLD: 0 K/UL — SIGNIFICANT CHANGE UP
PHOSPHATE SERPL-MCNC: 4 MG/DL — SIGNIFICANT CHANGE UP (ref 2.5–4.5)
PLATELET # BLD AUTO: 172 K/UL — SIGNIFICANT CHANGE UP (ref 150–400)
POTASSIUM SERPL-MCNC: 4.3 MMOL/L — SIGNIFICANT CHANGE UP (ref 3.5–5.3)
POTASSIUM SERPL-SCNC: 4.3 MMOL/L — SIGNIFICANT CHANGE UP (ref 3.5–5.3)
PROT SERPL-MCNC: 6.2 G/DL — SIGNIFICANT CHANGE UP (ref 6–8.3)
PROTHROM AB SERPL-ACNC: 12.2 SEC — SIGNIFICANT CHANGE UP (ref 10.5–13.4)
RBC # BLD: 3.81 M/UL — LOW (ref 4.2–5.8)
RBC # FLD: 16 % — HIGH (ref 10.3–14.5)
SODIUM SERPL-SCNC: 136 MMOL/L — SIGNIFICANT CHANGE UP (ref 135–145)
WBC # BLD: 5.36 K/UL — SIGNIFICANT CHANGE UP (ref 3.8–10.5)
WBC # FLD AUTO: 5.36 K/UL — SIGNIFICANT CHANGE UP (ref 3.8–10.5)

## 2022-05-02 PROCEDURE — 99223 1ST HOSP IP/OBS HIGH 75: CPT | Mod: GC

## 2022-05-02 PROCEDURE — 99232 SBSQ HOSP IP/OBS MODERATE 35: CPT

## 2022-05-02 PROCEDURE — 99223 1ST HOSP IP/OBS HIGH 75: CPT

## 2022-05-02 RX ORDER — OXYCODONE HYDROCHLORIDE 5 MG/1
5 TABLET ORAL EVERY 4 HOURS
Refills: 0 | Status: DISCONTINUED | OUTPATIENT
Start: 2022-05-02 | End: 2022-05-02

## 2022-05-02 RX ORDER — HYDROMORPHONE HYDROCHLORIDE 2 MG/ML
0.5 INJECTION INTRAMUSCULAR; INTRAVENOUS; SUBCUTANEOUS EVERY 4 HOURS
Refills: 0 | Status: DISCONTINUED | OUTPATIENT
Start: 2022-05-02 | End: 2022-05-02

## 2022-05-02 RX ORDER — POLYETHYLENE GLYCOL 3350 17 G/17G
17 POWDER, FOR SOLUTION ORAL DAILY
Refills: 0 | Status: DISCONTINUED | OUTPATIENT
Start: 2022-05-02 | End: 2022-05-06

## 2022-05-02 RX ORDER — GABAPENTIN 400 MG/1
200 CAPSULE ORAL THREE TIMES A DAY
Refills: 0 | Status: DISCONTINUED | OUTPATIENT
Start: 2022-05-02 | End: 2022-05-02

## 2022-05-02 RX ORDER — OXYCODONE HYDROCHLORIDE 5 MG/1
5 TABLET ORAL EVERY 4 HOURS
Refills: 0 | Status: DISCONTINUED | OUTPATIENT
Start: 2022-05-02 | End: 2022-05-03

## 2022-05-02 RX ORDER — GABAPENTIN 400 MG/1
300 CAPSULE ORAL EVERY 8 HOURS
Refills: 0 | Status: DISCONTINUED | OUTPATIENT
Start: 2022-05-02 | End: 2022-05-03

## 2022-05-02 RX ORDER — HYDROMORPHONE HYDROCHLORIDE 2 MG/ML
0.5 INJECTION INTRAMUSCULAR; INTRAVENOUS; SUBCUTANEOUS ONCE
Refills: 0 | Status: DISCONTINUED | OUTPATIENT
Start: 2022-05-02 | End: 2022-05-02

## 2022-05-02 RX ORDER — CLONAZEPAM 1 MG
0.5 TABLET ORAL
Refills: 0 | Status: DISCONTINUED | OUTPATIENT
Start: 2022-05-02 | End: 2022-05-06

## 2022-05-02 RX ORDER — HYDROMORPHONE HYDROCHLORIDE 2 MG/ML
0.5 INJECTION INTRAMUSCULAR; INTRAVENOUS; SUBCUTANEOUS EVERY 4 HOURS
Refills: 0 | Status: DISCONTINUED | OUTPATIENT
Start: 2022-05-02 | End: 2022-05-03

## 2022-05-02 RX ORDER — LIDOCAINE 4 G/100G
1 CREAM TOPICAL DAILY
Refills: 0 | Status: DISCONTINUED | OUTPATIENT
Start: 2022-05-02 | End: 2022-05-06

## 2022-05-02 RX ORDER — ACETAMINOPHEN 500 MG
650 TABLET ORAL EVERY 6 HOURS
Refills: 0 | Status: DISCONTINUED | OUTPATIENT
Start: 2022-05-02 | End: 2022-05-06

## 2022-05-02 RX ORDER — KETOROLAC TROMETHAMINE 30 MG/ML
15 SYRINGE (ML) INJECTION EVERY 6 HOURS
Refills: 0 | Status: DISCONTINUED | OUTPATIENT
Start: 2022-05-02 | End: 2022-05-04

## 2022-05-02 RX ORDER — SENNA PLUS 8.6 MG/1
2 TABLET ORAL AT BEDTIME
Refills: 0 | Status: DISCONTINUED | OUTPATIENT
Start: 2022-05-02 | End: 2022-05-06

## 2022-05-02 RX ORDER — ACETAMINOPHEN 500 MG
1000 TABLET ORAL ONCE
Refills: 0 | Status: DISCONTINUED | OUTPATIENT
Start: 2022-05-02 | End: 2022-05-02

## 2022-05-02 RX ORDER — CYCLOBENZAPRINE HYDROCHLORIDE 10 MG/1
5 TABLET, FILM COATED ORAL EVERY 8 HOURS
Refills: 0 | Status: DISCONTINUED | OUTPATIENT
Start: 2022-05-02 | End: 2022-05-06

## 2022-05-02 RX ADMIN — GABAPENTIN 100 MILLIGRAM(S): 400 CAPSULE ORAL at 05:58

## 2022-05-02 RX ADMIN — HYDROMORPHONE HYDROCHLORIDE 0.5 MILLIGRAM(S): 2 INJECTION INTRAMUSCULAR; INTRAVENOUS; SUBCUTANEOUS at 00:59

## 2022-05-02 RX ADMIN — OXYCODONE HYDROCHLORIDE 5 MILLIGRAM(S): 5 TABLET ORAL at 16:27

## 2022-05-02 RX ADMIN — OXYCODONE HYDROCHLORIDE 5 MILLIGRAM(S): 5 TABLET ORAL at 20:41

## 2022-05-02 RX ADMIN — HYDROMORPHONE HYDROCHLORIDE 1 MILLIGRAM(S): 2 INJECTION INTRAMUSCULAR; INTRAVENOUS; SUBCUTANEOUS at 13:57

## 2022-05-02 RX ADMIN — GABAPENTIN 200 MILLIGRAM(S): 400 CAPSULE ORAL at 13:48

## 2022-05-02 RX ADMIN — Medication 20 MILLIGRAM(S): at 12:03

## 2022-05-02 RX ADMIN — Medication 650 MILLIGRAM(S): at 01:43

## 2022-05-02 RX ADMIN — HYDROMORPHONE HYDROCHLORIDE 1 MILLIGRAM(S): 2 INJECTION INTRAMUSCULAR; INTRAVENOUS; SUBCUTANEOUS at 11:00

## 2022-05-02 RX ADMIN — HYDROMORPHONE HYDROCHLORIDE 1 MILLIGRAM(S): 2 INJECTION INTRAMUSCULAR; INTRAVENOUS; SUBCUTANEOUS at 10:14

## 2022-05-02 RX ADMIN — HYDROMORPHONE HYDROCHLORIDE 1 MILLIGRAM(S): 2 INJECTION INTRAMUSCULAR; INTRAVENOUS; SUBCUTANEOUS at 02:13

## 2022-05-02 RX ADMIN — OXYCODONE HYDROCHLORIDE 5 MILLIGRAM(S): 5 TABLET ORAL at 17:01

## 2022-05-02 RX ADMIN — Medication 500 MILLIGRAM(S): at 18:25

## 2022-05-02 RX ADMIN — Medication 100 MILLIGRAM(S): at 05:57

## 2022-05-02 RX ADMIN — HYDROMORPHONE HYDROCHLORIDE 1 MILLIGRAM(S): 2 INJECTION INTRAMUSCULAR; INTRAVENOUS; SUBCUTANEOUS at 14:27

## 2022-05-02 RX ADMIN — Medication 100 MILLIGRAM(S): at 21:30

## 2022-05-02 RX ADMIN — PANTOPRAZOLE SODIUM 40 MILLIGRAM(S): 20 TABLET, DELAYED RELEASE ORAL at 12:03

## 2022-05-02 RX ADMIN — HYDROMORPHONE HYDROCHLORIDE 0.5 MILLIGRAM(S): 2 INJECTION INTRAMUSCULAR; INTRAVENOUS; SUBCUTANEOUS at 18:24

## 2022-05-02 RX ADMIN — SIMETHICONE 80 MILLIGRAM(S): 80 TABLET, CHEWABLE ORAL at 12:10

## 2022-05-02 RX ADMIN — POLYETHYLENE GLYCOL 3350 17 GRAM(S): 17 POWDER, FOR SOLUTION ORAL at 12:03

## 2022-05-02 RX ADMIN — Medication 0.5 MILLIGRAM(S): at 17:07

## 2022-05-02 RX ADMIN — Medication 500 MILLIGRAM(S): at 05:57

## 2022-05-02 RX ADMIN — Medication 100 MILLIGRAM(S): at 13:48

## 2022-05-02 RX ADMIN — HYDROMORPHONE HYDROCHLORIDE 0.5 MILLIGRAM(S): 2 INJECTION INTRAMUSCULAR; INTRAVENOUS; SUBCUTANEOUS at 19:00

## 2022-05-02 RX ADMIN — HYDROMORPHONE HYDROCHLORIDE 1 MILLIGRAM(S): 2 INJECTION INTRAMUSCULAR; INTRAVENOUS; SUBCUTANEOUS at 06:28

## 2022-05-02 RX ADMIN — GABAPENTIN 300 MILLIGRAM(S): 400 CAPSULE ORAL at 21:19

## 2022-05-02 RX ADMIN — HYDROMORPHONE HYDROCHLORIDE 0.5 MILLIGRAM(S): 2 INJECTION INTRAMUSCULAR; INTRAVENOUS; SUBCUTANEOUS at 00:29

## 2022-05-02 RX ADMIN — CYCLOBENZAPRINE HYDROCHLORIDE 5 MILLIGRAM(S): 10 TABLET, FILM COATED ORAL at 20:33

## 2022-05-02 RX ADMIN — LIDOCAINE 1 PATCH: 4 CREAM TOPICAL at 16:29

## 2022-05-02 RX ADMIN — HYDROMORPHONE HYDROCHLORIDE 1 MILLIGRAM(S): 2 INJECTION INTRAMUSCULAR; INTRAVENOUS; SUBCUTANEOUS at 01:43

## 2022-05-02 RX ADMIN — HYDROMORPHONE HYDROCHLORIDE 1 MILLIGRAM(S): 2 INJECTION INTRAMUSCULAR; INTRAVENOUS; SUBCUTANEOUS at 05:58

## 2022-05-02 RX ADMIN — Medication 650 MILLIGRAM(S): at 02:13

## 2022-05-02 RX ADMIN — LIDOCAINE 1 PATCH: 4 CREAM TOPICAL at 16:28

## 2022-05-02 NOTE — BH CONSULTATION LIAISON ASSESSMENT NOTE - CURRENT MEDICATION
MEDICATIONS  (STANDING):  ciprofloxacin     Tablet 500 milliGRAM(s) Oral every 12 hours  clonazePAM  Tablet 0.5 milliGRAM(s) Oral two times a day  dextrose 50% Injectable 25 Gram(s) IV Push once  dextrose 50% Injectable 12.5 Gram(s) IV Push once  dextrose 50% Injectable 25 Gram(s) IV Push once  dextrose Oral Gel 15 Gram(s) Oral once  gabapentin Solution 300 milliGRAM(s) Oral every 8 hours  glucagon  Injectable 1 milliGRAM(s) IntraMuscular once  heparin   Injectable 5000 Unit(s) SubCutaneous every 12 hours  ketorolac   Injectable 15 milliGRAM(s) IV Push every 6 hours  lactated ringers. 1000 milliLiter(s) (30 mL/Hr) IV Continuous <Continuous>  lidocaine   4% Patch 1 Patch Transdermal daily  lidocaine   4% Patch 1 Patch Transdermal daily  metroNIDAZOLE  IVPB 500 milliGRAM(s) IV Intermittent every 8 hours  pantoprazole   Suspension 40 milliGRAM(s) Oral daily  PARoxetine 20 milliGRAM(s) Oral daily  polyethylene glycol 3350 17 Gram(s) Oral daily  senna 2 Tablet(s) Oral at bedtime    MEDICATIONS  (PRN):  acetaminophen    Suspension .. 650 milliGRAM(s) Oral every 6 hours PRN Temp greater or equal to 38C (100.4F), Mild Pain (1 - 3)  cyclobenzaprine 5 milliGRAM(s) Oral every 8 hours PRN Muscle Spasm  HYDROmorphone  Injectable 0.5 milliGRAM(s) IV Push every 4 hours PRN Severe Pain (7 - 10)  hydrOXYzine hydrochloride 50 milliGRAM(s) Oral every 6 hours PRN Anxiety  oxyCODONE    Solution 5 milliGRAM(s) Oral every 4 hours PRN Moderate to Severe pain (4 - 10)  simethicone 80 milliGRAM(s) Chew three times a day PRN Gas

## 2022-05-02 NOTE — BH CONSULTATION LIAISON ASSESSMENT NOTE - NSBHCHARTREVIEWVS_PSY_A_CORE FT
Vital Signs Last 24 Hrs  T(C): 36.9 (02 May 2022 12:09), Max: 37.1 (01 May 2022 17:13)  T(F): 98.4 (02 May 2022 12:09), Max: 98.7 (01 May 2022 17:13)  HR: 76 (02 May 2022 12:09) (68 - 86)  BP: 123/66 (02 May 2022 12:09) (102/58 - 132/78)  BP(mean): --  RR: 17 (02 May 2022 12:09) (17 - 19)  SpO2: 100% (02 May 2022 12:09) (97% - 100%)

## 2022-05-02 NOTE — BH CONSULTATION LIAISON ASSESSMENT NOTE - SUMMARY
Pt is a 27 y/o M hx of esophageal stricture s/p multiple dilations and esophagectomy, prior IVDU not on suboxone anymore who presented due to diarrhea x 7-8 loose BMs. Psych consulted for agitaton/anxiety/addiction control. Pt known to me from previous admission (4/12) where he refused to take Suboxone; psych started Klonopin but weaned that, as well as Paxil. He was discharged but bounced back as above. Reviewed notes from pain mgmt and discussed case with thoracic surgery. Appears to have severely low pain threshold, poor psychobehavioral coping, and high anxiety. Is desperate but this may be an opportunity for use of Suboxone for both opioid use disorder and pain control if coordinated appropriately. Discussed the following with the primary team    Plan:   - Start Klonopin 0.5mg BID, first dose now and second dose tonight  - Start Depakote 250mg IV TID standing, first dose now  - C/W Paxil as ordered  - C/W Hydroxyzine for PRN anxiety as ordered.   - Careful monitoring of visitors/substances delivered to his room; Narcan PRN obtunded/sedated state  - Will continue to follow.  - Hold pain meds at 6AM tomorrow AM and at 9AM give first dose of Suboxone 4mg SL; we will assess effects and likely will need to give other doses in short order.

## 2022-05-02 NOTE — DISCHARGE NOTE NURSING/CASE MANAGEMENT/SOCIAL WORK - NSDCPEFALRISK_GEN_ALL_CORE
For information on Fall & Injury Prevention, visit: [email or web address] OR  [email or web address] OR  https://www.cdc.gov/steadi/patient.html

## 2022-05-02 NOTE — CONSULT NOTE ADULT - SUBJECTIVE AND OBJECTIVE BOX
Patient is a 26y old  Male who presents with a chief complaint of pain and diarrhea (01 May 2022 13:52)      HPI:  Pt is a 27 y/o M hx of esophageal stricture s/p multiple dilations and esophagectomy, prior IVDU not on suboxone anymore who presents due to diarrhea 7-8 times loose stools since 11 am. An EGD on 4/5 showed esophagitis that required dilation.  THoracic surgery evaluated him and on 4/15/22. he underwent a FB, EGD, Robotic [place]-Mahesh Esophagectomy, and J-tube placement. He had complication then w/ post op hemothorax. He had abdominal pain as well, and Tylenol helped only mildly. He has J-tube that he is running out of sterile water to flush, and also did not have clean caps for it. He last used the J-tube 1 night ago. Denies drainage or skin rash around the site. Denies fevers, chills, melena, hematochezia, vomiting, dysuria, polyuria, LE edema, skin rash, cp, sob, or palpitations.  ED course CT iv con notable for small bowel enteritis, no bowel obstruction.   GI stool PCR and C. diff sent   (01 May 2022 00:29)      PAST MEDICAL & SURGICAL HISTORY:  Heroin abuse    Cocaine abuse    GERD (gastroesophageal reflux disease)    Hiatal hernia    History of esophageal stricture    Intravenous drug abuse    Migration of pancreatic stent    Migrated esophageal stent        MEDICATIONS  (STANDING):  ciprofloxacin     Tablet 500 milliGRAM(s) Oral every 12 hours  dextrose 50% Injectable 25 Gram(s) IV Push once  dextrose 50% Injectable 12.5 Gram(s) IV Push once  dextrose 50% Injectable 25 Gram(s) IV Push once  dextrose Oral Gel 15 Gram(s) Oral once  gabapentin Solution 200 milliGRAM(s) Oral three times a day  glucagon  Injectable 1 milliGRAM(s) IntraMuscular once  heparin   Injectable 5000 Unit(s) SubCutaneous every 12 hours  lactated ringers. 1000 milliLiter(s) (30 mL/Hr) IV Continuous <Continuous>  metroNIDAZOLE  IVPB 500 milliGRAM(s) IV Intermittent every 8 hours  pantoprazole   Suspension 40 milliGRAM(s) Oral daily  PARoxetine 20 milliGRAM(s) Oral daily  polyethylene glycol 3350 17 Gram(s) Oral daily  senna 2 Tablet(s) Oral at bedtime    MEDICATIONS  (PRN):  acetaminophen    Suspension .. 650 milliGRAM(s) Oral every 6 hours PRN Temp greater or equal to 38C (100.4F), Mild Pain (1 - 3)  HYDROmorphone  Injectable 1 milliGRAM(s) IV Push every 4 hours PRN Severe Pain (7 - 10)  HYDROmorphone  Injectable 0.5 milliGRAM(s) IV Push every 4 hours PRN Moderate Pain (4 - 6)  hydrOXYzine hydrochloride 50 milliGRAM(s) Oral every 6 hours PRN Anxiety  simethicone 80 milliGRAM(s) Chew three times a day PRN Gas      ICU Vital Signs Last 24 Hrs  T(C): 36.9 (02 May 2022 12:09), Max: 37.1 (01 May 2022 17:13)  T(F): 98.4 (02 May 2022 12:09), Max: 98.7 (01 May 2022 17:13)  HR: 76 (02 May 2022 12:09) (68 - 86)  BP: 123/66 (02 May 2022 12:09) (102/58 - 132/78)  BP(mean): --  ABP: --  ABP(mean): --  RR: 17 (02 May 2022 12:09) (17 - 19)  SpO2: 100% (02 May 2022 12:09) (97% - 100%)      Vital Signs Last 24 Hrs  T(C): 36.9 (02 May 2022 12:09), Max: 37.1 (01 May 2022 17:13)  T(F): 98.4 (02 May 2022 12:09), Max: 98.7 (01 May 2022 17:13)  HR: 76 (02 May 2022 12:09) (68 - 86)  BP: 123/66 (02 May 2022 12:09) (102/58 - 132/78)  BP(mean): --  RR: 17 (02 May 2022 12:09) (17 - 19)  SpO2: 100% (02 May 2022 12:09) (97% - 100%)                          11.1   5.36  )-----------( 172      ( 02 May 2022 07:34 )             33.1       LIVER FUNCTIONS - ( 02 May 2022 07:34 )  Alb: 4.0 g/dL / Pro: 6.2 g/dL / ALK PHOS: 176 U/L / ALT: 173 U/L / AST: 93 U/L / GGT: x             PT/INR - ( 02 May 2022 07:34 )   PT: 12.2 sec;   INR: 1.05 ratio         PTT - ( 30 Apr 2022 21:25 )  PTT:30.1 sec    SUMMARY:  Patient seen at bedside. Patient sitting up in chair. Patient states he was discharged home on Wednesday and came back to the hospital on Saturday with severe left lower quadrant pain. Patient states he was taking Oxycodone 5mg Q4H at home for his right chest tube site pain and then he started having the left lower quadrant pain on Saturday morning. Patient states he tried 15mg of Oxycodone on Saturday and it did not help him with his pain. Patient states his abdominal pain is 20/10 now. Patient states the pain is sharp and constant and at times he feels like his bowels are twisted. Patient also reports some mid abdominal pain along with the old chest tube site pain but states the left lower quadrant pain is severe. Patient states he received IV Morphine in the ED and it did not help him with his pain. Patient states he was then given IV Dilaudid which helped him. Patient reports receiving 1.5mg of IV Dilaudid last night which helped him for almost 5 hours last night. Patient states he is tolerating his diet well, denies nausea and vomiting. Reports having diarrhea on Saturday states his stool is semi formed now and he is having 2 BMs a day. Patient states he is getting Gabapentin for the nerve pain. Discussed restarting Suboxone with patient since he stopped Suboxone and refused Suboxone post surgery during last hospital stay. Patient adamantly refusing to restart Suboxone despite educating him on the risks of relapse. Patient states his pain management doctor is in [facility] and that he is not planning on following up with that doctor. Discussed consulting addiction Psychiatry for recommendations on alternative for Suboxone, patient agreed to see inpatient addiction Psych. Patient requesting IV Dilaudid at the time. Explained to patient that his case will be discussed with the attending and will make recommendations for his pain after discussing with the attending. Patient then states "this is when I want to go and get street drugs". Patient agitated and demanding IV Dilaudid for breakthrough at this time. Patient denies using drugs while he was home prior to coming to the hospital. Patient states he tried reaching out to his PCP to help him to set up an appointment with a Psychiatrist outpatient but was unable to find one. Patient alert and orientedx4. Patient answers questions appropriately. Maintains eye contact. Not in any apparent distress.  RECOMMENDATIONS:  1) Recommend IV Toradol 15mg Q6H standing x2 days. Only if ok with primary team.  2) Recommend changing PO Gabapentin to 300mg Q8H. Hold for over sedation.  3) Recommend PO Flexeril 5mg Q8H. Hold for over sedation.  4) Recommend Lidocaine patch to left lower quadrant. 12 hours ON/12 hours OFF.  5) Recommend Lidocaine patch to right chest . 12 hours ON/12 hours OFF.  6) Recommend discontinuing current orders for IV Dilaudid instead order:  PO Oxycodone 5mg Q4H PRN for moderate and severe pain. Hold for over sedation. Not to be given within one hour of any immediate acting opioid.  IV Dilaudid 0.5mg Q4H PRN for severe breakthrough pain. Hold for over sedation. Not to be given within one hour of any immediate acting opioid. ONLY TO BE GIVEN FOR PAIN NOT RELIEVED WITH PO OXYCODONE. STOP after 2 days by changing the frequency to Q6H, Q8H and discontinue.  7) Recommend addiction Psychiatry consult for opioid addiction and possible recommendations for alternative for Suboxone.  8) Recommend follow up with chronic pain management outpatient upon discharge.   I STOP reviewed and found:  Oxycodone 5mg tablets, quantity of 30 pills for 5 days. Last dispensed on 04/27/22.  Klonopin 0.5mg tablets, quantity of 6 pills for 5 days. Last dispensed on 04/27/22.  Buprenorphine- naloxone 4-1mg sl film. Quantity of 56 films for 28 days. Last dispensed on 03/18/22.  Discussed patient with Anesthesia pain attending who agrees with the above plan.   Pain service to sign off. Please call pain service if further assistance needed with pain management.

## 2022-05-02 NOTE — PROGRESS NOTE ADULT - SUBJECTIVE AND OBJECTIVE BOX
Kadeem Keita MD  [employer]  Pager [phone]  Email: [email or web address]          PROGRESS NOTE:     Patient is a 26y old  Male who presents with a chief complaint of pain and diarrhea (01 May 2022 13:52)      SUBJECTIVE / OVERNIGHT EVENTS:  Patient seen and examined this morning. Patient pending studies GI PCR and C.diff (had to be resent).   ADDITIONAL REVIEW OF SYSTEMS:  No f/c/n/v    MEDICATIONS  (STANDING):  ciprofloxacin     Tablet 500 milliGRAM(s) Oral every 12 hours  dextrose 50% Injectable 25 Gram(s) IV Push once  dextrose 50% Injectable 12.5 Gram(s) IV Push once  dextrose 50% Injectable 25 Gram(s) IV Push once  dextrose Oral Gel 15 Gram(s) Oral once  gabapentin Solution 200 milliGRAM(s) Oral three times a day  glucagon  Injectable 1 milliGRAM(s) IntraMuscular once  heparin   Injectable 5000 Unit(s) SubCutaneous every 12 hours  lactated ringers. 1000 milliLiter(s) (30 mL/Hr) IV Continuous <Continuous>  metroNIDAZOLE  IVPB 500 milliGRAM(s) IV Intermittent every 8 hours  pantoprazole   Suspension 40 milliGRAM(s) Oral daily  PARoxetine 20 milliGRAM(s) Oral daily  polyethylene glycol 3350 17 Gram(s) Oral daily  senna 2 Tablet(s) Oral at bedtime    MEDICATIONS  (PRN):  acetaminophen    Suspension .. 650 milliGRAM(s) Oral every 6 hours PRN Temp greater or equal to 38C (100.4F), Mild Pain (1 - 3)  HYDROmorphone  Injectable 1 milliGRAM(s) IV Push every 4 hours PRN Severe Pain (7 - 10)  HYDROmorphone  Injectable 0.5 milliGRAM(s) IV Push every 4 hours PRN Moderate Pain (4 - 6)  hydrOXYzine hydrochloride 50 milliGRAM(s) Oral every 6 hours PRN Anxiety  simethicone 80 milliGRAM(s) Chew three times a day PRN Gas      CAPILLARY BLOOD GLUCOSE      POCT Blood Glucose.: 155 mg/dL (02 May 2022 12:34)  POCT Blood Glucose.: 91 mg/dL (02 May 2022 06:23)  POCT Blood Glucose.: 115 mg/dL (01 May 2022 20:55)  POCT Blood Glucose.: 95 mg/dL (01 May 2022 17:38)    I&O's Summary    01 May 2022 07:01  -  02 May 2022 07:00  --------------------------------------------------------  IN: 1800 mL / OUT: 1400 mL / NET: 400 mL    02 May 2022 07:01  -  02 May 2022 14:03  --------------------------------------------------------  IN: 100 mL / OUT: 0 mL / NET: 100 mL        PHYSICAL EXAM:  Vital Signs Last 24 Hrs  T(C): 36.9 (02 May 2022 12:09), Max: 37.1 (01 May 2022 17:13)  T(F): 98.4 (02 May 2022 12:09), Max: 98.7 (01 May 2022 17:13)  HR: 76 (02 May 2022 12:09) (68 - 86)  BP: 123/66 (02 May 2022 12:09) (102/58 - 132/78)  BP(mean): --  RR: 17 (02 May 2022 12:09) (17 - 19)  SpO2: 100% (02 May 2022 12:09) (97% - 100%)     PHYSICAL EXAM:  GENERAL: NAD, well-developed  HEAD:  Atraumatic, Normocephalic  EYES: EOMI, PERRLA, conjunctiva and sclera clear  NECK: Supple, No JVD  CHEST/LUNG: Clear to auscultation bilaterally; No wheeze  HEART: Regular rate and rhythm; No murmurs, rubs, or gallops  ABDOMEN: Soft, tender throughout to palpation, nondistended, J tube in place no erythema, or warmth noted  EXTREMITIES:  2+ Peripheral Pulses, No clubbing, cyanosis, or edema  PSYCH: AAOx3  NEUROLOGY: non-focal  SKIN: No rashes or lesions      LABS:                        11.1   5.36  )-----------( 172      ( 02 May 2022 07:34 )             33.1     05-02    136  |  102  |  15  ----------------------------<  86  4.3   |  25  |  0.78    Ca    9.2      02 May 2022 07:34  Phos  4.0       Mg     2.00         TPro  6.2  /  Alb  4.0  /  TBili  0.7  /  DBili  x   /  AST  93<H>  /  ALT  173<H>  /  AlkPhos  176<H>      PT/INR - ( 02 May 2022 07:34 )   PT: 12.2 sec;   INR: 1.05 ratio         PTT - ( 2022 21:25 )  PTT:30.1 sec      Urinalysis Basic - ( 01 May 2022 07:04 )    Color: Light Yellow / Appearance: Clear / S.031 / pH: x  Gluc: x / Ketone: Negative  / Bili: Negative / Urobili: <2 mg/dL   Blood: x / Protein: Negative / Nitrite: Negative   Leuk Esterase: Negative / RBC: x / WBC x   Sq Epi: x / Non Sq Epi: x / Bacteria: x          RADIOLOGY & ADDITIONAL TESTS:  Results Reviewed:   Imaging Personally Reviewed:  Electrocardiogram Personally Reviewed:    COORDINATION OF CARE:  Care Discussed with Consultants/Other Providers [Y/N]: D/W Dr. Ebonie VILLA  Prior or Outpatient Records Reviewed [Y/N]:

## 2022-05-02 NOTE — BH CONSULTATION LIAISON ASSESSMENT NOTE - HPI (INCLUDE ILLNESS QUALITY, SEVERITY, DURATION, TIMING, CONTEXT, MODIFYING FACTORS, ASSOCIATED SIGNS AND SYMPTOMS)
Pt is a 27 y/o M hx of esophageal stricture s/p multiple dilations and esophagectomy, prior IVDU not on suboxone anymore who presented due to diarrhea x 7-8 loose BMs. Psych consulted for agitaton/anxiety/addiction control.     Chart reviewed. Per initial H&P: "An EGD on 4/5 showed esophagitis that required dilation.  THoracic surgery evaluated him and on 4/15/22. he underwent a FB, EGD, Robotic [place]-Mahesh Esophagectomy, and J-tube placement. He had complication then w/ post op hemothorax. He had abdominal pain as well, and Tylenol helped only mildly. He has J-tube that he is running out of sterile water to flush, and also did not have clean caps for it. He last used the J-tube 1 night ago. Denies drainage or skin rash around the site. Denies fevers, chills, melena, hematochezia, vomiting, dysuria, polyuria, LE edema, skin rash, cp, sob, or palpitations. ED course CT iv con notable for small bowel enteritis, no bowel obstruction. GI stool PCR and C. diff sent, pending."    Pt known to me from previous admission (4/12) where he refused to take Suboxone; psych started Klonopin but weaned that, as well as Paxil. He was discharged but bounced back as above. Reviewed notes from pain mgmt and discussed case with thoracic surgery.     On exam, he was found in his room in a chair weeping, holding his abdomen, barely able to talk due to the "10/10" pain. He is frustrated that he is allowed to "suffer like this" and wishes meds were available around the clock. He feels pain mgmt just "talks about meds" but is unsure what they offer. He does not endorse high anxiety at the moment, but acknowledges that last admission Klonopin helped him function. He is open to this, as well as Suboxone if it can help ("whatever you think"). He cites an ability to obtain illegal drugs delivered to him "anytime, its easy" but is trying very hard not to do this. He denied, SI, HI, AVH, or other safety concerns.

## 2022-05-02 NOTE — DISCHARGE NOTE NURSING/CASE MANAGEMENT/SOCIAL WORK - PATIENT PORTAL LINK FT
You can access the FollowMyHealth Patient Portal offered by [facility] by registering at the following website: [email or web address]. By joining [email or web address]’s FollowMyHealth portal, you will also be able to view your health information using other applications (apps) compatible with our system.

## 2022-05-02 NOTE — PROGRESS NOTE ADULT - ASSESSMENT
From: Jah Rodriguez  To: Leydi Kimble MD  Sent: 5/18/2017 12:50 PM CDT  Subject: Birth control    If you remember, a while ago we tried switching my birth control becasue I was experiencing consistent breakthrough bleeding. I didn't last very long on the new birth control becasue I suffered severe side effects such as stomach pain, loss of appetite, nausea, and intense and sudden mood swings. Because I couldn't handle the side effects, we switched me back to my original birth control. After being back on it for awhile, I am once again experiencing consistent breakthrough bleeding almost every month. The birth control I'm on now is also no longer relieving some of my pain from severe menstral cramping, when I am supposed to have my period and during the break through bleeds. I rely on birth control not only for contraception, but to relieve some menstral cramping pain and relieve chronic headache pain (which is why I only have 4 sugar pills). I am very concerned about switching birth controls and am scared I will suffer such side effects again, but I do not think what I am on is  working for me anymore. Do you have any other ideas of birth controls I could switch to to stop the breakthrough bleeds and help with cramps and headaches without me experiencing such side effects?     Thank you,     Jah Rodriguez   Pt is a 27 y/o M hx of esophageal stricture s/p multiple dilations and esophagectomy, prior IVDU not on suboxone anymore who presents due to diarrhea 7-8 times loose stools since 11 am.  [Negative] : Heme/Lymph [FreeTextEntry8] : Passed a 5 mm stone.

## 2022-05-02 NOTE — BH CONSULTATION LIAISON ASSESSMENT NOTE - MSE UNSTRUCTURED FT
AA O x 3,  found in his room in a chair weeping, holding his abdomen, barely able to talk due to the "10/10" pain. He is frustrated that he is allowed to "suffer like this" and wishes meds were available around the clock. He denied, SI, HI, AVH, or other safety concerns.

## 2022-05-02 NOTE — PROGRESS NOTE ADULT - ASSESSMENT
Pt is a 25 y/o M hx of esophageal stricture s/p multiple dilations and IVL-Esophagectomy(4/15), prior IVDU not on suboxone anymore who presented due to diarrhea.    Plan  - Transfer to thoracic Surg under Dr. Shannon  - Pain medicine consult  - Addiction psych consult  - F/u GI recs  - Diet: Soft, no carb beverages  - DVT PPx: Hep Subq    Thoracic Surgery  [id number]

## 2022-05-02 NOTE — PROGRESS NOTE ADULT - SUBJECTIVE AND OBJECTIVE BOX
Thoracic Surgery Progress Note    SUBJECTIVE: Patient seen and examined at bedside. Pt states that he is passing gas and having BMs. Pt endorse LLQ pain. Pt did not endorse chest pain, SOB, fevers/chills, N/V.    Vital Signs Last 24 Hrs  T(C): 36.9 (02 May 2022 12:09), Max: 37.1 (01 May 2022 17:13)  T(F): 98.4 (02 May 2022 12:09), Max: 98.7 (01 May 2022 17:13)  HR: 76 (02 May 2022 12:09) (68 - 86)  BP: 123/66 (02 May 2022 12:09) (102/58 - 132/78)  BP(mean): --  RR: 17 (02 May 2022 12:09) (17 - 19)  SpO2: 100% (02 May 2022 12:09) (97% - 100%)I&O's Detail    01 May 2022 07:01  -  02 May 2022 07:00  --------------------------------------------------------  IN:    IV PiggyBack: 100 mL    Lactated Ringers: 1000 mL    Oral Fluid: 700 mL  Total IN: 1800 mL    OUT:    Voided (mL): 1400 mL  Total OUT: 1400 mL    Total NET: 400 mL      02 May 2022 07:01  -  02 May 2022 13:13  --------------------------------------------------------  IN:    Oral Fluid: 100 mL  Total IN: 100 mL    OUT:  Total OUT: 0 mL    Total NET: 100 mL        Medications  MEDICATIONS  (STANDING):  ciprofloxacin     Tablet 500 milliGRAM(s) Oral every 12 hours  dextrose 50% Injectable 25 Gram(s) IV Push once  dextrose 50% Injectable 12.5 Gram(s) IV Push once  dextrose 50% Injectable 25 Gram(s) IV Push once  dextrose Oral Gel 15 Gram(s) Oral once  gabapentin Solution 200 milliGRAM(s) Oral three times a day  glucagon  Injectable 1 milliGRAM(s) IntraMuscular once  heparin   Injectable 5000 Unit(s) SubCutaneous every 12 hours  lactated ringers. 1000 milliLiter(s) (30 mL/Hr) IV Continuous <Continuous>  metroNIDAZOLE  IVPB 500 milliGRAM(s) IV Intermittent every 8 hours  pantoprazole   Suspension 40 milliGRAM(s) Oral daily  PARoxetine 20 milliGRAM(s) Oral daily  polyethylene glycol 3350 17 Gram(s) Oral daily  senna 2 Tablet(s) Oral at bedtime    MEDICATIONS  (PRN):  acetaminophen    Suspension .. 650 milliGRAM(s) Oral every 6 hours PRN Temp greater or equal to 38C (100.4F), Mild Pain (1 - 3)  HYDROmorphone  Injectable 1 milliGRAM(s) IV Push every 4 hours PRN Severe Pain (7 - 10)  HYDROmorphone  Injectable 0.5 milliGRAM(s) IV Push every 4 hours PRN Moderate Pain (4 - 6)  hydrOXYzine hydrochloride 50 milliGRAM(s) Oral every 6 hours PRN Anxiety  simethicone 80 milliGRAM(s) Chew three times a day PRN Gas      Physical Exam  Constitutional: A&Ox3, NAD  Eyes: Scleras clear, PERRLA/ EOMI, Gross vision intact  Respiratory:  Gastrointestinal: Soft nontender, nondistended  Genitourinary:   Extremities: Moving all extremities, no edema  Vascular:  Tubes:  Skin: No Rashes, Hematoma, Ecchymosis    LABS:                        11.1   5.36  )-----------( 172      ( 02 May 2022 07:34 )             33.1     05-    136  |  102  |  15  ----------------------------<  86  4.3   |  25  |  0.78    Ca    9.2      02 May 2022 07:34  Phos  4.0     05-02  Mg     2.00     05-02    TPro  6.2  /  Alb  4.0  /  TBili  0.7  /  DBili  x   /  AST  93<H>  /  ALT  173<H>  /  AlkPhos  176<H>  05-02    PT/INR - ( 02 May 2022 07:34 )   PT: 12.2 sec;   INR: 1.05 ratio         PTT - ( 2022 21:25 )  PTT:30.1 sec  LIVER FUNCTIONS - ( 02 May 2022 07:34 )  Alb: 4.0 g/dL / Pro: 6.2 g/dL / ALK PHOS: 176 U/L / ALT: 173 U/L / AST: 93 U/L / GGT: x           Urinalysis Basic - ( 01 May 2022 07:04 )    Color: Light Yellow / Appearance: Clear / S.031 / pH: x  Gluc: x / Ketone: Negative  / Bili: Negative / Urobili: <2 mg/dL   Blood: x / Protein: Negative / Nitrite: Negative   Leuk Esterase: Negative / RBC: x / WBC x   Sq Epi: x / Non Sq Epi: x / Bacteria: x

## 2022-05-02 NOTE — BH CONSULTATION LIAISON ASSESSMENT NOTE - NSSUICPROTFACT_PSY_ALL_CORE
Responsibility to children, family, or others/Identifies reasons for living/Supportive social network of family or friends/Fear of death or the actual act of killing self/Cultural, spiritual and/or moral attitudes against suicide/Mandaen beliefs

## 2022-05-03 LAB
ALBUMIN SERPL ELPH-MCNC: 3.7 G/DL — SIGNIFICANT CHANGE UP (ref 3.3–5)
ALP SERPL-CCNC: 154 U/L — HIGH (ref 40–120)
ALT FLD-CCNC: 160 U/L — HIGH (ref 4–41)
ANION GAP SERPL CALC-SCNC: 10 MMOL/L — SIGNIFICANT CHANGE UP (ref 7–14)
AST SERPL-CCNC: 70 U/L — HIGH (ref 4–40)
BILIRUB SERPL-MCNC: 0.5 MG/DL — SIGNIFICANT CHANGE UP (ref 0.2–1.2)
BUN SERPL-MCNC: 19 MG/DL — SIGNIFICANT CHANGE UP (ref 7–23)
CALCIUM SERPL-MCNC: 9.2 MG/DL — SIGNIFICANT CHANGE UP (ref 8.4–10.5)
CHLORIDE SERPL-SCNC: 102 MMOL/L — SIGNIFICANT CHANGE UP (ref 98–107)
CO2 SERPL-SCNC: 26 MMOL/L — SIGNIFICANT CHANGE UP (ref 22–31)
CREAT SERPL-MCNC: 0.8 MG/DL — SIGNIFICANT CHANGE UP (ref 0.5–1.3)
EGFR: 125 ML/MIN/1.73M2 — SIGNIFICANT CHANGE UP
GLUCOSE BLDC GLUCOMTR-MCNC: 106 MG/DL — HIGH (ref 70–99)
GLUCOSE BLDC GLUCOMTR-MCNC: 143 MG/DL — HIGH (ref 70–99)
GLUCOSE SERPL-MCNC: 86 MG/DL — SIGNIFICANT CHANGE UP (ref 70–99)
HCT VFR BLD CALC: 33.7 % — LOW (ref 39–50)
HGB BLD-MCNC: 11.1 G/DL — LOW (ref 13–17)
MCHC RBC-ENTMCNC: 28.7 PG — SIGNIFICANT CHANGE UP (ref 27–34)
MCHC RBC-ENTMCNC: 32.9 GM/DL — SIGNIFICANT CHANGE UP (ref 32–36)
MCV RBC AUTO: 87.1 FL — SIGNIFICANT CHANGE UP (ref 80–100)
NRBC # BLD: 0 /100 WBCS — SIGNIFICANT CHANGE UP
NRBC # FLD: 0 K/UL — SIGNIFICANT CHANGE UP
PLATELET # BLD AUTO: 207 K/UL — SIGNIFICANT CHANGE UP (ref 150–400)
POTASSIUM SERPL-MCNC: 4.3 MMOL/L — SIGNIFICANT CHANGE UP (ref 3.5–5.3)
POTASSIUM SERPL-SCNC: 4.3 MMOL/L — SIGNIFICANT CHANGE UP (ref 3.5–5.3)
PREALB SERPL-MCNC: 22 MG/DL — SIGNIFICANT CHANGE UP (ref 20–40)
PROT SERPL-MCNC: 6.1 G/DL — SIGNIFICANT CHANGE UP (ref 6–8.3)
RBC # BLD: 3.87 M/UL — LOW (ref 4.2–5.8)
RBC # FLD: 16.1 % — HIGH (ref 10.3–14.5)
SODIUM SERPL-SCNC: 138 MMOL/L — SIGNIFICANT CHANGE UP (ref 135–145)
WBC # BLD: 4.34 K/UL — SIGNIFICANT CHANGE UP (ref 3.8–10.5)
WBC # FLD AUTO: 4.34 K/UL — SIGNIFICANT CHANGE UP (ref 3.8–10.5)

## 2022-05-03 PROCEDURE — 99233 SBSQ HOSP IP/OBS HIGH 50: CPT

## 2022-05-03 PROCEDURE — 99232 SBSQ HOSP IP/OBS MODERATE 35: CPT

## 2022-05-03 RX ORDER — VALPROIC ACID (AS SODIUM SALT) 250 MG/5ML
250 SOLUTION, ORAL ORAL THREE TIMES A DAY
Refills: 0 | Status: DISCONTINUED | OUTPATIENT
Start: 2022-05-03 | End: 2022-05-06

## 2022-05-03 RX ORDER — SIMETHICONE 80 MG/1
80 TABLET, CHEWABLE ORAL THREE TIMES A DAY
Refills: 0 | Status: DISCONTINUED | OUTPATIENT
Start: 2022-05-03 | End: 2022-05-06

## 2022-05-03 RX ORDER — BUPRENORPHINE AND NALOXONE 2; .5 MG/1; MG/1
2 TABLET SUBLINGUAL ONCE
Refills: 0 | Status: DISCONTINUED | OUTPATIENT
Start: 2022-05-03 | End: 2022-05-03

## 2022-05-03 RX ORDER — GABAPENTIN 400 MG/1
400 CAPSULE ORAL THREE TIMES A DAY
Refills: 0 | Status: DISCONTINUED | OUTPATIENT
Start: 2022-05-03 | End: 2022-05-06

## 2022-05-03 RX ADMIN — LIDOCAINE 1 PATCH: 4 CREAM TOPICAL at 11:39

## 2022-05-03 RX ADMIN — Medication 650 MILLIGRAM(S): at 11:45

## 2022-05-03 RX ADMIN — HYDROMORPHONE HYDROCHLORIDE 0.5 MILLIGRAM(S): 2 INJECTION INTRAMUSCULAR; INTRAVENOUS; SUBCUTANEOUS at 04:40

## 2022-05-03 RX ADMIN — Medication 500 MILLIGRAM(S): at 07:14

## 2022-05-03 RX ADMIN — CYCLOBENZAPRINE HYDROCHLORIDE 5 MILLIGRAM(S): 10 TABLET, FILM COATED ORAL at 20:40

## 2022-05-03 RX ADMIN — GABAPENTIN 300 MILLIGRAM(S): 400 CAPSULE ORAL at 13:06

## 2022-05-03 RX ADMIN — Medication 100 MILLIGRAM(S): at 13:07

## 2022-05-03 RX ADMIN — CYCLOBENZAPRINE HYDROCHLORIDE 5 MILLIGRAM(S): 10 TABLET, FILM COATED ORAL at 10:45

## 2022-05-03 RX ADMIN — Medication 100 MILLIGRAM(S): at 07:15

## 2022-05-03 RX ADMIN — Medication 20 MILLIGRAM(S): at 11:39

## 2022-05-03 RX ADMIN — OXYCODONE HYDROCHLORIDE 5 MILLIGRAM(S): 5 TABLET ORAL at 07:46

## 2022-05-03 RX ADMIN — OXYCODONE HYDROCHLORIDE 5 MILLIGRAM(S): 5 TABLET ORAL at 03:10

## 2022-05-03 RX ADMIN — LIDOCAINE 1 PATCH: 4 CREAM TOPICAL at 23:06

## 2022-05-03 RX ADMIN — Medication 15 MILLIGRAM(S): at 17:41

## 2022-05-03 RX ADMIN — Medication 15 MILLIGRAM(S): at 11:54

## 2022-05-03 RX ADMIN — SIMETHICONE 80 MILLIGRAM(S): 80 TABLET, CHEWABLE ORAL at 22:04

## 2022-05-03 RX ADMIN — Medication 15 MILLIGRAM(S): at 23:15

## 2022-05-03 RX ADMIN — Medication 0.5 MILLIGRAM(S): at 18:41

## 2022-05-03 RX ADMIN — OXYCODONE HYDROCHLORIDE 5 MILLIGRAM(S): 5 TABLET ORAL at 08:46

## 2022-05-03 RX ADMIN — LIDOCAINE 1 PATCH: 4 CREAM TOPICAL at 20:36

## 2022-05-03 RX ADMIN — Medication 500 MILLIGRAM(S): at 17:41

## 2022-05-03 RX ADMIN — GABAPENTIN 300 MILLIGRAM(S): 400 CAPSULE ORAL at 07:13

## 2022-05-03 RX ADMIN — HYDROMORPHONE HYDROCHLORIDE 0.5 MILLIGRAM(S): 2 INJECTION INTRAMUSCULAR; INTRAVENOUS; SUBCUTANEOUS at 00:23

## 2022-05-03 RX ADMIN — Medication 0.5 MILLIGRAM(S): at 07:14

## 2022-05-03 RX ADMIN — Medication 650 MILLIGRAM(S): at 10:45

## 2022-05-03 RX ADMIN — PANTOPRAZOLE SODIUM 40 MILLIGRAM(S): 20 TABLET, DELAYED RELEASE ORAL at 11:54

## 2022-05-03 RX ADMIN — GABAPENTIN 400 MILLIGRAM(S): 400 CAPSULE ORAL at 22:16

## 2022-05-03 RX ADMIN — Medication 100 MILLIGRAM(S): at 22:02

## 2022-05-03 NOTE — BH CONSULTATION LIAISON PROGRESS NOTE - NSBHCHARTREVIEWVS_PSY_A_CORE FT
Vital Signs Last 24 Hrs  T(C): 36.5 (03 May 2022 12:04), Max: 37.2 (02 May 2022 23:35)  T(F): 97.7 (03 May 2022 12:04), Max: 98.9 (02 May 2022 23:35)  HR: 60 (03 May 2022 12:04) (46 - 87)  BP: 102/52 (03 May 2022 12:04) (102/52 - 117/63)  BP(mean): --  RR: 18 (03 May 2022 12:04) (16 - 18)  SpO2: 98% (03 May 2022 12:04) (97% - 99%)

## 2022-05-03 NOTE — BH CONSULTATION LIAISON PROGRESS NOTE - NSBHCONSULTFOLLOWAFTERCARE_PSY_A_CORE FT
[facility]  eMERGENCY dEPARTMENT eNCOUnter      Pt Name: Boubacar Galvez  MRN: [id number]  [place] 1937  Date of evaluation: 12/1/2020  Provider: Yael Gloria MD    CHIEF COMPLAINT       Chief Complaint   Patient presents with    Fall     ground leve, attempting to transfer to bedside commode; right hip pain         HISTORY OF PRESENT ILLNESS   (Location/Symptom, Timing/Onset,Context/Setting, Quality, Duration, Modifying Factors, Severity)  Note limiting factors. Boubacar Galvez is a 80 y.o. female who presents to the emergency department      The history is provided by the patient. Leg Injury   Location:  Hip  Time since incident:  1 hour  Injury: yes    Mechanism of injury: fall    Fall:     Fall occurred:  Standing    Impact surface:  [employer] of impact: right hip. Hip location:  R hip  Pain details:     Quality:  Aching    Radiates to:  Does not radiate    Severity:  Moderate    Onset quality:  Sudden    Timing:  Constant    Progression:  Unchanged  Chronicity:  New  Dislocation: none apparent. Relieved by:  None tried  Exacerbated by: any movement, palpation. Ineffective treatments: denies other injury. Associated symptoms: no back pain, no neck pain and no numbness        NursingNotes were reviewed. REVIEW OF SYSTEMS    (2-9 systems for level 4, 10 or more for level 5)     Review of Systems   Musculoskeletal: Negative for back pain and neck pain. All other systems reviewed and are negative. Except as noted above the remainder of the review of systems was reviewed and negative.        PAST MEDICAL HISTORY     Past Medical History:   Diagnosis Date    Bronchitis     Colon cancer ([facility] [address]     Colon polyps     Constipation     Deep vein blood clot of left lower extremity (HCC)     Left leg     Depression     DVT (deep venous thrombosis) (HCC)     Dysphagia     Fibrocystic breast disease     Fibromyalgia     GERD (gastroesophageal reflux disease)     reflux with hx of (LOPRESSOR) 25 MG tablet Take 1 tablet by mouth 2 times daily, Disp-180 tablet,R-3Normal      gabapentin (NEURONTIN) 600 MG tablet TAKE 1 TABLET BY MOUTH THREE TIMES DAILY, Disp-90 tablet,R-2Normal      tiZANidine (ZANAFLEX) 4 MG tablet Take 1 tablet by mouth 3 times daily as needed (muscle spasms), Disp-90 tablet,R-2Normal      Hospital Bed [facility] Starting Fri 8/21/2020, Disp-1 each,R-0, Print      acyclovir (ZOVIRAX) 400 MG tablet Take 400 mg by mouth 2 times daily history of shingles in her eyeHistorical Med      vitamin B-12 (CYANOCOBALAMIN) 500 MCG tablet TAKE 1 TABLET BY MOUTH DAILY, Disp-30 tablet, R-11Normal      omeprazole (PRILOSEC) 40 MG delayed release capsule TAKE 1 CAPSULE BY MOUTH DAILY, Disp-90 capsule, R-3Normal      triamterene-hydrochlorothiazide (MAXZIDE-25) 37.5-25 MG per tablet TAKE 1 TABLET BY MOUTH DAILY, Disp-90 tablet, R-3Normal      allopurinol (ZYLOPRIM) 100 MG tablet Take 2 tablets by mouth daily For gout -prevention, Disp-60 tablet, R-5Normal      losartan (COZAAR) 100 MG tablet TAKE 1 TABLET BY MOUTH EVERY MORNING FOR BLOOD PRESSURE, Disp-90 tablet, R-3Normal      vitamin D (ERGOCALCIFEROL) 1.25 MG (12892 UT) CAPS capsule TAKE 1 CAPSULE BY MOUTH 1 TIME A WEEK, Disp-13 capsule, R-3Normal      albuterol (ACCUNEB) 1.25 MG/3ML nebulizer solution Inhale 1 mL into the lungs every 6 hours as needed Historical Med      dorzolamide-timolol (COSOPT) 22.3-6.8 MG/ML ophthalmic solution Place 1 Dose into both eyes dailyHistorical Med      furosemide (LASIX) 20 MG tablet Take 1 tablet by mouth daily as needed (swelling), Disp-30 tablet, R-3Normal      Nebulizers (AIRIAL COMPACT MINI NEBULIZER) MISC 4 TIMES DAILY Starting 2/24/2017, Until Discontinued, Disp-1 each, R-0, Normal      terbinafine (LAMISIL) 250 MG tablet TAKE ONE TABLET PO ONCE DAILY, Disp-90 tablet, R-0Normal             ALLERGIES     Zoloft [sertraline hcl]    FAMILY HISTORY       Family History   Problem Relation Age of Onset    Cancer sclerotic change. This may be arthritic in   nature but could represent a small focus of AVN. No associated   subchondral collapse. Signed by Dr Jessica Vasquez on 12/1/2020 6:29 PM      CT PELVIS WO CONTRAST Additional Contrast? None   Final Result   1.. No evidence of acute displaced fracture. 2. 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 There is also central and   foraminal stenosis at the L4-L5 level and foraminal stenosis at L5-S1   within the lower lumbar spine. The patient's undergone previous   kyphoplasty at L5.   3. Questionable sclerotic change within the more anterior   weightbearing aspect of the right femoral head. This may be arthritic   in nature but could represent a small focus of AVN. The femoral head   remains concentric without evidence of subchondral collapse. Signed by Dr Jessica Vasquez on 12/1/2020 6:26 PM      XR HIP 2-3 VW W PELVIS RIGHT   Final Result   No acute bony finding. Signed by Dr Trudi Albrecht on 12/1/2020 4:58 PM            ED BEDSIDE ULTRASOUND:   Performed by ED Physician - none    LABS:  Labs Reviewed - No data to display    All other labs were within normal range or not returned as of this dictation. EMERGENCY DEPARTMENT COURSE and DIFFERENTIAL DIAGNOSIS/MDM:   Vitals:    Vitals:    12/01/20 1621 12/01/20 1835 12/01/20 1845   BP: (!) 126/90 124/80 130/84   Pulse: 76 74 77   Resp: 15 17 16   Temp: 98.3 °F (36.8 °C)     SpO2: 95% 95% 95%   Weight: 154 lb (69.9 kg)     Height: 5' 3\" (1.6 m)             MDM  Number of Diagnoses or Management Options  Diagnosis management comments: Ambulated before d/c. Has home pain meds. CRITICAL CARE TIME   Total Critical Care time was 0 minutes, excluding separately reportable procedures. There was a high probability of clinically significant/lifethreatening deterioration in the patient's condition which required my urgent intervention.       CONSULTS:  None    PROCEDURES:  Unless otherwise noted below, none     Procedures    FINAL IMPRESSION      1. Fall, initial encounter    2. Right hip pain          DISPOSITION/PLAN   DISPOSITION        PATIENT REFERRED TO:  Marti Soares MD  [address] [facility] [address] [phone]      As needed. Home pain medicines as directed.       DISCHARGE MEDICATIONS:  Discharge Medication List as of 12/1/2020  7:03 PM             (Please note that portions of this note were completed with a voice recognition program.  Efforts were made to edit the dictations but occasionally words are mis-transcribed.)    Kt Reynolds MD (electronically signed)  Attending Emergency Physician          Kt Reynolds MD  12/01/20 9407 Outpt f/u per pt preference; can give outpt [facility] psych dispo option as well: [phone]

## 2022-05-03 NOTE — PROGRESS NOTE ADULT - SUBJECTIVE AND OBJECTIVE BOX
Christiano Hoyt MD  Interventional Cardiology / Endovascular Specialist  [place] Office : [phone] [place] [address]. [address]  Tel:   [place] Office : [phone] [facility] [place] [address]  Tel: [phone]      Subjective/Overnight events: Patient lying in bed comfortably. No acute distress.   	  MEDICATIONS:  heparin   Injectable 5000 Unit(s) SubCutaneous every 12 hours    ciprofloxacin     Tablet 500 milliGRAM(s) Oral every 12 hours  metroNIDAZOLE  IVPB 500 milliGRAM(s) IV Intermittent every 8 hours      acetaminophen    Suspension .. 650 milliGRAM(s) Oral every 6 hours PRN  clonazePAM  Tablet 0.5 milliGRAM(s) Oral two times a day  cyclobenzaprine 5 milliGRAM(s) Oral every 8 hours PRN  gabapentin Solution 300 milliGRAM(s) Oral every 8 hours  hydrOXYzine hydrochloride 50 milliGRAM(s) Oral every 6 hours PRN  ketorolac   Injectable 15 milliGRAM(s) IV Push every 6 hours  PARoxetine 20 milliGRAM(s) Oral daily  valproate sodium IVPB 250 milliGRAM(s) IV Intermittent three times a day    pantoprazole   Suspension 40 milliGRAM(s) Oral daily  polyethylene glycol 3350 17 Gram(s) Oral daily  senna 2 Tablet(s) Oral at bedtime  simethicone 80 milliGRAM(s) Chew three times a day PRN    dextrose 50% Injectable 25 Gram(s) IV Push once  dextrose 50% Injectable 12.5 Gram(s) IV Push once  dextrose 50% Injectable 25 Gram(s) IV Push once  dextrose Oral Gel 15 Gram(s) Oral once  glucagon  Injectable 1 milliGRAM(s) IntraMuscular once    lidocaine   4% Patch 1 Patch Transdermal daily  lidocaine   4% Patch 1 Patch Transdermal daily      PAST MEDICAL/SURGICAL HISTORY  PAST MEDICAL & SURGICAL HISTORY:  Heroin abuse    Cocaine abuse    GERD (gastroesophageal reflux disease)    Hiatal hernia    History of esophageal stricture    Intravenous drug abuse    Migration of pancreatic stent    Migrated esophageal stent        SOCIAL HISTORY: Substance Use (street drugs): ( x ) never used  (  ) other:    FAMILY HISTORY:  FH: thyroid disease (Grandparent)            PHYSICAL EXAM:  T(C): 36.5 (05-03-22 @ 12:04), Max: 37.2 (05-02-22 @ 23:35)  HR: 60 (05-03-22 @ 12:04) (46 - 87)  BP: 102/52 (05-03-22 @ 12:04) (102/52 - 117/63)  RR: 18 (05-03-22 @ 12:04) (16 - 18)  SpO2: 98% (05-03-22 @ 12:04) (97% - 99%)  Wt(kg): --  I&O's Summary    02 May 2022 07:01  -  03 May 2022 07:00  --------------------------------------------------------  IN: 200 mL / OUT: 0 mL / NET: 200 mL        GENERAL: NAD   EYES: EOMI, PERRLA, conjunctiva and sclera clear  ENMT: No tonsillar erythema, exudates, or enlargement; Moist mucous membranes, Good dentition, No lesions  Cardiovascular: Normal S1 S2, No JVD, No murmurs, No edema  Respiratory: Lungs clear to auscultation	  Gastrointestinal:  s/p surgery and J tube  Extremities: NO EDEMA                                  11.1   4.34  )-----------( 207      ( 03 May 2022 06:21 )             33.7     05-03    138  |  102  |  19  ----------------------------<  86  4.3   |  26  |  0.80    Ca    9.2      03 May 2022 06:21  Phos  4.0     05-02  Mg     2.00     05-02    TPro  6.1  /  Alb  3.7  /  TBili  0.5  /  DBili  x   /  AST  70<H>  /  ALT  160<H>  /  AlkPhos  154<H>  05-03    proBNP:   Lipid Profile:   HgA1c:   TSH:     Consultant(s) Notes Reviewed:  [x ] YES  [ ] NO    Care Discussed with Consultants/Other Providers [ x] YES  [ ] NO    Imaging Personally Reviewed independently:  [x] YES  [ ] NO    All labs, radiologic studies, vitals, orders and medications list reviewed. Patient is seen and examined at bedside. Case discussed with medical team.

## 2022-05-03 NOTE — PROGRESS NOTE ADULT - SUBJECTIVE AND OBJECTIVE BOX
Subjective: "I'm feeling better. Less gas, less pain" Pt states more formed stool overnight. Did not want to start TF last night, waiting until tonight. Doing well on soft food. Labs improved. OOb ad zhane. Discussion had w pt and myself and Dr. Shannon about plans/reccs from Psych team. Explained to pt at this point post op no longer medical/surgical need for narcotics. Should be able to manage pain with non narcotics as planned by psych. Pt expressed concerned, hesitancy. Psych contacted, will see pt this am.     Vital Signs:  Vital Signs Last 24 Hrs  T(C): 37.2 (05-03-22 @ 07:35), Max: 37.2 (05-02-22 @ 23:35)  T(F): 98.9 (05-03-22 @ 07:35), Max: 98.9 (05-02-22 @ 23:35)  HR: 72 (05-03-22 @ 07:35) (46 - 87)  BP: 113/52 (05-03-22 @ 07:35) (111/58 - 123/66)  RR: 18 (05-03-22 @ 07:35) (16 - 18)  SpO2: 98% (05-03-22 @ 07:35) (97% - 100%) on (O2)    Telemetry/Alarms:  General: WN/WD NAD  Neurology: Awake, nonfocal, FERRARO x 4  Eyes: Scleras clear, PERRLA/ EOMI, Gross vision intact  ENT:Gross hearing intact, grossly patent pharynx, no stridor  Neck: Neck supple, trachea midline, No JVD,   Respiratory: CTA B/L, No wheezing, rales, rhonchi  CV: RRR, S1S2, no murmurs, rubs or gallops  Abdominal: Soft, NT, ND +BS, abd soft. Minimal left very lower left LQ pain. J tube in place w flushes  Extremities: No edema, + peripheral pulses  Skin: No Rashes, Hematoma, Ecchymosis  Lymphatic: No Neck, axilla, groin LAD  Psych: Oriented x 3, normal affect  Incisions: Rt chest and abd incisions c/d/i.   Tubes:  Relevant labs, radiology and Medications reviewed                        11.1   4.34  )-----------( 207      ( 03 May 2022 06:21 )             33.7     05-03    138  |  102  |  19  ----------------------------<  86  4.3   |  26  |  0.80    Ca    9.2      03 May 2022 06:21  Phos  4.0     05-02  Mg     2.00     05-02    TPro  6.1  /  Alb  3.7  /  TBili  0.5  /  DBili  x   /  AST  70<H>  /  ALT  160<H>  /  AlkPhos  154<H>  05-03    PT/INR - ( 02 May 2022 07:34 )   PT: 12.2 sec;   INR: 1.05 ratio           MEDICATIONS  (STANDING):  ciprofloxacin     Tablet 500 milliGRAM(s) Oral every 12 hours  clonazePAM  Tablet 0.5 milliGRAM(s) Oral two times a day  dextrose 50% Injectable 25 Gram(s) IV Push once  dextrose 50% Injectable 25 Gram(s) IV Push once  dextrose 50% Injectable 12.5 Gram(s) IV Push once  dextrose Oral Gel 15 Gram(s) Oral once  gabapentin Solution 300 milliGRAM(s) Oral every 8 hours  glucagon  Injectable 1 milliGRAM(s) IntraMuscular once  heparin   Injectable 5000 Unit(s) SubCutaneous every 12 hours  ketorolac   Injectable 15 milliGRAM(s) IV Push every 6 hours  lidocaine   4% Patch 1 Patch Transdermal daily  lidocaine   4% Patch 1 Patch Transdermal daily  metroNIDAZOLE  IVPB 500 milliGRAM(s) IV Intermittent every 8 hours  pantoprazole   Suspension 40 milliGRAM(s) Oral daily  PARoxetine 20 milliGRAM(s) Oral daily  polyethylene glycol 3350 17 Gram(s) Oral daily  senna 2 Tablet(s) Oral at bedtime  valproate sodium IVPB 250 milliGRAM(s) IV Intermittent three times a day    MEDICATIONS  (PRN):  acetaminophen    Suspension .. 650 milliGRAM(s) Oral every 6 hours PRN Temp greater or equal to 38C (100.4F), Mild Pain (1 - 3)  cyclobenzaprine 5 milliGRAM(s) Oral every 8 hours PRN Muscle Spasm  hydrOXYzine hydrochloride 50 milliGRAM(s) Oral every 6 hours PRN Anxiety  simethicone 80 milliGRAM(s) Chew three times a day PRN Gas    Pertinent Physical Exam  I&O's Summary    02 May 2022 07:01  -  03 May 2022 07:00  --------------------------------------------------------  IN: 200 mL / OUT: 0 mL / NET: 200 mL      Marital Status:  (   )    (   ) Single    (   )    (  )   Lives with: (  ) alone  (  ) children   (  ) spouse   (  ) parents  (  ) other  Recent Travel: No recent travel  Occupation:    Substance Use (street drugs): ( x ) never used  (  ) other:  Tobacco Usage:  ( x  ) never smoked   (   ) former smoker   (   ) current smoker  (     ) pack year  Alcohol Usage: None     Assessment  26y Male  w/ PAST MEDICAL & SURGICAL HISTORY:  Heroin abuse    on 4/15/22 he underwent a FB, EGD, Robotic Vestal-Mahesh Esophagectomy, and J-tube placement with thoracic surgery, Dr Shannon  pt readmitted on medical service w c/o diarrhea and bloating  Now on Thoracic surgery service. On Cipro and Flagyl for Enteritis. Cdiff sent. Symptoms improved. Will change TF to Jevity tonight.   Followed closely by Pscych/pain management.     PLAN  Neuro: Pain management. Will dc Narcs and start suboxone per Psych. Cont other meds. Close FU.   Pulm: Encourage coughing, deep breathing and use of incentive spirometry.  Daily CXR.   Cardio: Monitor telemetry/alarms  GI: Tolerating diet. Continue stool softeners. Cont soft diet. Jevity TF tonight  Renal: monitor urine output, supplement electrolytes as needed  Vasc: Heparin SC/SCDs for DVT prophylaxis  Heme: Stable H/H. .   ID: On cipro/flagyl. FU Cdiff  Therapy: OOB/ambulate  Discussed with Cardiothoracic Team at AM rounds.

## 2022-05-03 NOTE — BH CONSULTATION LIAISON PROGRESS NOTE - MSE UNSTRUCTURED FT
Patient is engaged and cooperative during session, normal speech. Mood is "ok" affect is euthymic. TP is linear, goal directed. Denies SI/HI along with AH/VH and paranoia. Judgement and insight are fair.

## 2022-05-03 NOTE — PROGRESS NOTE ADULT - ASSESSMENT
27 y/o M hx of esophageal stricture s/p multiple dilations and esophagectomy, prior IVDU not on suboxone anymore who presents due to diarrhea 7-8 times loose stools      EKG: NSR No acute changes    1. Diarrhea   - CT shows enteritis, on abx f/u stool studies f/u GI    2. Mild LV dysfunction   - asymptomatic    3. Dysphagia  -hx of esophageal stricture s/p multiple dilations  -s/p esophagectomy and j-tube placement previous admission    3. DVT PPX  - heparin

## 2022-05-03 NOTE — BH CONSULTATION LIAISON PROGRESS NOTE - CASE SUMMARY
Chart reviewed. Pt seen with traineeJory Milton on Klonopin this AM, but persistent about his refusal of Suboxone. best attempt at motivational interviewing offered as above but to no avail. May respond to non-opioid adjuncts as listed above. Also warrants outpt psych care but on a voluntary basis. Will continue to follow while here, though no indication for 1:1 or inpt psych care, and no psych contraindication to discharge when medically cleared.  I have personally seen and examined this patient.  I have fully participated in the care of this patient. I have reviewed all pertinent clinical information, including history, physical exam, plan and the Resident’s note and agree except as noted.

## 2022-05-03 NOTE — BH CONSULTATION LIAISON PROGRESS NOTE - CURRENT MEDICATION
MEDICATIONS  (STANDING):  ciprofloxacin     Tablet 500 milliGRAM(s) Oral every 12 hours  clonazePAM  Tablet 0.5 milliGRAM(s) Oral two times a day  dextrose 50% Injectable 25 Gram(s) IV Push once  dextrose 50% Injectable 12.5 Gram(s) IV Push once  dextrose 50% Injectable 25 Gram(s) IV Push once  dextrose Oral Gel 15 Gram(s) Oral once  gabapentin Solution 300 milliGRAM(s) Oral every 8 hours  glucagon  Injectable 1 milliGRAM(s) IntraMuscular once  heparin   Injectable 5000 Unit(s) SubCutaneous every 12 hours  ketorolac   Injectable 15 milliGRAM(s) IV Push every 6 hours  lidocaine   4% Patch 1 Patch Transdermal daily  lidocaine   4% Patch 1 Patch Transdermal daily  metroNIDAZOLE  IVPB 500 milliGRAM(s) IV Intermittent every 8 hours  pantoprazole   Suspension 40 milliGRAM(s) Oral daily  PARoxetine 20 milliGRAM(s) Oral daily  polyethylene glycol 3350 17 Gram(s) Oral daily  senna 2 Tablet(s) Oral at bedtime  simethicone 80 milliGRAM(s) Chew three times a day  valproate sodium IVPB 250 milliGRAM(s) IV Intermittent three times a day    MEDICATIONS  (PRN):  acetaminophen    Suspension .. 650 milliGRAM(s) Oral every 6 hours PRN Temp greater or equal to 38C (100.4F), Mild Pain (1 - 3)  cyclobenzaprine 5 milliGRAM(s) Oral every 8 hours PRN Muscle Spasm  hydrOXYzine hydrochloride 50 milliGRAM(s) Oral every 6 hours PRN Anxiety

## 2022-05-03 NOTE — BH CONSULTATION LIAISON PROGRESS NOTE - NSBHASSESSMENTFT_PSY_ALL_CORE
25 y/o M hx of esophageal stricture s/p multiple dilations and esophagectomy, prior IVDU not on suboxone anymore who presented due to diarrhea x 7-8 loose BMs. Psych consulted for agitaton/anxiety/addiction control. Pt known to me from previous admission (4/12) where he refused to take Suboxone; psych started Klonopin but weaned that, as well as Paxil. He was discharged but bounced back as above. Reviewed notes from pain mgmt and discussed case with thoracic surgery. Appears to have severely low pain threshold, poor psychobehavioral coping, and high anxiety. Is desperate but this may be an opportunity for use of Suboxone for both opioid use disorder and pain control if coordinated appropriately. Discussed the following with the primary team    Plan:   - Start Klonopin 0.5mg BID, first dose now and second dose tonight  - Start Depakote 250mg IV TID standing, first dose now  - C/W Paxil as ordered  - C/W Hydroxyzine for PRN anxiety as ordered.   - Careful monitoring of visitors/substances delivered to his room; Narcan PRN obtunded/sedated state 27 y/o M hx of esophageal stricture s/p multiple dilations and esophagectomy, prior IVDU not on suboxone anymore who presented due to diarrhea x 7-8 loose BMs. Psych consulted for agitaton/anxiety/addiction control.    Patient current contemplative about starting MAT for OUD at current. Patient would benefit from continued discussion of utility of MAT for OUD using MI frame.     Given that patient will not be getting further full agnoist opioids during current admission, pt will likely go into opioid withdrawal in the next day or so. Given that primary team not using opioids for pain anymore, will have to manage opioid withdrawal via non-opioid methods.     As such, we recommend the following:      Plan:    #PRN agents for opioid withdrawal (if patient having hard time tolerating opioid withdrawal period prior to achieving COWS score of 10)  - For general withdrawal discomfort: Start standing clonodine by 0.1mg, TID (hold for systolic BP< 100 mmHg)  - For diarrhea: Dose loperamide 4mg x1 (if more than 1 dose required, make next doses loperamide 2mg (Max daily dose =16mg)  - For muscle pain: Dose Advil (dose and frequency at discretion of primary team)  - For insomnia: Dose trazodone 100mg, qhs     #Mood disorder (depression/impulsivity/mood lability)  - c/w Klonopin 0.5mg BID  - c/w Depakote 250mg IVPB, TID   - C/W Paxil as ordered  - C/W Hydroxyzine for PRN anxiety as ordered.   - Careful monitoring of visitors/substances delivered to his room; Narcan PRN obtunded/sedated state 25 y/o M hx of esophageal stricture s/p multiple dilations and esophagectomy, prior IVDU not on suboxone anymore who presented due to diarrhea x 7-8 loose BMs. Psych consulted for agitaton/anxiety/addiction control. Patient current contemplative about starting MAT for OUD at current. Patient would benefit from continued discussion of utility of MAT for OUD using MI frame. Given that patient will not be getting further full agnoist opioids during current admission, pt will likely go into opioid withdrawal in the next day or so. Given that primary team not using opioids for pain anymore, will have to manage opioid withdrawal via non-opioid methods. As such, we recommend the following:    Plan:    #PRN agents for opioid withdrawal (if patient having hard time tolerating opioid withdrawal period prior to achieving COWS score of 10)  - For general withdrawal discomfort: Start standing clonodine by 0.1mg, TID (hold for systolic BP< 100 mmHg)  - For diarrhea: Dose loperamide 4mg x1 (if more than 1 dose required, make next doses loperamide 2mg (Max daily dose =16mg)  - For muscle pain: Dose Advil (dose and frequency at discretion of primary team)  - For insomnia: Dose trazodone 100mg, qhs     #Mood disorder (depression/impulsivity/mood lability)  - c/w Klonopin 0.5mg BID  - c/w Depakote 250mg IVPB, TID   - C/W Paxil as ordered  - C/W Hydroxyzine for PRN anxiety as ordered.   - Careful monitoring of visitors/substances delivered to his room; Narcan PRN obtunded/sedated state

## 2022-05-03 NOTE — PROGRESS NOTE ADULT - NSPROGADDITIONALINFOA_GEN_ALL_CORE
Patient also with h/o of IV drug use/opioids. Addiction medicine/psychiatry and pain medicine consulted, recs noted.

## 2022-05-03 NOTE — BH CONSULTATION LIAISON PROGRESS NOTE - NSBHFUPINTERVALHXFT_PSY_A_CORE
25 y/o M hx of esophageal stricture s/p multiple dilations and esophagectomy, prior IVDU not on suboxone anymore who presented due to diarrhea x 7-8 loose BMs. Psych consulted for agitaton/anxiety/addiction control. 27 y/o M hx of esophageal stricture s/p multiple dilations and esophagectomy, prior IVDU not on suboxone anymore who presented due to diarrhea x 7-8 loose BMs. Psych consulted for agitaton/anxiety/addiction control.    Spoke with patient at length today about starting suboxone as MAT for OUD. Patient is contemplative, but ended up deciding not to start suboxone at this time citing "I'm not ready."    I believe that iterative discussions with patinet about suboxone as either pain treatment or MAT for OUD would be of great benefit to the patinet in terms of incresing motivation to start suboxone. Team will continue to speak with patient about desire to start suboxone as MAT for OUD, along with its role in pain management.     Given that patient refusing to start MAT for OUD at this time (including methadone, suboxone and vivitrol), patinet will likely go into opioid withdrawal in the next day or so given that patient had been chronically taking opioids recently between recent admission and inter-admission period.     Given that primary team setting boundary to not use further opioids during current admission, based on assessment that patient current clinical condition doesn't necessitate use of opioids for pain control, patient will have to be detoxed from opioids using non-opioid methods.     See assessment and plan for specific recommendations.

## 2022-05-03 NOTE — PROGRESS NOTE ADULT - ASSESSMENT
Pt is a 25 y/o M hx of esophageal stricture s/p multiple dilations and esophagectomy, prior IVDU not on suboxone anymore who presents due to diarrhea 7-8 times loose stools since 11 am on the day of admission.

## 2022-05-03 NOTE — PROGRESS NOTE ADULT - SUBJECTIVE AND OBJECTIVE BOX
Kadeem Keita MD  Academic Hospitalist  Pager [phone]  Email: [email or web address]          PROGRESS NOTE:     Patient is a 26y old  Male who presents with a chief complaint of Diarrhea, abd pain (03 May 2022 10:11)      SUBJECTIVE / OVERNIGHT EVENTS:  Patient seen and examined this morning. Still pending C.diff results.  ADDITIONAL REVIEW OF SYSTEMS:  No f/c     MEDICATIONS  (STANDING):  ciprofloxacin     Tablet 500 milliGRAM(s) Oral every 12 hours  clonazePAM  Tablet 0.5 milliGRAM(s) Oral two times a day  dextrose 50% Injectable 25 Gram(s) IV Push once  dextrose 50% Injectable 12.5 Gram(s) IV Push once  dextrose 50% Injectable 25 Gram(s) IV Push once  dextrose Oral Gel 15 Gram(s) Oral once  gabapentin Solution 300 milliGRAM(s) Oral every 8 hours  glucagon  Injectable 1 milliGRAM(s) IntraMuscular once  heparin   Injectable 5000 Unit(s) SubCutaneous every 12 hours  ketorolac   Injectable 15 milliGRAM(s) IV Push every 6 hours  lidocaine   4% Patch 1 Patch Transdermal daily  lidocaine   4% Patch 1 Patch Transdermal daily  metroNIDAZOLE  IVPB 500 milliGRAM(s) IV Intermittent every 8 hours  pantoprazole   Suspension 40 milliGRAM(s) Oral daily  PARoxetine 20 milliGRAM(s) Oral daily  polyethylene glycol 3350 17 Gram(s) Oral daily  senna 2 Tablet(s) Oral at bedtime  valproate sodium IVPB 250 milliGRAM(s) IV Intermittent three times a day    MEDICATIONS  (PRN):  acetaminophen    Suspension .. 650 milliGRAM(s) Oral every 6 hours PRN Temp greater or equal to 38C (100.4F), Mild Pain (1 - 3)  cyclobenzaprine 5 milliGRAM(s) Oral every 8 hours PRN Muscle Spasm  hydrOXYzine hydrochloride 50 milliGRAM(s) Oral every 6 hours PRN Anxiety  simethicone 80 milliGRAM(s) Chew three times a day PRN Gas      CAPILLARY BLOOD GLUCOSE      POCT Blood Glucose.: 106 mg/dL (03 May 2022 12:19)    I&O's Summary    02 May 2022 07:01  -  03 May 2022 07:00  --------------------------------------------------------  IN: 200 mL / OUT: 0 mL / NET: 200 mL        PHYSICAL EXAM:  Vital Signs Last 24 Hrs  T(C): 36.5 (03 May 2022 12:04), Max: 37.2 (02 May 2022 23:35)  T(F): 97.7 (03 May 2022 12:04), Max: 98.9 (02 May 2022 23:35)  HR: 60 (03 May 2022 12:04) (46 - 87)  BP: 102/52 (03 May 2022 12:04) (102/52 - 117/63)  BP(mean): --  RR: 18 (03 May 2022 12:04) (16 - 18)  SpO2: 98% (03 May 2022 12:04) (97% - 99%)     PHYSICAL EXAM:  GENERAL: NAD, well-developed  HEAD:  Atraumatic, Normocephalic  EYES: EOMI, PERRLA, conjunctiva and sclera clear  NECK: Supple, No JVD  CHEST/LUNG: Clear to auscultation bilaterally; No wheeze  HEART: Regular rate and rhythm; No murmurs, rubs, or gallops  ABDOMEN: Soft, tender throughout to palpation, nondistended, J tube in place no erythema, or warmth noted  EXTREMITIES:  2+ Peripheral Pulses, No clubbing, cyanosis, or edema  PSYCH: AAOx3  NEUROLOGY: non-focal  SKIN: No rashes or lesions    LABS:                        11.1   4.34  )-----------( 207      ( 03 May 2022 06:21 )             33.7     05-03    138  |  102  |  19  ----------------------------<  86  4.3   |  26  |  0.80    Ca    9.2      03 May 2022 06:21  Phos  4.0     05-02  Mg     2.00     05-02    TPro  6.1  /  Alb  3.7  /  TBili  0.5  /  DBili  x   /  AST  70<H>  /  ALT  160<H>  /  AlkPhos  154<H>  05-03    PT/INR - ( 02 May 2022 07:34 )   PT: 12.2 sec;   INR: 1.05 ratio                     RADIOLOGY & ADDITIONAL TESTS:  Results Reviewed:   Imaging Personally Reviewed:  Electrocardiogram Personally Reviewed:    COORDINATION OF CARE:  Care Discussed with Consultants/Other Providers [Y/N]:  Prior or Outpatient Records Reviewed [Y/N]:

## 2022-05-04 LAB
ALBUMIN SERPL ELPH-MCNC: 3.8 G/DL — SIGNIFICANT CHANGE UP (ref 3.3–5)
ALP SERPL-CCNC: 159 U/L — HIGH (ref 40–120)
ALT FLD-CCNC: 132 U/L — HIGH (ref 4–41)
ANION GAP SERPL CALC-SCNC: 10 MMOL/L — SIGNIFICANT CHANGE UP (ref 7–14)
AST SERPL-CCNC: 46 U/L — HIGH (ref 4–40)
BILIRUB SERPL-MCNC: 0.5 MG/DL — SIGNIFICANT CHANGE UP (ref 0.2–1.2)
BUN SERPL-MCNC: 24 MG/DL — HIGH (ref 7–23)
C DIFF BY PCR RESULT: SIGNIFICANT CHANGE UP
C DIFF TOX GENS STL QL NAA+PROBE: SIGNIFICANT CHANGE UP
CALCIUM SERPL-MCNC: 9.2 MG/DL — SIGNIFICANT CHANGE UP (ref 8.4–10.5)
CHLORIDE SERPL-SCNC: 102 MMOL/L — SIGNIFICANT CHANGE UP (ref 98–107)
CO2 SERPL-SCNC: 26 MMOL/L — SIGNIFICANT CHANGE UP (ref 22–31)
CREAT SERPL-MCNC: 0.93 MG/DL — SIGNIFICANT CHANGE UP (ref 0.5–1.3)
EGFR: 116 ML/MIN/1.73M2 — SIGNIFICANT CHANGE UP
GLUCOSE BLDC GLUCOMTR-MCNC: 116 MG/DL — HIGH (ref 70–99)
GLUCOSE BLDC GLUCOMTR-MCNC: 143 MG/DL — HIGH (ref 70–99)
GLUCOSE BLDC GLUCOMTR-MCNC: 94 MG/DL — SIGNIFICANT CHANGE UP (ref 70–99)
GLUCOSE BLDC GLUCOMTR-MCNC: 94 MG/DL — SIGNIFICANT CHANGE UP (ref 70–99)
GLUCOSE SERPL-MCNC: 96 MG/DL — SIGNIFICANT CHANGE UP (ref 70–99)
HCT VFR BLD CALC: 36.3 % — LOW (ref 39–50)
HGB BLD-MCNC: 12.1 G/DL — LOW (ref 13–17)
MCHC RBC-ENTMCNC: 29.2 PG — SIGNIFICANT CHANGE UP (ref 27–34)
MCHC RBC-ENTMCNC: 33.3 GM/DL — SIGNIFICANT CHANGE UP (ref 32–36)
MCV RBC AUTO: 87.7 FL — SIGNIFICANT CHANGE UP (ref 80–100)
NRBC # BLD: 0 /100 WBCS — SIGNIFICANT CHANGE UP
NRBC # FLD: 0 K/UL — SIGNIFICANT CHANGE UP
PLATELET # BLD AUTO: 221 K/UL — SIGNIFICANT CHANGE UP (ref 150–400)
POTASSIUM SERPL-MCNC: 4.4 MMOL/L — SIGNIFICANT CHANGE UP (ref 3.5–5.3)
POTASSIUM SERPL-SCNC: 4.4 MMOL/L — SIGNIFICANT CHANGE UP (ref 3.5–5.3)
PROT SERPL-MCNC: 6.2 G/DL — SIGNIFICANT CHANGE UP (ref 6–8.3)
RBC # BLD: 4.14 M/UL — LOW (ref 4.2–5.8)
RBC # FLD: 16.2 % — HIGH (ref 10.3–14.5)
SODIUM SERPL-SCNC: 138 MMOL/L — SIGNIFICANT CHANGE UP (ref 135–145)
WBC # BLD: 7.1 K/UL — SIGNIFICANT CHANGE UP (ref 3.8–10.5)
WBC # FLD AUTO: 7.1 K/UL — SIGNIFICANT CHANGE UP (ref 3.8–10.5)

## 2022-05-04 PROCEDURE — 99231 SBSQ HOSP IP/OBS SF/LOW 25: CPT | Mod: GC

## 2022-05-04 PROCEDURE — 71045 X-RAY EXAM CHEST 1 VIEW: CPT | Mod: 26

## 2022-05-04 PROCEDURE — 99232 SBSQ HOSP IP/OBS MODERATE 35: CPT

## 2022-05-04 RX ORDER — BUPRENORPHINE AND NALOXONE 2; .5 MG/1; MG/1
2 TABLET SUBLINGUAL DAILY
Refills: 0 | Status: DISCONTINUED | OUTPATIENT
Start: 2022-05-04 | End: 2022-05-05

## 2022-05-04 RX ORDER — BUPRENORPHINE AND NALOXONE 2; .5 MG/1; MG/1
2 TABLET SUBLINGUAL ONCE
Refills: 0 | Status: DISCONTINUED | OUTPATIENT
Start: 2022-05-04 | End: 2022-05-04

## 2022-05-04 RX ADMIN — Medication 20 MILLIGRAM(S): at 12:00

## 2022-05-04 RX ADMIN — Medication 650 MILLIGRAM(S): at 17:27

## 2022-05-04 RX ADMIN — Medication 100 MILLIGRAM(S): at 15:42

## 2022-05-04 RX ADMIN — SENNA PLUS 2 TABLET(S): 8.6 TABLET ORAL at 21:37

## 2022-05-04 RX ADMIN — Medication 500 MILLIGRAM(S): at 06:59

## 2022-05-04 RX ADMIN — GABAPENTIN 400 MILLIGRAM(S): 400 CAPSULE ORAL at 07:00

## 2022-05-04 RX ADMIN — Medication 15 MILLIGRAM(S): at 12:01

## 2022-05-04 RX ADMIN — Medication 100 MILLIGRAM(S): at 06:58

## 2022-05-04 RX ADMIN — Medication 15 MILLIGRAM(S): at 06:56

## 2022-05-04 RX ADMIN — SIMETHICONE 80 MILLIGRAM(S): 80 TABLET, CHEWABLE ORAL at 14:09

## 2022-05-04 RX ADMIN — CYCLOBENZAPRINE HYDROCHLORIDE 5 MILLIGRAM(S): 10 TABLET, FILM COATED ORAL at 08:29

## 2022-05-04 RX ADMIN — BUPRENORPHINE AND NALOXONE 2 TABLET(S): 2; .5 TABLET SUBLINGUAL at 15:53

## 2022-05-04 RX ADMIN — Medication 52.5 MILLIGRAM(S): at 14:08

## 2022-05-04 RX ADMIN — Medication 0.5 MILLIGRAM(S): at 06:57

## 2022-05-04 RX ADMIN — Medication 500 MILLIGRAM(S): at 17:24

## 2022-05-04 RX ADMIN — GABAPENTIN 400 MILLIGRAM(S): 400 CAPSULE ORAL at 14:09

## 2022-05-04 RX ADMIN — SIMETHICONE 80 MILLIGRAM(S): 80 TABLET, CHEWABLE ORAL at 07:00

## 2022-05-04 RX ADMIN — Medication 100 MILLIGRAM(S): at 21:33

## 2022-05-04 RX ADMIN — BUPRENORPHINE AND NALOXONE 2 TABLET(S): 2; .5 TABLET SUBLINGUAL at 22:35

## 2022-05-04 RX ADMIN — Medication 0.5 MILLIGRAM(S): at 17:24

## 2022-05-04 RX ADMIN — Medication 15 MILLIGRAM(S): at 13:00

## 2022-05-04 RX ADMIN — Medication 15 MILLIGRAM(S): at 08:20

## 2022-05-04 RX ADMIN — Medication 52.5 MILLIGRAM(S): at 21:42

## 2022-05-04 RX ADMIN — PANTOPRAZOLE SODIUM 40 MILLIGRAM(S): 20 TABLET, DELAYED RELEASE ORAL at 12:00

## 2022-05-04 RX ADMIN — GABAPENTIN 400 MILLIGRAM(S): 400 CAPSULE ORAL at 21:35

## 2022-05-04 NOTE — DIETITIAN INITIAL EVALUATION ADULT - OTHER INFO
26 year old male with a PMH of esophageal stricture s/p multiple dilations and esophagectomy, prior IVDU not on suboxone anymore who presents due to diarrhea 7-8 times loose stools since 11 am on the day of admission per chart.    Patient is currently on a soft and bite-sized diet as well as Jevity 1.2 feeds via JT @ 63 mL/hr. x 12 hrs. for total volume 756 mL. Provides 907 kcal, 42 g pro and 610 mL of fluid (TF free water). Reports good appetite. however noted consuming 0-75% of meals per RN flow sheet.. Patient reporting continued loose stools, on antibiotics per chart. No N/V or chewing/swallowing difficulties reported. Has no food allergies. Denies any further weight changes since d/c. Per previous RD note (4/12) noted with weight 70.8 kg. ABW is 71.4 kg (5/1) per chart indicating stable weight. No edema or pressure injuries noted per RN flow sheet.    Discussed with patient ways to aid with diarrhea. Patient was amenable to try Two Jeffrey HN to reduce tube feed rate with banatrol. Patient requesting oral supplements and amenable to ensure max (150 kcal, 30 g pro).   26 year old male with a PMH of esophageal stricture s/p multiple dilations and esophagectomy, prior IVDU not on suboxone anymore who presents due to diarrhea 7-8 times loose stools since 11 am on the day of admission per chart.    Patient is currently on a soft and bite-sized diet as well as Jevity 1.2 feeds via JT @ 63 mL/hr. x 12 hrs. for total volume 756 mL. Provides 907 kcal, 42 g pro and 610 mL of fluid (TF free water). Per discussion with PA, plan to taper off tube feeds this admission and switch to full PO. Patient reports good appetite, however noted consuming 0-75% of meals per RN flow sheet. Patient reporting continued loose stools, on antibiotics per chart. No N/V or chewing/swallowing difficulties reported. Has no food allergies. Denies any further weight changes since d/c. Per previous RD note (4/12) noted with weight 70.8 kg. ABW is 71.4 kg (5/1) per chart indicating stable weight. No edema or pressure injuries noted per RN flow sheet.    Discussed with patient ways to aid with diarrhea. Patient was amenable to try Two Jeffrey HN to reduce tube feed rate with banatrol. Patient requesting oral supplements and amenable to ensure max (150 kcal, 30 g pro).

## 2022-05-04 NOTE — PROGRESS NOTE ADULT - SUBJECTIVE AND OBJECTIVE BOX
Kadeem Keita MD  Academic Hospitalist  Pager [phone]  Email: [email or web address]          PROGRESS NOTE:     Patient is a 26y old  Male who presents with a chief complaint of Noninfectious gastroenteritis     (04 May 2022 12:13)      SUBJECTIVE / OVERNIGHT EVENTS:  Patient seen and examined this morning. States that he feels palpitations and continues to have pain.   ADDITIONAL REVIEW OF SYSTEMS:  No reports of f/c/n/v    MEDICATIONS  (STANDING):  ciprofloxacin     Tablet 500 milliGRAM(s) Oral every 12 hours  clonazePAM  Tablet 0.5 milliGRAM(s) Oral two times a day  dextrose 50% Injectable 25 Gram(s) IV Push once  dextrose 50% Injectable 12.5 Gram(s) IV Push once  dextrose 50% Injectable 25 Gram(s) IV Push once  dextrose Oral Gel 15 Gram(s) Oral once  gabapentin Solution 400 milliGRAM(s) Oral three times a day  glucagon  Injectable 1 milliGRAM(s) IntraMuscular once  heparin   Injectable 5000 Unit(s) SubCutaneous every 12 hours  lidocaine   4% Patch 1 Patch Transdermal daily  lidocaine   4% Patch 1 Patch Transdermal daily  metroNIDAZOLE  IVPB 500 milliGRAM(s) IV Intermittent every 8 hours  pantoprazole   Suspension 40 milliGRAM(s) Oral daily  PARoxetine 20 milliGRAM(s) Oral daily  polyethylene glycol 3350 17 Gram(s) Oral daily  senna 2 Tablet(s) Oral at bedtime  simethicone 80 milliGRAM(s) Chew three times a day  valproate sodium IVPB 250 milliGRAM(s) IV Intermittent three times a day    MEDICATIONS  (PRN):  acetaminophen    Suspension .. 650 milliGRAM(s) Oral every 6 hours PRN Temp greater or equal to 38C (100.4F), Mild Pain (1 - 3)  cyclobenzaprine 5 milliGRAM(s) Oral every 8 hours PRN Muscle Spasm  hydrOXYzine hydrochloride 50 milliGRAM(s) Oral every 6 hours PRN Anxiety      CAPILLARY BLOOD GLUCOSE      POCT Blood Glucose.: 94 mg/dL (04 May 2022 12:57)  POCT Blood Glucose.: 94 mg/dL (04 May 2022 06:40)  POCT Blood Glucose.: 116 mg/dL (04 May 2022 00:55)  POCT Blood Glucose.: 143 mg/dL (03 May 2022 18:07)    I&O's Summary    03 May 2022 07:01  -  04 May 2022 07:00  --------------------------------------------------------  IN: 767 mL / OUT: 701 mL / NET: 66 mL        PHYSICAL EXAM:  Vital Signs Last 24 Hrs  T(C): 36.9 (04 May 2022 12:04), Max: 36.9 (04 May 2022 06:53)  T(F): 98.4 (04 May 2022 12:04), Max: 98.4 (04 May 2022 06:53)  HR: 78 (04 May 2022 12:04) (62 - 83)  BP: 102/47 (04 May 2022 12:04) (102/47 - 123/59)  BP(mean): --  RR: 18 (04 May 2022 12:04) (16 - 18)  SpO2: 97% (04 May 2022 12:04) (96% - 100%)       PHYSICAL EXAM:  GENERAL: NAD, well-developed  HEAD:  Atraumatic, Normocephalic  EYES: EOMI, PERRLA, conjunctiva and sclera clear  NECK: Supple, No JVD  CHEST/LUNG: Clear to auscultation bilaterally; No wheeze  HEART: Regular rate and rhythm; No murmurs, rubs, or gallops  ABDOMEN: Soft, tender throughout to palpation, nondistended, J tube in place no erythema, or warmth noted  EXTREMITIES:  2+ Peripheral Pulses, No clubbing, cyanosis, or edema  PSYCH: AAOx3  NEUROLOGY: non-focal  SKIN: No rashes or lesions      LABS:                        12.1   7.10  )-----------( 221      ( 04 May 2022 07:03 )             36.3     05-04    138  |  102  |  24<H>  ----------------------------<  96  4.4   |  26  |  0.93    Ca    9.2      04 May 2022 07:03    TPro  6.2  /  Alb  3.8  /  TBili  0.5  /  DBili  x   /  AST  46<H>  /  ALT  132<H>  /  AlkPhos  159<H>  05-04                RADIOLOGY & ADDITIONAL TESTS:  Results Reviewed:   Imaging Personally Reviewed:  Electrocardiogram Personally Reviewed:    COORDINATION OF CARE:  Care Discussed with Consultants/Other Providers [Y/N]:  Prior or Outpatient Records Reviewed [Y/N]:

## 2022-05-04 NOTE — DIETITIAN INITIAL EVALUATION ADULT - PERTINENT LABORATORY DATA
05-04    138  |  102  |  24<H>  ----------------------------<  96  4.4   |  26  |  0.93    Ca    9.2      04 May 2022 07:03    TPro  6.2  /  Alb  3.8  /  TBili  0.5  /  DBili  x   /  AST  46<H>  /  ALT  132<H>  /  AlkPhos  159<H>  05-04  POCT Blood Glucose.: 94 mg/dL (05-04-22 @ 06:40)

## 2022-05-04 NOTE — PROGRESS NOTE ADULT - SUBJECTIVE AND OBJECTIVE BOX
Subjective: pt admits persistent abdominal pain and loose stool  he continues on flagyl and cipro, no elevated WBC, no fevers  pt evaluated by psych yesterday who adjusted medications- pt refusing depakote and suboxone as suggested by psych cs  requesting Dilaudid for pain   "this is a slap in the face" in regard to medication adjustment  pt ambulatory ad zhane  he does not feel symptoms are related to tube feeds    Vital Signs:  Vital Signs Last 24 Hrs  T(C): 36.8 (05-04-22 @ 08:33), Max: 36.9 (05-04-22 @ 06:53)  T(F): 98.3 (05-04-22 @ 08:33), Max: 98.4 (05-04-22 @ 06:53)  HR: 63 (05-04-22 @ 08:33) (60 - 83)  BP: 112/58 (05-04-22 @ 08:33) (102/52 - 123/59)  RR: 18 (05-04-22 @ 08:33) (16 - 18)  SpO2: 96% (05-04-22 @ 08:33) (96% - 100%) on (O2)     Telemetry/Alarms: sr  General: WN/WD NAD  Neurology: Awake, nonfocal, FERRARO x 4  Eyes: Scleras clear, PERRLA/ EOMI, Gross vision intact  ENT:Gross hearing intact, grossly patent pharynx, no stridor  Neck: Neck supple, trachea midline, No JVD,   Respiratory: CTA B/L, No wheezing, rales, rhonchi  CV: RRR, S1S2, no murmurs, rubs or gallops  Abdominal: Soft, NT, ND +BS,   Extremities: No edema, + peripheral pulses  Skin: No Rashes, Hematoma, Ecchymosis  Lymphatic: No Neck, axilla, groin LAD  Psych: Oriented x 3, normal affect  Incisions: c,d,i  Tubes: J tube intact and functioning  Relevant labs, radiology and Medications reviewed                        12.1   7.10  )-----------( 221      ( 04 May 2022 07:03 )             36.3     05-04    138  |  102  |  24<H>  ----------------------------<  96  4.4   |  26  |  0.93    Ca    9.2      04 May 2022 07:03    TPro  6.2  /  Alb  3.8  /  TBili  0.5  /  DBili  x   /  AST  46<H>  /  ALT  132<H>  /  AlkPhos  159<H>  05-04      MEDICATIONS  (STANDING):  ciprofloxacin     Tablet 500 milliGRAM(s) Oral every 12 hours  clonazePAM  Tablet 0.5 milliGRAM(s) Oral two times a day  dextrose 50% Injectable 25 Gram(s) IV Push once  dextrose 50% Injectable 12.5 Gram(s) IV Push once  dextrose 50% Injectable 25 Gram(s) IV Push once  dextrose Oral Gel 15 Gram(s) Oral once  gabapentin Solution 400 milliGRAM(s) Oral three times a day  glucagon  Injectable 1 milliGRAM(s) IntraMuscular once  heparin   Injectable 5000 Unit(s) SubCutaneous every 12 hours  ketorolac   Injectable 15 milliGRAM(s) IV Push every 6 hours  lidocaine   4% Patch 1 Patch Transdermal daily  lidocaine   4% Patch 1 Patch Transdermal daily  metroNIDAZOLE  IVPB 500 milliGRAM(s) IV Intermittent every 8 hours  pantoprazole   Suspension 40 milliGRAM(s) Oral daily  PARoxetine 20 milliGRAM(s) Oral daily  polyethylene glycol 3350 17 Gram(s) Oral daily  senna 2 Tablet(s) Oral at bedtime  simethicone 80 milliGRAM(s) Chew three times a day  valproate sodium IVPB 250 milliGRAM(s) IV Intermittent three times a day    MEDICATIONS  (PRN):  acetaminophen    Suspension .. 650 milliGRAM(s) Oral every 6 hours PRN Temp greater or equal to 38C (100.4F), Mild Pain (1 - 3)  cyclobenzaprine 5 milliGRAM(s) Oral every 8 hours PRN Muscle Spasm  hydrOXYzine hydrochloride 50 milliGRAM(s) Oral every 6 hours PRN Anxiety    Pertinent Physical Exam  I&O's Summary    03 May 2022 07:01  -  04 May 2022 07:00  --------------------------------------------------------  IN: 767 mL / OUT: 701 mL / NET: 66 mL    CXR reviewed    Assessment  26y Male  w/ PAST MEDICAL & SURGICAL HISTORY:  Heroin abuse    Cocaine abuse    GERD (gastroesophageal reflux disease)    Hiatal hernia    History of esophageal stricture    Intravenous drug abuse    Migration of pancreatic stent    Migrated esophageal stent      Assessment  26y Male  w/ PAST MEDICAL & SURGICAL HISTORY:  on 4/15/22 he underwent a FB, EGD, Robotic Tyler-Mahesh Esophagectomy, and J-tube placement with thoracic surgery, Dr Shannon  pt readmitted on medical service w c/o diarrhea and bloating  Now on Thoracic surgery service. On Cipro and Flagyl for Enteritis. Cdiff negative  TF Jevity resumed   Followed closely by Pscych/pain management.     PLAN  Neuro: Pain management and psych follow up  Pulm: Encourage coughing, deep breathing and use of incentive spirometry. Wean off supplemental oxygen as able. Daily CXR.   Cardio: Monitor telemetry/alarms  GI: Tolerating diet. Continue stool softeners.  Renal: monitor urine output, supplement electrolytes as needed  Vasc: Heparin SC/SCDs for DVT prophylaxis  Heme: Stable H/H. .   ID: Off antibiotics. Stable.  Therapy: OOB/ambulate  Disposition: Aim to D/C to home once pain and psych meds managed and pt GI symptoms improved  Discussed with Cardiothoracic Team at AM rounds.      Subjective: pt admits persistent abdominal pain and loose stool  he continues on flagyl and cipro, no elevated WBC, no fevers  pt evaluated by psych yesterday who adjusted medications- pt refusing depakote and suboxone as suggested by psych cs  requesting Dilaudid for pain   "this is a slap in the face" in regard to medication adjustment  pt ambulatory ad zhane  he does not feel symptoms are related to tube feeds; pt tolerated tube feeds last night  prealbumin 22    Vital Signs:  Vital Signs Last 24 Hrs  T(C): 36.8 (05-04-22 @ 08:33), Max: 36.9 (05-04-22 @ 06:53)  T(F): 98.3 (05-04-22 @ 08:33), Max: 98.4 (05-04-22 @ 06:53)  HR: 63 (05-04-22 @ 08:33) (60 - 83)  BP: 112/58 (05-04-22 @ 08:33) (102/52 - 123/59)  RR: 18 (05-04-22 @ 08:33) (16 - 18)  SpO2: 96% (05-04-22 @ 08:33) (96% - 100%) on (O2)     Telemetry/Alarms: sr  General: WN/WD NAD  Neurology: Awake, nonfocal, FERRARO x 4  Eyes: Scleras clear, PERRLA/ EOMI, Gross vision intact  ENT:Gross hearing intact, grossly patent pharynx, no stridor  Neck: Neck supple, trachea midline, No JVD,   Respiratory: CTA B/L, No wheezing, rales, rhonchi  CV: RRR, S1S2, no murmurs, rubs or gallops  Abdominal: Soft, NT, ND +BS,   Extremities: No edema, + peripheral pulses  Skin: No Rashes, Hematoma, Ecchymosis  Lymphatic: No Neck, axilla, groin LAD  Psych: Oriented x 3, normal affect  Incisions: c,d,i  Tubes: J tube intact and functioning  Relevant labs, radiology and Medications reviewed                        12.1   7.10  )-----------( 221      ( 04 May 2022 07:03 )             36.3     05-04    138  |  102  |  24<H>  ----------------------------<  96  4.4   |  26  |  0.93    Ca    9.2      04 May 2022 07:03    TPro  6.2  /  Alb  3.8  /  TBili  0.5  /  DBili  x   /  AST  46<H>  /  ALT  132<H>  /  AlkPhos  159<H>  05-04      MEDICATIONS  (STANDING):  ciprofloxacin     Tablet 500 milliGRAM(s) Oral every 12 hours  clonazePAM  Tablet 0.5 milliGRAM(s) Oral two times a day  dextrose 50% Injectable 25 Gram(s) IV Push once  dextrose 50% Injectable 12.5 Gram(s) IV Push once  dextrose 50% Injectable 25 Gram(s) IV Push once  dextrose Oral Gel 15 Gram(s) Oral once  gabapentin Solution 400 milliGRAM(s) Oral three times a day  glucagon  Injectable 1 milliGRAM(s) IntraMuscular once  heparin   Injectable 5000 Unit(s) SubCutaneous every 12 hours  ketorolac   Injectable 15 milliGRAM(s) IV Push every 6 hours  lidocaine   4% Patch 1 Patch Transdermal daily  lidocaine   4% Patch 1 Patch Transdermal daily  metroNIDAZOLE  IVPB 500 milliGRAM(s) IV Intermittent every 8 hours  pantoprazole   Suspension 40 milliGRAM(s) Oral daily  PARoxetine 20 milliGRAM(s) Oral daily  polyethylene glycol 3350 17 Gram(s) Oral daily  senna 2 Tablet(s) Oral at bedtime  simethicone 80 milliGRAM(s) Chew three times a day  valproate sodium IVPB 250 milliGRAM(s) IV Intermittent three times a day    MEDICATIONS  (PRN):  acetaminophen    Suspension .. 650 milliGRAM(s) Oral every 6 hours PRN Temp greater or equal to 38C (100.4F), Mild Pain (1 - 3)  cyclobenzaprine 5 milliGRAM(s) Oral every 8 hours PRN Muscle Spasm  hydrOXYzine hydrochloride 50 milliGRAM(s) Oral every 6 hours PRN Anxiety    Pertinent Physical Exam  I&O's Summary    03 May 2022 07:01  -  04 May 2022 07:00  --------------------------------------------------------  IN: 767 mL / OUT: 701 mL / NET: 66 mL    CXR reviewed    Assessment  26y Male  w/ PAST MEDICAL & SURGICAL HISTORY:  Heroin abuse    Cocaine abuse    GERD (gastroesophageal reflux disease)    Hiatal hernia    History of esophageal stricture    Intravenous drug abuse    Migration of pancreatic stent    Migrated esophageal stent      Assessment  26y Male  w/ PAST MEDICAL & SURGICAL HISTORY:  on 4/15/22 he underwent a FB, EGD, Robotic Tyler-Mahesh Esophagectomy, and J-tube placement with thoracic surgery, Dr Shannon  pt readmitted on medical service w c/o diarrhea and bloating  Now on Thoracic surgery service. On Cipro and Flagyl for Enteritis. Cdiff negative  TF Jevity resumed   Followed closely by Pscych/pain management.     PLAN  Neuro: Pain management and psych follow up  Pulm: Encourage coughing, deep breathing and use of incentive spirometry. Wean off supplemental oxygen as able. Daily CXR.   Cardio: Monitor telemetry/alarms  GI: tolerating soft diet w tube feeds; monitor loose stool and gas; on simethicone  Renal: monitor urine output, supplement electrolytes as needed  Vasc: Heparin SC/SCDs for DVT prophylaxis  Heme: Stable H/H. .   ID: cipro, flagyl   Therapy: OOB/ambulate  Disposition: Aim to D/C to home once pain and psych meds managed and pt GI symptoms improved  Discussed with Cardiothoracic Team at AM rounds.

## 2022-05-04 NOTE — DIETITIAN INITIAL EVALUATION ADULT - ADD RECOMMEND
Continue diet as ordered. Consider Banatrol TID to aid with diarrhea. Obtain weekly weight and document PO intake to monitor trend.

## 2022-05-04 NOTE — PROVIDER CONTACT NOTE (OTHER) - SITUATION
pt only allowing TF to be set at 40ml/hr even though order states to be at 63ml/hr. pt states Dr that came in the morning said he only needs 40ml/hr now.

## 2022-05-04 NOTE — DIETITIAN INITIAL EVALUATION ADULT - PERTINENT MEDS FT
MEDICATIONS  (STANDING):  ciprofloxacin     Tablet 500 milliGRAM(s) Oral every 12 hours  clonazePAM  Tablet 0.5 milliGRAM(s) Oral two times a day  dextrose 50% Injectable 25 Gram(s) IV Push once  dextrose 50% Injectable 12.5 Gram(s) IV Push once  dextrose 50% Injectable 25 Gram(s) IV Push once  dextrose Oral Gel 15 Gram(s) Oral once  gabapentin Solution 400 milliGRAM(s) Oral three times a day  glucagon  Injectable 1 milliGRAM(s) IntraMuscular once  heparin   Injectable 5000 Unit(s) SubCutaneous every 12 hours  lidocaine   4% Patch 1 Patch Transdermal daily  lidocaine   4% Patch 1 Patch Transdermal daily  metroNIDAZOLE  IVPB 500 milliGRAM(s) IV Intermittent every 8 hours  pantoprazole   Suspension 40 milliGRAM(s) Oral daily  PARoxetine 20 milliGRAM(s) Oral daily  polyethylene glycol 3350 17 Gram(s) Oral daily  senna 2 Tablet(s) Oral at bedtime  simethicone 80 milliGRAM(s) Chew three times a day  valproate sodium IVPB 250 milliGRAM(s) IV Intermittent three times a day    MEDICATIONS  (PRN):  acetaminophen    Suspension .. 650 milliGRAM(s) Oral every 6 hours PRN Temp greater or equal to 38C (100.4F), Mild Pain (1 - 3)  cyclobenzaprine 5 milliGRAM(s) Oral every 8 hours PRN Muscle Spasm  hydrOXYzine hydrochloride 50 milliGRAM(s) Oral every 6 hours PRN Anxiety

## 2022-05-04 NOTE — PROGRESS NOTE ADULT - ASSESSMENT
25 y/o M hx of esophageal stricture s/p multiple dilations and esophagectomy, prior IVDU not on suboxone anymore who presents due to diarrhea 7-8 times loose stools    EKG: NSR No acute changes    1. Diarrhea   - CT shows enteritis, on abx f/u stool studies f/u GI    2. Mild LV dysfunction   - asymptomatic    3. Dysphagia  -hx of esophageal stricture s/p multiple dilations  -s/p esophagectomy and j-tube placement previous admission    3. DVT PPX  - heparin

## 2022-05-04 NOTE — PROGRESS NOTE ADULT - ASSESSMENT
Pt is a 27 y/o M hx of esophageal stricture s/p multiple dilations and esophagectomy, prior IVDU not on suboxone anymore who presents due to diarrhea 7-8 times loose stools since 11 am on the day of admission.

## 2022-05-04 NOTE — PROGRESS NOTE ADULT - SUBJECTIVE AND OBJECTIVE BOX
Christiano Hoyt MD  Interventional Cardiology / Endovascular Specialist  [place] Office : [address] [place] [address] [place] [address]  Tel:   [place] Office : [address] [facility] [place] [address]  Tel: [phone]      Subjective/Overnight events: Patient lying in bed. c/o of abd pain. Denies chest pain, SOB or palpitations  	  MEDICATIONS:  heparin   Injectable 5000 Unit(s) SubCutaneous every 12 hours    ciprofloxacin     Tablet 500 milliGRAM(s) Oral every 12 hours  metroNIDAZOLE  IVPB 500 milliGRAM(s) IV Intermittent every 8 hours      acetaminophen    Suspension .. 650 milliGRAM(s) Oral every 6 hours PRN  buprenorphine 2 mG/naloxone 0.5 mG SL  Tablet 2 Tablet(s) SubLingual once  clonazePAM  Tablet 0.5 milliGRAM(s) Oral two times a day  cyclobenzaprine 5 milliGRAM(s) Oral every 8 hours PRN  gabapentin Solution 400 milliGRAM(s) Oral three times a day  hydrOXYzine hydrochloride 50 milliGRAM(s) Oral every 6 hours PRN  ketorolac   Injectable 15 milliGRAM(s) IV Push every 6 hours  PARoxetine 20 milliGRAM(s) Oral daily  valproate sodium IVPB 250 milliGRAM(s) IV Intermittent three times a day    pantoprazole   Suspension 40 milliGRAM(s) Oral daily  polyethylene glycol 3350 17 Gram(s) Oral daily  senna 2 Tablet(s) Oral at bedtime  simethicone 80 milliGRAM(s) Chew three times a day    dextrose 50% Injectable 25 Gram(s) IV Push once  dextrose 50% Injectable 12.5 Gram(s) IV Push once  dextrose 50% Injectable 25 Gram(s) IV Push once  dextrose Oral Gel 15 Gram(s) Oral once  glucagon  Injectable 1 milliGRAM(s) IntraMuscular once    lidocaine   4% Patch 1 Patch Transdermal daily  lidocaine   4% Patch 1 Patch Transdermal daily      PAST MEDICAL/SURGICAL HISTORY  PAST MEDICAL & SURGICAL HISTORY:  Heroin abuse    Cocaine abuse    GERD (gastroesophageal reflux disease)    Hiatal hernia    History of esophageal stricture    Intravenous drug abuse    Migration of pancreatic stent    Migrated esophageal stent        SOCIAL HISTORY: Substance Use (street drugs): ( x ) never used  (  ) other:    FAMILY HISTORY:  FH: thyroid disease (Grandparent)        PHYSICAL EXAM:  T(C): 36.8 (05-04-22 @ 08:33), Max: 36.9 (05-04-22 @ 06:53)  HR: 63 (05-04-22 @ 08:33) (60 - 83)  BP: 112/58 (05-04-22 @ 08:33) (102/52 - 123/59)  RR: 18 (05-04-22 @ 08:33) (16 - 18)  SpO2: 96% (05-04-22 @ 08:33) (96% - 100%)  Wt(kg): --  I&O's Summary    03 May 2022 07:01  -  04 May 2022 07:00  --------------------------------------------------------  IN: 767 mL / OUT: 701 mL / NET: 66 mL            GENERAL: NAD   EYES: EOMI, PERRLA, conjunctiva and sclera clear  ENMT: No tonsillar erythema, exudates, or enlargement  Cardiovascular: Normal S1 S2, No JVD, No murmurs, No edema  Respiratory: Lungs clear to auscultation	  Gastrointestinal:  s/p surgery and J tube  Extremities: NO EDEMA                                  12.1   7.10  )-----------( 221      ( 04 May 2022 07:03 )             36.3     05-04    138  |  102  |  24<H>  ----------------------------<  96  4.4   |  26  |  0.93    Ca    9.2      04 May 2022 07:03    TPro  6.2  /  Alb  3.8  /  TBili  0.5  /  DBili  x   /  AST  46<H>  /  ALT  132<H>  /  AlkPhos  159<H>  05-04    proBNP:   Lipid Profile:   HgA1c:   TSH:     Consultant(s) Notes Reviewed:  [x ] YES  [ ] NO    Care Discussed with Consultants/Other Providers [ x] YES  [ ] NO    Imaging Personally Reviewed independently:  [x] YES  [ ] NO    All labs, radiologic studies, vitals, orders and medications list reviewed. Patient is seen and examined at bedside. Case discussed with medical team.

## 2022-05-04 NOTE — DIETITIAN INITIAL EVALUATION ADULT - NSFNSGIIOFT_GEN_A_CORE
05-03-22 @ 07:01  -  05-04-22 @ 07:00  --------------------------------------------------------  OUT:  Total OUT: 0 mL    Total NET: 567 mL

## 2022-05-04 NOTE — PROGRESS NOTE ADULT - ASSESSMENT
25 y/o M hx of esophageal stricture s/p multiple dilations and [place]-Mahesh esophagectomy 04/15/22, prior IVDU no longer on Suboxone who presents due to diarrhea 7-8x that started day of admission (4/30) with small bowel wall thickening on CT suspicious for infectious enteritis, now clinically improving.     Impression:    #Diarrhea - Stool frequency decreasing, tolerating oral diet, stool C. diff negative. Suspect infectious enteritis, likely viral, given acute onset of symptoms and small bowel wall thickening.  No new medication changes prior to diarrhea onset. GI stool PCR pending.    # History of esophageal stricture s/p multiple dilations and [place]-Mahesh esophagectomy and J-tube placement 04/15/22    #Elevated liver enzymes - Decreasing; hepatocellular liver injury pattern possible in could be in the setting of acute viral infection/enteritis vs. underlying hepatic steatosis; less likely biliary etiology given normal Tbili. No cholelithiasis or sonographic evidence of acute cholecystitis.     #J-tube for nutrition      Recommendations:  -avoid Imodium until infectious workup results (GI PCR pending)  -no role for endoscopic evaluation at this time  -supportive care per primary team  -continue to trend liver tests daily: if liver injury does not resolve, can consider MRCP and/or Hepatology evaluation      Ly Cox PGY-6  Gastroenterology/Hepatology Fellow  Page #[phone]   Page #[phone] 5pm-7am on weekdays, and on weekends     27 y/o M hx of esophageal stricture s/p multiple dilations and [place]-Mahesh esophagectomy 04/15/22, prior IVDU no longer on Suboxone who presents due to diarrhea 7-8x that started day of admission (4/30) with small bowel wall thickening on CT suspicious for infectious enteritis, now clinically improving.     Impression:    #Diarrhea - Stool frequency decreasing, tolerating oral diet, stool C. diff negative. Suspect infectious enteritis, likely viral, given acute onset of symptoms and small bowel wall thickening.  No new medication changes prior to diarrhea onset. GI stool PCR pending.  #History of esophageal stricture s/p multiple dilations and [place]-Mahesh esophagectomy and J-tube placement 04/15/22  #Elevated liver enzymes - Decreasing; hepatocellular liver injury pattern possible in could be in the setting of acute viral infection/enteritis vs. underlying hepatic steatosis; less likely biliary etiology given normal Tbili. No cholelithiasis or sonographic evidence of acute cholecystitis.   #J-tube for nutrition    Recommendations:  -avoid Imodium until infectious workup results (GI PCR pending)  -no role for endoscopic evaluation at this time  -supportive care per primary team  -continue to trend liver tests daily: if liver injury does not resolve, can consider MRCP and/or Hepatology evaluation    Ly Cox PGY-6  Gastroenterology/Hepatology Fellow  Page #[phone]   Page #[phone] 5pm-7am on weekdays, and on weekends

## 2022-05-04 NOTE — DIETITIAN INITIAL EVALUATION ADULT - ORAL INTAKE PTA/DIET HISTORY
Patient seen for assessment. Reports having good appetite PTA. States using Nutren 2.0 at home. States doing continuous overnight feeds @ 63 mL/hr. x 12 hrs. for total volume 756 mL and consumes rest of nutrition via PO with soft foods. Provides 1,512 kcal and 60.4 g pro.

## 2022-05-04 NOTE — DIETITIAN INITIAL EVALUATION ADULT - ENTERAL
Consider adjusting tube feed to Two Jeffrey Hn @ 40 mL/hr. x 12 hrs. for total volume 480 mL. Provides 960 kcal (13.4 kcal/kg), 40.1 g pro (0.6 g/kg) and 336 mL of fluid (TF free water).

## 2022-05-04 NOTE — PROGRESS NOTE ADULT - SUBJECTIVE AND OBJECTIVE BOX
Chief Complaint:  Patient is a 26y old  Male who presents with a chief complaint of Intractable abdominal pain and diarrhea (04 May 2022 14:15)      Interval Events:     Tolerating pureed diet  Reports total 5 episodes of loose stool over past 24 hours - decreasing per patient  Abdominal pain and bloating as improved per patient    Stool C. diff Ag negative    Allergies:  No Known Allergies      Hospital Medications:  acetaminophen    Suspension .. 650 milliGRAM(s) Oral every 6 hours PRN  buprenorphine 2 mG/naloxone 0.5 mG SL  Tablet 2 Tablet(s) SubLingual once  ciprofloxacin     Tablet 500 milliGRAM(s) Oral every 12 hours  clonazePAM  Tablet 0.5 milliGRAM(s) Oral two times a day  cyclobenzaprine 5 milliGRAM(s) Oral every 8 hours PRN  dextrose 50% Injectable 25 Gram(s) IV Push once  dextrose 50% Injectable 12.5 Gram(s) IV Push once  dextrose 50% Injectable 25 Gram(s) IV Push once  dextrose Oral Gel 15 Gram(s) Oral once  gabapentin Solution 400 milliGRAM(s) Oral three times a day  glucagon  Injectable 1 milliGRAM(s) IntraMuscular once  heparin   Injectable 5000 Unit(s) SubCutaneous every 12 hours  hydrOXYzine hydrochloride 50 milliGRAM(s) Oral every 6 hours PRN  lidocaine   4% Patch 1 Patch Transdermal daily  lidocaine   4% Patch 1 Patch Transdermal daily  metroNIDAZOLE  IVPB 500 milliGRAM(s) IV Intermittent every 8 hours  pantoprazole   Suspension 40 milliGRAM(s) Oral daily  PARoxetine 20 milliGRAM(s) Oral daily  polyethylene glycol 3350 17 Gram(s) Oral daily  senna 2 Tablet(s) Oral at bedtime  simethicone 80 milliGRAM(s) Chew three times a day  valproate sodium IVPB 250 milliGRAM(s) IV Intermittent three times a day      PMHX/PSHX:  Heroin abuse    Cocaine abuse    GERD (gastroesophageal reflux disease)    Hiatal hernia    History of esophageal stricture    Intravenous drug abuse    No significant past surgical history    Migration of pancreatic stent    Migrated esophageal stent        Family history:  FH: thyroid disease (Grandparent)        ROS:     General:  No weight loss, fevers, chills, night sweats, fatigue   Eyes:  No vision changes  ENT:  No sore throat, pain, runny nose  CV:  No chest pain, palpitations, dizziness   Resp:  No SOB, cough, wheezing  GI:  See HPI  :  No burning with urination, hematuria  Muscle:  No pain, weakness  Neuro:  No weakness/tingling, memory problems  Psych:  No fatigue, insomnia, mood problems, depression  Heme:  No easy bruisability  Skin:  No rash, edema      PHYSICAL EXAM:     GENERAL:  Thin, young, sitting up to chair, mild distress due to pain  HEENT:  Conjunctivae clear, sclera anicteric  CHEST:  No increased effort w/ respirations  HEART:  Regular rhythm & rate  ABDOMEN:  Soft, non-tender, non-distended +F-tube   EXTREMITIES:  no LE  edema  SKIN:  No rash/erythema/ecchymoses/petechiae/wounds/jaundice  NEURO:  Alert, oriented    Vital Signs:  Vital Signs Last 24 Hrs  T(C): 36.9 (04 May 2022 12:04), Max: 36.9 (04 May 2022 06:53)  T(F): 98.4 (04 May 2022 12:04), Max: 98.4 (04 May 2022 06:53)  HR: 78 (04 May 2022 12:04) (62 - 83)  BP: 102/47 (04 May 2022 12:04) (102/47 - 123/59)  BP(mean): --  RR: 18 (04 May 2022 12:04) (16 - 18)  SpO2: 97% (04 May 2022 12:04) (96% - 100%)  Daily     Daily     LABS:                        12.1   7.10  )-----------( 221      ( 04 May 2022 07:03 )             36.3     05-04    138  |  102  |  24<H>  ----------------------------<  96  4.4   |  26  |  0.93    Ca    9.2      04 May 2022 07:03    TPro  6.2  /  Alb  3.8  /  TBili  0.5  /  DBili  x   /  AST  46<H>  /  ALT  132<H>  /  AlkPhos  159<H>  05-04    LIVER FUNCTIONS - ( 04 May 2022 07:03 )  Alb: 3.8 g/dL / Pro: 6.2 g/dL / ALK PHOS: 159 U/L / ALT: 132 U/L / AST: 46 U/L / GGT: x                   Imaging:           Interval Events:     Tolerating pureed diet  Reports total 5 episodes of loose stool over past 24 hours - decreasing per patient  Abdominal pain and bloating as improved per patient    Stool C. diff Ag negative    Allergies:  No Known Allergies      Hospital Medications:  acetaminophen    Suspension .. 650 milliGRAM(s) Oral every 6 hours PRN  buprenorphine 2 mG/naloxone 0.5 mG SL  Tablet 2 Tablet(s) SubLingual once  ciprofloxacin     Tablet 500 milliGRAM(s) Oral every 12 hours  clonazePAM  Tablet 0.5 milliGRAM(s) Oral two times a day  cyclobenzaprine 5 milliGRAM(s) Oral every 8 hours PRN  dextrose 50% Injectable 25 Gram(s) IV Push once  dextrose 50% Injectable 12.5 Gram(s) IV Push once  dextrose 50% Injectable 25 Gram(s) IV Push once  dextrose Oral Gel 15 Gram(s) Oral once  gabapentin Solution 400 milliGRAM(s) Oral three times a day  glucagon  Injectable 1 milliGRAM(s) IntraMuscular once  heparin   Injectable 5000 Unit(s) SubCutaneous every 12 hours  hydrOXYzine hydrochloride 50 milliGRAM(s) Oral every 6 hours PRN  lidocaine   4% Patch 1 Patch Transdermal daily  lidocaine   4% Patch 1 Patch Transdermal daily  metroNIDAZOLE  IVPB 500 milliGRAM(s) IV Intermittent every 8 hours  pantoprazole   Suspension 40 milliGRAM(s) Oral daily  PARoxetine 20 milliGRAM(s) Oral daily  polyethylene glycol 3350 17 Gram(s) Oral daily  senna 2 Tablet(s) Oral at bedtime  simethicone 80 milliGRAM(s) Chew three times a day  valproate sodium IVPB 250 milliGRAM(s) IV Intermittent three times a day      PMHX/PSHX:  Heroin abuse    Cocaine abuse    GERD (gastroesophageal reflux disease)    Hiatal hernia    History of esophageal stricture    Intravenous drug abuse    No significant past surgical history    Migration of pancreatic stent    Migrated esophageal stent        Family history:  FH: thyroid disease (Grandparent)        ROS:   14-point ROS reviewed and negative except as per HPI above      PHYSICAL EXAM:     GENERAL:  Thin, young, sitting up to chair, mild distress due to pain  HEENT:  Conjunctivae clear, sclera anicteric  CHEST:  No increased effort w/ respirations  HEART:  Regular rhythm & rate  ABDOMEN:  Soft, non-tender, non-distended +F-tube   EXTREMITIES:  no LE  edema  SKIN:  No rash/erythema/ecchymoses/petechiae/wounds/jaundice  NEURO:  Alert, oriented    Vital Signs:  Vital Signs Last 24 Hrs  T(C): 36.9 (04 May 2022 12:04), Max: 36.9 (04 May 2022 06:53)  T(F): 98.4 (04 May 2022 12:04), Max: 98.4 (04 May 2022 06:53)  HR: 78 (04 May 2022 12:04) (62 - 83)  BP: 102/47 (04 May 2022 12:04) (102/47 - 123/59)  BP(mean): --  RR: 18 (04 May 2022 12:04) (16 - 18)  SpO2: 97% (04 May 2022 12:04) (96% - 100%)  Daily     Daily     LABS:                        12.1   7.10  )-----------( 221      ( 04 May 2022 07:03 )             36.3     05-04    138  |  102  |  24<H>  ----------------------------<  96  4.4   |  26  |  0.93    Ca    9.2      04 May 2022 07:03    TPro  6.2  /  Alb  3.8  /  TBili  0.5  /  DBili  x   /  AST  46<H>  /  ALT  132<H>  /  AlkPhos  159<H>  05-04    LIVER FUNCTIONS - ( 04 May 2022 07:03 )  Alb: 3.8 g/dL / Pro: 6.2 g/dL / ALK PHOS: 159 U/L / ALT: 132 U/L / AST: 46 U/L / GGT: x                   Imaging:

## 2022-05-05 ENCOUNTER — TRANSCRIPTION ENCOUNTER (OUTPATIENT)
Age: 27
End: 2022-05-05

## 2022-05-05 LAB
CULTURE RESULTS: SIGNIFICANT CHANGE UP
GLUCOSE BLDC GLUCOMTR-MCNC: 107 MG/DL — HIGH (ref 70–99)
GLUCOSE BLDC GLUCOMTR-MCNC: 215 MG/DL — HIGH (ref 70–99)
GLUCOSE BLDC GLUCOMTR-MCNC: 40 MG/DL — CRITICAL LOW (ref 70–99)
GLUCOSE BLDC GLUCOMTR-MCNC: 48 MG/DL — CRITICAL LOW (ref 70–99)
GLUCOSE BLDC GLUCOMTR-MCNC: 78 MG/DL — SIGNIFICANT CHANGE UP (ref 70–99)
GLUCOSE BLDC GLUCOMTR-MCNC: 85 MG/DL — SIGNIFICANT CHANGE UP (ref 70–99)
GLUCOSE BLDC GLUCOMTR-MCNC: 86 MG/DL — SIGNIFICANT CHANGE UP (ref 70–99)
SPECIMEN SOURCE: SIGNIFICANT CHANGE UP

## 2022-05-05 PROCEDURE — 99232 SBSQ HOSP IP/OBS MODERATE 35: CPT

## 2022-05-05 PROCEDURE — 99231 SBSQ HOSP IP/OBS SF/LOW 25: CPT | Mod: GC

## 2022-05-05 RX ORDER — DEXTROSE 50 % IN WATER 50 %
15 SYRINGE (ML) INTRAVENOUS ONCE
Refills: 0 | Status: COMPLETED | OUTPATIENT
Start: 2022-05-05 | End: 2022-05-05

## 2022-05-05 RX ORDER — BUPRENORPHINE AND NALOXONE 2; .5 MG/1; MG/1
2 TABLET SUBLINGUAL
Refills: 0 | Status: DISCONTINUED | OUTPATIENT
Start: 2022-05-06 | End: 2022-05-06

## 2022-05-05 RX ORDER — LOPERAMIDE HCL 2 MG
2 TABLET ORAL ONCE
Refills: 0 | Status: COMPLETED | OUTPATIENT
Start: 2022-05-05 | End: 2022-05-05

## 2022-05-05 RX ORDER — BUPRENORPHINE AND NALOXONE 2; .5 MG/1; MG/1
2 TABLET SUBLINGUAL ONCE
Refills: 0 | Status: DISCONTINUED | OUTPATIENT
Start: 2022-05-05 | End: 2022-05-05

## 2022-05-05 RX ADMIN — LIDOCAINE 1 PATCH: 4 CREAM TOPICAL at 12:29

## 2022-05-05 RX ADMIN — GABAPENTIN 400 MILLIGRAM(S): 400 CAPSULE ORAL at 21:31

## 2022-05-05 RX ADMIN — Medication 52.5 MILLIGRAM(S): at 21:32

## 2022-05-05 RX ADMIN — Medication 650 MILLIGRAM(S): at 12:20

## 2022-05-05 RX ADMIN — Medication 100 MILLIGRAM(S): at 21:40

## 2022-05-05 RX ADMIN — SIMETHICONE 80 MILLIGRAM(S): 80 TABLET, CHEWABLE ORAL at 13:45

## 2022-05-05 RX ADMIN — BUPRENORPHINE AND NALOXONE 2 TABLET(S): 2; .5 TABLET SUBLINGUAL at 18:59

## 2022-05-05 RX ADMIN — Medication 15 GRAM(S): at 06:36

## 2022-05-05 RX ADMIN — Medication 52.5 MILLIGRAM(S): at 13:45

## 2022-05-05 RX ADMIN — BUPRENORPHINE AND NALOXONE 2 TABLET(S): 2; .5 TABLET SUBLINGUAL at 06:38

## 2022-05-05 RX ADMIN — Medication 100 MILLIGRAM(S): at 13:44

## 2022-05-05 RX ADMIN — SENNA PLUS 2 TABLET(S): 8.6 TABLET ORAL at 21:32

## 2022-05-05 RX ADMIN — Medication 20 MILLIGRAM(S): at 12:22

## 2022-05-05 RX ADMIN — Medication 0.5 MILLIGRAM(S): at 17:36

## 2022-05-05 RX ADMIN — LIDOCAINE 1 PATCH: 4 CREAM TOPICAL at 12:24

## 2022-05-05 RX ADMIN — GABAPENTIN 400 MILLIGRAM(S): 400 CAPSULE ORAL at 13:46

## 2022-05-05 RX ADMIN — Medication 650 MILLIGRAM(S): at 13:15

## 2022-05-05 RX ADMIN — GABAPENTIN 400 MILLIGRAM(S): 400 CAPSULE ORAL at 06:21

## 2022-05-05 RX ADMIN — Medication 650 MILLIGRAM(S): at 21:37

## 2022-05-05 RX ADMIN — Medication 0.5 MILLIGRAM(S): at 06:20

## 2022-05-05 RX ADMIN — Medication 52.5 MILLIGRAM(S): at 06:21

## 2022-05-05 RX ADMIN — PANTOPRAZOLE SODIUM 40 MILLIGRAM(S): 20 TABLET, DELAYED RELEASE ORAL at 12:21

## 2022-05-05 RX ADMIN — Medication 2 MILLIGRAM(S): at 16:09

## 2022-05-05 RX ADMIN — CYCLOBENZAPRINE HYDROCHLORIDE 5 MILLIGRAM(S): 10 TABLET, FILM COATED ORAL at 12:20

## 2022-05-05 RX ADMIN — Medication 100 MILLIGRAM(S): at 06:20

## 2022-05-05 RX ADMIN — Medication 500 MILLIGRAM(S): at 06:20

## 2022-05-05 RX ADMIN — Medication 500 MILLIGRAM(S): at 17:37

## 2022-05-05 NOTE — PROVIDER CONTACT NOTE (OTHER) - BACKGROUND
Pt s/p esophagectomy 4/15. re-admit 4/30 for diarrhea.
Pt s/p esophagectomy 4/15. re-admit 4/30 for diarrhea.

## 2022-05-05 NOTE — PROGRESS NOTE ADULT - SUBJECTIVE AND OBJECTIVE BOX
Subjective: "I'm glad I agreed to take the Suboxone, I'm feeling much better" Pt states pain much improved. Less abd pain, minimal bloating. Still states frequently watery BMs overnight but tolerating a soft diet well. No n/v. Had TF overnight. Had 1 episode of asymptomatic hypoglycemia this morning, now improved. Ambulating throughout hallway multiple times per day. No fevers    Vital Signs:  Vital Signs Last 24 Hrs  T(C): 37 (05-05-22 @ 11:33), Max: 37 (05-05-22 @ 11:33)  T(F): 98.6 (05-05-22 @ 11:33), Max: 98.6 (05-05-22 @ 11:33)  HR: 84 (05-05-22 @ 11:33) (67 - 84)  BP: 108/66 (05-05-22 @ 11:33) (92/41 - 110/56)  RR: 17 (05-05-22 @ 11:33) (17 - 18)  SpO2: 98% (05-05-22 @ 11:33) (96% - 98%) on (O2)    Telemetry/Alarms: SR , will d/c tele  General: WN/WD NAD  Neurology: Awake, nonfocal, FERRARO x 4  Eyes: Scleras clear, PERRLA/ EOMI, Gross vision intact  ENT:Gross hearing intact, grossly patent pharynx, no stridor  Neck: Neck supple, trachea midline, No JVD,   Respiratory: CTA B/L, No wheezing, rales, rhonchi  CV: RRR, S1S2, no murmurs, rubs or gallops  Abdominal: Soft, NT, ND +BS, Loose watery BM overnight. [place] soft diet. PCR and Cdiff neg  Extremities: No edema, + peripheral pulses  Skin: No Rashes, Hematoma, Ecchymosis  Lymphatic: No Neck, axilla, groin LAD  Psych: Oriented x 3, normal affect  Incisions: Chest and abd incisions c/d/i.   Tubes: J tube site c/d/i  Relevant labs, radiology and Medications reviewed                        12.1   7.10  )-----------( 221      ( 04 May 2022 07:03 )             36.3     05-04    138  |  102  |  24<H>  ----------------------------<  96  4.4   |  26  |  0.93    Ca    9.2      04 May 2022 07:03    TPro  6.2  /  Alb  3.8  /  TBili  0.5  /  DBili  x   /  AST  46<H>  /  ALT  132<H>  /  AlkPhos  159<H>  05-04      MEDICATIONS  (STANDING):  buprenorphine 2 mG/naloxone 0.5 mG SL  Tablet 2 Tablet(s) SubLingual daily  ciprofloxacin     Tablet 500 milliGRAM(s) Oral every 12 hours  clonazePAM  Tablet 0.5 milliGRAM(s) Oral two times a day  dextrose 50% Injectable 25 Gram(s) IV Push once  dextrose 50% Injectable 12.5 Gram(s) IV Push once  dextrose 50% Injectable 25 Gram(s) IV Push once  dextrose Oral Gel 15 Gram(s) Oral once  gabapentin Solution 400 milliGRAM(s) Oral three times a day  glucagon  Injectable 1 milliGRAM(s) IntraMuscular once  heparin   Injectable 5000 Unit(s) SubCutaneous every 12 hours  lidocaine   4% Patch 1 Patch Transdermal daily  lidocaine   4% Patch 1 Patch Transdermal daily  metroNIDAZOLE  IVPB 500 milliGRAM(s) IV Intermittent every 8 hours  pantoprazole   Suspension 40 milliGRAM(s) Oral daily  PARoxetine 20 milliGRAM(s) Oral daily  polyethylene glycol 3350 17 Gram(s) Oral daily  senna 2 Tablet(s) Oral at bedtime  simethicone 80 milliGRAM(s) Chew three times a day  valproate sodium IVPB 250 milliGRAM(s) IV Intermittent three times a day    MEDICATIONS  (PRN):  acetaminophen    Suspension .. 650 milliGRAM(s) Oral every 6 hours PRN Temp greater or equal to 38C (100.4F), Mild Pain (1 - 3)  cyclobenzaprine 5 milliGRAM(s) Oral every 8 hours PRN Muscle Spasm  hydrOXYzine hydrochloride 50 milliGRAM(s) Oral every 6 hours PRN Anxiety    Pertinent Physical Exam  I&O's Summary    04 May 2022 07:01  -  05 May 2022 07:00  --------------------------------------------------------  IN: 320 mL / OUT: 1600 mL / NET: -1280 mL          Assessment  26y Male  w/ PAST MEDICAL & SURGICAL HISTORY:  Heroin abuse    Cocaine abuse    GERD (gastroesophageal reflux disease)    Hiatal hernia    History of esophageal stricture    Intravenous drug abuse    Migration of pancreatic stent    Migrated esophageal stent    admitted with complaints of Patient is a 26y old  Male who presents with a chief complaint of diarrhea (04 May 2022 15:50)  4/15/22 he underwent a FB, EGD, Robotic [place]-Mahesh Esophagectomy, and J-tube placement with thoracic surgery, Dr Shannon  pt readmitted on medical service w c/o diarrhea and bloating  Now on Thoracic surgery service. On Cipro and Flagyl for Enteritis. Cdiff sent. Symptoms improved. Will change TF to Jevity 5/3.   Followed closely by Pscych/pain management. 5/4-Pt agreed to start Suboxone and Depakote. Seen by GI. PCR and Cdiff neg.       PLAN  Neuro: Pain management. Pain improved. Will contact Psych to notify them that pt agreed to Suboxone and Depakote. See if any new reccs  Pulm: Encourage coughing, deep breathing and use of incentive spirometry  Cardio: Monitor telemetry/alarms- ok to d/c tele  GI: Cont soft diet. Will add Ensure. TF of Jevity overnight for added nutrition. Will FU any further reccs from GI for diarrhea  Renal: monitor urine output, supplement electrolytes as needed  Vasc: Heparin SC/SCDs for DVT prophylaxis  Heme: Stable H/H. .   ID: Cont Cipro/Flagyl for now  Therapy: OOB/ambulate  Disposition: Aim to D/C to home in next 24-48hrs   Discussed with Cardiothoracic Team at AM rounds.

## 2022-05-05 NOTE — PROGRESS NOTE ADULT - SUBJECTIVE AND OBJECTIVE BOX
Chief Complaint:  Patient is a 26y old  Male who presents with a chief complaint of abdominal pain and diarrhea (05 May 2022 14:42)      Interval Events:     History unobtainable from patient who appear stuporous/over sedated and unable to answer questions coherently; additionally his IV and cardiac monitor leads had been removed - nursing staff made aware     Allergies:  No Known Allergies      Hospital Medications:  acetaminophen    Suspension .. 650 milliGRAM(s) Oral every 6 hours PRN  buprenorphine 2 mG/naloxone 0.5 mG SL  Tablet 2 Tablet(s) SubLingual daily  ciprofloxacin     Tablet 500 milliGRAM(s) Oral every 12 hours  clonazePAM  Tablet 0.5 milliGRAM(s) Oral two times a day  cyclobenzaprine 5 milliGRAM(s) Oral every 8 hours PRN  dextrose 50% Injectable 25 Gram(s) IV Push once  dextrose 50% Injectable 12.5 Gram(s) IV Push once  dextrose 50% Injectable 25 Gram(s) IV Push once  dextrose Oral Gel 15 Gram(s) Oral once  gabapentin Solution 400 milliGRAM(s) Oral three times a day  glucagon  Injectable 1 milliGRAM(s) IntraMuscular once  heparin   Injectable 5000 Unit(s) SubCutaneous every 12 hours  hydrOXYzine hydrochloride 50 milliGRAM(s) Oral every 6 hours PRN  lidocaine   4% Patch 1 Patch Transdermal daily  lidocaine   4% Patch 1 Patch Transdermal daily  metroNIDAZOLE  IVPB 500 milliGRAM(s) IV Intermittent every 8 hours  pantoprazole   Suspension 40 milliGRAM(s) Oral daily  PARoxetine 20 milliGRAM(s) Oral daily  polyethylene glycol 3350 17 Gram(s) Oral daily  senna 2 Tablet(s) Oral at bedtime  simethicone 80 milliGRAM(s) Chew three times a day  valproate sodium IVPB 250 milliGRAM(s) IV Intermittent three times a day      PMHX/PSHX:  Heroin abuse    Cocaine abuse    GERD (gastroesophageal reflux disease)    Hiatal hernia    History of esophageal stricture    Intravenous drug abuse    No significant past surgical history    Migration of pancreatic stent    Migrated esophageal stent        Family history:  FH: thyroid disease (Grandparent)        ROS:     General:  No weight loss, fevers, chills, night sweats, fatigue   Eyes:  No vision changes  ENT:  No sore throat, pain, runny nose  CV:  No chest pain, palpitations, dizziness   Resp:  No SOB, cough, wheezing  GI:  See HPI  :  No burning with urination, hematuria  Muscle:  No pain, weakness  Neuro:  No weakness/tingling, memory problems  Psych:  No fatigue, insomnia, mood problems, depression  Heme:  No easy bruisability  Skin:  No rash, edema      PHYSICAL EXAM:     GENERAL:  Very stuporous in chair,   HEENT:  NC/AT,  conjunctivae clear, sclera anicteric  CHEST:  Full & symmetric excursion, no increased effort w/ respirations  HEART:  Regular rhythm & rate  ABDOMEN:  Soft, non-tender, non-distended  EXTREMITIES:  no LE  edema  SKIN:  No rash/erythema/ecchymoses/petechiae/wounds/jaundice  NEURO:  Alert, oriented    Vital Signs:  Vital Signs Last 24 Hrs  T(C): 37 (05 May 2022 11:33), Max: 37 (05 May 2022 11:33)  T(F): 98.6 (05 May 2022 11:33), Max: 98.6 (05 May 2022 11:33)  HR: 84 (05 May 2022 11:33) (67 - 84)  BP: 108/66 (05 May 2022 11:33) (92/41 - 110/56)  BP(mean): --  RR: 17 (05 May 2022 11:33) (17 - 18)  SpO2: 98% (05 May 2022 11:33) (96% - 98%)  Daily     Daily     LABS:                        12.1   7.10  )-----------( 221      ( 04 May 2022 07:03 )             36.3     05-04    138  |  102  |  24<H>  ----------------------------<  96  4.4   |  26  |  0.93    Ca    9.2      04 May 2022 07:03    TPro  6.2  /  Alb  3.8  /  TBili  0.5  /  DBili  x   /  AST  46<H>  /  ALT  132<H>  /  AlkPhos  159<H>  05-04    LIVER FUNCTIONS - ( 04 May 2022 07:03 )  Alb: 3.8 g/dL / Pro: 6.2 g/dL / ALK PHOS: 159 U/L / ALT: 132 U/L / AST: 46 U/L / GGT: x                   Imaging:             Chief Complaint:  Patient is a 26y old  Male who presents with a chief complaint of abdominal pain and diarrhea (05 May 2022 14:42)      Interval Events:     History unobtainable from patient who appeared stuporous/over sedated and unable to answer questions coherently; additionally his IV and cardiac monitor leads had been removed - nursing staff made aware   GI PCR negative    Allergies:  No Known Allergies      Hospital Medications:  acetaminophen    Suspension .. 650 milliGRAM(s) Oral every 6 hours PRN  buprenorphine 2 mG/naloxone 0.5 mG SL  Tablet 2 Tablet(s) SubLingual daily  ciprofloxacin     Tablet 500 milliGRAM(s) Oral every 12 hours  clonazePAM  Tablet 0.5 milliGRAM(s) Oral two times a day  cyclobenzaprine 5 milliGRAM(s) Oral every 8 hours PRN  dextrose 50% Injectable 25 Gram(s) IV Push once  dextrose 50% Injectable 12.5 Gram(s) IV Push once  dextrose 50% Injectable 25 Gram(s) IV Push once  dextrose Oral Gel 15 Gram(s) Oral once  gabapentin Solution 400 milliGRAM(s) Oral three times a day  glucagon  Injectable 1 milliGRAM(s) IntraMuscular once  heparin   Injectable 5000 Unit(s) SubCutaneous every 12 hours  hydrOXYzine hydrochloride 50 milliGRAM(s) Oral every 6 hours PRN  lidocaine   4% Patch 1 Patch Transdermal daily  lidocaine   4% Patch 1 Patch Transdermal daily  metroNIDAZOLE  IVPB 500 milliGRAM(s) IV Intermittent every 8 hours  pantoprazole   Suspension 40 milliGRAM(s) Oral daily  PARoxetine 20 milliGRAM(s) Oral daily  polyethylene glycol 3350 17 Gram(s) Oral daily  senna 2 Tablet(s) Oral at bedtime  simethicone 80 milliGRAM(s) Chew three times a day  valproate sodium IVPB 250 milliGRAM(s) IV Intermittent three times a day      PMHX/PSHX:  Heroin abuse    Cocaine abuse    GERD (gastroesophageal reflux disease)    Hiatal hernia    History of esophageal stricture    Intravenous drug abuse    No significant past surgical history    Migration of pancreatic stent    Migrated esophageal stent        Family history:  FH: thyroid disease (Grandparent)        ROS:     General:  No weight loss, fevers, chills, night sweats, fatigue   Eyes:  No vision changes  ENT:  No sore throat, pain, runny nose  CV:  No chest pain, palpitations, dizziness   Resp:  No SOB, cough, wheezing  GI:  See HPI  :  No burning with urination, hematuria  Muscle:  No pain, weakness  Neuro:  No weakness/tingling, memory problems  Psych:  No fatigue, insomnia, mood problems, depression  Heme:  No easy bruisability  Skin:  No rash, edema      PHYSICAL EXAM:     GENERAL:  Very stuporous in chair, minimally responsive  HEENT:  Conjunctivae clear, sclera anicteric  CHEST:  No increased effort w/ respirations  HEART:  Regular rhythm & rate  ABDOMEN:  Soft, non-tender, non-distended  EXTREMITIES:  no LE  edema  SKIN:  No rash/erythema/ecchymoses/petechiae/wounds/jaundice  NEURO:  Alert, orientedx1 (name only)    Vital Signs:  Vital Signs Last 24 Hrs  T(C): 37 (05 May 2022 11:33), Max: 37 (05 May 2022 11:33)  T(F): 98.6 (05 May 2022 11:33), Max: 98.6 (05 May 2022 11:33)  HR: 84 (05 May 2022 11:33) (67 - 84)  BP: 108/66 (05 May 2022 11:33) (92/41 - 110/56)  BP(mean): --  RR: 17 (05 May 2022 11:33) (17 - 18)  SpO2: 98% (05 May 2022 11:33) (96% - 98%)  Daily     Daily     LABS:                        12.1   7.10  )-----------( 221      ( 04 May 2022 07:03 )             36.3     05-04    138  |  102  |  24<H>  ----------------------------<  96  4.4   |  26  |  0.93    Ca    9.2      04 May 2022 07:03    TPro  6.2  /  Alb  3.8  /  TBili  0.5  /  DBili  x   /  AST  46<H>  /  ALT  132<H>  /  AlkPhos  159<H>  05-04    LIVER FUNCTIONS - ( 04 May 2022 07:03 )  Alb: 3.8 g/dL / Pro: 6.2 g/dL / ALK PHOS: 159 U/L / ALT: 132 U/L / AST: 46 U/L / GGT: x             Imaging:           Interval Events:     History unobtainable from patient who appeared stuporous/over sedated and unable to answer questions coherently; additionally his IV and cardiac monitor leads had been removed - nursing staff made aware   GI PCR negative    Allergies:  No Known Allergies      Hospital Medications:  acetaminophen    Suspension .. 650 milliGRAM(s) Oral every 6 hours PRN  buprenorphine 2 mG/naloxone 0.5 mG SL  Tablet 2 Tablet(s) SubLingual daily  ciprofloxacin     Tablet 500 milliGRAM(s) Oral every 12 hours  clonazePAM  Tablet 0.5 milliGRAM(s) Oral two times a day  cyclobenzaprine 5 milliGRAM(s) Oral every 8 hours PRN  dextrose 50% Injectable 25 Gram(s) IV Push once  dextrose 50% Injectable 12.5 Gram(s) IV Push once  dextrose 50% Injectable 25 Gram(s) IV Push once  dextrose Oral Gel 15 Gram(s) Oral once  gabapentin Solution 400 milliGRAM(s) Oral three times a day  glucagon  Injectable 1 milliGRAM(s) IntraMuscular once  heparin   Injectable 5000 Unit(s) SubCutaneous every 12 hours  hydrOXYzine hydrochloride 50 milliGRAM(s) Oral every 6 hours PRN  lidocaine   4% Patch 1 Patch Transdermal daily  lidocaine   4% Patch 1 Patch Transdermal daily  metroNIDAZOLE  IVPB 500 milliGRAM(s) IV Intermittent every 8 hours  pantoprazole   Suspension 40 milliGRAM(s) Oral daily  PARoxetine 20 milliGRAM(s) Oral daily  polyethylene glycol 3350 17 Gram(s) Oral daily  senna 2 Tablet(s) Oral at bedtime  simethicone 80 milliGRAM(s) Chew three times a day  valproate sodium IVPB 250 milliGRAM(s) IV Intermittent three times a day      PMHX/PSHX:  Heroin abuse    Cocaine abuse    GERD (gastroesophageal reflux disease)    Hiatal hernia    History of esophageal stricture    Intravenous drug abuse    No significant past surgical history    Migration of pancreatic stent    Migrated esophageal stent        Family history:  FH: thyroid disease (Grandparent)        ROS:     General:  No weight loss, fevers, chills, night sweats, fatigue   Eyes:  No vision changes  ENT:  No sore throat, pain, runny nose  CV:  No chest pain, palpitations, dizziness   Resp:  No SOB, cough, wheezing  GI:  See HPI  :  No burning with urination, hematuria  Muscle:  No pain, weakness  Neuro:  No weakness/tingling, memory problems  Psych:  No fatigue, insomnia, mood problems, depression  Heme:  No easy bruisability  Skin:  No rash, edema      PHYSICAL EXAM:     GENERAL:  Very stuporous in chair, minimally responsive  HEENT:  Conjunctivae clear, sclera anicteric  CHEST:  No increased effort w/ respirations  HEART:  Regular rhythm & rate  ABDOMEN:  Soft, non-tender, non-distended  EXTREMITIES:  no LE  edema  SKIN:  No rash/erythema/ecchymoses/petechiae/wounds/jaundice  NEURO:  Alert, orientedx1 (name only)    Vital Signs:  Vital Signs Last 24 Hrs  T(C): 37 (05 May 2022 11:33), Max: 37 (05 May 2022 11:33)  T(F): 98.6 (05 May 2022 11:33), Max: 98.6 (05 May 2022 11:33)  HR: 84 (05 May 2022 11:33) (67 - 84)  BP: 108/66 (05 May 2022 11:33) (92/41 - 110/56)  BP(mean): --  RR: 17 (05 May 2022 11:33) (17 - 18)  SpO2: 98% (05 May 2022 11:33) (96% - 98%)  Daily     Daily     LABS:                        12.1   7.10  )-----------( 221      ( 04 May 2022 07:03 )             36.3     05-04    138  |  102  |  24<H>  ----------------------------<  96  4.4   |  26  |  0.93    Ca    9.2      04 May 2022 07:03    TPro  6.2  /  Alb  3.8  /  TBili  0.5  /  DBili  x   /  AST  46<H>  /  ALT  132<H>  /  AlkPhos  159<H>  05-04    LIVER FUNCTIONS - ( 04 May 2022 07:03 )  Alb: 3.8 g/dL / Pro: 6.2 g/dL / ALK PHOS: 159 U/L / ALT: 132 U/L / AST: 46 U/L / GGT: x             Imaging:

## 2022-05-05 NOTE — DISCHARGE NOTE PROVIDER - INSTRUCTIONS
Jevity @ 63 mL /h x 16 h overnight regular diet    gauze and tape dressing change to old J tube site daily for 3 days  then leave uncovered

## 2022-05-05 NOTE — PROGRESS NOTE ADULT - ASSESSMENT
27 y/o M hx of esophageal stricture s/p multiple dilations and [place]-Mahesh esophagectomy 04/15/22, prior IVDU no longer on Suboxone who presents due to diarrhea 7-8x that started day of admission (4/30) with small bowel wall thickening on CT suspicious for infectious enteritis, now clinically improving.     Impression:    #Diarrhea - Stool frequency decreasing, tolerating oral diet, stool C. diff and GI PCR negative. Suspect infectious enteritis, likely viral, given acute onset of symptoms and small bowel wall thickening.  No new medication changes prior to diarrhea onset. GI stool PCR pending.  #History of esophageal stricture s/p multiple dilations and [place]-Mahesh esophagectomy and J-tube placement 04/15/22  #Elevated liver enzymes - Decreasing; hepatocellular liver injury pattern possible in could be in the setting of acute viral infection/enteritis vs. underlying hepatic steatosis; less likely biliary etiology given normal Tbili. No cholelithiasis or sonographic evidence of acute cholecystitis.   #J-tube for nutrition    Recommendations:  - okay to start Imodium as needed for any further diarrhea given GI PCR negative   - no role for endoscopic evaluation at this time - patient appears to be clinically improving   - supportive care per primary team  - continue to trend liver tests daily: if liver injury does not resolve, can consider MRCP and/or Hepatology evaluation      Ly Cox PGY-6  Gastroenterology/Hepatology Fellow  Page #[phone]   Page #[phone] 5pm-7am on weekdays, and on weekends   27 y/o M hx of esophageal stricture s/p multiple dilations and [place]-Mahesh esophagectomy 04/15/22, prior IVDU no longer on Suboxone who presents due to diarrhea 7-8x that started day of admission (4/30) with small bowel wall thickening on CT suspicious for infectious enteritis, now clinically improving.     Impression:    #Diarrhea - Stool frequency decreasing, tolerating oral diet, stool C. diff and GI PCR negative. Suspect infectious enteritis, likely viral, given acute onset of symptoms and small bowel wall thickening.  No new medication changes prior to diarrhea onset. GI stool PCR negative.  #History of esophageal stricture s/p multiple dilations and [place]-Mahesh esophagectomy and J-tube placement 04/15/22  #Elevated liver enzymes - Decreasing; hepatocellular liver injury pattern possible in could be in the setting of acute viral infection/enteritis vs. underlying hepatic steatosis; less likely biliary etiology given normal Tbili. No cholelithiasis or sonographic evidence of acute cholecystitis.   #J-tube for nutrition    Recommendations:  - would consult nutrition as loose stools may be in setting of recent TF initiation, possibly exacerbated by self-limited infection  - okay to start Imodium as needed for any further diarrhea given GI PCR negative   - no role for endoscopic evaluation at this time - patient appears to be clinically improving   - supportive care per primary team  - continue to trend liver tests daily: if liver injury does not resolve, can consider MRCP and/or Hepatology evaluation      Ly Cox PGY-6  Gastroenterology/Hepatology Fellow  Page #[phone]   Page #[phone] 5pm-7am on weekdays, and on weekends

## 2022-05-05 NOTE — DISCHARGE NOTE PROVIDER - NSDCFUADDAPPT_GEN_ALL_CORE_FT
See your PCP as soon as possible Please follow with Dr Shannon in 1-2 weeks  call for appointment    [facility]  Behavioral Health follow up  call [phone] for appointment  obtain an appointment with "[facility]" [facility]  they will dispense suboxone on daily basis  Please follow with Dr Shannon in 1-2 weeks  call for appointment    [facility]  Behavioral Health follow up  call [phone] for appointment  obtain an appointment with "[facility]" [facility]  they will dispense suboxone on daily basis     If you are having trouble obtaining the suboxone, you can present to [facility] ER on Saturday and Sunday for individual doses and call [facility] next week

## 2022-05-05 NOTE — PROGRESS NOTE ADULT - SUBJECTIVE AND OBJECTIVE BOX
Christiano Hoyt MD  Interventional Cardiology / Endovascular Specialist  [place] Office : [phone] [place] [address]. [phone]  Tel:   [place] Office : [address] [facility] [place] [address]  Tel: [phone]      Subjective/Overnight events: Patient lying in bed comfortably. No acute distress.   	  MEDICATIONS:  heparin   Injectable 5000 Unit(s) SubCutaneous every 12 hours    ciprofloxacin     Tablet 500 milliGRAM(s) Oral every 12 hours  metroNIDAZOLE  IVPB 500 milliGRAM(s) IV Intermittent every 8 hours      acetaminophen    Suspension .. 650 milliGRAM(s) Oral every 6 hours PRN  buprenorphine 2 mG/naloxone 0.5 mG SL  Tablet 2 Tablet(s) SubLingual daily  clonazePAM  Tablet 0.5 milliGRAM(s) Oral two times a day  cyclobenzaprine 5 milliGRAM(s) Oral every 8 hours PRN  gabapentin Solution 400 milliGRAM(s) Oral three times a day  hydrOXYzine hydrochloride 50 milliGRAM(s) Oral every 6 hours PRN  PARoxetine 20 milliGRAM(s) Oral daily  valproate sodium IVPB 250 milliGRAM(s) IV Intermittent three times a day    pantoprazole   Suspension 40 milliGRAM(s) Oral daily  polyethylene glycol 3350 17 Gram(s) Oral daily  senna 2 Tablet(s) Oral at bedtime  simethicone 80 milliGRAM(s) Chew three times a day    dextrose 50% Injectable 25 Gram(s) IV Push once  dextrose 50% Injectable 12.5 Gram(s) IV Push once  dextrose 50% Injectable 25 Gram(s) IV Push once  dextrose Oral Gel 15 Gram(s) Oral once  glucagon  Injectable 1 milliGRAM(s) IntraMuscular once    lidocaine   4% Patch 1 Patch Transdermal daily  lidocaine   4% Patch 1 Patch Transdermal daily      PAST MEDICAL/SURGICAL HISTORY  PAST MEDICAL & SURGICAL HISTORY:  Heroin abuse    Cocaine abuse    GERD (gastroesophageal reflux disease)    Hiatal hernia    History of esophageal stricture    Intravenous drug abuse    Migration of pancreatic stent    Migrated esophageal stent        SOCIAL HISTORY: Substance Use (street drugs): ( x ) never used  (  ) other:    FAMILY HISTORY:  FH: thyroid disease (Grandparent)        PHYSICAL EXAM:  T(C): 37 (05-05-22 @ 11:33), Max: 37 (05-05-22 @ 11:33)  HR: 84 (05-05-22 @ 11:33) (67 - 84)  BP: 108/66 (05-05-22 @ 11:33) (92/41 - 110/56)  RR: 17 (05-05-22 @ 11:33) (17 - 18)  SpO2: 98% (05-05-22 @ 11:33) (96% - 98%)  Wt(kg): --  I&O's Summary    04 May 2022 07:01  -  05 May 2022 07:00  --------------------------------------------------------  IN: 320 mL / OUT: 1600 mL / NET: -1280 mL            GENERAL: NAD   EYES: EOMI, PERRLA, conjunctiva and sclera clear  ENMT: No tonsillar erythema, exudates, or enlargement  Cardiovascular: Normal S1 S2, No JVD, No murmurs, No edema  Respiratory: Lungs clear to auscultation	  Gastrointestinal:  s/p surgery and J tube  Extremities: NO EDEMA                                    12.1   7.10  )-----------( 221      ( 04 May 2022 07:03 )             36.3     05-04    138  |  102  |  24<H>  ----------------------------<  96  4.4   |  26  |  0.93    Ca    9.2      04 May 2022 07:03    TPro  6.2  /  Alb  3.8  /  TBili  0.5  /  DBili  x   /  AST  46<H>  /  ALT  132<H>  /  AlkPhos  159<H>  05-04    proBNP:   Lipid Profile:   HgA1c:   TSH:     Consultant(s) Notes Reviewed:  [x ] YES  [ ] NO    Care Discussed with Consultants/Other Providers [ x] YES  [ ] NO    Imaging Personally Reviewed independently:  [x] YES  [ ] NO    All labs, radiologic studies, vitals, orders and medications list reviewed. Patient is seen and examined at bedside. Case discussed with medical team.

## 2022-05-05 NOTE — PROVIDER CONTACT NOTE (OTHER) - ASSESSMENT
Pt AOx4, independent, self. VSS. pt also on Soft and Bite sized diet.
Pt AOx4, independent, self. VSS. pt also on Soft and Bite sized diet. pt also on nocturnal tube feeds.

## 2022-05-05 NOTE — DISCHARGE NOTE PROVIDER - HOSPITAL COURSE
This 27 y/o M with hx of esophageal stricture s/p esophagectomy 4/15/22 presented to the ER 4/30/22 due to several episodes of diarrhea for one day. A  CT scan notable for small bowel enteritis, no bowel obstruction.   C diff PCR was negative and the pt's tube feeds were adjusted before discharge home. This 25 y/o M with hx of esophageal stricture s/p esophagectomy 4/15/22 presented to the ER 4/30/22 due to several episodes of diarrhea for one day. A  CT scan notable for small bowel enteritis, no bowel obstruction.   GI, cards, hospitalist, behavioral health consulted. pt completed course of antibiotics and gi symptoms resolved. behavior health recommended regimen for home. J tube removed. Pt tolerating regular diet. Pt stable for discharge home to follow up as an outpatient. This 27 y/o M with hx of esophageal stricture s/p esophagectomy 4/15/22 presented to the ER 4/30/22 due to several episodes of diarrhea for one day. A  CT scan notable for small bowel enteritis, no bowel obstruction.   GI, cards, hospitalist, behavioral health consulted. pt completed course of antibiotics and gi symptoms resolved. behavior health recommended regimen for home. J tube removed. Pt tolerating regular diet. Pt was seen and examined by psych and started on a regimen including suboxone.  Pt stable for discharge home to follow up as an outpatient.

## 2022-05-05 NOTE — DISCHARGE NOTE PROVIDER - NSDCMRMEDTOKEN_GEN_ALL_CORE_FT
hydrOXYzine hydrochloride 50 mg oral tablet: 1 tab(s) orally every 6 hours, As Needed MDD:4  oxyCODONE 5 mg oral tablet: 1 tab(s) orally every 4 hours, As Needed -for severe pain. MDD:6  PARoxetine 20 mg oral tablet: 1 tab(s) orally once a day  polyethylene glycol 3350 oral powder for reconstitution: 17 gram(s) orally once a day, As needed, Constipation  Protonix 40 mg oral delayed release tablet: 1 tab(s) orally once a day   simethicone 80 mg oral tablet, chewable: 1 tab(s) orally 3 times a day, As Needed  Tylenol 325 mg oral tablet: 2 tab(s) orally every 6 hours, As Needed  Can take liquid form or crush if preferred   clonazePAM 0.5 mg oral tablet: 1 tab(s) orally 2 times a day MDD:2  Depakote 250 mg oral delayed release tablet: 1 tab(s) orally 3 times a day MDD:3  gabapentin 400 mg oral capsule: 1 cap(s) orally 3 times a day MDD:3  naloxone rescue kit: once   PARoxetine 20 mg oral tablet: 1 tab(s) orally once a day MDD:1  Protonix 40 mg oral delayed release tablet: 1 tab(s) orally once a day   simethicone 80 mg oral tablet, chewable: 1 tab(s) orally 3 times a day, As Needed  Suboxone 2 mg-0.5 mg sublingual film: 1 film(s) sublingually 2 times a day MDD:2  Tylenol 325 mg oral tablet: 2 tab(s) orally every 6 hours, As Needed  Can take liquid form or crush if preferred  Zofran 4 mg oral tablet: 1 tab(s) orally every 8 hours

## 2022-05-05 NOTE — PROGRESS NOTE ADULT - SUBJECTIVE AND OBJECTIVE BOX
Kadeem Keita MD  Academic Hospitalist  Pager [phone]  Email: [email or web address]          PROGRESS NOTE:     Patient is a 26y old  Male who presents with a chief complaint of diarrhea (04 May 2022 15:50)      SUBJECTIVE / OVERNIGHT EVENTS:  Patient seen and examined this morning. Feeling better compared to yesterday, though still endorses diarrhea. Patient states that he moved his bowels 10 times overnight.  ADDITIONAL REVIEW OF SYSTEMS:  No f/c    MEDICATIONS  (STANDING):  buprenorphine 2 mG/naloxone 0.5 mG SL  Tablet 2 Tablet(s) SubLingual daily  ciprofloxacin     Tablet 500 milliGRAM(s) Oral every 12 hours  clonazePAM  Tablet 0.5 milliGRAM(s) Oral two times a day  dextrose 50% Injectable 25 Gram(s) IV Push once  dextrose 50% Injectable 12.5 Gram(s) IV Push once  dextrose 50% Injectable 25 Gram(s) IV Push once  dextrose Oral Gel 15 Gram(s) Oral once  gabapentin Solution 400 milliGRAM(s) Oral three times a day  glucagon  Injectable 1 milliGRAM(s) IntraMuscular once  heparin   Injectable 5000 Unit(s) SubCutaneous every 12 hours  lidocaine   4% Patch 1 Patch Transdermal daily  lidocaine   4% Patch 1 Patch Transdermal daily  metroNIDAZOLE  IVPB 500 milliGRAM(s) IV Intermittent every 8 hours  pantoprazole   Suspension 40 milliGRAM(s) Oral daily  PARoxetine 20 milliGRAM(s) Oral daily  polyethylene glycol 3350 17 Gram(s) Oral daily  senna 2 Tablet(s) Oral at bedtime  simethicone 80 milliGRAM(s) Chew three times a day  valproate sodium IVPB 250 milliGRAM(s) IV Intermittent three times a day    MEDICATIONS  (PRN):  acetaminophen    Suspension .. 650 milliGRAM(s) Oral every 6 hours PRN Temp greater or equal to 38C (100.4F), Mild Pain (1 - 3)  cyclobenzaprine 5 milliGRAM(s) Oral every 8 hours PRN Muscle Spasm  hydrOXYzine hydrochloride 50 milliGRAM(s) Oral every 6 hours PRN Anxiety      CAPILLARY BLOOD GLUCOSE      POCT Blood Glucose.: 78 mg/dL (05 May 2022 13:33)  POCT Blood Glucose.: 86 mg/dL (05 May 2022 10:50)  POCT Blood Glucose.: 215 mg/dL (05 May 2022 06:59)  POCT Blood Glucose.: 48 mg/dL (05 May 2022 06:32)  POCT Blood Glucose.: 40 mg/dL (05 May 2022 06:28)  POCT Blood Glucose.: 85 mg/dL (05 May 2022 00:34)  POCT Blood Glucose.: 143 mg/dL (04 May 2022 18:04)    I&O's Summary    04 May 2022 07:01  -  05 May 2022 07:00  --------------------------------------------------------  IN: 320 mL / OUT: 1600 mL / NET: -1280 mL        PHYSICAL EXAM:  Vital Signs Last 24 Hrs  T(C): 37 (05 May 2022 11:33), Max: 37 (05 May 2022 11:33)  T(F): 98.6 (05 May 2022 11:33), Max: 98.6 (05 May 2022 11:33)  HR: 84 (05 May 2022 11:33) (67 - 84)  BP: 108/66 (05 May 2022 11:33) (92/41 - 110/56)  BP(mean): --  RR: 17 (05 May 2022 11:33) (17 - 18)  SpO2: 98% (05 May 2022 11:33) (96% - 98%)     PHYSICAL EXAM:  GENERAL: NAD, well-developed  HEAD:  Atraumatic, Normocephalic  EYES: EOMI, PERRLA, conjunctiva and sclera clear  NECK: Supple, No JVD  CHEST/LUNG: Clear to auscultation bilaterally; No wheeze  HEART: Regular rate and rhythm; No murmurs, rubs, or gallops  ABDOMEN: Soft, tender throughout to palpation, nondistended, J tube in place no erythema, or warmth noted  EXTREMITIES:  2+ Peripheral Pulses, No clubbing, cyanosis, or edema  PSYCH: AAOx3  NEUROLOGY: non-focal  SKIN: No rashes or lesions    LABS:                        12.1   7.10  )-----------( 221      ( 04 May 2022 07:03 )             36.3     05-04    138  |  102  |  24<H>  ----------------------------<  96  4.4   |  26  |  0.93    Ca    9.2      04 May 2022 07:03    TPro  6.2  /  Alb  3.8  /  TBili  0.5  /  DBili  x   /  AST  46<H>  /  ALT  132<H>  /  AlkPhos  159<H>  05-04              GI PCR Panel, Stool (collected 04 May 2022 17:16)  Source: .Stool Feces  Final Report (05 May 2022 04:45):    GI PCR Results: NOT detected    *******Please Note:*******    GI panel PCR evaluates for:    Campylobacter, Plesiomonas shigelloides, Salmonella,    Vibrio, Yersinia enterocolitica, Enteroaggregative    Escherichia coli (EAEC), Enteropathogenic E.coli (EPEC),    Enterotoxigenic E. coli (ETEC) lt/st, Shiga-like    toxin-producing E. coli (STEC) stx1/stx2,    Shigella/ Enteroinvasive E. coli (EIEC), Cryptosporidium,    Cyclospora cayetanensis, Entamoeba histolytica,    Giardia lamblia, Adenovirus F 40/41, Astrovirus,    Norovirus GI/GII, Rotavirus A, Sapovirus        RADIOLOGY & ADDITIONAL TESTS:  Results Reviewed:   Imaging Personally Reviewed:  Electrocardiogram Personally Reviewed:    COORDINATION OF CARE:  Care Discussed with Consultants/Other Providers [Y/N]:   Prior or Outpatient Records Reviewed [Y/N]:

## 2022-05-05 NOTE — DISCHARGE NOTE PROVIDER - CARE PROVIDER_API CALL
Zenia Shannon)  Surgery; Thoracic Surgery  [phone] [place] [address]  Phone: [phone]  Fax: [phone]  Established Patient  Follow Up Time: 2 weeks

## 2022-05-06 VITALS
HEART RATE: 67 BPM | DIASTOLIC BLOOD PRESSURE: 65 MMHG | TEMPERATURE: 98 F | OXYGEN SATURATION: 98 % | RESPIRATION RATE: 18 BRPM | SYSTOLIC BLOOD PRESSURE: 120 MMHG

## 2022-05-06 LAB
ALBUMIN SERPL ELPH-MCNC: 3.8 G/DL — SIGNIFICANT CHANGE UP (ref 3.3–5)
ALP SERPL-CCNC: 124 U/L — HIGH (ref 40–120)
ALT FLD-CCNC: 77 U/L — HIGH (ref 4–41)
ANION GAP SERPL CALC-SCNC: 11 MMOL/L — SIGNIFICANT CHANGE UP (ref 7–14)
AST SERPL-CCNC: 28 U/L — SIGNIFICANT CHANGE UP (ref 4–40)
BILIRUB SERPL-MCNC: 0.4 MG/DL — SIGNIFICANT CHANGE UP (ref 0.2–1.2)
BUN SERPL-MCNC: 12 MG/DL — SIGNIFICANT CHANGE UP (ref 7–23)
CALCIUM SERPL-MCNC: 8.9 MG/DL — SIGNIFICANT CHANGE UP (ref 8.4–10.5)
CHLORIDE SERPL-SCNC: 103 MMOL/L — SIGNIFICANT CHANGE UP (ref 98–107)
CO2 SERPL-SCNC: 25 MMOL/L — SIGNIFICANT CHANGE UP (ref 22–31)
CREAT SERPL-MCNC: 0.72 MG/DL — SIGNIFICANT CHANGE UP (ref 0.5–1.3)
EGFR: 129 ML/MIN/1.73M2 — SIGNIFICANT CHANGE UP
GLUCOSE BLDC GLUCOMTR-MCNC: 119 MG/DL — HIGH (ref 70–99)
GLUCOSE BLDC GLUCOMTR-MCNC: 151 MG/DL — HIGH (ref 70–99)
GLUCOSE BLDC GLUCOMTR-MCNC: 80 MG/DL — SIGNIFICANT CHANGE UP (ref 70–99)
GLUCOSE SERPL-MCNC: 77 MG/DL — SIGNIFICANT CHANGE UP (ref 70–99)
POTASSIUM SERPL-MCNC: 4.1 MMOL/L — SIGNIFICANT CHANGE UP (ref 3.5–5.3)
POTASSIUM SERPL-SCNC: 4.1 MMOL/L — SIGNIFICANT CHANGE UP (ref 3.5–5.3)
PROT SERPL-MCNC: 5.9 G/DL — LOW (ref 6–8.3)
SODIUM SERPL-SCNC: 139 MMOL/L — SIGNIFICANT CHANGE UP (ref 135–145)

## 2022-05-06 PROCEDURE — 99231 SBSQ HOSP IP/OBS SF/LOW 25: CPT | Mod: GC

## 2022-05-06 PROCEDURE — 99232 SBSQ HOSP IP/OBS MODERATE 35: CPT

## 2022-05-06 RX ORDER — GABAPENTIN 400 MG/1
1 CAPSULE ORAL
Qty: 42 | Refills: 0
Start: 2022-05-06 | End: 2022-05-19

## 2022-05-06 RX ORDER — ONDANSETRON 8 MG/1
1 TABLET, FILM COATED ORAL
Qty: 42 | Refills: 0
Start: 2022-05-06 | End: 2022-05-19

## 2022-05-06 RX ORDER — BUPRENORPHINE AND NALOXONE 2; .5 MG/1; MG/1
1 TABLET SUBLINGUAL
Qty: 6 | Refills: 0
Start: 2022-05-06 | End: 2022-05-08

## 2022-05-06 RX ORDER — CLONAZEPAM 1 MG
1 TABLET ORAL
Qty: 28 | Refills: 0
Start: 2022-05-06 | End: 2022-05-19

## 2022-05-06 RX ORDER — DIVALPROEX SODIUM 500 MG/1
1 TABLET, DELAYED RELEASE ORAL
Qty: 42 | Refills: 0
Start: 2022-05-06 | End: 2022-05-19

## 2022-05-06 RX ADMIN — SIMETHICONE 80 MILLIGRAM(S): 80 TABLET, CHEWABLE ORAL at 05:45

## 2022-05-06 RX ADMIN — PANTOPRAZOLE SODIUM 40 MILLIGRAM(S): 20 TABLET, DELAYED RELEASE ORAL at 12:46

## 2022-05-06 RX ADMIN — Medication 0.5 MILLIGRAM(S): at 05:44

## 2022-05-06 RX ADMIN — GABAPENTIN 400 MILLIGRAM(S): 400 CAPSULE ORAL at 12:47

## 2022-05-06 RX ADMIN — BUPRENORPHINE AND NALOXONE 2 TABLET(S): 2; .5 TABLET SUBLINGUAL at 05:44

## 2022-05-06 RX ADMIN — Medication 20 MILLIGRAM(S): at 12:47

## 2022-05-06 RX ADMIN — Medication 52.5 MILLIGRAM(S): at 05:48

## 2022-05-06 RX ADMIN — HEPARIN SODIUM 5000 UNIT(S): 5000 INJECTION INTRAVENOUS; SUBCUTANEOUS at 05:45

## 2022-05-06 RX ADMIN — Medication 650 MILLIGRAM(S): at 06:57

## 2022-05-06 RX ADMIN — GABAPENTIN 400 MILLIGRAM(S): 400 CAPSULE ORAL at 05:45

## 2022-05-06 RX ADMIN — SIMETHICONE 80 MILLIGRAM(S): 80 TABLET, CHEWABLE ORAL at 12:46

## 2022-05-06 RX ADMIN — LIDOCAINE 1 PATCH: 4 CREAM TOPICAL at 12:48

## 2022-05-06 NOTE — PROGRESS NOTE ADULT - PROBLEM SELECTOR PROBLEM 3
History of esophageal stricture

## 2022-05-06 NOTE — PROGRESS NOTE ADULT - PROBLEM SELECTOR PLAN 4
-ALK phos elevated  -  -f/u RUQ US
LFTs elevated, but continues to trend down
LFTs elevated, but trending down
LFTs elevated, but trending down
LFTs elevated, but continues to trend down
LFTs elevated, but continues to trend down

## 2022-05-06 NOTE — PROGRESS NOTE ADULT - PROBLEM SELECTOR PLAN 1
-multiple loose watery stools, no blood or melena. Patient denies the possibilities of withdrawals at this time  -CT iv con shows small bowel enteritis, no obstruction  -possibly due to J tube, GI Consulted  -f/u GI stool PCR and C. diff studies- Discussed with the lab today, tests will need to be repeated  -if studies above negative consider d/cing antibiotics, possibly due to viral gastroenteritis  -can c/w cipro and flagyl for now given pt has instrumentation w/ J tube  -s/p 2 L NS can c/w 125 cc/hr LR  -Patient under the care of CT surgery, medicine will continue to follow
-multiple loose watery stools, no blood or melena.   -CT iv con shows small bowel enteritis, no obstruction  -possibly due to J tube, GI Consulted  -f/u GI stool PCR and C. diff studies- Discussed with the lab 5/2, test repeated and are negative.   -On cipro and flagyl for now given pt has instrumentation w/ J tube, GI follow up  -s/p 2 L NS can c/w 125 cc/hr LR  -Patient under the care of CT surgery, medicine will continue to follow  Has agreed to be transitioned to Suboxone
-multiple loose watery stools, no blood or melena. Patient denies the possibilities of withdrawals at this time  -CT iv con shows small bowel enteritis, no obstruction  -possibly due to J tube, GI Consulted  -f/u GI stool PCR and C. diff studies- Discussed with the lab 5/2, tests needed to be repeated.   -if studies above negative consider d/cing antibiotics, possibly due to viral gastroenteritis  -can c/w cipro and flagyl for now given pt has instrumentation w/ J tube  -s/p 2 L NS can c/w 125 cc/hr LR  -Patient under the care of CT surgery, medicine will continue to follow  GI also consulted
-multiple loose watery stools, no blood or melena. Patient denies the possibilities of withdrawals at this time  -CT iv con shows small bowel enteritis, no obstruction  -possibly due to J tube, email sent to GI  -f/u GI stool PCR and C. diff studies  -if studies above negative consider d/cing antibiotics, possibly due to viral gastroenteritis  -can c/w cipro and flagyl for now given pt has instrumentation w/ J tube  -s/p 2 L NS can c/w 125 cc/hr LR  -please reach out to general surgery in am as pt had recent procedure
-multiple loose watery stools, no blood or melena. Patient denies the possibilities of withdrawals at this time  -CT iv con shows small bowel enteritis, no obstruction  -possibly due to J tube, GI Consulted  -f/u GI stool PCR and C. diff studies- Discussed with the lab 5/2, test repeated and are negative.   -On cipro and flagyl for now given pt has instrumentation w/ J tube, GI follow up  -s/p 2 L NS can c/w 125 cc/hr LR  -Patient under the care of CT surgery, medicine will continue to follow
-multiple loose watery stools, no blood or melena.   -CT iv con shows small bowel enteritis, no obstruction  -f/u GI stool PCR and C. diff studies- Discussed with the lab 5/2, test repeated and are negative.   -Patient under the care of CT surgery  Has agreed to be transitioned to Suboxone    Completed antibiotics, electrolytes stable, BMs improved.

## 2022-05-06 NOTE — PROGRESS NOTE ADULT - PROBLEM SELECTOR PROBLEM 2
Enteritis presumed infectious

## 2022-05-06 NOTE — PROGRESS NOTE ADULT - SUBJECTIVE AND OBJECTIVE BOX
Chief Complaint:  Patient is a 26y old  Male who presents with a chief complaint of Diarrhea s/p esophagectomy (05 May 2022 23:54)      Interval Events:     Suboxone started yesterday - patient reports significantly improved pain relief.  He is eating and drinking well, his stools are loose, reports only 1 BM this morning.   He is planned for discharge today and may get J tube removed.    Allergies:  No Known Allergies      Hospital Medications:  acetaminophen    Suspension .. 650 milliGRAM(s) Oral every 6 hours PRN  clonazePAM  Tablet 0.5 milliGRAM(s) Oral two times a day  cyclobenzaprine 5 milliGRAM(s) Oral every 8 hours PRN  dextrose 50% Injectable 25 Gram(s) IV Push once  dextrose 50% Injectable 12.5 Gram(s) IV Push once  dextrose 50% Injectable 25 Gram(s) IV Push once  dextrose Oral Gel 15 Gram(s) Oral once  gabapentin Solution 400 milliGRAM(s) Oral three times a day  glucagon  Injectable 1 milliGRAM(s) IntraMuscular once  heparin   Injectable 5000 Unit(s) SubCutaneous every 12 hours  hydrOXYzine hydrochloride 50 milliGRAM(s) Oral every 6 hours PRN  lidocaine   4% Patch 1 Patch Transdermal daily  lidocaine   4% Patch 1 Patch Transdermal daily  pantoprazole   Suspension 40 milliGRAM(s) Oral daily  PARoxetine 20 milliGRAM(s) Oral daily  polyethylene glycol 3350 17 Gram(s) Oral daily  senna 2 Tablet(s) Oral at bedtime  simethicone 80 milliGRAM(s) Chew three times a day  valproate sodium IVPB 250 milliGRAM(s) IV Intermittent three times a day      PMHX/PSHX:  Heroin abuse    Cocaine abuse    GERD (gastroesophageal reflux disease)    Hiatal hernia    History of esophageal stricture    Intravenous drug abuse    No significant past surgical history    Migration of pancreatic stent    Migrated esophageal stent        Family history:  FH: thyroid disease (Grandparent)        ROS:     General:  No weight loss, fevers, chills, night sweats, fatigue   Eyes:  No vision changes  ENT:  No sore throat, pain, runny nose  CV:  No chest pain, palpitations, dizziness   Resp:  No SOB, cough, wheezing  GI:  See HPI  :  No burning with urination, hematuria  Muscle:  No pain, weakness  Neuro:  No weakness/tingling, memory problems  Psych:  No fatigue, insomnia, mood problems, depression  Heme:  No easy bruisability  Skin:  No rash, edema      PHYSICAL EXAM:     GENERAL:  Well developed, no distress  HEENT:  Conjunctivae clear, sclera anicteric  CHEST:  No increased effort w/ respirations  HEART:  Regular rhythm & rate  ABDOMEN:  Soft, non-tender, non-distended +J tube   EXTREMITIES:  no LE  edema  SKIN:  No rash/erythema/ecchymoses/petechiae/wounds/jaundice  NEURO:  Alert, orientedx3    Vital Signs:  Vital Signs Last 24 Hrs  T(C): 36.5 (06 May 2022 05:40), Max: 37 (05 May 2022 11:33)  T(F): 97.7 (06 May 2022 05:40), Max: 98.6 (05 May 2022 11:33)  HR: 67 (06 May 2022 05:40) (63 - 85)  BP: 120/65 (06 May 2022 05:40) (108/66 - 131/48)  BP(mean): --  RR: 18 (06 May 2022 05:40) (17 - 18)  SpO2: 98% (06 May 2022 05:40) (97% - 99%)  Daily       LABS:    05-06    139  |  103  |  12  ----------------------------<  77  4.1   |  25  |  0.72    Ca    8.9      06 May 2022 06:34    TPro  5.9<L>  /  Alb  3.8  /  TBili  0.4  /  DBili  x   /  AST  28  /  ALT  77<H>  /  AlkPhos  124<H>  05-06    LIVER FUNCTIONS - ( 06 May 2022 06:34 )  Alb: 3.8 g/dL / Pro: 5.9 g/dL / ALK PHOS: 124 U/L / ALT: 77 U/L / AST: 28 U/L / GGT: x             Imaging:           Interval Events:     Suboxone started yesterday - patient reports significantly improved pain relief.  He is eating and drinking well, his stools are loose, reports only 1 BM this morning.   He is planned for discharge today and may get J tube removed.    Allergies:  No Known Allergies      Hospital Medications:  acetaminophen    Suspension .. 650 milliGRAM(s) Oral every 6 hours PRN  clonazePAM  Tablet 0.5 milliGRAM(s) Oral two times a day  cyclobenzaprine 5 milliGRAM(s) Oral every 8 hours PRN  dextrose 50% Injectable 25 Gram(s) IV Push once  dextrose 50% Injectable 12.5 Gram(s) IV Push once  dextrose 50% Injectable 25 Gram(s) IV Push once  dextrose Oral Gel 15 Gram(s) Oral once  gabapentin Solution 400 milliGRAM(s) Oral three times a day  glucagon  Injectable 1 milliGRAM(s) IntraMuscular once  heparin   Injectable 5000 Unit(s) SubCutaneous every 12 hours  hydrOXYzine hydrochloride 50 milliGRAM(s) Oral every 6 hours PRN  lidocaine   4% Patch 1 Patch Transdermal daily  lidocaine   4% Patch 1 Patch Transdermal daily  pantoprazole   Suspension 40 milliGRAM(s) Oral daily  PARoxetine 20 milliGRAM(s) Oral daily  polyethylene glycol 3350 17 Gram(s) Oral daily  senna 2 Tablet(s) Oral at bedtime  simethicone 80 milliGRAM(s) Chew three times a day  valproate sodium IVPB 250 milliGRAM(s) IV Intermittent three times a day      PMHX/PSHX:  Heroin abuse    Cocaine abuse    GERD (gastroesophageal reflux disease)    Hiatal hernia    History of esophageal stricture    Intravenous drug abuse    No significant past surgical history    Migration of pancreatic stent    Migrated esophageal stent        Family history:  FH: thyroid disease (Grandparent)        ROS:     General:  No weight loss, fevers, chills, night sweats, fatigue   Eyes:  No vision changes  ENT:  No sore throat, pain, runny nose  CV:  No chest pain, palpitations, dizziness   Resp:  No SOB, cough, wheezing  GI:  See HPI  :  No burning with urination, hematuria  Muscle:  No pain, weakness  Neuro:  No weakness/tingling, memory problems  Psych:  No fatigue, insomnia, mood problems, depression  Heme:  No easy bruisability  Skin:  No rash, edema      PHYSICAL EXAM:     GENERAL:  Well developed, no distress  HEENT:  Conjunctivae clear, sclera anicteric  CHEST:  No increased effort w/ respirations  HEART:  Regular rhythm & rate  ABDOMEN:  Soft, non-tender, non-distended +J tube   EXTREMITIES:  no LE  edema  SKIN:  No rash/erythema/ecchymoses/petechiae/wounds/jaundice  NEURO:  Alert, orientedx3    Vital Signs:  Vital Signs Last 24 Hrs  T(C): 36.5 (06 May 2022 05:40), Max: 37 (05 May 2022 11:33)  T(F): 97.7 (06 May 2022 05:40), Max: 98.6 (05 May 2022 11:33)  HR: 67 (06 May 2022 05:40) (63 - 85)  BP: 120/65 (06 May 2022 05:40) (108/66 - 131/48)  BP(mean): --  RR: 18 (06 May 2022 05:40) (17 - 18)  SpO2: 98% (06 May 2022 05:40) (97% - 99%)  Daily       LABS:    05-06    139  |  103  |  12  ----------------------------<  77  4.1   |  25  |  0.72    Ca    8.9      06 May 2022 06:34    TPro  5.9<L>  /  Alb  3.8  /  TBili  0.4  /  DBili  x   /  AST  28  /  ALT  77<H>  /  AlkPhos  124<H>  05-06    LIVER FUNCTIONS - ( 06 May 2022 06:34 )  Alb: 3.8 g/dL / Pro: 5.9 g/dL / ALK PHOS: 124 U/L / ALT: 77 U/L / AST: 28 U/L / GGT: x             Imaging:

## 2022-05-06 NOTE — PROGRESS NOTE ADULT - PROVIDER SPECIALTY LIST ADULT
Cardiology
Gastroenterology
Thoracic Surgery
CT Surgery
Gastroenterology
Thoracic Surgery
CT Surgery
Cardiology
Cardiology
Gastroenterology
Hospitalist
Thoracic Surgery
Hospitalist

## 2022-05-06 NOTE — PROGRESS NOTE ADULT - ASSESSMENT
27 y/o M hx of esophageal stricture s/p multiple dilations and [place]-Mahesh esophagectomy 04/15/22, prior IVDU no longer on Suboxone who presents due to diarrhea 7-8x that started day of admission (4/30) with small bowel wall thickening on CT suspicious for infectious enteritis, now clinically improving.     Impression:    #Diarrhea - Stool frequency almost at baseline, tolerating oral diet, stool C. diff and GI PCR negative. Suspect resolving infectious enteritis, likely viral, given acute onset of symptoms and small bowel wall thickening.  No new medication changes prior to diarrhea onset. GI stool PCR negative.  #History of esophageal stricture s/p multiple dilations and [place]-Mahesh esophagectomy and J-tube placement 04/15/22  #Elevated liver enzymes - Decreasing; hepatocellular liver injury pattern possible in could be in the setting of acute viral infection/enteritis vs. underlying hepatic steatosis; less likely biliary etiology given normal Tbili. No cholelithiasis or sonographic evidence of acute cholecystitis.   #J-tube for nutrition    Recommendations:  - consider Nutrition evaluation  - okay to start Imodium as needed   - no role for endoscopic evaluation at this time - patient appears to be clinically improving   - supportive care per primary team; GI signing off      Ly Cox PGY-6  Gastroenterology/Hepatology Fellow  Page #[phone]   Page #[phone] 5pm-7am on weekdays, and on weekends   27 y/o M hx of esophageal stricture s/p multiple dilations and [place]-Mahesh esophagectomy 04/15/22, prior IVDU no longer on Suboxone who presents due to diarrhea 7-8x that started day of admission (4/30) with small bowel wall thickening on CT suspicious for infectious enteritis, now clinically improving.     Impression:    #Diarrhea - Stool frequency almost at baseline, tolerating oral diet, stool C. diff and GI PCR negative. Suspect resolving infectious enteritis, likely viral, given acute onset of symptoms and small bowel wall thickening.  No new medication changes prior to diarrhea onset. GI stool PCR negative.  #History of esophageal stricture s/p multiple dilations and [place]-Mahesh esophagectomy and J-tube placement 04/15/22  #Elevated liver enzymes - Decreasing; hepatocellular liver injury pattern possible in could be in the setting of acute viral infection/enteritis vs. underlying hepatic steatosis; less likely biliary etiology given normal Tbili. No cholelithiasis or sonographic evidence of acute cholecystitis.   #J-tube for nutrition    Recommendations:  - consider nutrition evaluation  - Imodium as needed if diarrhea persists  - no role for endoscopic evaluation at this time - patient appears to be clinically improving   - supportive care per primary team; GI signing off      Ly Cox PGY-6  Gastroenterology/Hepatology Fellow  Page #[phone]   Page #[phone] 5pm-7am on weekdays, and on weekends

## 2022-05-06 NOTE — PROGRESS NOTE ADULT - REASON FOR ADMISSION
abdominal pain
Diarrhea, abd pain
Intractable abdominal pain and diarrhea
diarrhea
abdominal pain/diarrhea
diarrhea
diarrhea
pain and diarrhea
abdominal pain and diarrhea
abdominal pain and diarrhea

## 2022-05-06 NOTE — PROGRESS NOTE ADULT - ATTENDING COMMENTS
Clinically improving. Stools loose, but less frequent and overall consistency improved. Abdominal pain better controlled.  Given clinical improvement, no further inpatient GI interventions planned.   Further management per primary team.
Reports continued improvement in diarrhea since symptoms began. Still with abdominal pain (present since recent surgery), but acute on chronic pain has been improving. Liver tests remain elevated and may consider hepatology consultation pending course. Continue supportive care.
Limited history from patient at time of GI evaluation; per other documentation patient with improved symptoms, but still with ongoing stool frequency. Infectious workup negative. Would consult nutrition as TF may be contributing to patient's altered bowel habits as well. If diarrhea worsens/persists, endoscopic evaluation can be pursued for diagnostic purposes, but would continue supportive care for now. Further recommendations as above.

## 2022-05-06 NOTE — CHART NOTE - NSCHARTNOTEFT_GEN_A_CORE
[facility] NIGHT MEDICINE COVERAGE    Notified by RN that pt c/o 10/10 abd pain, unrelieved by PRN 1mg Dilaudid, IV Tylenol 1g attempted w/o much relief.  Pt admitted for diarrhea 2/2 small bowel enteritis, pt on Cipro/Flagyl, w/ stool studies pending.  Pt had recent esophagectomy 2 weeks prior here w/ CT surgery, their service is following.  Pt also has h/o IVDU, not on Suboxone anymore.    Pt assessed at bedside, appears visibly uncomfortable, he admits to 10/10 crampy abdominal pain, admits to feeling need to move occasionally to help the pain, denies h/o kidney stones.  Reports he had some relief during the day around 1 PM after receiving a push of medications, but the pain is cyclical and now is at its worst.  Medications reviewed, pt did receive additional pushes of 0.5mg and 1mg x1 each during the day.  He also endorses some minor relief w/ simethicone which he has been taking.  Pt reports the pain is "so bad, I am worried I will leave and start using again".  denies N/V, chest pain, difficulty breathing, back pain, or headaches.  Reports R sided chest wall numbness 2/2 to his recent procedure, which he said is not new for him - "they had to cut a nerve, they told me".    T(C): 36.9 (05-01-22 @ 21:01), Max: 37.1 (05-01-22 @ 13:05)  HR: 86 (05-01-22 @ 21:01) (54 - 86)  BP: 132/78 (05-01-22 @ 21:01) (99/54 - 132/78)  RR: 18 (05-01-22 @ 21:01) (15 - 18)  SpO2: 100% (05-01-22 @ 21:01) (97% - 100%)    Physical Exam:  GS: A&Ox3, standing up leaning against table, appears uncomfortable, speaking clearly and calmly  Lungs: CTA b/l w/o wheezes, rales, or rhonchi  Heart: RRR, +S1S2, w/o murmur, rub, or gallop  Abd: Diffuse abdominal tenderness, more pronounced over LUQ, RLQ, and LLQ, bandage present w/ tube over LLQ, dressing clean, dry, and intact.  No distension noted, +BS, no rebound tenderness or guarding noted.  Extremities: Warm, well-perfused    Plan:  -Give Dilaudid 0.5mg IVP x1 now for breakthrough - explained cautious use of opiate medications given h/o IVDU and polysubstance abuse, and explained risks of respiratory depression from frequent opioids  -Pt requested pain doctor see him in AM, pt may benefit from their recommendations given h/o substance abuse - notify pain management team in AM  -PO Tylenol switched to suspension per pt request - encourage use  -Check Lactate w/ AM labs  -C/w simethicone  -Pt does not want to try Toradol, he said "it messed by stomach up when I was on it previously  -Monitor BMs given diarrhea  -F/u stool studies  -Pt hemodynamically stable  -Consider repeat imaging if clinical status changes.    Plan d/w RN and pt, all in agreement, all questions answered at bedside.  Pt stable at this time, will continue to monitor.    Roddy Claire PA-C  Department of Medicine - [facility]  In-House Pager: #[phone]
Search Terms: ryland soto, 1995Search Date: 05/01/2022 10:41:34 AM    The Drug Utilization Report below displays all of the controlled substance prescriptions, if any, that your patient has filled in the last twelve months. The information displayed on this report is compiled from pharmacy submissions to the Department, and accurately reflects the information as submitted by the pharmacies.    This report was requested by: Jenny Lund | Reference #: [id number]    Others' Prescriptions  Patient Name: Ryland SotoBirth Date: 1995  Address: [address] [place] [id number]: Male    Rx Written	Rx Dispensed	Drug	Quantity	Days Supply	Prescriber Name	Prescriber Lindsay #	Payment Method	Dispenser  04/27/2022	04/27/2022	oxycodone hcl (ir) 5 mg tablet	30	5	ZenaidaVashti cooney	[id number]	[facility]  04/27/2022	04/27/2022	clonazepam 0.5 mg tablet	6	5	ZenaidaVashti crespo	[id number]	[facility]    Patient Name: Ryland SotoBirth Date: 1995  Address: [address] [address]: Male  Rx Written	Rx Dispensed	Drug	Quantity	Days Supply	Prescriber Name	Prescriber Lindsay #	Payment Method	Dispenser  03/16/2022	03/18/2022	buprenorphine-naloxone 4-1 mg sl film	56	28	Felton Alcantara)	[id number]	Insurance	[id number]  02/16/2022	02/17/2022	suboxone 4 mg-1 mg sl film	56	28	Felton Alcantara)	[id number]	Insurance	[id number]  02/09/2022	02/09/2022	suboxone 4 mg-1 mg sl film	14	7	Felton Alcantara)	[id number]	Insurance	[id number]  02/01/2022	02/01/2022	buprenorphine-naloxone 4-1 mg sl film	14	7	Felton Alcantara)	[id number]	Insurance	[id number]  01/25/2022	01/25/2022	buprenorphine-naloxone 4-1 mg sl film	14	7	Felton Alcantara)	[id number]	Insurance	[id number]  * - Drugs marked with an asterisk are compound drugs. If the compound drug is made up of more than one controlled substance, then each controlled substance will be a separate row in the table.
Came by to eval patient today but was unable to as psychiatry team was evaluating patient. Liver enzymes improving.     Recs:  -f/u C diff   -please re-order and send GI PCR (test has been cancelled 2x so far)  -no role for endoscopic eval at this time  -supportive care per primary team  -continue to trend liver tests  -if liver enzymes do not improve with supportive care, can consider MRCP and/or hepatology eval    Kamilla Hare MD  GI Fellow, PGY-4  Available via [employer] Teams    NON-URGENT CONSULTS:  Please email [email or web address]  [email or web address]  AT NIGHT AND ON WEEKENDS:  Contact on-call GI fellow via answering service ([phone]) from 5pm-8am and on weekends/holidays  MONDAY-FRIDAY 8AM-5PM:  Pager# [phone] ([facility]) or [phone] ([facility])  GI Phone# [phone] ([facility])
Discussed case with primary team PA regarding disposition planning. Recommending [facility] ([facility] at [facility] for both substance use and behavioral health needs.     Pt will need to call their main intake number during normal business hours (recommending that he call immediately to secure an appointment in the near future). Contact number is [phone]. He has some Suboxone at home from his previous provider, according to him.     Suggest providing patient with 2 weeks worth of other medications: Klonopin, Depakote, and Paxil.   There is no psychiatric contraindication to discharge when medically cleared    Please call with any questions/concerns.     Michelet Franklin MD  Consultation-Liaison Psychiatry, Director  [phone]

## 2022-05-06 NOTE — PROGRESS NOTE ADULT - PROBLEM SELECTOR PLAN 3
-recent esophagectomy  -had procedure last visit, pt can c/w protonix for now  -pt tolerates soft diet at home
-recent esophagectomy  -had procedure last visit, pt can c/w protonix for now  -pt tolerates soft diet at home  GI and CT surgery consulted, will follow up final recs
-recent esophagectomy  -had procedure last visit, pt can c/w protonix for now  -pt tolerates soft diet at home
-recent esophagectomy  -had procedure last visit, pt can c/w protonix for now  -pt tolerates soft diet at home

## 2022-05-06 NOTE — PROGRESS NOTE ADULT - SUBJECTIVE AND OBJECTIVE BOX
Kadeem Keita MD  [facility] Hospitalist  Pager [phone]  Email: [email or web address]          PROGRESS NOTE:     Patient is a 26y old  Male who presents with a chief complaint of diarrhea (06 May 2022 09:30)      SUBJECTIVE / OVERNIGHT EVENTS:  Patient seen and examined this morning. Patient states that he is going home. Reports to be feeling much better.  ADDITIONAL REVIEW OF SYSTEMS:  No f/c/n/v    MEDICATIONS  (STANDING):  clonazePAM  Tablet 0.5 milliGRAM(s) Oral two times a day  dextrose 50% Injectable 12.5 Gram(s) IV Push once  dextrose 50% Injectable 25 Gram(s) IV Push once  dextrose 50% Injectable 25 Gram(s) IV Push once  dextrose Oral Gel 15 Gram(s) Oral once  gabapentin Solution 400 milliGRAM(s) Oral three times a day  glucagon  Injectable 1 milliGRAM(s) IntraMuscular once  heparin   Injectable 5000 Unit(s) SubCutaneous every 12 hours  lidocaine   4% Patch 1 Patch Transdermal daily  lidocaine   4% Patch 1 Patch Transdermal daily  pantoprazole   Suspension 40 milliGRAM(s) Oral daily  PARoxetine 20 milliGRAM(s) Oral daily  polyethylene glycol 3350 17 Gram(s) Oral daily  senna 2 Tablet(s) Oral at bedtime  simethicone 80 milliGRAM(s) Chew three times a day  valproate sodium IVPB 250 milliGRAM(s) IV Intermittent three times a day    MEDICATIONS  (PRN):  acetaminophen    Suspension .. 650 milliGRAM(s) Oral every 6 hours PRN Temp greater or equal to 38C (100.4F), Mild Pain (1 - 3)  cyclobenzaprine 5 milliGRAM(s) Oral every 8 hours PRN Muscle Spasm  hydrOXYzine hydrochloride 50 milliGRAM(s) Oral every 6 hours PRN Anxiety      CAPILLARY BLOOD GLUCOSE      POCT Blood Glucose.: 151 mg/dL (06 May 2022 12:13)  POCT Blood Glucose.: 80 mg/dL (06 May 2022 06:07)  POCT Blood Glucose.: 119 mg/dL (06 May 2022 00:12)  POCT Blood Glucose.: 107 mg/dL (05 May 2022 17:59)    I&O's Summary    05 May 2022 07:01  -  06 May 2022 07:00  --------------------------------------------------------  IN: 170 mL / OUT: 0 mL / NET: 170 mL        PHYSICAL EXAM:  Vital Signs Last 24 Hrs  T(C): 36.5 (06 May 2022 05:40), Max: 37 (05 May 2022 17:47)  T(F): 97.7 (06 May 2022 05:40), Max: 98.6 (05 May 2022 17:47)  HR: 67 (06 May 2022 05:40) (63 - 85)  BP: 120/65 (06 May 2022 05:40) (110/68 - 131/48)  BP(mean): --  RR: 18 (06 May 2022 05:40) (17 - 18)  SpO2: 98% (06 May 2022 05:40) (97% - 99%)     PHYSICAL EXAM:  GENERAL: NAD, well-developed  HEAD:  Atraumatic, Normocephalic  EYES: EOMI, PERRLA, conjunctiva and sclera clear  NECK: Supple, No JVD  CHEST/LUNG: Clear to auscultation bilaterally; No wheeze  HEART: Regular rate and rhythm; No murmurs, rubs, or gallops  ABDOMEN: Soft, tender throughout to palpation, nondistended, J tube in place no erythema, or warmth noted  EXTREMITIES:  2+ Peripheral Pulses, No clubbing, cyanosis, or edema  PSYCH: AAOx3  NEUROLOGY: non-focal  SKIN: No rashes or lesions      LABS:    05-06    139  |  103  |  12  ----------------------------<  77  4.1   |  25  |  0.72    Ca    8.9      06 May 2022 06:34    TPro  5.9<L>  /  Alb  3.8  /  TBili  0.4  /  DBili  x   /  AST  28  /  ALT  77<H>  /  AlkPhos  124<H>  05-06              GI PCR Panel, Stool (collected 04 May 2022 17:16)  Source: .Stool Feces  Final Report (05 May 2022 04:45):    GI PCR Results: NOT detected    *******Please Note:*******    GI panel PCR evaluates for:    Campylobacter, Plesiomonas shigelloides, Salmonella,    Vibrio, Yersinia enterocolitica, Enteroaggregative    Escherichia coli (EAEC), Enteropathogenic E.coli (EPEC),    Enterotoxigenic E. coli (ETEC) lt/st, Shiga-like    toxin-producing E. coli (STEC) stx1/stx2,    Shigella/ Enteroinvasive E. coli (EIEC), Cryptosporidium,    Cyclospora cayetanensis, Entamoeba histolytica,    Giardia lamblia, Adenovirus F 40/41, Astrovirus,    Norovirus GI/GII, Rotavirus A, Sapovirus        RADIOLOGY & ADDITIONAL TESTS:  Results Reviewed:   Imaging Personally Reviewed:  Electrocardiogram Personally Reviewed:    COORDINATION OF CARE:  Care Discussed with Consultants/Other Providers [Y/N]:  Prior or Outpatient Records Reviewed [Y/N]:

## 2022-05-18 DIAGNOSIS — G62.9 POLYNEUROPATHY, UNSPECIFIED: ICD-10-CM

## 2022-05-18 DIAGNOSIS — F41.9 ANXIETY DISORDER, UNSPECIFIED: ICD-10-CM

## 2022-05-18 DIAGNOSIS — Z98.890 NAUSEA WITH VOMITING, UNSPECIFIED: ICD-10-CM

## 2022-05-18 DIAGNOSIS — R11.2 NAUSEA WITH VOMITING, UNSPECIFIED: ICD-10-CM

## 2022-05-18 NOTE — CONSULT NOTE ADULT - NSICDXPILOT_GEN_ALL_CORE
[employer] You can access the FollowMyHealth Patient Portal offered by [facility] by registering at the following website: [email or web address]. By joining [employer]’s FollowMyHealth portal, you will also be able to view your health information using other applications (apps) compatible with our system.

## 2022-05-19 NOTE — ED ADULT NURSE NOTE - AS TEMP SITE
rectal
Implemented All Fall Risk Interventions:  [place] to call system. Call bell, personal items and telephone within reach. Instruct patient to call for assistance. Room bathroom lighting operational. Non-slip footwear when patient is off stretcher. Physically safe environment: no spills, clutter or unnecessary equipment. Stretcher in lowest position, wheels locked, appropriate side rails in place. Provide visual cue, wrist band, yellow gown, etc. Monitor gait and stability. Monitor for mental status changes and reorient to person, place, and time. Review medications for side effects contributing to fall risk. Reinforce activity limits and safety measures with patient and family.

## 2022-05-23 ENCOUNTER — OUTPATIENT (OUTPATIENT)
Dept: OUTPATIENT SERVICES | Facility: HOSPITAL | Age: 27
LOS: 1 days | End: 2022-05-23
Payer: MEDICAID

## 2022-05-23 ENCOUNTER — APPOINTMENT (OUTPATIENT)
Dept: THORACIC SURGERY | Facility: CLINIC | Age: 27
End: 2022-05-23
Payer: MEDICAID

## 2022-05-23 ENCOUNTER — APPOINTMENT (OUTPATIENT)
Dept: RADIOLOGY | Facility: HOSPITAL | Age: 27
End: 2022-05-23

## 2022-05-23 VITALS
WEIGHT: 158 LBS | HEIGHT: 71 IN | BODY MASS INDEX: 22.12 KG/M2 | OXYGEN SATURATION: 96 % | DIASTOLIC BLOOD PRESSURE: 63 MMHG | SYSTOLIC BLOOD PRESSURE: 99 MMHG | HEART RATE: 75 BPM

## 2022-05-23 DIAGNOSIS — Z98.890 OTHER SPECIFIED POSTPROCEDURAL STATES: ICD-10-CM

## 2022-05-23 DIAGNOSIS — J90 PLEURAL EFFUSION, NOT ELSEWHERE CLASSIFIED: ICD-10-CM

## 2022-05-23 DIAGNOSIS — K21.9 GASTRO-ESOPHAGEAL REFLUX DISEASE WITHOUT ESOPHAGITIS: ICD-10-CM

## 2022-05-23 DIAGNOSIS — T85.528A DISPLACEMENT OF OTHER GASTROINTESTINAL PROSTHETIC DEVICES, IMPLANTS AND GRAFTS, INITIAL ENCOUNTER: Chronic | ICD-10-CM

## 2022-05-23 DIAGNOSIS — K22.2 ESOPHAGEAL OBSTRUCTION: ICD-10-CM

## 2022-05-23 PROCEDURE — 99024 POSTOP FOLLOW-UP VISIT: CPT

## 2022-05-23 PROCEDURE — 71046 X-RAY EXAM CHEST 2 VIEWS: CPT | Mod: 26

## 2022-05-23 RX ORDER — ONDANSETRON HYDROCHLORIDE 4 MG/1
4 TABLET, FILM COATED ORAL
Refills: 0 | Status: ACTIVE | COMMUNITY

## 2022-05-23 RX ORDER — LANSOPRAZOLE 30 MG/1
30 CAPSULE, DELAYED RELEASE ORAL TWICE DAILY
Qty: 60 | Refills: 6 | Status: DISCONTINUED | COMMUNITY
Start: 2021-07-27 | End: 2022-05-23

## 2022-05-23 RX ORDER — LANSOPRAZOLE 30 MG/1
30 CAPSULE, DELAYED RELEASE ORAL TWICE DAILY
Qty: 60 | Refills: 0 | Status: DISCONTINUED | COMMUNITY
Start: 2021-07-27 | End: 2022-05-23

## 2022-05-23 RX ORDER — SUCRALFATE 1 G/10ML
1 SUSPENSION ORAL
Refills: 0 | Status: DISCONTINUED | COMMUNITY
End: 2022-05-23

## 2022-05-24 RX ORDER — GABAPENTIN 400 MG/1
400 CAPSULE ORAL 3 TIMES DAILY
Qty: 90 | Refills: 0 | Status: ACTIVE | COMMUNITY
Start: 2022-05-24 | End: 1900-01-01

## 2022-05-27 ENCOUNTER — NON-APPOINTMENT (OUTPATIENT)
Age: 27
End: 2022-05-27

## 2022-06-02 ENCOUNTER — NON-APPOINTMENT (OUTPATIENT)
Age: 27
End: 2022-06-02

## 2022-06-12 ENCOUNTER — EMERGENCY (EMERGENCY)
Facility: HOSPITAL | Age: 27
LOS: 1 days | Discharge: HOME | End: 2022-06-12
Attending: EMERGENCY MEDICINE
Payer: MEDICAID

## 2022-06-12 VITALS
OXYGEN SATURATION: 98 % | DIASTOLIC BLOOD PRESSURE: 72 MMHG | HEART RATE: 72 BPM | SYSTOLIC BLOOD PRESSURE: 122 MMHG | HEIGHT: 71 IN | WEIGHT: 156.09 LBS | RESPIRATION RATE: 20 BRPM

## 2022-06-12 VITALS
SYSTOLIC BLOOD PRESSURE: 128 MMHG | DIASTOLIC BLOOD PRESSURE: 74 MMHG | RESPIRATION RATE: 18 BRPM | HEART RATE: 84 BPM | OXYGEN SATURATION: 99 %

## 2022-06-12 DIAGNOSIS — T85.528A DISPLACEMENT OF OTHER GASTROINTESTINAL PROSTHETIC DEVICES, IMPLANTS AND GRAFTS, INITIAL ENCOUNTER: Chronic | ICD-10-CM

## 2022-06-12 LAB
ALBUMIN SERPL ELPH-MCNC: 4.6 G/DL — SIGNIFICANT CHANGE UP (ref 3.3–5)
ALP SERPL-CCNC: 77 U/L — SIGNIFICANT CHANGE UP (ref 40–120)
ALT FLD-CCNC: 75 U/L — HIGH (ref 10–45)
ANION GAP SERPL CALC-SCNC: 10 MMOL/L — SIGNIFICANT CHANGE UP (ref 5–17)
APTT BLD: 29.3 SEC — SIGNIFICANT CHANGE UP (ref 27.5–35.5)
AST SERPL-CCNC: 69 U/L — HIGH (ref 10–40)
BASOPHILS # BLD AUTO: 0.02 K/UL — SIGNIFICANT CHANGE UP (ref 0–0.2)
BASOPHILS NFR BLD AUTO: 0.3 % — SIGNIFICANT CHANGE UP (ref 0–2)
BILIRUB SERPL-MCNC: 1.1 MG/DL — SIGNIFICANT CHANGE UP (ref 0.2–1.2)
BLD GP AB SCN SERPL QL: NEGATIVE — SIGNIFICANT CHANGE UP
BUN SERPL-MCNC: 15 MG/DL — SIGNIFICANT CHANGE UP (ref 7–23)
CALCIUM SERPL-MCNC: 9.4 MG/DL — SIGNIFICANT CHANGE UP (ref 8.4–10.5)
CHLORIDE SERPL-SCNC: 103 MMOL/L — SIGNIFICANT CHANGE UP (ref 96–108)
CO2 SERPL-SCNC: 26 MMOL/L — SIGNIFICANT CHANGE UP (ref 22–31)
CREAT SERPL-MCNC: 0.91 MG/DL — SIGNIFICANT CHANGE UP (ref 0.5–1.3)
EGFR: 119 ML/MIN/1.73M2 — SIGNIFICANT CHANGE UP
EOSINOPHIL # BLD AUTO: 0.03 K/UL — SIGNIFICANT CHANGE UP (ref 0–0.5)
EOSINOPHIL NFR BLD AUTO: 0.4 % — SIGNIFICANT CHANGE UP (ref 0–6)
GLUCOSE SERPL-MCNC: 95 MG/DL — SIGNIFICANT CHANGE UP (ref 70–99)
HCT VFR BLD CALC: 35.1 % — LOW (ref 39–50)
HGB BLD-MCNC: 10.8 G/DL — LOW (ref 13–17)
IMM GRANULOCYTES NFR BLD AUTO: 0.3 % — SIGNIFICANT CHANGE UP (ref 0–1.5)
INR BLD: 1.16 RATIO — SIGNIFICANT CHANGE UP (ref 0.88–1.16)
LYMPHOCYTES # BLD AUTO: 0.92 K/UL — LOW (ref 1–3.3)
LYMPHOCYTES # BLD AUTO: 13.4 % — SIGNIFICANT CHANGE UP (ref 13–44)
MCHC RBC-ENTMCNC: 27.2 PG — SIGNIFICANT CHANGE UP (ref 27–34)
MCHC RBC-ENTMCNC: 30.8 GM/DL — LOW (ref 32–36)
MCV RBC AUTO: 88.4 FL — SIGNIFICANT CHANGE UP (ref 80–100)
MONOCYTES # BLD AUTO: 0.5 K/UL — SIGNIFICANT CHANGE UP (ref 0–0.9)
MONOCYTES NFR BLD AUTO: 7.3 % — SIGNIFICANT CHANGE UP (ref 2–14)
NEUTROPHILS # BLD AUTO: 5.38 K/UL — SIGNIFICANT CHANGE UP (ref 1.8–7.4)
NEUTROPHILS NFR BLD AUTO: 78.3 % — HIGH (ref 43–77)
NRBC # BLD: 0 /100 WBCS — SIGNIFICANT CHANGE UP (ref 0–0)
PLATELET # BLD AUTO: 100 K/UL — LOW (ref 150–400)
POTASSIUM SERPL-MCNC: 4.6 MMOL/L — SIGNIFICANT CHANGE UP (ref 3.5–5.3)
POTASSIUM SERPL-SCNC: 4.6 MMOL/L — SIGNIFICANT CHANGE UP (ref 3.5–5.3)
PROT SERPL-MCNC: 7.1 G/DL — SIGNIFICANT CHANGE UP (ref 6–8.3)
PROTHROM AB SERPL-ACNC: 13.4 SEC — SIGNIFICANT CHANGE UP (ref 10.5–13.4)
RBC # BLD: 3.97 M/UL — LOW (ref 4.2–5.8)
RBC # FLD: 13.9 % — SIGNIFICANT CHANGE UP (ref 10.3–14.5)
RH IG SCN BLD-IMP: POSITIVE — SIGNIFICANT CHANGE UP
SARS-COV-2 RNA SPEC QL NAA+PROBE: SIGNIFICANT CHANGE UP
SODIUM SERPL-SCNC: 139 MMOL/L — SIGNIFICANT CHANGE UP (ref 135–145)
WBC # BLD: 6.87 K/UL — SIGNIFICANT CHANGE UP (ref 3.8–10.5)
WBC # FLD AUTO: 6.87 K/UL — SIGNIFICANT CHANGE UP (ref 3.8–10.5)

## 2022-06-12 PROCEDURE — 86901 BLOOD TYPING SEROLOGIC RH(D): CPT

## 2022-06-12 PROCEDURE — 85025 COMPLETE CBC W/AUTO DIFF WBC: CPT

## 2022-06-12 PROCEDURE — 96375 TX/PRO/DX INJ NEW DRUG ADDON: CPT

## 2022-06-12 PROCEDURE — 86900 BLOOD TYPING SEROLOGIC ABO: CPT

## 2022-06-12 PROCEDURE — 86850 RBC ANTIBODY SCREEN: CPT

## 2022-06-12 PROCEDURE — 21451 CLTX MNDBLR FX W/MNPJ: CPT | Mod: LT

## 2022-06-12 PROCEDURE — 96374 THER/PROPH/DIAG INJ IV PUSH: CPT

## 2022-06-12 PROCEDURE — U0003: CPT

## 2022-06-12 PROCEDURE — 85730 THROMBOPLASTIN TIME PARTIAL: CPT

## 2022-06-12 PROCEDURE — 99284 EMERGENCY DEPT VISIT MOD MDM: CPT

## 2022-06-12 PROCEDURE — 96376 TX/PRO/DX INJ SAME DRUG ADON: CPT

## 2022-06-12 PROCEDURE — 85610 PROTHROMBIN TIME: CPT

## 2022-06-12 PROCEDURE — 99285 EMERGENCY DEPT VISIT HI MDM: CPT | Mod: 25

## 2022-06-12 PROCEDURE — 80053 COMPREHEN METABOLIC PANEL: CPT

## 2022-06-12 RX ORDER — HYDROMORPHONE HYDROCHLORIDE 2 MG/ML
1 INJECTION INTRAMUSCULAR; INTRAVENOUS; SUBCUTANEOUS ONCE
Refills: 0 | Status: DISCONTINUED | OUTPATIENT
Start: 2022-06-12 | End: 2022-06-12

## 2022-06-12 RX ORDER — KETOROLAC TROMETHAMINE 30 MG/ML
15 SYRINGE (ML) INJECTION ONCE
Refills: 0 | Status: DISCONTINUED | OUTPATIENT
Start: 2022-06-12 | End: 2022-06-12

## 2022-06-12 RX ORDER — HYDROMORPHONE HYDROCHLORIDE 2 MG/ML
0.5 INJECTION INTRAMUSCULAR; INTRAVENOUS; SUBCUTANEOUS ONCE
Refills: 0 | Status: DISCONTINUED | OUTPATIENT
Start: 2022-06-12 | End: 2022-06-12

## 2022-06-12 RX ORDER — HYDROMORPHONE HYDROCHLORIDE 2 MG/ML
2 INJECTION INTRAMUSCULAR; INTRAVENOUS; SUBCUTANEOUS ONCE
Refills: 0 | Status: DISCONTINUED | OUTPATIENT
Start: 2022-06-12 | End: 2022-06-12

## 2022-06-12 RX ORDER — ACETAMINOPHEN 500 MG
1000 TABLET ORAL ONCE
Refills: 0 | Status: COMPLETED | OUTPATIENT
Start: 2022-06-12 | End: 2022-06-12

## 2022-06-12 RX ADMIN — Medication 15 MILLIGRAM(S): at 13:21

## 2022-06-12 RX ADMIN — Medication 15 MILLIGRAM(S): at 09:10

## 2022-06-12 RX ADMIN — HYDROMORPHONE HYDROCHLORIDE 0.5 MILLIGRAM(S): 2 INJECTION INTRAMUSCULAR; INTRAVENOUS; SUBCUTANEOUS at 09:09

## 2022-06-12 RX ADMIN — HYDROMORPHONE HYDROCHLORIDE 1 MILLIGRAM(S): 2 INJECTION INTRAMUSCULAR; INTRAVENOUS; SUBCUTANEOUS at 11:11

## 2022-06-12 RX ADMIN — HYDROMORPHONE HYDROCHLORIDE 2 MILLIGRAM(S): 2 INJECTION INTRAMUSCULAR; INTRAVENOUS; SUBCUTANEOUS at 06:49

## 2022-06-12 RX ADMIN — Medication 400 MILLIGRAM(S): at 07:00

## 2022-06-12 NOTE — ED PROVIDER NOTE - CARE PROVIDER_API CALL
El Sanchez (DDS; MD)  OralMaxillofacial Surgery  [address] [place] [address], [address]  [place] [address]  Phone: [phone]  Fax: [phone]  Follow Up Time: 4-6 Days
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
## 2022-06-12 NOTE — ED PROVIDER NOTE - PHYSICAL EXAMINATION
CONSTITUTIONAL: non-toxic, uncomfortable appearing  SKIN: no rash, no petechiae.  EYES: PERRL, EOMI, pink conjunctiva, anicteric  ENT: tongue and uvular midline, no exudates, moist mucous membranes. Left mandible tender with soft tissue swelling. Teeth appear intact. No pooling of secretions. No stridor.   NECK: Supple; trachea midline, no JVD  CARD: RRR, no murmurs, equal radial pulses bilaterally 2+  RESP: CTAB, no respiratory distress  ABD: Soft, non-tender, non-distended, no peritoneal signs  EXT: Normal ROM x4. No edema.   NEURO: Alert, oriented. Neuro exam nonfocal.  PSYCH: Cooperative, appropriate.
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
## 2022-06-12 NOTE — ED PROVIDER NOTE - NSFOLLOWUPINSTRUCTIONS_ED_ALL_ED_FT
1. You presented to the emergency department for:  mandible fracture    2. Your evaluation in the emergency department included a physician evaluation. Your work-up did not reveal any findings indicating the need for admission to the hospital or any emergent interventions at this time.     3. It is recommended that you follow-up with oral maxillofacial surgery in 1 week as discussed for a repeat evaluation, and potentially further testing and treatment.     If needed, to arrange an appointment with a primary care provider please call: [phone]    4. Please continue taking your regular medications as prescribed.     For pain you may take 500-1000mg Tylenol every 8 hours - as needed.  This is an over-the-counter medication - please read the instructions for use and warnings on the label. If you have any questions regarding its use, you may refer them to your local pharmacist.    You can use 400-600mg Ibuprofen (such as motrin or advil) every 6 to 8 hours as needed for pain control.  Take ibuprofen with food or milk to lessen stomach upset.  This is an over-the-counter medication please respect the warnings on the label. All medications come with certain risks and side effects that you need to discuss with your doctor, especially if you are taking them for a prolonged period (beyond 3-4 days).    5. PLEASE RETURN TO THE EMERGENCY DEPARTMENT IMMEDIATELY IF you develop any fevers not responding to over the counter medications, uncontrollable nausea and vomiting, an inability to tolerate eating and drinking, difficulty breathing, chest pain, a severe increase in your symptoms or pain, or any other new symptoms that concern you.

## 2022-06-12 NOTE — ED PROVIDER NOTE - NS ED ROS FT
Review of Systems    Constitutional: (-) fever, (-) chills, (-) fatigue  Cardiovascular: (-) chest pain, (-) syncope  Respiratory: (-) cough, (-) shortness of breath  Gastrointestinal: (-) vomiting, (-) diarrhea, (-) abdominal pain  Musculoskeletal: (-) neck pain, (-) back pain, (-) joint pain  Integumentary: (-) rash, (+) edema, (-) wound  Neurological: (+) headache, (-) altered mental status    Except as documented in the HPI, all other systems are negative.
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
## 2022-06-12 NOTE — ED PROVIDER NOTE - PATIENT PORTAL LINK FT
You can access the FollowMyHealth Patient Portal offered by [facility] by registering at the following website: [email or web address]. By joining [employer]’s FollowMyHealth portal, you will also be able to view your health information using other applications (apps) compatible with our system.

## 2022-06-12 NOTE — ED PROVIDER NOTE - PROGRESS NOTE DETAILS
Gomez-PGY3: spoke to OMFS resident re: consult, pending eval/recs. Marco A Chapa PGY2: [id number] reduced by OMFS. Pt to follow up outpt w/ them in 1 week. They rec tylenol/motrin for pain control. Pt in agreement with plan. Strict return precautions provided.

## 2022-06-12 NOTE — ED ADULT NURSE REASSESSMENT NOTE - NS ED NURSE REASSESS COMMENT FT1
Pt returned from dental suite. A/O x3, still c/o 10/10 pain. Medicated with Toradol 15 mg IV as ordered. D/c with written instructions by MD. Pt and Mother verbalize understanding of D/c instructions. IV D/c'd, no redness, swelling, bleeding. D/c amb, A/O x3.
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
## 2022-06-17 ENCOUNTER — NON-APPOINTMENT (OUTPATIENT)
Age: 27
End: 2022-06-17

## 2022-06-18 ENCOUNTER — EMERGENCY (EMERGENCY)
Facility: HOSPITAL | Age: 27
LOS: 1 days | Discharge: HOME | End: 2022-06-18
Attending: EMERGENCY MEDICINE | Admitting: EMERGENCY MEDICINE
Payer: MEDICAID

## 2022-06-18 VITALS
HEIGHT: 71 IN | SYSTOLIC BLOOD PRESSURE: 140 MMHG | OXYGEN SATURATION: 99 % | DIASTOLIC BLOOD PRESSURE: 71 MMHG | RESPIRATION RATE: 16 BRPM | HEART RATE: 92 BPM | TEMPERATURE: 97 F

## 2022-06-18 DIAGNOSIS — T85.528A DISPLACEMENT OF OTHER GASTROINTESTINAL PROSTHETIC DEVICES, IMPLANTS AND GRAFTS, INITIAL ENCOUNTER: Chronic | ICD-10-CM

## 2022-06-18 LAB
ALBUMIN SERPL ELPH-MCNC: 4.8 G/DL — SIGNIFICANT CHANGE UP (ref 3.3–5)
ALP SERPL-CCNC: 68 U/L — SIGNIFICANT CHANGE UP (ref 40–120)
ALT FLD-CCNC: 34 U/L — SIGNIFICANT CHANGE UP (ref 4–41)
ANION GAP SERPL CALC-SCNC: 14 MMOL/L — SIGNIFICANT CHANGE UP (ref 7–14)
APTT BLD: 28.9 SEC — SIGNIFICANT CHANGE UP (ref 27–36.3)
AST SERPL-CCNC: 31 U/L — SIGNIFICANT CHANGE UP (ref 4–40)
BASOPHILS # BLD AUTO: 0.01 K/UL — SIGNIFICANT CHANGE UP (ref 0–0.2)
BASOPHILS NFR BLD AUTO: 0.1 % — SIGNIFICANT CHANGE UP (ref 0–2)
BILIRUB SERPL-MCNC: 0.5 MG/DL — SIGNIFICANT CHANGE UP (ref 0.2–1.2)
BLOOD GAS VENOUS COMPREHENSIVE RESULT: SIGNIFICANT CHANGE UP
BUN SERPL-MCNC: 20 MG/DL — SIGNIFICANT CHANGE UP (ref 7–23)
CALCIUM SERPL-MCNC: 9.6 MG/DL — SIGNIFICANT CHANGE UP (ref 8.4–10.5)
CHLORIDE SERPL-SCNC: 100 MMOL/L — SIGNIFICANT CHANGE UP (ref 98–107)
CO2 SERPL-SCNC: 25 MMOL/L — SIGNIFICANT CHANGE UP (ref 22–31)
CREAT SERPL-MCNC: 0.83 MG/DL — SIGNIFICANT CHANGE UP (ref 0.5–1.3)
EGFR: 124 ML/MIN/1.73M2 — SIGNIFICANT CHANGE UP
EOSINOPHIL # BLD AUTO: 0 K/UL — SIGNIFICANT CHANGE UP (ref 0–0.5)
EOSINOPHIL NFR BLD AUTO: 0 % — SIGNIFICANT CHANGE UP (ref 0–6)
GLUCOSE SERPL-MCNC: 154 MG/DL — HIGH (ref 70–99)
HCT VFR BLD CALC: 38.2 % — LOW (ref 39–50)
HGB BLD-MCNC: 11.6 G/DL — LOW (ref 13–17)
IANC: 5.76 K/UL — SIGNIFICANT CHANGE UP (ref 1.8–7.4)
IMM GRANULOCYTES NFR BLD AUTO: 0.4 % — SIGNIFICANT CHANGE UP (ref 0–1.5)
INR BLD: 1.09 RATIO — SIGNIFICANT CHANGE UP (ref 0.88–1.16)
LYMPHOCYTES # BLD AUTO: 1.1 K/UL — SIGNIFICANT CHANGE UP (ref 1–3.3)
LYMPHOCYTES # BLD AUTO: 15 % — SIGNIFICANT CHANGE UP (ref 13–44)
MAGNESIUM SERPL-MCNC: 1.8 MG/DL — SIGNIFICANT CHANGE UP (ref 1.6–2.6)
MCHC RBC-ENTMCNC: 27.3 PG — SIGNIFICANT CHANGE UP (ref 27–34)
MCHC RBC-ENTMCNC: 30.4 GM/DL — LOW (ref 32–36)
MCV RBC AUTO: 89.9 FL — SIGNIFICANT CHANGE UP (ref 80–100)
MONOCYTES # BLD AUTO: 0.43 K/UL — SIGNIFICANT CHANGE UP (ref 0–0.9)
MONOCYTES NFR BLD AUTO: 5.9 % — SIGNIFICANT CHANGE UP (ref 2–14)
NEUTROPHILS # BLD AUTO: 5.76 K/UL — SIGNIFICANT CHANGE UP (ref 1.8–7.4)
NEUTROPHILS NFR BLD AUTO: 78.6 % — HIGH (ref 43–77)
NRBC # BLD: 0 /100 WBCS — SIGNIFICANT CHANGE UP
NRBC # FLD: 0 K/UL — SIGNIFICANT CHANGE UP
NT-PROBNP SERPL-SCNC: 141 PG/ML — SIGNIFICANT CHANGE UP
PLATELET # BLD AUTO: 170 K/UL — SIGNIFICANT CHANGE UP (ref 150–400)
POTASSIUM SERPL-MCNC: 4.2 MMOL/L — SIGNIFICANT CHANGE UP (ref 3.5–5.3)
POTASSIUM SERPL-SCNC: 4.2 MMOL/L — SIGNIFICANT CHANGE UP (ref 3.5–5.3)
PROT SERPL-MCNC: 7.7 G/DL — SIGNIFICANT CHANGE UP (ref 6–8.3)
PROTHROM AB SERPL-ACNC: 12.7 SEC — SIGNIFICANT CHANGE UP (ref 10.5–13.4)
RBC # BLD: 4.25 M/UL — SIGNIFICANT CHANGE UP (ref 4.2–5.8)
RBC # FLD: 13.6 % — SIGNIFICANT CHANGE UP (ref 10.3–14.5)
SODIUM SERPL-SCNC: 139 MMOL/L — SIGNIFICANT CHANGE UP (ref 135–145)
TROPONIN T, HIGH SENSITIVITY RESULT: 11 NG/L — SIGNIFICANT CHANGE UP
WBC # BLD: 7.33 K/UL — SIGNIFICANT CHANGE UP (ref 3.8–10.5)
WBC # FLD AUTO: 7.33 K/UL — SIGNIFICANT CHANGE UP (ref 3.8–10.5)

## 2022-06-18 PROCEDURE — 71046 X-RAY EXAM CHEST 2 VIEWS: CPT | Mod: 26

## 2022-06-18 PROCEDURE — 99285 EMERGENCY DEPT VISIT HI MDM: CPT

## 2022-06-18 RX ORDER — SODIUM CHLORIDE 9 MG/ML
1000 INJECTION INTRAMUSCULAR; INTRAVENOUS; SUBCUTANEOUS ONCE
Refills: 0 | Status: COMPLETED | OUTPATIENT
Start: 2022-06-18 | End: 2022-06-18

## 2022-06-18 RX ADMIN — SODIUM CHLORIDE 1000 MILLILITER(S): 9 INJECTION INTRAMUSCULAR; INTRAVENOUS; SUBCUTANEOUS at 23:25
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
## 2022-06-18 NOTE — ED ADULT TRIAGE NOTE - CHIEF COMPLAINT QUOTE
Pt c/o intermittent left sided CP x 1 week, worse upon inspiration. Pt's jaw currently wired closed due to recent surgery.  Denies SOb, fever, chills. PMHx: GERD, cocaine/heroin use.

## 2022-06-18 NOTE — ED PROVIDER NOTE - PATIENT PORTAL LINK FT
You can access the FollowMyHealth Patient Portal offered by [facility] by registering at the following website: [email or web address]. By joining [facility]’s FollowMyHealth portal, you will also be able to view your health information using other applications (apps) compatible with our system.

## 2022-06-18 NOTE — ED PROVIDER NOTE - CLINICAL SUMMARY MEDICAL DECISION MAKING FREE TEXT BOX
27y/o M w/ h/o substance abuse on Suboxone, esophageal stricture s/p esophagectomy p/w chest pain w/ palpable crepitus. Concern for subcuatneous emphysema. Plan CT chest and neck. 25y/o M w/ h/o substance abuse on Suboxone, esophageal stricture s/p esophagectomy p/w chest pain w/ palpable crepitus. DDx subcutaneous emphysema vs costochondritis. Plan CT chest and neck.

## 2022-06-18 NOTE — ED PROVIDER NOTE - NSFOLLOWUPINSTRUCTIONS_ED_ALL_ED_FT
170.18
Chest Pain    Chest pain can be caused by many different conditions which may or may not be dangerous. Causes include heartburn, lung infections, heart attack, blood clot in lungs, skin infections, strain or damage to muscle, cartilage, or bones, etc. In addition to a history and physical examination, an electrocardiogram (ECG) or other lab tests may have been performed to determine the cause of your chest pain. Follow up with your primary care provider or with a cardiologist as instructed.     SEEK IMMEDIATE MEDICAL CARE IF YOU HAVE ANY OF THE FOLLOWING SYMPTOMS: worsening chest pain, coughing up blood, unexplained back/neck/jaw pain, severe abdominal pain, dizziness or lightheadedness, fainting, shortness of breath, sweaty or clammy skin, vomiting, or racing heart beat. These symptoms may represent a serious problem that is an emergency. Do not wait to see if the symptoms will go away. Get medical help right away. Call 911 and do not drive yourself to the hospital.     -Please follow up with your Primary Care Doctor within 24-48 hours and bring your paperwork     -Please take Tylenol up to 650 mg every 6 hours as needed for pain and/or Motrin up to 600 mg every 8 hours as needed for pain
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
## 2022-06-18 NOTE — ED PROVIDER NOTE - PHYSICAL EXAMINATION
Gen: NAD, non-toxic appearing  Head: normal appearing  HEENT: normal conjunctiva, oral mucosa moist  Lung: no respiratory distress, speaking in full sentences, CTA b/l     CV: regular rate and rhythm, no murmurs  Abd: soft, non distended, non tender   MSK: no visible deformities  Neuro: No focal deficits, AAOx3  Skin: demarcation of chest with palpable crepitus under left clavicle  Psych: normal affect Gen: NAD, non-toxic appearing  Head: normal appearing  HEENT: normal conjunctiva, oral mucosa moist  Lung: no respiratory distress, speaking in full sentences, CTA b/l     CV: regular rate and rhythm, no murmurs  Abd: soft, non distended, non tender   MSK: no visible deformities  Neuro: No focal deficits, AAOx3  Skin: demarcation of skin overlaying chest with palpable tenderness under left clavicle  Psych: normal affect
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
## 2022-06-19 VITALS
OXYGEN SATURATION: 100 % | SYSTOLIC BLOOD PRESSURE: 115 MMHG | RESPIRATION RATE: 18 BRPM | TEMPERATURE: 98 F | HEART RATE: 60 BPM | DIASTOLIC BLOOD PRESSURE: 70 MMHG

## 2022-06-19 LAB
BLOOD GAS VENOUS COMPREHENSIVE RESULT: SIGNIFICANT CHANGE UP
TROPONIN T, HIGH SENSITIVITY RESULT: 10 NG/L — SIGNIFICANT CHANGE UP

## 2022-06-19 PROCEDURE — 71260 CT THORAX DX C+: CPT | Mod: 26,MB

## 2022-06-19 PROCEDURE — 70491 CT SOFT TISSUE NECK W/DYE: CPT | Mod: 26,MA

## 2022-06-19 RX ORDER — CHLORHEXIDINE GLUCONATE 213 G/1000ML
15 SOLUTION TOPICAL
Qty: 210 | Refills: 0
Start: 2022-06-19 | End: 2022-06-25

## 2022-06-19 RX ORDER — AMOXICILLIN 250 MG/5ML
5 SUSPENSION, RECONSTITUTED, ORAL (ML) ORAL
Qty: 150 | Refills: 0
Start: 2022-06-19 | End: 2022-06-28

## 2022-06-19 NOTE — ED ADULT NURSE REASSESSMENT NOTE - NS ED NURSE REASSESS COMMENT FT1
received report from SAUL HERCULES pt aox4 in no apparent distress VSS, NSR on cardiac monitor. Pt endorses persistent chest pain but states mostly when taking deep breath. Rpt trop sent. Will continue to monitor

## 2022-06-20 ENCOUNTER — APPOINTMENT (OUTPATIENT)
Dept: THORACIC SURGERY | Facility: CLINIC | Age: 27
End: 2022-06-20

## 2022-08-17 NOTE — ASU PATIENT PROFILE, ADULT - ALCOHOL USE HISTORY SINGLE SELECT
Caller: Olivia Addison    Relationship to patient: Self    Best call back number: [phone]    Chief complaint: RIGHT KNEE PAIN    Type of visit: INJECTION    Requested date: ASAP          
never

## 2022-08-24 NOTE — ASU PATIENT PROFILE, ADULT - URINARY CATHETER
Chief Complaint   Patient presents with    Chest Pain    Onset 2 hours ago of left upper chest pain, pt is moaning and hyperventilating. EKG done in triage. No cardias hx.   no

## 2022-09-27 NOTE — ED ADULT NURSE NOTE - NS ED NURSE PATIENT LEFT UNIT TIME
18:31 Hemigard Postcare Instructions: The HEMIGARD strips are to remain completely dry for at least 5-7 days.
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
## 2022-10-19 ENCOUNTER — NON-APPOINTMENT (OUTPATIENT)
Age: 27
End: 2022-10-19

## 2022-10-20 NOTE — PATIENT PROFILE ADULT - NSPRONUTRITIONRISK_GEN_A_NUR
No indicators present Double Island Pedicle Flap Text: The defect edges were debeveled with a #15 scalpel blade.  Given the location of the defect, shape of the defect and the proximity to free margins a double island pedicle advancement flap was deemed most appropriate.  Using a sterile surgical marker, an appropriate advancement flap was drawn incorporating the defect, outlining the appropriate donor tissue and placing the expected incisions within the relaxed skin tension lines where possible.    The area thus outlined was incised deep to adipose tissue with a #15 scalpel blade.  The skin margins were undermined to an appropriate distance in all directions around the primary defect and laterally outward around the island pedicle utilizing iris scissors.  There was minimal undermining beneath the pedicle flap.

## 2022-11-23 NOTE — ED ADULT TRIAGE NOTE - DIRECT TO ROOM CARE INITIATED:
PLEASE PUT PREAD AND PREOP ORDERS IN FOR SURGERY ON 12-22-22.  PATIENT WILL SEE DR VAZQUEZ FOR H&P AND PRE-OP.  COVID TEST IS NEEDED.
17-Jan-2021 16:20

## 2022-12-09 ENCOUNTER — APPOINTMENT (OUTPATIENT)
Dept: RADIOLOGY | Facility: HOSPITAL | Age: 27
End: 2022-12-09

## 2022-12-12 ENCOUNTER — APPOINTMENT (OUTPATIENT)
Dept: THORACIC SURGERY | Facility: CLINIC | Age: 27
End: 2022-12-12
Payer: MEDICAID

## 2022-12-12 ENCOUNTER — NON-APPOINTMENT (OUTPATIENT)
Age: 27
End: 2022-12-12

## 2022-12-19 ENCOUNTER — NON-APPOINTMENT (OUTPATIENT)
Age: 27
End: 2022-12-19

## 2022-12-27 ENCOUNTER — APPOINTMENT (OUTPATIENT)
Dept: GASTROENTEROLOGY | Facility: CLINIC | Age: 27
End: 2022-12-27

## 2022-12-27 VITALS
DIASTOLIC BLOOD PRESSURE: 66 MMHG | WEIGHT: 170 LBS | SYSTOLIC BLOOD PRESSURE: 109 MMHG | OXYGEN SATURATION: 99 % | TEMPERATURE: 98.1 F | HEART RATE: 69 BPM | BODY MASS INDEX: 23.8 KG/M2 | HEIGHT: 71 IN

## 2022-12-27 PROCEDURE — 99213 OFFICE O/P EST LOW 20 MIN: CPT

## 2022-12-27 NOTE — HISTORY OF PRESENT ILLNESS
[FreeTextEntry1] : This is a pleasant 27 year old male with a history of dysphagia, esophageal stricture. S/P multiple dilation, esophageal stent failure. Patient s/p esophagectomy. Patient reports that GERD is well control with Pantoprazole 40 mg once daily and Probiotic. Patient denies any dysphagia. Patient states that he is now experiencing a lot of gas, bloating and dumping syndrome. Patient states bloating is experience after almost every meal. Reports early satiety and occasional painful abdominal distention. He takes Beano to help with the gas pain. Reports occasional fecal incontinent with excessive flatulence. Originally, he experience dumpings syndrome when he ate dairy products. Episode of dumping syndrome starts off with a bad taste in his mouth, shaking and sweating approximately once weekly. Patient denies any nausea, vomiting, diarrhea, constipation.

## 2022-12-27 NOTE — ASSESSMENT
[FreeTextEntry1] : This is a pleasant 27 year old male with a history of dysphagia, esophageal stricture. S/P multiple dilation, esophageal stent failure. S/P esophagectomy.  For GERD, patient to continue with Pantoprazole 40 mg daily. Will order X-ray for upper GI series.

## 2022-12-27 NOTE — PHYSICAL EXAM

## 2022-12-29 ENCOUNTER — APPOINTMENT (OUTPATIENT)
Dept: THORACIC SURGERY | Facility: CLINIC | Age: 27
End: 2022-12-29
Payer: MEDICAID

## 2023-02-06 ENCOUNTER — APPOINTMENT (OUTPATIENT)
Dept: THORACIC SURGERY | Facility: CLINIC | Age: 28
End: 2023-02-06
Payer: MEDICAID

## 2023-02-17 ENCOUNTER — OUTPATIENT (OUTPATIENT)
Dept: OUTPATIENT SERVICES | Facility: HOSPITAL | Age: 28
LOS: 1 days | End: 2023-02-17
Payer: MEDICAID

## 2023-02-17 ENCOUNTER — APPOINTMENT (OUTPATIENT)
Dept: RADIOLOGY | Facility: HOSPITAL | Age: 28
End: 2023-02-17

## 2023-02-17 DIAGNOSIS — T85.528A DISPLACEMENT OF OTHER GASTROINTESTINAL PROSTHETIC DEVICES, IMPLANTS AND GRAFTS, INITIAL ENCOUNTER: Chronic | ICD-10-CM

## 2023-02-17 DIAGNOSIS — K22.2 ESOPHAGEAL OBSTRUCTION: ICD-10-CM

## 2023-02-17 DIAGNOSIS — K21.9 GASTRO-ESOPHAGEAL REFLUX DISEASE WITHOUT ESOPHAGITIS: ICD-10-CM

## 2023-02-17 PROCEDURE — 74240 X-RAY XM UPR GI TRC 1CNTRST: CPT | Mod: 26

## 2023-02-27 ENCOUNTER — APPOINTMENT (OUTPATIENT)
Dept: THORACIC SURGERY | Facility: CLINIC | Age: 28
End: 2023-02-27
Payer: MEDICAID

## 2023-02-27 VITALS
RESPIRATION RATE: 18 BRPM | HEART RATE: 96 BPM | HEIGHT: 72 IN | WEIGHT: 170 LBS | BODY MASS INDEX: 23.03 KG/M2 | DIASTOLIC BLOOD PRESSURE: 79 MMHG | SYSTOLIC BLOOD PRESSURE: 118 MMHG | OXYGEN SATURATION: 98 %

## 2023-02-27 PROCEDURE — 99214 OFFICE O/P EST MOD 30 MIN: CPT

## 2023-02-27 RX ORDER — CLONAZEPAM 2 MG/1
2 TABLET ORAL
Refills: 0 | Status: COMPLETED | COMMUNITY
End: 2023-02-27

## 2023-02-27 RX ORDER — CLONAZEPAM 0.5 MG/1
0.5 TABLET ORAL
Qty: 14 | Refills: 0 | Status: COMPLETED | COMMUNITY
Start: 2022-05-18 | End: 2023-02-27

## 2023-02-27 RX ORDER — CLONAZEPAM 0.5 MG/1
0.5 TABLET ORAL
Qty: 14 | Refills: 0 | Status: COMPLETED | COMMUNITY
Start: 2022-06-02 | End: 2023-02-27

## 2023-02-27 RX ORDER — PAROXETINE HYDROCHLORIDE 20 MG/1
20 TABLET, FILM COATED ORAL DAILY
Qty: 14 | Refills: 0 | Status: COMPLETED | COMMUNITY
Start: 2022-05-27 | End: 2023-02-27

## 2023-02-27 RX ORDER — CLONAZEPAM 0.5 MG/1
0.5 TABLET ORAL TWICE DAILY
Qty: 5 | Refills: 0 | Status: COMPLETED | COMMUNITY
Start: 2022-05-23 | End: 2023-02-27

## 2023-02-27 RX ORDER — ONDANSETRON 4 MG/1
4 TABLET, ORALLY DISINTEGRATING ORAL DAILY
Qty: 14 | Refills: 0 | Status: COMPLETED | COMMUNITY
Start: 2022-05-18 | End: 2023-02-27

## 2023-02-27 RX ORDER — SIMETHICONE CHEW TAB 80 MG 80 MG
80 TABLET ORAL
Refills: 0 | Status: COMPLETED | COMMUNITY
End: 2023-02-27

## 2023-02-27 RX ORDER — DIVALPROEX SODIUM 250 MG/1
250 TABLET, DELAYED RELEASE ORAL
Refills: 0 | Status: COMPLETED | COMMUNITY
End: 2023-02-27

## 2023-02-27 RX ORDER — CLONAZEPAM 0.5 MG/1
0.5 TABLET ORAL
Qty: 14 | Refills: 0 | Status: COMPLETED | COMMUNITY
Start: 2022-05-27 | End: 2023-02-27

## 2023-02-27 RX ORDER — PANTOPRAZOLE SODIUM 40 MG/1
40 TABLET, DELAYED RELEASE ORAL
Refills: 0 | Status: COMPLETED | COMMUNITY
End: 2023-02-27

## 2023-02-27 RX ORDER — PAROXETINE HYDROCHLORIDE 20 MG/1
20 TABLET, FILM COATED ORAL
Refills: 0 | Status: COMPLETED | COMMUNITY
End: 2023-02-27

## 2023-02-27 RX ORDER — GABAPENTIN 400 MG/1
400 CAPSULE ORAL EVERY 8 HOURS
Qty: 21 | Refills: 0 | Status: COMPLETED | COMMUNITY
Start: 2022-05-18 | End: 2023-02-27

## 2023-02-27 NOTE — CONSULT LETTER
[Dear  ___] : Dear  [unfilled], [Consult Letter:] : I had the pleasure of evaluating your patient, [unfilled]. [Please see my note below.] : Please see my note below. [Consult Closing:] : Thank you very much for allowing me to participate in the care of this patient.  If you have any questions, please do not hesitate to contact me. [Sincerely,] : Sincerely, [FreeTextEntry2] : Dr. Thurman (PCP)\par Dr. Thelma Whitaker (GI) [FreeTextEntry3] : Zenia Shannon MD\par Attending Surgeon \par Division of Thoracic Surgery\par \par [employer] [facility]\par \par [facility]\par [address]\par Oncology [address]\[place] [address]\par Tel: [phone]\par Fax: [phone]\par
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
## 2023-03-30 NOTE — BH CONSULTATION LIAISON ASSESSMENT NOTE - SUICIDE ATTEMPT:
Anesthesia Pre-Procedure Evaluation    Patient: James Salvador   MRN: [id number] : 1945        Procedure : Procedure(s):  wide local excision of left cheek melanoma  BIOPSY, LYMPH NODE, SENTINEL          Past Medical History:   Diagnosis Date     Actinic cheilitis 2013    Lower lip, left      Actinic keratosis      Allergic rhinitis      Anxiety 3/1/23     Arthritis 2004     Basal cell carcinoma      Benign hypertension      CAD (coronary artery disease)     Cardiac cath and PCI . Cardiac Cath 2015: BRIDGET to LAD     Hearing problem     Wear hearing aids     Hyperlipidemia      Malignant melanoma      Morbid obesity 2016     Paroxysmal supraventricular tachycardia     on metoprolol     Permanent atrial fibrillation 2017     Squamous cell carcinoma      Tachy-edinson syndrome 2019      Past Surgical History:   Procedure Laterality Date     ADENOIDECTOMY  Childhood     ANGIOPLASTY      in [place]     ANKLE SURGERY  2005    right ankle     ARTHROPLASTY HIP Right 10/2009     EP PERM PACER SINGLE LEAD N/A 2019    Medtronic single lead pacemaker     Facial biopsy - Melanoma       GENITOURINARY SURGERY  10/20/23    Prostate surgery     HEART CATH STENT COR W/WO PTCA  2015    BRIDGET stent mid LAD     LASER SURGERY OF EYE  2002     MOHS MICROGRAPHIC PROCEDURE  2004    squamous cell carcinoma right temple     SINUS SURGERY  2006     TONSILLECTOMY  Childhood      Allergies   Allergen Reactions     Cats      Cat Dander     Dogs      Dog Dander     Hydromorphone      Other reaction(s): Confusion     Pollen Extract       Social History     Tobacco Use     Smoking status: Former     Packs/day: 0.50     Years: 10.00     Pack years: 5.00     Types: Cigarettes, Cigars, Pipe     Start date: 1966     Quit date: 1974     Years since quittin.9     Smokeless tobacco: Never     Tobacco comments:     Also smoked from 1985 - 1990   Substance Use  Topics     Alcohol use: Not Currently     Comment: Socially      Wt Readings from Last 1 Encounters:   03/30/23 125.2 kg (276 lb 0.3 oz)        Anesthesia Evaluation            ROS/MED HX  ENT/Pulmonary:       Neurologic:       Cardiovascular:     (+) hypertension--CAD ---pacemaker, Reason placed: atrial fibrillation. type: Medtronic, settings: VVIR, - Patient is dependent on pacemaker. dysrhythmias (currently paced), a-fib,     METS/Exercise Tolerance:     Hematologic:       Musculoskeletal:       GI/Hepatic:       Renal/Genitourinary:       Endo:     (+) Obesity,     Psychiatric/Substance Use:       Infectious Disease:       Malignancy:       Other:            Physical Exam    Airway        Mallampati: I   TM distance: > 3 FB   Neck ROM: full   Mouth opening: > 3 cm    Respiratory Devices and Support         Dental       (+) Removable bridges or other hardware      Cardiovascular          Rhythm and rate: regular and bradycardia     Pulmonary   pulmonary exam normal                OUTSIDE LABS:  CBC:   Lab Results   Component Value Date    WBC 6.2 03/16/2023    WBC 7.3 10/06/2022    HGB 14.6 03/16/2023    HGB 11.5 (L) 10/06/2022    HCT 41.8 03/16/2023    HCT 34.0 (L) 10/06/2022     03/16/2023     10/06/2022     BMP:   Lab Results   Component Value Date     (L) 03/16/2023     (L) 10/06/2022    POTASSIUM 5.0 03/16/2023    POTASSIUM 5.1 10/06/2022    CHLORIDE 93 (L) 03/16/2023    CHLORIDE 92 (L) 10/06/2022    CO2 24 03/16/2023    CO2 27 10/06/2022    BUN 12.2 03/16/2023    BUN 10 10/06/2022    CR 0.71 03/16/2023    CR 0.56 (L) 10/06/2022    GLC 77 03/16/2023    GLC 81 10/06/2022     COAGS:   Lab Results   Component Value Date    INR 1.02 09/23/2015     POC: No results found for: BGM, HCG, HCGS  HEPATIC:   Lab Results   Component Value Date    ALBUMIN 3.6 06/20/2022    PROTTOTAL 8.0 06/20/2022    ALT 34 06/20/2022    AST 25 06/20/2022    ALKPHOS 141 06/20/2022    BILITOTAL 0.3 06/20/2022      OTHER:   Lab Results   Component Value Date    LACT 1.0 06/20/2022    A1C 5.5 07/18/2018    BENJI 9.5 03/16/2023    MAG 2.0 09/23/2015    TSH 1.12 04/20/2017    SED 10 10/03/2014       Anesthesia Plan    ASA Status:  3   NPO Status:  NPO Appropriate    Anesthesia Type: General.     - Airway: ETT   Induction: Intravenous, Propofol.   Maintenance: Balanced.        Consents    Anesthesia Plan(s) and associated risks, benefits, and realistic alternatives discussed. Questions answered and patient/representative(s) expressed understanding.    - Discussed:     - Discussed with:  Patient      - Extended Intubation/Ventilatory Support Discussed: No.      - Patient is DNR/DNI Status: No    Use of blood products discussed: No .     Postoperative Care    Pain management: Multi-modal analgesia, IV analgesics.   PONV prophylaxis: Ondansetron (or other 5HT-3), Dexamethasone or Solumedrol     Comments:                Faye Shore MD   None known

## 2023-04-25 NOTE — BH CONSULTATION LIAISON ASSESSMENT NOTE - CURRENT PASSIVE IDEATION:
Being followed by nephrology, Dr. Richardson  Creatinine 3.67 at discharge 4/19/2023  Discharge recommendations were torsemide 60mg BID, metolazone 10mg three x a week, and avoid nephrotoxic agents/NSAIDS    Discontinued Entresto and spironolactone   None known

## 2023-06-09 NOTE — SBIRT NOTE ADULT - NSSBIRTALCNOACTINTDET_GEN_A_CORE
PLEASE READ YOUR DISCHARGE INSTRUCTIONS ENTIRELY AS IT CONTAINS IMPORTANT INFORMATION.      Take the antibiotics to completion.     Drink plenty of fluids, wipe front to back, take showers not baths, no scented soaps, wear breathable cotton underwear, urinate after sexual intercourse.     A urine culture was sent. You will be contacted once it results and appropriate action will be taken if needed.     Please go to the ER for worsening symptoms including fever, worsening flank pain, vomiting, etc.       Please return or see your primary care doctor if you develop new or worsening symptoms.     Please arrange follow up with your primary medical clinic as soon as possible. You must understand that you've received an Urgent Care treatment only and that you may be released before all of your medical problems are known or treated. You, the patient, will arrange for follow up as instructed. If your symptoms worsen or fail to improve you should go to the Emergency Room.  WE CANNOT RULE OUT ALL POSSIBLE CAUSES OF YOUR SYMPTOMS IN THE URGENT CARE SETTING PLEASE GO TO THE ER IF YOU FEELS YOUR CONDITION IS WORSENING OR YOU WOULD LIKE EMERGENT EVALUATION.     None  dialysis

## 2023-06-09 NOTE — ASU PATIENT PROFILE, ADULT - PRO INTERPRETER NEED 2
[facility] called to get verbal orders for skilled nursing, PT and OT.     [phone]   ok   English S1-S2

## 2023-09-18 DIAGNOSIS — R10.30 LOWER ABDOMINAL PAIN, UNSPECIFIED: ICD-10-CM

## 2023-10-23 NOTE — ED ADULT TRIAGE NOTE - TEMPERATURE IN FAHRENHEIT (DEGREES F)
Detail Level: Zone
Plan: If not improved after new regimen will possibly consider biopsy
Render In Strict Bullet Format?: No
Initiate Treatment: Ketoconazole 2 % shampoo - lather affected areas on scalp let soak 3-5 min before rinsing, use at least 3 to 5 times a week until well controlled, then use 1-2x a week as maintenance; clobetasol 0.05 % scalp solution - apply 6-10 drops to itchy flaky scalp once to twice daily as needed for itching.
98

## 2023-11-02 NOTE — H&P ADULT - NSCORESITESY/N_GEN_A_CORE_RD
No PAST MEDICAL HISTORY:  COVID-19 virus infection 12/2021 fever, hedaches and runny nose    Fatty liver     H/O cholelithiasis     H/O Helicobacter infection 2018 and treated    H/O trichomoniasis treated 10/2022    HTN (hypertension)     metorrhagia/Bleeding s/p D&C    Morbid Obesity BMI 64.2    NICOLÁS (obstructive sleep apnea) moderate - no CPAP    personal h/o Fatty Liver documented on testing     personal h/o Iron Deficiency Anemia     Vertigo last episode 2019

## 2023-11-10 NOTE — PATIENT PROFILE ADULT - NSPRESCRUSEDDRG_GEN_A_NUR
Antepartal discharge instructions given and discussed  To Follow up with Ob in 1 week or sooner if problem arises.  
Pt currently 29 4/7. VSS, safety measures maintained, pt denies pain or uterine cramping, scant brown spotting noted on pad. Pt may be up ad roula per MD order. NST ordered bid and is stable.   
Yes

## 2023-11-27 ENCOUNTER — APPOINTMENT (OUTPATIENT)
Dept: THORACIC SURGERY | Facility: CLINIC | Age: 28
End: 2023-11-27
Payer: MEDICAID

## 2023-11-27 VITALS
HEIGHT: 72 IN | BODY MASS INDEX: 22.35 KG/M2 | OXYGEN SATURATION: 96 % | WEIGHT: 165 LBS | RESPIRATION RATE: 17 BRPM | DIASTOLIC BLOOD PRESSURE: 88 MMHG | SYSTOLIC BLOOD PRESSURE: 137 MMHG | HEART RATE: 84 BPM

## 2023-11-27 DIAGNOSIS — R13.10 DYSPHAGIA, UNSPECIFIED: ICD-10-CM

## 2023-11-27 DIAGNOSIS — K22.2 ESOPHAGEAL OBSTRUCTION: ICD-10-CM

## 2023-11-27 PROCEDURE — 99213 OFFICE O/P EST LOW 20 MIN: CPT

## 2023-12-07 NOTE — ED PROVIDER NOTE - COVID-19 RESULT
Subjective:     Ulysses Coleman  is a 79 y.o. male who presents for Shoulder Pain (Left shoulder px x 1 week )       Resents today with left-sided shoulder pain has been ongoing the past week.  Denies any specific trauma or injury associate with symptom onset.  States that the pain is constant but is worse at night and with movement of the left shoulder.  Pain is primarily present over the front of her shoulder.  He denies any weakness of the left upper extremity, no neck pain or stiffness.  No numbness/tingling of the left arm.       Review of Systems   Musculoskeletal:  Positive for joint pain.   Neurological: Negative.       No Known Allergies  History reviewed. No pertinent past medical history.     Objective:   /82   Pulse 66   Temp 36.6 °C (97.9 °F) (Temporal)   Resp 18   Ht 1.829 m (6')   Wt 74.8 kg (165 lb)   SpO2 99%   BMI 22.38 kg/m²   Physical Exam  Vitals and nursing note reviewed.   Constitutional:       General: He is not in acute distress.     Appearance: He is not ill-appearing or toxic-appearing.   HENT:      Head: Normocephalic.      Nose: No rhinorrhea.   Eyes:      General: No scleral icterus.     Conjunctiva/sclera: Conjunctivae normal.   Pulmonary:      Effort: Pulmonary effort is normal. No respiratory distress.      Breath sounds: No stridor.   Musculoskeletal:      Cervical back: Neck supple.      Comments: Examination of the left shoulder reveals tenderness palpation over the bicipital groove.  No other muscle tenderness or bony tenderness over the left shoulder or shoulder girdle anatomy.  Strength testing of the left upper extremity is comparable bilaterally.  Left shoulder range of motion is comparable bilaterally.  Dermatome testing of the upper extremities was normal.   Neurological:      Mental Status: He is alert and oriented to person, place, and time.   Psychiatric:         Mood and Affect: Mood normal.         Behavior: Behavior normal.         Thought Content:  Thought content normal.         Judgment: Judgment normal.             Diagnostic testing:    Left shoulder x-ray series  Radiologist IMPRESSION:     Unchanged mild glenohumeral and acromioclavicular osteoarthritis    Assessment/Plan:     Encounter Diagnoses   Name Primary?    Acute pain of left shoulder     Left shoulder tendonitis         Plan for care for today's complaint includes starting the patient on Medrol Dosepak for tendinitis of the left shoulder based on physical exam today, specifically over the biceps tendon..  Radiographic imaging was negative for acute bony pathology.  Use over-the-counter medications for additional symptom support.  Continue to monitor symptoms and return to urgent care or follow-up with primary care provider if symptoms remain ongoing.  Follow-up in the emergency department if symptoms become severe, ER precautions discussed in office today..  Prescription for Medrol Dosepak provided.    See AVS Instructions below for written guidance provided to patient on after-visit management and care in addition to our verbal discussion during the visit.    Please note that this dictation was created using voice recognition software. I have attempted to correct all errors, but there may be sound-alike, spelling, grammar and possibly content errors that I did not discover before finalizing the note.    Slade Albrecht PA-C   NEGATIVE

## 2023-12-08 ENCOUNTER — APPOINTMENT (OUTPATIENT)
Dept: CT IMAGING | Facility: CLINIC | Age: 28
End: 2023-12-08
Payer: MEDICAID

## 2023-12-08 PROCEDURE — 71250 CT THORAX DX C-: CPT

## 2024-01-19 ENCOUNTER — NON-APPOINTMENT (OUTPATIENT)
Age: 29
End: 2024-01-19

## 2024-02-13 ENCOUNTER — EMERGENCY (EMERGENCY)
Facility: HOSPITAL | Age: 29
LOS: 1 days | Discharge: HOME | End: 2024-02-13
Attending: EMERGENCY MEDICINE | Admitting: EMERGENCY MEDICINE
Payer: MEDICAID

## 2024-02-13 VITALS
RESPIRATION RATE: 18 BRPM | OXYGEN SATURATION: 99 % | DIASTOLIC BLOOD PRESSURE: 74 MMHG | SYSTOLIC BLOOD PRESSURE: 119 MMHG | HEART RATE: 80 BPM

## 2024-02-13 DIAGNOSIS — T85.528A DISPLACEMENT OF OTHER GASTROINTESTINAL PROSTHETIC DEVICES, IMPLANTS AND GRAFTS, INITIAL ENCOUNTER: Chronic | ICD-10-CM

## 2024-02-13 LAB
ALBUMIN SERPL ELPH-MCNC: 4.2 G/DL — SIGNIFICANT CHANGE UP (ref 3.3–5)
ALP SERPL-CCNC: 112 U/L — SIGNIFICANT CHANGE UP (ref 40–120)
ALT FLD-CCNC: 144 U/L — HIGH (ref 4–41)
ANION GAP SERPL CALC-SCNC: 8 MMOL/L — SIGNIFICANT CHANGE UP (ref 7–14)
APPEARANCE UR: CLEAR — SIGNIFICANT CHANGE UP
AST SERPL-CCNC: 116 U/L — HIGH (ref 4–40)
BACTERIA # UR AUTO: NEGATIVE /HPF — SIGNIFICANT CHANGE UP
BASOPHILS # BLD AUTO: 0.04 K/UL — SIGNIFICANT CHANGE UP (ref 0–0.2)
BASOPHILS NFR BLD AUTO: 1.1 % — SIGNIFICANT CHANGE UP (ref 0–2)
BILIRUB SERPL-MCNC: 1.1 MG/DL — SIGNIFICANT CHANGE UP (ref 0.2–1.2)
BILIRUB UR-MCNC: NEGATIVE — SIGNIFICANT CHANGE UP
BUN SERPL-MCNC: 16 MG/DL — SIGNIFICANT CHANGE UP (ref 7–23)
CALCIUM SERPL-MCNC: 8.9 MG/DL — SIGNIFICANT CHANGE UP (ref 8.4–10.5)
CAST: 0 /LPF — SIGNIFICANT CHANGE UP (ref 0–4)
CHLORIDE SERPL-SCNC: 104 MMOL/L — SIGNIFICANT CHANGE UP (ref 98–107)
CO2 SERPL-SCNC: 26 MMOL/L — SIGNIFICANT CHANGE UP (ref 22–31)
COLOR SPEC: YELLOW — SIGNIFICANT CHANGE UP
CREAT SERPL-MCNC: 0.87 MG/DL — SIGNIFICANT CHANGE UP (ref 0.5–1.3)
DIFF PNL FLD: NEGATIVE — SIGNIFICANT CHANGE UP
EGFR: 121 ML/MIN/1.73M2 — SIGNIFICANT CHANGE UP
EOSINOPHIL # BLD AUTO: 0.1 K/UL — SIGNIFICANT CHANGE UP (ref 0–0.5)
EOSINOPHIL NFR BLD AUTO: 2.6 % — SIGNIFICANT CHANGE UP (ref 0–6)
GLUCOSE SERPL-MCNC: 93 MG/DL — SIGNIFICANT CHANGE UP (ref 70–99)
GLUCOSE UR QL: NEGATIVE MG/DL — SIGNIFICANT CHANGE UP
HCT VFR BLD CALC: 37.1 % — LOW (ref 39–50)
HGB BLD-MCNC: 12.9 G/DL — LOW (ref 13–17)
IANC: 1.53 K/UL — LOW (ref 1.8–7.4)
IMM GRANULOCYTES NFR BLD AUTO: 0 % — SIGNIFICANT CHANGE UP (ref 0–0.9)
KETONES UR-MCNC: ABNORMAL MG/DL
LEUKOCYTE ESTERASE UR-ACNC: NEGATIVE — SIGNIFICANT CHANGE UP
LIDOCAIN IGE QN: 17 U/L — SIGNIFICANT CHANGE UP (ref 7–60)
LYMPHOCYTES # BLD AUTO: 1.73 K/UL — SIGNIFICANT CHANGE UP (ref 1–3.3)
LYMPHOCYTES # BLD AUTO: 45.8 % — HIGH (ref 13–44)
MCHC RBC-ENTMCNC: 31.1 PG — SIGNIFICANT CHANGE UP (ref 27–34)
MCHC RBC-ENTMCNC: 34.8 GM/DL — SIGNIFICANT CHANGE UP (ref 32–36)
MCV RBC AUTO: 89.4 FL — SIGNIFICANT CHANGE UP (ref 80–100)
MONOCYTES # BLD AUTO: 0.38 K/UL — SIGNIFICANT CHANGE UP (ref 0–0.9)
MONOCYTES NFR BLD AUTO: 10.1 % — SIGNIFICANT CHANGE UP (ref 2–14)
NEUTROPHILS # BLD AUTO: 1.53 K/UL — LOW (ref 1.8–7.4)
NEUTROPHILS NFR BLD AUTO: 40.4 % — LOW (ref 43–77)
NITRITE UR-MCNC: NEGATIVE — SIGNIFICANT CHANGE UP
NRBC # BLD: 0 /100 WBCS — SIGNIFICANT CHANGE UP (ref 0–0)
NRBC # FLD: 0 K/UL — SIGNIFICANT CHANGE UP (ref 0–0)
PH UR: 6.5 — SIGNIFICANT CHANGE UP (ref 5–8)
PLATELET # BLD AUTO: 151 K/UL — SIGNIFICANT CHANGE UP (ref 150–400)
POTASSIUM SERPL-MCNC: 4.4 MMOL/L — SIGNIFICANT CHANGE UP (ref 3.5–5.3)
POTASSIUM SERPL-SCNC: 4.4 MMOL/L — SIGNIFICANT CHANGE UP (ref 3.5–5.3)
PROT SERPL-MCNC: 7 G/DL — SIGNIFICANT CHANGE UP (ref 6–8.3)
PROT UR-MCNC: NEGATIVE MG/DL — SIGNIFICANT CHANGE UP
RBC # BLD: 4.15 M/UL — LOW (ref 4.2–5.8)
RBC # FLD: 12 % — SIGNIFICANT CHANGE UP (ref 10.3–14.5)
RBC CASTS # UR COMP ASSIST: 0 /HPF — SIGNIFICANT CHANGE UP (ref 0–4)
SODIUM SERPL-SCNC: 138 MMOL/L — SIGNIFICANT CHANGE UP (ref 135–145)
SP GR SPEC: 1.02 — SIGNIFICANT CHANGE UP (ref 1–1.03)
SQUAMOUS # UR AUTO: 1 /HPF — SIGNIFICANT CHANGE UP (ref 0–5)
UROBILINOGEN FLD QL: 1 MG/DL — SIGNIFICANT CHANGE UP (ref 0.2–1)
WBC # BLD: 3.78 K/UL — LOW (ref 3.8–10.5)
WBC # FLD AUTO: 3.78 K/UL — LOW (ref 3.8–10.5)
WBC UR QL: 0 /HPF — SIGNIFICANT CHANGE UP (ref 0–5)

## 2024-02-13 PROCEDURE — 99285 EMERGENCY DEPT VISIT HI MDM: CPT

## 2024-02-13 PROCEDURE — 74177 CT ABD & PELVIS W/CONTRAST: CPT | Mod: 26,MA

## 2024-02-13 RX ORDER — SODIUM CHLORIDE 9 MG/ML
1000 INJECTION INTRAMUSCULAR; INTRAVENOUS; SUBCUTANEOUS ONCE
Refills: 0 | Status: COMPLETED | OUTPATIENT
Start: 2024-02-13 | End: 2024-02-13

## 2024-02-13 RX ADMIN — SODIUM CHLORIDE 1000 MILLILITER(S): 9 INJECTION INTRAMUSCULAR; INTRAVENOUS; SUBCUTANEOUS at 15:35

## 2024-02-13 NOTE — ED PROVIDER NOTE - PHYSICAL EXAMINATION
Exam as stated below:   CONSTITUTIONAL: In NAD.   SKIN: Warm dry. No rashes noted.   HEAD: NCAT.   EYES: No scleral icterus. Conjunctiva pink.  ABD: Soft. Non-tender. Not distended. no CVAT b/l   MSK: No pedal edema. No calf tenderness.  NEURO: UE/LE grossly intact. Motor UE/LE sensation grossly intact. CN II-XII grossly intact.   PSYCH: Cooperative, appropriate.

## 2024-02-13 NOTE — ED PROVIDER NOTE - NSFOLLOWUPINSTRUCTIONS_ED_ALL_ED_FT
- I am facilitating quick appointment with gastroenterology.  We will call you within 1-3 business days during the afternoon hours to make this happen, we work with your insurance, location, etc all to get you the best care you can.     - Your testing/exams was/were reassuring that dangerous emergencies/conditions are less likely to be occurring or to have occurred.    - Take all medications, if given/sent to pharmacy, and as, directed.    - If you had labs or imaging done, you were given copies of all labs and/or imaging results from your er visit--please take them with you to your follow up appointments.  - If needed, call patient access services at [phone] to find a primary care physician (PCP). Call this number to follow up with a specialty service, such as the spine clinic. If you need this, call and say you were recently in the emergency department and you are calling, per my orders.   - Make sure you do not require a primary care physician's referral if you make a specialty clinic appointment directly. Some insurance requires you to see your PCP, get a referral, then make a specialty appointment.

## 2024-02-13 NOTE — ED PROVIDER NOTE - ATTENDING CONTRIBUTION TO CARE
28-year-old male history of intravenous drug use, history of multiple esophageal strictures requiring dilatation, hiatal hernia, GERD, coming in with right-sided flank pain for 1 month.  Pain is 7 out of 10, takes no pain medications for it.  Does not radiate to groin.  No testicular pain no fevers no chills no nausea no vomiting when this occurs.  No chest pain no shortness of breath.  Last stool yesterday.  Describes the pain as a sharp pain that waxes and wanes.  Also endorsing polyuria.  No history of kidney stones in him or his family

## 2024-02-13 NOTE — ED PROVIDER NOTE - CLINICAL SUMMARY MEDICAL DECISION MAKING FREE TEXT BOX
MDM/Summary/DDx (includes but is not limited to):  28-year-old male with a coming in with 7 out of 10 colicky right-sided flank pain with polyuria.  Less likely to be infectious.   Could be UTI versus less likely pyelonephritis.  could be kidney stone as well.  Exam and history is not consistent with other intra-abdominal pathology, will investigate with CT abdomen pelvis with IV contrast for stone herrera and more.  Exam and history is not consistent with SBO, will not pursue.  exam and history is not consistent with testicular torsion or STI STD or prostatitis.  Will not pursue.  Labs:  CBC CMP UA UC  Imaging: CTAP   Tx: Supportive, pain/nausea medications as pt requires/requests.  Consults/Resources: none   Dispo: home likely    Triage note reviewed. VS reviewed. EKG reviewed and documented in "RESULTS" section, if possible at given time.     DDx in MDM includes the most likely ddx, but is not limited to solely what is listed. Clinical course may alter/deviate from the above plan. When possible, progress notes written, as needed, and are included in "PROGRESS NOTE" section below.       Medical, family, and social determinants of health reviewed and discussed w/ pt/family/caretaker, when allowable, and is incorporated into note above, whenever possible.

## 2024-02-13 NOTE — ED PROVIDER NOTE - OBJECTIVE STATEMENT
HPI & ROS: 28-year-old male history of intravenous drug use, history of multiple esophageal strictures requiring dilatation, hiatal hernia, GERD, coming in with right-sided flank pain for 1 month.  Pain is 7 out of 10, takes no pain medications for it.  Does not radiate to groin.  No testicular pain no fevers no chills no nausea no vomiting when this occurs.  No chest pain no shortness of breath.  Last stool yesterday.  Describes the pain as a sharp pain that waxes and wanes.  Also endorsing polyuria.  No history of kidney stones in him or his family HPI & ROS: 28-year-old male history of intravenous drug use, history of multiple esophageal strictures requiring dilatation, hiatal hernia, GERD, coming in with right-sided flank pain for 6 month.  Pain is 7 out of 10, takes no pain medications for it.  Does not radiate to groin.  No testicular pain no fevers no chills no nausea no vomiting when this occurs.  No chest pain no shortness of breath.  Last stool yesterday.  Describes the pain as a sharp pain that waxes and wanes.  Also endorsing polyuria.  No history of kidney stones in him or his family

## 2024-02-13 NOTE — ED ADULT NURSE NOTE - OBJECTIVE STATEMENT
Pt received to room 23A. Pt A&O x 4, ambulatory. Pt c/o right sided flank pain starting 6 months ago and progressively getting worse over the past week. Pt endorses GERD like symptoms when feeling the pain. - CVA tenderness. Hx of esophagectomy. Pt denies fevers, chills, headache, vision changes, dizziness, chest pain, SOB, numbness or tingling, abdominal pain, N/V/D/C, or urinary symptoms. PERRLA observed. No neuro deficits. Respirations even and unlabored. S1 and S2 heart sounds noted. Abdomen soft, nontender, nondistended. +2 pulses in all extremities. 20G Iv placed in the right hand. Labs drawn and sent. Comfort measure provided. Safety maintained. Pending lab results and imaging.

## 2024-02-13 NOTE — ED PROVIDER NOTE - PATIENT PORTAL LINK FT
You can access the FollowMyHealth Patient Portal offered by [facility] by registering at the following website: [email or web address]. By joining [employer]’s FollowMyHealth portal, you will also be able to view your health information using other applications (apps) compatible with our system.

## 2024-02-13 NOTE — ED ADULT TRIAGE NOTE - CHIEF COMPLAINT QUOTE
Pt AOX4 c/o Right flank pain, persisting and getting worse, also feels urinary frequency and urgency;  Pt has Hx of VUR,  pt s/p esophagectomy w/ gastric pull up for relief of stricture (Surgeon Dr. Shannon; pt takes Pantaprazole, and Subutex daily

## 2024-02-14 LAB
C TRACH RRNA SPEC QL NAA+PROBE: SIGNIFICANT CHANGE UP
CULTURE RESULTS: SIGNIFICANT CHANGE UP
N GONORRHOEA RRNA SPEC QL NAA+PROBE: SIGNIFICANT CHANGE UP
SPECIMEN SOURCE: SIGNIFICANT CHANGE UP

## 2024-02-27 ENCOUNTER — APPOINTMENT (OUTPATIENT)
Dept: GASTROENTEROLOGY | Facility: CLINIC | Age: 29
End: 2024-02-27
Payer: MEDICAID

## 2024-02-27 VITALS
WEIGHT: 167 LBS | BODY MASS INDEX: 22.62 KG/M2 | HEART RATE: 79 BPM | OXYGEN SATURATION: 96 % | HEIGHT: 72 IN | SYSTOLIC BLOOD PRESSURE: 121 MMHG | DIASTOLIC BLOOD PRESSURE: 78 MMHG

## 2024-02-27 DIAGNOSIS — R07.9 CHEST PAIN, UNSPECIFIED: ICD-10-CM

## 2024-02-27 PROCEDURE — 99214 OFFICE O/P EST MOD 30 MIN: CPT

## 2024-02-28 ENCOUNTER — NON-APPOINTMENT (OUTPATIENT)
Age: 29
End: 2024-02-28

## 2024-02-29 NOTE — ASU PATIENT PROFILE, ADULT - FALL HARM RISK - UNIVERSAL INTERVENTIONS
Bed in lowest position, wheels locked, appropriate side rails in place/Call bell, personal items and telephone in reach/Instruct patient to call for assistance before getting out of bed or chair/Non-slip footwear when patient is out of bed/[place] to call system/Physically safe environment - no spills, clutter or unnecessary equipment/Purposeful Proactive Rounding/Room/bathroom lighting operational, light cord in reach

## 2024-03-01 ENCOUNTER — OUTPATIENT (OUTPATIENT)
Dept: OUTPATIENT SERVICES | Facility: HOSPITAL | Age: 29
LOS: 1 days | Discharge: HOME | End: 2024-03-01
Payer: MEDICAID

## 2024-03-01 ENCOUNTER — TRANSCRIPTION ENCOUNTER (OUTPATIENT)
Age: 29
End: 2024-03-01

## 2024-03-01 ENCOUNTER — APPOINTMENT (OUTPATIENT)
Dept: GASTROENTEROLOGY | Facility: HOSPITAL | Age: 29
End: 2024-03-01

## 2024-03-01 VITALS
SYSTOLIC BLOOD PRESSURE: 131 MMHG | RESPIRATION RATE: 15 BRPM | DIASTOLIC BLOOD PRESSURE: 71 MMHG | HEART RATE: 66 BPM | OXYGEN SATURATION: 100 %

## 2024-03-01 VITALS
TEMPERATURE: 98 F | SYSTOLIC BLOOD PRESSURE: 136 MMHG | RESPIRATION RATE: 12 BRPM | DIASTOLIC BLOOD PRESSURE: 58 MMHG | HEART RATE: 58 BPM | OXYGEN SATURATION: 100 %

## 2024-03-01 DIAGNOSIS — K20.90 ESOPHAGITIS, UNSPECIFIED WITHOUT BLEEDING: ICD-10-CM

## 2024-03-01 DIAGNOSIS — T85.528A DISPLACEMENT OF OTHER GASTROINTESTINAL PROSTHETIC DEVICES, IMPLANTS AND GRAFTS, INITIAL ENCOUNTER: Chronic | ICD-10-CM

## 2024-03-01 DIAGNOSIS — R07.9 CHEST PAIN, UNSPECIFIED: ICD-10-CM

## 2024-03-01 PROCEDURE — 43235 EGD DIAGNOSTIC BRUSH WASH: CPT | Mod: GC

## 2024-03-01 RX ORDER — PANTOPRAZOLE 40 MG/1
40 TABLET, DELAYED RELEASE ORAL DAILY
Qty: 30 | Refills: 0 | Status: DISCONTINUED | COMMUNITY
Start: 2022-05-27 | End: 2024-03-01

## 2024-03-01 RX ORDER — OMEPRAZOLE 40 MG/1
40 CAPSULE, DELAYED RELEASE ORAL
Qty: 180 | Refills: 3 | Status: ACTIVE | COMMUNITY
Start: 2024-03-01 | End: 1900-01-01

## 2024-03-01 NOTE — ASU DISCHARGE PLAN (ADULT/PEDIATRIC) - NS MD DC FALL RISK RISK
For information on Fall & Injury Prevention, visit: [email or web address] OR  [email or web address] OR  https://www.cdc.gov/steadi/patient.html

## 2024-03-03 NOTE — HISTORY OF PRESENT ILLNESS
[FreeTextEntry1] : 28 year old male with a history of severe GERD and dysphagia due to a distal stricture. S/P multiple dilations finally ending with an esophagectomy 2 years ago. Pt has had multiple symptoms post op including gas, bloating and dumping . Currently he is complaining of a Wierd feeling in his right lower back which he describes as a sharp shooting pain which will last about 20 min.. The pain started 6 months ago and was infrequent but is now occurring every day. He notes that whenever he gets the pain, he experiences an upward pressure feeling in his chest, similar to reflux but he does not have reflux. This usually occurs in the morning before eating. He is currently on Protonix BID. It should be noted that he was seen in the ED for these symptoms and that a CT scan of the abdomen and pelvis was negative. However, his LFTs were elevated.

## 2024-03-03 NOTE — ASSESSMENT
[FreeTextEntry1] : 28 year old male with a history of severe GERD and dysphagia due to a distal stricture. S/P multiple dilations finally ending with an esophagectomy 2 years ago. Pt has had multiple symptoms post op including gas, bloating and dumping . Currently he is complaining of a Wierd feeling in his right lower back which he describes as a sharp shooting pain which will last about 20 min.. The pain started 6 months ago and was infrequent but is now occurring every day. He notes that whenever he gets the pain, he experiences an upward pressure feeling in his chest, similar to reflux but he does not have reflux. This usually occurs in the morning before eating. He is currently on Protonix BID. It should be noted that he was seen in the ED for these symptoms and that a CT scan of the abdomen and pelvis was negative. However, his LFTs were elevated.   Plan: This appears t be related to reflux with referred pain. The pt has not had an EGD for two years. I will schedule an EGD and refer him to hepatology.

## 2024-03-15 ENCOUNTER — APPOINTMENT (OUTPATIENT)
Dept: ORTHOPEDIC SURGERY | Facility: CLINIC | Age: 29
End: 2024-03-15
Payer: MEDICAID

## 2024-03-15 VITALS — BODY MASS INDEX: 22.62 KG/M2 | WEIGHT: 167 LBS | HEIGHT: 72 IN

## 2024-03-15 DIAGNOSIS — M54.2 CERVICALGIA: ICD-10-CM

## 2024-03-15 DIAGNOSIS — M54.6 PAIN IN THORACIC SPINE: ICD-10-CM

## 2024-03-15 DIAGNOSIS — M79.18 MYALGIA, OTHER SITE: ICD-10-CM

## 2024-03-15 PROCEDURE — 99204 OFFICE O/P NEW MOD 45 MIN: CPT

## 2024-03-15 PROCEDURE — 72070 X-RAY EXAM THORAC SPINE 2VWS: CPT

## 2024-03-15 PROCEDURE — 72040 X-RAY EXAM NECK SPINE 2-3 VW: CPT

## 2024-03-15 NOTE — ASSESSMENT
[FreeTextEntry1] : MDP Methocarbamol Ice/heat Initiate PT Return 6 weeks for re-eval, MRI if pain persists

## 2024-03-15 NOTE — HISTORY OF PRESENT ILLNESS
[Neck] : neck [Upper back] : upper back [de-identified] : 28 year old M with neck and upper/mid back pain, acute onset since he woke up 2 weeks ago. c/o stiffness neck with pain to shoulder blades. No injury recalled. Went to [facility] ED, had xrays -> muscle relaxer without relief.  He takes Zubsolv for pain (from [employer]) which helps temporarily.  Trying Lidocaine massages.  Pain is worse as day progresses and interferes with adl's and function. Prior hx pain mid shoulder blades since 10 years old.  PMH: Esophagectomy 2022, + vapes nicotine  [] : no

## 2024-03-15 NOTE — PHYSICAL EXAM
[Flexion] : flexion [Rotation to left] : rotation to left [Extension] : extension [Rotation to right] : rotation to right [] : thoracic paraspinal tenderness

## 2024-03-15 NOTE — IMAGING
[Straightening consistent with spasm] : Straightening consistent with spasm [Bilateral] : rib bilaterally [No bony abnormalities] : No bony abnormalities

## 2024-03-18 RX ORDER — METHOCARBAMOL 750 MG/1
750 TABLET, FILM COATED ORAL TWICE DAILY
Qty: 30 | Refills: 1 | Status: ACTIVE | COMMUNITY
Start: 2024-03-15 | End: 1900-01-01

## 2024-03-18 RX ORDER — METHYLPREDNISOLONE 4 MG/1
4 TABLET ORAL
Qty: 1 | Refills: 0 | Status: ACTIVE | COMMUNITY
Start: 2024-03-15 | End: 1900-01-01

## 2024-03-28 NOTE — ASU PREOP CHECKLIST - IDENTIFICATION BAND VERIFIED
[TextEntry] : Constitutional: alert and no acute distress. Well developed Eyes: no proptosis and no lid lag  Neck: no neck mass was observed   Pulmonary: no respiratory distress, no accessory muscle use  Cardiac: nontachycardic, no peripheral edema.  Vascular: no peripheral edema and there were no varicosital changes  Abdomen: nondistended Back: no spinal tenderness, no scoliosis Endocrine: no stigmata of Cushings Syndrome  Musculoskeletal: normal gait and no clubbing or cyanosis of the fingernails  Neurology:   no tremors  Psychiatric: oriented to person, place, and time and insight and judgment were intact  Skin: no rash, no acne  
done

## 2024-04-07 NOTE — ED PROVIDER NOTE - HIV OFFER
Problem: Discharge Planning  Goal: Discharge to home or other facility with appropriate resources  Outcome: Progressing  Flowsheets (Taken 4/6/2024 1958)  Discharge to home or other facility with appropriate resources: Identify barriers to discharge with patient and caregiver     Problem: Pain  Goal: Verbalizes/displays adequate comfort level or baseline comfort level  Outcome: Progressing     Problem: Safety - Adult  Goal: Free from fall injury  Outcome: Progressing     Problem: Chronic Conditions and Co-morbidities  Goal: Patient's chronic conditions and co-morbidity symptoms are monitored and maintained or improved  Outcome: Progressing  Flowsheets (Taken 4/6/2024 1958)  Care Plan - Patient's Chronic Conditions and Co-Morbidity Symptoms are Monitored and Maintained or Improved: Monitor and assess patient's chronic conditions and comorbid symptoms for stability, deterioration, or improvement     Problem: Skin/Tissue Integrity - Adult  Goal: Skin integrity remains intact  Outcome: Progressing     Problem: Gastrointestinal - Adult  Goal: Minimal or absence of nausea and vomiting  Outcome: Progressing     Problem: ABCDS Injury Assessment  Goal: Absence of physical injury  Outcome: Progressing      Previously Negative (within the last year)

## 2024-04-12 NOTE — H&P ADULT - NSICDXPILOT_GEN_ALL_CORE
29w6d    Patient reporting brown spotting while wiping and on underwear that started last night at 9 pm    Denies significant cramping    No recent intercourse    Endorses fetal movement    8 hour road trip yesterday, is out of town    Routed to Dr. England  Ok to monitor? Please advise   [place]

## 2024-04-26 ENCOUNTER — APPOINTMENT (OUTPATIENT)
Dept: ORTHOPEDIC SURGERY | Facility: CLINIC | Age: 29
End: 2024-04-26

## 2024-05-06 ENCOUNTER — APPOINTMENT (OUTPATIENT)
Dept: GASTROENTEROLOGY | Facility: CLINIC | Age: 29
End: 2024-05-06
Payer: MEDICAID

## 2024-05-06 VITALS
DIASTOLIC BLOOD PRESSURE: 72 MMHG | HEART RATE: 80 BPM | SYSTOLIC BLOOD PRESSURE: 119 MMHG | OXYGEN SATURATION: 95 % | BODY MASS INDEX: 22.11 KG/M2 | WEIGHT: 163 LBS

## 2024-05-06 DIAGNOSIS — K21.9 GASTRO-ESOPHAGEAL REFLUX DISEASE W/OUT ESOPHAGITIS: ICD-10-CM

## 2024-05-06 PROCEDURE — 99205 OFFICE O/P NEW HI 60 MIN: CPT

## 2024-05-06 PROCEDURE — G2211 COMPLEX E/M VISIT ADD ON: CPT | Mod: NC,1L

## 2024-05-06 NOTE — ASSESSMENT
[FreeTextEntry1] : 28-year-old very sweet male with RUSTY/recalcitrant esophageal stricture status post distal esophagectomy with [place] Mahesh morphology, found to have esophageal ulcers on his previous endoscopy 4 weeks ago here for establishment of GI care.  Given his recurrent reflux/regurgitation symptoms, we will add back Carafate 1 g before bedtime.  He is to continue his pantoprazole twice daily.  Once again I reiterated good eating hygiene for someone with an [place] Mahesh, which includes not eating within 2 to 3 hours of lying down.  We will plan on performing another upper endoscopy, I suspect that he also has IEM and may need an esophageal motility study.  1. EGD The risks, benefits and alternatives of the procedure were discussed with the patient in detail. Risks include bleeding, infection, bowel perforation, adverse reaction to sedation and missed lesions. All questions were answered. The patient expressed understanding of these risks and is agreeable to proceed.

## 2024-05-06 NOTE — HISTORY OF PRESENT ILLNESS
[FreeTextEntry1] : 20-year-old male with history of severe GERD/dysphagia found to have a distal stricture status post numerous dilations with recalcitrant stricture status post distal esophagectomy in 2022, with an [place] Mahesh.  He still had complaints of sharp pains in his right lower back which is described as a shooting pain which last approximately 20 minutes.  He also has abdominal bloating, gas.  He is status post upper endoscopy with me in early April 2024, which demonstrated esophageal ulcer and esophagitis at the level of the anastomosis.  At that time he was not consistently taking his PPI.  He was also still drinking alcohol which she has since discontinued for the past 4 weeks.  He is here today in the office, taking pantoprazole twice daily.  He no longer drinks any alcohol.  He has resolution of his right lower quadrant abdominal and back pains but still feels reflux, regurgitant symptoms.  He does admit to eating late at night, sometimes even waking up in the middle of the night to continue to eat.  He suffers from lots of generalized anxiety as well.  He still has some dysphagia to eating meats, does admit to eating very quickly, not chewing well and drinking liquids with his meals.

## 2024-06-03 NOTE — ASU PATIENT PROFILE, ADULT - PATIENT'S GENDER IDENTITY
Patient states she called her insurance to find out why her insurance keeps denying her infusions. She states that her insurance recently updated the policy where she now has to be referred to an off site facility.  She is requesting if she can have her infusion down at [facility] or [facility]

## 2024-06-04 ENCOUNTER — TRANSCRIPTION ENCOUNTER (OUTPATIENT)
Age: 29
End: 2024-06-04

## 2024-06-04 ENCOUNTER — OUTPATIENT (OUTPATIENT)
Dept: OUTPATIENT SERVICES | Facility: HOSPITAL | Age: 29
LOS: 1 days | Discharge: HOME | End: 2024-06-04
Payer: MEDICAID

## 2024-06-04 ENCOUNTER — APPOINTMENT (OUTPATIENT)
Dept: GASTROENTEROLOGY | Facility: HOSPITAL | Age: 29
End: 2024-06-04

## 2024-06-04 VITALS
OXYGEN SATURATION: 97 % | HEIGHT: 71 IN | SYSTOLIC BLOOD PRESSURE: 108 MMHG | TEMPERATURE: 98 F | DIASTOLIC BLOOD PRESSURE: 60 MMHG | HEART RATE: 80 BPM | WEIGHT: 154.98 LBS | RESPIRATION RATE: 18 BRPM

## 2024-06-04 VITALS
HEART RATE: 60 BPM | DIASTOLIC BLOOD PRESSURE: 60 MMHG | SYSTOLIC BLOOD PRESSURE: 107 MMHG | OXYGEN SATURATION: 96 % | RESPIRATION RATE: 13 BRPM

## 2024-06-04 DIAGNOSIS — T85.528A DISPLACEMENT OF OTHER GASTROINTESTINAL PROSTHETIC DEVICES, IMPLANTS AND GRAFTS, INITIAL ENCOUNTER: Chronic | ICD-10-CM

## 2024-06-04 DIAGNOSIS — K22.2 ESOPHAGEAL OBSTRUCTION: ICD-10-CM

## 2024-06-04 DIAGNOSIS — K29.60 OTHER GASTRITIS W/OUT BLEEDING: ICD-10-CM

## 2024-06-04 PROCEDURE — 43235 EGD DIAGNOSTIC BRUSH WASH: CPT

## 2024-06-04 RX ORDER — BUPRENORPHINE AND NALOXONE 2; .5 MG/1; MG/1
1 TABLET SUBLINGUAL
Refills: 0 | DISCHARGE

## 2024-06-04 RX ORDER — PANTOPRAZOLE SODIUM 20 MG/1
1 TABLET, DELAYED RELEASE ORAL
Refills: 0 | DISCHARGE

## 2024-06-04 RX ORDER — SODIUM CHLORIDE 9 MG/ML
500 INJECTION, SOLUTION INTRAVENOUS
Refills: 0 | Status: DISCONTINUED | OUTPATIENT
Start: 2024-06-04 | End: 2024-06-18

## 2024-06-04 RX ORDER — CHOLESTYRAMINE 4 G/9G
4 POWDER, FOR SUSPENSION ORAL DAILY
Qty: 30 | Refills: 0 | Status: ACTIVE | COMMUNITY
Start: 2024-06-04 | End: 1900-01-01

## 2024-06-04 RX ORDER — SUCRALFATE 1 G
1 TABLET ORAL
Refills: 0 | DISCHARGE

## 2024-06-04 RX ORDER — ACETAMINOPHEN 500 MG
2 TABLET ORAL
Qty: 0 | Refills: 0 | DISCHARGE

## 2024-06-04 NOTE — ASU DISCHARGE PLAN (ADULT/PEDIATRIC) - NS MD DC FALL RISK RISK
For information on Fall & Injury Prevention, visit: [email or web address] OR  [email or web address] OR  https://www.cdc.gov/steadi/patient.html

## 2024-06-04 NOTE — PRE PROCEDURE NOTE - PRE PROCEDURE EVALUATION
Pre-Endoscopy Evaluation    Referring Physician:                                    Indication for Procedure:    Sedation by Anesthesia [X ]    PAST MEDICAL & SURGICAL HISTORY:  Heroin abuse      Cocaine abuse      GERD (gastroesophageal reflux disease)      Hiatal hernia      History of esophageal stricture      Intravenous drug abuse      Migration of pancreatic stent      Migrated esophageal stent          Latex allergy: [ ] yes [X] no    Smoking: [ ] yes  [X] no    AICD/PPM: [ ] yes   [X] no    Pertinent lab data:                      Physical Examination:  Daily Height in cm: 180.34 (04 Jun 2024 10:37)    Daily   Vital Signs Last 24 Hrs  T(C): 36.2 (04 Jun 2024 12:52), Max: 36.9 (04 Jun 2024 10:37)  T(F): 97.1 (04 Jun 2024 12:52), Max: 98.4 (04 Jun 2024 10:37)  HR: 46 (04 Jun 2024 12:52) (46 - 80)  BP: 109/51 (04 Jun 2024 12:52) (108/60 - 109/51)  BP(mean): --  RR: 11 (04 Jun 2024 12:52) (11 - 18)  SpO2: 99% (04 Jun 2024 12:52) (97% - 99%)    Parameters below as of 04 Jun 2024 12:52  Patient On (Oxygen Delivery Method): room air        Constitutional: NAD    HEENT: PERRLA, EOMI,       Neck:  No JVD    Respiratory: CTAB/L    Cardiovascular: S1 and S2    Gastrointestinal: BS+, soft, NT/ND    Extremities: No peripheral edema    Neurological: A/O x 3, no focal deficits    Psychiatric: Normal mood, normal affect    : No Abdalla    Skin: No rashes    Comments:    ASA Class: I [ ]  II [ CX]  III [ ]  IV [ ]    The patient is a suitable candidate for the planned procedure unless box checked [ ]  No, explain:

## 2024-06-05 ENCOUNTER — NON-APPOINTMENT (OUTPATIENT)
Age: 29
End: 2024-06-05

## 2024-06-18 ENCOUNTER — RX RENEWAL (OUTPATIENT)
Age: 29
End: 2024-06-18

## 2024-06-18 RX ORDER — SUCRALFATE 1 G/10ML
1 SUSPENSION ORAL
Qty: 600 | Refills: 0 | Status: ACTIVE | COMMUNITY
Start: 2024-05-06 | End: 1900-01-01

## 2024-06-20 ENCOUNTER — EMERGENCY (EMERGENCY)
Facility: HOSPITAL | Age: 29
LOS: 1 days | Discharge: HOME | End: 2024-06-20
Attending: STUDENT IN AN ORGANIZED HEALTH CARE EDUCATION/TRAINING PROGRAM | Admitting: STUDENT IN AN ORGANIZED HEALTH CARE EDUCATION/TRAINING PROGRAM
Payer: MEDICAID

## 2024-06-20 VITALS
TEMPERATURE: 98 F | RESPIRATION RATE: 16 BRPM | OXYGEN SATURATION: 100 % | DIASTOLIC BLOOD PRESSURE: 72 MMHG | HEIGHT: 71 IN | HEART RATE: 80 BPM | SYSTOLIC BLOOD PRESSURE: 117 MMHG

## 2024-06-20 VITALS
SYSTOLIC BLOOD PRESSURE: 117 MMHG | HEART RATE: 82 BPM | RESPIRATION RATE: 16 BRPM | OXYGEN SATURATION: 100 % | DIASTOLIC BLOOD PRESSURE: 69 MMHG | TEMPERATURE: 98 F

## 2024-06-20 DIAGNOSIS — T85.528A DISPLACEMENT OF OTHER GASTROINTESTINAL PROSTHETIC DEVICES, IMPLANTS AND GRAFTS, INITIAL ENCOUNTER: Chronic | ICD-10-CM

## 2024-06-20 LAB
ALBUMIN SERPL ELPH-MCNC: 4.3 G/DL — SIGNIFICANT CHANGE UP (ref 3.3–5)
ALP SERPL-CCNC: 88 U/L — SIGNIFICANT CHANGE UP (ref 40–120)
ALT FLD-CCNC: 190 U/L — HIGH (ref 4–41)
ANION GAP SERPL CALC-SCNC: 8 MMOL/L — SIGNIFICANT CHANGE UP (ref 7–14)
APPEARANCE UR: CLEAR — SIGNIFICANT CHANGE UP
AST SERPL-CCNC: 129 U/L — HIGH (ref 4–40)
BACTERIA # UR AUTO: NEGATIVE /HPF — SIGNIFICANT CHANGE UP
BASOPHILS # BLD AUTO: 0.02 K/UL — SIGNIFICANT CHANGE UP (ref 0–0.2)
BASOPHILS NFR BLD AUTO: 0.5 % — SIGNIFICANT CHANGE UP (ref 0–2)
BILIRUB SERPL-MCNC: 1.2 MG/DL — SIGNIFICANT CHANGE UP (ref 0.2–1.2)
BILIRUB UR-MCNC: NEGATIVE — SIGNIFICANT CHANGE UP
BUN SERPL-MCNC: 22 MG/DL — SIGNIFICANT CHANGE UP (ref 7–23)
C DIFF GDH STL QL: NEGATIVE — SIGNIFICANT CHANGE UP
C DIFF GDH STL QL: SIGNIFICANT CHANGE UP
CALCIUM SERPL-MCNC: 8.9 MG/DL — SIGNIFICANT CHANGE UP (ref 8.4–10.5)
CAST: 0 /LPF — SIGNIFICANT CHANGE UP (ref 0–4)
CHLORIDE SERPL-SCNC: 102 MMOL/L — SIGNIFICANT CHANGE UP (ref 98–107)
CO2 SERPL-SCNC: 26 MMOL/L — SIGNIFICANT CHANGE UP (ref 22–31)
COLOR SPEC: YELLOW — SIGNIFICANT CHANGE UP
CREAT SERPL-MCNC: 0.92 MG/DL — SIGNIFICANT CHANGE UP (ref 0.5–1.3)
DIFF PNL FLD: NEGATIVE — SIGNIFICANT CHANGE UP
EGFR: 116 ML/MIN/1.73M2 — SIGNIFICANT CHANGE UP
EOSINOPHIL # BLD AUTO: 0.14 K/UL — SIGNIFICANT CHANGE UP (ref 0–0.5)
EOSINOPHIL NFR BLD AUTO: 3.3 % — SIGNIFICANT CHANGE UP (ref 0–6)
GI PCR PANEL: SIGNIFICANT CHANGE UP
GLUCOSE SERPL-MCNC: 95 MG/DL — SIGNIFICANT CHANGE UP (ref 70–99)
GLUCOSE UR QL: NEGATIVE MG/DL — SIGNIFICANT CHANGE UP
HCT VFR BLD CALC: 36.8 % — LOW (ref 39–50)
HGB BLD-MCNC: 12.3 G/DL — LOW (ref 13–17)
IANC: 1.96 K/UL — SIGNIFICANT CHANGE UP (ref 1.8–7.4)
IMM GRANULOCYTES NFR BLD AUTO: 0 % — SIGNIFICANT CHANGE UP (ref 0–0.9)
KETONES UR-MCNC: NEGATIVE MG/DL — SIGNIFICANT CHANGE UP
LEUKOCYTE ESTERASE UR-ACNC: NEGATIVE — SIGNIFICANT CHANGE UP
LIDOCAIN IGE QN: 17 U/L — SIGNIFICANT CHANGE UP (ref 7–60)
LYMPHOCYTES # BLD AUTO: 1.71 K/UL — SIGNIFICANT CHANGE UP (ref 1–3.3)
LYMPHOCYTES # BLD AUTO: 39.9 % — SIGNIFICANT CHANGE UP (ref 13–44)
MCHC RBC-ENTMCNC: 30.1 PG — SIGNIFICANT CHANGE UP (ref 27–34)
MCHC RBC-ENTMCNC: 33.4 GM/DL — SIGNIFICANT CHANGE UP (ref 32–36)
MCV RBC AUTO: 90 FL — SIGNIFICANT CHANGE UP (ref 80–100)
MONOCYTES # BLD AUTO: 0.46 K/UL — SIGNIFICANT CHANGE UP (ref 0–0.9)
MONOCYTES NFR BLD AUTO: 10.7 % — SIGNIFICANT CHANGE UP (ref 2–14)
NEUTROPHILS # BLD AUTO: 1.96 K/UL — SIGNIFICANT CHANGE UP (ref 1.8–7.4)
NEUTROPHILS NFR BLD AUTO: 45.6 % — SIGNIFICANT CHANGE UP (ref 43–77)
NITRITE UR-MCNC: NEGATIVE — SIGNIFICANT CHANGE UP
NRBC # BLD: 0 /100 WBCS — SIGNIFICANT CHANGE UP (ref 0–0)
NRBC # FLD: 0 K/UL — SIGNIFICANT CHANGE UP (ref 0–0)
PH UR: 5.5 — SIGNIFICANT CHANGE UP (ref 5–8)
PLATELET # BLD AUTO: 137 K/UL — LOW (ref 150–400)
POTASSIUM SERPL-MCNC: 4.1 MMOL/L — SIGNIFICANT CHANGE UP (ref 3.5–5.3)
POTASSIUM SERPL-SCNC: 4.1 MMOL/L — SIGNIFICANT CHANGE UP (ref 3.5–5.3)
PROT SERPL-MCNC: 6.7 G/DL — SIGNIFICANT CHANGE UP (ref 6–8.3)
PROT UR-MCNC: NEGATIVE MG/DL — SIGNIFICANT CHANGE UP
RBC # BLD: 4.09 M/UL — LOW (ref 4.2–5.8)
RBC # FLD: 12.2 % — SIGNIFICANT CHANGE UP (ref 10.3–14.5)
RBC CASTS # UR COMP ASSIST: 0 /HPF — SIGNIFICANT CHANGE UP (ref 0–4)
SODIUM SERPL-SCNC: 136 MMOL/L — SIGNIFICANT CHANGE UP (ref 135–145)
SP GR SPEC: 1.03 — SIGNIFICANT CHANGE UP (ref 1–1.03)
SQUAMOUS # UR AUTO: 1 /HPF — SIGNIFICANT CHANGE UP (ref 0–5)
UROBILINOGEN FLD QL: 1 MG/DL — SIGNIFICANT CHANGE UP (ref 0.2–1)
WBC # BLD: 4.29 K/UL — SIGNIFICANT CHANGE UP (ref 3.8–10.5)
WBC # FLD AUTO: 4.29 K/UL — SIGNIFICANT CHANGE UP (ref 3.8–10.5)
WBC UR QL: 0 /HPF — SIGNIFICANT CHANGE UP (ref 0–5)

## 2024-06-20 PROCEDURE — 99285 EMERGENCY DEPT VISIT HI MDM: CPT

## 2024-06-20 PROCEDURE — 74177 CT ABD & PELVIS W/CONTRAST: CPT | Mod: 26,MC

## 2024-06-20 RX ORDER — SODIUM CHLORIDE 9 MG/ML
1000 INJECTION INTRAMUSCULAR; INTRAVENOUS; SUBCUTANEOUS ONCE
Refills: 0 | Status: COMPLETED | OUTPATIENT
Start: 2024-06-20 | End: 2024-06-20

## 2024-06-20 RX ORDER — FAMOTIDINE 10 MG/ML
20 INJECTION INTRAVENOUS ONCE
Refills: 0 | Status: COMPLETED | OUTPATIENT
Start: 2024-06-20 | End: 2024-06-20

## 2024-06-20 RX ORDER — PANTOPRAZOLE SODIUM 20 MG/1
2 TABLET, DELAYED RELEASE ORAL
Qty: 84 | Refills: 0
Start: 2024-06-20 | End: 2024-07-10

## 2024-06-20 RX ORDER — ACETAMINOPHEN 500 MG
1000 TABLET ORAL ONCE
Refills: 0 | Status: COMPLETED | OUTPATIENT
Start: 2024-06-20 | End: 2024-06-20

## 2024-06-20 RX ADMIN — FAMOTIDINE 20 MILLIGRAM(S): 10 INJECTION INTRAVENOUS at 10:23

## 2024-06-20 RX ADMIN — SODIUM CHLORIDE 1000 MILLILITER(S): 9 INJECTION INTRAMUSCULAR; INTRAVENOUS; SUBCUTANEOUS at 06:45

## 2024-06-20 RX ADMIN — Medication 400 MILLIGRAM(S): at 06:45

## 2024-06-20 NOTE — ED ADULT TRIAGE NOTE - CHIEF COMPLAINT QUOTE
Patient c/o abdominal pain for one day. Endorses bloody stools today. No blood thinners. Some nausea, no vomiting.

## 2024-06-20 NOTE — ED PROVIDER NOTE - NSFOLLOWUPINSTRUCTIONS_ED_ALL_ED_FT
You were seen in the ED today for abdominal pain and blood in diarrhea.    Your work up included labs and cat scan. Your results are included in your paperwork.    Please follow up with your GI within the next 1-2 weeks.     Please also follow up with GI  in regards to the incidental findings found and discussed on your imaging. (possible Meckel's diverticulum)    A prescription for PROTONIX was sent to your pharmacy. Please take the medication as prescribed.    If you experience any of the following please return to the ED:  - Chest pain  - Trouble breathing  - New or worsening pain  - Increased blood in stool  - Fevers or chills

## 2024-06-20 NOTE — ED PROVIDER NOTE - CONSTITUTIONAL, MLM
normal... Well appearing, awake, alert, oriented to person, place, time/situation and in no immediate distress.

## 2024-06-20 NOTE — ED PROVIDER NOTE - OBJECTIVE STATEMENT
27 Y/O M PMH esophageal stricture requiring esophagectomy, H/O Cocaine and Heroin use, GERD, prior J tube placement  presents with nausea and L sided abdominal pain which he states is L sided with associated nausea. Pt denies vomiting and denies fever chills or nightsweats. Pt endorses liquid stools this morning with occasional streaks of blood. Pt denies rectal pain, denies H/O rectal intercourse and the pt declines a rectal exam in the ED. Pt denies recent travel, antibiotic use or hospitalization. Pt also endorses pain in his R lower back which he states began today along with his GI symptoms. Pt denies any other sx or acute complaints.

## 2024-06-20 NOTE — ED ADULT NURSE NOTE - OBJECTIVE STATEMENT
Pt received from triage on stretcher, A/Ox4 answers all questions appropriately. C/O Abdominal pain  w/ blood stool since last night. Pt tender to touch on LLQ, describes pain as radiating towards right flank. Abdomen is soft and non-distended. Pt denies chest pain and SOB during initial assessment, breathing is even and unlabored on room air. Pt ambulates independently at baseline, moves all four extremities on command, skin is intact. Pt resting comfortably at the bedside, no apparent acute visible distress noted on initial assessment. Vital signs stable, NKA to medications. PMHx Esophageal strictures. 20g peripheral IV placed on right hand, labs obtained and sent. Meds administered per eMAR. Pending CT imaging

## 2024-06-20 NOTE — ED PROVIDER NOTE - PROGRESS NOTE DETAILS
May Roth PGY2: Pt endorsed to me at sign out. Pt reassessed and resting comfortably. Pt endorsing continued abdominal pain at this time after tylenol. Will give additional pain meds and reassess. Pending CT. May Roth PGY2: Pt reassessed and resting comfortably. Went over CT results for pt and recommend additional imaging for assess for Meckel's diverticulum but this can be done on an outpatient basis as there is no signs of acute inflammation or infection noted on CT. Pt endorses understanding of results and will follow up with GI. Pt asking for refill of Protonix, states that his GI has him on 80 mg BID. Will sent refill. Pt okay for dc at this time. Return precautions and DC instructions discussed. Questions answered.

## 2024-06-20 NOTE — ED PROVIDER NOTE - PATIENT PORTAL LINK FT
You can access the FollowMyHealth Patient Portal offered by [facility] by registering at the following website: [email or web address]. By joining [employer]’s FollowMyHealth portal, you will also be able to view your health information using other applications (apps) compatible with our system.

## 2024-06-20 NOTE — ED ADULT NURSE REASSESSMENT NOTE - NS ED NURSE REASSESS COMMENT FT1
report received from overnight SAUL Abreu. A&Ox4. NAD. pt denies SOB, chest pain, dizziness, weakness, urinary symptoms, HA, n/v/d, fevers, chills, pain. respirations are even and un labored. call bell at bedside. waiting for CT to be done. safety precautions maintained.

## 2024-06-20 NOTE — ED PROVIDER NOTE - CLINICAL SUMMARY MEDICAL DECISION MAKING FREE TEXT BOX
29 Y/O M PMH esophageal stricture requiring esophagectomy, H/O Cocaine and Heroin use, GERD, prior J tube placement  presents with nausea and L sided abdominal pain which he states is L sided with associated nausea. Pt denies vomiting and denies fever chills or nightsweats. Pt endorses liquid stools this morning with occasional streaks of blood. Pt denies rectal pain, denies H/O rectal intercourse and the pt declines a rectal exam in the ED. Plan is stool studies to eval for an infectious diarrhea, CT imaging due to LLQ tenderness to eval for a colitis, diverticulitis or other acute pathology. Will obtain a UA and culture to eval for a UTI and will hydrate with IV fluid, provide Ofirmev for pain in the interm.

## 2024-06-20 NOTE — ED PROVIDER NOTE - ATTENDING APP SHARED VISIT CONTRIBUTION OF CARE
29yo M ho esophagectomy  pw abd pain and discomfort assoc with episode of bloody stool this am  tender in llq  will check CT eval divertic

## 2024-06-21 LAB
CULTURE RESULTS: NO GROWTH — SIGNIFICANT CHANGE UP
SPECIMEN SOURCE: SIGNIFICANT CHANGE UP

## 2024-06-21 RX ORDER — DEXLANSOPRAZOLE 60 MG/1
60 CAPSULE, DELAYED RELEASE ORAL
Qty: 180 | Refills: 2 | Status: ACTIVE | COMMUNITY
Start: 2024-06-04 | End: 1900-01-01

## 2024-06-22 LAB
CULTURE RESULTS: SIGNIFICANT CHANGE UP
SPECIMEN SOURCE: SIGNIFICANT CHANGE UP

## 2024-06-25 DIAGNOSIS — R10.32 LEFT LOWER QUADRANT PAIN: ICD-10-CM

## 2024-07-01 DIAGNOSIS — R19.4 CHANGE IN BOWEL HABIT: ICD-10-CM

## 2024-07-01 DIAGNOSIS — K62.5 HEMORRHAGE OF ANUS AND RECTUM: ICD-10-CM

## 2024-07-01 RX ORDER — SODIUM SULFATE, POTASSIUM SULFATE AND MAGNESIUM SULFATE 1.6; 3.13; 17.5 G/177ML; G/177ML; G/177ML
17.5-3.13-1.6 SOLUTION ORAL
Qty: 1 | Refills: 0 | Status: ACTIVE | COMMUNITY
Start: 2024-07-01 | End: 1900-01-01

## 2024-07-19 ENCOUNTER — APPOINTMENT (OUTPATIENT)
Dept: GASTROENTEROLOGY | Facility: HOSPITAL | Age: 29
End: 2024-07-19

## 2024-07-19 ENCOUNTER — RESULT REVIEW (OUTPATIENT)
Age: 29
End: 2024-07-19

## 2024-07-19 ENCOUNTER — OUTPATIENT (OUTPATIENT)
Dept: OUTPATIENT SERVICES | Facility: HOSPITAL | Age: 29
LOS: 1 days | Discharge: HOME | End: 2024-07-19
Payer: MEDICAID

## 2024-07-19 VITALS
HEART RATE: 69 BPM | OXYGEN SATURATION: 100 % | TEMPERATURE: 98 F | WEIGHT: 160.06 LBS | SYSTOLIC BLOOD PRESSURE: 116 MMHG | RESPIRATION RATE: 15 BRPM | DIASTOLIC BLOOD PRESSURE: 69 MMHG

## 2024-07-19 VITALS
DIASTOLIC BLOOD PRESSURE: 64 MMHG | OXYGEN SATURATION: 100 % | SYSTOLIC BLOOD PRESSURE: 120 MMHG | RESPIRATION RATE: 17 BRPM | HEART RATE: 74 BPM

## 2024-07-19 DIAGNOSIS — T85.528A DISPLACEMENT OF OTHER GASTROINTESTINAL PROSTHETIC DEVICES, IMPLANTS AND GRAFTS, INITIAL ENCOUNTER: Chronic | ICD-10-CM

## 2024-07-19 DIAGNOSIS — K62.5 HEMORRHAGE OF ANUS AND RECTUM: ICD-10-CM

## 2024-07-19 PROCEDURE — 45380 COLONOSCOPY AND BIOPSY: CPT | Mod: 59,GC

## 2024-07-19 PROCEDURE — 88305 TISSUE EXAM BY PATHOLOGIST: CPT | Mod: 26

## 2024-07-19 PROCEDURE — 45385 COLONOSCOPY W/LESION REMOVAL: CPT | Mod: GC

## 2024-07-19 NOTE — ASU PATIENT PROFILE, ADULT - FALL HARM RISK - UNIVERSAL INTERVENTIONS
Bed in lowest position, wheels locked, appropriate side rails in place/Call bell, personal items and telephone in reach/Instruct patient to call for assistance before getting out of bed or chair/Non-slip footwear when patient is out of bed/[place] to call system/Physically safe environment - no spills, clutter or unnecessary equipment/Purposeful Proactive Rounding/Room/bathroom lighting operational, light cord in reach

## 2024-07-19 NOTE — ASU DISCHARGE PLAN (ADULT/PEDIATRIC) - CALL YOUR DOCTOR IF YOU HAVE ANY OF THE FOLLOWING:
Bleeding that does not stop/Swelling that gets worse/Pain not relieved by Medications/Fever greater than (need to indicate Fahrenheit or Celsius)/Numbness, tingling, color or temperature change to extremity/Nausea and vomiting that does not stop/Inability to tolerate liquids or foods/Increased irritability or sluggishness

## 2024-07-23 LAB — SURGICAL PATHOLOGY STUDY: SIGNIFICANT CHANGE UP

## 2024-07-24 NOTE — ED ADULT NURSE NOTE - NS ED BHA MSE SPEECH VOLUME
Patient discharged per order. PIV removed. AVS reviewed with patient and patient verbalizes understanding of all discharge instructions. No acute distress noted. Patient to pick medications downstairs from pharmacy. Patient is currently awaiting transport.   Normal

## 2024-07-30 ENCOUNTER — RX RENEWAL (OUTPATIENT)
Age: 29
End: 2024-07-30

## 2024-08-06 ENCOUNTER — OUTPATIENT (OUTPATIENT)
Dept: OUTPATIENT SERVICES | Facility: HOSPITAL | Age: 29
LOS: 1 days | End: 2024-08-06
Payer: MEDICAID

## 2024-08-06 ENCOUNTER — APPOINTMENT (OUTPATIENT)
Dept: CT IMAGING | Facility: HOSPITAL | Age: 29
End: 2024-08-06

## 2024-08-06 DIAGNOSIS — T85.528A DISPLACEMENT OF OTHER GASTROINTESTINAL PROSTHETIC DEVICES, IMPLANTS AND GRAFTS, INITIAL ENCOUNTER: Chronic | ICD-10-CM

## 2024-08-06 DIAGNOSIS — R10.32 LEFT LOWER QUADRANT PAIN: ICD-10-CM

## 2024-08-06 PROCEDURE — 74177 CT ABD & PELVIS W/CONTRAST: CPT | Mod: 26

## 2024-08-06 PROCEDURE — 74177 CT ABD & PELVIS W/CONTRAST: CPT

## 2024-08-26 ENCOUNTER — NON-APPOINTMENT (OUTPATIENT)
Age: 29
End: 2024-08-26

## 2024-08-27 ENCOUNTER — APPOINTMENT (OUTPATIENT)
Dept: GASTROENTEROLOGY | Facility: CLINIC | Age: 29
End: 2024-08-27
Payer: MEDICAID

## 2024-08-27 VITALS
OXYGEN SATURATION: 98 % | WEIGHT: 160 LBS | HEART RATE: 67 BPM | BODY MASS INDEX: 22.4 KG/M2 | SYSTOLIC BLOOD PRESSURE: 120 MMHG | DIASTOLIC BLOOD PRESSURE: 60 MMHG | HEIGHT: 71 IN

## 2024-08-27 PROCEDURE — 99213 OFFICE O/P EST LOW 20 MIN: CPT

## 2024-08-27 NOTE — ASSESSMENT
[FreeTextEntry1] : 28 year old male S/P [place]-Mahesh for refractory GERD stricture. Currently complaining of a pressure sensation right posterior costal margin which occurs daily in the morning and occurs iwth eating. If the pt vomits while having the pressure the pain will get sharper. Pt is currently on cholestyramine, Carafate and Dexilant BID. Last EGD showed grade c esophaphagitis.   Plan: comp met, CBC, esophagram.

## 2024-08-27 NOTE — HISTORY OF PRESENT ILLNESS
[FreeTextEntry1] : 28 year old male S/P [place]-Mahesh for refractory GERD stricture. Currently complaining of a pressure sensation right posterior costal margin which occurs daily in the morning and occurs iwth eating. If the pt vomits while having the pressure the pain will get sharper. Pt is currently on cholestyramine, Carafate and Dexilant BID. Last EGD showed grade c esophaphagitis.

## 2024-08-28 DIAGNOSIS — R74.8 ABNORMAL LEVELS OF OTHER SERUM ENZYMES: ICD-10-CM

## 2024-08-28 LAB
ALBUMIN SERPL ELPH-MCNC: 4.6 G/DL
ALP BLD-CCNC: 80 U/L
ALT SERPL-CCNC: 217 U/L
ANION GAP SERPL CALC-SCNC: 12 MMOL/L
AST SERPL-CCNC: 173 U/L
BASOPHILS # BLD AUTO: 0.02 K/UL
BASOPHILS NFR BLD AUTO: 0.5 %
BILIRUB SERPL-MCNC: 0.9 MG/DL
BUN SERPL-MCNC: 17 MG/DL
CALCIUM SERPL-MCNC: 9.7 MG/DL
CHLORIDE SERPL-SCNC: 102 MMOL/L
CO2 SERPL-SCNC: 24 MMOL/L
CREAT SERPL-MCNC: 0.81 MG/DL
EGFR: 123 ML/MIN/1.73M2
EOSINOPHIL # BLD AUTO: 0.06 K/UL
EOSINOPHIL NFR BLD AUTO: 1.6 %
GLUCOSE SERPL-MCNC: 150 MG/DL
HCT VFR BLD CALC: 38.9 %
HGB BLD-MCNC: 13.1 G/DL
IMM GRANULOCYTES NFR BLD AUTO: 0.3 %
LYMPHOCYTES # BLD AUTO: 1.2 K/UL
LYMPHOCYTES NFR BLD AUTO: 31.4 %
MAN DIFF?: NORMAL
MCHC RBC-ENTMCNC: 31.9 PG
MCHC RBC-ENTMCNC: 33.7 GM/DL
MCV RBC AUTO: 94.6 FL
MONOCYTES # BLD AUTO: 0.2 K/UL
MONOCYTES NFR BLD AUTO: 5.2 %
NEUTROPHILS # BLD AUTO: 2.33 K/UL
NEUTROPHILS NFR BLD AUTO: 61 %
PLATELET # BLD AUTO: 112 K/UL
POTASSIUM SERPL-SCNC: 4.3 MMOL/L
PROT SERPL-MCNC: 7 G/DL
RBC # BLD: 4.11 M/UL
RBC # FLD: 12.7 %
SODIUM SERPL-SCNC: 138 MMOL/L
WBC # FLD AUTO: 3.82 K/UL

## 2024-09-08 NOTE — DIETITIAN INITIAL EVALUATION ADULT - LOSS OF SUBCUTANEOUS FAT
Goal Outcome Evaluation:  Problem: Adult Inpatient Plan of Care  Goal: Plan of Care Review  Description: The Plan of Care Review/Shift note should be completed every shift.  The Outcome Evaluation is a brief statement about your assessment that the patient is improving, declining, or no change.  This information will be displayed automatically on your shift  note.  Outcome: Progressing   Patient A&O x 3 VSS. Incision pain 3/10 on chest resolved with prn oxycodone. Lungs sound diminished. On room air. Chest tube to suction, out put was 80 ml.  Patient voiding-urine output adequate. Ambulate in room and garcia. Bowel sound active, passing gas, per patient appetite improving-at 75%. Monitor.                 Orbital.../Triceps.../Buccal...

## 2024-09-19 ENCOUNTER — NON-APPOINTMENT (OUTPATIENT)
Age: 29
End: 2024-09-19

## 2024-09-24 ENCOUNTER — APPOINTMENT (OUTPATIENT)
Dept: THORACIC SURGERY | Facility: CLINIC | Age: 29
End: 2024-09-24
Payer: MEDICAID

## 2024-09-24 DIAGNOSIS — K22.2 ESOPHAGEAL OBSTRUCTION: ICD-10-CM

## 2024-09-24 PROCEDURE — 99443: CPT

## 2024-09-24 NOTE — DATA REVIEWED
[FreeTextEntry1] : I have independently reviewed the following: CT enterography with oral and IV contrast on 8/23/24:
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
## 2024-09-24 NOTE — REASON FOR VISIT
[Follow-Up: _____] : a [unfilled] follow-up visit [Home] : at home, [unfilled] , at the time of the visit. [Medical Office: ([facility])___] : at the medical office located in  [Verbal consent obtained from patient] : the patient, [unfilled]
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
## 2024-09-24 NOTE — REASON FOR VISIT
[Follow-Up: _____] : a [unfilled] follow-up visit [Home] : at home, [unfilled] , at the time of the visit. [Medical Office: ([facility])___] : at the medical office located in  [Verbal consent obtained from patient] : the patient, [unfilled]
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
## 2024-10-22 ENCOUNTER — LABORATORY RESULT (OUTPATIENT)
Age: 29
End: 2024-10-22

## 2024-10-22 ENCOUNTER — APPOINTMENT (OUTPATIENT)
Age: 29
End: 2024-10-22
Payer: MEDICAID

## 2024-10-22 VITALS
RESPIRATION RATE: 16 BRPM | TEMPERATURE: 98.5 F | WEIGHT: 158 LBS | DIASTOLIC BLOOD PRESSURE: 71 MMHG | HEART RATE: 89 BPM | BODY MASS INDEX: 22.12 KG/M2 | SYSTOLIC BLOOD PRESSURE: 109 MMHG | OXYGEN SATURATION: 98 % | HEIGHT: 71 IN

## 2024-10-22 DIAGNOSIS — Z86.19 PERSONAL HISTORY OF OTHER INFECTIOUS AND PARASITIC DISEASES: ICD-10-CM

## 2024-10-22 DIAGNOSIS — Z81.1 FAMILY HISTORY OF ALCOHOL ABUSE AND DEPENDENCE: ICD-10-CM

## 2024-10-22 DIAGNOSIS — Z87.898 PERSONAL HISTORY OF OTHER SPECIFIED CONDITIONS: ICD-10-CM

## 2024-10-22 PROCEDURE — 99204 OFFICE O/P NEW MOD 45 MIN: CPT

## 2024-10-23 ENCOUNTER — NON-APPOINTMENT (OUTPATIENT)
Age: 29
End: 2024-10-23

## 2024-10-23 LAB
A1AT SERPL-MCNC: 156 MG/DL
ALBUMIN SERPL ELPH-MCNC: 4.8 G/DL
ALP BLD-CCNC: 101 U/L
ALT SERPL-CCNC: 267 U/L
ANION GAP SERPL CALC-SCNC: 17 MMOL/L
AST SERPL-CCNC: 164 U/L
BASOPHILS # BLD AUTO: 0.04 K/UL
BASOPHILS NFR BLD AUTO: 1 %
BILIRUB SERPL-MCNC: 1.8 MG/DL
BUN SERPL-MCNC: 20 MG/DL
CALCIUM SERPL-MCNC: 10.2 MG/DL
CERULOPLASMIN SERPL-MCNC: 23 MG/DL
CHLORIDE SERPL-SCNC: 98 MMOL/L
CO2 SERPL-SCNC: 23 MMOL/L
CREAT SERPL-MCNC: 0.99 MG/DL
DEPRECATED KAPPA LC FREE/LAMBDA SER: 1.61 RATIO
EGFR: 106 ML/MIN/1.73M2
EOSINOPHIL # BLD AUTO: 0.09 K/UL
EOSINOPHIL NFR BLD AUTO: 2.1 %
FERRITIN SERPL-MCNC: 41 NG/ML
GLUCOSE SERPL-MCNC: 87 MG/DL
HCT VFR BLD CALC: 41.5 %
HCV RNA SERPL NAA DL=5-ACNC: NORMAL IU/ML
HCV RNA SERPL NAA+PROBE-LOG IU: 6.41 LOGIU/ML
HEPC RNA INTERP: DETECTED
HGB BLD-MCNC: 13.8 G/DL
IGA SER QL IEP: 105 MG/DL
IGG SER QL IEP: 1389 MG/DL
IGM SER QL IEP: 155 MG/DL
IMM GRANULOCYTES NFR BLD AUTO: 0.2 %
INR PPP: 1.03 RATIO
IRON SATN MFR SERPL: 27 %
IRON SERPL-MCNC: 142 UG/DL
KAPPA LC CSF-MCNC: 1.38 MG/DL
KAPPA LC SERPL-MCNC: 2.22 MG/DL
LYMPHOCYTES # BLD AUTO: 1.88 K/UL
LYMPHOCYTES NFR BLD AUTO: 44.9 %
MAN DIFF?: NORMAL
MCHC RBC-ENTMCNC: 31.4 PG
MCHC RBC-ENTMCNC: 33.3 GM/DL
MCV RBC AUTO: 94.3 FL
MITOCHONDRIA AB SER IF-ACNC: NORMAL
MONOCYTES # BLD AUTO: 0.39 K/UL
MONOCYTES NFR BLD AUTO: 9.3 %
NEUTROPHILS # BLD AUTO: 1.78 K/UL
NEUTROPHILS NFR BLD AUTO: 42.5 %
PLATELET # BLD AUTO: 163 K/UL
POTASSIUM SERPL-SCNC: 4.6 MMOL/L
PROT SERPL-MCNC: 7.7 G/DL
PT BLD: 12.2 SEC
RBC # BLD: 4.4 M/UL
RBC # FLD: 12.2 %
SMOOTH MUSCLE AB SER QL IF: NORMAL
SODIUM SERPL-SCNC: 138 MMOL/L
TIBC SERPL-MCNC: 516 UG/DL
UIBC SERPL-MCNC: 374 UG/DL
WBC # FLD AUTO: 4.19 K/UL

## 2024-10-24 LAB
ANA PAT FLD IF-IMP: ABNORMAL
ANA SER IF-ACNC: ABNORMAL
HBV CORE IGG+IGM SER QL: NONREACTIVE
HBV SURFACE AB SER QL: REACTIVE
HBV SURFACE AG SER QL: NONREACTIVE
HCV AB SER QL: REACTIVE
HCV S/CO RATIO: 12.57 S/CO

## 2024-10-28 LAB
HCV GENTYP BLD NAA+PROBE: 3
PETH 16:0/18:1: NEGATIVE NG/ML
PETH 16:0/18:2: NEGATIVE NG/ML
PETH COMMENTS: NORMAL

## 2024-10-30 ENCOUNTER — APPOINTMENT (OUTPATIENT)
Dept: HEPATOLOGY | Facility: CLINIC | Age: 29
End: 2024-10-30
Payer: MEDICAID

## 2024-10-30 ENCOUNTER — APPOINTMENT (OUTPATIENT)
Dept: HEPATOLOGY | Facility: CLINIC | Age: 29
End: 2024-10-30

## 2024-10-30 VITALS
BODY MASS INDEX: 22.4 KG/M2 | TEMPERATURE: 96.3 F | HEIGHT: 71 IN | SYSTOLIC BLOOD PRESSURE: 90 MMHG | WEIGHT: 160 LBS | RESPIRATION RATE: 16 BRPM | HEART RATE: 71 BPM | DIASTOLIC BLOOD PRESSURE: 48 MMHG

## 2024-10-30 DIAGNOSIS — R74.8 ABNORMAL LEVELS OF OTHER SERUM ENZYMES: ICD-10-CM

## 2024-10-30 DIAGNOSIS — B18.2 CHRONIC VIRAL HEPATITIS C: ICD-10-CM

## 2024-10-30 PROCEDURE — 76981 USE PARENCHYMA: CPT

## 2024-10-30 PROCEDURE — 99214 OFFICE O/P EST MOD 30 MIN: CPT

## 2024-11-01 ENCOUNTER — NON-APPOINTMENT (OUTPATIENT)
Age: 29
End: 2024-11-01

## 2024-11-01 ENCOUNTER — OUTPATIENT (OUTPATIENT)
Dept: OUTPATIENT SERVICES | Facility: HOSPITAL | Age: 29
LOS: 1 days | End: 2024-11-01

## 2024-11-01 ENCOUNTER — APPOINTMENT (OUTPATIENT)
Dept: TRANSPLANT | Facility: CLINIC | Age: 29
End: 2024-11-01

## 2024-11-01 DIAGNOSIS — T85.528A DISPLACEMENT OF OTHER GASTROINTESTINAL PROSTHETIC DEVICES, IMPLANTS AND GRAFTS, INITIAL ENCOUNTER: Chronic | ICD-10-CM

## 2024-11-01 RX ORDER — VELPATASVIR AND SOFOSBUVIR 100; 400 MG/1; MG/1
400-100 TABLET, FILM COATED ORAL DAILY
Qty: 28 | Refills: 2 | Status: ACTIVE | COMMUNITY
Start: 2024-10-30 | End: 1900-01-01

## 2024-11-01 RX ORDER — FAMOTIDINE 40 MG/1
40 TABLET, FILM COATED ORAL TWICE DAILY
Qty: 180 | Refills: 0 | Status: ACTIVE | COMMUNITY
Start: 2024-11-01 | End: 1900-01-01

## 2024-11-01 RX ORDER — BUPRENORPHINE HYDROCHLORIDE AND NALOXONE HYDROCHLORIDE 8.6; 2.1 MG/1; MG/1
8.6-2.1 TABLET, ORALLY DISINTEGRATING SUBLINGUAL
Refills: 0 | Status: ACTIVE | COMMUNITY
Start: 2024-11-01

## 2024-11-19 DIAGNOSIS — K63.89 OTHER SPECIFIED DISEASES OF INTESTINE: ICD-10-CM

## 2024-12-06 ENCOUNTER — NON-APPOINTMENT (OUTPATIENT)
Age: 29
End: 2024-12-06

## 2024-12-18 ENCOUNTER — APPOINTMENT (OUTPATIENT)
Dept: HEPATOLOGY | Facility: CLINIC | Age: 29
End: 2024-12-18

## 2024-12-27 NOTE — DISCHARGE NOTE NURSING/CASE MANAGEMENT/SOCIAL WORK - NSDCVIVACCINE_GEN_ALL_CORE_FT
Outpatient Care Management Note:    Re:  ADT alert/chart review    Received [email or web address] message from Jodie HOPPER at PCP office and covering nurse care manager Sunil XIE on 12/24/24. Patient called into office to make me aware that he is not feeling well and will be going to emergency room to be admitted due to elevated blood sugars. Sunil covering nurse care manager reached out to patient.     I have not been able to reach patient and sent unable to reach letter on 12/17 to patient.     I did receive ADT alert and chart reviewed. Patient currently admitted to [facility] from 12/24- present for hyperglycemia. Initial blood sugar was over 600. Patient has wounds on left hand and sacrum.     Will continue to follow and plan to outreach when discharged.   
No Vaccines Administered.

## 2024-12-29 ENCOUNTER — NON-APPOINTMENT (OUTPATIENT)
Age: 29
End: 2024-12-29

## 2025-02-07 ENCOUNTER — APPOINTMENT (OUTPATIENT)
Dept: INTERNAL MEDICINE | Facility: CLINIC | Age: 30
End: 2025-02-07

## 2025-02-07 ENCOUNTER — OUTPATIENT (OUTPATIENT)
Dept: OUTPATIENT SERVICES | Facility: HOSPITAL | Age: 30
LOS: 1 days | End: 2025-02-07

## 2025-02-07 DIAGNOSIS — T85.528A DISPLACEMENT OF OTHER GASTROINTESTINAL PROSTHETIC DEVICES, IMPLANTS AND GRAFTS, INITIAL ENCOUNTER: Chronic | ICD-10-CM

## 2025-02-07 DIAGNOSIS — B18.2 CHRONIC VIRAL HEPATITIS C: ICD-10-CM

## 2025-04-17 NOTE — ASU PATIENT PROFILE, ADULT - DOMESTIC TRAVEL HIGH RISK QUESTION
PA started on [employer]:  Key: [id number]  Medication: REPATHA    Approval  for Repatha received. Good through 04/17/26    Outcome  Approved today by [employer] REDDY 2017  PA Case: [id number], Status: Approved, Coverage Starts on: 4/17/2025 12:00:00 AM, Coverage Ends on: 4/17/2026 12:00:00 AM.   No

## 2025-05-05 ENCOUNTER — APPOINTMENT (OUTPATIENT)
Dept: THORACIC SURGERY | Facility: CLINIC | Age: 30
End: 2025-05-05
Payer: MEDICAID

## 2025-05-05 ENCOUNTER — NON-APPOINTMENT (OUTPATIENT)
Age: 30
End: 2025-05-05

## 2025-05-05 VITALS
HEIGHT: 71 IN | OXYGEN SATURATION: 96 % | SYSTOLIC BLOOD PRESSURE: 91 MMHG | BODY MASS INDEX: 21.28 KG/M2 | DIASTOLIC BLOOD PRESSURE: 58 MMHG | WEIGHT: 152 LBS | HEART RATE: 70 BPM | RESPIRATION RATE: 17 BRPM

## 2025-05-05 DIAGNOSIS — K22.2 ESOPHAGEAL OBSTRUCTION: ICD-10-CM

## 2025-05-05 PROCEDURE — 99214 OFFICE O/P EST MOD 30 MIN: CPT

## 2025-05-27 ENCOUNTER — OUTPATIENT (OUTPATIENT)
Dept: OUTPATIENT SERVICES | Facility: HOSPITAL | Age: 30
LOS: 1 days | End: 2025-05-27
Payer: MEDICAID

## 2025-05-27 ENCOUNTER — APPOINTMENT (OUTPATIENT)
Dept: RADIOLOGY | Facility: HOSPITAL | Age: 30
End: 2025-05-27

## 2025-05-27 DIAGNOSIS — K22.2 ESOPHAGEAL OBSTRUCTION: ICD-10-CM

## 2025-05-27 PROCEDURE — 74220 X-RAY XM ESOPHAGUS 1CNTRST: CPT | Mod: 26

## 2025-06-02 ENCOUNTER — APPOINTMENT (OUTPATIENT)
Dept: THORACIC SURGERY | Facility: CLINIC | Age: 30
End: 2025-06-02
Payer: MEDICAID

## 2025-06-02 DIAGNOSIS — K22.2 ESOPHAGEAL OBSTRUCTION: ICD-10-CM

## 2025-06-02 PROCEDURE — 99213 OFFICE O/P EST LOW 20 MIN: CPT | Mod: 93

## 2025-06-03 ENCOUNTER — APPOINTMENT (OUTPATIENT)
Dept: GASTROENTEROLOGY | Facility: CLINIC | Age: 30
End: 2025-06-03
Payer: MEDICAID

## 2025-06-03 ENCOUNTER — NON-APPOINTMENT (OUTPATIENT)
Age: 30
End: 2025-06-03

## 2025-06-03 VITALS
OXYGEN SATURATION: 98 % | HEIGHT: 71 IN | HEART RATE: 77 BPM | WEIGHT: 155 LBS | SYSTOLIC BLOOD PRESSURE: 120 MMHG | BODY MASS INDEX: 21.7 KG/M2 | DIASTOLIC BLOOD PRESSURE: 70 MMHG

## 2025-06-03 DIAGNOSIS — K56.609 UNSPECIFIED INTESTINAL OBSTRUCTION, UNSPECIFIED AS TO PARTIAL VERSUS COMPLETE OBSTRUCTION: ICD-10-CM

## 2025-06-03 PROCEDURE — 99214 OFFICE O/P EST MOD 30 MIN: CPT

## 2025-06-05 DIAGNOSIS — B18.2 CHRONIC VIRAL HEPATITIS C: ICD-10-CM

## 2025-08-15 ENCOUNTER — APPOINTMENT (OUTPATIENT)
Dept: GASTROENTEROLOGY | Facility: HOSPITAL | Age: 30
End: 2025-08-15

## 2025-08-15 ENCOUNTER — OUTPATIENT (OUTPATIENT)
Dept: OUTPATIENT SERVICES | Facility: HOSPITAL | Age: 30
LOS: 1 days | End: 2025-08-15
Payer: MEDICAID

## 2025-08-15 VITALS
RESPIRATION RATE: 16 BRPM | DIASTOLIC BLOOD PRESSURE: 56 MMHG | SYSTOLIC BLOOD PRESSURE: 116 MMHG | HEART RATE: 61 BPM | OXYGEN SATURATION: 95 %

## 2025-08-15 VITALS
DIASTOLIC BLOOD PRESSURE: 62 MMHG | RESPIRATION RATE: 14 BRPM | TEMPERATURE: 98 F | OXYGEN SATURATION: 95 % | SYSTOLIC BLOOD PRESSURE: 119 MMHG | HEART RATE: 66 BPM

## 2025-08-15 DIAGNOSIS — T85.528A DISPLACEMENT OF OTHER GASTROINTESTINAL PROSTHETIC DEVICES, IMPLANTS AND GRAFTS, INITIAL ENCOUNTER: Chronic | ICD-10-CM

## 2025-08-15 DIAGNOSIS — K22.2 ESOPHAGEAL OBSTRUCTION: ICD-10-CM

## 2025-08-15 PROCEDURE — 44361 SMALL BOWEL ENDOSCOPY/BIOPSY: CPT | Mod: GC

## (undated) DEVICE — TUBING INSUFFLATION LAP FILTER 10FT

## (undated) DEVICE — DRSG 2X2

## (undated) DEVICE — DRSG BANDAID 0.75X3"

## (undated) DEVICE — LUBRICATING JELLY HR ONE SHOT 3G

## (undated) DEVICE — TUBING IV SET GRAVITY 3Y 100" MACRO

## (undated) DEVICE — XI ARM FORCEP FENESTRATED BIPOLAR 8MM

## (undated) DEVICE — SALIVA EJECTOR (BLUE)

## (undated) DEVICE — ELCTR ECG CONDUCTIVE ADHESIVE

## (undated) DEVICE — CONTAINER FORMALIN 80ML YELLOW

## (undated) DEVICE — SUT MONOCRYL 4-0 27" PS-2 UNDYED

## (undated) DEVICE — XI ARM FORCEP TENACULUM

## (undated) DEVICE — XI SEAL UNIV 5- 8 MM

## (undated) DEVICE — DRAPE INSTRUMENT POUCH 6.75" X 11"

## (undated) DEVICE — URETERAL CATH RED RUBBER 14FR (GREEN)

## (undated) DEVICE — POSITIONER FOAM HEAD CRADLE (PINK)

## (undated) DEVICE — BIOPSY FORCEP COLD DISP

## (undated) DEVICE — TUBING STRYKEFLOW II SUCTION / IRRIGATOR

## (undated) DEVICE — BASIN EMESIS 10IN GRADUATED MAUVE

## (undated) DEVICE — PACK IV START WITH CHG

## (undated) DEVICE — CLAMP BX HOT RAD JAW 3

## (undated) DEVICE — DENTURE CUP PINK

## (undated) DEVICE — DRAIN PENROSE .25" X 18" LATEX

## (undated) DEVICE — XI ARM PERMANENT CAUTERY HOOK

## (undated) DEVICE — XI ARM FORCEP CADIERE 8MM

## (undated) DEVICE — XI ARM DISSECTOR CURVED BIPOLAR 8MM

## (undated) DEVICE — DRAPE BACK TABLE COVER HEAVY DUTY 60"

## (undated) DEVICE — DRSG CURITY GAUZE SPONGE 4 X 4" 12-PLY NON-STERILE

## (undated) DEVICE — CATH IV SAFE BC 22G X 1" (BLUE)

## (undated) DEVICE — ENDOCATCH 10MM SPECIMEN POUCH

## (undated) DEVICE — BITE BLOCK ADULT 20 X 27MM (GREEN)

## (undated) DEVICE — STAPLER COVIDIEN ENDO GIA STANDARD HANDLE

## (undated) DEVICE — XI VESSEL SEALER

## (undated) DEVICE — SUT BIOSYN 4-0 18" P-12

## (undated) DEVICE — GLV 7.5 PROTEXIS (WHITE)

## (undated) DEVICE — XI TIP COVER

## (undated) DEVICE — LINE BREATHE SAMPLNG

## (undated) DEVICE — SUT VICRYL 0 27" UR-6

## (undated) DEVICE — GOWN LG

## (undated) DEVICE — FORCEP RADIAL JAW 4 PEDIATRIC W NDL 1.8MM 2MM 160CM DISP

## (undated) DEVICE — XI ARM NEEDLE DRIVER SUTURECUT MEGA 8MM

## (undated) DEVICE — DRAIN 24 FR ROUND HUBLESS FULL FLUTED SILICONE

## (undated) DEVICE — DRAIN PENROSE .5" X 18" LATEX

## (undated) DEVICE — SPECIMEN CONTAINER 100ML

## (undated) DEVICE — ELCTR BOVIE TIP BLADE INSULATED 2.75" EDGE

## (undated) DEVICE — BIOPSY FORCEP RADIAL JAW 4 STANDARD WITH NEEDLE

## (undated) DEVICE — GLV 7 PROTEXIS (WHITE)

## (undated) DEVICE — GLV 8.5 PROTEXIS (WHITE)

## (undated) DEVICE — ENDOCATCH GENERAL 15MM (PURPLE)

## (undated) DEVICE — Device

## (undated) DEVICE — TUBING SUCTION NONCONDUCTIVE 6MM X 12FT

## (undated) DEVICE — FOLEY TRAY 16FR 5CC LTX UMETER CLOSED

## (undated) DEVICE — XI ARM FORCEP PROGRASP 8MM

## (undated) DEVICE — TROCAR SURGIQUEST AIRSEAL 12MMX100MM

## (undated) DEVICE — TROCAR COVIDIEN VERSAONE BLADELESS FIXATION 5MM STANDARD

## (undated) DEVICE — INSUFFLATION NDL COVIDIEN STEP 14G FOR STEP/VERSASTEP

## (undated) DEVICE — DRSG TEGADERM 6"X8"

## (undated) DEVICE — GLV 6.5 PROTEXIS (WHITE)

## (undated) DEVICE — WARMING BLANKET LOWER ADULT

## (undated) DEVICE — UNDERPAD LINEN SAVER 17 X 24"

## (undated) DEVICE — SUT ETHIBOND 2-0 36" SH

## (undated) DEVICE — SYR ALLIANCE II INFLATION 60ML

## (undated) DEVICE — XI ARM FORCEP MARYLAND BIPOLAR

## (undated) DEVICE — POLY TRAP ETRAP

## (undated) DEVICE — XI ARM CLIP APPLIER MEDIUM-LARGE

## (undated) DEVICE — XI ARM GRASPER TIP UP FENESTRATED

## (undated) DEVICE — DRAPE MAYO STAND 30"

## (undated) DEVICE — SCOPE WARMER SEAL DISP

## (undated) DEVICE — ELCTR BOVIE TIP BLADE INSULATED 6.5" EDGE

## (undated) DEVICE — XI OBTURATOR OPTICAL BLADELESS 8MM

## (undated) DEVICE — SNARE LESIONHUNTER ROTAT NITNL COLD 10MM

## (undated) DEVICE — SUT SILK 0 24" SH DA

## (undated) DEVICE — SOL IRR POUR NS 0.9% 500ML

## (undated) DEVICE — FACESHIELD FULL VISOR

## (undated) DEVICE — CONTAINER FORMALIN 10% 20ML

## (undated) DEVICE — DRSG OPSITE 13.75 X 4"

## (undated) DEVICE — VENODYNE/SCD SLEEVE CALF MEDIUM

## (undated) DEVICE — XI DRAPE COLUMN

## (undated) DEVICE — TUBING MEDI-VAC W MAXIGRIP CONNECTORS 1/4"X6'

## (undated) DEVICE — D HELP - CLEARVIEW CLEARIFY SYSTEM

## (undated) DEVICE — POSITIONER PINK PAD PIGAZZI SYSTEM

## (undated) DEVICE — SOL IRR POUR H2O 250ML

## (undated) DEVICE — XI NEEDLE DRIVER LARGE

## (undated) DEVICE — XI ARM GRASPER BIPOLAR LONG 8MM

## (undated) DEVICE — SUT VLOC 180 0 9" GS-21 GREEN

## (undated) DEVICE — NDL COUNTER FOAM AND MAGNET 40-70

## (undated) DEVICE — XI DRAPE ARM

## (undated) DEVICE — ELCTR GROUNDING PAD ADULT COVIDIEN

## (undated) DEVICE — DRAPE LIGHT HANDLE COVER (BLUE)

## (undated) DEVICE — XI ARM PERMANENT CAUTERY SPATULA

## (undated) DEVICE — BASIN SET SINGLE

## (undated) DEVICE — PACK ROBOTIC LIJ

## (undated) DEVICE — SUT SILK 2-0 30" SH

## (undated) DEVICE — DRSG STERISTRIPS 0.5 X 4"

## (undated) DEVICE — GLV 8 PROTEXIS (WHITE)

## (undated) DEVICE — POSITIONER FOAM EGG CRATE ULNAR 2PCS (PINK)

## (undated) DEVICE — XI ARM SCISSOR MONO CURVED

## (undated) DEVICE — POSITIONER PURPLE ARM ONE STEP (LARGE)

## (undated) DEVICE — XI ARM CLIP APPLIER LARGE

## (undated) DEVICE — BLADE SCALPEL SAFETY #10 WITH PLASTIC GREEN HANDLE

## (undated) DEVICE — SUT VICRYL 3-0 27" SH UNDYED

## (undated) DEVICE — TUBING AIRSEAL TRI-LUMEN FILTERED